# Patient Record
Sex: FEMALE | Race: WHITE | Employment: FULL TIME | URBAN - METROPOLITAN AREA
[De-identification: names, ages, dates, MRNs, and addresses within clinical notes are randomized per-mention and may not be internally consistent; named-entity substitution may affect disease eponyms.]

---

## 2018-01-17 PROBLEM — K56.609 BOWEL OBSTRUCTION (HCC): Status: ACTIVE | Noted: 2018-01-17

## 2018-01-18 PROBLEM — I87.2 VENOUS INSUFFICIENCY: Chronic | Status: ACTIVE | Noted: 2018-01-18

## 2018-01-18 PROBLEM — K56.609 SBO (SMALL BOWEL OBSTRUCTION) (HCC): Status: ACTIVE | Noted: 2018-01-18

## 2018-01-18 PROBLEM — E87.6 HYPOKALEMIA: Status: ACTIVE | Noted: 2018-01-18

## 2018-01-18 PROBLEM — E66.01 MORBID OBESITY (HCC): Chronic | Status: ACTIVE | Noted: 2018-01-18

## 2018-01-19 PROBLEM — K56.609 SMALL BOWEL OBSTRUCTION (HCC): Status: RESOLVED | Noted: 2018-01-17 | Resolved: 2018-01-19

## 2019-07-15 ENCOUNTER — OFFICE VISIT (OUTPATIENT)
Dept: ORTHOPEDIC SURGERY | Age: 49
End: 2019-07-15
Payer: COMMERCIAL

## 2019-07-15 VITALS — HEIGHT: 63 IN | TEMPERATURE: 98 F | BODY MASS INDEX: 25.69 KG/M2 | WEIGHT: 145 LBS

## 2019-07-15 DIAGNOSIS — M76.829 PTTD (POSTERIOR TIBIAL TENDON DYSFUNCTION): Primary | ICD-10-CM

## 2019-07-15 PROCEDURE — 99203 OFFICE O/P NEW LOW 30 MIN: CPT | Performed by: ORTHOPAEDIC SURGERY

## 2019-07-15 RX ORDER — OMEPRAZOLE 40 MG/1
CAPSULE, DELAYED RELEASE ORAL
Refills: 5 | COMMUNITY
Start: 2019-07-02 | End: 2021-02-17 | Stop reason: ALTCHOICE

## 2019-07-15 RX ORDER — ARIPIPRAZOLE 10 MG/1
TABLET ORAL
Refills: 0 | COMMUNITY
Start: 2019-06-14 | End: 2021-02-17 | Stop reason: ALTCHOICE

## 2019-07-15 RX ORDER — CYCLOBENZAPRINE HCL 10 MG
TABLET ORAL
Refills: 0 | COMMUNITY
Start: 2019-07-02 | End: 2021-02-17 | Stop reason: ALTCHOICE

## 2019-07-15 RX ORDER — BUMETANIDE 1 MG/1
TABLET ORAL
Refills: 5 | COMMUNITY
Start: 2019-07-02

## 2019-07-15 RX ORDER — HYDROXYZINE PAMOATE 25 MG/1
CAPSULE ORAL
Refills: 0 | COMMUNITY
Start: 2019-07-05 | End: 2021-02-24 | Stop reason: DRUGHIGH

## 2019-07-15 NOTE — PROGRESS NOTES
Chief Complaint   Patient presents with    Ankle Pain     Left Ankle, x 1 year       Marcelo Rushing is a 50 y.o.  @female@ who presents today with a bilateral ankle injury. The injury occurred 1 years. The history of injury was from no. The patient complains of pain over ptt. The intensity is 8/10. The pain is described as: sharp. Previous treatment includes: nothing. Past Medical History:   Diagnosis Date    Bursitis     hips    Diverticulitis     GERD (gastroesophageal reflux disease)      Past Surgical History:   Procedure Laterality Date    CHOLECYSTECTOMY      COLECTOMY      HERNIA REPAIR      HERNIA REPAIR  08/25/2015    incarcerated hernia repair    HYSTERECTOMY      NERVE BLOCK      sacroiliac bilateral 12-    PELVIC FRACTURE SURGERY         Current Outpatient Medications:     hydrOXYzine (VISTARIL) 25 MG capsule, TAKE 1 CAPSULE BY ORAL ROUTE 3 TIMES EVERY DAY AS NEEDED, Disp: , Rfl: 0    omeprazole (PRILOSEC) 40 MG delayed release capsule, TAKE ONE CAPSULE EVERY DAY, Disp: , Rfl: 5    cyclobenzaprine (FLEXERIL) 10 MG tablet, TAKE ONE TABLET 2 TIMES A DAY IF NEEDED CAUSES DROWSINESS,NO ALCOHOL, Disp: , Rfl: 0    ARIPiprazole (ABILIFY) 10 MG tablet, TAKE 1/2 TAB FOR 7 DAYS AND THEN 1 TABLET BY ORAL ROUTE EVERY DAY WITH 350 CALORIES OF FOOD, Disp: , Rfl: 0    bumetanide (BUMEX) 1 MG tablet, TAKE ONE TABLET 2 TIMES A DAY, Disp: , Rfl: 5    pantoprazole (PROTONIX) 40 MG tablet, Take 40 mg by mouth daily.   , Disp: , Rfl:   Allergies   Allergen Reactions    Ibuprofen Hives    Morphine Hives and Anxiety     Social History     Socioeconomic History    Marital status:      Spouse name: Not on file    Number of children: Not on file    Years of education: Not on file    Highest education level: Not on file   Occupational History    Not on file   Social Needs    Financial resource strain: Not on file    Food insecurity:     Worry: Not on file     Inability: Not on file    Transportation needs:     Medical: Not on file     Non-medical: Not on file   Tobacco Use    Smoking status: Current Every Day Smoker     Packs/day: 0.50     Types: Cigarettes    Smokeless tobacco: Never Used   Substance and Sexual Activity    Alcohol use: No    Drug use: No    Sexual activity: Not on file   Lifestyle    Physical activity:     Days per week: Not on file     Minutes per session: Not on file    Stress: Not on file   Relationships    Social connections:     Talks on phone: Not on file     Gets together: Not on file     Attends [employer] service: Not on file     Active member of club or organization: Not on file     Attends meetings of clubs or organizations: Not on file     Relationship status: Not on file    Intimate partner violence:     Fear of current or ex partner: Not on file     Emotionally abused: Not on file     Physically abused: Not on file     Forced sexual activity: Not on file   Other Topics Concern    Not on file   Social History Narrative    Not on file     Family History   Problem Relation Age of Onset    Osteoarthritis Other     Diabetes Other        REVIEW OF SYSTEMS:     General/Constitution:  (-)weight loss, (-)fever, (-)chills, (-)weakness. Skin: (-) rash,(-) psoriasis,(-) eczema, (-)skin cancer. Musculoskeletal: (-) fractures,  (-) dislocations,(-) collagen vascular disease, (-) fibromyalgia, (-) multiple sclerosis, (-) muscular dystrophy, (-) RSD,(-) joint pain (-)swelling, (-) joint pain,swelling. Neurologic: (-) epilepsy, (-)seizures,(-) brain tumor,(-) TIA, (-)stroke, (-)headaches, (-)Parkinson disease,(-) memory loss, (-) LOC. Cardiovascular: (-) Chest pain, (-) swelling in legs/feet, (-) SOB, (-) cramping in legs/feet with walking. Respiratory: (-) SOB, (-) Coughing, (-) night sweats. GI: (-) nausea, (-) vomiting, (-) diarrhea, (-) blood in stool, (-) gastric ulcer.   Psychiatric: (-) Depression, (-) Anxiety, (-) bipolar disease, (-) ligamentous instability, there is not  deformity noted. Knee exam: the injured knee reveals normal exam, no swelling, tenderness, instability; ligaments intact, FROM. Knee exam: neither positive for moderate crepitations, some mild tenderness laxity is not noted with  stress. Ankle Exam:    Upon inspection and palpation of the Bilateral ankle,  there is not deformity noted,  no swelling, no ecchymosis, does not have pain on palpation of posterior tibial tendon. ROM R/L : DF 10/10; PF 30/30; INV 15/15, SANTIAGO 15/15. This exam was compared bilaterally. Right Ankle:   (-) Anterior Drawer ,  (-) Posterior Drawer ,  (-) Squeeze test,  (-) External Rotation, (-) Eversion test , (-) Zabala Test     Left ankle:   (-) Anterior Drawer ,(-)  Posterior Drawer ,(-) Squeeze test,(-) External Rotation (-) Eversion test, (-) Zabala Test.      Foot exam- visual inspection reveals warm, good capillary refill, there is  pain to palpation over the posterior tibial tendon. ROM inversion/eversion diminished range of motion, abduction/adduction diminished range of motion, ROM in MTP/PIP/DIP diminished range of motion. Xrays:n/a  Radiographic findings reviewed with patient      Impression:  Encounter Diagnosis   Name Primary?  PTTD (posterior tibial tendon dysfunction) Yes         Plan:Natural history and expected course discussed. Questions answered. Rest, ice, compression, elevation (RICE) therapy.    refer to [employer]

## 2021-02-17 ENCOUNTER — HOSPITAL ENCOUNTER (EMERGENCY)
Age: 51
Discharge: HOME | End: 2021-02-17
Payer: MEDICARE

## 2021-02-17 VITALS
BODY MASS INDEX: 35.44 KG/M2 | WEIGHT: 200 LBS | HEIGHT: 63 IN | SYSTOLIC BLOOD PRESSURE: 118 MMHG | DIASTOLIC BLOOD PRESSURE: 80 MMHG | TEMPERATURE: 98.7 F | HEART RATE: 86 BPM | RESPIRATION RATE: 20 BRPM | OXYGEN SATURATION: 94 %

## 2021-02-17 DIAGNOSIS — R10.9 ABDOMINAL PAIN, UNSPECIFIED ABDOMINAL LOCATION: Primary | ICD-10-CM

## 2021-02-17 DIAGNOSIS — R53.83 FATIGUE, UNSPECIFIED TYPE: ICD-10-CM

## 2021-02-17 PROCEDURE — 99211 OFF/OP EST MAY X REQ PHY/QHP: CPT

## 2021-02-17 RX ORDER — PAROXETINE 10 MG/1
10 TABLET, FILM COATED ORAL EVERY MORNING
COMMUNITY

## 2021-02-17 RX ORDER — PREDNISONE 1 MG/1
1 TABLET ORAL DAILY
COMMUNITY
Start: 2021-02-15 | End: 2021-02-19

## 2021-02-17 ASSESSMENT — PAIN DESCRIPTION - PROGRESSION
CLINICAL_PROGRESSION: NOT CHANGED
CLINICAL_PROGRESSION: GRADUALLY WORSENING

## 2021-02-17 ASSESSMENT — PAIN DESCRIPTION - FREQUENCY: FREQUENCY: CONTINUOUS

## 2021-02-17 ASSESSMENT — PAIN DESCRIPTION - LOCATION
LOCATION: ABDOMEN
LOCATION: ABDOMEN

## 2021-02-17 ASSESSMENT — PAIN DESCRIPTION - PAIN TYPE: TYPE: ACUTE PAIN

## 2021-02-17 ASSESSMENT — PAIN - FUNCTIONAL ASSESSMENT
PAIN_FUNCTIONAL_ASSESSMENT: ACTIVITIES ARE NOT PREVENTED
PAIN_FUNCTIONAL_ASSESSMENT: ACTIVITIES ARE NOT PREVENTED

## 2021-02-17 ASSESSMENT — PAIN SCALES - GENERAL: PAINLEVEL_OUTOF10: 10

## 2021-02-17 ASSESSMENT — PAIN DESCRIPTION - ONSET: ONSET: ON-GOING

## 2021-02-17 NOTE — ED PROVIDER NOTES
Department of Emergency [address] [facility]  Provider Note  Admit Date/Time: 2021  3:02 PM  Room:   NAME: Darrold Bernheim  : 1970  MRN: [id number]     Chief Complaint:  Shortness of Breath, Fatigue, and Abdominal Pain    History of Present Illness        Darrold Bernheim is a 48 y.o. female who has a past medical history of:   Past Medical History:   Diagnosis Date    Bursitis     hips    Diverticulitis     GERD (gastroesophageal reflux disease)     presents to the urgent care center by private car for complaints of extreme fatigue and abdominal pain. She says that she cannot stay awake. She says that she cannot eat or drink. This has been going on for 2 days. Her last BM was 2 days ago. She says that she has a history of hernias and bowel obstruction. Pain is in the upper abdomen. She believes that she is dehydrated. ROS    Pertinent positives and negatives are stated within HPI, all other systems reviewed and are negative. Past Surgical History:   Procedure Laterality Date    CHOLECYSTECTOMY      COLECTOMY      HERNIA REPAIR      HERNIA REPAIR  2015    incarcerated hernia repair    HYSTERECTOMY      NERVE BLOCK      sacroiliac bilateral 2012    PELVIC FRACTURE SURGERY     Social History:  reports that she has been smoking cigarettes. She has been smoking about 0.50 packs per day. She has never used smokeless tobacco. She reports that she does not drink alcohol or use drugs. Family History: family history includes Diabetes in an other family member; Osteoarthritis in an other family member.   Allergies: Ibuprofen and Morphine    Physical Exam   Oxygen Saturation Interpretation: Normal.   ED Triage Vitals [21 1502]   BP Temp Temp Source Pulse Resp SpO2 Height Weight   118/80 98.7 °F (37.1 °C) Temporal 86 20 94 % 5' 3\" (1.6 m) 200 lb (90.7 kg)       Physical Exam  · Constitutional/General: She was sleeping on the exam table when I entered the room she aroused easily. States she is just very fatigued and cannot stay awake. · HEENT:  NC/NT, clear conjunctiva Airway patent. · Neck: Supple, full ROM,  · Respiratory: Lungs clear to auscultation bilaterally, no wheezes, rales, or rhonchi. Not in respiratory distress  · CV:  Regular rate. Regular rhythm. No murmurs, gallops, or rubs. 2+ distal pulses    · GI:  Abdomen Soft, tender throughout the upper abdomen, multiple abdominal scars  · Musculoskeletal: Moves all extremities x 4. Warm and well perfused, no clubbing, cyanosis, or edema. Capillary refill <3 seconds  · Integument: skin warm and dry. No rashes. · Lymphatic: no lymphadenopathy noted  · Neurologic: GCS 15, no focal deficits,     Lab / Imaging Results   (All laboratory and radiology results have been personally reviewed by myself)  Labs:  No results found for this visit on 02/17/21. Imaging: All Radiology results interpreted by Radiologist unless otherwise noted. No orders to display       ED Course / Medical Decision Making   Medications - No data to display       MDM:   With extreme pain in the upper abdomen. She said she has had this before and it was a bowel obstruction and they had to admit her an  treat her with an NG tube and rest.  She said she has not been able to eat or drink for 2 days she is very fatigued , her last bowel movement was 2 days ago. Says she has also has a history of multiple hernia repairs and surgeries. She denies any chest pain or fever. I advised the patient that she will need to go to the Emergency Department for evaluation of her symptoms. Her friend drove her. She does not want ambulance transfer. Assessment      1. Abdominal pain, unspecified abdominal location    2.  Fatigue, unspecified type      Plan   Advised to go to the Emergency Department for evaluation  New Medications     New Prescriptions    No medications on file     Electronically signed by BOB Odell - POLO   DD:

## 2021-02-20 ENCOUNTER — APPOINTMENT (OUTPATIENT)
Dept: ULTRASOUND IMAGING | Age: 51
DRG: 254 | End: 2021-02-20
Payer: MEDICARE

## 2021-02-20 ENCOUNTER — APPOINTMENT (OUTPATIENT)
Dept: CT IMAGING | Age: 51
DRG: 254 | End: 2021-02-20
Payer: MEDICARE

## 2021-02-20 ENCOUNTER — HOSPITAL ENCOUNTER (INPATIENT)
Age: 51
LOS: 2 days | Discharge: HOME | DRG: 254 | End: 2021-02-22
Attending: EMERGENCY MEDICINE | Admitting: SURGERY
Payer: MEDICARE

## 2021-02-20 DIAGNOSIS — K56.609 SMALL BOWEL OBSTRUCTION (HCC): ICD-10-CM

## 2021-02-20 DIAGNOSIS — K43.6 INCARCERATED VENTRAL HERNIA: Primary | ICD-10-CM

## 2021-02-20 DIAGNOSIS — J02.0 STREPTOCOCCAL SORE THROAT: ICD-10-CM

## 2021-02-20 PROBLEM — K46.0 INCARCERATED HERNIA: Status: ACTIVE | Noted: 2021-02-20

## 2021-02-20 LAB
ALBUMIN SERPL-MCNC: 4.1 G/DL (ref 3.5–5.2)
ALP BLD-CCNC: 71 U/L (ref 35–104)
ALT SERPL-CCNC: 105 U/L (ref 0–32)
ANION GAP SERPL CALCULATED.3IONS-SCNC: 15 MMOL/L (ref 7–16)
AST SERPL-CCNC: 85 U/L (ref 0–31)
BACTERIA: ABNORMAL /HPF
BASOPHILS ABSOLUTE: 0.04 E9/L (ref 0–0.2)
BASOPHILS RELATIVE PERCENT: 0.6 % (ref 0–2)
BILIRUB SERPL-MCNC: 0.3 MG/DL (ref 0–1.2)
BILIRUBIN URINE: NEGATIVE
BLOOD, URINE: ABNORMAL
BUN BLDV-MCNC: 9 MG/DL (ref 6–20)
CALCIUM SERPL-MCNC: 8.2 MG/DL (ref 8.6–10.2)
CHLORIDE BLD-SCNC: 100 MMOL/L (ref 98–107)
CLARITY: ABNORMAL
CO2: 23 MMOL/L (ref 22–29)
COLOR: YELLOW
CREAT SERPL-MCNC: 0.7 MG/DL (ref 0.5–1)
EOSINOPHILS ABSOLUTE: 0.15 E9/L (ref 0.05–0.5)
EOSINOPHILS RELATIVE PERCENT: 2.2 % (ref 0–6)
EPITHELIAL CELLS, UA: ABNORMAL /HPF
GFR AFRICAN AMERICAN: >60
GFR NON-AFRICAN AMERICAN: >60 ML/MIN/1.73
GLUCOSE BLD-MCNC: 86 MG/DL (ref 74–99)
GLUCOSE URINE: NEGATIVE MG/DL
HCT VFR BLD CALC: 47.6 % (ref 34–48)
HEMOGLOBIN: 16.1 G/DL (ref 11.5–15.5)
IMMATURE GRANULOCYTES #: 0.06 E9/L
IMMATURE GRANULOCYTES %: 0.9 % (ref 0–5)
KETONES, URINE: NEGATIVE MG/DL
LACTIC ACID: 2.7 MMOL/L (ref 0.5–2.2)
LEUKOCYTE ESTERASE, URINE: ABNORMAL
LIPASE: 82 U/L (ref 13–60)
LYMPHOCYTES ABSOLUTE: 1.53 E9/L (ref 1.5–4)
LYMPHOCYTES RELATIVE PERCENT: 22.4 % (ref 20–42)
MCH RBC QN AUTO: 32.7 PG (ref 26–35)
MCHC RBC AUTO-ENTMCNC: 33.8 % (ref 32–34.5)
MCV RBC AUTO: 96.6 FL (ref 80–99.9)
MONOCYTES ABSOLUTE: 0.48 E9/L (ref 0.1–0.95)
MONOCYTES RELATIVE PERCENT: 7 % (ref 2–12)
NEUTROPHILS ABSOLUTE: 4.56 E9/L (ref 1.8–7.3)
NEUTROPHILS RELATIVE PERCENT: 66.9 % (ref 43–80)
NITRITE, URINE: NEGATIVE
PDW BLD-RTO: 16.1 FL (ref 11.5–15)
PH UA: 5.5 (ref 5–9)
PLATELET # BLD: 235 E9/L (ref 130–450)
PMV BLD AUTO: 9.4 FL (ref 7–12)
POTASSIUM REFLEX MAGNESIUM: 4.4 MMOL/L (ref 3.5–5)
PROTEIN UA: NEGATIVE MG/DL
RBC # BLD: 4.93 E12/L (ref 3.5–5.5)
RBC UA: ABNORMAL /HPF (ref 0–2)
SARS-COV-2, NAAT: NOT DETECTED
SODIUM BLD-SCNC: 138 MMOL/L (ref 132–146)
SPECIFIC GRAVITY UA: 1.02 (ref 1–1.03)
STREP GRP A PCR: POSITIVE
TOTAL PROTEIN: 6.6 G/DL (ref 6.4–8.3)
TROPONIN: <0.01 NG/ML (ref 0–0.03)
UROBILINOGEN, URINE: 0.2 E.U./DL
WBC # BLD: 6.8 E9/L (ref 4.5–11.5)
WBC UA: ABNORMAL /HPF (ref 0–5)

## 2021-02-20 PROCEDURE — 96374 THER/PROPH/DIAG INJ IV PUSH: CPT

## 2021-02-20 PROCEDURE — 96361 HYDRATE IV INFUSION ADD-ON: CPT

## 2021-02-20 PROCEDURE — C9113 INJ PANTOPRAZOLE SODIUM, VIA: HCPCS | Performed by: STUDENT IN AN ORGANIZED HEALTH CARE EDUCATION/TRAINING PROGRAM

## 2021-02-20 PROCEDURE — 1200000000 HC SEMI PRIVATE

## 2021-02-20 PROCEDURE — 2580000003 HC RX 258: Performed by: EMERGENCY MEDICINE

## 2021-02-20 PROCEDURE — 6360000002 HC RX W HCPCS: Performed by: SURGERY

## 2021-02-20 PROCEDURE — 93005 ELECTROCARDIOGRAM TRACING: CPT | Performed by: STUDENT IN AN ORGANIZED HEALTH CARE EDUCATION/TRAINING PROGRAM

## 2021-02-20 PROCEDURE — 6360000004 HC RX CONTRAST MEDICATION: Performed by: RADIOLOGY

## 2021-02-20 PROCEDURE — 96375 TX/PRO/DX INJ NEW DRUG ADDON: CPT

## 2021-02-20 PROCEDURE — 80053 COMPREHEN METABOLIC PANEL: CPT

## 2021-02-20 PROCEDURE — 83690 ASSAY OF LIPASE: CPT

## 2021-02-20 PROCEDURE — 71275 CT ANGIOGRAPHY CHEST: CPT

## 2021-02-20 PROCEDURE — 83605 ASSAY OF LACTIC ACID: CPT

## 2021-02-20 PROCEDURE — 74177 CT ABD & PELVIS W/CONTRAST: CPT

## 2021-02-20 PROCEDURE — 85025 COMPLETE CBC W/AUTO DIFF WBC: CPT

## 2021-02-20 PROCEDURE — 6360000002 HC RX W HCPCS: Performed by: STUDENT IN AN ORGANIZED HEALTH CARE EDUCATION/TRAINING PROGRAM

## 2021-02-20 PROCEDURE — 81001 URINALYSIS AUTO W/SCOPE: CPT

## 2021-02-20 PROCEDURE — 99285 EMERGENCY DEPT VISIT HI MDM: CPT

## 2021-02-20 PROCEDURE — 84484 ASSAY OF TROPONIN QUANT: CPT

## 2021-02-20 PROCEDURE — 2580000003 HC RX 258: Performed by: SURGERY

## 2021-02-20 PROCEDURE — 87635 SARS-COV-2 COVID-19 AMP PRB: CPT

## 2021-02-20 PROCEDURE — 2500000003 HC RX 250 WO HCPCS: Performed by: STUDENT IN AN ORGANIZED HEALTH CARE EDUCATION/TRAINING PROGRAM

## 2021-02-20 PROCEDURE — 2580000003 HC RX 258: Performed by: STUDENT IN AN ORGANIZED HEALTH CARE EDUCATION/TRAINING PROGRAM

## 2021-02-20 PROCEDURE — 87880 STREP A ASSAY W/OPTIC: CPT

## 2021-02-20 PROCEDURE — 93971 EXTREMITY STUDY: CPT

## 2021-02-20 RX ORDER — ONDANSETRON 4 MG/1
4 TABLET, ORALLY DISINTEGRATING ORAL EVERY 8 HOURS PRN
Status: DISCONTINUED | OUTPATIENT
Start: 2021-02-20 | End: 2021-02-22 | Stop reason: HOSPADM

## 2021-02-20 RX ORDER — MEPERIDINE HYDROCHLORIDE 25 MG/ML
25 INJECTION INTRAMUSCULAR; INTRAVENOUS; SUBCUTANEOUS EVERY 4 HOURS PRN
Status: DISCONTINUED | OUTPATIENT
Start: 2021-02-20 | End: 2021-02-22 | Stop reason: HOSPADM

## 2021-02-20 RX ORDER — SODIUM CHLORIDE 0.9 % (FLUSH) 0.9 %
10 SYRINGE (ML) INJECTION PRN
Status: DISCONTINUED | OUTPATIENT
Start: 2021-02-20 | End: 2021-02-22 | Stop reason: HOSPADM

## 2021-02-20 RX ORDER — SODIUM CHLORIDE 0.9 % (FLUSH) 0.9 %
10 SYRINGE (ML) INJECTION EVERY 12 HOURS SCHEDULED
Status: DISCONTINUED | OUTPATIENT
Start: 2021-02-20 | End: 2021-02-20 | Stop reason: SDUPTHER

## 2021-02-20 RX ORDER — ONDANSETRON 2 MG/ML
4 INJECTION INTRAMUSCULAR; INTRAVENOUS EVERY 6 HOURS PRN
Status: DISCONTINUED | OUTPATIENT
Start: 2021-02-20 | End: 2021-02-22 | Stop reason: HOSPADM

## 2021-02-20 RX ORDER — MORPHINE SULFATE 2 MG/ML
2 INJECTION, SOLUTION INTRAMUSCULAR; INTRAVENOUS
Status: DISCONTINUED | OUTPATIENT
Start: 2021-02-20 | End: 2021-02-20 | Stop reason: ALTCHOICE

## 2021-02-20 RX ORDER — SODIUM CHLORIDE, SODIUM LACTATE, POTASSIUM CHLORIDE, CALCIUM CHLORIDE 600; 310; 30; 20 MG/100ML; MG/100ML; MG/100ML; MG/100ML
INJECTION, SOLUTION INTRAVENOUS CONTINUOUS
Status: DISCONTINUED | OUTPATIENT
Start: 2021-02-20 | End: 2021-02-22

## 2021-02-20 RX ORDER — 0.9 % SODIUM CHLORIDE 0.9 %
1000 INTRAVENOUS SOLUTION INTRAVENOUS ONCE
Status: COMPLETED | OUTPATIENT
Start: 2021-02-20 | End: 2021-02-20

## 2021-02-20 RX ORDER — ONDANSETRON 2 MG/ML
4 INJECTION INTRAMUSCULAR; INTRAVENOUS ONCE
Status: COMPLETED | OUTPATIENT
Start: 2021-02-20 | End: 2021-02-20

## 2021-02-20 RX ORDER — PANTOPRAZOLE SODIUM 40 MG/10ML
40 INJECTION, POWDER, LYOPHILIZED, FOR SOLUTION INTRAVENOUS ONCE
Status: COMPLETED | OUTPATIENT
Start: 2021-02-20 | End: 2021-02-20

## 2021-02-20 RX ORDER — FENTANYL CITRATE 50 UG/ML
50 INJECTION, SOLUTION INTRAMUSCULAR; INTRAVENOUS ONCE
Status: COMPLETED | OUTPATIENT
Start: 2021-02-20 | End: 2021-02-20

## 2021-02-20 RX ORDER — ACETAMINOPHEN 325 MG/1
650 TABLET ORAL EVERY 4 HOURS PRN
Status: DISCONTINUED | OUTPATIENT
Start: 2021-02-20 | End: 2021-02-22 | Stop reason: HOSPADM

## 2021-02-20 RX ORDER — SODIUM CHLORIDE 0.9 % (FLUSH) 0.9 %
10 SYRINGE (ML) INJECTION EVERY 12 HOURS SCHEDULED
Status: DISCONTINUED | OUTPATIENT
Start: 2021-02-20 | End: 2021-02-22 | Stop reason: HOSPADM

## 2021-02-20 RX ORDER — MEPERIDINE HYDROCHLORIDE 50 MG/ML
50 INJECTION INTRAMUSCULAR; INTRAVENOUS; SUBCUTANEOUS EVERY 4 HOURS PRN
Status: DISCONTINUED | OUTPATIENT
Start: 2021-02-20 | End: 2021-02-22 | Stop reason: HOSPADM

## 2021-02-20 RX ORDER — SODIUM CHLORIDE 0.9 % (FLUSH) 0.9 %
10 SYRINGE (ML) INJECTION PRN
Status: DISCONTINUED | OUTPATIENT
Start: 2021-02-20 | End: 2021-02-20 | Stop reason: SDUPTHER

## 2021-02-20 RX ORDER — MORPHINE SULFATE 4 MG/ML
4 INJECTION, SOLUTION INTRAMUSCULAR; INTRAVENOUS
Status: DISCONTINUED | OUTPATIENT
Start: 2021-02-20 | End: 2021-02-20 | Stop reason: ALTCHOICE

## 2021-02-20 RX ADMIN — FENTANYL CITRATE 50 MCG: 50 INJECTION, SOLUTION INTRAMUSCULAR; INTRAVENOUS at 19:29

## 2021-02-20 RX ADMIN — MEPERIDINE HYDROCHLORIDE 50 MG: 50 INJECTION, SOLUTION INTRAMUSCULAR; INTRAVENOUS; SUBCUTANEOUS at 21:30

## 2021-02-20 RX ADMIN — METRONIDAZOLE 500 MG: 500 INJECTION, SOLUTION INTRAVENOUS at 19:22

## 2021-02-20 RX ADMIN — IOPAMIDOL 75 ML: 755 INJECTION, SOLUTION INTRAVENOUS at 17:50

## 2021-02-20 RX ADMIN — PANTOPRAZOLE SODIUM 40 MG: 40 INJECTION, POWDER, FOR SOLUTION INTRAVENOUS at 16:53

## 2021-02-20 RX ADMIN — Medication 10 ML: at 23:05

## 2021-02-20 RX ADMIN — SODIUM CHLORIDE 1000 ML: 9 INJECTION, SOLUTION INTRAVENOUS at 18:00

## 2021-02-20 RX ADMIN — IOHEXOL 50 ML: 240 INJECTION, SOLUTION INTRATHECAL; INTRAVASCULAR; INTRAVENOUS; ORAL at 17:50

## 2021-02-20 RX ADMIN — ONDANSETRON 4 MG: 2 INJECTION INTRAMUSCULAR; INTRAVENOUS at 20:30

## 2021-02-20 RX ADMIN — ONDANSETRON 4 MG: 2 INJECTION INTRAMUSCULAR; INTRAVENOUS at 16:51

## 2021-02-20 RX ADMIN — WATER 1000 MG: 1 INJECTION INTRAMUSCULAR; INTRAVENOUS; SUBCUTANEOUS at 19:24

## 2021-02-20 RX ADMIN — ENOXAPARIN SODIUM 40 MG: 40 INJECTION SUBCUTANEOUS at 19:27

## 2021-02-20 RX ADMIN — SODIUM CHLORIDE 1000 ML: 9 INJECTION, SOLUTION INTRAVENOUS at 16:50

## 2021-02-20 RX ADMIN — SODIUM CHLORIDE, POTASSIUM CHLORIDE, SODIUM LACTATE AND CALCIUM CHLORIDE: 600; 310; 30; 20 INJECTION, SOLUTION INTRAVENOUS at 23:04

## 2021-02-20 ASSESSMENT — ENCOUNTER SYMPTOMS
NAUSEA: 1
DIARRHEA: 1
ABDOMINAL PAIN: 1
EYES NEGATIVE: 1
SHORTNESS OF BREATH: 0
SORE THROAT: 1
VOMITING: 1
COUGH: 0

## 2021-02-20 ASSESSMENT — PAIN SCALES - GENERAL: PAINLEVEL_OUTOF10: 10

## 2021-02-20 ASSESSMENT — PAIN DESCRIPTION - PROGRESSION: CLINICAL_PROGRESSION: NOT CHANGED

## 2021-02-20 ASSESSMENT — PAIN - FUNCTIONAL ASSESSMENT: PAIN_FUNCTIONAL_ASSESSMENT: ACTIVITIES ARE NOT PREVENTED

## 2021-02-20 ASSESSMENT — PAIN DESCRIPTION - FREQUENCY: FREQUENCY: CONTINUOUS

## 2021-02-20 ASSESSMENT — PAIN DESCRIPTION - DESCRIPTORS: DESCRIPTORS: SHARP;BURNING

## 2021-02-20 ASSESSMENT — PAIN DESCRIPTION - ORIENTATION: ORIENTATION: MID;LOWER

## 2021-02-20 ASSESSMENT — PAIN DESCRIPTION - ONSET: ONSET: ON-GOING

## 2021-02-20 NOTE — ED NOTES
Bed: 14  Expected date:   Expected time:   Means of arrival:   Comments:  Rosales Klein RN  02/20/21 2650

## 2021-02-20 NOTE — ED PROVIDER NOTES
43-year-old female with a history of multiple abdominal hernias presenting for evaluation of left-sided abdominal pain. She states for the past week she has felt a burning in the left side of her abdomen and has had nausea, emesis, and diarrhea. She is also had a sore throat and a cough productive of green sputum. She states she has not been able to keep anything down for several days, and complains of generalized weakness. States she has been in bed for the past 4 days. She also complains of swelling in her left leg that she says is new. She endorses shortness of breath and dizziness and states she had blood-tinged emesis 2 days ago. She was seen at [facility] recently and told she had bowel loops stuck in her hernia. She denies fever, chest pain, dysuria, hematemesis, hematochezia, and melena. Review of Systems   Constitutional: Positive for fatigue. Negative for chills and fever. HENT: Positive for congestion and sore throat. Eyes: Negative. Respiratory: Negative for cough and shortness of breath. Cardiovascular: Positive for leg swelling. Negative for chest pain. Gastrointestinal: Positive for abdominal pain, diarrhea, nausea and vomiting. Endocrine: Negative. Genitourinary: Negative for dysuria and frequency. Musculoskeletal: Negative for neck pain and neck stiffness. Skin: Negative for rash and wound. Neurological: Positive for light-headedness and headaches. Negative for dizziness. Physical Exam  Constitutional:       General: She is not in acute distress. Appearance: She is obese. She is not toxic-appearing. Comments: Appears tired   HENT:      Head: Normocephalic. Mouth/Throat:      Comments: 3+ tonsillar swelling bilaterally with erythema  Postnasal drainage in posterior pharynx  Dry oral mucous membranes  Eyes:      General: No scleral icterus. Extraocular Movements: Extraocular movements intact.    Cardiovascular:      Rate and Rhythm: Normal rate and regular rhythm. Pulmonary:      Effort: Pulmonary effort is normal. No respiratory distress. Breath sounds: No stridor. Wheezing present. No rhonchi or rales. Comments: Mild wheezes bilaterally  Abdominal:      General: There is no distension. Palpations: Abdomen is soft. Tenderness: There is abdominal tenderness in the left upper quadrant and left lower quadrant. Hernia: No hernia is present. Skin:     General: Skin is warm and dry. Neurological:      General: No focal deficit present. Mental Status: She is alert. Procedures     MDM  Number of Diagnoses or Management Options  Incarcerated ventral hernia  Small bowel obstruction ([address]  Streptococcal sore throat  Diagnosis management comments: 27-year-old female with a history of multiple abdominal hernias presenting for evaluation of left-sided abdominal pain with nausea, emesis, and diarrhea. Also complaining of sore throat, productive cough, shortness of breath, and left leg swelling. No acute ischemic changes on EKG. Troponin negative. CBC unremarkable, CMP. Lactate 2.7, lipase 82. Trace leukocyte esterase and many bacteria on UA. Strep screen positive. CT abdomen/pelvis showed incarcerated ventral hernia and SBO. US showed no DVT in L leg. Due to patient's history of pulmonary nodules and recent immobilization, as well as complaints of shortness of breath, CTA pulmonary was performed. No PE detected, but did show some groundglass opacities in bases. Rapid COVID negative. Dr. Chet Harding was consulted and agreed to admit patient. She was given rocephin and flagyl in the ED, as well as fluids, zofran, and protonix. NG tube was placed. She remained hemodynamically stable in the ED and was admitted to [facility].        ED Course as of Feb 21 1458   Sat Feb 20, 2021   1543 ATTENDING PROVIDER ATTESTATION:     I have personally performed and/or participated in the history, exam, medical decision making, and procedures and agree with all pertinent clinical information unless otherwise noted. I have also reviewed and agree with the past medical, family and social history unless otherwise noted. I have discussed this patient in detail with the resident, and provided the instruction and education regarding patient here with multiple complaints, she is complaining of abdominal pain with nausea and vomiting and diarrhea as well as generalized fatigue and weakness and body swelling and coughing short of breath. She basically has a positive review of all systems. symptoms have all been present for about 4 -5 days. No actual fevers. States she had a negative Covid test and was recently seen at a local hospital and worked up and discharged. She states nothing really makes her better or worse. .  My findings/plan: Patient is laying in the bed in no distress, heart rate regular, lungs with mild scattered wheezing with no respiratory distress. She has no jaundice or icterus. Abdomen soft and while she is talking there is no tenderness and then as she focuses on the exam she complains of pain at various areas of palpation which seems to migrate during the exam.  No pulsatile masses. No focal or consistent area of tenderness in the abdomen. No calf pain bilaterally. She is neurovascular intact all extremities. Very dramatic in presentation. [NC]   4636 Pt lying in bed. Complaining of pain. Just got back from [address] and is just now getting medications. Will continue to monitor.    [AP]   5655 Notified by nurse that while patient was sleeping, she desatted to 86% on room air. He woke her up and applied 3 L NC O2 and sats improved to 94%. Patient in CT at this time.    [AP]   [address] to Dr. Rambo Horan, discussed case. He is agreeable to admission.     [AP]      ED Course User Index  [AP] Jon Antonio MD  [NC] Chanel Nieto, DO          ED Course as of Feb 21 1508   Sat Feb 20, 2021   7497 ATTENDING PROVIDER ATTESTATION: I have personally performed and/or participated in the history, exam, medical decision making, and procedures and agree with all pertinent clinical information unless otherwise noted. I have also reviewed and agree with the past medical, family and social history unless otherwise noted. I have discussed this patient in detail with the resident, and provided the instruction and education regarding patient here with multiple complaints, she is complaining of abdominal pain with nausea and vomiting and diarrhea as well as generalized fatigue and weakness and body swelling and coughing short of breath. She basically has a positive review of all systems. symptoms have all been present for about 4 -5 days. No actual fevers. States she had a negative Covid test and was recently seen at a local hospital and worked up and discharged. She states nothing really makes her better or worse. .  My findings/plan: Patient is laying in the bed in no distress, heart rate regular, lungs with mild scattered wheezing with no respiratory distress. She has no jaundice or icterus. Abdomen soft and while she is talking there is no tenderness and then as she focuses on the exam she complains of pain at various areas of palpation which seems to migrate during the exam.  No pulsatile masses. No focal or consistent area of tenderness in the abdomen. No calf pain bilaterally. She is neurovascular intact all extremities. Very dramatic in presentation. [NC]   1974 Pt lying in bed. Complaining of pain. Just got back from [address] and is just now getting medications. Will continue to monitor.    [AP]   9635 Notified by nurse that while patient was sleeping, she desatted to 86% on room air. He woke her up and applied 3 L NC O2 and sats improved to 94%. Patient in CT at this time.    [AP]   [address] to Dr. Carolynn Khalil, discussed case. He is agreeable to admission.     [AP]      ED Course User Index  [AP] Poppy Edwards MD  [NC] Mile Carreon , DO       --------------------------------------------- PAST HISTORY ---------------------------------------------  Past Medical History:  has a past medical history of Bursitis, Class 3 severe obesity due to excess calories with body mass index (BMI) of 40.0 to 44.9 in adult [facility]), Diverticulitis, GERD (gastroesophageal reflux disease), Incisional hernia with obstruction but no gangrene, and Strep throat. Past Surgical History:  has a past surgical history that includes Hysterectomy; Cholecystectomy; Pelvic fracture surgery; colectomy (2016); Nerve Block; hernia repair; hernia repair (2015); ventral hernia repair (2015); and  section. Social History:  reports that she has been smoking cigarettes. She has a 30.00 pack-year smoking history. She has never used smokeless tobacco. She reports that she does not drink alcohol or use drugs. Family History: family history includes Diabetes in an other family member; Osteoarthritis in an other family member; Other in her father; Stroke in her brother. The patients home medications have been reviewed.     Allergies: Ibuprofen, Nsaids, and Morphine    -------------------------------------------------- RESULTS -------------------------------------------------    LABS:  Results for orders placed or performed during the hospital encounter of 21   Strep screen group a throat    Specimen: Throat   Result Value Ref Range    Strep Grp A PCR POSITIVE Negative   COVID-19, Rapid   Result Value Ref Range    SARS-CoV-2, NAAT Not Detected Not Detected   CBC Auto Differential   Result Value Ref Range    WBC 6.8 4.5 - 11.5 E9/L    RBC 4.93 3.50 - 5.50 E12/L    Hemoglobin 16.1 (H) 11.5 - 15.5 g/dL    Hematocrit 47.6 34.0 - 48.0 %    MCV 96.6 80.0 - 99.9 fL    MCH 32.7 26.0 - 35.0 pg    MCHC 33.8 32.0 - 34.5 %    RDW 16.1 (H) 11.5 - 15.0 fL    Platelets 298 524 - 394 E9/L    MPV 9.4 7.0 - 12.0 fL    Neutrophils % 66.9 43.0 - 80.0 % Bacteria, UA MANY (A) None Seen /HPF   CBC   Result Value Ref Range    WBC 8.2 4.5 - 11.5 E9/L    RBC 4.49 3.50 - 5.50 E12/L    Hemoglobin 14.5 11.5 - 15.5 g/dL    Hematocrit 44.3 34.0 - 48.0 %    MCV 98.7 80.0 - 99.9 fL    MCH 32.3 26.0 - 35.0 pg    MCHC 32.7 32.0 - 34.5 %    RDW 16.0 (H) 11.5 - 15.0 fL    Platelets 969 348 - 568 E9/L    MPV 9.9 7.0 - 12.0 fL   Comprehensive Metabolic Panel w/ Reflex to MG   Result Value Ref Range    Sodium 137 132 - 146 mmol/L    Potassium reflex Magnesium 4.4 3.5 - 5.0 mmol/L    Chloride 101 98 - 107 mmol/L    CO2 22 22 - 29 mmol/L    Anion Gap 14 7 - 16 mmol/L    Glucose 77 74 - 99 mg/dL    BUN 9 6 - 20 mg/dL    CREATININE 0.7 0.5 - 1.0 mg/dL    GFR Non-African American >60 >=60 mL/min/1.73    GFR African American >60     Calcium 8.0 (L) 8.6 - 10.2 mg/dL    Total Protein 6.5 6.4 - 8.3 g/dL    Albumin 3.9 3.5 - 5.2 g/dL    Total Bilirubin 0.4 0.0 - 1.2 mg/dL    Alkaline Phosphatase 66 35 - 104 U/L     (H) 0 - 32 U/L    AST 79 (H) 0 - 31 U/L   EKG 12 Lead   Result Value Ref Range    Ventricular Rate 85 BPM    Atrial Rate 85 BPM    P-R Interval 132 ms    QRS Duration 90 ms    Q-T Interval 386 ms    QTc Calculation (Bazett) 459 ms    P Axis 58 degrees    R Axis 100 degrees    T Axis 51 degrees       RADIOLOGY:  CTA PULMONARY W CONTRAST   Final Result   No central pulmonary embolism or aortic dissection. COPD with minimal atelectasis and ground-glass densities in the lung bases. Clinical assessment is recommended to evaluate for infectious/inflammatory   process. 9 mm subpleural nodule in the left base. Consider surveillance with repeat   CT scan in 8-10 weeks. CT ABDOMEN AND PELVIS. Comparison 01/18/2018   Findings. There is hepatomegaly with liver measuring 19 cm which is diffusely fatty. Gallbladder is absent.   Spleen, pancreas, the adrenals and the kidneys are   normal.  There is moderately dilated fluid-filled proximal small bowel loops   measuring up to 3. 5 cm. A ventral hernia is identified with herniation of   small bowel loops which are incarcerated resulting and bowel obstruction. Degenerative changes are identified in the lumbar spine with a grade 1   spondylolisthesis at L4-L5. Pelvis. The bladder is distended. Colon is collapsed. The appendix is   normal.   Impression   Moderate  small-bowel obstruction with dilated proximal small bowel loops due   to incarcerated ventral hernia with herniated and incarcerated small bowel   loops. CT ABDOMEN PELVIS W IV CONTRAST Additional Contrast? Oral   Final Result   No central pulmonary embolism or aortic dissection. COPD with minimal atelectasis and ground-glass densities in the lung bases. Clinical assessment is recommended to evaluate for infectious/inflammatory   process. 9 mm subpleural nodule in the left base. Consider surveillance with repeat   CT scan in 8-10 weeks. CT ABDOMEN AND PELVIS. Comparison 01/18/2018   Findings. There is hepatomegaly with liver measuring 19 cm which is diffusely fatty. Gallbladder is absent. Spleen, pancreas, the adrenals and the kidneys are   normal.  There is moderately dilated fluid-filled proximal small bowel loops   measuring up to 3.5 cm. A ventral hernia is identified with herniation of   small bowel loops which are incarcerated resulting and bowel obstruction. Degenerative changes are identified in the lumbar spine with a grade 1   spondylolisthesis at L4-L5. Pelvis. The bladder is distended. Colon is collapsed. The appendix is   normal.   Impression   Moderate  small-bowel obstruction with dilated proximal small bowel loops due   to incarcerated ventral hernia with herniated and incarcerated small bowel   loops. US DUP LOWER EXTREMITY LEFT CALEB   Final Result   No evidence of DVT in the left lower extremity. EKG: This EKG is signed and interpreted by me.     Rate: 85   Rhythm: Sinus  Interpretation: non-specific EKG  Comparison: no previous EKG available      ------------------------- NURSING NOTES AND VITALS REVIEWED ---------------------------  Date / Time Roomed:  2/20/2021  3:15 PM  ED Bed Assignment:  0331/0331-01    The nursing notes within the ED encounter and vital signs as below have been reviewed. Patient Vitals for the past 24 hrs:   BP Temp Temp src Pulse Resp SpO2 Height Weight   02/21/21 0759 (!) 154/87 98.4 °F (36.9 °C) Oral 74 16 97 % -- --   02/21/21 0715 -- -- -- -- -- 96 % -- --   02/20/21 2041 113/69 97 °F (36.1 °C) Oral 76 16 97 % 5' 2\" (1.575 m) --   02/20/21 1941 116/63 98 °F (36.7 °C) -- 83 12 97 % -- --   02/20/21 1821 (!) 100/53 97.2 °F (36.2 °C) -- 81 27 98 % -- --   02/20/21 1725 121/77 -- -- -- -- -- -- --   02/20/21 1709 -- 97.2 °F (36.2 °C) -- 77 12 94 % 5' 3\" (1.6 m) 240 lb (108.9 kg)   02/20/21 1520 133/83 97.2 °F (36.2 °C) Oral 78 20 93 % -- --       Oxygen Saturation Interpretation: Normal    ------------------------------------------ PROGRESS NOTES ------------------------------------------  Re-evaluation(s):  See ED course above. Counseling:  I have spoken with the patient and discussed todays results, in addition to providing specific details for the plan of care and counseling regarding the diagnosis and prognosis. Their questions are answered at this time and they are agreeable with the plan of admission.    --------------------------------- ADDITIONAL PROVIDER NOTES ---------------------------------  Consultations:  See ED course above. This patient's ED course included: a personal history and physicial examination, re-evaluation prior to disposition, multiple bedside re-evaluations, IV medications, cardiac monitoring and continuous pulse oximetry    This patient has remained hemodynamically stable during their ED course. Diagnosis:  1. Incarcerated ventral hernia    2.  Small bowel obstruction (HCC)    3. Streptococcal sore throat        Disposition:  Patient's disposition: Admit to med/surg floor  Patient's condition is stable.          Rhiannon Cast MD  Resident  02/21/21 8734

## 2021-02-20 NOTE — ED NOTES
Pt placed on 3l NC for a desat to 81 % while sleeping Doctor  notified     Zaida Keita RN  02/20/21 6038

## 2021-02-21 LAB
ALBUMIN SERPL-MCNC: 3.9 G/DL (ref 3.5–5.2)
ALP BLD-CCNC: 66 U/L (ref 35–104)
ALT SERPL-CCNC: 102 U/L (ref 0–32)
ANION GAP SERPL CALCULATED.3IONS-SCNC: 14 MMOL/L (ref 7–16)
AST SERPL-CCNC: 79 U/L (ref 0–31)
BILIRUB SERPL-MCNC: 0.4 MG/DL (ref 0–1.2)
BUN BLDV-MCNC: 9 MG/DL (ref 6–20)
CALCIUM SERPL-MCNC: 8 MG/DL (ref 8.6–10.2)
CHLORIDE BLD-SCNC: 101 MMOL/L (ref 98–107)
CO2: 22 MMOL/L (ref 22–29)
CREAT SERPL-MCNC: 0.7 MG/DL (ref 0.5–1)
EKG ATRIAL RATE: 85 BPM
EKG P AXIS: 58 DEGREES
EKG P-R INTERVAL: 132 MS
EKG Q-T INTERVAL: 386 MS
EKG QRS DURATION: 90 MS
EKG QTC CALCULATION (BAZETT): 459 MS
EKG R AXIS: 100 DEGREES
EKG T AXIS: 51 DEGREES
EKG VENTRICULAR RATE: 85 BPM
GFR AFRICAN AMERICAN: >60
GFR NON-AFRICAN AMERICAN: >60 ML/MIN/1.73
GLUCOSE BLD-MCNC: 77 MG/DL (ref 74–99)
HCT VFR BLD CALC: 44.3 % (ref 34–48)
HEMOGLOBIN: 14.5 G/DL (ref 11.5–15.5)
MCH RBC QN AUTO: 32.3 PG (ref 26–35)
MCHC RBC AUTO-ENTMCNC: 32.7 % (ref 32–34.5)
MCV RBC AUTO: 98.7 FL (ref 80–99.9)
PDW BLD-RTO: 16 FL (ref 11.5–15)
PLATELET # BLD: 210 E9/L (ref 130–450)
PMV BLD AUTO: 9.9 FL (ref 7–12)
POTASSIUM REFLEX MAGNESIUM: 4.4 MMOL/L (ref 3.5–5)
RBC # BLD: 4.49 E12/L (ref 3.5–5.5)
SODIUM BLD-SCNC: 137 MMOL/L (ref 132–146)
TOTAL PROTEIN: 6.5 G/DL (ref 6.4–8.3)
WBC # BLD: 8.2 E9/L (ref 4.5–11.5)

## 2021-02-21 PROCEDURE — 6360000002 HC RX W HCPCS: Performed by: SURGERY

## 2021-02-21 PROCEDURE — 6370000000 HC RX 637 (ALT 250 FOR IP): Performed by: INTERNAL MEDICINE

## 2021-02-21 PROCEDURE — 2580000003 HC RX 258: Performed by: SURGERY

## 2021-02-21 PROCEDURE — 85027 COMPLETE CBC AUTOMATED: CPT

## 2021-02-21 PROCEDURE — 80053 COMPREHEN METABOLIC PANEL: CPT

## 2021-02-21 PROCEDURE — 99223 1ST HOSP IP/OBS HIGH 75: CPT | Performed by: SURGERY

## 2021-02-21 PROCEDURE — 1200000000 HC SEMI PRIVATE

## 2021-02-21 PROCEDURE — 36415 COLL VENOUS BLD VENIPUNCTURE: CPT

## 2021-02-21 PROCEDURE — 6360000002 HC RX W HCPCS: Performed by: INTERNAL MEDICINE

## 2021-02-21 PROCEDURE — 2580000003 HC RX 258: Performed by: INTERNAL MEDICINE

## 2021-02-21 RX ORDER — ZOLPIDEM TARTRATE 5 MG/1
5 TABLET ORAL NIGHTLY
Status: DISCONTINUED | OUTPATIENT
Start: 2021-02-21 | End: 2021-02-21

## 2021-02-21 RX ORDER — PAROXETINE HYDROCHLORIDE 20 MG/1
10 TABLET, FILM COATED ORAL EVERY MORNING
Status: DISCONTINUED | OUTPATIENT
Start: 2021-02-22 | End: 2021-02-22 | Stop reason: HOSPADM

## 2021-02-21 RX ORDER — BUMETANIDE 1 MG/1
1 TABLET ORAL 2 TIMES DAILY
Status: DISCONTINUED | OUTPATIENT
Start: 2021-02-21 | End: 2021-02-22 | Stop reason: HOSPADM

## 2021-02-21 RX ORDER — CEFTRIAXONE 1 G/50ML
1000 INJECTION, SOLUTION INTRAVENOUS EVERY 24 HOURS
Status: DISCONTINUED | OUTPATIENT
Start: 2021-02-21 | End: 2021-02-21 | Stop reason: CLARIF

## 2021-02-21 RX ORDER — HYDROXYZINE PAMOATE 25 MG/1
25 CAPSULE ORAL EVERY 6 HOURS PRN
Status: DISCONTINUED | OUTPATIENT
Start: 2021-02-21 | End: 2021-02-22 | Stop reason: HOSPADM

## 2021-02-21 RX ORDER — ZOLPIDEM TARTRATE 5 MG/1
10 TABLET ORAL NIGHTLY
Status: DISCONTINUED | OUTPATIENT
Start: 2021-02-21 | End: 2021-02-22 | Stop reason: HOSPADM

## 2021-02-21 RX ADMIN — MEPERIDINE HYDROCHLORIDE 50 MG: 50 INJECTION, SOLUTION INTRAMUSCULAR; INTRAVENOUS; SUBCUTANEOUS at 20:53

## 2021-02-21 RX ADMIN — MEPERIDINE HYDROCHLORIDE 50 MG: 50 INJECTION, SOLUTION INTRAMUSCULAR; INTRAVENOUS; SUBCUTANEOUS at 08:00

## 2021-02-21 RX ADMIN — BUMETANIDE 1 MG: 1 TABLET ORAL at 17:03

## 2021-02-21 RX ADMIN — MEPERIDINE HYDROCHLORIDE 50 MG: 50 INJECTION, SOLUTION INTRAMUSCULAR; INTRAVENOUS; SUBCUTANEOUS at 16:53

## 2021-02-21 RX ADMIN — SODIUM CHLORIDE, PRESERVATIVE FREE 10 ML: 5 INJECTION INTRAVENOUS at 16:50

## 2021-02-21 RX ADMIN — SODIUM CHLORIDE, POTASSIUM CHLORIDE, SODIUM LACTATE AND CALCIUM CHLORIDE: 600; 310; 30; 20 INJECTION, SOLUTION INTRAVENOUS at 16:44

## 2021-02-21 RX ADMIN — CEFTRIAXONE SODIUM 1000 MG: 1 INJECTION, POWDER, FOR SOLUTION INTRAMUSCULAR; INTRAVENOUS at 18:43

## 2021-02-21 RX ADMIN — ONDANSETRON 4 MG: 2 INJECTION INTRAMUSCULAR; INTRAVENOUS at 01:51

## 2021-02-21 RX ADMIN — MEPERIDINE HYDROCHLORIDE 50 MG: 50 INJECTION, SOLUTION INTRAMUSCULAR; INTRAVENOUS; SUBCUTANEOUS at 01:51

## 2021-02-21 RX ADMIN — SODIUM CHLORIDE, POTASSIUM CHLORIDE, SODIUM LACTATE AND CALCIUM CHLORIDE: 600; 310; 30; 20 INJECTION, SOLUTION INTRAVENOUS at 07:25

## 2021-02-21 RX ADMIN — HYDROXYZINE PAMOATE 25 MG: 25 CAPSULE ORAL at 18:53

## 2021-02-21 RX ADMIN — ONDANSETRON 4 MG: 2 INJECTION INTRAMUSCULAR; INTRAVENOUS at 08:07

## 2021-02-21 RX ADMIN — ONDANSETRON 4 MG: 2 INJECTION INTRAMUSCULAR; INTRAVENOUS at 16:50

## 2021-02-21 RX ADMIN — ZOLPIDEM TARTRATE 10 MG: 5 TABLET ORAL at 20:46

## 2021-02-21 RX ADMIN — MEPERIDINE HYDROCHLORIDE 50 MG: 50 INJECTION, SOLUTION INTRAMUSCULAR; INTRAVENOUS; SUBCUTANEOUS at 12:20

## 2021-02-21 RX ADMIN — ENOXAPARIN SODIUM 40 MG: 40 INJECTION SUBCUTANEOUS at 12:24

## 2021-02-21 SDOH — ECONOMIC STABILITY: FOOD INSECURITY: WITHIN THE PAST 12 MONTHS, YOU WORRIED THAT YOUR FOOD WOULD RUN OUT BEFORE YOU GOT MONEY TO BUY MORE.: NOT ASKED

## 2021-02-21 SDOH — ECONOMIC STABILITY: TRANSPORTATION INSECURITY
IN THE PAST 12 MONTHS, HAS THE LACK OF TRANSPORTATION KEPT YOU FROM MEDICAL APPOINTMENTS OR FROM GETTING MEDICATIONS?: NOT ASKED

## 2021-02-21 SDOH — ECONOMIC STABILITY: INCOME INSECURITY: HOW HARD IS IT FOR YOU TO PAY FOR THE VERY BASICS LIKE FOOD, HOUSING, MEDICAL CARE, AND HEATING?: NOT ASKED

## 2021-02-21 SDOH — ECONOMIC STABILITY: FOOD INSECURITY: WITHIN THE PAST 12 MONTHS, THE FOOD YOU BOUGHT JUST DIDN'T LAST AND YOU DIDN'T HAVE MONEY TO GET MORE.: NOT ASKED

## 2021-02-21 SDOH — ECONOMIC STABILITY: TRANSPORTATION INSECURITY
IN THE PAST 12 MONTHS, HAS LACK OF TRANSPORTATION KEPT YOU FROM MEETINGS, WORK, OR FROM GETTING THINGS NEEDED FOR DAILY LIVING?: NOT ASKED

## 2021-02-21 ASSESSMENT — PAIN - FUNCTIONAL ASSESSMENT: PAIN_FUNCTIONAL_ASSESSMENT: PREVENTS OR INTERFERES SOME ACTIVE ACTIVITIES AND ADLS

## 2021-02-21 ASSESSMENT — PAIN SCALES - GENERAL
PAINLEVEL_OUTOF10: 7
PAINLEVEL_OUTOF10: 10
PAINLEVEL_OUTOF10: 8
PAINLEVEL_OUTOF10: 9

## 2021-02-21 ASSESSMENT — PAIN DESCRIPTION - PROGRESSION
CLINICAL_PROGRESSION: NOT CHANGED
CLINICAL_PROGRESSION: NOT CHANGED

## 2021-02-21 ASSESSMENT — PAIN DESCRIPTION - ONSET: ONSET: ON-GOING

## 2021-02-21 ASSESSMENT — PAIN DESCRIPTION - LOCATION
LOCATION: ABDOMEN
LOCATION: ABDOMEN

## 2021-02-21 ASSESSMENT — PAIN DESCRIPTION - FREQUENCY: FREQUENCY: CONTINUOUS

## 2021-02-21 NOTE — H&P
General Surgery History and Physical    Patient's Name/Date of Birth: Delilah Mays / 1970    Date: 2021     Surgeon: Corinne Cifuentes M.D.    PCP: Nelly Soto DO     Chief Complaint: abdominal pain    HPI:   Delilah Mays is a 48 y.o. female who presents for evaluation of abdominal pain and came to ED where CT was concerning for bowel obstruction in a large incarcerated ventral hernia . Timing of pain is improving but intermittent, radiation to entire abdomen, alleviated by nothing and started 3-4 days ago and severity is 3-8/10.  Did have bm this am, no emesis, mild nausea today      Past Medical History:   Diagnosis Date    Bursitis     hips    Class 3 severe obesity due to excess calories with body mass index (BMI) of 40.0 to 44.9 in adult [facility])     Diverticulitis     GERD (gastroesophageal reflux disease)     Incisional hernia with obstruction but no gangrene     Strep throat        Past Surgical History:   Procedure Laterality Date     SECTION      CHOLECYSTECTOMY      COLECTOMY  2016   [address]  2015    incarcerated hernia repair    HYSTERECTOMY      NERVE BLOCK      sacroiliac bilateral 2012    PELVIC FRACTURE SURGERY      VENTRAL HERNIA REPAIR  2015       Current Facility-Administered Medications   Medication Dose Route Frequency Provider Last Rate Last Admin    benzocaine-menthol (CEPACOL SORE THROAT) lozenge 1 lozenge  1 lozenge Oral PRN Gary Lopez DO        zolpidem (AMBIEN) tablet 5 mg  5 mg Oral Nightly Gary Lopez DO        cefTRIAXone (ROCEPHIN) 1,000 mg in sterile water 10 mL IV syringe  1,000 mg Intravenous Q24H Gary Lopez DO        acetaminophen (TYLENOL) tablet 650 mg  650 mg Oral Q4H PRN Paco Crawford MD        sodium chloride flush 0.9 % injection 10 mL  10 mL Intravenous 2 times per day Paco Crawford MD   10 mL at 21 0371    sodium chloride flush 0.9 % injection 10 mL  10 mL Intravenous PRN Penny Quinteros MD        ondansetron (ZOFRAN-ODT) disintegrating tablet 4 mg  4 mg Oral Q8H PRN Penny Quinteros MD        Or    ondansetron [facility]) injection 4 mg  4 mg Intravenous Q6H PRN Penny Quinteros MD   4 mg at 02/21/21 8039    enoxaparin (LOVENOX) injection 40 mg  40 mg Subcutaneous Daily Penny Quinteros MD        meperidine (DEMEROL) injection 25 mg  25 mg Intravenous Q4H PRN Penny Quinteros MD        Or    meperidine (DEMEROL) injection 50 mg  50 mg Intravenous Q4H PRN Penny Quinteros MD   50 mg at 02/21/21 0800    lactated ringers infusion   Intravenous Continuous Penny Quinteros  mL/hr at 02/21/21 0726 Rate Verify at 02/21/21 8759       Allergies   Allergen Reactions    Ibuprofen Hives    Nsaids Hives    Morphine Hives and Anxiety       The patient has a family history that is negative for severe cardiovascular or respiratory issues, negative for reaction to anesthesia.     Social History     Socioeconomic History    Marital status:      Spouse name: Not on file    Number of children: Not on file    Years of education: Not on file    Highest education level: Not on file   Occupational History     Employer: [employer]   Social Needs    Financial resource strain: Not on file    Food insecurity     Worry: Not on file     Inability: Not on file   [employer] needs     Medical: Not on file     Non-medical: Not on file   Tobacco Use    Smoking status: Current Every Day Smoker     Packs/day: 1.00     Years: 30.00     Pack years: 30.00     Types: Cigarettes    Smokeless tobacco: Never Used   Substance and Sexual Activity    Alcohol use: No    Drug use: No    Sexual activity: Yes     Partners: Male   Lifestyle    Physical activity     Days per week: Not on file     Minutes per session: Not on file    Stress: Not on file   Relationships    Social connections     Talks on phone: Not on file     Gets together: Not on file     Attends [employer] service: Not on file     Active member of club or organization: Not on file     Attends meetings of clubs or organizations: Not on file     Relationship status: Not on file    Intimate partner violence     Fear of current or ex partner: Not on file     Emotionally abused: Not on file     Physically abused: Not on file     Forced sexual activity: Not on file   Other Topics Concern    Not on file   Social History Narrative     twice.  Recently  December 2020           Review of Systems  Review of Systems -  General ROS: negative for - chills, fatigue or malaise  ENT ROS: negative for - hearing change, nasal congestion or nasal discharge  Allergy and Immunology ROS: negative for - hives, itchy/watery eyes or nasal congestion  Hematological and Lymphatic ROS: negative for - blood clots, blood transfusions, bruising or fatigue  Endocrine ROS: negative for - malaise/lethargy, mood swings, palpitations or polydipsia/polyuria  Respiratory ROS: negative for - sputum changes, stridor, tachypnea or wheezing  Cardiovascular ROS: negative for - irregular heartbeat, loss of consciousness, murmur or orthopnea  Gastrointestinal ROS: negative for - constipation, diarrhea, gas/bloating, heartburn or hematemesis  Genito-Urinary ROS: negative for -  genital discharge, genital ulcers or hematuria  Musculoskeletal ROS: negative for - gait disturbance, muscle pain or muscular weakness    Physical exam:   BP (!) 154/87   Pulse 74   Temp 98.4 °F (36.9 °C) (Oral)   Resp 16   Ht 5' 2\" (1.575 m)   Wt 240 lb (108.9 kg)   SpO2 97%   BMI 43.90 kg/m²   General appearance:  NAD  Head: NCAT, PERRLA, EOMI, red conjunctiva  Neck: supple, no masses  Lungs: CTAB, equal chest rise bilateral  Heart: Reg rate  Abdomen: soft, minimally tender, nondistended  Skin; no lesions  Gu: no cva tenderness  Extremities: extremities normal, atraumatic, no cyanosis or edema      Radiology:  CT abdomen/pelvis:   No central pulmonary embolism or

## 2021-02-22 VITALS
OXYGEN SATURATION: 96 % | BODY MASS INDEX: 44.16 KG/M2 | RESPIRATION RATE: 18 BRPM | HEIGHT: 62 IN | SYSTOLIC BLOOD PRESSURE: 136 MMHG | TEMPERATURE: 98 F | HEART RATE: 78 BPM | WEIGHT: 240 LBS | DIASTOLIC BLOOD PRESSURE: 84 MMHG

## 2021-02-22 LAB
ALBUMIN SERPL-MCNC: 3.8 G/DL (ref 3.5–5.2)
ALP BLD-CCNC: 68 U/L (ref 35–104)
ALT SERPL-CCNC: 109 U/L (ref 0–32)
ANION GAP SERPL CALCULATED.3IONS-SCNC: 10 MMOL/L (ref 7–16)
AST SERPL-CCNC: 110 U/L (ref 0–31)
BASOPHILS ABSOLUTE: 0.02 E9/L (ref 0–0.2)
BASOPHILS RELATIVE PERCENT: 0.4 % (ref 0–2)
BILIRUB SERPL-MCNC: 0.4 MG/DL (ref 0–1.2)
BUN BLDV-MCNC: 6 MG/DL (ref 6–20)
CALCIUM SERPL-MCNC: 8.6 MG/DL (ref 8.6–10.2)
CHLORIDE BLD-SCNC: 96 MMOL/L (ref 98–107)
CO2: 31 MMOL/L (ref 22–29)
CREAT SERPL-MCNC: 0.8 MG/DL (ref 0.5–1)
EOSINOPHILS ABSOLUTE: 0.07 E9/L (ref 0.05–0.5)
EOSINOPHILS RELATIVE PERCENT: 1.3 % (ref 0–6)
GFR AFRICAN AMERICAN: >60
GFR NON-AFRICAN AMERICAN: >60 ML/MIN/1.73
GLUCOSE BLD-MCNC: 111 MG/DL (ref 74–99)
HCT VFR BLD CALC: 43.3 % (ref 34–48)
HEMOGLOBIN: 14.1 G/DL (ref 11.5–15.5)
IMMATURE GRANULOCYTES #: 0.02 E9/L
IMMATURE GRANULOCYTES %: 0.4 % (ref 0–5)
LYMPHOCYTES ABSOLUTE: 0.92 E9/L (ref 1.5–4)
LYMPHOCYTES RELATIVE PERCENT: 17.4 % (ref 20–42)
MCH RBC QN AUTO: 32.4 PG (ref 26–35)
MCHC RBC AUTO-ENTMCNC: 32.6 % (ref 32–34.5)
MCV RBC AUTO: 99.5 FL (ref 80–99.9)
MONOCYTES ABSOLUTE: 0.35 E9/L (ref 0.1–0.95)
MONOCYTES RELATIVE PERCENT: 6.6 % (ref 2–12)
NEUTROPHILS ABSOLUTE: 3.9 E9/L (ref 1.8–7.3)
NEUTROPHILS RELATIVE PERCENT: 73.9 % (ref 43–80)
PDW BLD-RTO: 15.9 FL (ref 11.5–15)
PLATELET # BLD: 166 E9/L (ref 130–450)
PMV BLD AUTO: 9.9 FL (ref 7–12)
POTASSIUM SERPL-SCNC: 4.2 MMOL/L (ref 3.5–5)
RBC # BLD: 4.35 E12/L (ref 3.5–5.5)
SODIUM BLD-SCNC: 137 MMOL/L (ref 132–146)
TOTAL PROTEIN: 6.3 G/DL (ref 6.4–8.3)
WBC # BLD: 5.3 E9/L (ref 4.5–11.5)

## 2021-02-22 PROCEDURE — 80053 COMPREHEN METABOLIC PANEL: CPT

## 2021-02-22 PROCEDURE — 6360000002 HC RX W HCPCS: Performed by: SURGERY

## 2021-02-22 PROCEDURE — 6370000000 HC RX 637 (ALT 250 FOR IP): Performed by: NURSE PRACTITIONER

## 2021-02-22 PROCEDURE — 36415 COLL VENOUS BLD VENIPUNCTURE: CPT

## 2021-02-22 PROCEDURE — 99232 SBSQ HOSP IP/OBS MODERATE 35: CPT | Performed by: SURGERY

## 2021-02-22 PROCEDURE — 2580000003 HC RX 258: Performed by: SURGERY

## 2021-02-22 PROCEDURE — 6370000000 HC RX 637 (ALT 250 FOR IP): Performed by: INTERNAL MEDICINE

## 2021-02-22 PROCEDURE — 85025 COMPLETE CBC W/AUTO DIFF WBC: CPT

## 2021-02-22 RX ORDER — AMOXICILLIN 500 MG/1
500 CAPSULE ORAL EVERY 12 HOURS SCHEDULED
Status: DISCONTINUED | OUTPATIENT
Start: 2021-02-22 | End: 2021-02-22 | Stop reason: HOSPADM

## 2021-02-22 RX ORDER — AMOXICILLIN 500 MG/1
500 CAPSULE ORAL EVERY 12 HOURS SCHEDULED
Qty: 20 CAPSULE | Refills: 0 | Status: SHIPPED | OUTPATIENT
Start: 2021-02-22 | End: 2021-03-04

## 2021-02-22 RX ADMIN — MEPERIDINE HYDROCHLORIDE 50 MG: 50 INJECTION, SOLUTION INTRAMUSCULAR; INTRAVENOUS; SUBCUTANEOUS at 02:39

## 2021-02-22 RX ADMIN — BUMETANIDE 1 MG: 1 TABLET ORAL at 08:59

## 2021-02-22 RX ADMIN — ENOXAPARIN SODIUM 40 MG: 40 INJECTION SUBCUTANEOUS at 08:59

## 2021-02-22 RX ADMIN — AMOXICILLIN 500 MG: 500 CAPSULE ORAL at 08:59

## 2021-02-22 RX ADMIN — PAROXETINE HYDROCHLORIDE 10 MG: 20 TABLET, FILM COATED ORAL at 09:00

## 2021-02-22 RX ADMIN — MEPERIDINE HYDROCHLORIDE 50 MG: 50 INJECTION, SOLUTION INTRAMUSCULAR; INTRAVENOUS; SUBCUTANEOUS at 10:49

## 2021-02-22 RX ADMIN — SODIUM CHLORIDE, POTASSIUM CHLORIDE, SODIUM LACTATE AND CALCIUM CHLORIDE: 600; 310; 30; 20 INJECTION, SOLUTION INTRAVENOUS at 02:40

## 2021-02-22 RX ADMIN — MEPERIDINE HYDROCHLORIDE 50 MG: 50 INJECTION, SOLUTION INTRAMUSCULAR; INTRAVENOUS; SUBCUTANEOUS at 06:46

## 2021-02-22 RX ADMIN — ONDANSETRON 4 MG: 2 INJECTION INTRAMUSCULAR; INTRAVENOUS at 09:43

## 2021-02-22 ASSESSMENT — PAIN DESCRIPTION - DESCRIPTORS: DESCRIPTORS: CRAMPING;ACHING;DISCOMFORT

## 2021-02-22 ASSESSMENT — PAIN SCALES - GENERAL
PAINLEVEL_OUTOF10: 6
PAINLEVEL_OUTOF10: 8

## 2021-02-22 ASSESSMENT — PAIN DESCRIPTION - LOCATION: LOCATION: ABDOMEN

## 2021-02-22 NOTE — CONSULTS
[address]                                  CONSULTATION    PATIENT NAME: Clay Fuller                        :        1970  MED REC NO:   [id number]                            ROOM:       8411  ACCOUNT NO:   [de-identified]                           ADMIT DATE: 2021  PROVIDER:     Bindu Mirza DO    DATE OF CONSULTATION:  2021    CHIEF COMPLAINT/HISTORY OF CHIEF COMPLAINT:  This is a pleasant  22-year-old white female, who was admitted to [address] [place] [address] The patient presented to the hospital through the emergency room on  2021. The patient at this time presents to the hospital here with  chief complaint of lower quadrant abdominal pain. This got  progressively worse. The patient was seen and evaluated. It was noted  at this time, the patient did have evidence of incomplete small bowel  obstruction. The patient was admitted to the hospital at this time  through the general surgical floor. Consultation was obtained with Dr. Bhupendra Weinberg and Dr. Loco Kang. The patient states that the pain has been  intermittent for the past several days. This got progressively worse  over the past 12 hours. The patient states that she has been adequate  bowel movement. There has been no prior change in her bowel habits. The patient did have previous surgical intervention for incisional  hernia. She was admitted to the hospital through the general medical  floor. From a cardiac standpoint view, she currently denies any chest  pain. She denies any resting or exertional dyspnea, orthopnea,  paroxysmal nocturnal dyspnea, intermittent claudication, or pretibial  edema. It was noted that she did complain of a sore throat and she did  have a positive strep culture. Cardiac wise, there are no complaints of  chest pain. She denies any chest pain.   She states she is under a lot of stress lately, but did not have any cardiac symptomatology,  unfortunately she still smokes. Gastrointestinal wise, she has not had  any recent change to bowel habits. Her review of systems was described  above. Genitourinary wise, she denies any dysuria, hematuria, or  pyuria. Neurologically, she has been under a lot of stress lately due  to some marital discord. She underwent recent dissolution. She also  has been having hot flashes, which could be gynecological related. Currently, she appeared to be stable and improved at the present time. ALLERGIES:  She is allergic to MOTRIN, NONSTEROIDAL ANTI-INFLAMMATORY  AGENT, and MORPHINE. CURRENT MEDICATIONS PRIOR TO ADMISSION:  Include Rexulti 0.5 mg daily,  Paxil 10 mg daily, Vistaril 25 mg daily, and Bumex 1 mg nightly. PAST MEDICAL HISTORY:  Positive for usual childhood diseases, history of  obesity, diverticulitis, gastroesophageal reflux disease, incisional  hernia without obstruction, and recent strep throat. PAST SURGICAL HISTORY:  Includes  section, cholecystectomy,  colectomy in 2016, previous scope by Dr. Winsome Griffith, previous surgery by Dr. Yulia Colon, hernia repair in , hysterectomy with oophorectomy, nerve  block, pelvic fracture, and ventral hernia. FAMILY HISTORY:  Parents are alive. SOCIAL HISTORY:  The patient smokes a pack of cigarettes per day for 30  years. Denies any use of alcohol. Currently went through a divorce. She is a mother of two children. REVIEW OF THE CHART:  EKG showed normal sinus rhythm, nonspecific ST-T  wave abnormality. CMP was normal.  CBC was satisfactory. PHYSICAL EXAMINATION:  VITAL SIGNS:  Show height to be 5 feet 2 inches. Weight is 240 pounds. BMI of 43.91. Blood pressure 154/87, pulse 80, and respirations 18. GENERAL APPEARANCE:  At this time revealed a pleasant 77-year-old white  female, who is alert and responsive, oriented to person, place, and  time. HEENT:  Normal grossly to inspection. Posterior pharyngeal wall  appeared to be mildly injected. NECK: short stature. LUNGS:  Clear. HEART:  Fairly regular. No S3. No S4.  ABDOMEN:  With previous surgical intervention. Bowel sound was somewhat  hypodynamic. Slight tenderness to midepigastric area. It was obese. EXTREMITIES:  Currently without any cyanosis, clubbing, or edema. BREASTS:  Not performed. RECTAL:  Not performed. GENITAL:  Not performed. NEUROLOGIC:  Grossly intact. IMPRESSION:  At this time include,  1. Abdominal pain, possibly secondary to incomplete small bowel  obstruction from previous surgery, rule out adhesions. 2.  History of depression. 3.  Obesity with elevated BMI of 43.9.  4.  History of nicotine use. 5.  History of multiple stressors in life secondary to recent marital  discord and divorce. 6.  Perimenopausal syndrome, status post hysterectomy. 7.  Elevated liver enzymes. AST and ALT, rule out fatty infiltrates in  the liver. 8.  Strep pharyngitis. PLAN AND RECOMMENDATIONS:  At this time, currently, the patient does  appear to be stable. She has been admitted to the hospital through the  general surgical floor. The patient has been seen by Dr. Cookie Gilbert at this  time. The diet will be modified. Hopefully, this will be incomplete  small bowel obstruction, which has resolved. The patient will be placed  on antibiotics for strep pharyngitis. We will also review her other  medications and make further recommendations as clinically indicated. The patient will be resumed on her antidepressant therapy at this time. I have counseled her about smoking, but due to multiple stresses in her  life at this time this will be delayed. At the present time, we will  evaluate the liver enzymes accordingly.         Chinmay Montano DO    D: 02/21/2021 14:50:41       T: 02/21/2021 15:28:52     [email or web address]  Job#: [id number]     Doc#: [id number]    CC:

## 2021-02-22 NOTE — CARE COORDINATION
2-22-Cm note: ( Covid neg 2-20) I met with pt for transition of care needs, pt is independent, she denies any dc needs , pt's family friend will provide transport home .  Electronically signed by Ahmet Lewis RN on 2/22/2021 at 2:14 PM

## 2021-02-22 NOTE — PROGRESS NOTES
for symptomatic management. Home medications have been resumed. Chronic morbidities, labs and vital signs are being monitored address accordingly. Continue current therapy. See orders for further plan of care. She is considered clear for discharge from internal medicine standpoint once cleared by surgery. More than 50% of my time was spent at the bedside counseling/coordinating care with the patient and/or family with face to face contact. This time was spent reviewing notes and laboratory data as well as instructing and counseling the patient. Time I spent with the family or surrogate(s) is included only if the patient was incapable of providing the necessary information or participating in medical decisions. I also discussed the differential diagnosis and all of the proposed management plans with the patient and individuals accompanying the patient. I am readily available for any further decision-making and intervention.        BOB Vasquez - CNP  2/22/2021  7:20 AM

## 2021-02-22 NOTE — PROGRESS NOTES
GENERAL SURGERY  DAILY PROGRESS NOTE  2/22/2021    Subjective:  Doing well this AM, multiple BMs, some nausea but no emesis, feels much better    Objective:  /84   Pulse 65   Temp 98 °F (36.7 °C) (Oral)   Resp 18   Ht 5' 2\" (1.575 m)   Wt 240 lb (108.9 kg)   SpO2 98%   BMI 43.90 kg/m²     GENERAL:  Laying in bed, awake, alert, cooperative, no apparent distress  HEAD: Normocephalic, atraumatic  EYES: No sclera icterus, pupils equal  LUNGS:  No increased work of breathing  CARDIOVASCULAR:  RR  ABDOMEN:  Soft, non-tender, mild distention   EXTREMITIES: edema  SKIN: Warm and dry    Assessment/Plan:  48 y.o. female w/ SBO due to ventral hernia     Advance diet  Ambulate  Possible DC in PM pending progression     Electronically signed by Brad Deluca DO on 2/22/2021 at 6:44 AM    Surgery Progress Note            Chief complaint:   Patient Active Problem List   Diagnosis    GERD (gastroesophageal reflux disease)    Osteoarthritis cervical spine    Osteoarthritis of lumbar spine    Morbid obesity ([address]    Hypokalemia    Venous insufficiency    SBO (small bowel obstruction) ([address]    Incarcerated hernia       S: doing well, tolerating diet and  Moving bowels    O:   Vitals:    02/22/21 0515   BP: 136/84   Pulse: 65   Resp: 18   Temp: 98 °F (36.7 °C)   SpO2: 98%       Intake/Output Summary (Last 24 hours) at 2/22/2021 0802  Last data filed at 2/22/2021 0511  Gross per 24 hour   Intake 2790.42 ml   Output 3350 ml   Net -559.58 ml           Labs:  Recent Labs     02/20/21  1608 02/21/21  0501 02/22/21  0534   WBC 6.8 8.2 5.3   HGB 16.1* 14.5 14.1   HCT 47.6 44.3 43.3     Lab Results   Component Value Date    CREATININE 0.8 02/22/2021    BUN 6 02/22/2021     02/22/2021    K 4.2 02/22/2021    CL 96 (L) 02/22/2021    CO2 31 (H) 02/22/2021     Recent Labs     02/20/21  1608   LIPASE 82*       Physical exam:   /84   Pulse 65   Temp 98 °F (36.7 °C) (Oral)   Resp 18   Ht 5' 2\" (1.575 m)   Wt 240 lb (108.9 kg)   SpO2 98%   BMI 43.90 kg/m²   General appearance: NAD  Head: NCAT  Neck: supple, no masses  Lungs: equal chest rise bilateral  Heart: S1S2 present  Abdomen: soft, nontender, nondistended,  Large hernia  Skin; no lesions  Gu: no cva tenderness  Extremities: extremities normal, atraumatic, no cyanosis or edema    A:  psbo resolving and ventral hernia    P: will advance diet and if tolerates will d/c     Don Baugh MD  2/22/2021

## 2021-02-24 ENCOUNTER — TELEPHONE (OUTPATIENT)
Dept: SURGERY | Age: 51
End: 2021-02-24

## 2021-02-24 ENCOUNTER — OFFICE VISIT (OUTPATIENT)
Dept: SURGERY | Age: 51
End: 2021-02-24
Payer: MEDICARE

## 2021-02-24 ENCOUNTER — HOSPITAL ENCOUNTER (OUTPATIENT)
Age: 51
Setting detail: SURGERY ADMIT
DRG: 227 | End: 2021-02-24
Attending: SURGERY | Admitting: SURGERY
Payer: MEDICARE

## 2021-02-24 VITALS
SYSTOLIC BLOOD PRESSURE: 150 MMHG | BODY MASS INDEX: 36.8 KG/M2 | DIASTOLIC BLOOD PRESSURE: 80 MMHG | HEART RATE: 85 BPM | TEMPERATURE: 97.6 F | HEIGHT: 62 IN | WEIGHT: 200 LBS

## 2021-02-24 DIAGNOSIS — Z01.818 PREOP TESTING: Primary | ICD-10-CM

## 2021-02-24 DIAGNOSIS — K56.609 SBO (SMALL BOWEL OBSTRUCTION) (HCC): ICD-10-CM

## 2021-02-24 DIAGNOSIS — K43.0 INCISIONAL HERNIA WITH OBSTRUCTION BUT NO GANGRENE: Primary | ICD-10-CM

## 2021-02-24 DIAGNOSIS — R11.2 NON-INTRACTABLE VOMITING WITH NAUSEA, UNSPECIFIED VOMITING TYPE: ICD-10-CM

## 2021-02-24 DIAGNOSIS — R10.9 ABDOMINAL PAIN, UNSPECIFIED ABDOMINAL LOCATION: ICD-10-CM

## 2021-02-24 PROCEDURE — 3017F COLORECTAL CA SCREEN DOC REV: CPT | Performed by: SURGERY

## 2021-02-24 PROCEDURE — 4004F PT TOBACCO SCREEN RCVD TLK: CPT | Performed by: SURGERY

## 2021-02-24 PROCEDURE — 99214 OFFICE O/P EST MOD 30 MIN: CPT | Performed by: SURGERY

## 2021-02-24 PROCEDURE — G8417 CALC BMI ABV UP PARAM F/U: HCPCS | Performed by: SURGERY

## 2021-02-24 PROCEDURE — 1111F DSCHRG MED/CURRENT MED MERGE: CPT | Performed by: SURGERY

## 2021-02-24 PROCEDURE — G8427 DOCREV CUR MEDS BY ELIG CLIN: HCPCS | Performed by: SURGERY

## 2021-02-24 PROCEDURE — G8484 FLU IMMUNIZE NO ADMIN: HCPCS | Performed by: SURGERY

## 2021-02-24 RX ORDER — HYDROXYZINE 50 MG/1
50 TABLET, FILM COATED ORAL NIGHTLY
COMMUNITY
Start: 2021-01-27

## 2021-02-24 RX ORDER — TRAZODONE HYDROCHLORIDE 100 MG/1
TABLET ORAL
Status: ON HOLD | COMMUNITY
Start: 2021-01-27 | End: 2021-03-29 | Stop reason: HOSPADM

## 2021-02-24 RX ORDER — TRAZODONE HYDROCHLORIDE 50 MG/1
TABLET ORAL
Status: ON HOLD | COMMUNITY
Start: 2020-12-17 | End: 2021-08-02 | Stop reason: HOSPADM

## 2021-02-24 RX ORDER — ROPINIROLE 0.25 MG/1
TABLET, FILM COATED ORAL
Status: ON HOLD | COMMUNITY
Start: 2021-01-27 | End: 2021-08-02 | Stop reason: HOSPADM

## 2021-02-24 RX ORDER — PROMETHAZINE HYDROCHLORIDE 12.5 MG/1
12.5 TABLET ORAL 4 TIMES DAILY PRN
Qty: 20 TABLET | Refills: 1 | Status: SHIPPED | OUTPATIENT
Start: 2021-02-24 | End: 2021-03-03

## 2021-02-24 NOTE — TELEPHONE ENCOUNTER
Per Dr. Rafael Peters, patient is scheduled for Laparoscopic robotic incisional hernia repair with mesh, possible component separation at [address] [place] [address] on 3/30/21. Surgery scheduling form faxed with confirmation, surgeon's calendar updated. Dr. Rafael Peters to enter orders. Follow up appointment scheduled. Medical clearance requested from Dr. Rosa Pritchard. Electronically signed by Gris Reyes RN on 2/24/2021 at 12:44 PM    Medical clearance received.   Electronically signed by Gris Reyes RN on 3/10/2021 at 2:17 PM

## 2021-02-24 NOTE — PROGRESS NOTES
General Surgery History and Physical  [facility]    Patient's Name/Date of Birth: Bailey Proctor / 1970    Date: 2021     Surgeon: Lupis Bowser M.D.    PCP: Palmira Chapman DO     Chief Complaint: Abdominal hernia    HPI:   Bailey Proctor is a 48 y.o. female who presents for evaluation of incisional hernia. Timing is constant, radiation to midline, alleviated by none and started several months ago and severity is 7/10. she has history of multiple abdominal surgeries. she has had history of previous repairs of the hernia. States last hernia was repair was done with Dr. Dinah Sanchez in 2018 for which he \"repaired 10 hernias at once\". She admits that she had mesh placed and she states \"at least a foot of mesh\". Denies nausea, vomiting, fever, chills, SOB, chest pain, diarrheal constipation. He was recently admitted to the hospital for small bowel obstruction. She has had a few bowel movements since her discharge but continues to have pain and nausea no history of abnormal colonoscopy.  They are a smoker, 1 30pack per day for 30 years    Patient Active Problem List   Diagnosis    GERD (gastroesophageal reflux disease)    Osteoarthritis cervical spine    Osteoarthritis of lumbar spine    Morbid obesity ([address]    Hypokalemia    Venous insufficiency    SBO (small bowel obstruction) ([address]    Incarcerated hernia       Past Medical History:   Diagnosis Date    Bursitis     hips    Class 3 severe obesity due to excess calories with body mass index (BMI) of 40.0 to 44.9 in adult ([address]     Diverticulitis     GERD (gastroesophageal reflux disease)     Incisional hernia with obstruction but no gangrene     Strep throat        Past Surgical History:   Procedure Laterality Date     SECTION      CHOLECYSTECTOMY      COLECTOMY  2016    HERNIA REPAIR      HERNIA REPAIR  2015    incarcerated hernia repair    HYSTERECTOMY      NERVE BLOCK      sacroiliac bilateral 12-    PELVIC FRACTURE SURGERY      VENTRAL HERNIA REPAIR  08/25/2015       Allergies   Allergen Reactions    Ibuprofen Hives    Nsaids Hives    Morphine Hives and Anxiety       The patient has a family history that is negative for severe cardiovascular or respiratory issues, negative for reaction to anesthesia. Time spent reviewing past medical, surgical, social and family history, vitals, nursing assessment and images. No changes from above documented history. Social History     Socioeconomic History    Marital status:      Spouse name: Not on file    Number of children: Not on file    Years of education: Not on file    Highest education level: Not on file   Occupational History     Employer: [employer]   Social Needs    Financial resource strain: Not on file    Food insecurity     Worry: Not on file     Inability: Not on file    Transportation needs     Medical: Not on file     Non-medical: Not on file   Tobacco Use    Smoking status: Current Every Day Smoker     Packs/day: 1.00     Years: 30.00     Pack years: 30.00     Types: Cigarettes    Smokeless tobacco: Never Used   Substance and Sexual Activity    Alcohol use: No    Drug use: No    Sexual activity: Yes     Partners: Male   Lifestyle    Physical activity     Days per week: Not on file     Minutes per session: Not on file    Stress: Not on file   Relationships    Social connections     Talks on phone: Not on file     Gets together: Not on file     Attends Restorationist service: Not on file     Active member of club or organization: Not on file     Attends meetings of clubs or organizations: Not on file     Relationship status: Not on file    Intimate partner violence     Fear of current or ex partner: Not on file     Emotionally abused: Not on file     Physically abused: Not on file     Forced sexual activity: Not on file   Other Topics Concern    Not on file   Social History Narrative     twice.  Recently

## 2021-03-03 ENCOUNTER — APPOINTMENT (OUTPATIENT)
Dept: GENERAL RADIOLOGY | Age: 51
End: 2021-03-03
Payer: MEDICARE

## 2021-03-03 ENCOUNTER — APPOINTMENT (OUTPATIENT)
Dept: CT IMAGING | Age: 51
End: 2021-03-03
Payer: MEDICARE

## 2021-03-03 ENCOUNTER — HOSPITAL ENCOUNTER (EMERGENCY)
Age: 51
Discharge: HOME | End: 2021-03-03
Attending: EMERGENCY MEDICINE
Payer: MEDICARE

## 2021-03-03 VITALS
OXYGEN SATURATION: 95 % | DIASTOLIC BLOOD PRESSURE: 78 MMHG | WEIGHT: 240 LBS | BODY MASS INDEX: 42.52 KG/M2 | SYSTOLIC BLOOD PRESSURE: 137 MMHG | RESPIRATION RATE: 19 BRPM | HEART RATE: 80 BPM | TEMPERATURE: 96.4 F | HEIGHT: 63 IN

## 2021-03-03 DIAGNOSIS — K45.8 RECURRENT ABDOMINAL HERNIA WITHOUT OBSTRUCTION OR GANGRENE, UNSPECIFIED HERNIA TYPE: ICD-10-CM

## 2021-03-03 DIAGNOSIS — R60.1 GENERALIZED EDEMA: Primary | ICD-10-CM

## 2021-03-03 LAB
ALBUMIN SERPL-MCNC: 4.3 G/DL (ref 3.5–5.2)
ALP BLD-CCNC: 113 U/L (ref 35–104)
ALT SERPL-CCNC: 158 U/L (ref 0–32)
ANION GAP SERPL CALCULATED.3IONS-SCNC: 11 MMOL/L (ref 7–16)
AST SERPL-CCNC: 92 U/L (ref 0–31)
BACTERIA: NORMAL /HPF
BASOPHILS ABSOLUTE: 0.02 E9/L (ref 0–0.2)
BASOPHILS RELATIVE PERCENT: 0.3 % (ref 0–2)
BILIRUB SERPL-MCNC: 0.7 MG/DL (ref 0–1.2)
BILIRUBIN URINE: NEGATIVE
BLOOD, URINE: ABNORMAL
BUN BLDV-MCNC: 8 MG/DL (ref 6–20)
CALCIUM SERPL-MCNC: 9.2 MG/DL (ref 8.6–10.2)
CHLORIDE BLD-SCNC: 90 MMOL/L (ref 98–107)
CLARITY: CLEAR
CO2: 33 MMOL/L (ref 22–29)
COLOR: YELLOW
CREAT SERPL-MCNC: 0.8 MG/DL (ref 0.5–1)
D DIMER: 279 NG/ML DDU
EOSINOPHILS ABSOLUTE: 0.05 E9/L (ref 0.05–0.5)
EOSINOPHILS RELATIVE PERCENT: 0.7 % (ref 0–6)
EPITHELIAL CELLS, UA: NORMAL /HPF
GFR AFRICAN AMERICAN: >60
GFR NON-AFRICAN AMERICAN: >60 ML/MIN/1.73
GLUCOSE BLD-MCNC: 108 MG/DL (ref 74–99)
GLUCOSE URINE: NEGATIVE MG/DL
HCT VFR BLD CALC: 44.8 % (ref 34–48)
HEMOGLOBIN: 14.9 G/DL (ref 11.5–15.5)
IMMATURE GRANULOCYTES #: 0.02 E9/L
IMMATURE GRANULOCYTES %: 0.3 % (ref 0–5)
KETONES, URINE: NEGATIVE MG/DL
LACTIC ACID, SEPSIS: 1.7 MMOL/L (ref 0.5–1.9)
LEUKOCYTE ESTERASE, URINE: NEGATIVE
LIPASE: 13 U/L (ref 13–60)
LYMPHOCYTES ABSOLUTE: 0.87 E9/L (ref 1.5–4)
LYMPHOCYTES RELATIVE PERCENT: 12.6 % (ref 20–42)
MCH RBC QN AUTO: 33 PG (ref 26–35)
MCHC RBC AUTO-ENTMCNC: 33.3 % (ref 32–34.5)
MCV RBC AUTO: 99.1 FL (ref 80–99.9)
MONOCYTES ABSOLUTE: 0.51 E9/L (ref 0.1–0.95)
MONOCYTES RELATIVE PERCENT: 7.4 % (ref 2–12)
NEUTROPHILS ABSOLUTE: 5.42 E9/L (ref 1.8–7.3)
NEUTROPHILS RELATIVE PERCENT: 78.7 % (ref 43–80)
NITRITE, URINE: NEGATIVE
PDW BLD-RTO: 16.7 FL (ref 11.5–15)
PH UA: 7 (ref 5–9)
PLATELET # BLD: 225 E9/L (ref 130–450)
PMV BLD AUTO: 9.7 FL (ref 7–12)
POTASSIUM SERPL-SCNC: 3.4 MMOL/L (ref 3.5–5)
PRO-BNP: 410 PG/ML (ref 0–125)
PROTEIN UA: NEGATIVE MG/DL
RBC # BLD: 4.52 E12/L (ref 3.5–5.5)
RBC UA: NORMAL /HPF (ref 0–2)
SODIUM BLD-SCNC: 134 MMOL/L (ref 132–146)
SPECIFIC GRAVITY UA: <=1.005 (ref 1–1.03)
STREP GRP A PCR: NEGATIVE
T4 FREE: 1.41 NG/DL (ref 0.93–1.7)
TOTAL PROTEIN: 7.3 G/DL (ref 6.4–8.3)
TROPONIN: <0.01 NG/ML (ref 0–0.03)
TSH SERPL DL<=0.05 MIU/L-ACNC: 5.12 UIU/ML (ref 0.27–4.2)
UROBILINOGEN, URINE: 0.2 E.U./DL
WBC # BLD: 6.9 E9/L (ref 4.5–11.5)
WBC UA: NORMAL /HPF (ref 0–5)

## 2021-03-03 PROCEDURE — 6360000004 HC RX CONTRAST MEDICATION: Performed by: RADIOLOGY

## 2021-03-03 PROCEDURE — 87088 URINE BACTERIA CULTURE: CPT

## 2021-03-03 PROCEDURE — 85025 COMPLETE CBC W/AUTO DIFF WBC: CPT

## 2021-03-03 PROCEDURE — 85378 FIBRIN DEGRADE SEMIQUANT: CPT

## 2021-03-03 PROCEDURE — 83690 ASSAY OF LIPASE: CPT

## 2021-03-03 PROCEDURE — 87040 BLOOD CULTURE FOR BACTERIA: CPT

## 2021-03-03 PROCEDURE — 84439 ASSAY OF FREE THYROXINE: CPT

## 2021-03-03 PROCEDURE — 87880 STREP A ASSAY W/OPTIC: CPT

## 2021-03-03 PROCEDURE — 74177 CT ABD & PELVIS W/CONTRAST: CPT

## 2021-03-03 PROCEDURE — 83880 ASSAY OF NATRIURETIC PEPTIDE: CPT

## 2021-03-03 PROCEDURE — 81001 URINALYSIS AUTO W/SCOPE: CPT

## 2021-03-03 PROCEDURE — 6360000002 HC RX W HCPCS: Performed by: EMERGENCY MEDICINE

## 2021-03-03 PROCEDURE — 71045 X-RAY EXAM CHEST 1 VIEW: CPT

## 2021-03-03 PROCEDURE — 83605 ASSAY OF LACTIC ACID: CPT

## 2021-03-03 PROCEDURE — 84484 ASSAY OF TROPONIN QUANT: CPT

## 2021-03-03 PROCEDURE — 71275 CT ANGIOGRAPHY CHEST: CPT

## 2021-03-03 PROCEDURE — 80053 COMPREHEN METABOLIC PANEL: CPT

## 2021-03-03 PROCEDURE — 84443 ASSAY THYROID STIM HORMONE: CPT

## 2021-03-03 PROCEDURE — 99284 EMERGENCY DEPT VISIT MOD MDM: CPT

## 2021-03-03 PROCEDURE — 96374 THER/PROPH/DIAG INJ IV PUSH: CPT

## 2021-03-03 RX ORDER — FENTANYL CITRATE 50 UG/ML
100 INJECTION, SOLUTION INTRAMUSCULAR; INTRAVENOUS ONCE
Status: COMPLETED | OUTPATIENT
Start: 2021-03-03 | End: 2021-03-03

## 2021-03-03 RX ADMIN — FENTANYL CITRATE 100 MCG: 50 INJECTION INTRAMUSCULAR; INTRAVENOUS at 19:07

## 2021-03-03 RX ADMIN — IOPAMIDOL 75 ML: 755 INJECTION, SOLUTION INTRAVENOUS at 19:41

## 2021-03-03 ASSESSMENT — ENCOUNTER SYMPTOMS
ABDOMINAL PAIN: 1
CHEST TIGHTNESS: 1
SHORTNESS OF BREATH: 1

## 2021-03-03 ASSESSMENT — PAIN DESCRIPTION - PAIN TYPE: TYPE: ACUTE PAIN

## 2021-03-03 ASSESSMENT — PAIN SCALES - GENERAL: PAINLEVEL_OUTOF10: 7

## 2021-03-03 NOTE — ED PROVIDER NOTES
47 yo female with generalized edema that is recurrent in the last year. She also has some chronic abd pain with a pending hernia surgery at the end of the month. She has has some sob with cp as well. She is somewhat diaphoretic now. Not tachycardic, no hypoxia or respiratory distress. She is otherwise awake and alert and oriented, hypertensive upon arrival.  She is currently being treated with amoxicillin for strep pharyngitis that happened in the last 10 days or so. She has a few days left of the 2 weeks of antibiotics. She was started on Bumex a couple weeks ago as well. She denies any history of liver or kidney problems. However, she started drinking again a few weeks ago, is been drinking excessively and then stopped about 1 week ago. Last alcohol was 6 days ago. This is a recurrent problem, chronic, persistent, moderate to severe, 3 weeks of duration of this episode associated with her alcohol consumption and venous stasis. Family History   Problem Relation Age of Onset    Osteoarthritis Other     Diabetes Other     Other Father         pancreatitis    Stroke Brother      Past Surgical History:   Procedure Laterality Date     SECTION      CHOLECYSTECTOMY      COLECTOMY  2016    HERNIA REPAIR      HERNIA REPAIR  2015    incarcerated hernia repair    HYSTERECTOMY      NERVE BLOCK      sacroiliac bilateral 2012    PELVIC FRACTURE SURGERY      VENTRAL HERNIA REPAIR  2015       Review of Systems   Constitutional: Positive for diaphoresis. Negative for chills and fever. Respiratory: Positive for chest tightness and shortness of breath. Cardiovascular: Positive for leg swelling. Negative for chest pain. Gastrointestinal: Positive for abdominal pain. All other systems reviewed and are negative. Physical Exam  Constitutional:       General: She is not in acute distress. Appearance: She is well-developed. She is obese. She is diaphoretic. HENT:      Head: Normocephalic and atraumatic. Eyes:      Conjunctiva/sclera: Conjunctivae normal.      Pupils: Pupils are equal, round, and reactive to light. Neck:      Musculoskeletal: Normal range of motion. Thyroid: No thyromegaly. Cardiovascular:      Rate and Rhythm: Normal rate and regular rhythm. Pulmonary:      Effort: Pulmonary effort is normal. No respiratory distress. Breath sounds: Normal breath sounds. Abdominal:      General: There is no distension. Palpations: Abdomen is soft. Tenderness: There is no abdominal tenderness. There is no guarding or rebound. Musculoskeletal: Normal range of motion. General: No tenderness. Right lower leg: Edema present. Left lower leg: Edema present. Skin:     General: Skin is warm. Findings: No erythema. Neurological:      Mental Status: She is alert and oriented to person, place, and time. Cranial Nerves: No cranial nerve deficit. Coordination: Coordination normal.          Procedures     [facility]     ED Course as of Mar 05 2200   Wed Mar 03, 2021   6479 Patient says she is feeling better at this time. The belly pain she did have is gone. She does have a known hernia, concern for obstruction based on imaging. However, when I talk with her she is moving her bowels, she tolerating oral intake, there is no clinical evidence of an obstruction at this point. We talked about the generalized edema that she has, that she does keep take the Bumex but also she needs to help discover reason for this. Whether her family doctor is able to do the evaluation or send her to an endocrinologist there are additional testing that need to be done to figure this out. In the meantime she will return to the ED for any problems or any worsening, she knows to follow-up with her family doctor, she does have surgery scheduled for the end of the month with her general surgeon for the hernia.     [SO]      ED Course User Index [SO] Darlyn Rea DO          ED Course as of Mar 05 2201   Wed Mar 03, 2021   2159 Patient says she is feeling better at this time. The belly pain she did have is gone. She does have a known hernia, concern for obstruction based on imaging. However, when I talk with her she is moving her bowels, she tolerating oral intake, there is no clinical evidence of an obstruction at this point. We talked about the generalized edema that she has, that she does keep take the Bumex but also she needs to help discover reason for this. Whether her family doctor is able to do the evaluation or send her to an endocrinologist there are additional testing that need to be done to figure this out. In the meantime she will return to the ED for any problems or any worsening, she knows to follow-up with her family doctor, she does have surgery scheduled for the end of the month with her general surgeon for the hernia. [SO]      ED Course User Index  [SO] Darlyn Rea DO       --------------------------------------------- PAST HISTORY ---------------------------------------------  Past Medical History:  has a past medical history of Bursitis, Class 3 severe obesity due to excess calories with body mass index (BMI) of 40.0 to 44.9 in [facility]), Diverticulitis, GERD (gastroesophageal reflux disease), Incisional hernia with obstruction but no gangrene, and Strep throat. Past Surgical History:  has a past surgical history that includes Hysterectomy; Cholecystectomy; Pelvic fracture surgery; colectomy (2016); Nerve Block; hernia repair; hernia repair (2015); ventral hernia repair (2015); and  section. Social History:  reports that she has been smoking cigarettes. She has a 30.00 pack-year smoking history. She has never used smokeless tobacco. She reports that she does not drink alcohol or use drugs.     Family History: family history includes Diabetes in an other family member; Osteoarthritis in an other family member; Other in her father; Stroke in her brother. The patients home medications have been reviewed.     Allergies: Ibuprofen, Nsaids, and Morphine    -------------------------------------------------- RESULTS -------------------------------------------------  Labs:  Results for orders placed or performed during the hospital encounter of 03/03/21   Culture, Blood 1    Specimen: Blood   Result Value Ref Range    Blood Culture, Routine 24 Hours no growth    Culture, Blood 2    Specimen: Blood   Result Value Ref Range    Culture, Blood 2 24 Hours no growth    Culture, Urine    Specimen: Urine, clean catch   Result Value Ref Range    Urine Culture, Routine <10,000 CFU/mL  Mixed gram positive organisms      Strep Screen Group A Throat    Specimen: Throat   Result Value Ref Range    Strep Grp A PCR Negative Negative   CBC auto differential   Result Value Ref Range    WBC 6.9 4.5 - 11.5 E9/L    RBC 4.52 3.50 - 5.50 E12/L    Hemoglobin 14.9 11.5 - 15.5 g/dL    Hematocrit 44.8 34.0 - 48.0 %    MCV 99.1 80.0 - 99.9 fL    MCH 33.0 26.0 - 35.0 pg    MCHC 33.3 32.0 - 34.5 %    RDW 16.7 (H) 11.5 - 15.0 fL    Platelets 130 009 - 449 E9/L    MPV 9.7 7.0 - 12.0 fL    Neutrophils % 78.7 43.0 - 80.0 %    Immature Granulocytes % 0.3 0.0 - 5.0 %    Lymphocytes % 12.6 (L) 20.0 - 42.0 %    Monocytes % 7.4 2.0 - 12.0 %    Eosinophils % 0.7 0.0 - 6.0 %    Basophils % 0.3 0.0 - 2.0 %    Neutrophils Absolute 5.42 1.80 - 7.30 E9/L    Immature Granulocytes # 0.02 E9/L    Lymphocytes Absolute 0.87 (L) 1.50 - 4.00 E9/L    Monocytes Absolute 0.51 0.10 - 0.95 E9/L    Eosinophils Absolute 0.05 0.05 - 0.50 E9/L    Basophils Absolute 0.02 0.00 - 0.20 E9/L   Comprehensive metabolic panel   Result Value Ref Range    Sodium 134 132 - 146 mmol/L    Potassium 3.4 (L) 3.5 - 5.0 mmol/L    Chloride 90 (L) 98 - 107 mmol/L    CO2 33 (H) 22 - 29 mmol/L    Anion Gap 11 7 - 16 mmol/L    Glucose 108 (H) 74 - 99 mg/dL    BUN 8 6 - 20 mg/dL    CREATININE 0.8 0.5 - 1.0 mg/dL    GFR Non-African American >60 >=60 mL/min/1.73    GFR African American >60     Calcium 9.2 8.6 - 10.2 mg/dL    Total Protein 7.3 6.4 - 8.3 g/dL    Albumin 4.3 3.5 - 5.2 g/dL    Total Bilirubin 0.7 0.0 - 1.2 mg/dL    Alkaline Phosphatase 113 (H) 35 - 104 U/L     (H) 0 - 32 U/L    AST 92 (H) 0 - 31 U/L   Troponin   Result Value Ref Range    Troponin <0.01 0.00 - 0.03 ng/mL   Brain Natriuretic Peptide   Result Value Ref Range    Pro- (H) 0 - 125 pg/mL   D-Dimer, Quantitative   Result Value Ref Range    D-Dimer, Quant 279 ng/mL DDU   Lactate, Sepsis   Result Value Ref Range    Lactic Acid, Sepsis 1.7 0.5 - 1.9 mmol/L   Urinalysis   Result Value Ref Range    Color, UA Yellow Straw/Yellow    Clarity, UA Clear Clear    Glucose, Ur Negative Negative mg/dL    Bilirubin Urine Negative Negative    Ketones, Urine Negative Negative mg/dL    Specific Gravity, UA <=1.005 1.005 - 1.030    Blood, Urine SMALL (A) Negative    pH, UA 7.0 5.0 - 9.0    Protein, UA Negative Negative mg/dL    Urobilinogen, Urine 0.2 <2.0 E.U./dL    Nitrite, Urine Negative Negative    Leukocyte Esterase, Urine Negative Negative   Lipase   Result Value Ref Range    Lipase 13 13 - 60 U/L   TSH without Reflex   Result Value Ref Range    TSH 5.120 (H) 0.270 - 4.200 uIU/mL   T4, Free   Result Value Ref Range    T4 Free 1.41 0.93 - 1.70 ng/dL   Microscopic Urinalysis   Result Value Ref Range    WBC, UA NONE 0 - 5 /HPF    RBC, UA 1-3 0 - 2 /HPF    Epithelial Cells, UA NONE SEEN /HPF    Bacteria, UA NONE SEEN None Seen /HPF       Radiology:  CTA CHEST W CONTRAST   Final Result   1. No evidence of pulmonary embolism   2. Subpleural airspace opacities in the basilar left lower lobe may represent   atelectasis or pneumonia. There is new atelectasis in the right middle lobe   3.  Persistent ventral hernia containing a few loops of small bowel, with   persistent mild dilatation of small bowel proximal to the hernia suggesting   an element of low-grade obstruction, possibly chronic   4. Hepatic steatosis         CT ABDOMEN PELVIS W IV CONTRAST Additional Contrast? None   Final Result   1. No evidence of pulmonary embolism   2. Subpleural airspace opacities in the basilar left lower lobe may represent   atelectasis or pneumonia. There is new atelectasis in the right middle lobe   3. Persistent ventral hernia containing a few loops of small bowel, with   persistent mild dilatation of small bowel proximal to the hernia suggesting   an element of low-grade obstruction, possibly chronic   4. Hepatic steatosis         XR CHEST PORTABLE   Final Result   No pneumonia or pleural effusion.             ------------------------- NURSING NOTES AND VITALS REVIEWED ---------------------------  Date / Time Roomed:  3/3/2021  5:02 PM  ED Bed Assignment:  14/14    The nursing notes within the ED encounter and vital signs as below have been reviewed. /78   Pulse 80   Temp 96.4 °F (35.8 °C) (Temporal)   Resp 19   Ht 5' 3\" (1.6 m)   Wt 240 lb (108.9 kg)   SpO2 95%   BMI 42.51 kg/m²   Oxygen Saturation Interpretation: Normal      ------------------------------------------ PROGRESS NOTES ------------------------------------------  I have spoken with the patient and discussed todays results, in addition to providing specific details for the plan of care and counseling regarding the diagnosis and prognosis. Their questions are answered at this time and they are agreeable with the plan. I discussed at length with them reasons for immediate return here for re evaluation. They will followup with primary care by calling their office tomorrow. --------------------------------- ADDITIONAL PROVIDER NOTES ---------------------------------  At this time the patient is without objective evidence of an acute process requiring hospitalization or inpatient management.   They have remained hemodynamically stable throughout their entire ED visit and are stable for discharge with outpatient follow-up. The plan has been discussed in detail and they are aware of the specific conditions for emergent return, as well as the importance of follow-up. Discharge Medication List as of 3/3/2021 10:02 PM          Diagnosis:  1. Generalized edema    2. Recurrent abdominal hernia without obstruction or gangrene, unspecified hernia type        Disposition:  Patient's disposition: Discharge to home  Patient's condition is stable.          Darlyn Rea,   03/05/21 [id number]

## 2021-03-06 LAB — URINE CULTURE, ROUTINE: NORMAL

## 2021-03-08 LAB
BLOOD CULTURE, ROUTINE: NORMAL
CULTURE, BLOOD 2: NORMAL

## 2021-03-09 ENCOUNTER — APPOINTMENT (OUTPATIENT)
Dept: GENERAL RADIOLOGY | Age: 51
DRG: 254 | End: 2021-03-09
Payer: MEDICARE

## 2021-03-09 ENCOUNTER — APPOINTMENT (OUTPATIENT)
Dept: CT IMAGING | Age: 51
DRG: 254 | End: 2021-03-09
Payer: MEDICARE

## 2021-03-09 ENCOUNTER — HOSPITAL ENCOUNTER (INPATIENT)
Age: 51
LOS: 2 days | Discharge: HOME | DRG: 254 | End: 2021-03-11
Attending: EMERGENCY MEDICINE | Admitting: SURGERY
Payer: MEDICARE

## 2021-03-09 DIAGNOSIS — R10.9 INTRACTABLE ABDOMINAL PAIN: Primary | ICD-10-CM

## 2021-03-09 DIAGNOSIS — R11.0 NAUSEA: ICD-10-CM

## 2021-03-09 DIAGNOSIS — R10.33 PERIUMBILICAL ABDOMINAL PAIN: ICD-10-CM

## 2021-03-09 DIAGNOSIS — K46.0 INCARCERATED HERNIA: ICD-10-CM

## 2021-03-09 LAB
ALBUMIN SERPL-MCNC: 3.8 G/DL (ref 3.5–5.2)
ALP BLD-CCNC: 86 U/L (ref 35–104)
ALT SERPL-CCNC: 78 U/L (ref 0–32)
ANION GAP SERPL CALCULATED.3IONS-SCNC: 15 MMOL/L (ref 7–16)
AST SERPL-CCNC: 95 U/L (ref 0–31)
BACTERIA: ABNORMAL /HPF
BASOPHILS ABSOLUTE: 0.03 E9/L (ref 0–0.2)
BASOPHILS RELATIVE PERCENT: 0.7 % (ref 0–2)
BILIRUB SERPL-MCNC: 0.4 MG/DL (ref 0–1.2)
BILIRUBIN URINE: NEGATIVE
BLOOD, URINE: ABNORMAL
BUN BLDV-MCNC: 11 MG/DL (ref 6–20)
CALCIUM SERPL-MCNC: 8.6 MG/DL (ref 8.6–10.2)
CHLORIDE BLD-SCNC: 98 MMOL/L (ref 98–107)
CLARITY: CLEAR
CO2: 27 MMOL/L (ref 22–29)
COLOR: YELLOW
CREAT SERPL-MCNC: 0.8 MG/DL (ref 0.5–1)
EOSINOPHILS ABSOLUTE: 0.14 E9/L (ref 0.05–0.5)
EOSINOPHILS RELATIVE PERCENT: 3.2 % (ref 0–6)
EPITHELIAL CELLS, UA: ABNORMAL /HPF
GFR AFRICAN AMERICAN: >60
GFR NON-AFRICAN AMERICAN: >60 ML/MIN/1.73
GLUCOSE BLD-MCNC: 91 MG/DL (ref 74–99)
GLUCOSE URINE: NEGATIVE MG/DL
HCT VFR BLD CALC: 44.2 % (ref 34–48)
HEMOGLOBIN: 14.8 G/DL (ref 11.5–15.5)
IMMATURE GRANULOCYTES #: 0.03 E9/L
IMMATURE GRANULOCYTES %: 0.7 % (ref 0–5)
KETONES, URINE: NEGATIVE MG/DL
LACTIC ACID, SEPSIS: 2.1 MMOL/L (ref 0.5–1.9)
LACTIC ACID, SEPSIS: 2.8 MMOL/L (ref 0.5–1.9)
LEUKOCYTE ESTERASE, URINE: NEGATIVE
LIPASE: 48 U/L (ref 13–60)
LYMPHOCYTES ABSOLUTE: 1.15 E9/L (ref 1.5–4)
LYMPHOCYTES RELATIVE PERCENT: 26.5 % (ref 20–42)
MAGNESIUM: 2.2 MG/DL (ref 1.6–2.6)
MCH RBC QN AUTO: 32.6 PG (ref 26–35)
MCHC RBC AUTO-ENTMCNC: 33.5 % (ref 32–34.5)
MCV RBC AUTO: 97.4 FL (ref 80–99.9)
MONOCYTES ABSOLUTE: 0.39 E9/L (ref 0.1–0.95)
MONOCYTES RELATIVE PERCENT: 9 % (ref 2–12)
NEUTROPHILS ABSOLUTE: 2.6 E9/L (ref 1.8–7.3)
NEUTROPHILS RELATIVE PERCENT: 59.9 % (ref 43–80)
NITRITE, URINE: NEGATIVE
PDW BLD-RTO: 17.1 FL (ref 11.5–15)
PH UA: 6.5 (ref 5–9)
PLATELET # BLD: 215 E9/L (ref 130–450)
PMV BLD AUTO: 9.2 FL (ref 7–12)
POTASSIUM REFLEX MAGNESIUM: 3.4 MMOL/L (ref 3.5–5)
PROTEIN UA: NEGATIVE MG/DL
RBC # BLD: 4.54 E12/L (ref 3.5–5.5)
RBC UA: ABNORMAL /HPF (ref 0–2)
SODIUM BLD-SCNC: 140 MMOL/L (ref 132–146)
SPECIFIC GRAVITY UA: 1.01 (ref 1–1.03)
TOTAL PROTEIN: 6.5 G/DL (ref 6.4–8.3)
UROBILINOGEN, URINE: 1 E.U./DL
WBC # BLD: 4.3 E9/L (ref 4.5–11.5)
WBC UA: ABNORMAL /HPF (ref 0–5)

## 2021-03-09 PROCEDURE — 80053 COMPREHEN METABOLIC PANEL: CPT

## 2021-03-09 PROCEDURE — 83605 ASSAY OF LACTIC ACID: CPT

## 2021-03-09 PROCEDURE — 83735 ASSAY OF MAGNESIUM: CPT

## 2021-03-09 PROCEDURE — 96375 TX/PRO/DX INJ NEW DRUG ADDON: CPT

## 2021-03-09 PROCEDURE — 6370000000 HC RX 637 (ALT 250 FOR IP): Performed by: SURGERY

## 2021-03-09 PROCEDURE — 74177 CT ABD & PELVIS W/CONTRAST: CPT

## 2021-03-09 PROCEDURE — 99283 EMERGENCY DEPT VISIT LOW MDM: CPT

## 2021-03-09 PROCEDURE — 96365 THER/PROPH/DIAG IV INF INIT: CPT

## 2021-03-09 PROCEDURE — 1200000000 HC SEMI PRIVATE

## 2021-03-09 PROCEDURE — 83690 ASSAY OF LIPASE: CPT

## 2021-03-09 PROCEDURE — 71045 X-RAY EXAM CHEST 1 VIEW: CPT

## 2021-03-09 PROCEDURE — 81001 URINALYSIS AUTO W/SCOPE: CPT

## 2021-03-09 PROCEDURE — 6360000004 HC RX CONTRAST MEDICATION: Performed by: RADIOLOGY

## 2021-03-09 PROCEDURE — 2580000003 HC RX 258: Performed by: SURGERY

## 2021-03-09 PROCEDURE — 6360000002 HC RX W HCPCS: Performed by: EMERGENCY MEDICINE

## 2021-03-09 PROCEDURE — 93005 ELECTROCARDIOGRAM TRACING: CPT | Performed by: STUDENT IN AN ORGANIZED HEALTH CARE EDUCATION/TRAINING PROGRAM

## 2021-03-09 PROCEDURE — 36415 COLL VENOUS BLD VENIPUNCTURE: CPT

## 2021-03-09 PROCEDURE — 2580000003 HC RX 258: Performed by: STUDENT IN AN ORGANIZED HEALTH CARE EDUCATION/TRAINING PROGRAM

## 2021-03-09 PROCEDURE — 87088 URINE BACTERIA CULTURE: CPT

## 2021-03-09 PROCEDURE — 85025 COMPLETE CBC W/AUTO DIFF WBC: CPT

## 2021-03-09 PROCEDURE — 6360000002 HC RX W HCPCS: Performed by: SURGERY

## 2021-03-09 PROCEDURE — 6360000002 HC RX W HCPCS: Performed by: STUDENT IN AN ORGANIZED HEALTH CARE EDUCATION/TRAINING PROGRAM

## 2021-03-09 RX ORDER — SODIUM CHLORIDE 9 MG/ML
10 INJECTION INTRAVENOUS DAILY
Status: DISCONTINUED | OUTPATIENT
Start: 2021-03-10 | End: 2021-03-11 | Stop reason: HOSPADM

## 2021-03-09 RX ORDER — SODIUM CHLORIDE 0.9 % (FLUSH) 0.9 %
10 SYRINGE (ML) INJECTION EVERY 12 HOURS SCHEDULED
Status: DISCONTINUED | OUTPATIENT
Start: 2021-03-09 | End: 2021-03-11 | Stop reason: HOSPADM

## 2021-03-09 RX ORDER — HYDROMORPHONE HYDROCHLORIDE 1 MG/ML
1 INJECTION, SOLUTION INTRAMUSCULAR; INTRAVENOUS; SUBCUTANEOUS ONCE
Status: COMPLETED | OUTPATIENT
Start: 2021-03-09 | End: 2021-03-09

## 2021-03-09 RX ORDER — ACETAMINOPHEN 325 MG/1
650 TABLET ORAL EVERY 4 HOURS PRN
Status: DISCONTINUED | OUTPATIENT
Start: 2021-03-09 | End: 2021-03-11 | Stop reason: HOSPADM

## 2021-03-09 RX ORDER — SODIUM CHLORIDE, SODIUM LACTATE, POTASSIUM CHLORIDE, CALCIUM CHLORIDE 600; 310; 30; 20 MG/100ML; MG/100ML; MG/100ML; MG/100ML
INJECTION, SOLUTION INTRAVENOUS CONTINUOUS
Status: DISCONTINUED | OUTPATIENT
Start: 2021-03-09 | End: 2021-03-11 | Stop reason: HOSPADM

## 2021-03-09 RX ORDER — PANTOPRAZOLE SODIUM 40 MG/10ML
40 INJECTION, POWDER, LYOPHILIZED, FOR SOLUTION INTRAVENOUS DAILY
Status: DISCONTINUED | OUTPATIENT
Start: 2021-03-10 | End: 2021-03-11 | Stop reason: HOSPADM

## 2021-03-09 RX ORDER — POTASSIUM CHLORIDE 7.45 MG/ML
10 INJECTION INTRAVENOUS ONCE
Status: COMPLETED | OUTPATIENT
Start: 2021-03-09 | End: 2021-03-09

## 2021-03-09 RX ORDER — ONDANSETRON 2 MG/ML
4 INJECTION INTRAMUSCULAR; INTRAVENOUS ONCE
Status: COMPLETED | OUTPATIENT
Start: 2021-03-09 | End: 2021-03-09

## 2021-03-09 RX ORDER — SODIUM CHLORIDE 0.9 % (FLUSH) 0.9 %
10 SYRINGE (ML) INJECTION PRN
Status: DISCONTINUED | OUTPATIENT
Start: 2021-03-09 | End: 2021-03-11 | Stop reason: HOSPADM

## 2021-03-09 RX ORDER — ONDANSETRON 2 MG/ML
4 INJECTION INTRAMUSCULAR; INTRAVENOUS EVERY 6 HOURS PRN
Status: DISCONTINUED | OUTPATIENT
Start: 2021-03-09 | End: 2021-03-11 | Stop reason: HOSPADM

## 2021-03-09 RX ORDER — ONDANSETRON 4 MG/1
4 TABLET, ORALLY DISINTEGRATING ORAL EVERY 8 HOURS PRN
Status: DISCONTINUED | OUTPATIENT
Start: 2021-03-09 | End: 2021-03-11 | Stop reason: HOSPADM

## 2021-03-09 RX ORDER — OXYCODONE HYDROCHLORIDE 5 MG/1
5 TABLET ORAL EVERY 4 HOURS PRN
Status: DISCONTINUED | OUTPATIENT
Start: 2021-03-09 | End: 2021-03-11 | Stop reason: HOSPADM

## 2021-03-09 RX ORDER — PROCHLORPERAZINE EDISYLATE 5 MG/ML
10 INJECTION INTRAMUSCULAR; INTRAVENOUS ONCE
Status: COMPLETED | OUTPATIENT
Start: 2021-03-09 | End: 2021-03-09

## 2021-03-09 RX ORDER — OXYCODONE HYDROCHLORIDE 5 MG/1
10 TABLET ORAL EVERY 4 HOURS PRN
Status: DISCONTINUED | OUTPATIENT
Start: 2021-03-09 | End: 2021-03-11 | Stop reason: HOSPADM

## 2021-03-09 RX ORDER — FENTANYL CITRATE 50 UG/ML
100 INJECTION, SOLUTION INTRAMUSCULAR; INTRAVENOUS ONCE
Status: COMPLETED | OUTPATIENT
Start: 2021-03-09 | End: 2021-03-09

## 2021-03-09 RX ORDER — 0.9 % SODIUM CHLORIDE 0.9 %
1000 INTRAVENOUS SOLUTION INTRAVENOUS ONCE
Status: COMPLETED | OUTPATIENT
Start: 2021-03-09 | End: 2021-03-09

## 2021-03-09 RX ORDER — HYDROMORPHONE HYDROCHLORIDE 1 MG/ML
0.5 INJECTION, SOLUTION INTRAMUSCULAR; INTRAVENOUS; SUBCUTANEOUS
Status: DISCONTINUED | OUTPATIENT
Start: 2021-03-09 | End: 2021-03-11 | Stop reason: HOSPADM

## 2021-03-09 RX ADMIN — SODIUM CHLORIDE, PRESERVATIVE FREE 10 ML: 5 INJECTION INTRAVENOUS at 21:56

## 2021-03-09 RX ADMIN — IOPAMIDOL 75 ML: 755 INJECTION, SOLUTION INTRAVENOUS at 19:23

## 2021-03-09 RX ADMIN — OXYCODONE 10 MG: 5 TABLET ORAL at 22:39

## 2021-03-09 RX ADMIN — SODIUM CHLORIDE 1000 ML: 9 INJECTION, SOLUTION INTRAVENOUS at 20:19

## 2021-03-09 RX ADMIN — SODIUM CHLORIDE, POTASSIUM CHLORIDE, SODIUM LACTATE AND CALCIUM CHLORIDE: 600; 310; 30; 20 INJECTION, SOLUTION INTRAVENOUS at 21:56

## 2021-03-09 RX ADMIN — FENTANYL CITRATE 100 MCG: 50 INJECTION INTRAMUSCULAR; INTRAVENOUS at 18:21

## 2021-03-09 RX ADMIN — POTASSIUM CHLORIDE 10 MEQ: 7.46 INJECTION, SOLUTION INTRAVENOUS at 20:18

## 2021-03-09 RX ADMIN — ONDANSETRON 4 MG: 2 INJECTION INTRAMUSCULAR; INTRAVENOUS at 21:56

## 2021-03-09 RX ADMIN — HYDROMORPHONE HYDROCHLORIDE 1 MG: 1 INJECTION, SOLUTION INTRAMUSCULAR; INTRAVENOUS; SUBCUTANEOUS at 20:35

## 2021-03-09 RX ADMIN — PROCHLORPERAZINE EDISYLATE 10 MG: 5 INJECTION INTRAMUSCULAR; INTRAVENOUS at 20:44

## 2021-03-09 RX ADMIN — ONDANSETRON 4 MG: 2 INJECTION INTRAMUSCULAR; INTRAVENOUS at 18:21

## 2021-03-09 ASSESSMENT — ENCOUNTER SYMPTOMS
COUGH: 1
VOMITING: 0
SHORTNESS OF BREATH: 0
SORE THROAT: 0
DIARRHEA: 0
NAUSEA: 1
CONSTIPATION: 1
RHINORRHEA: 0
WHEEZING: 0
ABDOMINAL DISTENTION: 1
BLOOD IN STOOL: 0
ABDOMINAL PAIN: 1
BACK PAIN: 1

## 2021-03-09 ASSESSMENT — PAIN SCALES - GENERAL
PAINLEVEL_OUTOF10: 10
PAINLEVEL_OUTOF10: 8

## 2021-03-09 ASSESSMENT — PAIN DESCRIPTION - PAIN TYPE: TYPE: ACUTE PAIN

## 2021-03-09 NOTE — ED PROVIDER NOTES
[facility]  Department of Emergency Medicine     Written by: Iliana Baugh DO  Patient Name: Pb Whaley  Attending Provider: Ramesh Garcia MD  Admit Date: 3/9/2021  5:35 PM  MRN: [id number]                   : 1970        Chief Complaint   Patient presents with    Abdominal Pain     X 4 days. Sent here to be evaluated per her PCP.    - Chief complaint    The history is provided by the patient and medical records. Patient is a 66-year-old female with past medical history of GERD, SBO, partial colectomy in 2016 due to diverticulitis, and incarcerated hernia in the past who presents the ED due to chief complaint of abdominal pain. Complaint is severe in severity, associated with dry heaving, made worse with palpation, not made better by anything in particular, and began gradually worsening over the past week. States she has not passed a bowel movement in 4 days but has been passing gas. She was seen here on 3/3 for abdominal pain, shortness of breath, and chest pain; CT abdomen pelvis at that time showed a ventral wall hernia with loops of small bowel and mild dilatation proximally suggesting possible mild obstruction which was interpreted as likely chronic; work-up was otherwise generally unremarkable, she was not admitted. She is scheduled for hernia repair surgery with Dr. Maurisio Oswald on 3/30/2021. Denies any fevers, cough or sore throat, neck pain or stiffness, chest pain or palpitations, shortness of breath, diarrhea, black or bloody stools, or urinary symptoms. Review of Systems   Constitutional: Positive for fatigue. Negative for chills and fever. HENT: Negative for rhinorrhea and sore throat. Eyes: Negative for visual disturbance. Respiratory: Positive for cough. Negative for shortness of breath and wheezing. Cardiovascular: Negative for chest pain and palpitations.    Gastrointestinal: Positive for abdominal distention, abdominal pain, constipation and nausea. Negative for blood in stool, diarrhea and vomiting. Genitourinary: Positive for decreased urine volume. Negative for dysuria and frequency. Musculoskeletal: Positive for back pain. Negative for myalgias. Skin: Negative for rash and wound. Neurological: Negative for weakness and headaches. Psychiatric/Behavioral: Negative for confusion. Physical Exam  Vitals signs and nursing note reviewed. Constitutional:       Comments: AAOx4, obese, answers questions and response to examination appropriately, tearful and very uncomfortable due to pain   HENT:      Head: Normocephalic and atraumatic. Right Ear: External ear normal.      Left Ear: External ear normal.      Nose: Nose normal. No rhinorrhea. Mouth/Throat:      Mouth: Mucous membranes are moist.      Pharynx: Oropharynx is clear. Eyes:      Extraocular Movements: Extraocular movements intact. Conjunctiva/sclera: Conjunctivae normal.      Pupils: Pupils are equal, round, and reactive to light. Neck:      Musculoskeletal: Normal range of motion and neck supple. Cardiovascular:      Rate and Rhythm: Normal rate and regular rhythm. Pulses: Normal pulses. Heart sounds: Normal heart sounds. Pulmonary:      Effort: Pulmonary effort is normal. No respiratory distress. Breath sounds: Normal breath sounds. No wheezing or rales. Abdominal:      General: A surgical scar is present. Bowel sounds are normal.      Palpations: Abdomen is soft. Tenderness: There is abdominal tenderness in the epigastric area and periumbilical area. There is rebound. There is no right CVA tenderness, left CVA tenderness or guarding. Hernia: A hernia is present. Hernia is present in the ventral area. Musculoskeletal: Normal range of motion. Right lower leg: No edema. Left lower leg: No edema. Skin:     General: Skin is warm and dry. Capillary Refill: Capillary refill takes less than 2 seconds.    Neurological: General: No focal deficit present. Mental Status: She is alert and oriented to person, place, and time. Sensory: No sensory deficit. Psychiatric:         Mood and Affect: Mood normal.         Behavior: Behavior normal.          Procedures       MDM     63-year-old female with history of GERD, SBO, and incarcerated hernia presents to the ED due to chief complaint of abdominal pain. On arrival blood pressure is 148/89, vitals otherwise stable; she is afebrile and nontachycardic. Exam remarkable for marked TTP of periumbilical region; there is ventral wall hernia; there are multiple abdominal surgery scars; abdomen is non acute, there is voluntary guarding; no rebound or rigidity. Lactic acid is 2.8; patient given IV NS bolus. Patient was dry heaving throughout this encounter which was ultimately relieved after Zofran and compazine. Her abdominal pain was somewhat improved after fentanyl and dilaudid but was not resolved and repeat exams are improved but remained painful and patient continued to be very uncomfortable. CT abd/pelv with contrast shows no evidence of acute intra-abdominal or pelvic inflammatory process; there is a ventral hernia containing loops of small bowel with no evidence of obstruction; there is fatty liver. CXR shows no acute process. Labs show potassium 3.4 which was repleted; lipase 48, no UTI, CBC unremarkable, AST/ALT 95/78 which is at/below patient's baseline per chart review. Given these findings persistence and the persistence of patient's symptoms, decision made to admit this patient due to intractable abdominal pain in context of ventral wall hernia which is to be repaired at the end of this month. Spoke with patient's general surgeon Dr. Milady Reddy, discussed case; he accepts this patient for admission. Discussed results and plan with the patient, she is amenable and her questions are answered at this time.       I have discussed this patient with my attending, who has seen the patient and agrees with this disposition. Patient was seen and evaluated by myself and my attending Lupis Bowser MD. Assessment and Plan discussed with attending provider, please see attestation for final plan of care. ED Course as of Mar 10 0101   Tue Mar 09, 2021   2033 Patient experiencing increasing abdominal pain additional pain meds ordered and will reassess. Discussed results of labs thus far. [MS]    Patient is dry heaving significantly; Compazine ordered. [VG]    Decision to admit this patient due to intractable abdominal pain. [VG]    Spoke with Dr. Janie Aguayo from his general surgery), discussed case; he accepts this patient for admission.    [VG]      ED Course User Index  [MS] Stephy Mueller DO  [VG] Buster Otto,        --------------------------------------------- PAST HISTORY ---------------------------------------------  Past Medical History:  has a past medical history of Bursitis, Class 3 severe obesity due to excess calories with body mass index (BMI) of 40.0 to 44.9 in [facility]), Diverticulitis, GERD (gastroesophageal reflux disease), Incisional hernia with obstruction but no gangrene, and Strep throat. Past Surgical History:  has a past surgical history that includes Hysterectomy; Cholecystectomy; Pelvic fracture surgery; colectomy (2016); Nerve Block; hernia repair; hernia repair (2015); ventral hernia repair (2015); and  section. Social History:  reports that she has been smoking cigarettes. She has a 30.00 pack-year smoking history. She has never used smokeless tobacco. She reports that she does not drink alcohol or use drugs. Family History: family history includes Diabetes in an other family member; Osteoarthritis in an other family member; Other in her father; Stroke in her brother. The patients home medications have been reviewed.     Allergies: Ibuprofen, Nsaids, and Negative Negative mg/dL    Specific Gravity, UA 1.010 1.005 - 1.030    Blood, Urine MODERATE (A) Negative    pH, UA 6.5 5.0 - 9.0    Protein, UA Negative Negative mg/dL    Urobilinogen, Urine 1.0 <2.0 E.U./dL    Nitrite, Urine Negative Negative    Leukocyte Esterase, Urine Negative Negative   Lactate, Sepsis   Result Value Ref Range    Lactic Acid, Sepsis 2.8 (H) 0.5 - 1.9 mmol/L   Lactate, Sepsis   Result Value Ref Range    Lactic Acid, Sepsis 2.1 (H) 0.5 - 1.9 mmol/L   Magnesium   Result Value Ref Range    Magnesium 2.2 1.6 - 2.6 mg/dL   Microscopic Urinalysis   Result Value Ref Range    WBC, UA NONE 0 - 5 /HPF    RBC, UA 0-1 0 - 2 /HPF    Epithelial Cells, UA RARE /HPF    Bacteria, UA RARE (A) None Seen /HPF   EKG 12 Lead   Result Value Ref Range    Ventricular Rate 76 BPM    Atrial Rate 76 BPM    P-R Interval 142 ms    QRS Duration 92 ms    Q-T Interval 410 ms    QTc Calculation (Bazett) 461 ms    P Axis 58 degrees    R Axis 101 degrees    T Axis 50 degrees       RADIOLOGY:  CT ABDOMEN PELVIS W IV CONTRAST Additional Contrast? None   Final Result   No CT evidence of acute intra-abdominal or pelvic inflammatory process. Ventral hernia containing loops of small bowel, with no evidence of   obstruction. Fatty liver. XR CHEST PORTABLE   Final Result   No acute process. EKG:  This EKG is signed and interpreted by the emergency department physician. Rate: 76  Rhythm: Sinus  Interpretation: NSR, RAD, normal ME interval, normal QRS duration, QTc 461, no ST elevations  Comparison: No significant change when compared to prior      ------------------------- NURSING NOTES AND VITALS REVIEWED ---------------------------  Date / Time Roomed:  3/9/2021  5:35 PM  ED Bed Assignment:  [id number]/[id number]    The nursing notes within the ED encounter and vital signs as below have been reviewed.      Patient Vitals for the past 24 hrs:   BP Temp Temp src Pulse Resp SpO2 Height Weight   03/09/21 2145 -- -- -- -- -- 93 % -- --   03/09/21 2143 -- -- -- -- -- (!) 89 % -- --   03/09/21 2132 (!) 108/38 98.1 °F (36.7 °C) Oral 79 20 (!) 77 % -- --   03/09/21 2038 137/89 98.7 °F (37.1 °C) Oral 78 20 90 % -- --   03/09/21 1735 (!) 148/89 98.2 °F (36.8 °C) Oral 81 22 94 % 5' 3\" (1.6 m) 240 lb (108.9 kg)   03/09/21 1728 -- 96.6 °F (35.9 °C) Temporal -- -- -- -- --   03/09/21 1725 -- -- -- 87 22 93 % -- --       Oxygen Saturation Interpretation: Normal    ------------------------------------------ PROGRESS NOTES ------------------------------------------  Re-evaluation(s):  Please see ED course    Counseling:  I have spoken with the patient and discussed todays results, in addition to providing specific details for the plan of care and counseling regarding the diagnosis and prognosis. Their questions are answered at this time and they are agreeable with the plan of admission.    --------------------------------- ADDITIONAL PROVIDER NOTES ---------------------------------  Consultations:  Please see ED course    This patient's ED course included: a personal history and physicial examination, re-evaluation prior to disposition, multiple bedside re-evaluations, IV medications, cardiac monitoring, continuous pulse oximetry and complex medical decision making and emergency management    This patient has remained hemodynamically stable during their ED course. Diagnosis:  1. Intractable abdominal pain    2. Periumbilical abdominal pain    3. Nausea        Disposition:  Patient's disposition: Admit to med/surg floor  Patient's condition is stable.        Lon Hicks DO  Resident  03/10/21 0567

## 2021-03-10 ENCOUNTER — APPOINTMENT (OUTPATIENT)
Dept: GENERAL RADIOLOGY | Age: 51
DRG: 254 | End: 2021-03-10
Payer: MEDICARE

## 2021-03-10 LAB
ADENOVIRUS BY PCR: NOT DETECTED
ANION GAP SERPL CALCULATED.3IONS-SCNC: 14 MMOL/L (ref 7–16)
BASOPHILS ABSOLUTE: 0.02 E9/L (ref 0–0.2)
BASOPHILS RELATIVE PERCENT: 0.4 % (ref 0–2)
BORDETELLA PARAPERTUSSIS BY PCR: NOT DETECTED
BORDETELLA PERTUSSIS BY PCR: NOT DETECTED
BUN BLDV-MCNC: 9 MG/DL (ref 6–20)
CALCIUM SERPL-MCNC: 7.9 MG/DL (ref 8.6–10.2)
CHLAMYDOPHILIA PNEUMONIAE BY PCR: NOT DETECTED
CHLORIDE BLD-SCNC: 99 MMOL/L (ref 98–107)
CO2: 24 MMOL/L (ref 22–29)
CORONAVIRUS 229E BY PCR: NOT DETECTED
CORONAVIRUS HKU1 BY PCR: NOT DETECTED
CORONAVIRUS NL63 BY PCR: NOT DETECTED
CORONAVIRUS OC43 BY PCR: NOT DETECTED
CREAT SERPL-MCNC: 0.8 MG/DL (ref 0.5–1)
EKG ATRIAL RATE: 76 BPM
EKG P AXIS: 58 DEGREES
EKG P-R INTERVAL: 142 MS
EKG Q-T INTERVAL: 410 MS
EKG QRS DURATION: 92 MS
EKG QTC CALCULATION (BAZETT): 461 MS
EKG R AXIS: 101 DEGREES
EKG T AXIS: 50 DEGREES
EKG VENTRICULAR RATE: 76 BPM
EOSINOPHILS ABSOLUTE: 0.12 E9/L (ref 0.05–0.5)
EOSINOPHILS RELATIVE PERCENT: 2.2 % (ref 0–6)
GFR AFRICAN AMERICAN: >60
GFR NON-AFRICAN AMERICAN: >60 ML/MIN/1.73
GLUCOSE BLD-MCNC: 75 MG/DL (ref 74–99)
HCT VFR BLD CALC: 43.9 % (ref 34–48)
HEMOGLOBIN: 14.2 G/DL (ref 11.5–15.5)
HUMAN METAPNEUMOVIRUS BY PCR: NOT DETECTED
HUMAN RHINOVIRUS/ENTEROVIRUS BY PCR: NOT DETECTED
IMMATURE GRANULOCYTES #: 0.02 E9/L
IMMATURE GRANULOCYTES %: 0.4 % (ref 0–5)
INFLUENZA A BY PCR: NOT DETECTED
INFLUENZA B BY PCR: NOT DETECTED
LV EF: 67 %
LVEF MODALITY: NORMAL
LYMPHOCYTES ABSOLUTE: 1.05 E9/L (ref 1.5–4)
LYMPHOCYTES RELATIVE PERCENT: 19.2 % (ref 20–42)
MCH RBC QN AUTO: 32.6 PG (ref 26–35)
MCHC RBC AUTO-ENTMCNC: 32.3 % (ref 32–34.5)
MCV RBC AUTO: 100.7 FL (ref 80–99.9)
MONOCYTES ABSOLUTE: 0.5 E9/L (ref 0.1–0.95)
MONOCYTES RELATIVE PERCENT: 9.1 % (ref 2–12)
MYCOPLASMA PNEUMONIAE BY PCR: NOT DETECTED
NEUTROPHILS ABSOLUTE: 3.76 E9/L (ref 1.8–7.3)
NEUTROPHILS RELATIVE PERCENT: 68.7 % (ref 43–80)
PARAINFLUENZA VIRUS 1 BY PCR: NOT DETECTED
PARAINFLUENZA VIRUS 2 BY PCR: NOT DETECTED
PARAINFLUENZA VIRUS 3 BY PCR: NOT DETECTED
PARAINFLUENZA VIRUS 4 BY PCR: NOT DETECTED
PDW BLD-RTO: 17.1 FL (ref 11.5–15)
PLATELET # BLD: 203 E9/L (ref 130–450)
PMV BLD AUTO: 9.5 FL (ref 7–12)
POTASSIUM REFLEX MAGNESIUM: 4.2 MMOL/L (ref 3.5–5)
PRO-BNP: 189 PG/ML (ref 0–125)
PROCALCITONIN: 0.11 NG/ML (ref 0–0.08)
RBC # BLD: 4.36 E12/L (ref 3.5–5.5)
RESPIRATORY SYNCYTIAL VIRUS BY PCR: NOT DETECTED
SARS-COV-2, PCR: NOT DETECTED
SODIUM BLD-SCNC: 137 MMOL/L (ref 132–146)
TROPONIN: <0.01 NG/ML (ref 0–0.03)
WBC # BLD: 5.5 E9/L (ref 4.5–11.5)

## 2021-03-10 PROCEDURE — 6370000000 HC RX 637 (ALT 250 FOR IP): Performed by: SURGERY

## 2021-03-10 PROCEDURE — 74250 X-RAY XM SM INT 1CNTRST STD: CPT

## 2021-03-10 PROCEDURE — 6360000004 HC RX CONTRAST MEDICATION: Performed by: SURGERY

## 2021-03-10 PROCEDURE — 2580000003 HC RX 258: Performed by: SURGERY

## 2021-03-10 PROCEDURE — 83880 ASSAY OF NATRIURETIC PEPTIDE: CPT

## 2021-03-10 PROCEDURE — 6360000002 HC RX W HCPCS: Performed by: SURGERY

## 2021-03-10 PROCEDURE — 84145 PROCALCITONIN (PCT): CPT

## 2021-03-10 PROCEDURE — 2580000003 HC RX 258: Performed by: NURSE PRACTITIONER

## 2021-03-10 PROCEDURE — 6370000000 HC RX 637 (ALT 250 FOR IP): Performed by: NURSE PRACTITIONER

## 2021-03-10 PROCEDURE — 85025 COMPLETE CBC W/AUTO DIFF WBC: CPT

## 2021-03-10 PROCEDURE — 93010 ELECTROCARDIOGRAM REPORT: CPT | Performed by: INTERNAL MEDICINE

## 2021-03-10 PROCEDURE — 99222 1ST HOSP IP/OBS MODERATE 55: CPT | Performed by: SURGERY

## 2021-03-10 PROCEDURE — 36415 COLL VENOUS BLD VENIPUNCTURE: CPT

## 2021-03-10 PROCEDURE — 1200000000 HC SEMI PRIVATE

## 2021-03-10 PROCEDURE — 80048 BASIC METABOLIC PNL TOTAL CA: CPT

## 2021-03-10 PROCEDURE — 6370000000 HC RX 637 (ALT 250 FOR IP): Performed by: STUDENT IN AN ORGANIZED HEALTH CARE EDUCATION/TRAINING PROGRAM

## 2021-03-10 PROCEDURE — 0202U NFCT DS 22 TRGT SARS-COV-2: CPT

## 2021-03-10 PROCEDURE — 6360000002 HC RX W HCPCS: Performed by: NURSE PRACTITIONER

## 2021-03-10 PROCEDURE — 84484 ASSAY OF TROPONIN QUANT: CPT

## 2021-03-10 PROCEDURE — 93306 TTE W/DOPPLER COMPLETE: CPT

## 2021-03-10 PROCEDURE — C9113 INJ PANTOPRAZOLE SODIUM, VIA: HCPCS | Performed by: SURGERY

## 2021-03-10 PROCEDURE — 71046 X-RAY EXAM CHEST 2 VIEWS: CPT

## 2021-03-10 PROCEDURE — 2700000000 HC OXYGEN THERAPY PER DAY

## 2021-03-10 RX ORDER — DOCUSATE SODIUM 100 MG/1
100 CAPSULE, LIQUID FILLED ORAL 2 TIMES DAILY
Status: DISCONTINUED | OUTPATIENT
Start: 2021-03-10 | End: 2021-03-11 | Stop reason: HOSPADM

## 2021-03-10 RX ORDER — ALBUTEROL SULFATE 2.5 MG/3ML
2.5 SOLUTION RESPIRATORY (INHALATION) EVERY 4 HOURS PRN
Status: DISCONTINUED | OUTPATIENT
Start: 2021-03-10 | End: 2021-03-11 | Stop reason: HOSPADM

## 2021-03-10 RX ORDER — DOXYCYCLINE HYCLATE 100 MG/1
100 CAPSULE ORAL EVERY 12 HOURS SCHEDULED
Status: DISCONTINUED | OUTPATIENT
Start: 2021-03-10 | End: 2021-03-11 | Stop reason: HOSPADM

## 2021-03-10 RX ORDER — POLYETHYLENE GLYCOL 3350 17 G/17G
17 POWDER, FOR SOLUTION ORAL DAILY
Status: DISCONTINUED | OUTPATIENT
Start: 2021-03-10 | End: 2021-03-11 | Stop reason: HOSPADM

## 2021-03-10 RX ADMIN — HYDROMORPHONE HYDROCHLORIDE 0.5 MG: 1 INJECTION, SOLUTION INTRAMUSCULAR; INTRAVENOUS; SUBCUTANEOUS at 09:28

## 2021-03-10 RX ADMIN — HYDROMORPHONE HYDROCHLORIDE 0.5 MG: 1 INJECTION, SOLUTION INTRAMUSCULAR; INTRAVENOUS; SUBCUTANEOUS at 05:16

## 2021-03-10 RX ADMIN — HYDROMORPHONE HYDROCHLORIDE 0.5 MG: 1 INJECTION, SOLUTION INTRAMUSCULAR; INTRAVENOUS; SUBCUTANEOUS at 21:21

## 2021-03-10 RX ADMIN — DOCUSATE SODIUM 100 MG: 100 CAPSULE, LIQUID FILLED ORAL at 20:27

## 2021-03-10 RX ADMIN — OXYCODONE 10 MG: 5 TABLET ORAL at 07:02

## 2021-03-10 RX ADMIN — HYDROMORPHONE HYDROCHLORIDE 0.5 MG: 1 INJECTION, SOLUTION INTRAMUSCULAR; INTRAVENOUS; SUBCUTANEOUS at 13:57

## 2021-03-10 RX ADMIN — ENOXAPARIN SODIUM 40 MG: 40 INJECTION SUBCUTANEOUS at 16:20

## 2021-03-10 RX ADMIN — CEFTRIAXONE SODIUM 1000 MG: 1 INJECTION, POWDER, FOR SOLUTION INTRAMUSCULAR; INTRAVENOUS at 09:27

## 2021-03-10 RX ADMIN — OXYCODONE 10 MG: 5 TABLET ORAL at 20:27

## 2021-03-10 RX ADMIN — SODIUM CHLORIDE, PRESERVATIVE FREE 10 ML: 5 INJECTION INTRAVENOUS at 09:29

## 2021-03-10 RX ADMIN — OXYCODONE 10 MG: 5 TABLET ORAL at 16:20

## 2021-03-10 RX ADMIN — HYDROMORPHONE HYDROCHLORIDE 0.5 MG: 1 INJECTION, SOLUTION INTRAMUSCULAR; INTRAVENOUS; SUBCUTANEOUS at 01:39

## 2021-03-10 RX ADMIN — SODIUM CHLORIDE, PRESERVATIVE FREE 10 ML: 5 INJECTION INTRAVENOUS at 09:28

## 2021-03-10 RX ADMIN — SODIUM CHLORIDE, POTASSIUM CHLORIDE, SODIUM LACTATE AND CALCIUM CHLORIDE: 600; 310; 30; 20 INJECTION, SOLUTION INTRAVENOUS at 05:16

## 2021-03-10 RX ADMIN — HYDROMORPHONE HYDROCHLORIDE 0.5 MG: 1 INJECTION, SOLUTION INTRAMUSCULAR; INTRAVENOUS; SUBCUTANEOUS at 17:45

## 2021-03-10 RX ADMIN — OXYCODONE 10 MG: 5 TABLET ORAL at 03:03

## 2021-03-10 RX ADMIN — PANTOPRAZOLE SODIUM 40 MG: 40 INJECTION, POWDER, FOR SOLUTION INTRAVENOUS at 09:28

## 2021-03-10 RX ADMIN — OXYCODONE 10 MG: 5 TABLET ORAL at 12:16

## 2021-03-10 RX ADMIN — ONDANSETRON 4 MG: 2 INJECTION INTRAMUSCULAR; INTRAVENOUS at 20:29

## 2021-03-10 RX ADMIN — DOXYCYCLINE HYCLATE 100 MG: 100 CAPSULE ORAL at 20:26

## 2021-03-10 RX ADMIN — DIATRIZOATE MEGLUMINE AND DIATRIZOATE SODIUM 360 ML: 660; 100 LIQUID ORAL; RECTAL at 09:06

## 2021-03-10 ASSESSMENT — PAIN SCALES - GENERAL
PAINLEVEL_OUTOF10: 8
PAINLEVEL_OUTOF10: 7
PAINLEVEL_OUTOF10: 7
PAINLEVEL_OUTOF10: 3
PAINLEVEL_OUTOF10: 8
PAINLEVEL_OUTOF10: 7
PAINLEVEL_OUTOF10: 7

## 2021-03-10 ASSESSMENT — PAIN DESCRIPTION - DESCRIPTORS: DESCRIPTORS: SHOOTING;BURNING

## 2021-03-10 ASSESSMENT — PAIN DESCRIPTION - LOCATION: LOCATION: ABDOMEN

## 2021-03-10 NOTE — H&P
GENERAL SURGERY  HISTORY AND PHYSICAL  3/10/2021    Chief Complaint   Patient presents with    Abdominal Pain     X 4 days. Sent here to be evaluated per her PCP. MELISA Abdul is a 48 y.o. female w/ PMH obesity, multiple previous abdominal surgeries and a ventral hernia who presents for evaluation of constipation and pain. She was seen in the surgery clinic at the end of February with some issues and she is scheduled for repair at the end of March. She states the past 4 days she has had issues with having a BM and tolerating PO. She states her hernia sometimes \" pops out\" but it does reduce without issues. She went to her PCP who sent her to the ER. She had another CT scan which showed some small bowel loops in the hernia but no proximal dilation. Past Medical History:   Diagnosis Date    Bursitis     hips    Class 3 severe obesity due to excess calories with body mass index (BMI) of 40.0 to 44.9 in adult [facility])     Diverticulitis     GERD (gastroesophageal reflux disease)     Incisional hernia with obstruction but no gangrene     Strep throat        Past Surgical History:   Procedure Laterality Date     SECTION      CHOLECYSTECTOMY      COLECTOMY  2016    HERNIA REPAIR      HERNIA REPAIR  2015    incarcerated hernia repair    HYSTERECTOMY      NERVE BLOCK      sacroiliac bilateral 2012    PELVIC FRACTURE SURGERY      VENTRAL HERNIA REPAIR  2015       Prior to Admission medications    Medication Sig Start Date End Date Taking?  Authorizing Provider   hydrOXYzine (ATARAX) 50 MG tablet Take 50 mg by mouth nightly  21  Yes Historical Provider, MD   rOPINIRole (REQUIP) 0.25 MG tablet  21  Yes Historical Provider, MD   brexpiprazole (REXULTI) 0.5 MG TABS tablet Take 0.5 mg by mouth daily   Yes Historical Provider, MD   PARoxetine (PAXIL) 10 MG tablet Take 10 mg by mouth every morning   Yes Historical Provider, MD   bumetanide (BUMEX) 1 MG tablet TAKE comes back out, mild distention  Skin; warm and dry, no cyanosis  Gu: no cva tenderness  Extremities: edema  Psych: No tremor, visual hallucinations    LABS:  CBC  Recent Labs     03/10/21  0522   WBC 5.5   HGB 14.2   HCT 43.9        BMP  Recent Labs     03/10/21  0522      K 4.2   CL 99   CO2 24   BUN 9   CREATININE 0.8   CALCIUM 7.9*     Liver Function  Recent Labs     03/09/21  1825   LIPASE 48   BILITOT 0.4   AST 95*   ALT 78*   ALKPHOS 86   PROT 6.5   LABALBU 3.8     No results for input(s): LACTATE in the last 72 hours. No results for input(s): INR, PTT in the last 72 hours. Invalid input(s): PT    RADIOLOGY    Ct Abdomen Pelvis W Iv Contrast Additional Contrast? None    Result Date: 3/9/2021  EXAMINATION: CT OF THE ABDOMEN AND PELVIS WITH CONTRAST 3/9/2021 7:23 pm TECHNIQUE: CT of the abdomen and pelvis was performed with the administration of intravenous contrast. Multiplanar reformatted images are provided for review. Dose modulation, iterative reconstruction, and/or weight based adjustment of the mA/kV was utilized to reduce the radiation dose to as low as reasonably achievable. COMPARISON: March 3, 2021 HISTORY: ORDERING SYSTEM PROVIDED HISTORY: abdominal pain; r/o obstruction, free air TECHNOLOGIST PROVIDED HISTORY: Additional Contrast?->None Reason for exam:->abdominal pain; r/o obstruction, free air Decision Support Exception->Emergency Medical Condition (MA) FINDINGS: Lower Chest: There are mild ground-glass peripheral infiltrates at the visualized lung bases. No pleural effusion. Organs: Fatty liver with no focal lesions. Gallbladder is surgically absent. Spleen is not enlarged. Pancreas and adrenal glands appear unremarkable. Symmetric enhancement of the kidneys with no hydronephrosis. GI/Bowel: No evidence of obstruction. Appendix is not visualized. Pelvis: Bladder is suboptimally distended. There is no significant free fluid.  Peritoneum/Retroperitoneum: No free air or significant adenopathy. Bones/Soft Tissues: Ventral hernia containing loops of bowel. Degenerative changes with anterolisthesis of L4 on L5. No CT evidence of acute intra-abdominal or pelvic inflammatory process. Ventral hernia containing loops of small bowel, with no evidence of obstruction. Fatty liver. Xr Chest Portable    Result Date: 3/9/2021  EXAMINATION: ONE XRAY VIEW OF THE CHEST 3/9/2021 6:27 pm COMPARISON: None. HISTORY: ORDERING SYSTEM PROVIDED HISTORY: abdominal pain TECHNOLOGIST PROVIDED HISTORY: Reason for exam:->abdominal pain FINDINGS: The lungs are without acute focal process. There is no effusion or pneumothorax. The cardiomediastinal silhouette is without acute process. The osseous structures are without acute process. No acute process. ASSESSMENT:  48 y.o. female with intermittent SBO 2/2 ventral hernia    PLAN:  Hernia is not incarcerated or strangulated  NPO- possible CLD today  Monitor for bowel function  Bowel regimen  She is currently scheduled for March 30th for ventral hernia repair  Will discuss further with Dr. Yury Boyd    Electronically signed by Roosevelt Lepe DO on 3/10/21 at 7:08 AM EST      General Surgery Consult Note  Alla Mcneal MD, MS    Patient's Name/Date of Birth: Darrold Bernheim / 1970    Date: March 10, 2021     Surgeon: Yury Boyd MD    Requesting Physician:     Chief Complaint: abd pain    Patient Active Problem List   Diagnosis    GERD (gastroesophageal reflux disease)    Osteoarthritis cervical spine    Osteoarthritis of lumbar spine    Morbid obesity ([address]    Hypokalemia    Venous insufficiency    SBO (small bowel obstruction) ([address]    Incarcerated hernia    Intractable abdominal pain       Subjective: As above. I saw and examined the patient and discussed the above HPI with the resident and agree with documented positive and negative findings.      Objective:  BP (!) 157/89   Pulse 70   Temp 97.9 °F (36.6 °C) (Oral)   Resp 20   Ht 5' 3\" (1.6 m)   Wt 240 lb (108.9 kg)   SpO2 95%   BMI 42.51 kg/m²   Labs:  Reviewed by me and relevant abnormalities noted       A complete 10 system review was performed and are otherwise negative unless mentioned in the above HPI. Specific negatives are listed below but may not include all those reviewed. General ROS: negative obtundation, AMS  ENT ROS: negative rhinorrhea, epistaxis  Allergy and Immunology ROS: negative itchy/watery eyes or nasal congestion  Hematological and Lymphatic ROS: negative spontaneous bleeding or bruising  Endocrine ROS: negative  lethargy, mood swings, palpitations or polydipsia/polyuria  Respiratory ROS: negative sputum changes, stridor, tachypnea or wheezing  Cardiovascular ROS: negative for - loss of consciousness, murmur or orthopnea  Gastrointestinal ROS: negative for - hematochezia or hematemesis  Genito-Urinary ROS: negative for -  genital discharge or hematuria  Musculoskeletal ROS: negative for - focal weakness, gangrene  Psych/Neuro ROS: negative for - visual or auditory hallucinations, suicidal ideation    Physical exam:   BP (!) 157/89   Pulse 70   Temp 97.9 °F (36.6 °C) (Oral)   Resp 20   Ht 5' 3\" (1.6 m)   Wt 240 lb (108.9 kg)   SpO2 95%   BMI 42.51 kg/m²   General appearance:  NAD, appears stated age  Head: NCAT, PERRLA, EOMI, red conjunctiva  Neck: supple, no masses, trachea midline  Lungs: Equal chest rise bilateral, no retractions, no wheezing  Heart: Reg rate  Abdomen: soft, minimally tender, distended, partially reducible hernia  Skin; warm and dry, no cyanosis  Gu: no cva tenderness  Extremities: atraumatic, no focal motor deficits, no open wounds  Psych: No tremor, visual hallucinations        Radiology: I reviewed relevant abdominal imaging from this admission and that available in the EMR including CT abd/pel from admission.  My assessment is PSBO    Assessment/Plan:  Pb Whaley is a 48 y.o. female with incarcerated incisional hernia without evidence of bowel obstruction on CT, intractable abdominal pain  Patient Active Problem List   Diagnosis    GERD (gastroesophageal reflux disease)    Osteoarthritis cervical spine    Osteoarthritis of lumbar spine    Morbid obesity ([address]    Hypokalemia    Venous insufficiency    SBO (small bowel obstruction) ([address]    Incarcerated hernia    Intractable abdominal pain       Admit  IV fluids  IV pain control  Small bowel follow-through to rule out obstruction  If no evidence of obstruction will advance diet and transition to p.o. pain control for plans for outpatient repair of her incisional hernia as previously planned  I have personally participated in a face-to-face history and physical exam on the date of service. Reviewed chart, vitals, labs and radiologic studies. I also participated in medical decision making with the resident/NP on the date of service and I agree with all of the pertinent clinical information, assessment and treatment plan. I have reviewed and edited the note above based on my findings during my history, exam, and decision making.        Physician Signature: Electronically signed by Dr. Jonathan Farrell  [phone] (p)  3/10/2021  1:58 PM

## 2021-03-10 NOTE — CONSULTS
Department of Internal Medicine  Internal Medicine Consultation Note    Primary Care Physician: Leslie Tate DO   Admitting Physician:  Thai Aggarwal MD  Admission date and time: 3/9/2021  5:35 PM    Room:  [address] [place] [address]  Admitting diagnosis: Intractable abdominal pain [R10.9]    Patient Name: Ariel Sherwood  MRN: [id number]    Date of Service: 3/10/2021     Reason for consultation:  Abdominal pain, hypoxia    HISTORY OF PRESENT ILLNESS:    Jesica Oliva is a 59-year-old  female patient recently seen by our service during last hospitalization. At that point in time she had been treated for a partial small bowel obstruction that resolved with conservative medical treatment. She tolerated dietary advancement. She was also found to have acute streptococcal pharyngitis and was started on IV antibiotics but transition to oral amoxicillin at discharge. She states since discharge she has had intermittent abdominal pain which she locates in the periumbilical region. She admits to a hernia in that area. She states that she has not had a bowel movement for the last 5 days. She admits to nausea but does not presently disclose vomiting. She denies any fevers or chills associated. She admits to sensation of mild malaise. She denies any chest pain or resting shortness of breath but is presently requiring 4 L of oxygen. She is not coughing, not producing sputum. She denies hemoptysis. Abdominal discomfort in the periumbilical region as discussed. She denies acute genitourinary complaints. Jesica Oliva has had 2 recent ER evaluations. On March 3 she was evaluated for the same issues and was noted to be mildly hypoxic. D-dimer was assessed at that time and was mildly elevated. CTA of the chest was performed that did show some atelectasis in the left lung base as well as in the right perihilar region. A hernia with small bowel contained was noted without obstructive findings. She was subsequently discharged home.   She Marital status:      Spouse name: Not on file    Number of children: Not on file    Years of education: Not on file    Highest education level: Not on file   Occupational History     Employer: [employer]   Social Needs    Financial resource strain: Not on file    Food insecurity     Worry: Not on file     Inability: Not on file    Transportation needs     Medical: Not on file     Non-medical: Not on file   Tobacco Use    Smoking status: Current Every Day Smoker     Packs/day: 1.00     Years: 30.00     Pack years: 30.00     Types: Cigarettes    Smokeless tobacco: Never Used   Substance and Sexual Activity    Alcohol use: No    Drug use: No    Sexual activity: Yes     Partners: Male   Lifestyle    Physical activity     Days per week: Not on file     Minutes per session: Not on file    Stress: Not on file   Relationships    Social connections     Talks on phone: Not on file     Gets together: Not on file     Attends [employer] service: Not on file     Active member of club or organization: Not on file     Attends meetings of clubs or organizations: Not on file     Relationship status: Not on file    Intimate partner violence     Fear of current or ex partner: Not on file     Emotionally abused: Not on file     Physically abused: Not on file     Forced sexual activity: Not on file   Other Topics Concern    Not on file   Social History Narrative     twice. Recently  December 2020       ROS: 12 point review of symptoms was conducted, pertinent positives and negative were reviewed, aside from that all 12 systems were reviewed and negative.        PHYSICAL EXAM:  VITALS:  Vitals:    03/10/21 0600   BP: (!) 157/89   Pulse: 70   Resp: 20   Temp: 97.9 °F (36.6 °C)   SpO2: 95%         CONSTITUTIONAL:    Awake, alert, cooperative, uncomfortable appearing but without distress    EYES:    PERRL, EOMI, sclera clear without icterus, conjunctiva normal    ENT:    Normocephalic, atraumatic, Component Value Date     03/10/2021    K 4.2 03/10/2021    CL 99 03/10/2021    CO2 24 03/10/2021    BUN 9 03/10/2021    CREATININE 0.8 03/10/2021    GFRAA >60 03/10/2021    LABGLOM >60 03/10/2021    GLUCOSE 75 03/10/2021    GLUCOSE 89 09/14/2011    PROT 6.5 03/09/2021    LABALBU 3.8 03/09/2021    CALCIUM 7.9 03/10/2021    BILITOT 0.4 03/09/2021    ALKPHOS 86 03/09/2021    AST 95 03/09/2021    ALT 78 03/09/2021     Last 3 Troponin:    Lab Results   Component Value Date    TROPONINI <0.01 03/03/2021    TROPONINI <0.01 02/20/2021       ASSESSMENT:  · Intractable abdominal pain due to ventral hernia with small bowel contained without strangulation or obstruction   · Acute respiratory failure with hypoxia of unknown etiology  · Generalized edema without known cardiomyopathy  · Elevated blood pressure without known diagnosis of essential hypertension   · Morbid obesity, BMI 43.90 kg/m2    PLAN:  Buffy Hodges was admitted by the surgery team due to intractable abdominal pain with known ventral hernia. Imaging shows no evidence of obstruction and clinically this is soft and reducible. Symptomatic and supportive care, dietary advancement as per the surgery team.  Fluid rate will be de-escalating due to edema. Hypoxic evaluation will be undertaken. Given recent CTA reads of infiltrate versus atelectasis repeat chest x-ray be obtained. Empiric antibiotics for pneumonia will be employed and infectious work-up be undertaken. Echocardiogram will be assessed. We will monitor blood pressure control once pain is more well controlled and, if needed, institute antihypertensive therapy. EKG from emergency department has been reviewed as no acute findings. Troponin and BNP will be assessed. Underlying co-morbidites will be addressed during hospitalization as well. Labs and vital signs will be monitored closely and addressed accordingly. See additional orders for details.      Merna Contreras, APRN-CNP  7:55 AM  3/10/2021    Electronically signed by BOB Bosch CNP on 3/10/21 at 7:55 AM EST

## 2021-03-11 VITALS
SYSTOLIC BLOOD PRESSURE: 140 MMHG | OXYGEN SATURATION: 95 % | WEIGHT: 240 LBS | RESPIRATION RATE: 20 BRPM | TEMPERATURE: 97.7 F | HEART RATE: 66 BPM | BODY MASS INDEX: 42.52 KG/M2 | DIASTOLIC BLOOD PRESSURE: 84 MMHG | HEIGHT: 63 IN

## 2021-03-11 LAB
ANION GAP SERPL CALCULATED.3IONS-SCNC: 10 MMOL/L (ref 7–16)
BASOPHILS ABSOLUTE: 0.03 E9/L (ref 0–0.2)
BASOPHILS RELATIVE PERCENT: 0.6 % (ref 0–2)
BUN BLDV-MCNC: 7 MG/DL (ref 6–20)
CALCIUM SERPL-MCNC: 8.9 MG/DL (ref 8.6–10.2)
CHLORIDE BLD-SCNC: 98 MMOL/L (ref 98–107)
CO2: 28 MMOL/L (ref 22–29)
CREAT SERPL-MCNC: 0.8 MG/DL (ref 0.5–1)
EOSINOPHILS ABSOLUTE: 0.1 E9/L (ref 0.05–0.5)
EOSINOPHILS RELATIVE PERCENT: 2.1 % (ref 0–6)
GFR AFRICAN AMERICAN: >60
GFR NON-AFRICAN AMERICAN: >60 ML/MIN/1.73
GLUCOSE BLD-MCNC: 100 MG/DL (ref 74–99)
HCT VFR BLD CALC: 44.4 % (ref 34–48)
HEMOGLOBIN: 13.9 G/DL (ref 11.5–15.5)
IMMATURE GRANULOCYTES #: 0.01 E9/L
IMMATURE GRANULOCYTES %: 0.2 % (ref 0–5)
LYMPHOCYTES ABSOLUTE: 1.08 E9/L (ref 1.5–4)
LYMPHOCYTES RELATIVE PERCENT: 22.6 % (ref 20–42)
MCH RBC QN AUTO: 32.6 PG (ref 26–35)
MCHC RBC AUTO-ENTMCNC: 31.3 % (ref 32–34.5)
MCV RBC AUTO: 104.2 FL (ref 80–99.9)
MONOCYTES ABSOLUTE: 0.42 E9/L (ref 0.1–0.95)
MONOCYTES RELATIVE PERCENT: 8.8 % (ref 2–12)
NEUTROPHILS ABSOLUTE: 3.13 E9/L (ref 1.8–7.3)
NEUTROPHILS RELATIVE PERCENT: 65.7 % (ref 43–80)
PDW BLD-RTO: 16.7 FL (ref 11.5–15)
PLATELET # BLD: 149 E9/L (ref 130–450)
PMV BLD AUTO: 10.4 FL (ref 7–12)
POTASSIUM REFLEX MAGNESIUM: 3.8 MMOL/L (ref 3.5–5)
RBC # BLD: 4.26 E12/L (ref 3.5–5.5)
SODIUM BLD-SCNC: 136 MMOL/L (ref 132–146)
WBC # BLD: 4.8 E9/L (ref 4.5–11.5)

## 2021-03-11 PROCEDURE — 36415 COLL VENOUS BLD VENIPUNCTURE: CPT

## 2021-03-11 PROCEDURE — 2580000003 HC RX 258: Performed by: SURGERY

## 2021-03-11 PROCEDURE — 6370000000 HC RX 637 (ALT 250 FOR IP): Performed by: NURSE PRACTITIONER

## 2021-03-11 PROCEDURE — 6370000000 HC RX 637 (ALT 250 FOR IP): Performed by: STUDENT IN AN ORGANIZED HEALTH CARE EDUCATION/TRAINING PROGRAM

## 2021-03-11 PROCEDURE — 2700000000 HC OXYGEN THERAPY PER DAY

## 2021-03-11 PROCEDURE — 80048 BASIC METABOLIC PNL TOTAL CA: CPT

## 2021-03-11 PROCEDURE — 6360000002 HC RX W HCPCS: Performed by: SURGERY

## 2021-03-11 PROCEDURE — 85025 COMPLETE CBC W/AUTO DIFF WBC: CPT

## 2021-03-11 PROCEDURE — C9113 INJ PANTOPRAZOLE SODIUM, VIA: HCPCS | Performed by: SURGERY

## 2021-03-11 PROCEDURE — 2580000003 HC RX 258: Performed by: NURSE PRACTITIONER

## 2021-03-11 PROCEDURE — 6370000000 HC RX 637 (ALT 250 FOR IP): Performed by: SURGERY

## 2021-03-11 PROCEDURE — 6370000000 HC RX 637 (ALT 250 FOR IP): Performed by: INTERNAL MEDICINE

## 2021-03-11 PROCEDURE — 6360000002 HC RX W HCPCS: Performed by: NURSE PRACTITIONER

## 2021-03-11 RX ORDER — OXYCODONE HYDROCHLORIDE AND ACETAMINOPHEN 5; 325 MG/1; MG/1
1 TABLET ORAL EVERY 6 HOURS PRN
Qty: 28 TABLET | Refills: 0 | Status: ON HOLD
Start: 2021-03-11 | End: 2021-03-29 | Stop reason: HOSPADM

## 2021-03-11 RX ORDER — PETROLATUM 42 G/100G
OINTMENT TOPICAL 2 TIMES DAILY PRN
Status: DISCONTINUED | OUTPATIENT
Start: 2021-03-11 | End: 2021-03-11 | Stop reason: CLARIF

## 2021-03-11 RX ORDER — PETROLATUM 430 MG/G
OINTMENT TOPICAL 2 TIMES DAILY PRN
Status: DISCONTINUED | OUTPATIENT
Start: 2021-03-11 | End: 2021-03-11 | Stop reason: HOSPADM

## 2021-03-11 RX ADMIN — PETROLATUM: 430 OINTMENT TOPICAL at 08:46

## 2021-03-11 RX ADMIN — HYDROMORPHONE HYDROCHLORIDE 0.5 MG: 1 INJECTION, SOLUTION INTRAMUSCULAR; INTRAVENOUS; SUBCUTANEOUS at 07:35

## 2021-03-11 RX ADMIN — OXYCODONE 10 MG: 5 TABLET ORAL at 04:39

## 2021-03-11 RX ADMIN — SODIUM CHLORIDE, PRESERVATIVE FREE 10 ML: 5 INJECTION INTRAVENOUS at 08:47

## 2021-03-11 RX ADMIN — DOXYCYCLINE HYCLATE 100 MG: 100 CAPSULE ORAL at 08:47

## 2021-03-11 RX ADMIN — POLYETHYLENE GLYCOL 3350 17 G: 17 POWDER, FOR SOLUTION ORAL at 08:46

## 2021-03-11 RX ADMIN — PANTOPRAZOLE SODIUM 40 MG: 40 INJECTION, POWDER, FOR SOLUTION INTRAVENOUS at 08:46

## 2021-03-11 RX ADMIN — SODIUM CHLORIDE, POTASSIUM CHLORIDE, SODIUM LACTATE AND CALCIUM CHLORIDE: 600; 310; 30; 20 INJECTION, SOLUTION INTRAVENOUS at 04:39

## 2021-03-11 RX ADMIN — ENOXAPARIN SODIUM 40 MG: 40 INJECTION SUBCUTANEOUS at 08:46

## 2021-03-11 RX ADMIN — CEFTRIAXONE SODIUM 1000 MG: 1 INJECTION, POWDER, FOR SOLUTION INTRAMUSCULAR; INTRAVENOUS at 08:46

## 2021-03-11 RX ADMIN — HYDROMORPHONE HYDROCHLORIDE 0.5 MG: 1 INJECTION, SOLUTION INTRAMUSCULAR; INTRAVENOUS; SUBCUTANEOUS at 00:41

## 2021-03-11 RX ADMIN — OXYCODONE 10 MG: 5 TABLET ORAL at 08:52

## 2021-03-11 RX ADMIN — DOCUSATE SODIUM 100 MG: 100 CAPSULE, LIQUID FILLED ORAL at 08:47

## 2021-03-11 ASSESSMENT — PAIN DESCRIPTION - PROGRESSION: CLINICAL_PROGRESSION: NOT CHANGED

## 2021-03-11 ASSESSMENT — PAIN SCALES - GENERAL
PAINLEVEL_OUTOF10: 5
PAINLEVEL_OUTOF10: 2
PAINLEVEL_OUTOF10: 4
PAINLEVEL_OUTOF10: 8
PAINLEVEL_OUTOF10: 6

## 2021-03-11 ASSESSMENT — PAIN DESCRIPTION - FREQUENCY: FREQUENCY: CONTINUOUS

## 2021-03-11 ASSESSMENT — PAIN - FUNCTIONAL ASSESSMENT: PAIN_FUNCTIONAL_ASSESSMENT: ACTIVITIES ARE NOT PREVENTED

## 2021-03-11 ASSESSMENT — PAIN DESCRIPTION - DESCRIPTORS: DESCRIPTORS: ACHING;DISCOMFORT

## 2021-03-11 ASSESSMENT — PAIN DESCRIPTION - ONSET: ONSET: GRADUAL

## 2021-03-11 NOTE — PROGRESS NOTES
Internal Medicine Progress Note    [facility]    Aura Chopra. Emy Staff., F.A.LILLIEOManoloI. Cosette Goldberg, D.O., SHILPI Romano D.O. Fernando Marinelli, MSN, APRN, NP-C  Angel Gaminglonnie. Stanley Smith, MSN, APRN-CNP     Primary Care Physician: Dominique Alejandre DO   Admitting Physician:  Melissa Cruz MD  Admission date and time: 3/9/2021  5:35 PM    Room:  [address] [place] [address]  Admitting diagnosis: Intractable abdominal pain [R10.9]    Patient Name: Omer Piedra  MRN: [id number]    Date of Service: 3/11/2021     Subjective:  Cory Lea is a 48 y.o. female who was seen and examined today,3/11/2021, at the bedside. Patient is having some loose stools but this is probably secondary to resolution of her acute bowel problems. Pending Dr. Milady Reddy evaluation, possibly discharge today. Patient is tolerating diet satisfactory  Review of System:   Constitutional:   Denies fever or chills, weight loss or gain, fatigue or malaise. HEENT:   Denies ear pain, improved sore throat. Denies sinus issues. Cardiovascular:   Denies any chest pain, irregular heartbeats, or palpitations. Respiratory:   Denies shortness of breath, coughing, sputum production, hemoptysis, or wheezing. Gastrointestinal:   Mild abdominal cramping without pain. No nausea, no vomiting. Positive for the passage of flatus and BM. Genitourinary:    Denies any urgency, frequency, hematuria. Voiding  without difficulty. Extremities:   Denies lower extremity swelling, edema or cyanosis. Neurology:    Denies any headache or focal neurological deficits, Denies generalized weakness or memory difficulty. Psch:   Denies being anxious or depressed. Musculoskeletal:    Denies  myalgias, joint complaints or back pain. Integumentary:   Denies any rashes, ulcers, or excoriations. Denies bruising. Hematologic/Lymphatic:  Denies bruising or bleeding.     Physical Exam:  I/O this shift:  In: 360 [P.O.:360]  Out: -     Intake/Output Summary (Last 24 hours) at 3/11/2021 1402  Last data filed at 3/11/2021 0916  Gross per 24 hour   Intake 2983 ml   Output 250 ml   Net 2733 ml   I/O last 3 completed shifts: In: 2623 [P.O.:780; I.V.:1843]  Out: 250 [Urine:250]  Patient Vitals for the past 96 hrs (Last 3 readings):   Weight   03/09/21 1735 240 lb (108.9 kg)     Vital Signs:   Blood pressure (!) 140/84, pulse 66, temperature 97.7 °F (36.5 °C), temperature source Oral, resp. rate 20, height 5' 3\" (1.6 m), weight 240 lb (108.9 kg), SpO2 95 %. General appearance:  Alert, responsive, oriented to person, place, and time. Comfortable, no distress. Head:  Normocephalic. No masses, lesions or tenderness. Eyes:  PERRLA. EOMI. Sclera clear. Buccal mucosa moist.  ENT:  Ears normal. Mucosa normal.  Neck:    Supple. Trachea midline. No thyromegaly. No JVD. No bruits. Heart:    Rhythm regular. Rate controlled. S1 and S2, systolic murmur. Lungs:    Symmetrical. Clear to auscultation bilaterally. No wheezes. No rhonchi. No rales. Abdomen:   Soft. Non-tender. Non-distended. Bowel sounds positive. No organomegaly or masses. No pain on palpation. Extremities:    Peripheral pulses present. No peripheral edema. No ulcers. No cyanosis. No clubbing. Neurologic:    Alert x 3. No focal deficit. Cranial nerves grossly intact. No focal weakness. Psych:   Behavior is normal. Mood appears normal. Speech is not rapid and/or pressured. Musculoskeletal:   Spine ROM normal. Muscular strength intact. Gait not assessed. Integumentary:  No rashes  Skin normal color and texture.   Genitalia/Breast:  Deferred    Medication:  Scheduled Meds:   docusate sodium  100 mg Oral BID    polyethylene glycol  17 g Oral Daily    cefTRIAXone (ROCEPHIN) IV  1,000 mg Intravenous Q24H    doxycycline hyclate  100 mg Oral 2 times per day    sodium chloride flush  10 mL Intravenous 2 times per day    pantoprazole  40 mg Intravenous Daily    And    sodium chloride (PF)  10 mL Intravenous Daily    enoxaparin  40 mg Subcutaneous Daily     Continuous Infusions:   lactated ringers 75 mL/hr at 03/11/21 0439       Objective Data:  CBC with Differential:    Lab Results   Component Value Date    WBC 4.8 03/11/2021    RBC 4.26 03/11/2021    HGB 13.9 03/11/2021    HCT 44.4 03/11/2021     03/11/2021    .2 03/11/2021    MCH 32.6 03/11/2021    MCHC 31.3 03/11/2021    RDW 16.7 03/11/2021    SEGSPCT 82 09/14/2011    LYMPHOPCT 22.6 03/11/2021    MONOPCT 8.8 03/11/2021    BASOPCT 0.6 03/11/2021    MONOSABS 0.42 03/11/2021    LYMPHSABS 1.08 03/11/2021    EOSABS 0.10 03/11/2021    BASOSABS 0.03 03/11/2021     CMP:    Lab Results   Component Value Date     03/11/2021    K 3.8 03/11/2021    CL 98 03/11/2021    CO2 28 03/11/2021    BUN 7 03/11/2021    CREATININE 0.8 03/11/2021    GFRAA >60 03/11/2021    LABGLOM >60 03/11/2021    GLUCOSE 100 03/11/2021    GLUCOSE 89 09/14/2011    PROT 6.5 03/09/2021    LABALBU 3.8 03/09/2021    CALCIUM 8.9 03/11/2021    BILITOT 0.4 03/09/2021    ALKPHOS 86 03/09/2021    AST 95 03/09/2021    ALT 78 03/09/2021       Wound Documentation:   Incision 08/25/15 Abdomen Mid (Active)   Number of days: 2007     Assessment:    · Partial small bowel obstruction with resolution  · Mild transaminitis  · Acute streptococcal pharyngitis  · Emotional stress superimposed on depression  · Morbid obesity, BMI 43.90 kg/m2    Plan:     · Patient bowel function seem to be satisfactory at the present time. · Surgery has evaluated the patient with probable discharge today  · Plan for surgical revision in the next several weeks  · Encourage ambulation and adequate bowel prophylaxis      More than 50% of my time was spent at the bedside counseling/coordinating care with the patient and/or family with face to face contact. This time was spent reviewing notes and laboratory data as well as instructing and counseling the patient.  Time I spent with the family or surrogate(s) is included only if the patient was incapable of providing the necessary information or participating in medical decisions. I also discussed the differential diagnosis and all of the proposed management plans with the patient and individuals accompanying the patient. I am readily available for any further decision-making and intervention.        DO PENNY Ramírez  3/11/2021  2:02 PM

## 2021-03-11 NOTE — PLAN OF CARE
Problem: Pain:  Goal: Pain level will decrease  Description: Pain level will decrease  3/10/2021 2249 by Cornelio Moore RN  Outcome: Met This Shift  3/10/2021 1632 by Mabeline Bamberger, RN  Outcome: Met This Shift  Goal: Control of acute pain  Description: Control of acute pain  3/10/2021 1632 by Mabeline Bamberger, RN  Outcome: Met This Shift     Problem: Falls - Risk of:  Goal: Will remain free from falls  Description: Will remain free from falls  3/10/2021 2249 by Cornelio Moore RN  Outcome: Met This Shift  3/10/2021 1632 by Mabeline Bamberger, RN  Outcome: Met This Shift  Goal: Absence of physical injury  Description: Absence of physical injury  3/10/2021 2249 by Cornelio Moore RN  Outcome: Met This Shift  3/10/2021 1632 by Mabeline Bamberger, RN  Outcome: Met This Shift

## 2021-03-11 NOTE — PLAN OF CARE
Problem: Pain:  Goal: Pain level will decrease  Description: Pain level will decrease  3/11/2021 1019 by Amauri Aldridge RN  Outcome: Met This Shift  3/10/2021 2249 by Mayito Wheeler RN  Outcome: Met This Shift  Goal: Control of acute pain  Description: Control of acute pain  Outcome: Met This Shift     Problem: Falls - Risk of:  Goal: Will remain free from falls  Description: Will remain free from falls  3/11/2021 1019 by Amauri Aldirdge RN  Outcome: Met This Shift  3/10/2021 2249 by Mayito Wheeler RN  Outcome: Met This Shift  Goal: Absence of physical injury  Description: Absence of physical injury  3/11/2021 1019 by Amauri Aldridge RN  Outcome: Met This Shift  3/10/2021 2249 by Mayito Wheeler RN  Outcome: Met This Shift

## 2021-03-12 LAB — URINE CULTURE, ROUTINE: NORMAL

## 2021-03-16 DIAGNOSIS — G56.02 CARPAL TUNNEL SYNDROME OF LEFT WRIST: Primary | ICD-10-CM

## 2021-03-16 DIAGNOSIS — G56.12 LEFT MEDIAN NERVE NEUROPATHY: ICD-10-CM

## 2021-03-17 ENCOUNTER — HOSPITAL ENCOUNTER (EMERGENCY)
Age: 51
Discharge: LEFT BEFORE DISCHARGE | End: 2021-03-17
Payer: MEDICARE

## 2021-03-17 ENCOUNTER — OFFICE VISIT (OUTPATIENT)
Dept: PHYSICAL MEDICINE AND REHAB | Age: 51
End: 2021-03-17
Payer: MEDICARE

## 2021-03-17 VITALS — WEIGHT: 235 LBS | HEIGHT: 62 IN | BODY MASS INDEX: 43.24 KG/M2

## 2021-03-17 VITALS
BODY MASS INDEX: 44.16 KG/M2 | WEIGHT: 240 LBS | HEIGHT: 62 IN | SYSTOLIC BLOOD PRESSURE: 124 MMHG | RESPIRATION RATE: 18 BRPM | TEMPERATURE: 98.7 F | HEART RATE: 89 BPM | DIASTOLIC BLOOD PRESSURE: 64 MMHG | OXYGEN SATURATION: 99 %

## 2021-03-17 DIAGNOSIS — G56.22 CUBITAL TUNNEL SYNDROME ON LEFT: ICD-10-CM

## 2021-03-17 DIAGNOSIS — G56.02 CARPAL TUNNEL SYNDROME OF LEFT WRIST: Primary | ICD-10-CM

## 2021-03-17 PROCEDURE — G8428 CUR MEDS NOT DOCUMENT: HCPCS | Performed by: PHYSICAL MEDICINE & REHABILITATION

## 2021-03-17 PROCEDURE — 99242 OFF/OP CONSLTJ NEW/EST SF 20: CPT | Performed by: PHYSICAL MEDICINE & REHABILITATION

## 2021-03-17 PROCEDURE — G8417 CALC BMI ABV UP PARAM F/U: HCPCS | Performed by: PHYSICAL MEDICINE & REHABILITATION

## 2021-03-17 PROCEDURE — 95909 NRV CNDJ TST 5-6 STUDIES: CPT | Performed by: PHYSICAL MEDICINE & REHABILITATION

## 2021-03-17 PROCEDURE — G8484 FLU IMMUNIZE NO ADMIN: HCPCS | Performed by: PHYSICAL MEDICINE & REHABILITATION

## 2021-03-17 PROCEDURE — 95886 MUSC TEST DONE W/N TEST COMP: CPT | Performed by: PHYSICAL MEDICINE & REHABILITATION

## 2021-03-17 RX ORDER — ONDANSETRON 2 MG/ML
4 INJECTION INTRAMUSCULAR; INTRAVENOUS ONCE
Status: DISCONTINUED | OUTPATIENT
Start: 2021-03-17 | End: 2021-03-17

## 2021-03-17 RX ORDER — 0.9 % SODIUM CHLORIDE 0.9 %
1000 INTRAVENOUS SOLUTION INTRAVENOUS ONCE
Status: DISCONTINUED | OUTPATIENT
Start: 2021-03-17 | End: 2021-03-18 | Stop reason: HOSPADM

## 2021-03-17 RX ORDER — 0.9 % SODIUM CHLORIDE 0.9 %
1000 INTRAVENOUS SOLUTION INTRAVENOUS ONCE
Status: DISCONTINUED | OUTPATIENT
Start: 2021-03-17 | End: 2021-03-17

## 2021-03-17 RX ORDER — ONDANSETRON 2 MG/ML
4 INJECTION INTRAMUSCULAR; INTRAVENOUS ONCE
Status: DISCONTINUED | OUTPATIENT
Start: 2021-03-17 | End: 2021-03-18 | Stop reason: HOSPADM

## 2021-03-17 ASSESSMENT — PAIN SCALES - GENERAL: PAINLEVEL_OUTOF10: 8

## 2021-03-17 ASSESSMENT — PAIN DESCRIPTION - PAIN TYPE: TYPE: CHRONIC PAIN

## 2021-03-17 ASSESSMENT — PAIN DESCRIPTION - LOCATION: LOCATION: ABDOMEN

## 2021-03-17 NOTE — PATIENT INSTRUCTIONS
Electrodiagnotic Laboratory  Accredited by the [facility] with Exemplary status  ANA CRISTINA Rapp D.O. Formerly [facility]  [address]  Phone: [phone]  Fax: [phone]        Today you had an electrodiagnostic exam which included nerve conduction studies (NCS) and electromyography (EMG). This test evaluated the electrical activity of your nerves and muscles to help determine if you have a nerve or muscle disease. This test can help determine the location and type of a nerve or muscle problem. This will help your referring doctor diagnose your condition and determine the appropriate next step in your treatment plan. After your test:    1. There are no long lasting side effects of the test.     2. You may resume your normal activities without restrictions. 3.  Resume any medications that were stopped for the test.     4  If you have sore areas or bruising in your muscles where the needle was placed, apply a cold pack to the sore area for 15-20 minutes three to four times a day as needed for pain. The soreness should go away in about 1-2 days. 5. Your results were provided  Briefly at the end of your test and the final detailed report will be provided to your referring physician, and/or primary care physician and any other parties you requested within 1-2 days of the examination. You may wish to contact your referring provider after a few days to determine what they would like you to do next. 6.  Please call [phone] with any questions or concerns and if you develop increased body temperature/fever, swelling, tenderness, increased pain and/or drainage from the sites where the needle was placed. Thank you for choosing us for your health care needs.

## 2021-03-17 NOTE — PROGRESS NOTES
[address] [facility]  Electrodiagnostic Laboratory  *Accredited by the [address] [place] [address] with exemplary status  [address] [facility] [address]  Phone: [phone]  Fax: [phone]    Referring Provider: Momo Patterson CNP  Primary Care Physician: Daryl Arevalo DO  Patient Name: Pb Whaley  Patient YOB: 1970  Gender: female  BMI: Body mass index is 42.98 kg/m². Height 5' 2\" (1.575 m), weight 235 lb (106.6 kg). 3/17/2021    Description of clinical problem:   Chief Complaint   Patient presents with    Extremity Pain     pain in elbow to hand. 8/10 pain. constant pain. for the last 6 weeks. no acute injury    Numbness     L ring and pinky finger have been numb for the last 2 weeks.  Extremity Weakness      strength decreased. thumb to middle finger do not want to work at times. Sensory NCS      Nerve / Sites Rec. Site Peak Lat PP Amp Segments Distance Velocity Temp. ms µV  cm m/s °C   L Median - Digit II (Antidromic)      Palm Dig II 1.88 26.7 Palm - Dig II 7 56 32.2      Wrist Dig II 4.64* 9.9* Wrist - Dig II 14 37 32.2   L Ulnar - Digit V (Antidromic)      Wrist Dig V 3.23 7.5 Wrist - Dig V 14 50 32.2   L Radial - Anatomical snuff box (Forearm)      Forearm Wrist 2.24 17.7 Forearm - Wrist 10 55 32.1   L Dorsal ulnar cutaneous - Hand dorsum (Forearm)      Forearm Hand dorsum 1.88 7.8* Forearm - Hand dorsum 8 53 32.1       Motor NCS      Nerve / Sites Muscle Onset Amplitude Segments Distance Velocity Temp.     ms mV  cm m/s °C   L Median - APB      Palm APB 1.77 9.2 Palm - APB   32.2      Wrist APB 4.95* 9.7 Wrist - Palm 8 25* 32.1      Elbow APB 8.18 7.8 Elbow - Wrist 19 59 32.2   L Ulnar - ADM      Wrist ADM 2.97 1.9* Wrist - ADM 8  32.1      B. Elbow ADM 6.46 1.8* B. Elbow - Wrist 16 46 32.1      A. Elbow ADM 10.21 1.0* A. Elbow - B. Elbow 10 27* 32.2       F Wave      Nerve Fmin % F    ms %   L Median - APB 27.92 100   L Ulnar - ADM 33.23* 50       EMG      EMG Summary Table     Spontaneous MUAP Recruitment   Muscle Nerve Roots IA Fib PSW Fasc Amp Dur. PPP Pattern   L. Biceps brachii Musculocutaneous C5-C6 N None None None N N N N   L. Infraspinatus Suprascapular C5-C6 N None None None N N N N   L. Triceps brachii Radial C6-C8 N None None None N N N N   L. Pronator teres Median C6-C7 N None None None N N N N   L. Extensor indicis proprius Radial C7-C8 N None None None N N N N   L. First dorsal interosseous Ulnar C8-T1 N 3+ 3+ None N N N Discrete   L. Abductor pollicis brevis Median F0-N1 N None None None N N N N   L. Flexor Digitorum Profundus Dig IV-V Ulnar C8-T1 N None None None N N N N       Study Limitations:  none    Summary of Findings:   Nerve conduction studies:   · The following nerve conduction studies were abnormal:   · The left median sensory latency at the wrist is prolonged and there is incomplete conduction block across the left wrist.    · The left dorsal ulnar cutaneous sensory response was decreased in amplitude. · The left median motor latency at the wrist is prolonged and there is focal slowing of conduction velocity across the wrist.   · The left ulnar motor amplitude was decreased and there was focal slowing of conduction velocity across the elbow,   · All other nerve conduction studies listed in the table above were normal in latency, amplitude and conduction velocity. Needle EMG:   · Needle EMG was performed using a concentric needle. The following abnormalities were seen on needle EMG: positive sharp waves, fibrillation potentials and discrete motor unit recruitment was present in the left first dorsal interossei. All other muscles tested, as listed in the table above demonstrated normal amplitude, duration, phases and recruitment and no active denervation signs were seen. Diagnostic Interpretation: This study was abnormal.     Electrodiagnosis: There is electrodiagnostic evidence of a median mononeuropathy.    · Location: left at the wrist    · Garlon Payal: [  ] Axonal   Cis.Boast  ] Demyelinating  [  ] Mixed axonal and demyelinating     [  ] Sensory [  ] Motor               [ X ] Mixed sensorimotor     [  ] with active denervation       [ X ] without active denervation  · Duration: Acute  · Severity: moderate  · Prognosis: Good    Electrodiagnosis: There is electrodiagnostic evidence of a ulnar neuropathy. · Location: left at the elbow. · Nature: [  ] Axonal   [  ] Demyelinating  [ X ] Mixed axonal and demyelinating     [  ] Sensory [  ] Motor               [ X ] Mixed sensorimotor     [ X ] with active denervation       [  ] without active denervation  · Duration: Subacute  · Severity: severe  · Prognosis: Fair. The prognosis for recovery of axonal lesions is poor and dependant on collateral sprouting and reinnervation. The prognosis for recovery of demyelinating lesions is good if the cause is alleviated. Previous Study: none for comparison      Follow up EMG is recommended if no surgical intervention and symptoms persist in 3 months. Technologist: Meredith Dillon  Physician:    Kristyn Ledesma D.O., P.T. Board Certified Physical Medicine and Rehabilitation  Board Certified Electrodiagnostic Medicine      Nerve conduction studies and electromyography were performed according to our laboratory policies and procedures which can be provided upon request. All abnormal values are identified in the table.  Laboratory normal values can also be provided upon request.       Cc: POLO Navarro DO

## 2021-03-17 NOTE — PROGRESS NOTES
[address] [facility]  Electrodiagnostic Laboratory  *Accredited by the [address] [place] [address] with exemplary status  [address] [facility] [address]  Phone: [phone]  Fax: [phone]      Date of Examination: 03/17/21  Patient Name: [place]  is a 48y.o. year old female who was seen due to complaints of   Chief Complaint   Patient presents with    Extremity Pain     pain in elbow to hand. 8/10 pain. constant pain. for the last 6 weeks. no acute injury    Numbness     L ring and pinky finger have been numb for the last 2 weeks.  Extremity Weakness      strength decreased. thumb to middle finger do not want to work at times. Physical Exam: MSK: There is no joint effusion, deformity, instability, swelling, erythema or warmth. AROM is full in the spine and extremities. +Tinel left wrist and elbow. Neurologic:  Left  is weak, diminished sensation to light touch left hypothenar, otherwise, no focal sensorimotor deficit. Reflexes 2+ and symmetric. Gait is normal.    Impression:   1. Carpal tunnel syndrome of left wrist    2. Cubital tunnel syndrome on left        Plan:   · EMG is indicated to evaluate the above diagnosis. Orders Placed This Encounter   Procedures    HI NEEDLE EMG EA EXTREMTY W/PARASPINL AREA COMPLETE    HI MOTOR &/SENS 5-6 NRV CNDJ PRECONF ELTRODE LIMB     · EMG was done today and showed left cubital and left carpal tunnel syndrome. The patient was educated about the diagnosis and the prognosis. · Recommend surgical consult regarding cubital tunnel. · Regarding CTS, recommend neutral wrist splints at h.s., OT and/or carpal tunnel injection and if no improvement after 4-6 weeks of conservative treatments consider orthopedic surgery evaluation. · Recommend repeating the EMG in 3 months if symptoms persist.  Advised patient to follow up with referring provider. Thank you for allowing me to participate in the care of your patient. Sincerely,     Heena Cisneros

## 2021-03-18 ENCOUNTER — APPOINTMENT (OUTPATIENT)
Dept: GENERAL RADIOLOGY | Age: 51
DRG: 227 | End: 2021-03-18
Payer: MEDICARE

## 2021-03-18 ENCOUNTER — APPOINTMENT (OUTPATIENT)
Dept: ULTRASOUND IMAGING | Age: 51
DRG: 227 | End: 2021-03-18
Payer: MEDICARE

## 2021-03-18 ENCOUNTER — HOSPITAL ENCOUNTER (INPATIENT)
Age: 51
LOS: 11 days | Discharge: HOME | DRG: 227 | End: 2021-03-29
Attending: EMERGENCY MEDICINE | Admitting: SURGERY
Payer: MEDICARE

## 2021-03-18 ENCOUNTER — APPOINTMENT (OUTPATIENT)
Dept: CT IMAGING | Age: 51
DRG: 227 | End: 2021-03-18
Payer: MEDICARE

## 2021-03-18 DIAGNOSIS — K43.6 VENTRAL HERNIA WITH OBSTRUCTION AND WITHOUT GANGRENE: ICD-10-CM

## 2021-03-18 DIAGNOSIS — K56.609 SMALL BOWEL OBSTRUCTION (HCC): Primary | ICD-10-CM

## 2021-03-18 DIAGNOSIS — J44.1 CHRONIC OBSTRUCTIVE PULMONARY DISEASE WITH ACUTE EXACERBATION (HCC): ICD-10-CM

## 2021-03-18 DIAGNOSIS — G56.02 CARPAL TUNNEL SYNDROME OF LEFT WRIST: ICD-10-CM

## 2021-03-18 DIAGNOSIS — G56.12 LEFT MEDIAN NERVE NEUROPATHY: ICD-10-CM

## 2021-03-18 LAB
ALBUMIN SERPL-MCNC: 4.2 G/DL (ref 3.5–5.2)
ALP BLD-CCNC: 85 U/L (ref 35–104)
ALT SERPL-CCNC: 133 U/L (ref 0–32)
ANION GAP SERPL CALCULATED.3IONS-SCNC: 17 MMOL/L (ref 7–16)
AST SERPL-CCNC: 178 U/L (ref 0–31)
BACTERIA: NORMAL /HPF
BASOPHILS ABSOLUTE: 0.02 E9/L (ref 0–0.2)
BASOPHILS RELATIVE PERCENT: 0.4 % (ref 0–2)
BILIRUB SERPL-MCNC: 0.5 MG/DL (ref 0–1.2)
BILIRUBIN URINE: NEGATIVE
BLOOD, URINE: ABNORMAL
BUN BLDV-MCNC: 10 MG/DL (ref 6–20)
CALCIUM SERPL-MCNC: 9 MG/DL (ref 8.6–10.2)
CHLORIDE BLD-SCNC: 100 MMOL/L (ref 98–107)
CLARITY: CLEAR
CO2: 23 MMOL/L (ref 22–29)
COLOR: YELLOW
CREAT SERPL-MCNC: 0.8 MG/DL (ref 0.5–1)
EKG ATRIAL RATE: 150 BPM
EKG P AXIS: 48 DEGREES
EKG Q-T INTERVAL: 210 MS
EKG QRS DURATION: 82 MS
EKG QTC CALCULATION (BAZETT): 331 MS
EKG R AXIS: 106 DEGREES
EKG T AXIS: 43 DEGREES
EKG VENTRICULAR RATE: 150 BPM
EOSINOPHILS ABSOLUTE: 0.04 E9/L (ref 0.05–0.5)
EOSINOPHILS RELATIVE PERCENT: 0.7 % (ref 0–6)
EPITHELIAL CELLS, UA: NORMAL /HPF
GFR AFRICAN AMERICAN: >60
GFR NON-AFRICAN AMERICAN: >60 ML/MIN/1.73
GLUCOSE BLD-MCNC: 89 MG/DL (ref 74–99)
GLUCOSE URINE: NEGATIVE MG/DL
HCT VFR BLD CALC: 45.4 % (ref 34–48)
HEMOGLOBIN: 15.3 G/DL (ref 11.5–15.5)
IMMATURE GRANULOCYTES #: 0.03 E9/L
IMMATURE GRANULOCYTES %: 0.5 % (ref 0–5)
KETONES, URINE: NEGATIVE MG/DL
LACTIC ACID: 2.8 MMOL/L (ref 0.5–2.2)
LEUKOCYTE ESTERASE, URINE: NEGATIVE
LIPASE: 23 U/L (ref 13–60)
LYMPHOCYTES ABSOLUTE: 1.02 E9/L (ref 1.5–4)
LYMPHOCYTES RELATIVE PERCENT: 17.9 % (ref 20–42)
MCH RBC QN AUTO: 33.2 PG (ref 26–35)
MCHC RBC AUTO-ENTMCNC: 33.7 % (ref 32–34.5)
MCV RBC AUTO: 98.5 FL (ref 80–99.9)
MONOCYTES ABSOLUTE: 0.55 E9/L (ref 0.1–0.95)
MONOCYTES RELATIVE PERCENT: 9.7 % (ref 2–12)
NEUTROPHILS ABSOLUTE: 4.03 E9/L (ref 1.8–7.3)
NEUTROPHILS RELATIVE PERCENT: 70.8 % (ref 43–80)
NITRITE, URINE: NEGATIVE
PDW BLD-RTO: 16.6 FL (ref 11.5–15)
PH UA: 8 (ref 5–9)
PLATELET # BLD: 204 E9/L (ref 130–450)
PMV BLD AUTO: 9.3 FL (ref 7–12)
POTASSIUM REFLEX MAGNESIUM: 4.2 MMOL/L (ref 3.5–5)
PRO-BNP: 186 PG/ML (ref 0–125)
PROTEIN UA: NEGATIVE MG/DL
RBC # BLD: 4.61 E12/L (ref 3.5–5.5)
RBC UA: NORMAL /HPF (ref 0–2)
SODIUM BLD-SCNC: 140 MMOL/L (ref 132–146)
SPECIFIC GRAVITY UA: 1.01 (ref 1–1.03)
TOTAL PROTEIN: 6.9 G/DL (ref 6.4–8.3)
TROPONIN: <0.01 NG/ML (ref 0–0.03)
UROBILINOGEN, URINE: 0.2 E.U./DL
WBC # BLD: 5.7 E9/L (ref 4.5–11.5)
WBC UA: NORMAL /HPF (ref 0–5)

## 2021-03-18 PROCEDURE — 2580000003 HC RX 258: Performed by: INTERNAL MEDICINE

## 2021-03-18 PROCEDURE — 6360000002 HC RX W HCPCS: Performed by: EMERGENCY MEDICINE

## 2021-03-18 PROCEDURE — C9113 INJ PANTOPRAZOLE SODIUM, VIA: HCPCS | Performed by: INTERNAL MEDICINE

## 2021-03-18 PROCEDURE — 83880 ASSAY OF NATRIURETIC PEPTIDE: CPT

## 2021-03-18 PROCEDURE — 84484 ASSAY OF TROPONIN QUANT: CPT

## 2021-03-18 PROCEDURE — 81001 URINALYSIS AUTO W/SCOPE: CPT

## 2021-03-18 PROCEDURE — 96372 THER/PROPH/DIAG INJ SC/IM: CPT

## 2021-03-18 PROCEDURE — 74177 CT ABD & PELVIS W/CONTRAST: CPT

## 2021-03-18 PROCEDURE — 96376 TX/PRO/DX INJ SAME DRUG ADON: CPT

## 2021-03-18 PROCEDURE — 80053 COMPREHEN METABOLIC PANEL: CPT

## 2021-03-18 PROCEDURE — 2580000003 HC RX 258: Performed by: SURGERY

## 2021-03-18 PROCEDURE — 96374 THER/PROPH/DIAG INJ IV PUSH: CPT

## 2021-03-18 PROCEDURE — 96375 TX/PRO/DX INJ NEW DRUG ADDON: CPT

## 2021-03-18 PROCEDURE — 6360000002 HC RX W HCPCS: Performed by: INTERNAL MEDICINE

## 2021-03-18 PROCEDURE — 93971 EXTREMITY STUDY: CPT

## 2021-03-18 PROCEDURE — 6370000000 HC RX 637 (ALT 250 FOR IP): Performed by: SURGERY

## 2021-03-18 PROCEDURE — 99284 EMERGENCY DEPT VISIT MOD MDM: CPT

## 2021-03-18 PROCEDURE — 6360000004 HC RX CONTRAST MEDICATION: Performed by: RADIOLOGY

## 2021-03-18 PROCEDURE — 1200000000 HC SEMI PRIVATE

## 2021-03-18 PROCEDURE — 93005 ELECTROCARDIOGRAM TRACING: CPT | Performed by: EMERGENCY MEDICINE

## 2021-03-18 PROCEDURE — 83605 ASSAY OF LACTIC ACID: CPT

## 2021-03-18 PROCEDURE — 71045 X-RAY EXAM CHEST 1 VIEW: CPT

## 2021-03-18 PROCEDURE — 74018 RADEX ABDOMEN 1 VIEW: CPT

## 2021-03-18 PROCEDURE — 85025 COMPLETE CBC W/AUTO DIFF WBC: CPT

## 2021-03-18 PROCEDURE — 83690 ASSAY OF LIPASE: CPT

## 2021-03-18 RX ORDER — HYDROXYZINE HYDROCHLORIDE 25 MG/1
50 TABLET, FILM COATED ORAL NIGHTLY
Status: DISCONTINUED | OUTPATIENT
Start: 2021-03-18 | End: 2021-03-29 | Stop reason: HOSPADM

## 2021-03-18 RX ORDER — PANTOPRAZOLE SODIUM 40 MG/10ML
40 INJECTION, POWDER, LYOPHILIZED, FOR SOLUTION INTRAVENOUS DAILY
Status: DISCONTINUED | OUTPATIENT
Start: 2021-03-19 | End: 2021-03-18

## 2021-03-18 RX ORDER — TRAZODONE HYDROCHLORIDE 50 MG/1
50 TABLET ORAL NIGHTLY PRN
Status: DISCONTINUED | OUTPATIENT
Start: 2021-03-18 | End: 2021-03-29 | Stop reason: HOSPADM

## 2021-03-18 RX ORDER — HYDROMORPHONE HYDROCHLORIDE 1 MG/ML
0.25 INJECTION, SOLUTION INTRAMUSCULAR; INTRAVENOUS; SUBCUTANEOUS
Status: DISCONTINUED | OUTPATIENT
Start: 2021-03-18 | End: 2021-03-29

## 2021-03-18 RX ORDER — ONDANSETRON 4 MG/1
4 TABLET, ORALLY DISINTEGRATING ORAL EVERY 8 HOURS PRN
Status: DISCONTINUED | OUTPATIENT
Start: 2021-03-18 | End: 2021-03-29 | Stop reason: HOSPADM

## 2021-03-18 RX ORDER — SODIUM CHLORIDE 9 MG/ML
10 INJECTION INTRAVENOUS DAILY
Status: DISCONTINUED | OUTPATIENT
Start: 2021-03-19 | End: 2021-03-18

## 2021-03-18 RX ORDER — NICOTINE 21 MG/24HR
1 PATCH, TRANSDERMAL 24 HOURS TRANSDERMAL DAILY
Status: DISCONTINUED | OUTPATIENT
Start: 2021-03-18 | End: 2021-03-19

## 2021-03-18 RX ORDER — SODIUM CHLORIDE 0.9 % (FLUSH) 0.9 %
10 SYRINGE (ML) INJECTION EVERY 12 HOURS SCHEDULED
Status: DISCONTINUED | OUTPATIENT
Start: 2021-03-18 | End: 2021-03-29 | Stop reason: HOSPADM

## 2021-03-18 RX ORDER — PAROXETINE HYDROCHLORIDE 20 MG/1
10 TABLET, FILM COATED ORAL EVERY MORNING
Status: DISCONTINUED | OUTPATIENT
Start: 2021-03-19 | End: 2021-03-29 | Stop reason: HOSPADM

## 2021-03-18 RX ORDER — ONDANSETRON 2 MG/ML
4 INJECTION INTRAMUSCULAR; INTRAVENOUS ONCE
Status: COMPLETED | OUTPATIENT
Start: 2021-03-18 | End: 2021-03-18

## 2021-03-18 RX ORDER — FENTANYL CITRATE 50 UG/ML
50 INJECTION, SOLUTION INTRAMUSCULAR; INTRAVENOUS ONCE
Status: COMPLETED | OUTPATIENT
Start: 2021-03-18 | End: 2021-03-18

## 2021-03-18 RX ORDER — 0.9 % SODIUM CHLORIDE 0.9 %
1000 INTRAVENOUS SOLUTION INTRAVENOUS ONCE
Status: COMPLETED | OUTPATIENT
Start: 2021-03-18 | End: 2021-03-19

## 2021-03-18 RX ORDER — SODIUM CHLORIDE 9 MG/ML
INJECTION, SOLUTION INTRAVENOUS CONTINUOUS
Status: DISCONTINUED | OUTPATIENT
Start: 2021-03-18 | End: 2021-03-23

## 2021-03-18 RX ORDER — HYDROMORPHONE HYDROCHLORIDE 1 MG/ML
0.5 INJECTION, SOLUTION INTRAMUSCULAR; INTRAVENOUS; SUBCUTANEOUS
Status: DISCONTINUED | OUTPATIENT
Start: 2021-03-18 | End: 2021-03-29

## 2021-03-18 RX ORDER — DICYCLOMINE HYDROCHLORIDE 10 MG/ML
20 INJECTION INTRAMUSCULAR ONCE
Status: COMPLETED | OUTPATIENT
Start: 2021-03-18 | End: 2021-03-18

## 2021-03-18 RX ORDER — SODIUM CHLORIDE 0.9 % (FLUSH) 0.9 %
10 SYRINGE (ML) INJECTION PRN
Status: DISCONTINUED | OUTPATIENT
Start: 2021-03-18 | End: 2021-03-29 | Stop reason: HOSPADM

## 2021-03-18 RX ORDER — HYDROMORPHONE HYDROCHLORIDE 1 MG/ML
1 INJECTION, SOLUTION INTRAMUSCULAR; INTRAVENOUS; SUBCUTANEOUS ONCE
Status: COMPLETED | OUTPATIENT
Start: 2021-03-18 | End: 2021-03-18

## 2021-03-18 RX ORDER — ONDANSETRON 2 MG/ML
4 INJECTION INTRAMUSCULAR; INTRAVENOUS EVERY 6 HOURS PRN
Status: DISCONTINUED | OUTPATIENT
Start: 2021-03-18 | End: 2021-03-29 | Stop reason: HOSPADM

## 2021-03-18 RX ORDER — BUMETANIDE 1 MG/1
1 TABLET ORAL 2 TIMES DAILY
Status: DISCONTINUED | OUTPATIENT
Start: 2021-03-18 | End: 2021-03-18

## 2021-03-18 RX ORDER — PROMETHAZINE HYDROCHLORIDE 25 MG/ML
25 INJECTION, SOLUTION INTRAMUSCULAR; INTRAVENOUS EVERY 6 HOURS PRN
Status: DISCONTINUED | OUTPATIENT
Start: 2021-03-18 | End: 2021-03-29 | Stop reason: HOSPADM

## 2021-03-18 RX ORDER — PANTOPRAZOLE SODIUM 40 MG/10ML
40 INJECTION, POWDER, LYOPHILIZED, FOR SOLUTION INTRAVENOUS 2 TIMES DAILY
Status: DISCONTINUED | OUTPATIENT
Start: 2021-03-18 | End: 2021-03-29

## 2021-03-18 RX ORDER — BUMETANIDE 1 MG/1
1 TABLET ORAL 2 TIMES DAILY
Status: DISCONTINUED | OUTPATIENT
Start: 2021-03-19 | End: 2021-03-21

## 2021-03-18 RX ADMIN — IOPAMIDOL 75 ML: 755 INJECTION, SOLUTION INTRAVENOUS at 19:29

## 2021-03-18 RX ADMIN — HYDROMORPHONE HYDROCHLORIDE 1 MG: 1 INJECTION, SOLUTION INTRAMUSCULAR; INTRAVENOUS; SUBCUTANEOUS at 21:20

## 2021-03-18 RX ADMIN — ONDANSETRON 4 MG: 2 INJECTION INTRAMUSCULAR; INTRAVENOUS at 17:23

## 2021-03-18 RX ADMIN — FENTANYL CITRATE 50 MCG: 50 INJECTION INTRAMUSCULAR; INTRAVENOUS at 17:25

## 2021-03-18 RX ADMIN — SODIUM CHLORIDE, PRESERVATIVE FREE 10 ML: 5 INJECTION INTRAVENOUS at 23:17

## 2021-03-18 RX ADMIN — DICYCLOMINE HYDROCHLORIDE 20 MG: 10 INJECTION INTRAMUSCULAR at 20:13

## 2021-03-18 RX ADMIN — PANTOPRAZOLE SODIUM 40 MG: 40 INJECTION, POWDER, FOR SOLUTION INTRAVENOUS at 23:16

## 2021-03-18 RX ADMIN — FENTANYL CITRATE 50 MCG: 50 INJECTION INTRAMUSCULAR; INTRAVENOUS at 20:16

## 2021-03-18 RX ADMIN — IOHEXOL 50 ML: 240 INJECTION, SOLUTION INTRATHECAL; INTRAVASCULAR; INTRAVENOUS; ORAL at 19:30

## 2021-03-18 RX ADMIN — SODIUM CHLORIDE 1000 ML: 9 INJECTION, SOLUTION INTRAVENOUS at 23:17

## 2021-03-18 RX ADMIN — PROMETHAZINE HYDROCHLORIDE 25 MG: 25 INJECTION INTRAMUSCULAR; INTRAVENOUS at 21:23

## 2021-03-18 ASSESSMENT — PAIN SCALES - GENERAL
PAINLEVEL_OUTOF10: 9
PAINLEVEL_OUTOF10: 8
PAINLEVEL_OUTOF10: 6

## 2021-03-18 ASSESSMENT — PAIN DESCRIPTION - FREQUENCY: FREQUENCY: INTERMITTENT

## 2021-03-18 ASSESSMENT — PAIN DESCRIPTION - ONSET: ONSET: ON-GOING

## 2021-03-18 ASSESSMENT — PAIN DESCRIPTION - PAIN TYPE: TYPE: ACUTE PAIN

## 2021-03-18 ASSESSMENT — PAIN DESCRIPTION - PROGRESSION: CLINICAL_PROGRESSION: GRADUALLY WORSENING

## 2021-03-18 ASSESSMENT — ENCOUNTER SYMPTOMS
EYE REDNESS: 0
VOMITING: 0
SHORTNESS OF BREATH: 0
ABDOMINAL PAIN: 1

## 2021-03-18 ASSESSMENT — PAIN DESCRIPTION - ORIENTATION: ORIENTATION: MID

## 2021-03-18 ASSESSMENT — PAIN - FUNCTIONAL ASSESSMENT: PAIN_FUNCTIONAL_ASSESSMENT: PREVENTS OR INTERFERES SOME ACTIVE ACTIVITIES AND ADLS

## 2021-03-18 ASSESSMENT — PAIN DESCRIPTION - LOCATION: LOCATION: ABDOMEN

## 2021-03-18 NOTE — ED PROVIDER NOTES
Neurological: Negative for light-headedness. Psychiatric/Behavioral: Negative for confusion. All other systems reviewed and are negative.      --------------------------------------------- PAST HISTORY ---------------------------------------------  Past Medical History:  has a past medical history of Arthritis, Bursitis, Class 3 severe obesity due to excess calories with body mass index (BMI) of 40.0 to 44.9 in adult [facility]), COPD (chronic obstructive pulmonary disease) ([facility] [address] Diverticulitis, GERD (gastroesophageal reflux disease), Incisional hernia with obstruction but no gangrene, and Strep throat. Past Surgical History:  has a past surgical history that includes Hysterectomy; Cholecystectomy; Pelvic fracture surgery; colectomy (2016); Nerve Block; hernia repair; hernia repair (2015); ventral hernia repair (2015);  section; and Forearm fracture surgery (Left). Social History:  reports that she quit smoking 13 days ago. Her smoking use included cigarettes. She has a 30.00 pack-year smoking history. She has never used smokeless tobacco. She reports current alcohol use. She reports previous drug use. Drug: Marijuana. Family History: family history includes Cancer in her paternal grandfather; Diabetes in an other family member; Osteoarthritis in an other family member; Other in her father; Prostate Cancer in her father; Stroke in her brother. The patients home medications have been reviewed. Allergies: Ibuprofen, Nsaids, and Morphine    ---------------------------------------------------PHYSICAL EXAM--------------------------------------  Constitutional/General: Alert and oriented x3, well appearing, non toxic in NAD  Head: Normocephalic and atraumatic  Mouth: Oropharynx clear, handling secretions, no trismus  Neck: Supple, full ROM,  Pulmonary: Lungs clear to auscultation bilaterally, no wheezes, rales, or rhonchi. Not in respiratory distress  Cardiovascular:  Regular rate. CREATININE 0.8 0.5 - 1.0 mg/dL    GFR Non-African American >60 >=60 mL/min/1.73    GFR African American >60     Calcium 9.0 8.6 - 10.2 mg/dL    Total Protein 6.9 6.4 - 8.3 g/dL    Albumin 4.2 3.5 - 5.2 g/dL    Total Bilirubin 0.5 0.0 - 1.2 mg/dL    Alkaline Phosphatase 85 35 - 104 U/L     (H) 0 - 32 U/L     (H) 0 - 31 U/L   Lipase   Result Value Ref Range    Lipase 23 13 - 60 U/L   Urinalysis   Result Value Ref Range    Color, UA Yellow Straw/Yellow    Clarity, UA Clear Clear    Glucose, Ur Negative Negative mg/dL    Bilirubin Urine Negative Negative    Ketones, Urine Negative Negative mg/dL    Specific Gravity, UA 1.015 1.005 - 1.030    Blood, Urine SMALL (A) Negative    pH, UA 8.0 5.0 - 9.0    Protein, UA Negative Negative mg/dL    Urobilinogen, Urine 0.2 <2.0 E.U./dL    Nitrite, Urine Negative Negative    Leukocyte Esterase, Urine Negative Negative   Lactic acid, plasma   Result Value Ref Range    Lactic Acid 2.8 (H) 0.5 - 2.2 mmol/L   Troponin   Result Value Ref Range    Troponin <0.01 0.00 - 0.03 ng/mL   Brain Natriuretic Peptide   Result Value Ref Range    Pro- (H) 0 - 125 pg/mL   Microscopic Urinalysis   Result Value Ref Range    WBC, UA NONE 0 - 5 /HPF    RBC, UA 1-3 0 - 2 /HPF    Epithelial Cells, UA RARE /HPF    Bacteria, UA NONE SEEN None Seen /HPF   EKG 12 lead   Result Value Ref Range    Ventricular Rate 150 BPM    Atrial Rate 150 BPM    QRS Duration 82 ms    Q-T Interval 210 ms    QTc Calculation (Bazett) 331 ms    P Axis 48 degrees    R Axis 106 degrees    T Axis 43 degrees       RADIOLOGY:  Interpreted by Radiologist.  US DUP LOWER EXTREMITY RIGHT CALEB   Final Result   No evidence of DVT in the right lower extremity. XR CHEST 1 VIEW   Final Result   No acute process. Specifically, no free air below the right hemidiaphragm.          CT ABDOMEN PELVIS W IV CONTRAST Additional Contrast? Oral   Final Result   Small ventral hernia containing a loop of small bowel, resulting in early or   partial small bowel obstruction. Fatty liver. No evidence of acute appendicitis or obstructive uropathy. XR ABDOMEN FOR NG/OG/NE TUBE PLACEMENT    (Results Pending)         EKG: This EKG is signed and interpreted by me. Normal sinus rhythm, rate of 75, mild nonspecific ST segment elevations in the inferior leads which were present on prior EKG, no reciprocal depressions, QRS duration 82 MS,  MS, stable compared to patient's prior EKG  Interpreted by me      ------------------------- NURSING NOTES AND VITALS REVIEWED ---------------------------   The nursing notes within the ED encounter and vital signs as below have been reviewed by myself. BP (!) 147/89   Pulse 70   Temp 98.8 °F (37.1 °C) (Oral)   Resp 20   Ht 5' 2\" (1.575 m)   Wt 220 lb (99.8 kg)   SpO2 92%   BMI 40.24 kg/m²   Oxygen Saturation Interpretation: Normal    The patients available past medical records and past encounters were reviewed.         ------------------------------ ED COURSE/MEDICAL DECISION MAKING----------------------  Medications   brexpiprazole (REXULTI) tablet 0.5 mg (has no administration in time range)   hydrOXYzine (ATARAX) tablet 50 mg (50 mg Oral Not Given 3/18/21 2307)   PARoxetine (PAXIL) tablet 10 mg (has no administration in time range)   traZODone (DESYREL) tablet 50 mg (has no administration in time range)   sodium chloride flush 0.9 % injection 10 mL (10 mLs Intravenous Given 3/18/21 2317)   sodium chloride flush 0.9 % injection 10 mL (has no administration in time range)   ondansetron (ZOFRAN-ODT) disintegrating tablet 4 mg (has no administration in time range)     Or   ondansetron (ZOFRAN) injection 4 mg (has no administration in time range)   enoxaparin (LOVENOX) injection 40 mg (has no administration in time range)   0.9 % sodium chloride infusion (has no administration in time range)   HYDROmorphone HCl PF (DILAUDID) injection 0.25 mg (has no administration in time range) Or   HYDROmorphone HCl PF (DILAUDID) injection 0.5 mg (has no administration in time range)   promethazine (PHENERGAN) injection 25 mg (25 mg Intramuscular Given 3/18/21 2123)   0.9 % sodium chloride bolus (1,000 mLs Intravenous New Bag 3/18/21 2317)   pantoprazole (PROTONIX) injection 40 mg (40 mg Intravenous Given 3/18/21 2316)   bumetanide (BUMEX) tablet 1 mg (has no administration in time range)   nicotine (NICODERM CQ) 21 MG/24HR 1 patch (1 patch Transdermal Patch Applied 3/18/21 2317)   ondansetron (ZOFRAN) injection 4 mg (4 mg Intravenous Given 3/18/21 1723)   fentaNYL (SUBLIMAZE) injection 50 mcg (50 mcg Intravenous Given 3/18/21 1725)   dicyclomine (BENTYL) injection 20 mg (20 mg Intramuscular Given 3/18/21 2013)   iopamidol (ISOVUE-370) 76 % injection 75 mL (75 mLs Intravenous Given 3/18/21 1929)   iohexol (OMNIPAQUE 240) injection 50 mL (50 mLs Oral Given 3/18/21 1930)   fentaNYL (SUBLIMAZE) injection 50 mcg (50 mcg Intravenous Given 3/18/21 2016)   HYDROmorphone HCl PF (DILAUDID) injection 1 mg (1 mg Intravenous Given 3/18/21 2120)             Medical Decision Making:   IDr. Dinesh am the primary physician of record. Krystal Kilgore is a 48 y.o. female who presents to the ED for abdominal pain differential diagnosis includes but is not limited to bowel obstruction, colitis, diverticulitis the patient does have a past medical history of   has a past medical history of Arthritis, Bursitis, Class 3 severe obesity due to excess calories with body mass index (BMI) of 40.0 to 44.9 in [facility]), COPD (chronic obstructive pulmonary disease) ([facility] [address] Diverticulitis, GERD (gastroesophageal reflux disease), Incisional hernia with obstruction but no gangrene, and Strep throat. . The patient was given fentanyl, Zofran, Bentyl. Labs and imaging obtained, reviewed. Patient treated symptomatically.    The patient had a CBC which was fairly unremarkable, CMP with mild transaminitis, lactic acid 2.8, patient troponin negative, no CV changes on EKG, ultrasound of the right lower extremity negative for any DVT. CT abdomen pelvis did reveal concern for potential incarcerated ventral abdominal hernia as well as bowel obstruction. General surgery consultation. NG tube order was placed patient adamantly refused NG tube. Patient will be admitted for further treatment and evaluation. Re-Evaluations/Consultations:             ED Course as of Mar 18 2337   Thu Mar 18, 2021   2004 Is in the bed in no acute distress but still complaining of pain. She was educated on the results of today. Patient will be remedicated. [MT]   2020 Spoke with Dr. Clarke Armas he will accept the patient for admission    [MT]      ED Course User Index  [MT] Martha Patterson DO               This patient's ED course included: History, physical examination, reevaluation prior to disposition, labs, imaging, telemetry monitoring, EKG, IV medications, general surgery consultation      This patient has remained hemodynamically stable during their ED course. Counseling: The emergency provider has spoken with the patient and discussed todays results, in addition to providing specific details for the plan of care and counseling regarding the diagnosis and prognosis. Questions are answered at this time and they are agreeable with the plan.       --------------------------------- IMPRESSION AND DISPOSITION ---------------------------------    IMPRESSION  1. Small bowel obstruction ([address]    2. Ventral hernia with obstruction and without gangrene        DISPOSITION  Disposition: Admit to med/surg floor  Patient condition is stable        NOTE: This report was transcribed using voice recognition software.  Every effort was made to ensure accuracy; however, inadvertent computerized transcription errors may be present         Martha Patterson DO  03/18/21 [id number]

## 2021-03-19 ENCOUNTER — APPOINTMENT (OUTPATIENT)
Dept: GENERAL RADIOLOGY | Age: 51
DRG: 227 | End: 2021-03-19
Payer: MEDICARE

## 2021-03-19 LAB
ACETAMINOPHEN LEVEL: <5 MCG/ML (ref 10–30)
ALBUMIN SERPL-MCNC: 4.1 G/DL (ref 3.5–5.2)
ALP BLD-CCNC: 82 U/L (ref 35–104)
ALT SERPL-CCNC: 123 U/L (ref 0–32)
ANION GAP SERPL CALCULATED.3IONS-SCNC: 9 MMOL/L (ref 7–16)
AST SERPL-CCNC: 181 U/L (ref 0–31)
BASOPHILS ABSOLUTE: 0.04 E9/L (ref 0–0.2)
BASOPHILS RELATIVE PERCENT: 0.8 % (ref 0–2)
BILIRUB SERPL-MCNC: 0.7 MG/DL (ref 0–1.2)
BILIRUBIN DIRECT: 0.2 MG/DL (ref 0–0.3)
BILIRUBIN, INDIRECT: 0.5 MG/DL (ref 0–1)
BUN BLDV-MCNC: 8 MG/DL (ref 6–20)
CALCIUM SERPL-MCNC: 8.6 MG/DL (ref 8.6–10.2)
CHLORIDE BLD-SCNC: 100 MMOL/L (ref 98–107)
CO2: 27 MMOL/L (ref 22–29)
CREAT SERPL-MCNC: 0.8 MG/DL (ref 0.5–1)
EOSINOPHILS ABSOLUTE: 0.1 E9/L (ref 0.05–0.5)
EOSINOPHILS RELATIVE PERCENT: 1.9 % (ref 0–6)
GFR AFRICAN AMERICAN: >60
GFR NON-AFRICAN AMERICAN: >60 ML/MIN/1.73
GLUCOSE BLD-MCNC: 87 MG/DL (ref 74–99)
HCT VFR BLD CALC: 43.9 % (ref 34–48)
HCT VFR BLD CALC: 44.1 % (ref 34–48)
HCT VFR BLD CALC: 44.9 % (ref 34–48)
HEMOGLOBIN: 13.9 G/DL (ref 11.5–15.5)
HEMOGLOBIN: 14.2 G/DL (ref 11.5–15.5)
HEMOGLOBIN: 14.5 G/DL (ref 11.5–15.5)
IMMATURE GRANULOCYTES #: 0.03 E9/L
IMMATURE GRANULOCYTES %: 0.6 % (ref 0–5)
INR BLD: 1
LACTIC ACID: 0.7 MMOL/L (ref 0.5–2.2)
LACTIC ACID: 0.7 MMOL/L (ref 0.5–2.2)
LACTIC ACID: 0.9 MMOL/L (ref 0.5–2.2)
LACTIC ACID: 0.9 MMOL/L (ref 0.5–2.2)
LACTIC ACID: 1 MMOL/L (ref 0.5–2.2)
LIPASE: 23 U/L (ref 13–60)
LYMPHOCYTES ABSOLUTE: 1.24 E9/L (ref 1.5–4)
LYMPHOCYTES RELATIVE PERCENT: 23.8 % (ref 20–42)
MAGNESIUM: 2.2 MG/DL (ref 1.6–2.6)
MCH RBC QN AUTO: 32.6 PG (ref 26–35)
MCHC RBC AUTO-ENTMCNC: 31.6 % (ref 32–34.5)
MCV RBC AUTO: 103 FL (ref 80–99.9)
MONOCYTES ABSOLUTE: 0.4 E9/L (ref 0.1–0.95)
MONOCYTES RELATIVE PERCENT: 7.7 % (ref 2–12)
NEUTROPHILS ABSOLUTE: 3.4 E9/L (ref 1.8–7.3)
NEUTROPHILS RELATIVE PERCENT: 65.2 % (ref 43–80)
PDW BLD-RTO: 16.6 FL (ref 11.5–15)
PHOSPHORUS: 3.3 MG/DL (ref 2.5–4.5)
PLATELET # BLD: 179 E9/L (ref 130–450)
PMV BLD AUTO: 9.5 FL (ref 7–12)
POTASSIUM REFLEX MAGNESIUM: 4.7 MMOL/L (ref 3.5–5)
PROTHROMBIN TIME: 11.2 SEC (ref 9.3–12.4)
RBC # BLD: 4.36 E12/L (ref 3.5–5.5)
SODIUM BLD-SCNC: 136 MMOL/L (ref 132–146)
TOTAL PROTEIN: 6.7 G/DL (ref 6.4–8.3)
TSH SERPL DL<=0.05 MIU/L-ACNC: 8.04 UIU/ML (ref 0.27–4.2)
WBC # BLD: 5.2 E9/L (ref 4.5–11.5)

## 2021-03-19 PROCEDURE — C9113 INJ PANTOPRAZOLE SODIUM, VIA: HCPCS | Performed by: INTERNAL MEDICINE

## 2021-03-19 PROCEDURE — 85025 COMPLETE CBC W/AUTO DIFF WBC: CPT

## 2021-03-19 PROCEDURE — 6360000002 HC RX W HCPCS: Performed by: SURGERY

## 2021-03-19 PROCEDURE — 6370000000 HC RX 637 (ALT 250 FOR IP): Performed by: SURGERY

## 2021-03-19 PROCEDURE — 83605 ASSAY OF LACTIC ACID: CPT

## 2021-03-19 PROCEDURE — 6370000000 HC RX 637 (ALT 250 FOR IP): Performed by: INTERNAL MEDICINE

## 2021-03-19 PROCEDURE — 85610 PROTHROMBIN TIME: CPT

## 2021-03-19 PROCEDURE — 80076 HEPATIC FUNCTION PANEL: CPT

## 2021-03-19 PROCEDURE — 80074 ACUTE HEPATITIS PANEL: CPT

## 2021-03-19 PROCEDURE — 84100 ASSAY OF PHOSPHORUS: CPT

## 2021-03-19 PROCEDURE — 85018 HEMOGLOBIN: CPT

## 2021-03-19 PROCEDURE — 99223 1ST HOSP IP/OBS HIGH 75: CPT | Performed by: SURGERY

## 2021-03-19 PROCEDURE — 83735 ASSAY OF MAGNESIUM: CPT

## 2021-03-19 PROCEDURE — 6360000002 HC RX W HCPCS: Performed by: INTERNAL MEDICINE

## 2021-03-19 PROCEDURE — 80143 DRUG ASSAY ACETAMINOPHEN: CPT

## 2021-03-19 PROCEDURE — 85014 HEMATOCRIT: CPT

## 2021-03-19 PROCEDURE — 84443 ASSAY THYROID STIM HORMONE: CPT

## 2021-03-19 PROCEDURE — 1200000000 HC SEMI PRIVATE

## 2021-03-19 PROCEDURE — 2580000003 HC RX 258: Performed by: SURGERY

## 2021-03-19 PROCEDURE — 83690 ASSAY OF LIPASE: CPT

## 2021-03-19 PROCEDURE — 36415 COLL VENOUS BLD VENIPUNCTURE: CPT

## 2021-03-19 PROCEDURE — 80048 BASIC METABOLIC PNL TOTAL CA: CPT

## 2021-03-19 PROCEDURE — 74018 RADEX ABDOMEN 1 VIEW: CPT

## 2021-03-19 RX ORDER — LEVOTHYROXINE SODIUM 0.05 MG/1
50 TABLET ORAL DAILY
Status: DISCONTINUED | OUTPATIENT
Start: 2021-03-19 | End: 2021-03-29 | Stop reason: HOSPADM

## 2021-03-19 RX ADMIN — PANTOPRAZOLE SODIUM 40 MG: 40 INJECTION, POWDER, FOR SOLUTION INTRAVENOUS at 21:22

## 2021-03-19 RX ADMIN — SODIUM CHLORIDE: 9 INJECTION, SOLUTION INTRAVENOUS at 02:45

## 2021-03-19 RX ADMIN — HYDROMORPHONE HYDROCHLORIDE 0.5 MG: 1 INJECTION, SOLUTION INTRAMUSCULAR; INTRAVENOUS; SUBCUTANEOUS at 14:39

## 2021-03-19 RX ADMIN — HYDROMORPHONE HYDROCHLORIDE 0.5 MG: 1 INJECTION, SOLUTION INTRAMUSCULAR; INTRAVENOUS; SUBCUTANEOUS at 00:42

## 2021-03-19 RX ADMIN — LEVOTHYROXINE SODIUM 50 MCG: 50 TABLET ORAL at 16:55

## 2021-03-19 RX ADMIN — ENOXAPARIN SODIUM 40 MG: 40 INJECTION SUBCUTANEOUS at 08:09

## 2021-03-19 RX ADMIN — HYDROXYZINE HYDROCHLORIDE 50 MG: 25 TABLET, FILM COATED ORAL at 21:22

## 2021-03-19 RX ADMIN — HYDROMORPHONE HYDROCHLORIDE 0.5 MG: 1 INJECTION, SOLUTION INTRAMUSCULAR; INTRAVENOUS; SUBCUTANEOUS at 18:03

## 2021-03-19 RX ADMIN — HYDROMORPHONE HYDROCHLORIDE 0.5 MG: 1 INJECTION, SOLUTION INTRAMUSCULAR; INTRAVENOUS; SUBCUTANEOUS at 08:09

## 2021-03-19 RX ADMIN — SODIUM CHLORIDE, PRESERVATIVE FREE 10 ML: 5 INJECTION INTRAVENOUS at 21:27

## 2021-03-19 RX ADMIN — HYDROMORPHONE HYDROCHLORIDE 0.5 MG: 1 INJECTION, SOLUTION INTRAMUSCULAR; INTRAVENOUS; SUBCUTANEOUS at 11:39

## 2021-03-19 RX ADMIN — HYDROMORPHONE HYDROCHLORIDE 0.5 MG: 1 INJECTION, SOLUTION INTRAMUSCULAR; INTRAVENOUS; SUBCUTANEOUS at 03:48

## 2021-03-19 RX ADMIN — HYDROMORPHONE HYDROCHLORIDE 0.5 MG: 1 INJECTION, SOLUTION INTRAMUSCULAR; INTRAVENOUS; SUBCUTANEOUS at 21:22

## 2021-03-19 RX ADMIN — BUMETANIDE 1 MG: 1 TABLET ORAL at 21:22

## 2021-03-19 RX ADMIN — PANTOPRAZOLE SODIUM 40 MG: 40 INJECTION, POWDER, FOR SOLUTION INTRAVENOUS at 08:09

## 2021-03-19 ASSESSMENT — PAIN SCALES - GENERAL
PAINLEVEL_OUTOF10: 7
PAINLEVEL_OUTOF10: 3
PAINLEVEL_OUTOF10: 7
PAINLEVEL_OUTOF10: 8
PAINLEVEL_OUTOF10: 7
PAINLEVEL_OUTOF10: 5

## 2021-03-19 ASSESSMENT — PAIN DESCRIPTION - PROGRESSION: CLINICAL_PROGRESSION: NOT CHANGED

## 2021-03-19 ASSESSMENT — PAIN DESCRIPTION - ONSET
ONSET: GRADUAL
ONSET: ON-GOING

## 2021-03-19 ASSESSMENT — PAIN - FUNCTIONAL ASSESSMENT: PAIN_FUNCTIONAL_ASSESSMENT: ACTIVITIES ARE NOT PREVENTED

## 2021-03-19 ASSESSMENT — PAIN DESCRIPTION - FREQUENCY: FREQUENCY: CONTINUOUS

## 2021-03-19 ASSESSMENT — PAIN DESCRIPTION - PAIN TYPE
TYPE: ACUTE PAIN
TYPE: ACUTE PAIN

## 2021-03-19 ASSESSMENT — PAIN DESCRIPTION - LOCATION: LOCATION: ABDOMEN

## 2021-03-19 NOTE — CONSULTS
Department of Internal Medicine  Internal Medicine Consultation Note    Primary Care Physician: Willis Mares DO   Admitting Physician:  Asher Bruno MD  Admission date and time: 3/18/2021  4:41 PM    Room:  [address] [place] [address]  Admitting diagnosis: SBO (small bowel obstruction) [facility]) [K56.609]    Patient Name: Nolan Joya  MRN: [id number]    Date of Service: 3/19/2021     Reason for consultation: Medical management    History of present illness:    Patient is a 59-year-old female who presented to the ED due to abdominal pain, dry heaving and decreased oral intake. Patient states that dry heaving began a few days ago. It continued to progress and her oral intake diminished as well. Initially she was able to tolerate solids however over the last 1 to 2 days she has only able to tolerate liquids with great difficulty. Describes abdominal pain as sharp with associated cramping. Pain is mainly epigastric in location with some left-sided upper quadrant pain as well. Patient did admit to likely bleeding from hemorrhoids about a week ago. However today she noted a bowel movement with black tar-like stool. She denies any chest pain. Denies shortness of breath. She denies any fever or chills.     PAST MEDICAL Hx:  Past Medical History:   Diagnosis Date    Arthritis     Bursitis     hips    Class 3 severe obesity due to excess calories with body mass index (BMI) of 40.0 to 44.9 in adult [facility])     COPD (chronic obstructive pulmonary disease) ([address]     Diverticulitis     GERD (gastroesophageal reflux disease)     Incisional hernia with obstruction but no gangrene     Strep throat        PAST SURGICAL Hx:   Past Surgical History:   Procedure Laterality Date     SECTION      CHOLECYSTECTOMY      COLECTOMY  2016    FOREARM FRACTURE SURGERY Left     fracture of ulna s/p repair    HERNIA REPAIR      HERNIA REPAIR  2015    incarcerated hernia repair    HYSTERECTOMY      NERVE BLOCK sacroiliac bilateral 2012    PELVIC FRACTURE SURGERY      VENTRAL HERNIA REPAIR  2015       FAMILY Hx:  Family History   Problem Relation Age of Onset    Osteoarthritis Other     Diabetes Other     Other Father         pancreatitis    Prostate Cancer Father     Stroke Brother         half brother    Cancer Paternal Grandfather        HOME MEDICATIONS:  Prior to Admission medications    Medication Sig Start Date End Date Taking?  Authorizing Provider   traZODone (DESYREL) 100 MG tablet  21  Yes Historical Provider, MD   traZODone (DESYREL) 50 MG tablet  20  Yes Historical Provider, MD   rOPINIRole (REQUIP) 0.25 MG tablet  21  Yes Historical Provider, MD   bumetanide (BUMEX) 1 MG tablet TAKE ONE TABLET 2 TIMES A DAY 19  Yes Historical Provider, MD   hydrOXYzine (ATARAX) 50 MG tablet Take 50 mg by mouth nightly  21   Historical Provider, MD   brexpiprazole (REXULTI) 0.5 MG TABS tablet Take 0.5 mg by mouth daily    Historical Provider, MD   PARoxetine (PAXIL) 10 MG tablet Take 10 mg by mouth every morning    Historical Provider, MD       ALLERGIES:  Ibuprofen, Nsaids, and Morphine    SOCIAL Hx:  Social History     Socioeconomic History    Marital status:      Spouse name: Not on file    Number of children: Not on file    Years of education: Not on file    Highest education level: Not on file   Occupational History     Employer: [employer]   Social Needs    Financial resource strain: Not on file    Food insecurity     Worry: Not on file     Inability: Not on file    Transportation needs     Medical: Not on file     Non-medical: Not on file   Tobacco Use    Smoking status: Former Smoker     Packs/day: 1.00     Years: 30.00     Pack years: 30.00     Types: Cigarettes     Quit date: 3/5/2021     Years since quittin.0    Smokeless tobacco: Never Used   Substance and Sexual Activity    Alcohol use: Yes     Comment: daily vodka drinker    Drug use: Not Currently     Types: Marijuana     Comment: medical marijuana.  Sexual activity: Yes     Partners: Male   Lifestyle    Physical activity     Days per week: Not on file     Minutes per session: Not on file    Stress: Not on file   Relationships    Social connections     Talks on phone: Not on file     Gets together: Not on file     Attends Faith service: Not on file     Active member of club or organization: Not on file     Attends meetings of clubs or organizations: Not on file     Relationship status: Not on file    Intimate partner violence     Fear of current or ex partner: Not on file     Emotionally abused: Not on file     Physically abused: Not on file     Forced sexual activity: Not on file   Other Topics Concern    Not on file   Social History Narrative     twice. Recently  December 2020       ROS: Positive in bold  General:   Denies chills, fatigue, fever, malaise, night sweats or weight loss    Psychological:   Denies anxiety, disorientation or hallucinations    ENT:    Denies epistaxis, headaches, vertigo or visual changes    Cardiovascular:   Denies any chest pain, irregular heartbeats, or palpitations. No paroxysmal nocturnal dyspnea. Respiratory:   Denies shortness of breath, coughing, sputum production, hemoptysis, or wheezing. No orthopnea. Gastrointestinal:   Denies nausea, vomiting, diarrhea, or constipation. Denies any abdominal pain. Denies change in bowel habits or stools. Genito-Urinary:    Denies any urgency, frequency, hematuria. Voiding without difficulty. Musculoskeletal:   Denies joint pain, joint stiffness, joint swelling or muscle pain    Neurology:    Denies any headache or focal neurological deficits. No weakness or paresthesia. Derm:    Denies any rashes, ulcers, or excoriations. Denies bruising. Extremities:   Denies any lower extremity swelling or edema.       PHYSICAL EXAM:  VITALS:  Vitals:    03/18/21 2215   BP: (!) 147/89 Pulse: 70   Resp: 20   Temp: 98.8 °F (37.1 °C)   SpO2: 92%         CONSTITUTIONAL:    Awake, alert, cooperative, no apparent distress, and appears stated age    EYES:     EOMI, sclera clear without icterus, conjunctiva normal    ENT:    Normocephalic, atraumatic, sinuses nontender on palpation. External ears without lesions. NECK:    Supple, symmetrical, trachea midline, no adenopathy, thyroid symmetric, not enlarged and no tenderness, skin normal, no bruits, no JVD    HEMATOLOGIC/LYMPHATICS:    No cervical lymphadenopathy and no supraclavicular lymphadenopathy    LUNGS:    Symmetric. No increased work of breathing, good air exchange, clear to auscultation bilaterally, no wheezes, rhonchi, or rales,     CARDIOVASCULAR:    Normal apical impulse, regular rate and rhythm, normal S1 and S2, no S3 or S4, and no murmur noted    ABDOMEN:    Normal contour, normal bowel sounds, soft, non-distended, non-tender, no masses palpated, no hepatosplenomegaly, no rebound or guarding elicited on palpation   Patient has epigastric and left upper quadrant abdominal tenderness  Patient has nonreducible ventral abdominal hernia    MUSCULOSKELETAL:    There is no redness, warmth, or swelling of the joints. Full range of motion noted. Tone is normal.    NEUROLOGIC:    Awake, alert, oriented to name, place and time. Cranial nerves II-XII are grossly intact. Motor is 5 out of 5 bilaterally. SKIN:    No bruising or bleeding. No redness, warmth, or swelling    EXTREMITIES:    Peripheral pulses present. No edema, cyanosis, or swelling.     LABORATORY DATA:  CBC with Differential:    Lab Results   Component Value Date    WBC 5.7 03/18/2021    RBC 4.61 03/18/2021    HGB 15.3 03/18/2021    HCT 45.4 03/18/2021     03/18/2021    MCV 98.5 03/18/2021    MCH 33.2 03/18/2021    MCHC 33.7 03/18/2021    RDW 16.6 03/18/2021    SEGSPCT 82 09/14/2011    LYMPHOPCT 17.9 03/18/2021    MONOPCT 9.7 03/18/2021    BASOPCT 0.4 03/18/2021 MONOSABS 0.55 03/18/2021    LYMPHSABS 1.02 03/18/2021    EOSABS 0.04 03/18/2021    BASOSABS 0.02 03/18/2021     CMP:    Lab Results   Component Value Date     03/18/2021    K 4.2 03/18/2021     03/18/2021    CO2 23 03/18/2021    BUN 10 03/18/2021    CREATININE 0.8 03/18/2021    GFRAA >60 03/18/2021    LABGLOM >60 03/18/2021    GLUCOSE 89 03/18/2021    GLUCOSE 89 09/14/2011    PROT 6.9 03/18/2021    LABALBU 4.2 03/18/2021    CALCIUM 9.0 03/18/2021    BILITOT 0.5 03/18/2021    ALKPHOS 85 03/18/2021     03/18/2021     03/18/2021       ASSESSMENT/PLAN:  1. Small bowel obstruction  2. Nonreducible ventral abdominal hernia  3. Transaminitis  4. Possible GI bleed   5. Left cubital and left carpal tunnel syndrome  6. COPD  7. Mild pulmonary hypertension  8. GERD  9. Obesity  10. History of tobacco abuse; recently quit  11. Daily alcohol consumption      Patient admitted by general surgery. Patient placed on IV fluids. NG placement ordered. Placed on IV Dilaudid for pain control. Patient did admit to melena. Protonix IV twice daily ordered. H&H every 8 hours. Internal medicine consulted for medical management.     Santana Ricketts [place]  12:42 AM  3/19/2021    Electronically signed by Santana Ricketts DO on 3/18/21 at 8:48 PM EDT

## 2021-03-19 NOTE — PROGRESS NOTES
HCT 44.9 03/19/2021     03/19/2021    .0 03/19/2021    MCH 32.6 03/19/2021    MCHC 31.6 03/19/2021    RDW 16.6 03/19/2021    SEGSPCT 82 09/14/2011    LYMPHOPCT 23.8 03/19/2021    MONOPCT 7.7 03/19/2021    BASOPCT 0.8 03/19/2021    MONOSABS 0.40 03/19/2021    LYMPHSABS 1.24 03/19/2021    EOSABS 0.10 03/19/2021    BASOSABS 0.04 03/19/2021     CMP:    Lab Results   Component Value Date     03/19/2021    K 4.7 03/19/2021     03/19/2021    CO2 27 03/19/2021    BUN 8 03/19/2021    CREATININE 0.8 03/19/2021    GFRAA >60 03/19/2021    LABGLOM >60 03/19/2021    GLUCOSE 87 03/19/2021    GLUCOSE 89 09/14/2011    PROT 6.7 03/19/2021    LABALBU 4.1 03/19/2021    CALCIUM 8.6 03/19/2021    BILITOT 0.7 03/19/2021    ALKPHOS 82 03/19/2021     03/19/2021     03/19/2021       Wound Documentation:   Incision 08/25/15 Abdomen Mid (Active)   Number of days: 2032       Assessment:    · Small bowel obstruction with nonreducible ventral abdominal hernia  · Transaminitis  · Possible GI bleed   · Left cubital and left carpal tunnel syndrome  · COPD  · Mild pulmonary hypertension  · GERD  · Obesity  · History of tobacco abuse; recently quit  · Daily alcohol consumption  · Elevated TSH      Plan:     · Continue NG tube decompression--follow surgical recommendation  · Adequate pain control  · DVT prophylaxis  · Monitor hemoglobin hematocrit continue PPIs  · Thyroid supplementation      More than 50% of my time was spent at the bedside counseling/coordinating care with the patient and/or family with face to face contact. This time was spent reviewing notes and laboratory data as well as instructing and counseling the patient. Time I spent with the family or surrogate(s) is included only if the patient was incapable of providing the necessary information or participating in medical decisions.  I also discussed the differential diagnosis and all of the proposed management plans with the patient and individuals accompanying the patient. I am readily available for any further decision-making and intervention.        Coleman Lopez DO, [email or web address].  3/19/2021  1:13 PM

## 2021-03-19 NOTE — ED NOTES
Pt refused NG placement do to previous complication. Doctor notified.      Vlad Greer, RN  03/18/21 2021

## 2021-03-19 NOTE — PLAN OF CARE
Problem: Pain:  Goal: Pain level will decrease  Description: Pain level will decrease  3/19/2021 1030 by Moises Bates RN  Outcome: Met This Shift  3/19/2021 0213 by Eve Collins RN  Outcome: Met This Shift  Goal: Control of acute pain  Description: Control of acute pain  3/19/2021 1030 by Moises Bates RN  Outcome: Met This Shift  3/19/2021 0213 by Eve Collins RN  Outcome: Met This Shift

## 2021-03-19 NOTE — CARE COORDINATION
SS Note/Discharge plan:  Met with pt for transition of care planning, pt currently a&o and pleasant, pt plans to return home and will have help and transportation home from her daughter or son-in-law, pt functions independently, no anticipated needs for discharge.  Electronically signed by Jennett Ormond, LSW on 3/19/2021 at 11:52 AM      Readmission Assessment  Number of Days since last admission?: 1-7 days  Previous Disposition: Home with Family  Who is being Interviewed: Patient  What was the patient's/caregiver's perception as to why they think they needed to return back to the hospital?: Other (Comment)(pt having increased abdominal pain and decreased appetite)  Did you visit your Primary Care Physician after you left the hospital, before you returned this time?: No  Why weren't you able to visit your PCP?: Other (Comment)(rehospitalized)  Did you see a specialist, such as Cardiac, Pulmonary, Orthopedic Physician, etc. after you left the hospital?: No  Who advised the patient to return to the hospital?: Physician  Does the patient report anything that got in the way of taking their medications?: No  In our efforts to provide the best possible care to you and others like you, can you think of anything that we could have done to help you after you left the hospital the first time, so that you might not have needed to return so soon?: Other (Comment)(No)

## 2021-03-19 NOTE — H&P
bumetanide (BUMEX) 1 MG tablet TAKE ONE TABLET 2 TIMES A DAY 19  Yes Historical Provider, MD   hydrOXYzine (ATARAX) 50 MG tablet Take 50 mg by mouth nightly  21   Historical Provider, MD   brexpiprazole (REXULTI) 0.5 MG TABS tablet Take 0.5 mg by mouth daily    Historical Provider, MD   PARoxetine (PAXIL) 10 MG tablet Take 10 mg by mouth every morning    Historical Provider, MD       Allergies   Allergen Reactions    Ibuprofen Hives    Nsaids Hives    Morphine Hives and Anxiety       Family History   Problem Relation Age of Onset    Osteoarthritis Other     Diabetes Other     Other Father         pancreatitis    Prostate Cancer Father     Stroke Brother         half brother    Cancer Paternal Grandfather        Social History     Tobacco Use    Smoking status: Former Smoker     Packs/day: 1.00     Years: 30.00     Pack years: 30.00     Types: Cigarettes     Quit date: 3/5/2021     Years since quittin.0    Smokeless tobacco: Never Used   Substance Use Topics    Alcohol use: Yes     Comment: daily vodka drinker    Drug use: Not Currently     Types: Marijuana     Comment: medical marijuana.          Review of Systems - History obtained from the patient  General ROS: negative obtundation, AMS  ENT ROS: negative rhinorrhea, epistaxis  Allergy and Immunology ROS: negative itchy/watery eyes or nasal congestion  Hematological and Lymphatic ROS: negative spontaneous bleeding or bruising  Endocrine ROS: negative  lethargy, mood swings, palpitations or polydipsia/polyuria  Respiratory ROS: negative sputum changes, stridor, tachypnea or wheezing  Cardiovascular ROS: negative for - loss of consciousness, murmur or orthopnea  Gastrointestinal ROS: abdominal pain, nausea, vomiting  Genito-Urinary ROS: negative for -  genital discharge or hematuria  Musculoskeletal ROS: negative for - focal weakness, gangrene  Psych/Neuro ROS: negative for - visual or auditory hallucinations, suicidal enlarged. Organs: The liver is fatty with no focal lesions or biliary ductal dilatation. Gallbladder is surgically absent. Spleen is not enlarged. The pancreas demonstrates normal contour. Adrenal glands appear unremarkable. There is symmetric enhancement of the kidneys with no hydronephrosis. GI/Bowel: There are no obstructing or constricting mass lesions. The appendix is normal.  The proximal small bowel is mildly distended. Pelvis: Bladder is suboptimally distended with no stones. There is no significant free fluid. The uterus is absent. Peritoneum/Retroperitoneum: No free air or significant adenopathy. Bones/Soft Tissues: Postsurgical scar in the anterior abdominal wall. Small ventral hernia containing a loop of small bowel, no change. Small ventral hernia containing a loop of small bowel, resulting in early or partial small bowel obstruction. Fatty liver. No evidence of acute appendicitis or obstructive uropathy. Xr Chest 1 View    Result Date: 3/18/2021  EXAMINATION: ONE XRAY VIEW OF THE CHEST 3/18/2021 7:02 pm COMPARISON: March 10, 2021 HISTORY: ORDERING SYSTEM PROVIDED HISTORY: eval free air TECHNOLOGIST PROVIDED HISTORY: Reason for exam:->eval free air FINDINGS: The lungs are without acute focal process. There is no effusion or pneumothorax. The cardiomediastinal silhouette is without acute process. The osseous structures are without acute process. Stable old left rib fractures noted. No acute process. Specifically, no free air below the right hemidiaphragm. Us Dup Lower Extremity Right Zachery    Result Date: 3/18/2021  EXAMINATION: DUPLEX VENOUS ULTRASOUND OF THE RIGHT LOWER EXTREMITY, 3/18/2021 7:50 pm TECHNIQUE: Duplex ultrasound using B-mode/gray scaled imaging and Doppler spectral analysis and color flow was obtained of the right lower extremity. COMPARISON: None.  HISTORY: ORDERING SYSTEM PROVIDED HISTORY: Discomfort TECHNOLOGIST PROVIDED HISTORY: Reason for exam:->Discomfort What reading provider will be dictating this exam?->CRC FINDINGS: The visualized veins of the right lower extremity are patent and free of echogenic thrombus. The veins demonstrate good compressibility with normal color flow study and spectral analysis. Soft tissue swelling at level of right calf. No evidence of DVT in the right lower extremity. Xr Abdomen For Ng/og/ne Tube Placement    Result Date: 3/19/2021  EXAMINATION: ONE SUPINE XRAY VIEW(S) OF THE ABDOMEN 3/18/2021 11:19 pm COMPARISON: None. HISTORY: ORDERING SYSTEM PROVIDED HISTORY: Confirmation of course of NG/OG/NE tube and location of tip of tube TECHNOLOGIST PROVIDED HISTORY: Reason for exam:->Confirmation of course of NG/OG/NE tube and location of tip of tube Portable? ->Yes FINDINGS: Nonspecific bowel gas pattern without evidence of obstruction. No abnormal calcifications. No acute osseous abnormality. Enteric tube tip is in the body of the stomach. Normal heart size, lungs and pleural spaces. Enteric tube tip in the body of the stomach. ASSESSMENT:  48 y.o. female with chronic PSBO 2/2 ventral hernia    PLAN:  Continue NGT  NPO  Pain control  Will discuss with Dr. Mehdi Meredith and Dr. Nilda Callahan- She is scheduled for operative repair at the end of the month    Electronically signed by Hansel Jewell DO on 3/19/21 at 7:12 AM EDT      General Surgery Progress Note  [facility]    Patient's Name/Date of Birth: Sean Chaidez / 1970    Date: March 19, 2021     Surgeon: Mehdi Meredith MD    Chief Complaint: SBO    Patient Active Problem List   Diagnosis    GERD (gastroesophageal reflux disease)    Osteoarthritis cervical spine    Osteoarthritis of lumbar spine    Morbid obesity ([address]    Hypokalemia    Venous insufficiency    SBO (small bowel obstruction) ([address]    Incarcerated hernia    Intractable abdominal pain       Subjective: HPI as above. Now passing some flatus. No bm.  No nausea    Objective:  BP (!) 146/77   Pulse 73   Temp 98.7 °F (37.1 °C) Comment (Src): oral  Resp 20   Ht 5' 2\" (1.575 m)   Wt 220 lb (99.8 kg)   SpO2 99%   BMI 40.24 kg/m²   Labs:  Recent Labs     03/18/21  1708 03/19/21  0100 03/19/21  0400 03/19/21  1459   WBC 5.7  --  5.2  --    HGB 15.3 14.5 14.2 13.9   HCT 45.4 44.1 44.9 43.9     Lab Results   Component Value Date    CREATININE 0.8 03/19/2021    BUN 8 03/19/2021     03/19/2021    K 4.7 03/19/2021     03/19/2021    CO2 27 03/19/2021     Recent Labs     03/18/21  1708 03/19/21  0400   LIPASE 23 23     CBC with Differential:    Lab Results   Component Value Date    WBC 5.2 03/19/2021    RBC 4.36 03/19/2021    HGB 13.9 03/19/2021    HCT 43.9 03/19/2021     03/19/2021    .0 03/19/2021    MCH 32.6 03/19/2021    MCHC 31.6 03/19/2021    RDW 16.6 03/19/2021    SEGSPCT 82 09/14/2011    LYMPHOPCT 23.8 03/19/2021    MONOPCT 7.7 03/19/2021    BASOPCT 0.8 03/19/2021    MONOSABS 0.40 03/19/2021    LYMPHSABS 1.24 03/19/2021    EOSABS 0.10 03/19/2021    BASOSABS 0.04 03/19/2021     CMP:    Lab Results   Component Value Date     03/19/2021    K 4.7 03/19/2021     03/19/2021    CO2 27 03/19/2021    BUN 8 03/19/2021    CREATININE 0.8 03/19/2021    GFRAA >60 03/19/2021    LABGLOM >60 03/19/2021    GLUCOSE 87 03/19/2021    GLUCOSE 89 09/14/2011    PROT 6.7 03/19/2021    LABALBU 4.1 03/19/2021    CALCIUM 8.6 03/19/2021    BILITOT 0.7 03/19/2021    ALKPHOS 82 03/19/2021     03/19/2021     03/19/2021       General appearance:  NAD  Head: NCAT, PERRLA, EOMI, red conjunctiva  Neck: supple, no masses  Lungs: CTAB, equal chest rise bilateral  Heart: Reg rate  Abdomen: soft, nondistended, mild tender midline with partially reducible ventral hernia  Skin; no lesions  Gu: no cva tenderness  Extremities: extremities normal, atraumatic, no cyanosis or edema      Assessment/Plan:  Sharon Anita is a 48 y.o. female with SBO, incarcerated incisional hernia    Clamp NG  Clears   Abd

## 2021-03-20 LAB
ALBUMIN SERPL-MCNC: 4 G/DL (ref 3.5–5.2)
ALP BLD-CCNC: 85 U/L (ref 35–104)
ALT SERPL-CCNC: 102 U/L (ref 0–32)
ANION GAP SERPL CALCULATED.3IONS-SCNC: 10 MMOL/L (ref 7–16)
AST SERPL-CCNC: 96 U/L (ref 0–31)
BASOPHILS ABSOLUTE: 0.02 E9/L (ref 0–0.2)
BASOPHILS RELATIVE PERCENT: 0.4 % (ref 0–2)
BILIRUB SERPL-MCNC: 0.7 MG/DL (ref 0–1.2)
BILIRUBIN DIRECT: 0.3 MG/DL (ref 0–0.3)
BILIRUBIN, INDIRECT: 0.4 MG/DL (ref 0–1)
BUN BLDV-MCNC: 7 MG/DL (ref 6–20)
CALCIUM SERPL-MCNC: 8.4 MG/DL (ref 8.6–10.2)
CHLORIDE BLD-SCNC: 101 MMOL/L (ref 98–107)
CO2: 26 MMOL/L (ref 22–29)
CREAT SERPL-MCNC: 0.8 MG/DL (ref 0.5–1)
EOSINOPHILS ABSOLUTE: 0.13 E9/L (ref 0.05–0.5)
EOSINOPHILS RELATIVE PERCENT: 2.8 % (ref 0–6)
GFR AFRICAN AMERICAN: >60
GFR NON-AFRICAN AMERICAN: >60 ML/MIN/1.73
GLUCOSE BLD-MCNC: 99 MG/DL (ref 74–99)
HCT VFR BLD CALC: 43.3 % (ref 34–48)
HCT VFR BLD CALC: 43.6 % (ref 34–48)
HCT VFR BLD CALC: 44.5 % (ref 34–48)
HCT VFR BLD CALC: 45.3 % (ref 34–48)
HEMOGLOBIN: 14 G/DL (ref 11.5–15.5)
HEMOGLOBIN: 14.4 G/DL (ref 11.5–15.5)
HEMOGLOBIN: 14.5 G/DL (ref 11.5–15.5)
HEMOGLOBIN: 14.6 G/DL (ref 11.5–15.5)
IMMATURE GRANULOCYTES #: 0.02 E9/L
IMMATURE GRANULOCYTES %: 0.4 % (ref 0–5)
LACTIC ACID: 0.7 MMOL/L (ref 0.5–2.2)
LACTIC ACID: 1.4 MMOL/L (ref 0.5–2.2)
LACTIC ACID: 1.4 MMOL/L (ref 0.5–2.2)
LYMPHOCYTES ABSOLUTE: 1.03 E9/L (ref 1.5–4)
LYMPHOCYTES RELATIVE PERCENT: 22.3 % (ref 20–42)
MCH RBC QN AUTO: 33 PG (ref 26–35)
MCHC RBC AUTO-ENTMCNC: 32.3 % (ref 32–34.5)
MCV RBC AUTO: 102.1 FL (ref 80–99.9)
MONOCYTES ABSOLUTE: 0.39 E9/L (ref 0.1–0.95)
MONOCYTES RELATIVE PERCENT: 8.5 % (ref 2–12)
NEUTROPHILS ABSOLUTE: 3.02 E9/L (ref 1.8–7.3)
NEUTROPHILS RELATIVE PERCENT: 65.6 % (ref 43–80)
PDW BLD-RTO: 15.9 FL (ref 11.5–15)
PLATELET # BLD: 137 E9/L (ref 130–450)
PMV BLD AUTO: 9.7 FL (ref 7–12)
POTASSIUM SERPL-SCNC: 4.2 MMOL/L (ref 3.5–5)
RBC # BLD: 4.24 E12/L (ref 3.5–5.5)
SODIUM BLD-SCNC: 137 MMOL/L (ref 132–146)
TOTAL PROTEIN: 6.7 G/DL (ref 6.4–8.3)
WBC # BLD: 4.6 E9/L (ref 4.5–11.5)

## 2021-03-20 PROCEDURE — 80053 COMPREHEN METABOLIC PANEL: CPT

## 2021-03-20 PROCEDURE — 82248 BILIRUBIN DIRECT: CPT

## 2021-03-20 PROCEDURE — 6360000002 HC RX W HCPCS: Performed by: INTERNAL MEDICINE

## 2021-03-20 PROCEDURE — 6370000000 HC RX 637 (ALT 250 FOR IP): Performed by: INTERNAL MEDICINE

## 2021-03-20 PROCEDURE — 85018 HEMOGLOBIN: CPT

## 2021-03-20 PROCEDURE — 83605 ASSAY OF LACTIC ACID: CPT

## 2021-03-20 PROCEDURE — 85025 COMPLETE CBC W/AUTO DIFF WBC: CPT

## 2021-03-20 PROCEDURE — 6370000000 HC RX 637 (ALT 250 FOR IP): Performed by: SURGERY

## 2021-03-20 PROCEDURE — 36415 COLL VENOUS BLD VENIPUNCTURE: CPT

## 2021-03-20 PROCEDURE — 2580000003 HC RX 258: Performed by: SURGERY

## 2021-03-20 PROCEDURE — 6360000002 HC RX W HCPCS: Performed by: SURGERY

## 2021-03-20 PROCEDURE — 99232 SBSQ HOSP IP/OBS MODERATE 35: CPT | Performed by: SURGERY

## 2021-03-20 PROCEDURE — 1200000000 HC SEMI PRIVATE

## 2021-03-20 PROCEDURE — 85014 HEMATOCRIT: CPT

## 2021-03-20 PROCEDURE — C9113 INJ PANTOPRAZOLE SODIUM, VIA: HCPCS | Performed by: INTERNAL MEDICINE

## 2021-03-20 RX ORDER — METOCLOPRAMIDE 5 MG/1
5 TABLET ORAL
Status: DISCONTINUED | OUTPATIENT
Start: 2021-03-20 | End: 2021-03-29 | Stop reason: HOSPADM

## 2021-03-20 RX ADMIN — ONDANSETRON 4 MG: 2 INJECTION INTRAMUSCULAR; INTRAVENOUS at 04:06

## 2021-03-20 RX ADMIN — SODIUM CHLORIDE: 9 INJECTION, SOLUTION INTRAVENOUS at 19:39

## 2021-03-20 RX ADMIN — BENZOCAINE 6 MG-MENTHOL 10 MG LOZENGES 1 LOZENGE: at 00:57

## 2021-03-20 RX ADMIN — BUMETANIDE 1 MG: 1 TABLET ORAL at 08:33

## 2021-03-20 RX ADMIN — BREXPIPRAZOLE 0.5 MG: 2 TABLET ORAL at 10:00

## 2021-03-20 RX ADMIN — LEVOTHYROXINE SODIUM 50 MCG: 50 TABLET ORAL at 05:23

## 2021-03-20 RX ADMIN — HYDROMORPHONE HYDROCHLORIDE 0.5 MG: 1 INJECTION, SOLUTION INTRAMUSCULAR; INTRAVENOUS; SUBCUTANEOUS at 07:37

## 2021-03-20 RX ADMIN — HYDROMORPHONE HYDROCHLORIDE 0.5 MG: 1 INJECTION, SOLUTION INTRAMUSCULAR; INTRAVENOUS; SUBCUTANEOUS at 04:08

## 2021-03-20 RX ADMIN — SODIUM CHLORIDE, PRESERVATIVE FREE 10 ML: 5 INJECTION INTRAVENOUS at 20:46

## 2021-03-20 RX ADMIN — SODIUM CHLORIDE, PRESERVATIVE FREE 10 ML: 5 INJECTION INTRAVENOUS at 08:33

## 2021-03-20 RX ADMIN — HYDROMORPHONE HYDROCHLORIDE 0.5 MG: 1 INJECTION, SOLUTION INTRAMUSCULAR; INTRAVENOUS; SUBCUTANEOUS at 00:35

## 2021-03-20 RX ADMIN — HYDROMORPHONE HYDROCHLORIDE 0.5 MG: 1 INJECTION, SOLUTION INTRAMUSCULAR; INTRAVENOUS; SUBCUTANEOUS at 18:30

## 2021-03-20 RX ADMIN — SODIUM CHLORIDE: 9 INJECTION, SOLUTION INTRAVENOUS at 00:34

## 2021-03-20 RX ADMIN — PANTOPRAZOLE SODIUM 40 MG: 40 INJECTION, POWDER, FOR SOLUTION INTRAVENOUS at 08:33

## 2021-03-20 RX ADMIN — BUMETANIDE 1 MG: 1 TABLET ORAL at 19:39

## 2021-03-20 RX ADMIN — ENOXAPARIN SODIUM 40 MG: 40 INJECTION SUBCUTANEOUS at 08:34

## 2021-03-20 RX ADMIN — ONDANSETRON 4 MG: 4 TABLET, ORALLY DISINTEGRATING ORAL at 16:18

## 2021-03-20 RX ADMIN — PANTOPRAZOLE SODIUM 40 MG: 40 INJECTION, POWDER, FOR SOLUTION INTRAVENOUS at 20:46

## 2021-03-20 RX ADMIN — HYDROXYZINE HYDROCHLORIDE 50 MG: 25 TABLET, FILM COATED ORAL at 20:46

## 2021-03-20 RX ADMIN — METOCLOPRAMIDE 5 MG: 5 TABLET ORAL at 20:46

## 2021-03-20 RX ADMIN — HYDROMORPHONE HYDROCHLORIDE 0.5 MG: 1 INJECTION, SOLUTION INTRAMUSCULAR; INTRAVENOUS; SUBCUTANEOUS at 14:29

## 2021-03-20 RX ADMIN — BENZOCAINE 6 MG-MENTHOL 10 MG LOZENGES 1 LOZENGE: at 04:09

## 2021-03-20 RX ADMIN — HYDROMORPHONE HYDROCHLORIDE 0.5 MG: 1 INJECTION, SOLUTION INTRAMUSCULAR; INTRAVENOUS; SUBCUTANEOUS at 21:30

## 2021-03-20 RX ADMIN — PAROXETINE HYDROCHLORIDE 10 MG: 20 TABLET, FILM COATED ORAL at 08:33

## 2021-03-20 RX ADMIN — PROMETHAZINE HYDROCHLORIDE 25 MG: 25 INJECTION INTRAMUSCULAR; INTRAVENOUS at 19:51

## 2021-03-20 RX ADMIN — METOCLOPRAMIDE 5 MG: 5 TABLET ORAL at 16:17

## 2021-03-20 RX ADMIN — HYDROMORPHONE HYDROCHLORIDE 0.5 MG: 1 INJECTION, SOLUTION INTRAMUSCULAR; INTRAVENOUS; SUBCUTANEOUS at 11:07

## 2021-03-20 ASSESSMENT — PAIN DESCRIPTION - ONSET: ONSET: ON-GOING

## 2021-03-20 ASSESSMENT — PAIN DESCRIPTION - LOCATION
LOCATION: ABDOMEN
LOCATION: ABDOMEN

## 2021-03-20 ASSESSMENT — PAIN SCALES - GENERAL
PAINLEVEL_OUTOF10: 6
PAINLEVEL_OUTOF10: 5
PAINLEVEL_OUTOF10: 9
PAINLEVEL_OUTOF10: 5
PAINLEVEL_OUTOF10: 10
PAINLEVEL_OUTOF10: 7

## 2021-03-20 ASSESSMENT — PAIN DESCRIPTION - FREQUENCY: FREQUENCY: CONTINUOUS

## 2021-03-20 ASSESSMENT — PAIN DESCRIPTION - ORIENTATION
ORIENTATION: MID;LOWER
ORIENTATION: INNER

## 2021-03-20 ASSESSMENT — PAIN DESCRIPTION - PROGRESSION: CLINICAL_PROGRESSION: RAPIDLY WORSENING

## 2021-03-20 ASSESSMENT — PAIN DESCRIPTION - DESCRIPTORS: DESCRIPTORS: ACHING;DISCOMFORT;PENETRATING

## 2021-03-20 NOTE — PROGRESS NOTES
General Surgery Progress Note  Jany Mitchell MD, MS    Patient's Name/Date of Birth: Deondre Flores / 1970    Date: March 20, 2021     Surgeon: Nneka Steward MD    Chief Complaint: sbo    Patient Active Problem List   Diagnosis    GERD (gastroesophageal reflux disease)    Osteoarthritis cervical spine    Osteoarthritis of lumbar spine    [address]bid [address] ([facility]    Hypokalemia    Venous insufficiency    SBO (small bowel obstruction) ([facility]    Incarcerated hernia    Intractable abdominal pain       Subjective: Seems improved ambulating NG clamped tolerating clears no bowel movement    Objective:  BP (!) 143/90   Pulse 76   Temp 98.9 °F (37.2 °C) (Oral)   Resp 20   Ht 5' 2\" (1.575 m)   Wt 220 lb (99.8 kg)   SpO2 96%   BMI 40.24 kg/m²   Labs:  Recent Labs     03/18/21  1708 03/18/21  1708 03/19/21  0400 03/19/21  0400 03/20/21  0041 03/20/21  0420 03/20/21  1053   WBC 5.7  --  5.2  --   --  4.6  --    HGB 15.3   < > 14.2   < > 14.6 14.0 14.5   HCT 45.4   < > 44.9   < > 45.3 43.3 43.6    < > = values in this interval not displayed. Lab Results   Component Value Date    CREATININE 0.8 03/20/2021    BUN 7 03/20/2021     03/20/2021    K 4.2 03/20/2021     03/20/2021    CO2 26 03/20/2021     Recent Labs     03/18/21  1708 03/19/21  0400   LIPASE 23 23         General appearance:  NAD  Head: NCAT, PERRLA, EOMI, red conjunctiva  Neck: supple, no masses  Lungs: CTAB, equal chest rise bilateral  Heart: Reg rate  Abdomen: soft, nondistended, tender mild midline  Skin; no lesions  Gu: no cva tenderness  Extremities: extremities normal, atraumatic, no cyanosis or edema      Assessment/Plan:  Deondre Flores is a 48 y.o. female current small bowel obstruction secondary to incarcerated large incisional hernia    Due to recurrent hospitalizations for small bowel obstruction she will need surgical intervention.   Plan surgery first part of the week pending availability in OR time as will require significant 3 to 4 hours to complete component separation for hernia repair  Continue clear liquid diet remove NG tube  Continue ambulation monitor pain  Bowel regimen    Physician Signature: Electronically signed by Dr. Marika Rees  [phone] (p)  3/20/2021  1:27 PM

## 2021-03-20 NOTE — PROGRESS NOTES
Internal Medicine Progress Note    [facility]    Lance Betancourt. Mamie Mcarthur., F.A.LILLIEO.I. Jackalyn Runner, D.O., SHILPI Francisco D.O. Mariana Saini, MSN, APRN, NP-C  Nay Juarez. Shayne Toledo, MSN, APRN-CNP     Primary Care Physician: Zoltan Narayanan DO   Admitting Physician:  Anette Sharif MD  Admission date and time: 3/18/2021  4:41 PM    Room:  [address] [place] [address]  Admitting diagnosis: SBO (small bowel obstruction) [facility]) [K56.609]    Patient Name: Kathryn Blake  MRN: [id number]    Date of Service: 3/20/2021     Subjective:  Adelina Trinidad is a 48 y.o. female who was seen and examined today,3/20/2021, at the bedside. Patient still had NG tube in. Repeat radiographic finding does show progression of contrast into the colon. Still complaining of difficulty taking full amount in. No family present during my examination. Review of System:   Constitutional:   Denies fever or chills, weight loss or gain, fatigue or malaise. HEENT:   Denies ear pain, sore throat, sinus or eye problems. Slight irritation from NG tube  Cardiovascular:   Denies any chest pain, irregular heartbeats, or palpitations. Respiratory:   Denies shortness of breath, coughing, sputum production, hemoptysis, or wheezing. Gastrointestinal:   Improved nausea. Less distention  Genitourinary:    Denies any urgency, frequency, hematuria. Voiding  without difficulty. Extremities:   Denies lower extremity swelling, edema or cyanosis. Neurology:    Denies any headache or focal neurological deficits, Denies generalized weakness or memory difficulty. Psch:   Denies being anxious or depressed. Musculoskeletal:    Denies  myalgias, joint complaints or back pain. Integumentary:   Denies any rashes, ulcers, or excoriations. Denies bruising. Hematologic/Lymphatic:  Denies bruising or bleeding.     Physical Exam:  I/O this shift:  In: 290 [P.O.:280; I.V.:10]  Out: -     Intake/Output Summary (Last 24 hours) at 3/20/2021 1253  Last data filed at 3/20/2021 2307  Gross per 24 hour   Intake 1110 ml   Output 1950 ml   Net -840 ml   I/O last 3 completed shifts: In: 36 [P.O.:820]  Out: 1950 [Urine:1550; Emesis/NG output:400]  Patient Vitals for the past 96 hrs (Last 3 readings):   Weight   03/18/21 2215 220 lb (99.8 kg)   03/18/21 1636 220 lb (99.8 kg)     Vital Signs:   Blood pressure (!) 143/90, pulse 76, temperature 98.9 °F (37.2 °C), temperature source Oral, resp. rate 20, height 5' 2\" (1.575 m), weight 220 lb (99.8 kg), SpO2 96 %. General appearance:  Alert, responsive, oriented to person, place, and time. Well preserved, alert, no distress. Head:  Normocephalic. No masses, lesions or tenderness. Eyes:  PERRLA. EOMI. Sclera clear. Buccal mucosa moist.  ENT:  Ears normal. Mucosa normal.  NG tube in place  Neck:    Supple. Trachea midline. No thyromegaly. No JVD. No bruits. Heart:    Rhythm regular. Rate controlled. No murmurs. Lungs:    Symmetrical. Clear to auscultation bilaterally. No wheezes. No rhonchi. No rales. Abdomen:   Diminished abdominal bowel sounds  Extremities:    Peripheral pulses present. No peripheral edema. No ulcers. No cyanosis. No clubbing. Neurologic:    Alert x 3. No focal deficit. Cranial nerves grossly intact. No focal weakness. Psych:   Behavior is normal. Mood appears normal. Speech is not rapid and/or pressured. Musculoskeletal:   Spine ROM normal. Muscular strength intact. Gait not assessed. Integumentary:  No rashes  Skin normal color and texture.   Genitalia/Breast:  Deferred    Medication:  Scheduled Meds:   brexpiprazole  0.5 mg Oral Daily    nicotine  1 patch Transdermal Daily    levothyroxine  50 mcg Oral Daily    hydrOXYzine  50 mg Oral Nightly    PARoxetine  10 mg Oral QAM    sodium chloride flush  10 mL Intravenous 2 times per day    enoxaparin  40 mg Subcutaneous Daily    pantoprazole  40 mg Intravenous BID    bumetanide  1 mg Oral BID     Continuous Infusions:   sodium chloride 100 mL/hr at 03/20/21 0034       Objective Data:  CBC with Differential:    Lab Results   Component Value Date    WBC 4.6 03/20/2021    RBC 4.24 03/20/2021    HGB 14.5 03/20/2021    HCT 43.6 03/20/2021     03/20/2021    .1 03/20/2021    MCH 33.0 03/20/2021    MCHC 32.3 03/20/2021    RDW 15.9 03/20/2021    SEGSPCT 82 09/14/2011    LYMPHOPCT 22.3 03/20/2021    MONOPCT 8.5 03/20/2021    BASOPCT 0.4 03/20/2021    MONOSABS 0.39 03/20/2021    LYMPHSABS 1.03 03/20/2021    EOSABS 0.13 03/20/2021    BASOSABS 0.02 03/20/2021     CMP:    Lab Results   Component Value Date     03/20/2021    K 4.2 03/20/2021    K 4.7 03/19/2021     03/20/2021    CO2 26 03/20/2021    BUN 7 03/20/2021    CREATININE 0.8 03/20/2021    GFRAA >60 03/20/2021    LABGLOM >60 03/20/2021    GLUCOSE 99 03/20/2021    GLUCOSE 89 09/14/2011    PROT 6.7 03/20/2021    LABALBU 4.0 03/20/2021    CALCIUM 8.4 03/20/2021    BILITOT 0.7 03/20/2021    ALKPHOS 85 03/20/2021    AST 96 03/20/2021     03/20/2021       Wound Documentation:   Incision 08/25/15 Abdomen Mid (Active)   Number of days: 2032       Assessment:    · Small bowel obstruction with nonreducible ventral abdominal hernia  · Transaminitis  · Possible GI bleed   · Left cubital and left carpal tunnel syndrome  · COPD  · Mild pulmonary hypertension  · GERD  · Obesity  · History of tobacco abuse; recently quit  · Daily alcohol consumption  · Elevated TSH      Plan:     · Patient started on Synthroid for elevated TSH  · NG tube  as tolerated with diet being monitor  · Increase activity  · Consider trial of Reglan  · DVT prophylaxis      More than 50% of my time was spent at the bedside counseling/coordinating care with the patient and/or family with face to face contact. This time was spent reviewing notes and laboratory data as well as instructing and counseling the patient.  Time I spent with the family or surrogate(s) is included only if the

## 2021-03-21 LAB
ALBUMIN SERPL-MCNC: 3.9 G/DL (ref 3.5–5.2)
ALP BLD-CCNC: 80 U/L (ref 35–104)
ALT SERPL-CCNC: 84 U/L (ref 0–32)
ANION GAP SERPL CALCULATED.3IONS-SCNC: 9 MMOL/L (ref 7–16)
ANISOCYTOSIS: ABNORMAL
AST SERPL-CCNC: 70 U/L (ref 0–31)
BASOPHILS ABSOLUTE: 0 E9/L (ref 0–0.2)
BASOPHILS RELATIVE PERCENT: 0.2 % (ref 0–2)
BILIRUB SERPL-MCNC: 0.4 MG/DL (ref 0–1.2)
BILIRUBIN DIRECT: 0.2 MG/DL (ref 0–0.3)
BILIRUBIN, INDIRECT: 0.2 MG/DL (ref 0–1)
BUN BLDV-MCNC: 8 MG/DL (ref 6–20)
CALCIUM SERPL-MCNC: 8.2 MG/DL (ref 8.6–10.2)
CHLORIDE BLD-SCNC: 104 MMOL/L (ref 98–107)
CO2: 29 MMOL/L (ref 22–29)
CREAT SERPL-MCNC: 0.9 MG/DL (ref 0.5–1)
EOSINOPHILS ABSOLUTE: 0.04 E9/L (ref 0.05–0.5)
EOSINOPHILS RELATIVE PERCENT: 0.9 % (ref 0–6)
GFR AFRICAN AMERICAN: >60
GFR NON-AFRICAN AMERICAN: >60 ML/MIN/1.73
GLUCOSE BLD-MCNC: 99 MG/DL (ref 74–99)
HCT VFR BLD CALC: 44 % (ref 34–48)
HCT VFR BLD CALC: 45.5 % (ref 34–48)
HCT VFR BLD CALC: 46.5 % (ref 34–48)
HCT VFR BLD CALC: 46.7 % (ref 34–48)
HEMOGLOBIN: 14 G/DL (ref 11.5–15.5)
HEMOGLOBIN: 14.1 G/DL (ref 11.5–15.5)
HEMOGLOBIN: 14.4 G/DL (ref 11.5–15.5)
HEMOGLOBIN: 15 G/DL (ref 11.5–15.5)
LYMPHOCYTES ABSOLUTE: 1.18 E9/L (ref 1.5–4)
LYMPHOCYTES RELATIVE PERCENT: 24.6 % (ref 20–42)
MCH RBC QN AUTO: 32.7 PG (ref 26–35)
MCHC RBC AUTO-ENTMCNC: 31 % (ref 32–34.5)
MCV RBC AUTO: 105.4 FL (ref 80–99.9)
MONOCYTES ABSOLUTE: 0.28 E9/L (ref 0.1–0.95)
MONOCYTES RELATIVE PERCENT: 6.1 % (ref 2–12)
NEUTROPHILS ABSOLUTE: 3.2 E9/L (ref 1.8–7.3)
NEUTROPHILS RELATIVE PERCENT: 68.4 % (ref 43–80)
OVALOCYTES: ABNORMAL
PDW BLD-RTO: 15.8 FL (ref 11.5–15)
PLATELET # BLD: 144 E9/L (ref 130–450)
PMV BLD AUTO: 9.8 FL (ref 7–12)
POIKILOCYTES: ABNORMAL
POTASSIUM SERPL-SCNC: 4.6 MMOL/L (ref 3.5–5)
RBC # BLD: 4.41 E12/L (ref 3.5–5.5)
SODIUM BLD-SCNC: 142 MMOL/L (ref 132–146)
TEAR DROP CELLS: ABNORMAL
TOTAL PROTEIN: 6.4 G/DL (ref 6.4–8.3)
WBC # BLD: 4.7 E9/L (ref 4.5–11.5)

## 2021-03-21 PROCEDURE — 80053 COMPREHEN METABOLIC PANEL: CPT

## 2021-03-21 PROCEDURE — 6360000002 HC RX W HCPCS: Performed by: INTERNAL MEDICINE

## 2021-03-21 PROCEDURE — 85014 HEMATOCRIT: CPT

## 2021-03-21 PROCEDURE — 36415 COLL VENOUS BLD VENIPUNCTURE: CPT

## 2021-03-21 PROCEDURE — 85018 HEMOGLOBIN: CPT

## 2021-03-21 PROCEDURE — 1200000000 HC SEMI PRIVATE

## 2021-03-21 PROCEDURE — 99232 SBSQ HOSP IP/OBS MODERATE 35: CPT | Performed by: SURGERY

## 2021-03-21 PROCEDURE — C9113 INJ PANTOPRAZOLE SODIUM, VIA: HCPCS | Performed by: INTERNAL MEDICINE

## 2021-03-21 PROCEDURE — 82248 BILIRUBIN DIRECT: CPT

## 2021-03-21 PROCEDURE — 6370000000 HC RX 637 (ALT 250 FOR IP): Performed by: SURGERY

## 2021-03-21 PROCEDURE — 2580000003 HC RX 258: Performed by: SURGERY

## 2021-03-21 PROCEDURE — 6360000002 HC RX W HCPCS: Performed by: SURGERY

## 2021-03-21 PROCEDURE — 6370000000 HC RX 637 (ALT 250 FOR IP): Performed by: INTERNAL MEDICINE

## 2021-03-21 PROCEDURE — 2580000003 HC RX 258: Performed by: INTERNAL MEDICINE

## 2021-03-21 PROCEDURE — 85025 COMPLETE CBC W/AUTO DIFF WBC: CPT

## 2021-03-21 RX ORDER — CEFAZOLIN SODIUM 2 G/50ML
2000 SOLUTION INTRAVENOUS
Status: CANCELLED | OUTPATIENT
Start: 2021-03-21 | End: 2021-03-21

## 2021-03-21 RX ORDER — SODIUM CHLORIDE 0.9 % (FLUSH) 0.9 %
10 SYRINGE (ML) INJECTION PRN
Status: CANCELLED | OUTPATIENT
Start: 2021-03-21

## 2021-03-21 RX ORDER — BUMETANIDE 1 MG/1
1 TABLET ORAL DAILY
Status: DISCONTINUED | OUTPATIENT
Start: 2021-03-21 | End: 2021-03-29 | Stop reason: HOSPADM

## 2021-03-21 RX ORDER — SODIUM CHLORIDE 9 MG/ML
INJECTION, SOLUTION INTRAVENOUS CONTINUOUS
Status: CANCELLED | OUTPATIENT
Start: 2021-03-21

## 2021-03-21 RX ORDER — SODIUM CHLORIDE 0.9 % (FLUSH) 0.9 %
10 SYRINGE (ML) INJECTION EVERY 12 HOURS SCHEDULED
Status: CANCELLED | OUTPATIENT
Start: 2021-03-21

## 2021-03-21 RX ADMIN — HYDROMORPHONE HYDROCHLORIDE 0.5 MG: 1 INJECTION, SOLUTION INTRAMUSCULAR; INTRAVENOUS; SUBCUTANEOUS at 15:27

## 2021-03-21 RX ADMIN — HYDROMORPHONE HYDROCHLORIDE 0.5 MG: 1 INJECTION, SOLUTION INTRAMUSCULAR; INTRAVENOUS; SUBCUTANEOUS at 08:16

## 2021-03-21 RX ADMIN — HYDROXYZINE HYDROCHLORIDE 50 MG: 25 TABLET, FILM COATED ORAL at 20:36

## 2021-03-21 RX ADMIN — PROMETHAZINE HYDROCHLORIDE 25 MG: 25 INJECTION INTRAMUSCULAR; INTRAVENOUS at 04:44

## 2021-03-21 RX ADMIN — SODIUM CHLORIDE, PRESERVATIVE FREE 10 ML: 5 INJECTION INTRAVENOUS at 11:40

## 2021-03-21 RX ADMIN — SODIUM CHLORIDE, PRESERVATIVE FREE 10 ML: 5 INJECTION INTRAVENOUS at 08:16

## 2021-03-21 RX ADMIN — SODIUM CHLORIDE: 9 INJECTION, SOLUTION INTRAVENOUS at 19:32

## 2021-03-21 RX ADMIN — METOCLOPRAMIDE 5 MG: 5 TABLET ORAL at 16:44

## 2021-03-21 RX ADMIN — PANTOPRAZOLE SODIUM 40 MG: 40 INJECTION, POWDER, FOR SOLUTION INTRAVENOUS at 20:36

## 2021-03-21 RX ADMIN — ONDANSETRON 4 MG: 2 INJECTION INTRAMUSCULAR; INTRAVENOUS at 11:40

## 2021-03-21 RX ADMIN — ENOXAPARIN SODIUM 40 MG: 40 INJECTION SUBCUTANEOUS at 08:23

## 2021-03-21 RX ADMIN — METOCLOPRAMIDE 5 MG: 5 TABLET ORAL at 11:39

## 2021-03-21 RX ADMIN — BREXPIPRAZOLE 0.5 MG: 2 TABLET ORAL at 08:27

## 2021-03-21 RX ADMIN — SODIUM CHLORIDE, PRESERVATIVE FREE 10 ML: 5 INJECTION INTRAVENOUS at 20:36

## 2021-03-21 RX ADMIN — HYDROMORPHONE HYDROCHLORIDE 0.5 MG: 1 INJECTION, SOLUTION INTRAMUSCULAR; INTRAVENOUS; SUBCUTANEOUS at 22:27

## 2021-03-21 RX ADMIN — PANTOPRAZOLE SODIUM 40 MG: 40 INJECTION, POWDER, FOR SOLUTION INTRAVENOUS at 08:23

## 2021-03-21 RX ADMIN — HYDROMORPHONE HYDROCHLORIDE 0.5 MG: 1 INJECTION, SOLUTION INTRAMUSCULAR; INTRAVENOUS; SUBCUTANEOUS at 11:39

## 2021-03-21 RX ADMIN — METOCLOPRAMIDE 5 MG: 5 TABLET ORAL at 20:36

## 2021-03-21 RX ADMIN — BUMETANIDE 1 MG: 1 TABLET ORAL at 08:26

## 2021-03-21 RX ADMIN — SODIUM CHLORIDE, PRESERVATIVE FREE 10 ML: 5 INJECTION INTRAVENOUS at 15:27

## 2021-03-21 RX ADMIN — HYDROMORPHONE HYDROCHLORIDE 0.5 MG: 1 INJECTION, SOLUTION INTRAMUSCULAR; INTRAVENOUS; SUBCUTANEOUS at 01:07

## 2021-03-21 RX ADMIN — PAROXETINE HYDROCHLORIDE 10 MG: 20 TABLET, FILM COATED ORAL at 08:26

## 2021-03-21 RX ADMIN — LEVOTHYROXINE SODIUM 50 MCG: 50 TABLET ORAL at 06:13

## 2021-03-21 RX ADMIN — SODIUM CHLORIDE: 9 INJECTION, SOLUTION INTRAVENOUS at 04:43

## 2021-03-21 RX ADMIN — HYDROMORPHONE HYDROCHLORIDE 0.5 MG: 1 INJECTION, SOLUTION INTRAMUSCULAR; INTRAVENOUS; SUBCUTANEOUS at 04:44

## 2021-03-21 RX ADMIN — HYDROMORPHONE HYDROCHLORIDE 0.5 MG: 1 INJECTION, SOLUTION INTRAMUSCULAR; INTRAVENOUS; SUBCUTANEOUS at 19:32

## 2021-03-21 RX ADMIN — METOCLOPRAMIDE 5 MG: 5 TABLET ORAL at 06:13

## 2021-03-21 ASSESSMENT — PAIN DESCRIPTION - LOCATION
LOCATION: ABDOMEN

## 2021-03-21 ASSESSMENT — PAIN DESCRIPTION - FREQUENCY
FREQUENCY: CONTINUOUS

## 2021-03-21 ASSESSMENT — PAIN SCALES - GENERAL
PAINLEVEL_OUTOF10: 9
PAINLEVEL_OUTOF10: 7
PAINLEVEL_OUTOF10: 4
PAINLEVEL_OUTOF10: 8
PAINLEVEL_OUTOF10: 4
PAINLEVEL_OUTOF10: 8
PAINLEVEL_OUTOF10: 6
PAINLEVEL_OUTOF10: 5
PAINLEVEL_OUTOF10: 8
PAINLEVEL_OUTOF10: 6
PAINLEVEL_OUTOF10: 9

## 2021-03-21 ASSESSMENT — PAIN DESCRIPTION - PROGRESSION: CLINICAL_PROGRESSION: NOT CHANGED

## 2021-03-21 ASSESSMENT — PAIN DESCRIPTION - ONSET
ONSET: ON-GOING
ONSET: ON-GOING

## 2021-03-21 ASSESSMENT — PAIN DESCRIPTION - ORIENTATION
ORIENTATION: MID;LOWER
ORIENTATION: MID;LOWER

## 2021-03-21 ASSESSMENT — PAIN DESCRIPTION - PAIN TYPE
TYPE: ACUTE PAIN
TYPE: ACUTE PAIN

## 2021-03-21 ASSESSMENT — PAIN DESCRIPTION - DESCRIPTORS
DESCRIPTORS: ACHING;DISCOMFORT;SORE
DESCRIPTORS: ACHING;CONSTANT;DISCOMFORT

## 2021-03-21 ASSESSMENT — PAIN - FUNCTIONAL ASSESSMENT: PAIN_FUNCTIONAL_ASSESSMENT: ACTIVITIES ARE NOT PREVENTED

## 2021-03-21 NOTE — PLAN OF CARE
Problem: Pain:  Goal: Pain level will decrease  Outcome: Met This Shift     Problem: Pain:  Goal: Control of acute pain  3/21/2021 0942 by Serjio Grigsby RN  Outcome: Met This Shift     Problem: Pain:  Goal: Control of chronic pain  3/21/2021 0942 by Serjio Grigsby RN  Outcome: Met This Shift

## 2021-03-21 NOTE — PLAN OF CARE
Problem: Pain:  Goal: Pain level will decrease  Description: Pain level will decrease  3/20/2021 1227 by Serjio Grigsby RN  Outcome: Met This Shift  Goal: Control of acute pain  Description: Control of acute pain  3/20/2021 2127 by Carmencita Saenz RN  Outcome: Met This Shift  3/20/2021 1227 by Serjio Grigsby RN  Outcome: Met This Shift  Goal: Control of chronic pain  Description: Control of chronic pain  3/20/2021 2127 by Carmencita Saenz RN  Outcome: Met This Shift  3/20/2021 1227 by Serjio Grigsby RN  Outcome: Met This Shift

## 2021-03-21 NOTE — PROGRESS NOTES
40 mg Intravenous BID    bumetanide  1 mg Oral BID     Continuous Infusions:   sodium chloride 100 mL/hr at 03/21/21 0443       Objective Data:  CBC with Differential:    Lab Results   Component Value Date    WBC 4.7 03/21/2021    RBC 4.41 03/21/2021    HGB 14.4 03/21/2021    HCT 46.5 03/21/2021     03/21/2021    .4 03/21/2021    MCH 32.7 03/21/2021    MCHC 31.0 03/21/2021    RDW 15.8 03/21/2021    SEGSPCT 82 09/14/2011    LYMPHOPCT 24.6 03/21/2021    MONOPCT 6.1 03/21/2021    BASOPCT 0.2 03/21/2021    MONOSABS 0.28 03/21/2021    LYMPHSABS 1.18 03/21/2021    EOSABS 0.04 03/21/2021    BASOSABS 0.00 03/21/2021     CMP:    Lab Results   Component Value Date     03/21/2021    K 4.6 03/21/2021    K 4.7 03/19/2021     03/21/2021    CO2 29 03/21/2021    BUN 8 03/21/2021    CREATININE 0.9 03/21/2021    GFRAA >60 03/21/2021    LABGLOM >60 03/21/2021    GLUCOSE 99 03/21/2021    GLUCOSE 89 09/14/2011    PROT 6.4 03/21/2021    LABALBU 3.9 03/21/2021    CALCIUM 8.2 03/21/2021    BILITOT 0.4 03/21/2021    ALKPHOS 80 03/21/2021    AST 70 03/21/2021    ALT 84 03/21/2021       Wound Documentation:   Incision 08/25/15 Abdomen Mid (Active)   Number of days: 2032       Assessment:    · Small bowel obstruction with nonreducible ventral abdominal hernia  · Transaminitis  · Possible GI bleed   · Left cubital and left carpal tunnel syndrome  · COPD  · Mild pulmonary hypertension  · GERD  · Obesity  · History of tobacco abuse; recently quit  · Daily alcohol consumption  · Elevated TSH      Plan:     · NG tube has removed  · Patient tolerating clear liquid diet  · IV Reglan being prescribed  · Resume on home medication  · Surgery is considering earlier surgical intervention  · We will modify IVs and diuretics      More than 50% of my time was spent at the bedside counseling/coordinating care with the patient and/or family with face to face contact.   This time was spent reviewing notes and laboratory data as well as instructing and counseling the patient. Time I spent with the family or surrogate(s) is included only if the patient was incapable of providing the necessary information or participating in medical decisions. I also discussed the differential diagnosis and all of the proposed management plans with the patient and individuals accompanying the patient. I am readily available for any further decision-making and intervention.        Alyssa Lopez DO, F.A.C.O.I.  3/21/2021  6:48 AM

## 2021-03-21 NOTE — PROGRESS NOTES
General Surgery Progress Note  Ayaz Villa MD, MS    Patient's Name/Date of Birth: Marina Álvarez / 1970    Date: March 21, 2021     Surgeon: Navneet Gilbert MD    Chief Complaint: sbo    Patient Active Problem List   Diagnosis    GERD (gastroesophageal reflux disease)    Osteoarthritis cervical spine    Osteoarthritis of lumbar spine    [facility] [address]    Hypokalemia    Venous insufficiency    SBO (small bowel obstruction) ([facility] [address]    Incarcerated hernia    Intractable abdominal pain       Subjective: Seems improved ambulating, tolerating clears still with pain    Objective:  /85   Pulse 78   Temp 98 °F (36.7 °C) (Oral)   Resp 18   Ht 5' 2\" (1.575 m)   Wt 220 lb (99.8 kg)   SpO2 96%   BMI 40.24 kg/m²   Labs:  Recent Labs     03/19/21  0400 03/19/21  0400 03/20/21  0420 03/20/21  0420 03/21/21  0059 03/21/21  0459 03/21/21  0808   WBC 5.2  --  4.6  --   --  4.7  --    HGB 14.2   < > 14.0   < > 14.0 14.4 14.1   HCT 44.9   < > 43.3   < > 44.0 46.5 45.5    < > = values in this interval not displayed. Lab Results   Component Value Date    CREATININE 0.9 03/21/2021    BUN 8 03/21/2021     03/21/2021    K 4.6 03/21/2021     03/21/2021    CO2 29 03/21/2021     Recent Labs     03/18/21  1708 03/19/21  0400   LIPASE 23 23         General appearance:  NAD  Head: NCAT, PERRLA, EOMI, red conjunctiva  Neck: supple, no masses  Lungs: CTAB, equal chest rise bilateral  Heart: Reg rate  Abdomen: soft, nondistended, tender mild midline  Skin; no lesions  Gu: no cva tenderness  Extremities: extremities normal, atraumatic, no cyanosis or edema      Assessment/Plan:  Marina Álvarez is a 48 y.o. female current small bowel obstruction secondary to incarcerated large incisional hernia    Due to recurrent hospitalizations for small bowel obstruction she will need surgical intervention.   Plan surgery first part of the week pending availability in OR time as will require

## 2021-03-22 LAB
ALBUMIN SERPL-MCNC: 3.7 G/DL (ref 3.5–5.2)
ALP BLD-CCNC: 78 U/L (ref 35–104)
ALT SERPL-CCNC: 75 U/L (ref 0–32)
ANION GAP SERPL CALCULATED.3IONS-SCNC: 11 MMOL/L (ref 7–16)
AST SERPL-CCNC: 64 U/L (ref 0–31)
BASOPHILS ABSOLUTE: 0.03 E9/L (ref 0–0.2)
BASOPHILS RELATIVE PERCENT: 0.7 % (ref 0–2)
BILIRUB SERPL-MCNC: 0.4 MG/DL (ref 0–1.2)
BILIRUBIN DIRECT: <0.2 MG/DL (ref 0–0.3)
BILIRUBIN, INDIRECT: ABNORMAL MG/DL (ref 0–1)
BUN BLDV-MCNC: 9 MG/DL (ref 6–20)
CALCIUM SERPL-MCNC: 8.6 MG/DL (ref 8.6–10.2)
CHLORIDE BLD-SCNC: 102 MMOL/L (ref 98–107)
CO2: 27 MMOL/L (ref 22–29)
CREAT SERPL-MCNC: 0.9 MG/DL (ref 0.5–1)
EOSINOPHILS ABSOLUTE: 0.08 E9/L (ref 0.05–0.5)
EOSINOPHILS RELATIVE PERCENT: 1.7 % (ref 0–6)
GFR AFRICAN AMERICAN: >60
GFR NON-AFRICAN AMERICAN: >60 ML/MIN/1.73
GLUCOSE BLD-MCNC: 112 MG/DL (ref 74–99)
HCT VFR BLD CALC: 43.9 % (ref 34–48)
HCT VFR BLD CALC: 44.1 % (ref 34–48)
HCT VFR BLD CALC: 46.5 % (ref 34–48)
HEMOGLOBIN: 14 G/DL (ref 11.5–15.5)
HEMOGLOBIN: 14.1 G/DL (ref 11.5–15.5)
HEMOGLOBIN: 14.7 G/DL (ref 11.5–15.5)
IMMATURE GRANULOCYTES #: 0.02 E9/L
IMMATURE GRANULOCYTES %: 0.4 % (ref 0–5)
LYMPHOCYTES ABSOLUTE: 1.07 E9/L (ref 1.5–4)
LYMPHOCYTES RELATIVE PERCENT: 23.3 % (ref 20–42)
MCH RBC QN AUTO: 33 PG (ref 26–35)
MCHC RBC AUTO-ENTMCNC: 31.7 % (ref 32–34.5)
MCV RBC AUTO: 104 FL (ref 80–99.9)
MONOCYTES ABSOLUTE: 0.31 E9/L (ref 0.1–0.95)
MONOCYTES RELATIVE PERCENT: 6.7 % (ref 2–12)
NEUTROPHILS ABSOLUTE: 3.09 E9/L (ref 1.8–7.3)
NEUTROPHILS RELATIVE PERCENT: 67.2 % (ref 43–80)
PDW BLD-RTO: 15.5 FL (ref 11.5–15)
PLATELET # BLD: 145 E9/L (ref 130–450)
PMV BLD AUTO: 10 FL (ref 7–12)
POTASSIUM SERPL-SCNC: 4.2 MMOL/L (ref 3.5–5)
RBC # BLD: 4.24 E12/L (ref 3.5–5.5)
SODIUM BLD-SCNC: 140 MMOL/L (ref 132–146)
TOTAL PROTEIN: 6.5 G/DL (ref 6.4–8.3)
WBC # BLD: 4.6 E9/L (ref 4.5–11.5)

## 2021-03-22 PROCEDURE — 2580000003 HC RX 258: Performed by: SURGERY

## 2021-03-22 PROCEDURE — 85018 HEMOGLOBIN: CPT

## 2021-03-22 PROCEDURE — 6360000002 HC RX W HCPCS: Performed by: INTERNAL MEDICINE

## 2021-03-22 PROCEDURE — 80053 COMPREHEN METABOLIC PANEL: CPT

## 2021-03-22 PROCEDURE — 6370000000 HC RX 637 (ALT 250 FOR IP): Performed by: INTERNAL MEDICINE

## 2021-03-22 PROCEDURE — 6370000000 HC RX 637 (ALT 250 FOR IP): Performed by: STUDENT IN AN ORGANIZED HEALTH CARE EDUCATION/TRAINING PROGRAM

## 2021-03-22 PROCEDURE — C9113 INJ PANTOPRAZOLE SODIUM, VIA: HCPCS | Performed by: INTERNAL MEDICINE

## 2021-03-22 PROCEDURE — 2580000003 HC RX 258: Performed by: INTERNAL MEDICINE

## 2021-03-22 PROCEDURE — 6360000002 HC RX W HCPCS: Performed by: SURGERY

## 2021-03-22 PROCEDURE — 82248 BILIRUBIN DIRECT: CPT

## 2021-03-22 PROCEDURE — 85014 HEMATOCRIT: CPT

## 2021-03-22 PROCEDURE — 6370000000 HC RX 637 (ALT 250 FOR IP): Performed by: SURGERY

## 2021-03-22 PROCEDURE — 36415 COLL VENOUS BLD VENIPUNCTURE: CPT

## 2021-03-22 PROCEDURE — 1200000000 HC SEMI PRIVATE

## 2021-03-22 PROCEDURE — 85025 COMPLETE CBC W/AUTO DIFF WBC: CPT

## 2021-03-22 RX ORDER — OXYCODONE HYDROCHLORIDE 5 MG/1
5 TABLET ORAL EVERY 4 HOURS PRN
Status: DISCONTINUED | OUTPATIENT
Start: 2021-03-22 | End: 2021-03-26

## 2021-03-22 RX ADMIN — HYDROMORPHONE HYDROCHLORIDE 0.5 MG: 1 INJECTION, SOLUTION INTRAMUSCULAR; INTRAVENOUS; SUBCUTANEOUS at 01:39

## 2021-03-22 RX ADMIN — HYDROMORPHONE HYDROCHLORIDE 0.5 MG: 1 INJECTION, SOLUTION INTRAMUSCULAR; INTRAVENOUS; SUBCUTANEOUS at 16:06

## 2021-03-22 RX ADMIN — OXYCODONE 5 MG: 5 TABLET ORAL at 17:45

## 2021-03-22 RX ADMIN — HYDROMORPHONE HYDROCHLORIDE 0.5 MG: 1 INJECTION, SOLUTION INTRAMUSCULAR; INTRAVENOUS; SUBCUTANEOUS at 19:31

## 2021-03-22 RX ADMIN — HYDROMORPHONE HYDROCHLORIDE 0.5 MG: 1 INJECTION, SOLUTION INTRAMUSCULAR; INTRAVENOUS; SUBCUTANEOUS at 11:44

## 2021-03-22 RX ADMIN — METOCLOPRAMIDE 5 MG: 5 TABLET ORAL at 05:56

## 2021-03-22 RX ADMIN — BREXPIPRAZOLE 0.5 MG: 2 TABLET ORAL at 12:45

## 2021-03-22 RX ADMIN — PAROXETINE HYDROCHLORIDE 10 MG: 20 TABLET, FILM COATED ORAL at 08:00

## 2021-03-22 RX ADMIN — METOCLOPRAMIDE 5 MG: 5 TABLET ORAL at 16:06

## 2021-03-22 RX ADMIN — METOCLOPRAMIDE 5 MG: 5 TABLET ORAL at 20:28

## 2021-03-22 RX ADMIN — ONDANSETRON 4 MG: 4 TABLET, ORALLY DISINTEGRATING ORAL at 11:44

## 2021-03-22 RX ADMIN — PANTOPRAZOLE SODIUM 40 MG: 40 INJECTION, POWDER, FOR SOLUTION INTRAVENOUS at 20:28

## 2021-03-22 RX ADMIN — SODIUM CHLORIDE, PRESERVATIVE FREE 10 ML: 5 INJECTION INTRAVENOUS at 20:29

## 2021-03-22 RX ADMIN — HYDROMORPHONE HYDROCHLORIDE 0.5 MG: 1 INJECTION, SOLUTION INTRAMUSCULAR; INTRAVENOUS; SUBCUTANEOUS at 22:55

## 2021-03-22 RX ADMIN — PANTOPRAZOLE SODIUM 40 MG: 40 INJECTION, POWDER, FOR SOLUTION INTRAVENOUS at 08:00

## 2021-03-22 RX ADMIN — PROMETHAZINE HYDROCHLORIDE 25 MG: 25 INJECTION INTRAMUSCULAR; INTRAVENOUS at 19:31

## 2021-03-22 RX ADMIN — ENOXAPARIN SODIUM 40 MG: 40 INJECTION SUBCUTANEOUS at 07:56

## 2021-03-22 RX ADMIN — OXYCODONE 5 MG: 5 TABLET ORAL at 10:11

## 2021-03-22 RX ADMIN — SODIUM CHLORIDE, PRESERVATIVE FREE 10 ML: 5 INJECTION INTRAVENOUS at 08:01

## 2021-03-22 RX ADMIN — METOCLOPRAMIDE 5 MG: 5 TABLET ORAL at 11:44

## 2021-03-22 RX ADMIN — HYDROXYZINE HYDROCHLORIDE 50 MG: 25 TABLET, FILM COATED ORAL at 20:28

## 2021-03-22 RX ADMIN — BUMETANIDE 1 MG: 1 TABLET ORAL at 07:57

## 2021-03-22 RX ADMIN — ONDANSETRON 4 MG: 2 INJECTION INTRAMUSCULAR; INTRAVENOUS at 01:43

## 2021-03-22 RX ADMIN — HYDROMORPHONE HYDROCHLORIDE 0.5 MG: 1 INJECTION, SOLUTION INTRAMUSCULAR; INTRAVENOUS; SUBCUTANEOUS at 04:46

## 2021-03-22 RX ADMIN — LEVOTHYROXINE SODIUM 50 MCG: 50 TABLET ORAL at 05:56

## 2021-03-22 RX ADMIN — SODIUM CHLORIDE: 9 INJECTION, SOLUTION INTRAVENOUS at 16:46

## 2021-03-22 ASSESSMENT — PAIN SCALES - GENERAL
PAINLEVEL_OUTOF10: 7
PAINLEVEL_OUTOF10: 8
PAINLEVEL_OUTOF10: 7
PAINLEVEL_OUTOF10: 8
PAINLEVEL_OUTOF10: 6
PAINLEVEL_OUTOF10: 7
PAINLEVEL_OUTOF10: 9
PAINLEVEL_OUTOF10: 7

## 2021-03-22 ASSESSMENT — PAIN DESCRIPTION - DESCRIPTORS: DESCRIPTORS: ACHING;CONSTANT;DISCOMFORT

## 2021-03-22 ASSESSMENT — PAIN DESCRIPTION - ORIENTATION: ORIENTATION: MID;LOWER

## 2021-03-22 ASSESSMENT — PAIN DESCRIPTION - FREQUENCY: FREQUENCY: CONTINUOUS

## 2021-03-22 ASSESSMENT — PAIN DESCRIPTION - LOCATION: LOCATION: ABDOMEN

## 2021-03-22 ASSESSMENT — PAIN DESCRIPTION - ONSET: ONSET: ON-GOING

## 2021-03-22 NOTE — PLAN OF CARE
Problem: Pain:  Goal: Pain level will decrease  3/22/2021 1048 by Mac Swain RN  Outcome: Met This Shift     Problem: Pain:  Goal: Control of acute pain  3/22/2021 1048 by Mac Swain RN  Outcome: Met This Shift     Problem: Pain:  Goal: Control of chronic pain  Outcome: Met This Shift     Problem: Bowel/Gastric:  Goal: Control of bowel function will improve  Outcome: Not Met This Shift     Problem:  Bowel/Gastric:  Goal: Ability to achieve a regular elimination pattern will improve  Outcome: Not Met This Shift     Problem: Nutritional:  Goal: Ability to follow a diet with enough fiber (20 to 30 grams) for normal bowel function will improve  Outcome: Not Met This Shift

## 2021-03-22 NOTE — CARE COORDINATION
CM note: patient will be having hernia repair surgery as she has experienced several admission for same complaints. CM will follow but no discharge needs have been identified.

## 2021-03-22 NOTE — PROGRESS NOTES
GENERAL SURGERY  DAILY PROGRESS NOTE  3/22/2021    Subjective:  Passing some flatus, pain still present, tolerating full liquids    Objective:  /70   Pulse 70   Temp 98.3 °F (36.8 °C) (Oral)   Resp 16   Ht 5' 2\" (1.575 m)   Wt 220 lb (99.8 kg)   SpO2 96%   BMI 40.24 kg/m²     GENERAL:  Laying in bed, awake, alert, cooperative, no apparent distress  HEAD: Normocephalic, atraumatic  EYES: No sclera icterus, pupils equal  LUNGS:  No increased work of breathing  CARDIOVASCULAR:  RR  ABDOMEN:  Soft, tender at ventral hernia- reducible, obese  EXTREMITIES: edema  SKIN: Warm and dry    Assessment/Plan:  48 y.o. female w/ chronic partial SBO 2/2 ventral hernia    Continue FLD  Plan for operative repair thi admission due to multiple previous admissions  Add caterina for pain control    Electronically signed by Raphael Stoner DO on 3/22/2021 at 6:33 AM      As above  No acute change  Surgery Wed due to scheduling for complex abdominal wall reconstruction    Medina Gonzales MD, MS  Minimally Invasive and Bariatric Surgery  [phone] (p)  3/22/2021  5:23 PM

## 2021-03-22 NOTE — PROGRESS NOTES
shift: In: 925 [P.O.:480; I.V.:445]  Out: 400 [Urine:400]    Intake/Output Summary (Last 24 hours) at 3/22/2021 0647  Last data filed at 3/22/2021 0557  Gross per 24 hour   Intake 3252 ml   Output 400 ml   Net 2852 ml   I/O last 3 completed shifts: In: 3429 [P.O.:1800; I.V.:1787]  Out: 600 [Urine:600]  Patient Vitals for the past 96 hrs (Last 3 readings):   Weight   03/18/21 2215 220 lb (99.8 kg)   03/18/21 1636 220 lb (99.8 kg)     Vital Signs:   Blood pressure 126/70, pulse 70, temperature 98.3 °F (36.8 °C), temperature source Oral, resp. rate 16, height 5' 2\" (1.575 m), weight 220 lb (99.8 kg), SpO2 96 %. General appearance:  Alert, responsive, oriented to person, place, and time. Well preserved, alert, no distress. Head:  Normocephalic. No masses, lesions or tenderness. Eyes:  PERRLA. EOMI. Sclera clear. Buccal mucosa moist.  ENT:  Ears normal. Mucosa normal.   Neck:    Supple. Trachea midline. No thyromegaly. No JVD. No bruits. Heart:    Rhythm regular. Rate controlled. No murmurs. Lungs:    Symmetrical. Clear to auscultation bilaterally. No wheezes. No rhonchi. No rales. Abdomen:   Soft. Mildly tender to palpation diffusely with voluntary guarding elicited. Hypoactive bowel sounds. Extremities:    Peripheral pulses present. No peripheral edema. No ulcers. No cyanosis. No clubbing. Neurologic:    Alert x 3. No focal deficit. Cranial nerves grossly intact. No focal weakness. Psych:   Behavior is normal. Mood appears normal. Speech is not rapid and/or pressured. Musculoskeletal:   Spine ROM normal. Muscular strength intact. Gait not assessed. Integumentary:  No rashes  Skin normal color and texture.   Genitalia/Breast:  Deferred    Medication:  Scheduled Meds:   bumetanide  1 mg Oral Daily    brexpiprazole  0.5 mg Oral Daily    metoclopramide  5 mg Oral 4x Daily AC & HS    nicotine  1 patch Transdermal Daily    levothyroxine  50 mcg Oral Daily    hydrOXYzine  50 mg Oral Nightly    family with face to face contact. This time was spent reviewing notes and laboratory data as well as instructing and counseling the patient. Time I spent with the family or surrogate(s) is included only if the patient was incapable of providing the necessary information or participating in medical decisions. I also discussed the differential diagnosis and all of the proposed management plans with the patient and individuals accompanying the patient. I am readily available for any further decision-making and intervention.        Kaylynn Campoverde DO, F.A.C.O.I.  3/22/2021  6:47 AM

## 2021-03-23 LAB
ALBUMIN SERPL-MCNC: 3.8 G/DL (ref 3.5–5.2)
ALP BLD-CCNC: 76 U/L (ref 35–104)
ALT SERPL-CCNC: 83 U/L (ref 0–32)
ANION GAP SERPL CALCULATED.3IONS-SCNC: 8 MMOL/L (ref 7–16)
AST SERPL-CCNC: 73 U/L (ref 0–31)
BASOPHILS ABSOLUTE: 0.02 E9/L (ref 0–0.2)
BASOPHILS RELATIVE PERCENT: 0.4 % (ref 0–2)
BILIRUB SERPL-MCNC: 0.4 MG/DL (ref 0–1.2)
BILIRUBIN DIRECT: <0.2 MG/DL (ref 0–0.3)
BILIRUBIN, INDIRECT: ABNORMAL MG/DL (ref 0–1)
BUN BLDV-MCNC: 10 MG/DL (ref 6–20)
CALCIUM SERPL-MCNC: 9.3 MG/DL (ref 8.6–10.2)
CHLORIDE BLD-SCNC: 99 MMOL/L (ref 98–107)
CO2: 31 MMOL/L (ref 22–29)
CREAT SERPL-MCNC: 1 MG/DL (ref 0.5–1)
EOSINOPHILS ABSOLUTE: 0.09 E9/L (ref 0.05–0.5)
EOSINOPHILS RELATIVE PERCENT: 1.9 % (ref 0–6)
GFR AFRICAN AMERICAN: >60
GFR NON-AFRICAN AMERICAN: 59 ML/MIN/1.73
GLUCOSE BLD-MCNC: 114 MG/DL (ref 74–99)
HAV IGM SER IA-ACNC: NORMAL
HCT VFR BLD CALC: 44.4 % (ref 34–48)
HCT VFR BLD CALC: 44.5 % (ref 34–48)
HCT VFR BLD CALC: 47.4 % (ref 34–48)
HCT VFR BLD CALC: 48.4 % (ref 34–48)
HEMOGLOBIN: 14.1 G/DL (ref 11.5–15.5)
HEMOGLOBIN: 14.4 G/DL (ref 11.5–15.5)
HEMOGLOBIN: 15.2 G/DL (ref 11.5–15.5)
HEMOGLOBIN: 15.8 G/DL (ref 11.5–15.5)
HEPATITIS B CORE IGM ANTIBODY: NORMAL
HEPATITIS B SURFACE ANTIGEN INTERPRETATION: NORMAL
HEPATITIS C ANTIBODY INTERPRETATION: NORMAL
IMMATURE GRANULOCYTES #: 0.03 E9/L
IMMATURE GRANULOCYTES %: 0.6 % (ref 0–5)
LYMPHOCYTES ABSOLUTE: 1.08 E9/L (ref 1.5–4)
LYMPHOCYTES RELATIVE PERCENT: 22.5 % (ref 20–42)
MCH RBC QN AUTO: 32.9 PG (ref 26–35)
MCHC RBC AUTO-ENTMCNC: 31.7 % (ref 32–34.5)
MCV RBC AUTO: 103.7 FL (ref 80–99.9)
MONOCYTES ABSOLUTE: 0.42 E9/L (ref 0.1–0.95)
MONOCYTES RELATIVE PERCENT: 8.7 % (ref 2–12)
NEUTROPHILS ABSOLUTE: 3.17 E9/L (ref 1.8–7.3)
NEUTROPHILS RELATIVE PERCENT: 65.9 % (ref 43–80)
PDW BLD-RTO: 15.4 FL (ref 11.5–15)
PLATELET # BLD: 142 E9/L (ref 130–450)
PMV BLD AUTO: 9.7 FL (ref 7–12)
POTASSIUM SERPL-SCNC: 4.6 MMOL/L (ref 3.5–5)
RBC # BLD: 4.29 E12/L (ref 3.5–5.5)
SODIUM BLD-SCNC: 138 MMOL/L (ref 132–146)
TOTAL PROTEIN: 6.3 G/DL (ref 6.4–8.3)
WBC # BLD: 4.8 E9/L (ref 4.5–11.5)

## 2021-03-23 PROCEDURE — 99231 SBSQ HOSP IP/OBS SF/LOW 25: CPT | Performed by: SURGERY

## 2021-03-23 PROCEDURE — 85025 COMPLETE CBC W/AUTO DIFF WBC: CPT

## 2021-03-23 PROCEDURE — 82248 BILIRUBIN DIRECT: CPT

## 2021-03-23 PROCEDURE — 85014 HEMATOCRIT: CPT

## 2021-03-23 PROCEDURE — 6360000002 HC RX W HCPCS: Performed by: SURGERY

## 2021-03-23 PROCEDURE — 1200000000 HC SEMI PRIVATE

## 2021-03-23 PROCEDURE — 6370000000 HC RX 637 (ALT 250 FOR IP): Performed by: STUDENT IN AN ORGANIZED HEALTH CARE EDUCATION/TRAINING PROGRAM

## 2021-03-23 PROCEDURE — 2580000003 HC RX 258: Performed by: INTERNAL MEDICINE

## 2021-03-23 PROCEDURE — 85018 HEMOGLOBIN: CPT

## 2021-03-23 PROCEDURE — 6360000002 HC RX W HCPCS: Performed by: INTERNAL MEDICINE

## 2021-03-23 PROCEDURE — 6370000000 HC RX 637 (ALT 250 FOR IP): Performed by: SURGERY

## 2021-03-23 PROCEDURE — 2580000003 HC RX 258: Performed by: SURGERY

## 2021-03-23 PROCEDURE — 80053 COMPREHEN METABOLIC PANEL: CPT

## 2021-03-23 PROCEDURE — 6370000000 HC RX 637 (ALT 250 FOR IP): Performed by: INTERNAL MEDICINE

## 2021-03-23 PROCEDURE — C9113 INJ PANTOPRAZOLE SODIUM, VIA: HCPCS | Performed by: INTERNAL MEDICINE

## 2021-03-23 PROCEDURE — 36415 COLL VENOUS BLD VENIPUNCTURE: CPT

## 2021-03-23 RX ADMIN — PANTOPRAZOLE SODIUM 40 MG: 40 INJECTION, POWDER, FOR SOLUTION INTRAVENOUS at 08:15

## 2021-03-23 RX ADMIN — ENOXAPARIN SODIUM 40 MG: 40 INJECTION SUBCUTANEOUS at 08:30

## 2021-03-23 RX ADMIN — HYDROMORPHONE HYDROCHLORIDE 0.5 MG: 1 INJECTION, SOLUTION INTRAMUSCULAR; INTRAVENOUS; SUBCUTANEOUS at 05:11

## 2021-03-23 RX ADMIN — OXYCODONE 5 MG: 5 TABLET ORAL at 15:38

## 2021-03-23 RX ADMIN — HYDROMORPHONE HYDROCHLORIDE 0.5 MG: 1 INJECTION, SOLUTION INTRAMUSCULAR; INTRAVENOUS; SUBCUTANEOUS at 11:11

## 2021-03-23 RX ADMIN — OXYCODONE 5 MG: 5 TABLET ORAL at 09:46

## 2021-03-23 RX ADMIN — HYDROXYZINE HYDROCHLORIDE 50 MG: 25 TABLET, FILM COATED ORAL at 20:56

## 2021-03-23 RX ADMIN — HYDROMORPHONE HYDROCHLORIDE 0.5 MG: 1 INJECTION, SOLUTION INTRAMUSCULAR; INTRAVENOUS; SUBCUTANEOUS at 17:41

## 2021-03-23 RX ADMIN — HYDROMORPHONE HYDROCHLORIDE 0.5 MG: 1 INJECTION, SOLUTION INTRAMUSCULAR; INTRAVENOUS; SUBCUTANEOUS at 23:58

## 2021-03-23 RX ADMIN — BREXPIPRAZOLE 0.5 MG: 2 TABLET ORAL at 08:16

## 2021-03-23 RX ADMIN — BUMETANIDE 1 MG: 1 TABLET ORAL at 08:15

## 2021-03-23 RX ADMIN — HYDROMORPHONE HYDROCHLORIDE 0.5 MG: 1 INJECTION, SOLUTION INTRAMUSCULAR; INTRAVENOUS; SUBCUTANEOUS at 08:15

## 2021-03-23 RX ADMIN — SODIUM CHLORIDE, PRESERVATIVE FREE 10 ML: 5 INJECTION INTRAVENOUS at 20:57

## 2021-03-23 RX ADMIN — PROMETHAZINE HYDROCHLORIDE 25 MG: 25 INJECTION INTRAMUSCULAR; INTRAVENOUS at 05:55

## 2021-03-23 RX ADMIN — PAROXETINE HYDROCHLORIDE 10 MG: 20 TABLET, FILM COATED ORAL at 08:15

## 2021-03-23 RX ADMIN — HYDROMORPHONE HYDROCHLORIDE 0.5 MG: 1 INJECTION, SOLUTION INTRAMUSCULAR; INTRAVENOUS; SUBCUTANEOUS at 02:06

## 2021-03-23 RX ADMIN — METOCLOPRAMIDE 5 MG: 5 TABLET ORAL at 20:56

## 2021-03-23 RX ADMIN — OXYCODONE 5 MG: 5 TABLET ORAL at 00:10

## 2021-03-23 RX ADMIN — METOCLOPRAMIDE 5 MG: 5 TABLET ORAL at 11:53

## 2021-03-23 RX ADMIN — PANTOPRAZOLE SODIUM 40 MG: 40 INJECTION, POWDER, FOR SOLUTION INTRAVENOUS at 20:56

## 2021-03-23 RX ADMIN — METOCLOPRAMIDE 5 MG: 5 TABLET ORAL at 05:18

## 2021-03-23 RX ADMIN — PROMETHAZINE HYDROCHLORIDE 25 MG: 25 INJECTION INTRAMUSCULAR; INTRAVENOUS at 22:37

## 2021-03-23 RX ADMIN — HYDROMORPHONE HYDROCHLORIDE 0.5 MG: 1 INJECTION, SOLUTION INTRAMUSCULAR; INTRAVENOUS; SUBCUTANEOUS at 14:24

## 2021-03-23 RX ADMIN — METOCLOPRAMIDE 5 MG: 5 TABLET ORAL at 16:55

## 2021-03-23 RX ADMIN — ONDANSETRON 4 MG: 2 INJECTION INTRAMUSCULAR; INTRAVENOUS at 08:15

## 2021-03-23 RX ADMIN — LEVOTHYROXINE SODIUM 50 MCG: 50 TABLET ORAL at 05:18

## 2021-03-23 RX ADMIN — SODIUM CHLORIDE: 9 INJECTION, SOLUTION INTRAVENOUS at 11:49

## 2021-03-23 RX ADMIN — HYDROMORPHONE HYDROCHLORIDE 0.5 MG: 1 INJECTION, SOLUTION INTRAMUSCULAR; INTRAVENOUS; SUBCUTANEOUS at 20:56

## 2021-03-23 RX ADMIN — OXYCODONE 5 MG: 5 TABLET ORAL at 22:37

## 2021-03-23 ASSESSMENT — PAIN SCALES - GENERAL
PAINLEVEL_OUTOF10: 8
PAINLEVEL_OUTOF10: 8
PAINLEVEL_OUTOF10: 7
PAINLEVEL_OUTOF10: 9

## 2021-03-23 NOTE — PROGRESS NOTES
General Surgery Progress Note  Edis Contreras MD, MS    Patient's Name/Date of Birth: Emery Belter / 1970    Date: March 23, 2021     Surgeon: Elodia Hodgkins, MD    Chief Complaint: sbo    Patient Active Problem List   Diagnosis    GERD (gastroesophageal reflux disease)    Osteoarthritis cervical spine    Osteoarthritis of lumbar spine    Morbid obesity ([facility] [address]    Hypokalemia    Venous insufficiency    SBO (small bowel obstruction) ([facility] [address]    Incarcerated hernia    Intractable abdominal pain       Subjective: improved, still with pain, had BM    Objective:  BP (!) 155/87   Pulse 69   Temp 98.3 °F (36.8 °C) (Oral)   Resp 16   Ht 5' 2\" (1.575 m)   Wt 220 lb (99.8 kg)   SpO2 95%   BMI 40.24 kg/m²   Labs:  Recent Labs     03/21/21  0459 03/21/21  0459 03/22/21  0432 03/22/21  0432 03/23/21  0106 03/23/21  0454 03/23/21  1027   WBC 4.7  --  4.6  --   --  4.8  --    HGB 14.4   < > 14.0   < > 14.4 14.1 15.8*   HCT 46.5   < > 44.1   < > 44.4 44.5 48.4*    < > = values in this interval not displayed. Lab Results   Component Value Date    CREATININE 1.0 03/23/2021    BUN 10 03/23/2021     03/23/2021    K 4.6 03/23/2021    CL 99 03/23/2021    CO2 31 (H) 03/23/2021     No results for input(s): LIPASE, AMYLASE in the last 72 hours.       General appearance:  NAD  Head: NCAT, PERRLA, EOMI, red conjunctiva  Neck: supple, no masses  Lungs: CTAB, equal chest rise bilateral  Heart: Reg rate  Abdomen: soft, nondistended, tender epigastric  Skin; no lesions  Gu: no cva tenderness  Extremities: extremities normal, atraumatic, no cyanosis or edema      Assessment/Plan:  Emery Beltre is a 48 y.o. female with recurrent SBO, now PSBO, ongoign pain due to incarcerated hernai    For Thursday morning for laparoscopic robotic assisted of incarcerated incisional hernia repair possible component separation  Given the timeline needed for the surgical procedure we have to delay until we have operative ability in OR time  We will advance her diet I discussed with her protein supplements and limited diet we will stop her IV fluids for now.     Physician Signature: Electronically signed by Dr. Dave Batres  [phone] (p)  3/23/2021  4:06 PM

## 2021-03-23 NOTE — PROGRESS NOTES
Internal Medicine Progress Note    [facility]    Ofe Found. Barbie Donaldson., MANAOManoloI. Patel Sainz D.O., MANAOLEANNA Kinney D.O. Reji Borges, MSN, APRN, NP-C  Angella Gauthier. Yahaira Thomson, MSN, APRN-CNP     Primary Care Physician: Aleja Lezama DO   Admitting Physician:  Heena Abraham MD  Admission date and time: 3/18/2021  4:41 PM    Room:  [address] [place] [address]  Admitting diagnosis: SBO (small bowel obstruction) [facility]) [K56.609]    Patient Name: Tammy aPtten  MRN: [id number]    Date of Service: 3/23/2021     Subjective:  Diandra Álvarez is a 48 y.o. female who was seen and examined today,3/23/2021, at the bedside. Diandra Álvarez continues to tolerate a full liquid diet with plans for surgical intervention tomorrow. She continues to complain of abdominal pain and nausea. Review of System:   Constitutional:   Denies fever or chills, weight loss or gain, fatigue or malaise. HEENT:   Denies ear pain, sore throat, sinus or eye problems. Cardiovascular:   Denies any chest pain, irregular heartbeats, or palpitations. Respiratory:   Denies shortness of breath, coughing, sputum production, hemoptysis, or wheezing. Gastrointestinal:   Ongoing abdominal discomfort. Ongoing nausea with the ingestion of clear liquids. Genitourinary:    Denies any urgency, frequency, hematuria. Voiding  without difficulty. Extremities:   Denies lower extremity swelling, edema or cyanosis. Neurology:    Denies any headache or focal neurological deficits, Denies generalized weakness or memory difficulty. Psch:   Denies being anxious or depressed. Musculoskeletal:    Denies  myalgias, joint complaints or back pain. Integumentary:   Denies any rashes, ulcers, or excoriations. Denies bruising. Hematologic/Lymphatic:  Denies bruising or bleeding. Physical Exam:  No intake/output data recorded.     Intake/Output Summary (Last 24 hours) at 3/23/2021 0640  Last data filed at 3/22/2021 2257  Gross per 24 with Differential:    Lab Results   Component Value Date    WBC 4.8 03/23/2021    RBC 4.29 03/23/2021    HGB 14.1 03/23/2021    HCT 44.5 03/23/2021     03/23/2021    .7 03/23/2021    MCH 32.9 03/23/2021    MCHC 31.7 03/23/2021    RDW 15.4 03/23/2021    SEGSPCT 82 09/14/2011    LYMPHOPCT 22.5 03/23/2021    MONOPCT 8.7 03/23/2021    BASOPCT 0.4 03/23/2021    MONOSABS 0.42 03/23/2021    LYMPHSABS 1.08 03/23/2021    EOSABS 0.09 03/23/2021    BASOSABS 0.02 03/23/2021     CMP:    Lab Results   Component Value Date     03/23/2021    K 4.6 03/23/2021    K 4.7 03/19/2021    CL 99 03/23/2021    CO2 31 03/23/2021    BUN 10 03/23/2021    CREATININE 1.0 03/23/2021    GFRAA >60 03/23/2021    LABGLOM 59 03/23/2021    GLUCOSE 114 03/23/2021    GLUCOSE 89 09/14/2011    PROT 6.3 03/23/2021    LABALBU 3.8 03/23/2021    CALCIUM 9.3 03/23/2021    BILITOT 0.4 03/23/2021    ALKPHOS 76 03/23/2021    AST 73 03/23/2021    ALT 83 03/23/2021       Assessment:  1. Small bowel obstruction with nonreducible ventral abdominal hernia  2. Left cubital and left carpal tunnel syndrome  3. Oxygen dependent COPD with chronic respiratory failure  4. Mild pulmonary hypertension  5. Gastroesophageal reflux disease  6. Obesity  7. History of tobacco abuse; recently quit  8. Daily alcohol consumption    Plan:   Plans are for surgical intervention tomorrow. She is cleared for surgery. Chronic comorbidities are otherwise well controlled. We will continue with pain control, IV fluid resuscitation, and antiemetic medications. More than 50% of my time was spent at the bedside counseling/coordinating care with the patient and/or family with face to face contact. This time was spent reviewing notes and laboratory data as well as instructing and counseling the patient. Time I spent with the family or surrogate(s) is included only if the patient was incapable of providing the necessary information or participating in medical decisions.  I also discussed the differential diagnosis and all of the proposed management plans with the patient and individuals accompanying the patient. I am readily available for any further decision-making and intervention.        Keven Rodriguez DO, F.A.C.O.I.  3/23/2021  6:40 AM

## 2021-03-23 NOTE — PLAN OF CARE
Problem: Pain:  Goal: Pain level will decrease  Description: Pain level will decrease  3/23/2021 0924 by Daxa Quesada RN  Outcome: Met This Shift     Problem: Pain:  Goal: Control of acute pain  Description: Control of acute pain  3/23/2021 0924 by Daxa Quesada RN  Outcome: Met This Shift     Problem: Pain:  Goal: Control of chronic pain  Description: Control of chronic pain  3/23/2021 0924 by Daxa Quesada RN  Outcome: Met This Shift     Problem: Nutritional:  Goal: Ability to follow a diet with enough fiber (20 to 30 grams) for normal bowel function will improve  Description: Ability to follow a diet with enough fiber (20 to 30 grams) for normal bowel function will improve  3/23/2021 0924 by Daxa Quesada RN  Outcome: Met This Shift     Problem:  Bowel/Gastric:  Goal: Control of bowel function will improve  Description: Control of bowel function will improve  3/23/2021 0924 by Daxa Quesada RN  Outcome: Not Met This Shift

## 2021-03-24 LAB
ALBUMIN SERPL-MCNC: 4 G/DL (ref 3.5–5.2)
ALP BLD-CCNC: 79 U/L (ref 35–104)
ALT SERPL-CCNC: 95 U/L (ref 0–32)
ANION GAP SERPL CALCULATED.3IONS-SCNC: 10 MMOL/L (ref 7–16)
AST SERPL-CCNC: 85 U/L (ref 0–31)
BASOPHILS ABSOLUTE: 0.04 E9/L (ref 0–0.2)
BASOPHILS RELATIVE PERCENT: 0.8 % (ref 0–2)
BILIRUB SERPL-MCNC: 0.5 MG/DL (ref 0–1.2)
BILIRUBIN DIRECT: <0.2 MG/DL (ref 0–0.3)
BILIRUBIN, INDIRECT: NORMAL MG/DL (ref 0–1)
BUN BLDV-MCNC: 14 MG/DL (ref 6–20)
CALCIUM SERPL-MCNC: 9.5 MG/DL (ref 8.6–10.2)
CHLORIDE BLD-SCNC: 99 MMOL/L (ref 98–107)
CO2: 33 MMOL/L (ref 22–29)
CREAT SERPL-MCNC: 1.1 MG/DL (ref 0.5–1)
EOSINOPHILS ABSOLUTE: 0.11 E9/L (ref 0.05–0.5)
EOSINOPHILS RELATIVE PERCENT: 2.3 % (ref 0–6)
GFR AFRICAN AMERICAN: >60
GFR NON-AFRICAN AMERICAN: 52 ML/MIN/1.73
GLUCOSE BLD-MCNC: 103 MG/DL (ref 74–99)
HCT VFR BLD CALC: 46.4 % (ref 34–48)
HCT VFR BLD CALC: 46.9 % (ref 34–48)
HEMOGLOBIN: 14.9 G/DL (ref 11.5–15.5)
HEMOGLOBIN: 15.2 G/DL (ref 11.5–15.5)
IMMATURE GRANULOCYTES #: 0.02 E9/L
IMMATURE GRANULOCYTES %: 0.4 % (ref 0–5)
LYMPHOCYTES ABSOLUTE: 1.11 E9/L (ref 1.5–4)
LYMPHOCYTES RELATIVE PERCENT: 22.8 % (ref 20–42)
MCH RBC QN AUTO: 33.1 PG (ref 26–35)
MCHC RBC AUTO-ENTMCNC: 32.1 % (ref 32–34.5)
MCV RBC AUTO: 103.1 FL (ref 80–99.9)
MONOCYTES ABSOLUTE: 0.42 E9/L (ref 0.1–0.95)
MONOCYTES RELATIVE PERCENT: 8.6 % (ref 2–12)
NEUTROPHILS ABSOLUTE: 3.16 E9/L (ref 1.8–7.3)
NEUTROPHILS RELATIVE PERCENT: 65.1 % (ref 43–80)
PDW BLD-RTO: 15.2 FL (ref 11.5–15)
PLATELET # BLD: 147 E9/L (ref 130–450)
PMV BLD AUTO: 9.8 FL (ref 7–12)
POTASSIUM SERPL-SCNC: 4.5 MMOL/L (ref 3.5–5)
RBC # BLD: 4.5 E12/L (ref 3.5–5.5)
SODIUM BLD-SCNC: 142 MMOL/L (ref 132–146)
TOTAL PROTEIN: 6.8 G/DL (ref 6.4–8.3)
WBC # BLD: 4.9 E9/L (ref 4.5–11.5)

## 2021-03-24 PROCEDURE — 6370000000 HC RX 637 (ALT 250 FOR IP): Performed by: INTERNAL MEDICINE

## 2021-03-24 PROCEDURE — 85014 HEMATOCRIT: CPT

## 2021-03-24 PROCEDURE — 85018 HEMOGLOBIN: CPT

## 2021-03-24 PROCEDURE — 6370000000 HC RX 637 (ALT 250 FOR IP): Performed by: SURGERY

## 2021-03-24 PROCEDURE — 80053 COMPREHEN METABOLIC PANEL: CPT

## 2021-03-24 PROCEDURE — 36415 COLL VENOUS BLD VENIPUNCTURE: CPT

## 2021-03-24 PROCEDURE — 82248 BILIRUBIN DIRECT: CPT

## 2021-03-24 PROCEDURE — 6360000002 HC RX W HCPCS: Performed by: INTERNAL MEDICINE

## 2021-03-24 PROCEDURE — 1200000000 HC SEMI PRIVATE

## 2021-03-24 PROCEDURE — 85025 COMPLETE CBC W/AUTO DIFF WBC: CPT

## 2021-03-24 PROCEDURE — 2580000003 HC RX 258: Performed by: SURGERY

## 2021-03-24 PROCEDURE — C9113 INJ PANTOPRAZOLE SODIUM, VIA: HCPCS | Performed by: INTERNAL MEDICINE

## 2021-03-24 PROCEDURE — 6360000002 HC RX W HCPCS: Performed by: SURGERY

## 2021-03-24 PROCEDURE — 6370000000 HC RX 637 (ALT 250 FOR IP): Performed by: STUDENT IN AN ORGANIZED HEALTH CARE EDUCATION/TRAINING PROGRAM

## 2021-03-24 RX ADMIN — PROMETHAZINE HYDROCHLORIDE 25 MG: 25 INJECTION INTRAMUSCULAR; INTRAVENOUS at 21:57

## 2021-03-24 RX ADMIN — HYDROMORPHONE HYDROCHLORIDE 0.5 MG: 1 INJECTION, SOLUTION INTRAMUSCULAR; INTRAVENOUS; SUBCUTANEOUS at 06:45

## 2021-03-24 RX ADMIN — LEVOTHYROXINE SODIUM 50 MCG: 50 TABLET ORAL at 06:35

## 2021-03-24 RX ADMIN — HYDROMORPHONE HYDROCHLORIDE 0.5 MG: 1 INJECTION, SOLUTION INTRAMUSCULAR; INTRAVENOUS; SUBCUTANEOUS at 10:47

## 2021-03-24 RX ADMIN — PANTOPRAZOLE SODIUM 40 MG: 40 INJECTION, POWDER, FOR SOLUTION INTRAVENOUS at 20:58

## 2021-03-24 RX ADMIN — SODIUM CHLORIDE, PRESERVATIVE FREE 10 ML: 5 INJECTION INTRAVENOUS at 09:36

## 2021-03-24 RX ADMIN — METOCLOPRAMIDE 5 MG: 5 TABLET ORAL at 16:53

## 2021-03-24 RX ADMIN — PAROXETINE HYDROCHLORIDE 10 MG: 20 TABLET, FILM COATED ORAL at 09:37

## 2021-03-24 RX ADMIN — HYDROMORPHONE HYDROCHLORIDE 0.5 MG: 1 INJECTION, SOLUTION INTRAMUSCULAR; INTRAVENOUS; SUBCUTANEOUS at 18:04

## 2021-03-24 RX ADMIN — OXYCODONE 5 MG: 5 TABLET ORAL at 16:53

## 2021-03-24 RX ADMIN — ONDANSETRON 4 MG: 2 INJECTION INTRAMUSCULAR; INTRAVENOUS at 16:53

## 2021-03-24 RX ADMIN — OXYCODONE 5 MG: 5 TABLET ORAL at 04:56

## 2021-03-24 RX ADMIN — PANTOPRAZOLE SODIUM 40 MG: 40 INJECTION, POWDER, FOR SOLUTION INTRAVENOUS at 09:37

## 2021-03-24 RX ADMIN — HYDROXYZINE HYDROCHLORIDE 50 MG: 25 TABLET, FILM COATED ORAL at 20:58

## 2021-03-24 RX ADMIN — HYDROMORPHONE HYDROCHLORIDE 0.5 MG: 1 INJECTION, SOLUTION INTRAMUSCULAR; INTRAVENOUS; SUBCUTANEOUS at 13:52

## 2021-03-24 RX ADMIN — ONDANSETRON 4 MG: 2 INJECTION INTRAMUSCULAR; INTRAVENOUS at 09:37

## 2021-03-24 RX ADMIN — ENOXAPARIN SODIUM 40 MG: 40 INJECTION SUBCUTANEOUS at 09:37

## 2021-03-24 RX ADMIN — SODIUM CHLORIDE, PRESERVATIVE FREE 10 ML: 5 INJECTION INTRAVENOUS at 20:59

## 2021-03-24 RX ADMIN — HYDROMORPHONE HYDROCHLORIDE 0.5 MG: 1 INJECTION, SOLUTION INTRAMUSCULAR; INTRAVENOUS; SUBCUTANEOUS at 23:59

## 2021-03-24 RX ADMIN — HYDROMORPHONE HYDROCHLORIDE 0.5 MG: 1 INJECTION, SOLUTION INTRAMUSCULAR; INTRAVENOUS; SUBCUTANEOUS at 03:17

## 2021-03-24 RX ADMIN — METOCLOPRAMIDE 5 MG: 5 TABLET ORAL at 06:35

## 2021-03-24 RX ADMIN — OXYCODONE 5 MG: 5 TABLET ORAL at 09:37

## 2021-03-24 RX ADMIN — HYDROMORPHONE HYDROCHLORIDE 0.5 MG: 1 INJECTION, SOLUTION INTRAMUSCULAR; INTRAVENOUS; SUBCUTANEOUS at 20:57

## 2021-03-24 RX ADMIN — METOCLOPRAMIDE 5 MG: 5 TABLET ORAL at 10:47

## 2021-03-24 RX ADMIN — BREXPIPRAZOLE 0.5 MG: 2 TABLET ORAL at 09:38

## 2021-03-24 RX ADMIN — METOCLOPRAMIDE 5 MG: 5 TABLET ORAL at 20:58

## 2021-03-24 ASSESSMENT — PAIN SCALES - GENERAL
PAINLEVEL_OUTOF10: 8
PAINLEVEL_OUTOF10: 9
PAINLEVEL_OUTOF10: 9
PAINLEVEL_OUTOF10: 8
PAINLEVEL_OUTOF10: 9
PAINLEVEL_OUTOF10: 8
PAINLEVEL_OUTOF10: 6
PAINLEVEL_OUTOF10: 7

## 2021-03-24 ASSESSMENT — PAIN DESCRIPTION - PAIN TYPE: TYPE: ACUTE PAIN

## 2021-03-24 NOTE — PROGRESS NOTES
Internal Medicine Progress Note    [facility]    Ofe Found. Barbie Donaldson., RACHEL.BRIAN.LILLIEOManoloI. Patel Sainz D.O., MANAOLEANNA Kinney D.O. Reji Borges, MSN, APRN, NP-C  Angella Gauthier. Yahaira Thomson, MSN, APRN-CNP     Primary Care Physician: Aleja Lezama DO   Admitting Physician:  Heena Abraham MD  Admission date and time: 3/18/2021  4:41 PM    Room:  [address] [place] [address]  Admitting diagnosis: SBO (small bowel obstruction) [facility]) [K56.609]    Patient Name: Tammy Patten  MRN: [id number]    Date of Service: 3/24/2021     Subjective:  Channing is a 48 y.o. female who was seen and examined today,3/24/2021, at the bedside. Channing is scheduled to undergo surgical intervention tomorrow morning. She continues to have intermittent abdominal discomfort. Review of System:   Constitutional:   Denies fever or chills, weight loss or gain, fatigue or malaise. HEENT:   Denies ear pain, sore throat, sinus or eye problems. Cardiovascular:   Denies any chest pain, irregular heartbeats, or palpitations. Respiratory:   Denies shortness of breath, coughing, sputum production, hemoptysis, or wheezing. Gastrointestinal:   Ongoing abdominal discomfort. Ongoing nausea with the ingestion of clear liquids. Genitourinary:    Denies any urgency, frequency, hematuria. Voiding  without difficulty. Extremities:   Denies lower extremity swelling, edema or cyanosis. Neurology:    Denies any headache or focal neurological deficits, Denies generalized weakness or memory difficulty. Psch:   Denies being anxious or depressed. Musculoskeletal:    Denies  myalgias, joint complaints or back pain. Integumentary:   Denies any rashes, ulcers, or excoriations. Denies bruising. Hematologic/Lymphatic:  Denies bruising or bleeding. Physical Exam:  No intake/output data recorded.     Intake/Output Summary (Last 24 hours) at 3/24/2021 0651  Last data filed at 3/23/2021 1750  Gross per 24 hour   Intake 1540 ml Output --   Net 1540 ml   I/O last 3 completed shifts: In: 1540 [P.O.:1540]  Out: -   No data found. Vital Signs:   Blood pressure 127/87, pulse 66, temperature 97.8 °F (36.6 °C), temperature source Oral, resp. rate 16, height 5' 2\" (1.575 m), weight 220 lb (99.8 kg), SpO2 96 %. General appearance:  Alert, responsive, oriented to person, place, and time. Well preserved, alert, no distress. Head:  Normocephalic. No masses, lesions or tenderness. Eyes:  PERRLA. EOMI. Sclera clear. Buccal mucosa moist.  ENT:  Ears normal. Mucosa normal.   Neck:    Supple. Trachea midline. No thyromegaly. No JVD. No bruits. Heart:    Rhythm regular. Rate controlled. No murmurs. Lungs:    Symmetrical. Clear to auscultation bilaterally. No wheezes. No rhonchi. No rales. Abdomen:   Soft. Mildly tender to palpation diffusely with voluntary guarding elicited. Hypoactive bowel sounds. Extremities:    Peripheral pulses present. No peripheral edema. No ulcers. No cyanosis. No clubbing. Neurologic:    Alert x 3. No focal deficit. Cranial nerves grossly intact. No focal weakness. Psych:   Behavior is normal. Mood appears normal. Speech is not rapid and/or pressured. Musculoskeletal:   Spine ROM normal. Muscular strength intact. Gait not assessed. Integumentary:  No rashes  Skin normal color and texture.   Genitalia/Breast:  Deferred    Medication:  Scheduled Meds:   bumetanide  1 mg Oral Daily    brexpiprazole  0.5 mg Oral Daily    metoclopramide  5 mg Oral 4x Daily AC & HS    nicotine  1 patch Transdermal Daily    levothyroxine  50 mcg Oral Daily    hydrOXYzine  50 mg Oral Nightly    PARoxetine  10 mg Oral QAM    sodium chloride flush  10 mL Intravenous 2 times per day    enoxaparin  40 mg Subcutaneous Daily    pantoprazole  40 mg Intravenous BID     Continuous Infusions:      Objective Data:  CBC with Differential:    Lab Results   Component Value Date    WBC 4.9 03/24/2021    RBC 4.50 03/24/2021    HGB 14.9 03/24/2021    HCT 46.4 03/24/2021     03/24/2021    .1 03/24/2021    MCH 33.1 03/24/2021    MCHC 32.1 03/24/2021    RDW 15.2 03/24/2021    SEGSPCT 82 09/14/2011    LYMPHOPCT 22.8 03/24/2021    MONOPCT 8.6 03/24/2021    BASOPCT 0.8 03/24/2021    MONOSABS 0.42 03/24/2021    LYMPHSABS 1.11 03/24/2021    EOSABS 0.11 03/24/2021    BASOSABS 0.04 03/24/2021     CMP:    Lab Results   Component Value Date     03/24/2021    K 4.5 03/24/2021    K 4.7 03/19/2021    CL 99 03/24/2021    CO2 33 03/24/2021    BUN 14 03/24/2021    CREATININE 1.1 03/24/2021    GFRAA >60 03/24/2021    LABGLOM 52 03/24/2021    GLUCOSE 103 03/24/2021    GLUCOSE 89 09/14/2011    PROT 6.8 03/24/2021    LABALBU 4.0 03/24/2021    CALCIUM 9.5 03/24/2021    BILITOT 0.5 03/24/2021    ALKPHOS 79 03/24/2021    AST 85 03/24/2021    ALT 95 03/24/2021       Assessment:  1. Small bowel obstruction with nonreducible ventral abdominal hernia  2. Left cubital and left carpal tunnel syndrome  3. Oxygen dependent COPD with chronic respiratory failure  4. Mild pulmonary hypertension  5. Gastroesophageal reflux disease  6. Obesity  7. History of tobacco abuse; recently quit  8. Daily alcohol consumption    Plan:   Plans are for surgical intervention tomorrow. She is cleared for surgery. In the setting of mild upward trending creatinine, Bumex will be held temporarily. Appropriate antiemetic medications are being administered. Chronic comorbidities are being monitored. More than 50% of my time was spent at the bedside counseling/coordinating care with the patient and/or family with face to face contact. This time was spent reviewing notes and laboratory data as well as instructing and counseling the patient. Time I spent with the family or surrogate(s) is included only if the patient was incapable of providing the necessary information or participating in medical decisions.  I also discussed the differential diagnosis and all of the proposed management plans with the patient and individuals accompanying the patient. I am readily available for any further decision-making and intervention.        Mackenzie Gutierrez DO, RACHEL.A.C.O.I.  3/24/2021  6:51 AM

## 2021-03-24 NOTE — PLAN OF CARE
Problem: Pain:  Goal: Pain level will decrease  Description: Pain level will decrease  Outcome: Met This Shift     Problem: Pain:  Goal: Control of acute pain  Description: Control of acute pain  Outcome: Met This Shift     Problem:  Bowel/Gastric:  Goal: Control of bowel function will improve  Description: Control of bowel function will improve  Outcome: Met This Shift

## 2021-03-25 ENCOUNTER — ANESTHESIA EVENT (OUTPATIENT)
Dept: OPERATING ROOM | Age: 51
DRG: 227 | End: 2021-03-25
Payer: MEDICARE

## 2021-03-25 ENCOUNTER — ANESTHESIA (OUTPATIENT)
Dept: OPERATING ROOM | Age: 51
DRG: 227 | End: 2021-03-25
Payer: MEDICARE

## 2021-03-25 VITALS
DIASTOLIC BLOOD PRESSURE: 82 MMHG | OXYGEN SATURATION: 100 % | RESPIRATION RATE: 5 BRPM | TEMPERATURE: 97.9 F | SYSTOLIC BLOOD PRESSURE: 121 MMHG

## 2021-03-25 LAB
ALBUMIN SERPL-MCNC: 4 G/DL (ref 3.5–5.2)
ALP BLD-CCNC: 73 U/L (ref 35–104)
ALT SERPL-CCNC: 93 U/L (ref 0–32)
ANION GAP SERPL CALCULATED.3IONS-SCNC: 10 MMOL/L (ref 7–16)
AST SERPL-CCNC: 81 U/L (ref 0–31)
BASOPHILS ABSOLUTE: 0.04 E9/L (ref 0–0.2)
BASOPHILS RELATIVE PERCENT: 1 % (ref 0–2)
BILIRUB SERPL-MCNC: 0.4 MG/DL (ref 0–1.2)
BILIRUBIN DIRECT: <0.2 MG/DL (ref 0–0.3)
BILIRUBIN, INDIRECT: ABNORMAL MG/DL (ref 0–1)
BUN BLDV-MCNC: 18 MG/DL (ref 6–20)
CALCIUM SERPL-MCNC: 9.5 MG/DL (ref 8.6–10.2)
CHLORIDE BLD-SCNC: 98 MMOL/L (ref 98–107)
CO2: 30 MMOL/L (ref 22–29)
CREAT SERPL-MCNC: 1.1 MG/DL (ref 0.5–1)
EOSINOPHILS ABSOLUTE: 0.09 E9/L (ref 0.05–0.5)
EOSINOPHILS RELATIVE PERCENT: 2.2 % (ref 0–6)
GFR AFRICAN AMERICAN: >60
GFR NON-AFRICAN AMERICAN: 52 ML/MIN/1.73
GLUCOSE BLD-MCNC: 93 MG/DL (ref 74–99)
HCT VFR BLD CALC: 46.5 % (ref 34–48)
HEMOGLOBIN: 14.9 G/DL (ref 11.5–15.5)
IMMATURE GRANULOCYTES #: 0.02 E9/L
IMMATURE GRANULOCYTES %: 0.5 % (ref 0–5)
LYMPHOCYTES ABSOLUTE: 1.06 E9/L (ref 1.5–4)
LYMPHOCYTES RELATIVE PERCENT: 25.6 % (ref 20–42)
MCH RBC QN AUTO: 33 PG (ref 26–35)
MCHC RBC AUTO-ENTMCNC: 32 % (ref 32–34.5)
MCV RBC AUTO: 103.1 FL (ref 80–99.9)
MONOCYTES ABSOLUTE: 0.47 E9/L (ref 0.1–0.95)
MONOCYTES RELATIVE PERCENT: 11.4 % (ref 2–12)
NEUTROPHILS ABSOLUTE: 2.46 E9/L (ref 1.8–7.3)
NEUTROPHILS RELATIVE PERCENT: 59.3 % (ref 43–80)
PDW BLD-RTO: 14.9 FL (ref 11.5–15)
PLATELET # BLD: 148 E9/L (ref 130–450)
PMV BLD AUTO: 10.2 FL (ref 7–12)
POTASSIUM SERPL-SCNC: 4.8 MMOL/L (ref 3.5–5)
RBC # BLD: 4.51 E12/L (ref 3.5–5.5)
SODIUM BLD-SCNC: 138 MMOL/L (ref 132–146)
TOTAL PROTEIN: 6.7 G/DL (ref 6.4–8.3)
WBC # BLD: 4.1 E9/L (ref 4.5–11.5)

## 2021-03-25 PROCEDURE — 6360000002 HC RX W HCPCS

## 2021-03-25 PROCEDURE — 85025 COMPLETE CBC W/AUTO DIFF WBC: CPT

## 2021-03-25 PROCEDURE — 7100000001 HC PACU RECOVERY - ADDTL 15 MIN: Performed by: SURGERY

## 2021-03-25 PROCEDURE — 6370000000 HC RX 637 (ALT 250 FOR IP): Performed by: SURGERY

## 2021-03-25 PROCEDURE — C1781 MESH (IMPLANTABLE): HCPCS | Performed by: SURGERY

## 2021-03-25 PROCEDURE — S2900 ROBOTIC SURGICAL SYSTEM: HCPCS | Performed by: SURGERY

## 2021-03-25 PROCEDURE — 3600000019 HC SURGERY ROBOT ADDTL 15MIN: Performed by: SURGERY

## 2021-03-25 PROCEDURE — 6360000002 HC RX W HCPCS: Performed by: SURGERY

## 2021-03-25 PROCEDURE — 2500000003 HC RX 250 WO HCPCS

## 2021-03-25 PROCEDURE — 36415 COLL VENOUS BLD VENIPUNCTURE: CPT

## 2021-03-25 PROCEDURE — 0DN84ZZ RELEASE SMALL INTESTINE, PERCUTANEOUS ENDOSCOPIC APPROACH: ICD-10-PCS | Performed by: SURGERY

## 2021-03-25 PROCEDURE — 15734 MUSCLE-SKIN GRAFT TRUNK: CPT | Performed by: SURGERY

## 2021-03-25 PROCEDURE — 49657 PR LAP, RECURRENT INCISIONAL HERNIA REPAIR,INCARCERATED: CPT | Performed by: SURGERY

## 2021-03-25 PROCEDURE — 2500000003 HC RX 250 WO HCPCS: Performed by: SURGERY

## 2021-03-25 PROCEDURE — 3600000009 HC SURGERY ROBOT BASE: Performed by: SURGERY

## 2021-03-25 PROCEDURE — 6370000000 HC RX 637 (ALT 250 FOR IP)

## 2021-03-25 PROCEDURE — 6370000000 HC RX 637 (ALT 250 FOR IP): Performed by: STUDENT IN AN ORGANIZED HEALTH CARE EDUCATION/TRAINING PROGRAM

## 2021-03-25 PROCEDURE — 0WUF4JZ SUPPLEMENT ABDOMINAL WALL WITH SYNTHETIC SUBSTITUTE, PERCUTANEOUS ENDOSCOPIC APPROACH: ICD-10-PCS | Performed by: SURGERY

## 2021-03-25 PROCEDURE — 1200000000 HC SEMI PRIVATE

## 2021-03-25 PROCEDURE — 8E0W4CZ ROBOTIC ASSISTED PROCEDURE OF TRUNK REGION, PERCUTANEOUS ENDOSCOPIC APPROACH: ICD-10-PCS | Performed by: SURGERY

## 2021-03-25 PROCEDURE — 2709999900 HC NON-CHARGEABLE SUPPLY: Performed by: SURGERY

## 2021-03-25 PROCEDURE — 82248 BILIRUBIN DIRECT: CPT

## 2021-03-25 PROCEDURE — 2580000003 HC RX 258

## 2021-03-25 PROCEDURE — 6360000002 HC RX W HCPCS: Performed by: ANESTHESIOLOGY

## 2021-03-25 PROCEDURE — 7100000000 HC PACU RECOVERY - FIRST 15 MIN: Performed by: SURGERY

## 2021-03-25 PROCEDURE — 80053 COMPREHEN METABOLIC PANEL: CPT

## 2021-03-25 PROCEDURE — 3700000001 HC ADD 15 MINUTES (ANESTHESIA): Performed by: SURGERY

## 2021-03-25 PROCEDURE — 2720000010 HC SURG SUPPLY STERILE: Performed by: SURGERY

## 2021-03-25 PROCEDURE — C9113 INJ PANTOPRAZOLE SODIUM, VIA: HCPCS | Performed by: SURGERY

## 2021-03-25 PROCEDURE — 3700000000 HC ANESTHESIA ATTENDED CARE: Performed by: SURGERY

## 2021-03-25 DEVICE — MESH HERN W12XL12IN INGUINAL POLYPR SQ L PORE MFIL SF: Type: IMPLANTABLE DEVICE | Status: FUNCTIONAL

## 2021-03-25 RX ORDER — SODIUM CHLORIDE, SODIUM LACTATE, POTASSIUM CHLORIDE, CALCIUM CHLORIDE 600; 310; 30; 20 MG/100ML; MG/100ML; MG/100ML; MG/100ML
INJECTION, SOLUTION INTRAVENOUS CONTINUOUS PRN
Status: DISCONTINUED | OUTPATIENT
Start: 2021-03-25 | End: 2021-03-25 | Stop reason: SDUPTHER

## 2021-03-25 RX ORDER — BUPIVACAINE HYDROCHLORIDE AND EPINEPHRINE 2.5; 5 MG/ML; UG/ML
INJECTION, SOLUTION EPIDURAL; INFILTRATION; INTRACAUDAL; PERINEURAL PRN
Status: DISCONTINUED | OUTPATIENT
Start: 2021-03-25 | End: 2021-03-25 | Stop reason: ALTCHOICE

## 2021-03-25 RX ORDER — PROPOFOL 10 MG/ML
INJECTION, EMULSION INTRAVENOUS PRN
Status: DISCONTINUED | OUTPATIENT
Start: 2021-03-25 | End: 2021-03-25 | Stop reason: SDUPTHER

## 2021-03-25 RX ORDER — SUCCINYLCHOLINE/SOD CL,ISO/PF 200MG/10ML
SYRINGE (ML) INTRAVENOUS PRN
Status: DISCONTINUED | OUTPATIENT
Start: 2021-03-25 | End: 2021-03-25 | Stop reason: SDUPTHER

## 2021-03-25 RX ORDER — MEPERIDINE HYDROCHLORIDE 25 MG/ML
INJECTION INTRAMUSCULAR; INTRAVENOUS; SUBCUTANEOUS
Status: COMPLETED
Start: 2021-03-25 | End: 2021-03-25

## 2021-03-25 RX ORDER — MIDAZOLAM HYDROCHLORIDE 1 MG/ML
INJECTION INTRAMUSCULAR; INTRAVENOUS PRN
Status: DISCONTINUED | OUTPATIENT
Start: 2021-03-25 | End: 2021-03-25 | Stop reason: SDUPTHER

## 2021-03-25 RX ORDER — DEXAMETHASONE SODIUM PHOSPHATE 4 MG/ML
INJECTION, SOLUTION INTRA-ARTICULAR; INTRALESIONAL; INTRAMUSCULAR; INTRAVENOUS; SOFT TISSUE
Status: COMPLETED
Start: 2021-03-25 | End: 2021-03-25

## 2021-03-25 RX ORDER — MEPERIDINE HYDROCHLORIDE 25 MG/ML
12.5 INJECTION INTRAMUSCULAR; INTRAVENOUS; SUBCUTANEOUS EVERY 5 MIN PRN
Status: DISCONTINUED | OUTPATIENT
Start: 2021-03-25 | End: 2021-03-25 | Stop reason: HOSPADM

## 2021-03-25 RX ORDER — DEXAMETHASONE SODIUM PHOSPHATE 10 MG/ML
4 INJECTION INTRAMUSCULAR; INTRAVENOUS ONCE
Status: DISCONTINUED | OUTPATIENT
Start: 2021-03-25 | End: 2021-03-25 | Stop reason: HOSPADM

## 2021-03-25 RX ORDER — GLYCOPYRROLATE 1 MG/5 ML
SYRINGE (ML) INTRAVENOUS PRN
Status: DISCONTINUED | OUTPATIENT
Start: 2021-03-25 | End: 2021-03-25 | Stop reason: SDUPTHER

## 2021-03-25 RX ORDER — ALBUTEROL SULFATE 90 UG/1
AEROSOL, METERED RESPIRATORY (INHALATION) PRN
Status: DISCONTINUED | OUTPATIENT
Start: 2021-03-25 | End: 2021-03-25 | Stop reason: SDUPTHER

## 2021-03-25 RX ORDER — PROMETHAZINE HYDROCHLORIDE 25 MG/ML
INJECTION, SOLUTION INTRAMUSCULAR; INTRAVENOUS
Status: COMPLETED
Start: 2021-03-25 | End: 2021-03-25

## 2021-03-25 RX ORDER — FENTANYL CITRATE 50 UG/ML
INJECTION, SOLUTION INTRAMUSCULAR; INTRAVENOUS PRN
Status: DISCONTINUED | OUTPATIENT
Start: 2021-03-25 | End: 2021-03-25 | Stop reason: SDUPTHER

## 2021-03-25 RX ORDER — PROMETHAZINE HYDROCHLORIDE 25 MG/ML
25 INJECTION, SOLUTION INTRAMUSCULAR; INTRAVENOUS ONCE
Status: COMPLETED | OUTPATIENT
Start: 2021-03-25 | End: 2021-03-25

## 2021-03-25 RX ORDER — NEOSTIGMINE METHYLSULFATE 1 MG/ML
INJECTION, SOLUTION INTRAVENOUS PRN
Status: DISCONTINUED | OUTPATIENT
Start: 2021-03-25 | End: 2021-03-25 | Stop reason: SDUPTHER

## 2021-03-25 RX ORDER — ONDANSETRON 2 MG/ML
INJECTION INTRAMUSCULAR; INTRAVENOUS PRN
Status: DISCONTINUED | OUTPATIENT
Start: 2021-03-25 | End: 2021-03-25 | Stop reason: SDUPTHER

## 2021-03-25 RX ORDER — LIDOCAINE HYDROCHLORIDE 20 MG/ML
INJECTION, SOLUTION INTRAVENOUS PRN
Status: DISCONTINUED | OUTPATIENT
Start: 2021-03-25 | End: 2021-03-25 | Stop reason: SDUPTHER

## 2021-03-25 RX ORDER — CEFAZOLIN SODIUM 1 G/3ML
INJECTION, POWDER, FOR SOLUTION INTRAMUSCULAR; INTRAVENOUS PRN
Status: DISCONTINUED | OUTPATIENT
Start: 2021-03-25 | End: 2021-03-25 | Stop reason: SDUPTHER

## 2021-03-25 RX ORDER — DEXAMETHASONE SODIUM PHOSPHATE 10 MG/ML
INJECTION, SOLUTION INTRAMUSCULAR; INTRAVENOUS PRN
Status: DISCONTINUED | OUTPATIENT
Start: 2021-03-25 | End: 2021-03-25 | Stop reason: SDUPTHER

## 2021-03-25 RX ORDER — MEPERIDINE HYDROCHLORIDE 50 MG/ML
INJECTION INTRAMUSCULAR; INTRAVENOUS; SUBCUTANEOUS
Status: COMPLETED
Start: 2021-03-25 | End: 2021-03-25

## 2021-03-25 RX ORDER — METHOCARBAMOL 500 MG/1
1000 TABLET, FILM COATED ORAL EVERY 6 HOURS
Status: DISCONTINUED | OUTPATIENT
Start: 2021-03-25 | End: 2021-03-29 | Stop reason: HOSPADM

## 2021-03-25 RX ORDER — MEPERIDINE HYDROCHLORIDE 50 MG/ML
50 INJECTION INTRAMUSCULAR; INTRAVENOUS; SUBCUTANEOUS ONCE
Status: COMPLETED | OUTPATIENT
Start: 2021-03-25 | End: 2021-03-25

## 2021-03-25 RX ORDER — ROCURONIUM BROMIDE 10 MG/ML
INJECTION, SOLUTION INTRAVENOUS PRN
Status: DISCONTINUED | OUTPATIENT
Start: 2021-03-25 | End: 2021-03-25 | Stop reason: SDUPTHER

## 2021-03-25 RX ADMIN — FENTANYL CITRATE 100 MCG: 50 INJECTION, SOLUTION INTRAMUSCULAR; INTRAVENOUS at 09:33

## 2021-03-25 RX ADMIN — HYDROMORPHONE HYDROCHLORIDE 0.5 MG: 1 INJECTION, SOLUTION INTRAMUSCULAR; INTRAVENOUS; SUBCUTANEOUS at 20:22

## 2021-03-25 RX ADMIN — DEXAMETHASONE SODIUM PHOSPHATE 10 MG: 10 INJECTION, SOLUTION INTRAMUSCULAR; INTRAVENOUS at 09:58

## 2021-03-25 RX ADMIN — ONDANSETRON 4 MG: 2 INJECTION INTRAMUSCULAR; INTRAVENOUS at 12:17

## 2021-03-25 RX ADMIN — HYDROMORPHONE HYDROCHLORIDE 0.5 MG: 1 INJECTION, SOLUTION INTRAMUSCULAR; INTRAVENOUS; SUBCUTANEOUS at 14:33

## 2021-03-25 RX ADMIN — OXYCODONE 5 MG: 5 TABLET ORAL at 22:51

## 2021-03-25 RX ADMIN — HYDROMORPHONE HYDROCHLORIDE 0.5 MG: 1 INJECTION, SOLUTION INTRAMUSCULAR; INTRAVENOUS; SUBCUTANEOUS at 14:04

## 2021-03-25 RX ADMIN — OXYCODONE 5 MG: 5 TABLET ORAL at 16:31

## 2021-03-25 RX ADMIN — HYDROMORPHONE HYDROCHLORIDE 0.5 MG: 1 INJECTION, SOLUTION INTRAMUSCULAR; INTRAVENOUS; SUBCUTANEOUS at 14:44

## 2021-03-25 RX ADMIN — PROPOFOL 200 MG: 10 INJECTION, EMULSION INTRAVENOUS at 09:33

## 2021-03-25 RX ADMIN — MEPERIDINE HYDROCHLORIDE 50 MG: 50 INJECTION, SOLUTION INTRAMUSCULAR; INTRAVENOUS; SUBCUTANEOUS at 15:27

## 2021-03-25 RX ADMIN — ROCURONIUM BROMIDE 20 MG: 10 SOLUTION INTRAVENOUS at 11:02

## 2021-03-25 RX ADMIN — FENTANYL CITRATE 50 MCG: 50 INJECTION, SOLUTION INTRAMUSCULAR; INTRAVENOUS at 11:15

## 2021-03-25 RX ADMIN — MEPERIDINE HYDROCHLORIDE 12.5 MG: 25 INJECTION INTRAMUSCULAR; INTRAVENOUS; SUBCUTANEOUS at 15:00

## 2021-03-25 RX ADMIN — PROMETHAZINE HYDROCHLORIDE 25 MG: 25 INJECTION INTRAMUSCULAR; INTRAVENOUS at 15:27

## 2021-03-25 RX ADMIN — HYDROMORPHONE HYDROCHLORIDE 0.5 MG: 1 INJECTION, SOLUTION INTRAMUSCULAR; INTRAVENOUS; SUBCUTANEOUS at 14:18

## 2021-03-25 RX ADMIN — MEPERIDINE HYDROCHLORIDE 50 MG: 50 INJECTION INTRAMUSCULAR; INTRAVENOUS; SUBCUTANEOUS at 15:27

## 2021-03-25 RX ADMIN — ALBUTEROL SULFATE 2 PUFF: 90 AEROSOL, METERED RESPIRATORY (INHALATION) at 11:19

## 2021-03-25 RX ADMIN — PANTOPRAZOLE SODIUM 40 MG: 40 INJECTION, POWDER, FOR SOLUTION INTRAVENOUS at 20:22

## 2021-03-25 RX ADMIN — FENTANYL CITRATE 50 MCG: 50 INJECTION, SOLUTION INTRAMUSCULAR; INTRAVENOUS at 10:06

## 2021-03-25 RX ADMIN — SODIUM CHLORIDE, POTASSIUM CHLORIDE, SODIUM LACTATE AND CALCIUM CHLORIDE: 600; 310; 30; 20 INJECTION, SOLUTION INTRAVENOUS at 09:26

## 2021-03-25 RX ADMIN — ROCURONIUM BROMIDE 30 MG: 10 SOLUTION INTRAVENOUS at 09:44

## 2021-03-25 RX ADMIN — MEPERIDINE HYDROCHLORIDE 12.5 MG: 25 INJECTION INTRAMUSCULAR; INTRAVENOUS; SUBCUTANEOUS at 15:09

## 2021-03-25 RX ADMIN — HYDROMORPHONE HYDROCHLORIDE 0.5 MG: 1 INJECTION, SOLUTION INTRAMUSCULAR; INTRAVENOUS; SUBCUTANEOUS at 06:11

## 2021-03-25 RX ADMIN — LIDOCAINE HYDROCHLORIDE 100 MG: 20 INJECTION, SOLUTION INTRAVENOUS at 09:33

## 2021-03-25 RX ADMIN — PHENYLEPHRINE HYDROCHLORIDE 100 MCG: 10 INJECTION INTRAVENOUS at 12:24

## 2021-03-25 RX ADMIN — METOCLOPRAMIDE 5 MG: 5 TABLET ORAL at 18:32

## 2021-03-25 RX ADMIN — Medication 140 MG: at 09:37

## 2021-03-25 RX ADMIN — Medication 0.6 MG: at 13:06

## 2021-03-25 RX ADMIN — DEXAMETHASONE SODIUM PHOSPHATE 4 MG: 4 INJECTION, SOLUTION INTRAMUSCULAR; INTRAVENOUS at 13:53

## 2021-03-25 RX ADMIN — ROCURONIUM BROMIDE 20 MG: 10 SOLUTION INTRAVENOUS at 11:50

## 2021-03-25 RX ADMIN — CEFAZOLIN 3000 MG: 1 INJECTION, POWDER, FOR SOLUTION INTRAMUSCULAR; INTRAVENOUS at 09:33

## 2021-03-25 RX ADMIN — OXYCODONE 5 MG: 5 TABLET ORAL at 01:33

## 2021-03-25 RX ADMIN — Medication 3 MG: at 13:06

## 2021-03-25 RX ADMIN — PROMETHAZINE HYDROCHLORIDE 25 MG: 25 INJECTION, SOLUTION INTRAMUSCULAR; INTRAVENOUS at 15:27

## 2021-03-25 RX ADMIN — MIDAZOLAM 2 MG: 1 INJECTION INTRAMUSCULAR; INTRAVENOUS at 09:20

## 2021-03-25 RX ADMIN — FENTANYL CITRATE 50 MCG: 50 INJECTION, SOLUTION INTRAMUSCULAR; INTRAVENOUS at 09:54

## 2021-03-25 RX ADMIN — METHOCARBAMOL 1000 MG: 500 TABLET, FILM COATED ORAL at 18:29

## 2021-03-25 ASSESSMENT — PULMONARY FUNCTION TESTS
PIF_VALUE: 37
PIF_VALUE: 24
PIF_VALUE: 35
PIF_VALUE: 32
PIF_VALUE: 37
PIF_VALUE: 35
PIF_VALUE: 31
PIF_VALUE: 0
PIF_VALUE: 37
PIF_VALUE: 30
PIF_VALUE: 34
PIF_VALUE: 32
PIF_VALUE: 32
PIF_VALUE: 37
PIF_VALUE: 29
PIF_VALUE: 34
PIF_VALUE: 37
PIF_VALUE: 35
PIF_VALUE: 33
PIF_VALUE: 35
PIF_VALUE: 34
PIF_VALUE: 37
PIF_VALUE: 35
PIF_VALUE: 37
PIF_VALUE: 34
PIF_VALUE: 17
PIF_VALUE: 30
PIF_VALUE: 34
PIF_VALUE: 33
PIF_VALUE: 37
PIF_VALUE: 35
PIF_VALUE: 33
PIF_VALUE: 33
PIF_VALUE: 37
PIF_VALUE: 37
PIF_VALUE: 34
PIF_VALUE: 37
PIF_VALUE: 25
PIF_VALUE: 34
PIF_VALUE: 35
PIF_VALUE: 24
PIF_VALUE: 37
PIF_VALUE: 37
PIF_VALUE: 35
PIF_VALUE: 3
PIF_VALUE: 33
PIF_VALUE: 34
PIF_VALUE: 37
PIF_VALUE: 34
PIF_VALUE: 30
PIF_VALUE: 35
PIF_VALUE: 0
PIF_VALUE: 32
PIF_VALUE: 35
PIF_VALUE: 35
PIF_VALUE: 37
PIF_VALUE: 34
PIF_VALUE: 37
PIF_VALUE: 35
PIF_VALUE: 29
PIF_VALUE: 35
PIF_VALUE: 37
PIF_VALUE: 30
PIF_VALUE: 32
PIF_VALUE: 35
PIF_VALUE: 0
PIF_VALUE: 37
PIF_VALUE: 37
PIF_VALUE: 13
PIF_VALUE: 37
PIF_VALUE: 35
PIF_VALUE: 37
PIF_VALUE: 34
PIF_VALUE: 37
PIF_VALUE: 16
PIF_VALUE: 34
PIF_VALUE: 34
PIF_VALUE: 32
PIF_VALUE: 37
PIF_VALUE: 35
PIF_VALUE: 35
PIF_VALUE: 34
PIF_VALUE: 37
PIF_VALUE: 35
PIF_VALUE: 32
PIF_VALUE: 37
PIF_VALUE: 37
PIF_VALUE: 34
PIF_VALUE: 33
PIF_VALUE: 37
PIF_VALUE: 12
PIF_VALUE: 37
PIF_VALUE: 34
PIF_VALUE: 33
PIF_VALUE: 37
PIF_VALUE: 42
PIF_VALUE: 37
PIF_VALUE: 33
PIF_VALUE: 37
PIF_VALUE: 33
PIF_VALUE: 47
PIF_VALUE: 37
PIF_VALUE: 35
PIF_VALUE: 35
PIF_VALUE: 34
PIF_VALUE: 32
PIF_VALUE: 35
PIF_VALUE: 37
PIF_VALUE: 32
PIF_VALUE: 33
PIF_VALUE: 37
PIF_VALUE: 37
PIF_VALUE: 35
PIF_VALUE: 33
PIF_VALUE: 33
PIF_VALUE: 37
PIF_VALUE: 33
PIF_VALUE: 25
PIF_VALUE: 29
PIF_VALUE: 37
PIF_VALUE: 37
PIF_VALUE: 25
PIF_VALUE: 30
PIF_VALUE: 40

## 2021-03-25 ASSESSMENT — PAIN DESCRIPTION - PAIN TYPE
TYPE: SURGICAL PAIN
TYPE: SURGICAL PAIN

## 2021-03-25 ASSESSMENT — PAIN SCALES - GENERAL
PAINLEVEL_OUTOF10: 10
PAINLEVEL_OUTOF10: 8
PAINLEVEL_OUTOF10: 0
PAINLEVEL_OUTOF10: 9
PAINLEVEL_OUTOF10: 6
PAINLEVEL_OUTOF10: 8
PAINLEVEL_OUTOF10: 10

## 2021-03-25 ASSESSMENT — PAIN DESCRIPTION - LOCATION
LOCATION: ABDOMEN

## 2021-03-25 ASSESSMENT — PAIN DESCRIPTION - DESCRIPTORS
DESCRIPTORS: ACHING;SHARP;SORE
DESCRIPTORS: DISCOMFORT;SHARP;SORE
DESCRIPTORS: ACHING;SHARP;SORE
DESCRIPTORS: DISCOMFORT;SHARP;SORE

## 2021-03-25 ASSESSMENT — PAIN DESCRIPTION - FREQUENCY: FREQUENCY: CONTINUOUS

## 2021-03-25 NOTE — PROGRESS NOTES
Internal Medicine Progress Note    [facility]    Ирина Tsai. Sierra Damico., RACHEL.A.CManoloOManoloI. Maddison Booker D.O., F.A.C.O.I. Saintclair Popper, D.O. Garth Lacks, MSN, APRN, NP-C  Pradip Myrick. Chicho Marin, MSN, APRN-CNP     Primary Care Physician: Candy Hernadez DO   Admitting Physician:  Anh Read MD  Admission date and time: 3/18/2021  4:41 PM    Room:  OR POOL/NONE  Admitting diagnosis: SBO (small bowel obstruction) [facility]) [K56.609]    Patient Name: Obed Oconnor  MRN: [id number]    Date of Service: 3/25/2021     Subjective:  Carlos Boxer is a 48 y.o. female who was seen and examined today,3/25/2021, at the bedside. Patient seems somewhat apprehensive today about surgery. Anxious to have this done but is concern of complication. She has no further nausea or vomiting at the present time. History of first that she might require a colostomy    No family members are present      Review of System:   Constitutional:   Denies fever or chills, weight loss or gain, fatigue or malaise. HEENT:   Denies ear pain, sore throat, sinus or eye problems. Cardiovascular:   Denies any chest pain, irregular heartbeats, or palpitations. Respiratory:   Denies shortness of breath, coughing, sputum production, hemoptysis, or wheezing. Gastrointestinal:   Ongoing abdominal discomfort. Resolved nausea   Genitourinary:    Denies any urgency, frequency, hematuria. Voiding  without difficulty. Extremities:   Denies lower extremity swelling, edema or cyanosis. Neurology:    Denies any headache or focal neurological deficits, Denies generalized weakness or memory difficulty. Psch:   Denies being anxious or depressed. Musculoskeletal:    Denies  myalgias, joint complaints or back pain. Integumentary:   Denies any rashes, ulcers, or excoriations. Denies bruising. Hematologic/Lymphatic:  Denies bruising or bleeding. Physical Exam:  I/O this shift:   In: 900 [I.V.:900]  Out: 100 [Urine:100]    Intake/Output Summary (Last 24 hours) at 3/25/2021 1351  Last data filed at 3/25/2021 1334  Gross per 24 hour   Intake 900 ml   Output 100 ml   Net 800 ml   I/O last 3 completed shifts: In: 80 [P.O.:780]  Out: 0   No data found. Vital Signs:   Blood pressure (!) 153/111, pulse 88, temperature 98.6 °F (37 °C), temperature source Temporal, resp. rate 12, height 5' 2\" (1.575 m), weight 220 lb (99.8 kg), SpO2 92 %. General appearance:  Alert, responsive, oriented to person, place, and time. Well preserved, alert, no distress. Head:  Normocephalic. No masses, lesions or tenderness. Eyes:  PERRLA. EOMI. Sclera clear. Buccal mucosa moist.  ENT:  Ears normal. Mucosa normal.   Neck:    Supple. Trachea midline. No thyromegaly. No JVD. No bruits. Heart:    Rhythm regular. Rate controlled. No murmurs. Lungs:    Symmetrical. Clear to auscultation bilaterally. No wheezes. No rhonchi. No rales. Abdomen:   Soft. Mildly tender to palpation diffusely with voluntary guarding elicited. Hypoactive bowel sounds. Extremities:    Peripheral pulses present. No peripheral edema. No ulcers. No cyanosis. No clubbing. Neurologic:    Alert x 3. No focal deficit. Cranial nerves grossly intact. No focal weakness. Psych:   Behavior is normal. Mood appears normal. Speech is not rapid and/or pressured. Musculoskeletal:   Spine ROM normal. Muscular strength intact. Gait not assessed. Integumentary:  No rashes  Skin normal color and texture.   Genitalia/Breast:  Deferred    Medication:  Scheduled Meds:   dexamethasone        [Held by provider] bumetanide  1 mg Oral Daily    brexpiprazole  0.5 mg Oral Daily    metoclopramide  5 mg Oral 4x Daily AC & HS    nicotine  1 patch Transdermal Daily    levothyroxine  50 mcg Oral Daily    hydrOXYzine  50 mg Oral Nightly    PARoxetine  10 mg Oral QAM    sodium chloride flush  10 mL Intravenous 2 times per day    enoxaparin  40 mg Subcutaneous Daily    pantoprazole  40 mg Intravenous BID     Continuous Infusions:      Objective Data:  CBC with Differential:    Lab Results   Component Value Date    WBC 4.1 03/25/2021    RBC 4.51 03/25/2021    HGB 14.9 03/25/2021    HCT 46.5 03/25/2021     03/25/2021    .1 03/25/2021    MCH 33.0 03/25/2021    MCHC 32.0 03/25/2021    RDW 14.9 03/25/2021    SEGSPCT 82 09/14/2011    LYMPHOPCT 25.6 03/25/2021    MONOPCT 11.4 03/25/2021    BASOPCT 1.0 03/25/2021    MONOSABS 0.47 03/25/2021    LYMPHSABS 1.06 03/25/2021    EOSABS 0.09 03/25/2021    BASOSABS 0.04 03/25/2021     CMP:    Lab Results   Component Value Date     03/25/2021    K 4.8 03/25/2021    K 4.7 03/19/2021    CL 98 03/25/2021    CO2 30 03/25/2021    BUN 18 03/25/2021    CREATININE 1.1 03/25/2021    GFRAA >60 03/25/2021    LABGLOM 52 03/25/2021    GLUCOSE 93 03/25/2021    GLUCOSE 89 09/14/2011    PROT 6.7 03/25/2021    LABALBU 4.0 03/25/2021    CALCIUM 9.5 03/25/2021    BILITOT 0.4 03/25/2021    ALKPHOS 73 03/25/2021    AST 81 03/25/2021    ALT 93 03/25/2021       Assessment:      · Small bowel obstruction with nonreducible ventral abdominal hernia. For surgery today by Dr. Dave Batres  · Left cubital and left carpal tunnel syndrome  · Oxygen dependent COPD with chronic respiratory failure  · Mild pulmonary hypertension  · Gastroesophageal reflux disease  · Obesity  · History of tobacco abuse; recently quit  · Daily alcohol consumption    Plan:     · Patient is scheduled for surgery today  · Reassurance offered and given to the patient  · We will follow postoperatively  · Obtain postop lab work in the a.m. including EKG and cardiac enzymes  · Continue DVT prophylaxis  · Encourage the use of incentive spirometry        More than 50% of my time was spent at the bedside counseling/coordinating care with the patient and/or family with face to face contact.   This time was spent reviewing notes and laboratory data as well as instructing and counseling the

## 2021-03-25 NOTE — OP NOTE
Miriam Cool MD, MS [facility]  [id number]    DATE OF PROCEDURE:  3/25/2021    PREOPERATIVE DIAGNOSES:  1. Recurrent Incarcerated incisional hernia. 2.  Disruption of muscle closure. 3.  Intraabdominal adhesions. 4.  SBO    POSTOPERATIVE DIAGNOSES:  1. Recurrent Incarcerated incisional hernia. 2.  Disruption of muscle closure. 3.  Intraabdominal adhesions. 4.  SBO  5. Hepatomegaly    PROCEDURE PERFORMED:    1. Laparoscopic robotic-assisted transabdominal retrorectus release with repair of incisional hernia with mesh  2. Extensive lysis of adhesions  3. Left sided abdominal wall posterior component separation with myofascial flap mobilization and placement  4. Right sided abdominal wall posterior component separation with myofascial flap mobilization and placement    ANESTHESIA:  General endotracheal.    SURGEON:  Kalpesh Chester MD    ASSISTANT: Dr Glenna Green MD (No qualified surgical resident or surgical assistant was available to assist this case); Dr Loi Ramsay DO    COMPLICATIONS:  None. ESTIMATED BLOOD LOSS:  50 mL. FLUIDS:  Crystalloid. SPECIMEN:  None. MESH USED:  Bard soft mesh 12 inch x 12 inch. INDICATIONS:  This is a 48 y.o. patient with symptomatic recurrent incarcerated incisional hernia. A CT scan showed a complete midline disruption with greater than 9 cm diastasis of the fascial edges and multiple areas of incarcerated bowel. She had multiple areas on CT scan with incarcerated small bowel she was admitted for NG decompression secondary to her small bowel obstruction. She had some improvement. Patient had resolution with bowel function however continued to have ongoing pain. She had been admitted 3 times in 3 months for the same and therefore we elected to discuss with her surgical repairing component release for closure.   After being explained the risks, benefits, and alternatives of the procedure, the patient agreed to proceed. OPERATIVE PROCEDURE:  The patient was taken to the operating room, placed supine, administered general anesthesia and intubated. Once the airway was secured and they were adequately sedated, they was prepped and draped in normal sterile fashion. A time-out was performed, confirming surgical site and the patient's name. The patient received preoperative antibiotics with in 30min prior to incision. We initially placed them supine, fractured the bed in order to separate the anterior superior iliac spine in the costal margin. We started on the right side for dissection and made an 8-mm incision just in the left upper quadrant at Julian's point. Inserted a Veress needle, confirmed needle placement, insufflated to 15 mmHg. We removed the Veress needle and inserted an 8-mm robotic trocar. Upon entrance we could immediately appreciate extensive adhesive disease on the left side of the abdomen. Therefore we elected to enter onto the right side we used the Optiview trocar and a 5 mm camera in order to enter into the right upper quadrant. Upon entrance in the abdomen on this side we could appreciate severe hepatomegaly. There is much fewer and adhesions on the right side of the abdomen we were able to gain access. Inserted the camera to follow and inspected the abdomen. There was extensive adhesions in the midline to multiple hernia defects from the subxiphoid area all the way down to the suprapubic. We then inserted 2 more 8-mm trocars, one in the right lateral abdomen and one in the right lower quadrant. The liver was very large and extended almost all the way down the left gutter to the ASIS. it was fatty and consistent with hepatic steatosis. Under direct visualization, we docked the robot over the left side.   We initially started our dissection, lysed adhesions for greater than an hour in order to expose hernia defect at the midline, it was extensive and there were multiple adhesions including bowel that was incarcerated in the hernia defects. Sharp dissection with scissors and minimal cautery was used to free the bowel. We were able to reduce them and without any injury to bowel. Meticulous dissection was necessary to gently reduce the large hernia defect at times even entering into the hernia with the camera to dissect bowel from the hernia sac. We then identified the midline defect, which extended approximately 22 cm in length and was almost 9 cm in width at its widest portion. The mesh could be appreciated from her previous repairs that was now incarcerated up within the hernia defect. We also identified the area where we inserted the left upper quadrant trocar which had not injured any underlying structures. We were also able to gain access with 3 additional trochars. There was obvious lack in the ability to close the midline without component muscle release. We initially started on the left lateral side, identifying the medial border at the right rectus muscle. We could immediately appreciate the midline defect was not amenable to repair with primary closure. Created a fascial defect and entered into the retromuscular plane behind the rectus muscle. We then dissected laterally, until we were to the lateral edge of the rectus. We then identified the perforating vessels to the rectus and preserved the branches of the epigastrics and perforating vessels. We moved 1cm medial to the linea semilunaris and incised the posterior sheath. We identified the transversalis fascia and released the posterior lamella of the internal oblique and entered into the preperitonal space releasing the transversalis muscle from the fascial edges. We used electrocautery in order to dissect in between the anterior and posterior rectus aponeurosis planes and separate the transversalis from the posterior peritoneal wall.   We then entered in the preperitoneal space and dissected all the way lateral, making sure that we were initially at our start of our dissection, just medial to the semilunaris line. We did this in order to create right sided myofascial flaps in order to gain enough laxity to close the midline defect anteriorly and approximate the posterior sheath excluding the abdominal organs from our field. We dissected all the way up to the costal margin, all the way nearly to the diaphragm and then laterally all the way up to the sulcus to the white line of Toldt. We dissected all the way down in the inguinal canal, crossed the arcuate ligament and then into the pre-fascial plane in the right groin. Dissected all the way to the midline and we completely exposed just posterior to the bladder. At this point, we were satisfied that we got adequate amount of release to extend the myofascial flap past the midline. We then under direct visualization, inserted 3 more 8-mm trocars in the left side of the abdomen in a similar fashion and then docked the robot over the left side again to work from left to right. We started our dissection again working from medial to lateral, identifying the medial border at the left rectus muscle and then dissecting laterally in the retrorectus plane, until we identified the edge of the fascial line of the semilunaris fascial confluence and then we entered into the preperitoneal space, releasing the posterior lamella of the internal oblique and transversalis muscle and dissecting both cephalad and caudad. We were able to connect our left sided dissection at the xyphoid and the posterior bladder sulcus. We again preserved the perforating rectus blood supply and branches of the epigastric vessels. We were able to identify both inferior epigastric vessels and preserve them on the left and the right. This completed the release of bilateral myofascial flaps in order to gain midline closure. Suprapubic dissection was met and we extended into the left groin.   We identified the arcuate line, traversed it, and then dissected all the way to the white line of Toldt laterally. We dissected up to the costal margin, past it and then even up to the diaphragm. The falciform ligament was taken down partially until we completely connected our 2 fascial edges. At this point, we satisfied with our dissection. Our fascial edges would meet in the middle with some laxity and then we closed the peritoneal space using a running Stratafix suture, running from cephalad to caudad, until we completely excluded the intraabdominal contents. At this point, we were in the retrorectus plane and we closed the midline defect using a running Stratafix PDS 2-0 barbed suture, running from caudad to cephalad, until we completely reapproximated the fascial edges and the rectus was again touching in the middle. We then used 3-0 vicryl suture to close any peritoneal tears that were made during dissection. There were several separate areas that were closed. This occluded the abdominal organs and the intraperitoneal space from our field and we were now in the rectrorectus space with all of our trocars. We dropped the insufflation pressure to 8 mmHg and at the beginning of the closure of the anterior sheath, the peak airway pressures were 40 and at the completion of the closure that was 40. We used two separate 2-0 PDS barbed sutures to achieve closure. At this point, we had reapproximated the fascial edges. Our defect had been primarily closed and we were excluded from the peritoneal cavity. At this point, we used a piece of 12 inch x 12 inch Bard soft mesh, measured our cavity that we had created, was 30 cm from lateral to medial at the most superior aspect by the costal edges and down into the groin, it was tapered to approximately 20 cm in width. We cut our mesh that was 30 x 30 cm in order to accommodate this tapering. The cephalad to caudad distance was approximately 30 cm.   We then rolled up our mesh, placed it through a 12-mm left upper quadrant trocar. Under direct visualization, we unrolled it, until completely covered and reached both lateral edges of our dissection and all the way down to the prevesicular space as well as up to the subxiphoid area. Mesh laid flat without any kinks, rolls, or bunching. We used 6 g of Selvin over the mesh in attempts to minimize seroma formation. At this point, we decompressed the abdomen under direct visualization, while the mesh remained in place and evacuated all the air from the pre-rectus space. At this point, we removed each one of our trocars, closed the skin incisions using 4-0 Monocryl suture and surgical glue. The patient was then awoken, extubated, and transferred to the postoperative care in stable condition. All instrument counts, lap counts, and needle counts were correct at the completion of the procedure. Dr Adriana Sandoval was essential in performing the case safely and assisted in muscle release, lysis of adhesions, dissection and mesh placement.       Arleen Scott MD, MS  Minimally Invasive and Bariatric Surgery  [phone] (p)  3/25/2021  1:03 PM

## 2021-03-25 NOTE — PROGRESS NOTES
Nutrition Assessment     Type and Reason for Visit: Initial, RD Nutrition Re-Screen/LOS    Nutrition Recommendations/Plan: Monitor nutrition progression     Nutrition Assessment:  Pt admits w/ Dx: SBO, w/PMH of Morbid Obesity and GERD. Pt is NPO and for OR today. Prior to surg. procedure pt p.o. intakes >50-75% most meals.  Will contiue to monitor and follow      Current Nutrition Therapies:    Diet NPO, After Midnight      Nutrition Diagnosis:   · Inadequate energy intake related to altered GI function as evidenced by NPO or clear liquid status due to medical condition, intake 51-75%      Nutrition Interventions:   Food and/or Nutrient Delivery:  Continue NPO(advance diet and add ONS when medically appropriate)  Nutrition Education/Counseling:  No recommendation at this time   Coordination of Nutrition Care:  Continue to monitor while inpatient    Goals:  Nutrition Progression       Nutrition Monitoring and Evaluation:   Behavioral-Environmental Outcomes:      Food/Nutrient Intake Outcomes:  Diet Advancement/Tolerance  Physical Signs/Symptoms Outcomes:  Biochemical Data, GI Status, Fluid Status or Edema, Nutrition Focused Physical Findings, Skin, Weight     Discharge Planning:    Continue current diet, Continue Oral Nutrition Supplement     Electronically signed by Ary Heller RD, LD on 3/25/21 at 10:56 AM EDT    Contact: [phone]

## 2021-03-25 NOTE — PLAN OF CARE
Problem: Pain:  Goal: Pain level will decrease  Description: Pain level will decrease  Outcome: Not Met This Shift  Goal: Control of acute pain  Description: Control of acute pain  Outcome: Not Met This Shift     Problem: Infection - Surgical Site:  Goal: Will show no infection signs and symptoms  Description: Will show no infection signs and symptoms  Outcome: Not Met This Shift

## 2021-03-25 NOTE — ANESTHESIA POSTPROCEDURE EVALUATION
Department of Anesthesiology  Postprocedure Note    Patient: Roxane Parmar  MRN: [id number]  YOB: 1970  Date of evaluation: 3/25/2021  Time:  3:23 PM     Procedure Summary     Date: 03/25/21 Room / Location: [facility] [address] / [address] [facility]    Anesthesia Start: 4139 Anesthesia Stop: 4328    Procedure: [address] [place], POSSIBLE COMPONENT SEPARATION  LAPAROSCOPIC ROBOTIC XI ASSISTED (CPT 86885) (N/A ) Diagnosis: (INCISIONAL HERNIA)    Surgeons: Zak Najera MD Responsible Provider: Rickey Tam MD    Anesthesia Type: general ASA Status: 3          Anesthesia Type: general    Russell Phase I: Russell Score: 8    Russell Phase II:      Last vitals: Reviewed and per EMR flowsheets. Anesthesia Post Evaluation    Patient location during evaluation: PACU  Patient participation: complete - patient participated  Level of consciousness: awake  Pain score: 0  Airway patency: patent  Nausea & Vomiting: no nausea  Complications: no  Cardiovascular status: blood pressure returned to baseline  Respiratory status: acceptable  Hydration status: euvolemic  Comments: Her Laryngeal aperture is very small and 1.5 inch deeper that the epiglottis, it allows a maximum ETT size of 7:00, preferably use ETT size 6.5.

## 2021-03-25 NOTE — PLAN OF CARE
Problem: Pain:  Goal: Pain level will decrease  Description: Pain level will decrease  3/25/2021 0257 by Todd Terrazas RN  Outcome: Met This Shift     Problem: Pain:  Goal: Control of acute pain  Description: Control of acute pain  3/25/2021 0257 by Todd Terrazas RN  Outcome: Met This Shift     Problem:  Bowel/Gastric:  Goal: Control of bowel function will improve  Description: Control of bowel function will improve  3/25/2021 0257 by Todd Terrazas RN  Outcome: Met This Shift

## 2021-03-25 NOTE — ANESTHESIA PRE PROCEDURE
Department of Anesthesiology  Preprocedure Note       Name:  Omer Piedra   Age:  48 y.o.  :  1970                                          MRN:  [id number]         Date:  3/25/2021      Surgeon: Esme Lawson):  Melissa Cruz MD    Procedure: Procedure(s):  INCISIONAL HERNIA REPAIR WITH MESH, POSSIBLE COMPONENT SEPARATION  LAPAROSCOPIC ROBOTIC XI ASSISTED (CPT 83594)    Medications prior to admission:   Prior to Admission medications    Medication Sig Start Date End Date Taking?  Authorizing Provider   traZODone (DESYREL) 100 MG tablet  21  Yes Historical Provider, MD   traZODone (DESYREL) 50 MG tablet  20  Yes Historical Provider, MD   rOPINIRole (REQUIP) 0.25 MG tablet  21  Yes Historical Provider, MD   bumetanide (BUMEX) 1 MG tablet TAKE ONE TABLET 2 TIMES A DAY 19  Yes Historical Provider, MD   hydrOXYzine (ATARAX) 50 MG tablet Take 50 mg by mouth nightly  21   Historical Provider, MD   brexpiprazole (REXULTI) 0.5 MG TABS tablet Take 0.5 mg by mouth daily    Historical Provider, MD   PARoxetine (PAXIL) 10 MG tablet Take 10 mg by mouth every morning    Historical Provider, MD       Current medications:    Current Facility-Administered Medications   Medication Dose Route Frequency Provider Last Rate Last Admin    oxyCODONE (ROXICODONE) immediate release tablet 5 mg  5 mg Oral Q4H PRN Hamzah Alexandra DO   5 mg at 21 0133    [Held by provider] bumetanide (BUMEX) tablet 1 mg  1 mg Oral Daily Gary Lopez DO   1 mg at 21 0815    Benzocaine-Menthol (CEPACOL) 1 lozenge  1 lozenge Oral Q2H PRN Venus Carranza MD   1 lozenge at 21 0409    brexpiprazole (REXULTI) tablet 0.5 mg  0.5 mg Oral Daily Venus Carranza MD   0.5 mg at 21 0938    metoclopramide (REGLAN) tablet 5 mg  5 mg Oral 4x Daily AC & HS Gary Lopez DO   5 mg at 21 205    nicotine (NICODERM CQ) 7 MG/24HR 1 patch  1 patch Transdermal Daily Gary Lopez DO   1 patch at 21 0563    levothyroxine (SYNTHROID) tablet 50 mcg  50 mcg Oral Daily Dayanara Lopez, DO   50 mcg at 03/24/21 4529    hydrOXYzine (ATARAX) tablet 50 mg  50 mg Oral Nightly Esthela Tran MD   50 mg at 03/24/21 2058    PARoxetine (PAXIL) tablet 10 mg  10 mg Oral QAM Esthela Tran MD   10 mg at 03/24/21 0937    traZODone (DESYREL) tablet 50 mg  50 mg Oral Nightly PRN Esthela Tran MD        sodium chloride flush 0.9 % injection 10 mL  10 mL Intravenous 2 times per day Esthela Tran MD   10 mL at 03/24/21 2059    sodium chloride flush 0.9 % injection 10 mL  10 mL Intravenous PRN Esthela Tran MD   10 mL at 03/22/21 2029    ondansetron (ZOFRAN-ODT) disintegrating tablet 4 mg  4 mg Oral Q8H PRN Esthela Tran MD   4 mg at 03/22/21 1144    Or    ondansetron (ZOFRAN) injection 4 mg  4 mg Intravenous Q6H PRN Esthela Tran MD   4 mg at 03/24/21 1653    enoxaparin (LOVENOX) injection 40 mg  40 mg Subcutaneous Daily Esthela Tran MD   40 mg at 03/24/21 0937    HYDROmorphone HCl PF (DILAUDID) injection 0.25 mg  0.25 mg Intravenous Q3H PRN Esthela Tran MD        Or    HYDROmorphone HCl PF (DILAUDID) injection 0.5 mg  0.5 mg Intravenous Q3H PRN Esthela Tran MD   0.5 mg at 03/25/21 0611    promethazine (PHENERGAN) injection 25 mg  25 mg Intramuscular Q6H PRN Isabelle Daub, DO   25 mg at 03/24/21 2157    pantoprazole (PROTONIX) injection 40 mg  40 mg Intravenous BID Isabelle Daub, DO   40 mg at 03/24/21 2058       Allergies:     Allergies   Allergen Reactions    Ibuprofen Hives    Nsaids Hives    Morphine Hives and Anxiety       Problem List:    Patient Active Problem List   Diagnosis Code    GERD (gastroesophageal reflux disease) K21.9    Osteoarthritis cervical spine M47.812    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    Morbid obesity ([address] E66.01    Hypokalemia E87.6    Venous insufficiency I87.2    SBO (small bowel obstruction) ([facility] [address] K56.609    Incarcerated hernia K46.0    Intractable abdominal pain R10.9       Past Medical History:        Diagnosis Date    Arthritis     Bursitis     hips    Class 3 severe obesity due to excess calories with body mass index (BMI) of 40.0 to 44.9 in adult [facility])     COPD (chronic obstructive pulmonary disease) (HCC)     Diverticulitis     GERD (gastroesophageal reflux disease)     Incisional hernia with obstruction but no gangrene     Strep throat        Past Surgical History:        Procedure Laterality Date     SECTION      CHOLECYSTECTOMY      COLECTOMY  2016    FOREARM FRACTURE SURGERY Left     fracture of ulna s/p repair    HERNIA REPAIR      HERNIA REPAIR  2015    incarcerated hernia repair    HYSTERECTOMY      NERVE BLOCK      sacroiliac bilateral 2012    PELVIC FRACTURE SURGERY      VENTRAL HERNIA REPAIR  2015       Social History:    Social History     Tobacco Use    Smoking status: Former Smoker     Packs/day: 1.00     Years: 30.00     Pack years: 30.00     Types: Cigarettes     Quit date: 3/5/2021     Years since quittin.0    Smokeless tobacco: Never Used   Substance Use Topics    Alcohol use: Yes     Comment: daily vodka drinker                                Counseling given: Not Answered      Vital Signs (Current):   Vitals:    21 0530 21 0800 21 1653 21 0530   BP: 127/87  135/77 114/72   Pulse: 66 69 70 65   Resp: 16  16 18   Temp: 97.8 °F (36.6 °C)  98.2 °F (36.8 °C) 98.3 °F (36.8 °C)   TempSrc: Oral  Oral Oral   SpO2: 96% 97% 96% 95%   Weight:       Height:                                                  BP Readings from Last 3 Encounters:   21 114/72   21 124/64   21 (!) 140/84       NPO Status:                                                                                 BMI:   Wt Readings from Last 3 Encounters:   21 220 lb (99.8 kg)   21 240 lb (108.9 kg)   21 235 lb (106.6 kg)     Body mass index is 40.24 kg/m².    CBC:   Lab Results   Component Value Date    WBC 4.1 03/25/2021    RBC 4.51 03/25/2021    HGB 14.9 03/25/2021    HCT 46.5 03/25/2021    .1 03/25/2021    RDW 14.9 03/25/2021     03/25/2021       CMP:   Lab Results   Component Value Date     03/25/2021    K 4.8 03/25/2021    K 4.7 03/19/2021    CL 98 03/25/2021    CO2 30 03/25/2021    BUN 18 03/25/2021    CREATININE 1.1 03/25/2021    GFRAA >60 03/25/2021    LABGLOM 52 03/25/2021    GLUCOSE 93 03/25/2021    GLUCOSE 89 09/14/2011    PROT 6.7 03/25/2021    CALCIUM 9.5 03/25/2021    BILITOT 0.4 03/25/2021    ALKPHOS 73 03/25/2021    AST 81 03/25/2021    ALT 93 03/25/2021       POC Tests: No results for input(s): POCGLU, POCNA, POCK, POCCL, POCBUN, POCHEMO, POCHCT in the last 72 hours. Coags:   Lab Results   Component Value Date    PROTIME 11.2 03/19/2021    INR 1.0 03/19/2021       HCG (If Applicable):   Lab Results   Component Value Date    PREGTESTUR NEGATIVE 01/17/2018        ABGs: No results found for: PHART, PO2ART, IWH1XEN, QJI6NXQ, BEART, X2IWTOUU     Type & Screen (If Applicable):  No results found for: LABABO, LABRH    Drug/Infectious Status (If Applicable):  No results found for: HIV, HEPCAB    COVID-19 Screening (If Applicable):   Lab Results   Component Value Date    COVID19 Not Detected 03/10/2021           Anesthesia Evaluation  Patient summary reviewed  Airway: Mallampati: I  TM distance: >3 FB   Neck ROM: full  Mouth opening: > = 3 FB Dental:          Pulmonary: breath sounds clear to auscultation  (+) COPD:                            ROS comment: Strep Throat. Former Smoker. Cardiovascular:            Rhythm: regular  Rate: normal                    Neuro/Psych:                ROS comment: Buirsitis. GI/Hepatic/Renal:   (+) GERD:,          ROS comment: Diverticulitis  . Endo/Other:    (+) : arthritis:., .                  ROS comment: Obesity BMI 44.5. Abdominal:   (+) obese,         Vascular: negative vascular ROS.

## 2021-03-26 LAB
ALBUMIN SERPL-MCNC: 3.7 G/DL (ref 3.5–5.2)
ALP BLD-CCNC: 62 U/L (ref 35–104)
ALT SERPL-CCNC: 95 U/L (ref 0–32)
ANION GAP SERPL CALCULATED.3IONS-SCNC: 13 MMOL/L (ref 7–16)
AST SERPL-CCNC: 93 U/L (ref 0–31)
BASOPHILS ABSOLUTE: 0.03 E9/L (ref 0–0.2)
BASOPHILS RELATIVE PERCENT: 0.6 % (ref 0–2)
BILIRUB SERPL-MCNC: 0.8 MG/DL (ref 0–1.2)
BILIRUBIN DIRECT: <0.2 MG/DL (ref 0–0.3)
BILIRUBIN, INDIRECT: ABNORMAL MG/DL (ref 0–1)
BUN BLDV-MCNC: 14 MG/DL (ref 6–20)
CALCIUM SERPL-MCNC: 8.7 MG/DL (ref 8.6–10.2)
CHLORIDE BLD-SCNC: 97 MMOL/L (ref 98–107)
CK MB: 1.5 NG/ML (ref 0–4.3)
CO2: 26 MMOL/L (ref 22–29)
CREAT SERPL-MCNC: 1 MG/DL (ref 0.5–1)
EOSINOPHILS ABSOLUTE: 0.02 E9/L (ref 0.05–0.5)
EOSINOPHILS RELATIVE PERCENT: 0.4 % (ref 0–6)
GFR AFRICAN AMERICAN: >60
GFR NON-AFRICAN AMERICAN: 59 ML/MIN/1.73
GLUCOSE BLD-MCNC: 138 MG/DL (ref 74–99)
HCT VFR BLD CALC: 43.1 % (ref 34–48)
HEMOGLOBIN: 14.2 G/DL (ref 11.5–15.5)
IMMATURE GRANULOCYTES #: 0.02 E9/L
IMMATURE GRANULOCYTES %: 0.4 % (ref 0–5)
LYMPHOCYTES ABSOLUTE: 0.6 E9/L (ref 1.5–4)
LYMPHOCYTES RELATIVE PERCENT: 11 % (ref 20–42)
MCH RBC QN AUTO: 33.7 PG (ref 26–35)
MCHC RBC AUTO-ENTMCNC: 32.9 % (ref 32–34.5)
MCV RBC AUTO: 102.4 FL (ref 80–99.9)
MONOCYTES ABSOLUTE: 1.03 E9/L (ref 0.1–0.95)
MONOCYTES RELATIVE PERCENT: 19 % (ref 2–12)
NEUTROPHILS ABSOLUTE: 3.73 E9/L (ref 1.8–7.3)
NEUTROPHILS RELATIVE PERCENT: 68.6 % (ref 43–80)
PDW BLD-RTO: 15.1 FL (ref 11.5–15)
PLATELET # BLD: 158 E9/L (ref 130–450)
PMV BLD AUTO: 10.2 FL (ref 7–12)
POTASSIUM SERPL-SCNC: 4.7 MMOL/L (ref 3.5–5)
RBC # BLD: 4.21 E12/L (ref 3.5–5.5)
SODIUM BLD-SCNC: 136 MMOL/L (ref 132–146)
TOTAL CK: 328 U/L (ref 20–180)
TOTAL PROTEIN: 6.4 G/DL (ref 6.4–8.3)
TROPONIN: <0.01 NG/ML (ref 0–0.03)
WBC # BLD: 5.4 E9/L (ref 4.5–11.5)

## 2021-03-26 PROCEDURE — 97165 OT EVAL LOW COMPLEX 30 MIN: CPT

## 2021-03-26 PROCEDURE — 97530 THERAPEUTIC ACTIVITIES: CPT

## 2021-03-26 PROCEDURE — C9113 INJ PANTOPRAZOLE SODIUM, VIA: HCPCS | Performed by: SURGERY

## 2021-03-26 PROCEDURE — 6360000002 HC RX W HCPCS: Performed by: SURGERY

## 2021-03-26 PROCEDURE — 6370000000 HC RX 637 (ALT 250 FOR IP): Performed by: STUDENT IN AN ORGANIZED HEALTH CARE EDUCATION/TRAINING PROGRAM

## 2021-03-26 PROCEDURE — 6370000000 HC RX 637 (ALT 250 FOR IP): Performed by: SURGERY

## 2021-03-26 PROCEDURE — 85025 COMPLETE CBC W/AUTO DIFF WBC: CPT

## 2021-03-26 PROCEDURE — 84484 ASSAY OF TROPONIN QUANT: CPT

## 2021-03-26 PROCEDURE — 82550 ASSAY OF CK (CPK): CPT

## 2021-03-26 PROCEDURE — 82553 CREATINE MB FRACTION: CPT

## 2021-03-26 PROCEDURE — 82248 BILIRUBIN DIRECT: CPT

## 2021-03-26 PROCEDURE — 97530 THERAPEUTIC ACTIVITIES: CPT | Performed by: PHYSICAL THERAPIST

## 2021-03-26 PROCEDURE — 2580000003 HC RX 258: Performed by: SURGERY

## 2021-03-26 PROCEDURE — 36415 COLL VENOUS BLD VENIPUNCTURE: CPT

## 2021-03-26 PROCEDURE — 1200000000 HC SEMI PRIVATE

## 2021-03-26 PROCEDURE — 80053 COMPREHEN METABOLIC PANEL: CPT

## 2021-03-26 PROCEDURE — 97161 PT EVAL LOW COMPLEX 20 MIN: CPT | Performed by: PHYSICAL THERAPIST

## 2021-03-26 RX ORDER — CYCLOBENZAPRINE HCL 10 MG
10 TABLET ORAL 3 TIMES DAILY PRN
Status: DISCONTINUED | OUTPATIENT
Start: 2021-03-26 | End: 2021-03-29 | Stop reason: HOSPADM

## 2021-03-26 RX ORDER — OXYCODONE HYDROCHLORIDE 5 MG/1
10 TABLET ORAL EVERY 4 HOURS PRN
Status: DISCONTINUED | OUTPATIENT
Start: 2021-03-26 | End: 2021-03-29 | Stop reason: HOSPADM

## 2021-03-26 RX ORDER — SODIUM CHLORIDE 9 MG/ML
INJECTION, SOLUTION INTRAVENOUS CONTINUOUS
Status: DISCONTINUED | OUTPATIENT
Start: 2021-03-26 | End: 2021-03-27

## 2021-03-26 RX ORDER — OXYCODONE HYDROCHLORIDE 5 MG/1
5 TABLET ORAL EVERY 4 HOURS PRN
Status: DISCONTINUED | OUTPATIENT
Start: 2021-03-26 | End: 2021-03-29 | Stop reason: HOSPADM

## 2021-03-26 RX ORDER — DOCUSATE SODIUM 100 MG/1
100 CAPSULE, LIQUID FILLED ORAL DAILY
Status: DISCONTINUED | OUTPATIENT
Start: 2021-03-26 | End: 2021-03-29 | Stop reason: HOSPADM

## 2021-03-26 RX ORDER — SENNA PLUS 8.6 MG/1
1 TABLET ORAL NIGHTLY
Status: DISCONTINUED | OUTPATIENT
Start: 2021-03-26 | End: 2021-03-29 | Stop reason: HOSPADM

## 2021-03-26 RX ADMIN — METHOCARBAMOL 1000 MG: 500 TABLET, FILM COATED ORAL at 18:46

## 2021-03-26 RX ADMIN — ONDANSETRON 4 MG: 2 INJECTION INTRAMUSCULAR; INTRAVENOUS at 03:23

## 2021-03-26 RX ADMIN — OXYCODONE 10 MG: 5 TABLET ORAL at 21:32

## 2021-03-26 RX ADMIN — HYDROMORPHONE HYDROCHLORIDE 0.5 MG: 1 INJECTION, SOLUTION INTRAMUSCULAR; INTRAVENOUS; SUBCUTANEOUS at 19:53

## 2021-03-26 RX ADMIN — SODIUM CHLORIDE, PRESERVATIVE FREE 10 ML: 5 INJECTION INTRAVENOUS at 13:59

## 2021-03-26 RX ADMIN — DOCUSATE SODIUM 100 MG: 100 CAPSULE, LIQUID FILLED ORAL at 08:41

## 2021-03-26 RX ADMIN — METHOCARBAMOL 1000 MG: 500 TABLET, FILM COATED ORAL at 01:29

## 2021-03-26 RX ADMIN — HYDROMORPHONE HYDROCHLORIDE 0.5 MG: 1 INJECTION, SOLUTION INTRAMUSCULAR; INTRAVENOUS; SUBCUTANEOUS at 00:23

## 2021-03-26 RX ADMIN — METHOCARBAMOL 1000 MG: 500 TABLET, FILM COATED ORAL at 23:43

## 2021-03-26 RX ADMIN — PAROXETINE HYDROCHLORIDE 10 MG: 20 TABLET, FILM COATED ORAL at 08:35

## 2021-03-26 RX ADMIN — HYDROXYZINE HYDROCHLORIDE 50 MG: 25 TABLET, FILM COATED ORAL at 21:32

## 2021-03-26 RX ADMIN — METOCLOPRAMIDE 5 MG: 5 TABLET ORAL at 12:58

## 2021-03-26 RX ADMIN — ENOXAPARIN SODIUM 40 MG: 40 INJECTION SUBCUTANEOUS at 08:34

## 2021-03-26 RX ADMIN — METOCLOPRAMIDE 5 MG: 5 TABLET ORAL at 17:19

## 2021-03-26 RX ADMIN — PANTOPRAZOLE SODIUM 40 MG: 40 INJECTION, POWDER, FOR SOLUTION INTRAVENOUS at 23:42

## 2021-03-26 RX ADMIN — HYDROMORPHONE HYDROCHLORIDE 0.5 MG: 1 INJECTION, SOLUTION INTRAMUSCULAR; INTRAVENOUS; SUBCUTANEOUS at 23:03

## 2021-03-26 RX ADMIN — SODIUM CHLORIDE: 9 INJECTION, SOLUTION INTRAVENOUS at 15:40

## 2021-03-26 RX ADMIN — METOCLOPRAMIDE 5 MG: 5 TABLET ORAL at 06:33

## 2021-03-26 RX ADMIN — OXYCODONE 10 MG: 5 TABLET ORAL at 08:35

## 2021-03-26 RX ADMIN — METOCLOPRAMIDE 5 MG: 5 TABLET ORAL at 21:32

## 2021-03-26 RX ADMIN — SENNOSIDES 8.6 MG: 8.6 TABLET, FILM COATED ORAL at 21:32

## 2021-03-26 RX ADMIN — OXYCODONE 10 MG: 5 TABLET ORAL at 12:59

## 2021-03-26 RX ADMIN — PANTOPRAZOLE SODIUM 40 MG: 40 INJECTION, POWDER, FOR SOLUTION INTRAVENOUS at 08:42

## 2021-03-26 RX ADMIN — OXYCODONE 10 MG: 5 TABLET ORAL at 17:18

## 2021-03-26 RX ADMIN — HYDROMORPHONE HYDROCHLORIDE 0.5 MG: 1 INJECTION, SOLUTION INTRAMUSCULAR; INTRAVENOUS; SUBCUTANEOUS at 06:34

## 2021-03-26 RX ADMIN — HYDROMORPHONE HYDROCHLORIDE 0.5 MG: 1 INJECTION, SOLUTION INTRAMUSCULAR; INTRAVENOUS; SUBCUTANEOUS at 03:28

## 2021-03-26 RX ADMIN — PROMETHAZINE HYDROCHLORIDE 25 MG: 25 INJECTION INTRAMUSCULAR; INTRAVENOUS at 00:27

## 2021-03-26 RX ADMIN — BREXPIPRAZOLE 0.5 MG: 2 TABLET ORAL at 08:32

## 2021-03-26 RX ADMIN — METHOCARBAMOL 1000 MG: 500 TABLET, FILM COATED ORAL at 12:58

## 2021-03-26 RX ADMIN — HYDROMORPHONE HYDROCHLORIDE 0.5 MG: 1 INJECTION, SOLUTION INTRAMUSCULAR; INTRAVENOUS; SUBCUTANEOUS at 13:57

## 2021-03-26 RX ADMIN — LEVOTHYROXINE SODIUM 50 MCG: 50 TABLET ORAL at 06:33

## 2021-03-26 RX ADMIN — HYDROMORPHONE HYDROCHLORIDE 0.5 MG: 1 INJECTION, SOLUTION INTRAMUSCULAR; INTRAVENOUS; SUBCUTANEOUS at 10:07

## 2021-03-26 ASSESSMENT — PAIN DESCRIPTION - ONSET
ONSET: ON-GOING
ONSET: ON-GOING

## 2021-03-26 ASSESSMENT — PAIN DESCRIPTION - LOCATION
LOCATION: ABDOMEN
LOCATION: ABDOMEN

## 2021-03-26 ASSESSMENT — PAIN SCALES - GENERAL
PAINLEVEL_OUTOF10: 0
PAINLEVEL_OUTOF10: 10
PAINLEVEL_OUTOF10: 0
PAINLEVEL_OUTOF10: 10

## 2021-03-26 ASSESSMENT — PAIN DESCRIPTION - FREQUENCY
FREQUENCY: CONTINUOUS

## 2021-03-26 ASSESSMENT — PAIN DESCRIPTION - PROGRESSION
CLINICAL_PROGRESSION: NOT CHANGED
CLINICAL_PROGRESSION: NOT CHANGED
CLINICAL_PROGRESSION: GRADUALLY WORSENING

## 2021-03-26 ASSESSMENT — PAIN - FUNCTIONAL ASSESSMENT
PAIN_FUNCTIONAL_ASSESSMENT: PREVENTS OR INTERFERES SOME ACTIVE ACTIVITIES AND ADLS
PAIN_FUNCTIONAL_ASSESSMENT: PREVENTS OR INTERFERES SOME ACTIVE ACTIVITIES AND ADLS

## 2021-03-26 ASSESSMENT — PAIN DESCRIPTION - PAIN TYPE
TYPE: SURGICAL PAIN
TYPE: SURGICAL PAIN;ACUTE PAIN
TYPE: ACUTE PAIN;SURGICAL PAIN

## 2021-03-26 ASSESSMENT — PAIN DESCRIPTION - DESCRIPTORS: DESCRIPTORS: CRAMPING;DISCOMFORT;DULL

## 2021-03-26 NOTE — PROGRESS NOTES
GENERAL SURGERY  DAILY PROGRESS NOTE  3/26/2021    Subjective:  Doing ok this AM, having significant pain, didn't sleep well, on 5L through facemask this morning    Objective:  /77   Pulse 99   Temp 98.8 °F (37.1 °C) (Oral)   Resp 24   Ht 5' 2\" (1.575 m)   Wt 220 lb (99.8 kg)   SpO2 90%   BMI 40.24 kg/m²     GENERAL:  Laying in bed, awake, alert, cooperative, no apparent distress  HEAD: Normocephalic, atraumatic  EYES: No sclera icterus, pupils equal  LUNGS:  5L through face mask  CARDIOVASCULAR:  RR  ABDOMEN:  Soft, tender at midline, incisions C/D/I, binder in place  EXTREMITIES: edema  SKIN: Warm and dry    Assessment/Plan:  48 y.o. female s/p ventral hernia repair, component separation     Add flexeril today  Keep sam until ambulating better  PT/OT  Increase caterina  Continue to wean O2    Electronically signed by Barber Cruz, DO on 3/26/2021 at 7:23 AM      As above  Nausea, postop ileus as expected  IVF till tolerating PO  Sam out  Out of bed  Wean 02 as tolerated  Binder while walking can have unstrapped while in bed to fascilitate [facility]  Await return of bowel function    Juana Reynolds MD, MS  Minimally Invasive and Bariatric Surgery  [phone] (p)  3/26/2021  2:29 PM

## 2021-03-26 NOTE — PROGRESS NOTES
Internal Medicine Progress Note    ANNALEE=[facility]    Lizbeth Garner. Aviva Wyatt, MANAOManoloIManolo Villela D.O., HILARIA Quarles, MSN, APRN, NP-C  Meseret Henson, MSN, APRN-CNP     Primary Care Physician: Rosa Levy DO   Admitting Physician:  Dino Cardona MD  Admission date and time: 3/18/2021  4:41 PM    Room:  [address] [place] [address]  Admitting diagnosis: SBO (small bowel obstruction) [facility]) [K56.609]    Patient Name: Sharon Howard  MRN: [id number]    Date of Service: 3/26/2021     Subjective:  Hu Dumont is a 48 y.o. female who was seen and examined today,3/26/2021, at the bedside. Patient seem to be doing well. She does have normal amount of postop pain and tolerating surgery satisfactory. Currently using the incentive spirometry. Seems in relatively good spirits      No family members are present      Review of System:   Constitutional:   Denies fever or chills, weight loss or gain, fatigue or malaise. HEENT:   Denies ear pain, sore throat, sinus or eye problems. Cardiovascular:   Denies any chest pain, irregular heartbeats, or palpitations. Respiratory:   Denies shortness of breath, coughing, sputum production, hemoptysis, or wheezing. Gastrointestinal:   Ongoing abdominal discomfort. Resolved nausea per surgical pain  Genitourinary:    Denies any urgency, frequency, hematuria. Voiding  without difficulty. Extremities:   Denies lower extremity swelling, edema or cyanosis. Neurology:    Denies any headache or focal neurological deficits, Denies generalized weakness or memory difficulty. Psch:   Denies being anxious or depressed. Musculoskeletal:    Denies  myalgias, joint complaints or back pain. Integumentary:   Denies any rashes, ulcers, or excoriations. Denies bruising. Hematologic/Lymphatic:  Denies bruising or bleeding. Physical Exam:  No intake/output data recorded.     Intake/Output Summary (Last 24 hours) at 3/26/2021 [address] [facility] filed at 3/26/2021 3262  Gross per 24 hour   Intake 1970 ml   Output 1290 ml   Net 680 ml   I/O last 3 completed shifts: In: 1970 [P.O.:570; I.V.:1400]  Out: 1290 [Urine:1290]  No data found. Vital Signs:   Blood pressure 119/71, pulse 101, temperature 99.2 °F (37.3 °C), temperature source Oral, resp. rate 20, height 5' 2\" (1.575 m), weight 220 lb (99.8 kg), SpO2 92 %. General appearance:  Alert, responsive, oriented to person, place, and time. Well preserved, alert, no distress. Head:  Normocephalic. No masses, lesions or tenderness. Eyes:  PERRLA. EOMI. Sclera clear. Buccal mucosa moist.  Wearing supplemental O2  ENT:  Ears normal. Mucosa normal.   Neck:    Supple. Trachea midline. No thyromegaly. No JVD. No bruits. Heart:    Rhythm regular. Rate controlled. No murmurs. Lungs:    Symmetrical. Clear to auscultation bilaterally. No wheezes. No rhonchi. No rales. Abdomen:   Soft. Mildly tender to palpation diffusely with voluntary guarding elicited. Hypoactive bowel sounds. Post surgical findings  Extremities:    Peripheral pulses present. No peripheral edema. No ulcers. No cyanosis. No clubbing. Neurologic:    Alert x 3. No focal deficit. Cranial nerves grossly intact. No focal weakness. Psych:   Behavior is normal. Mood appears normal. Speech is not rapid and/or pressured. Musculoskeletal:   Spine ROM normal. Muscular strength intact. Gait not assessed. Integumentary:  No rashes  Skin normal color and texture.   Genitalia/Breast:  Deferred    Medication:  Scheduled Meds:   docusate sodium  100 mg Oral Daily    senna  1 tablet Oral Nightly    enoxaparin  40 mg Subcutaneous Daily    methocarbamol  1,000 mg Oral Q6H    [Held by provider] bumetanide  1 mg Oral Daily    brexpiprazole  0.5 mg Oral Daily    metoclopramide  5 mg Oral 4x Daily AC & HS    nicotine  1 patch Transdermal Daily    levothyroxine  50 mcg Oral Daily    hydrOXYzine  50 mg Oral Nightly    PARoxetine 10 mg Oral QAM    sodium chloride flush  10 mL Intravenous 2 times per day    pantoprazole  40 mg Intravenous BID     Continuous Infusions:      Objective Data:  CBC with Differential:    Lab Results   Component Value Date    WBC 5.4 03/26/2021    RBC 4.21 03/26/2021    HGB 14.2 03/26/2021    HCT 43.1 03/26/2021     03/26/2021    .4 03/26/2021    MCH 33.7 03/26/2021    MCHC 32.9 03/26/2021    RDW 15.1 03/26/2021    SEGSPCT 82 09/14/2011    LYMPHOPCT 11.0 03/26/2021    MONOPCT 19.0 03/26/2021    BASOPCT 0.6 03/26/2021    MONOSABS 1.03 03/26/2021    LYMPHSABS 0.60 03/26/2021    EOSABS 0.02 03/26/2021    BASOSABS 0.03 03/26/2021     CMP:    Lab Results   Component Value Date     03/26/2021    K 4.7 03/26/2021    K 4.7 03/19/2021    CL 97 03/26/2021    CO2 26 03/26/2021    BUN 14 03/26/2021    CREATININE 1.0 03/26/2021    GFRAA >60 03/26/2021    LABGLOM 59 03/26/2021    GLUCOSE 138 03/26/2021    GLUCOSE 89 09/14/2011    PROT 6.4 03/26/2021    LABALBU 3.7 03/26/2021    CALCIUM 8.7 03/26/2021    BILITOT 0.8 03/26/2021    ALKPHOS 62 03/26/2021    AST 93 03/26/2021    ALT 95 03/26/2021       Assessment:      · Small bowel obstruction with nonreducible ventral abdominal hernia. Status post laparoscopic robotic assisted transabdominal retrorectus repair of incisional hernia with extensive lysis of adhesion by Dr. Saul Carcamo on March 25  ·  Left cubital and left carpal tunnel syndrome  · Oxygen dependent COPD with chronic respiratory failure  · Mild pulmonary hypertension  · Gastroesophageal reflux disease  · Obesity  · History of tobacco abuse; recently quit  · Daily alcohol consumption    Plan:     · Patient doing well with surgery  · Lab work appear to be satisfactory  · Encourage ambulation  · DVT prophylaxis  · Continue use incentive spirometry        More than 50% of my time was spent at the bedside counseling/coordinating care with the patient and/or family with face to face contact.   This time was spent reviewing notes and laboratory data as well as instructing and counseling the patient. Time I spent with the family or surrogate(s) is included only if the patient was incapable of providing the necessary information or participating in medical decisions. I also discussed the differential diagnosis and all of the proposed management plans with the patient and individuals accompanying the patient. I am readily available for any further decision-making and intervention.        Harlan Lopez DO, F.A.C.O.I.  3/26/2021  1:07 PM

## 2021-03-26 NOTE — PROGRESS NOTES
Physical Therapy Initial Evaluation    Room #:  0324/0324-01  Patient Name: Malick Bass  YOB: 1970  MRN: [id number]    Referring Provider:   Luis Carlos Huber DO     Date of Service: 3/26/2021    Evaluating Physical Therapist: Zakia Mason, PT #[id number]       Diagnosis:   SBO (small bowel obstruction) ([address] [A29.124]      1. Laparoscopic robotic-assisted transabdominal retrorectus release with repair of incisional hernia with mesh  2. Extensive lysis of adhesions  3.  Left sided abdominal wall posterior component separation with myofascial flap mobilization and placement  Right sided abdominal wall posterior component separation with myofascial flap mobilization and placement    Patient Active Problem List   Diagnosis    GERD (gastroesophageal reflux disease)    Osteoarthritis cervical spine    Osteoarthritis of lumbar spine    Small bowel obstruction ([address]    Morbid obesity ([address]    Hypokalemia    Venous insufficiency    SBO (small bowel obstruction) ([address]    Incarcerated hernia    Intractable abdominal pain    Incarcerated incisional hernia    Disruption or dehiscence of closure of fascia, superficial or muscular        Tentative placement recommendation: Home Health Physical Therapy     Equipment recommendation: Wheeled Walker and Tub transfer bench      Prior Level of Function: Patient ambulated independently    Rehab Potential: good  - for baseline    Past medical history:   Past Medical History:   Diagnosis Date    Arthritis     Bursitis     hips    Class 3 severe obesity due to excess calories with body mass index (BMI) of 40.0 to 44.9 in adult [facility])     COPD (chronic obstructive pulmonary disease) ([address]     Diverticulitis     GERD (gastroesophageal reflux disease)     Incisional hernia with obstruction but no gangrene     Strep throat      Past Surgical History:   Procedure Laterality Date     SECTION      CHOLECYSTECTOMY      COLECTOMY  2016    FOREARM FRACTURE SURGERY Left     fracture of ulna s/p repair    HERNIA REPAIR      HERNIA REPAIR  08/25/2015    incarcerated hernia repair    HERNIA REPAIR N/A 3/25/2021    INCISIONAL HERNIA REPAIR WITH MESH, POSSIBLE COMPONENT SEPARATION  LAPAROSCOPIC ROBOTIC XI ASSISTED (CPT 87331) [facility] [address] [facility]      sacroiliac bilateral 12-    PELVIC FRACTURE SURGERY      VENTRAL HERNIA REPAIR  08/25/2015       Precautions: Up as tolerated, falls ,     Laparoscopic robotic-assisted transabdominal retrorectus release with repair of incisional hernia with mesh,   Extensive lysis of adhesions  Left sided abdominal wall posterior component separation with myofascial flap mobilization and placement  Right sided abdominal wall posterior component separation with myofascial flap mobilization and placement    SUBJECTIVE:    Social history: Patient lives with son   in a two story home bedroom and bathroom 2nd floor full flight steps with rail  with 1 + 3 steps  to enter with Rail  Tub shower grab bars    Equipment owned: Gladis Andres,       [address]   How much difficulty turning over in bed?: A Little  How much difficulty sitting down on / standing up from a chair with arms?: A Little  How much difficulty moving from lying on back to sitting on side of bed?: A Little  How much help from another person moving to and from a bed to a chair?: A Little  How much help from another person needed to walk in hospital room?: A Little  How much help from another person for climbing 3-5 steps with a railing?: A Little  AM-PAC Inpatient Mobility Raw Score : 18  AM-PAC Inpatient T-Scale Score : 43.63  Mobility Inpatient CMS 0-100% Score: 46.58  Mobility Inpatient CMS G-Code Modifier : CK    Nursing cleared patient for PT evaluation.  The admitting diagnosis and active problem list as listed above have been reviewed prior to the initiation of this instructed/facilitated in the following treatment: Patient stood at bedside x 3 minutes supervision for balance, nursing present moving chair/recliner. Ambulated in hallway, returned to room, assisted up to recliner. performed exercises. lower extremities elevated with chair       Therapeutic Exercises:  ankle pumps, quad sets, glut sets and long arc quad  x 15 reps. At end of session, patient in chair with   call light and phone within reach,   all lines and tubes intact, nursing notified. Patient would benefit from continued skilled Physical Therapy to improve functional independence and quality of life. Patient's/ family goals   home        ASSESSMENT: Patient exhibits decreased strength, balance, endurance and pain abdomen impairing functional mobility, transfers, gait , gait distance and tolerance to activity       Plan of Care:     -Bed Mobility: Lower extremity exercises   -Sitting Balance: Incorporate reaching activities to activate trunk muscles   -Standing Balance: Perform strengthening exercises in standing to promote motor control with or without upper extremity support   -Transfers: Provide instruction on proper hand and foot position for adequate transfer of weight onto lower extremities and use of gait device  -Gait: Gait training and Standing activities to improve: base of support, weight shift, weight bearing    -Endurance: Utilize Supervised activities to increase level of endurance to allow for safe functional mobility including transfers and gait   -Stairs: Stair training with instruction on proper technique and hand placement on rail    Patient and or family understand(s) diagnosis, prognosis, and plan of care. Frequency of treatments: Patient will be seen  daily.        Time in  1006  Time out  1028    Total Treatment Time   8 minutes    Evaluation time includes thorough review of current medical information, gathering information on past medical history/social history and prior level of function, completion of standardized testing/informal observation of tasks, assessment of data, and development of Plan of care and goals.      CPT codes:  Low Complexity PT evaluation (55894)  Gait Training (36516) 8 minutes 1 unit(s)    Lux Padilla, PT

## 2021-03-26 NOTE — PROGRESS NOTES
OCCUPATIONAL THERAPY  Initial Evaluation  Date:3/26/2021  Patient Name: Miya Gonsalves  MRN: [id number]  : 1970  ROOM #: 2074/7768-65     Referring Provider: Nemesio Arthur DO  Evaluating OT: Vimal Holland OTR/L [id number]    Placement Recommendation: HHOT   Recommended Adaptive Equipment: wheeled walker, tub transfer bench    AMPAC   AM-PAC Daily Activity Inpatient   How much help for putting on and taking off regular lower body clothing?: A Little  How much help for Bathing?: A Little  How much help for Toileting?: A Little  How much help for putting on and taking off regular upper body clothing?: A Little  How much help for taking care of personal grooming?: A Little  How much help for eating meals?: None  AM-PAC Inpatient Daily Activity Raw Score: 19  AM-PAC Inpatient ADL T-Scale Score : 40.22  ADL Inpatient CMS 0-100% Score: 42.8  ADL Inpatient CMS G-Code Modifier : CK    Based on patient's functional performance as stated below and their level of assistance needed prior to admission, this therapist believes that the patient would benefit from skilled Occupational Therapy following their hospital stay in an effort to increase safety and independence with completion of ADL/IADL tasks for functional independence and quality of life. Diagnosis:   1. Small bowel obstruction ([address]    2. Ventral hernia with obstruction and without gangrene      Pertinent Medical History:   Past Medical History:   Diagnosis Date    Arthritis     Bursitis     hips    Class 3 severe obesity due to excess calories with body mass index (BMI) of 40.0 to 44.9 in adult [facility])     COPD (chronic obstructive pulmonary disease) [facility]     Diverticulitis     GERD (gastroesophageal reflux disease)     Incisional hernia with obstruction but no gangrene     Strep throat       Precautions:  falls, s/p ventral hernia repair, component separation, 4L O2 via NC   Pain Scale: Numeric Rate: 10/10 pain in abdomin; Nursing notified. bedside chair and commode to increase dynamic sitting balance and activities tolerance with Supervision     Standing fair with wheeled walker to improve balance   Endurance: fair     Comments: Upon arrival to the room the patient was seated in bedside chair. At end of the session, the patient was returned to bedside chair. Call light and phone within reach. Pt required verbal cues and instruction as noted above for safe facilitation and completion of tasks. Therapist provided skilled monitoring of the patient's response during treatment session. Prior to and at the end of session, environmental modifications/line management completed for patients safety and efficiency of treatment session. OT services provided to include instruction/training on safety and adapted techniques for completion of transfers and ADL's. Overall, the patient demonstrates difficulties with completion of BADL's and IADL's. Factors contributing to these difficulties includes decreased endurance and generalized weakness. Pt would benefit from continued skilled OT to increase independence with BADL's and IADL's. Treatment:    Bed mobility: Facilitated bed mobility with cues for proper body mechanics and sequencing to prepare for ADL completion. Functional transfers: Facilitated transfers from various surfaces with cues for body alignment, safety and hand placement. ADL completion: Self-care retraining for the above-mentioned ADLs; training on proper hand placement, safety technique, sequencing, and energy conservation techniques. Postural Balance: Sitting and standing balance retraining to improve righting reactions with postural changes during ADLs. Skilled positioning: Proper positioning to improve interaction with environment, overall functioning and decrease/prevent edema and contractures.     Evaluation Complexity:   · Low Complexity  · History: Brief history including review of medical records relating to the problem  · Exam: 1-3 performance Deficits  · Assistance/Modification: No assistance or modifications required to perform tasks. No comorbities affecting occupational performance. Assessment of current deficits:   Functional mobility [x]        ADLs [x]        Strength [x]      Cognition []  Functional transfers  [x]       IADLs [x]        Safety Awareness []      Endurance [x]  Fine Motor Coordination []       Balance [x]       Vision/perception []     Sensation []   Gross Motor Coordination []       ROM []         Delirium []                          Motor Control []    Plan of Care: OT 1-3x/week for 5-7 days PRN   Instruction/training on adapted ADL techniques and AE recommendations to increased functional independence with precautions   Functional transfer/mobility training/DME recommendations for increased independence, safety, and fall prevention    Therapeutic activity to facilitate/challenge dynamic balance, standing tolerance, fine motor dexterity/in-hand manipulation for increased independence with ALD's    Functional Mobility Training   Training on energy conservation techniques/strategies to improve independence/tolerance for self care routine   Positioning to Improve Functional [place], Safety, and Skin Integrity   Patient/family education to increase follow through with safety techniques and functional independence   Therapeutic exercise to improve motor endurance, ROM, and functional strength for ALD's and functional transfers   Splinting/positioning for increased function, prevention of contractures, and improved skin integrity   Recommendation of environmental modifications for increased safety with functional transfers/mobility and ADL's          Rehab Potential: Good for established goals developed with patient and family. Patient / Family Goal: return home      Patient and/or family were instructed on functional diagnosis, prognosis/goals and OT plan of care.  Demonstrated fair understanding. Evaluation time includes thorough review of current medical information, gathering information on past medical history/social history and prior level of function, completion of standardized testing/informal observation of tasks, assessment of data, and development of POC/Goals.     Time In and Out: 8:40 - 8:45am and 9:06- 9:21am                Min Units   OT Eval Low 34486 X     OT Eval Medium 85279     OT Eval High 44971     OT Re-Eval [id number]          ADL/Self Care 10172     Therapeutic Activities 54638 13    Therapeutic Ex 24404     Orthotic Management 19419     Neuro Re-Ed 96106     Non-Billable Time     TOTAL TIMED TREATMENT 13 [facility] OTR/L [id number]

## 2021-03-26 NOTE — PLAN OF CARE
Problem: Pain:  Goal: Pain level will decrease  Description: Pain level will decrease  3/26/2021 0818 by Keely Price RN  Outcome: Met This Shift  3/25/2021 1841 by Daja Hurst RN  Outcome: Not Met This Shift  Goal: Control of acute pain  Description: Control of acute pain  3/26/2021 0818 by Keely Price RN  Outcome: Met This Shift  3/25/2021 1841 by Daja Hurst RN  Outcome: Not Met This Shift  Goal: Control of chronic pain  Description: Control of chronic pain  Outcome: Met This Shift     Problem:  Bowel/Gastric:  Goal: Control of bowel function will improve  Description: Control of bowel function will improve  Outcome: Met This Shift  Goal: Ability to achieve a regular elimination pattern will improve  Description: Ability to achieve a regular elimination pattern will improve  Outcome: Met This Shift     Problem: Infection - Surgical Site:  Goal: Will show no infection signs and symptoms  Description: Will show no infection signs and symptoms  3/26/2021 0818 by Keely Price RN  Outcome: Met This Shift  3/25/2021 1841 by Daja Hurst RN  Outcome: Not Met This Shift

## 2021-03-27 ENCOUNTER — APPOINTMENT (OUTPATIENT)
Dept: GENERAL RADIOLOGY | Age: 51
DRG: 227 | End: 2021-03-27
Payer: MEDICARE

## 2021-03-27 LAB
ALBUMIN SERPL-MCNC: 3.5 G/DL (ref 3.5–5.2)
ALP BLD-CCNC: 59 U/L (ref 35–104)
ALT SERPL-CCNC: 62 U/L (ref 0–32)
ANION GAP SERPL CALCULATED.3IONS-SCNC: 8 MMOL/L (ref 7–16)
AST SERPL-CCNC: 38 U/L (ref 0–31)
BASOPHILS ABSOLUTE: 0.02 E9/L (ref 0–0.2)
BASOPHILS RELATIVE PERCENT: 0.3 % (ref 0–2)
BILIRUB SERPL-MCNC: 0.9 MG/DL (ref 0–1.2)
BILIRUBIN DIRECT: 0.4 MG/DL (ref 0–0.3)
BILIRUBIN, INDIRECT: 0.5 MG/DL (ref 0–1)
BUN BLDV-MCNC: 12 MG/DL (ref 6–20)
CALCIUM SERPL-MCNC: 8.6 MG/DL (ref 8.6–10.2)
CHLORIDE BLD-SCNC: 98 MMOL/L (ref 98–107)
CO2: 27 MMOL/L (ref 22–29)
CREAT SERPL-MCNC: 1 MG/DL (ref 0.5–1)
EOSINOPHILS ABSOLUTE: 0.12 E9/L (ref 0.05–0.5)
EOSINOPHILS RELATIVE PERCENT: 1.9 % (ref 0–6)
GFR AFRICAN AMERICAN: >60
GFR NON-AFRICAN AMERICAN: 59 ML/MIN/1.73
GLUCOSE BLD-MCNC: 135 MG/DL (ref 74–99)
HCT VFR BLD CALC: 38.4 % (ref 34–48)
HEMOGLOBIN: 12.2 G/DL (ref 11.5–15.5)
IMMATURE GRANULOCYTES #: 0.02 E9/L
IMMATURE GRANULOCYTES %: 0.3 % (ref 0–5)
LYMPHOCYTES ABSOLUTE: 0.62 E9/L (ref 1.5–4)
LYMPHOCYTES RELATIVE PERCENT: 9.6 % (ref 20–42)
MCH RBC QN AUTO: 32.8 PG (ref 26–35)
MCHC RBC AUTO-ENTMCNC: 31.8 % (ref 32–34.5)
MCV RBC AUTO: 103.2 FL (ref 80–99.9)
MONOCYTES ABSOLUTE: 1.13 E9/L (ref 0.1–0.95)
MONOCYTES RELATIVE PERCENT: 17.5 % (ref 2–12)
NEUTROPHILS ABSOLUTE: 4.55 E9/L (ref 1.8–7.3)
NEUTROPHILS RELATIVE PERCENT: 70.4 % (ref 43–80)
PDW BLD-RTO: 14.9 FL (ref 11.5–15)
PLATELET # BLD: 144 E9/L (ref 130–450)
PMV BLD AUTO: 10.4 FL (ref 7–12)
POTASSIUM SERPL-SCNC: 4.2 MMOL/L (ref 3.5–5)
RBC # BLD: 3.72 E12/L (ref 3.5–5.5)
SODIUM BLD-SCNC: 133 MMOL/L (ref 132–146)
TOTAL PROTEIN: 6.2 G/DL (ref 6.4–8.3)
WBC # BLD: 6.5 E9/L (ref 4.5–11.5)

## 2021-03-27 PROCEDURE — 94640 AIRWAY INHALATION TREATMENT: CPT

## 2021-03-27 PROCEDURE — 6370000000 HC RX 637 (ALT 250 FOR IP): Performed by: SURGERY

## 2021-03-27 PROCEDURE — 1200000000 HC SEMI PRIVATE

## 2021-03-27 PROCEDURE — 99024 POSTOP FOLLOW-UP VISIT: CPT | Performed by: SURGERY

## 2021-03-27 PROCEDURE — 80053 COMPREHEN METABOLIC PANEL: CPT

## 2021-03-27 PROCEDURE — 6360000002 HC RX W HCPCS: Performed by: SURGERY

## 2021-03-27 PROCEDURE — 97110 THERAPEUTIC EXERCISES: CPT

## 2021-03-27 PROCEDURE — C9113 INJ PANTOPRAZOLE SODIUM, VIA: HCPCS | Performed by: SURGERY

## 2021-03-27 PROCEDURE — 6370000000 HC RX 637 (ALT 250 FOR IP): Performed by: STUDENT IN AN ORGANIZED HEALTH CARE EDUCATION/TRAINING PROGRAM

## 2021-03-27 PROCEDURE — 2580000003 HC RX 258: Performed by: SURGERY

## 2021-03-27 PROCEDURE — 85025 COMPLETE CBC W/AUTO DIFF WBC: CPT

## 2021-03-27 PROCEDURE — 6360000002 HC RX W HCPCS: Performed by: INTERNAL MEDICINE

## 2021-03-27 PROCEDURE — 71045 X-RAY EXAM CHEST 1 VIEW: CPT

## 2021-03-27 PROCEDURE — 82248 BILIRUBIN DIRECT: CPT

## 2021-03-27 PROCEDURE — 36415 COLL VENOUS BLD VENIPUNCTURE: CPT

## 2021-03-27 PROCEDURE — 97530 THERAPEUTIC ACTIVITIES: CPT

## 2021-03-27 RX ORDER — ALBUTEROL SULFATE 2.5 MG/3ML
2.5 SOLUTION RESPIRATORY (INHALATION) 4 TIMES DAILY
Status: DISCONTINUED | OUTPATIENT
Start: 2021-03-27 | End: 2021-03-29 | Stop reason: HOSPADM

## 2021-03-27 RX ORDER — BUDESONIDE 0.5 MG/2ML
500 INHALANT ORAL 2 TIMES DAILY
Status: DISCONTINUED | OUTPATIENT
Start: 2021-03-27 | End: 2021-03-29 | Stop reason: HOSPADM

## 2021-03-27 RX ADMIN — HYDROMORPHONE HYDROCHLORIDE 0.5 MG: 1 INJECTION, SOLUTION INTRAMUSCULAR; INTRAVENOUS; SUBCUTANEOUS at 15:43

## 2021-03-27 RX ADMIN — SENNOSIDES 8.6 MG: 8.6 TABLET, FILM COATED ORAL at 20:50

## 2021-03-27 RX ADMIN — HYDROMORPHONE HYDROCHLORIDE 0.5 MG: 1 INJECTION, SOLUTION INTRAMUSCULAR; INTRAVENOUS; SUBCUTANEOUS at 21:58

## 2021-03-27 RX ADMIN — HYDROMORPHONE HYDROCHLORIDE 0.5 MG: 1 INJECTION, SOLUTION INTRAMUSCULAR; INTRAVENOUS; SUBCUTANEOUS at 18:46

## 2021-03-27 RX ADMIN — HYDROXYZINE HYDROCHLORIDE 50 MG: 25 TABLET, FILM COATED ORAL at 20:50

## 2021-03-27 RX ADMIN — SODIUM CHLORIDE, PRESERVATIVE FREE 10 ML: 5 INJECTION INTRAVENOUS at 20:51

## 2021-03-27 RX ADMIN — DOCUSATE SODIUM 100 MG: 100 CAPSULE, LIQUID FILLED ORAL at 08:03

## 2021-03-27 RX ADMIN — METHOCARBAMOL 1000 MG: 500 TABLET, FILM COATED ORAL at 16:43

## 2021-03-27 RX ADMIN — OXYCODONE 10 MG: 5 TABLET ORAL at 16:43

## 2021-03-27 RX ADMIN — METOCLOPRAMIDE 5 MG: 5 TABLET ORAL at 12:02

## 2021-03-27 RX ADMIN — METOCLOPRAMIDE 5 MG: 5 TABLET ORAL at 06:41

## 2021-03-27 RX ADMIN — HYDROMORPHONE HYDROCHLORIDE 0.5 MG: 1 INJECTION, SOLUTION INTRAMUSCULAR; INTRAVENOUS; SUBCUTANEOUS at 08:59

## 2021-03-27 RX ADMIN — PROMETHAZINE HYDROCHLORIDE 25 MG: 25 INJECTION INTRAMUSCULAR; INTRAVENOUS at 06:55

## 2021-03-27 RX ADMIN — HYDROMORPHONE HYDROCHLORIDE 0.5 MG: 1 INJECTION, SOLUTION INTRAMUSCULAR; INTRAVENOUS; SUBCUTANEOUS at 02:22

## 2021-03-27 RX ADMIN — METOCLOPRAMIDE 5 MG: 5 TABLET ORAL at 16:43

## 2021-03-27 RX ADMIN — BUDESONIDE 500 MCG: 0.5 INHALANT RESPIRATORY (INHALATION) at 19:54

## 2021-03-27 RX ADMIN — ENOXAPARIN SODIUM 40 MG: 40 INJECTION SUBCUTANEOUS at 08:03

## 2021-03-27 RX ADMIN — HYDROMORPHONE HYDROCHLORIDE 0.5 MG: 1 INJECTION, SOLUTION INTRAMUSCULAR; INTRAVENOUS; SUBCUTANEOUS at 12:02

## 2021-03-27 RX ADMIN — PANTOPRAZOLE SODIUM 40 MG: 40 INJECTION, POWDER, FOR SOLUTION INTRAVENOUS at 20:51

## 2021-03-27 RX ADMIN — SODIUM CHLORIDE: 9 INJECTION, SOLUTION INTRAVENOUS at 02:16

## 2021-03-27 RX ADMIN — OXYCODONE 10 MG: 5 TABLET ORAL at 08:04

## 2021-03-27 RX ADMIN — PROMETHAZINE HYDROCHLORIDE 25 MG: 25 INJECTION INTRAMUSCULAR; INTRAVENOUS at 20:58

## 2021-03-27 RX ADMIN — HYDROMORPHONE HYDROCHLORIDE 0.5 MG: 1 INJECTION, SOLUTION INTRAMUSCULAR; INTRAVENOUS; SUBCUTANEOUS at 05:46

## 2021-03-27 RX ADMIN — TRAZODONE HYDROCHLORIDE 50 MG: 50 TABLET ORAL at 20:50

## 2021-03-27 RX ADMIN — METHOCARBAMOL 1000 MG: 500 TABLET, FILM COATED ORAL at 12:02

## 2021-03-27 RX ADMIN — LEVOTHYROXINE SODIUM 50 MCG: 50 TABLET ORAL at 06:42

## 2021-03-27 RX ADMIN — PANTOPRAZOLE SODIUM 40 MG: 40 INJECTION, POWDER, FOR SOLUTION INTRAVENOUS at 08:04

## 2021-03-27 RX ADMIN — OXYCODONE 10 MG: 5 TABLET ORAL at 20:50

## 2021-03-27 RX ADMIN — METHOCARBAMOL 1000 MG: 500 TABLET, FILM COATED ORAL at 06:41

## 2021-03-27 RX ADMIN — BREXPIPRAZOLE 0.5 MG: 2 TABLET ORAL at 08:03

## 2021-03-27 RX ADMIN — SODIUM CHLORIDE, PRESERVATIVE FREE 10 ML: 5 INJECTION INTRAVENOUS at 08:06

## 2021-03-27 RX ADMIN — PAROXETINE HYDROCHLORIDE 10 MG: 20 TABLET, FILM COATED ORAL at 08:03

## 2021-03-27 RX ADMIN — ALBUTEROL SULFATE 2.5 MG: 2.5 SOLUTION RESPIRATORY (INHALATION) at 19:54

## 2021-03-27 RX ADMIN — METOCLOPRAMIDE 5 MG: 5 TABLET ORAL at 20:58

## 2021-03-27 ASSESSMENT — PAIN DESCRIPTION - FREQUENCY: FREQUENCY: CONTINUOUS

## 2021-03-27 ASSESSMENT — PAIN SCALES - GENERAL
PAINLEVEL_OUTOF10: 9
PAINLEVEL_OUTOF10: 5
PAINLEVEL_OUTOF10: 2
PAINLEVEL_OUTOF10: 9
PAINLEVEL_OUTOF10: 9
PAINLEVEL_OUTOF10: 2
PAINLEVEL_OUTOF10: 3
PAINLEVEL_OUTOF10: 5
PAINLEVEL_OUTOF10: 10
PAINLEVEL_OUTOF10: 6
PAINLEVEL_OUTOF10: 9

## 2021-03-27 ASSESSMENT — PAIN DESCRIPTION - ORIENTATION
ORIENTATION: RIGHT;LEFT

## 2021-03-27 ASSESSMENT — PAIN DESCRIPTION - ONSET: ONSET: ON-GOING

## 2021-03-27 ASSESSMENT — PAIN DESCRIPTION - PAIN TYPE: TYPE: SURGICAL PAIN

## 2021-03-27 ASSESSMENT — PAIN DESCRIPTION - PROGRESSION: CLINICAL_PROGRESSION: GRADUALLY WORSENING

## 2021-03-27 ASSESSMENT — PAIN - FUNCTIONAL ASSESSMENT: PAIN_FUNCTIONAL_ASSESSMENT: PREVENTS OR INTERFERES SOME ACTIVE ACTIVITIES AND ADLS

## 2021-03-27 ASSESSMENT — PAIN DESCRIPTION - DESCRIPTORS: DESCRIPTORS: CRAMPING;DISCOMFORT

## 2021-03-27 NOTE — PROGRESS NOTES
General Surgery Progress Note  [facility]    Patient's Name/Date of Birth: Marina Álvarez / 1970    Date: March 27, 2021     Surgeon: Carli Barger MD    Chief Complaint: s/p hernia repair    Patient Active Problem List   Diagnosis    GERD (gastroesophageal reflux disease)    Osteoarthritis cervical spine    Osteoarthritis of lumbar spine    Small bowel obstruction ([address]    Morbid obesity ([address]    Hypokalemia    Venous insufficiency    SBO (small bowel obstruction) ([address]    Incarcerated hernia    Intractable abdominal pain    Incarcerated incisional hernia    Disruption or dehiscence of closure of fascia, superficial or muscular       Subjective: c/o mild nausea. No flatus or BM. Objective:  /66   Pulse 80   Temp 98.3 °F (36.8 °C) (Oral)   Resp 16   Ht 5' 2\" (1.575 m)   Wt 220 lb (99.8 kg)   SpO2 91%   BMI 40.24 kg/m²   Labs:  Recent Labs     03/25/21  0548 03/26/21  0817 03/27/21  0452   WBC 4.1* 5.4 6.5   HGB 14.9 14.2 12.2   HCT 46.5 43.1 38.4     Lab Results   Component Value Date    CREATININE 1.0 03/27/2021    BUN 12 03/27/2021     03/27/2021    K 4.2 03/27/2021    CL 98 03/27/2021    CO2 27 03/27/2021     No results for input(s): LIPASE, AMYLASE in the last 72 hours.   CBC with Differential:    Lab Results   Component Value Date    WBC 6.5 03/27/2021    RBC 3.72 03/27/2021    HGB 12.2 03/27/2021    HCT 38.4 03/27/2021     03/27/2021    .2 03/27/2021    MCH 32.8 03/27/2021    MCHC 31.8 03/27/2021    RDW 14.9 03/27/2021    SEGSPCT 82 09/14/2011    LYMPHOPCT 9.6 03/27/2021    MONOPCT 17.5 03/27/2021    BASOPCT 0.3 03/27/2021    MONOSABS 1.13 03/27/2021    LYMPHSABS 0.62 03/27/2021    EOSABS 0.12 03/27/2021    BASOSABS 0.02 03/27/2021     BMP:    Lab Results   Component Value Date     03/27/2021    K 4.2 03/27/2021    K 4.7 03/19/2021    CL 98 03/27/2021    CO2 27 03/27/2021    BUN 12 03/27/2021    LABALBU 3.5 03/27/2021    CREATININE 1.0 03/27/2021    CALCIUM 8.6 03/27/2021    GFRAA >60 03/27/2021    LABGLOM 59 03/27/2021    GLUCOSE 135 03/27/2021    GLUCOSE 89 09/14/2011       General appearance:  NAD  Head: NCAT, PERRLA, EOMI, red conjunctiva  Neck: supple, no masses  Lungs: CTAB, equal chest rise bilateral  Heart: Reg rate  Abdomen: soft, Mildly distended, tender appropriately, incision C/D/I,   Skin; no lesions  Gu: no cva tenderness  Extremities: extremities normal, atraumatic, no cyanosis or edema      Assessment/Plan:  Sharon Howard is a 48 y.o. female POD 2 lap robotic assisted repair of incarcerated incisional hernia with bilateral component separation, expected postop ileus due     Wean O2  Await bowel function  Continue gen diet  OOB, increase activity  Pain control    Dino Cardona MD  3/27/2021  1:28 PM

## 2021-03-27 NOTE — PLAN OF CARE
Problem: Pain:  Goal: Pain level will decrease  Description: Pain level will decrease  Outcome: Met This Shift  Goal: Control of acute pain  Description: Control of acute pain  Outcome: Met This Shift  Goal: Control of chronic pain  Description: Control of chronic pain  Outcome: Met This Shift     Problem:  Bowel/Gastric:  Goal: Control of bowel function will improve  Description: Control of bowel function will improve  Outcome: Met This Shift  Goal: Ability to achieve a regular elimination pattern will improve  Description: Ability to achieve a regular elimination pattern will improve  Outcome: Met This Shift     Problem: Nutritional:  Goal: Ability to follow a diet with enough fiber (20 to 30 grams) for normal bowel function will improve  Description: Ability to follow a diet with enough fiber (20 to 30 grams) for normal bowel function will improve  Outcome: Met This Shift     Problem: Infection - Surgical Site:  Goal: Will show no infection signs and symptoms  Description: Will show no infection signs and symptoms  Outcome: Met This Shift

## 2021-03-27 NOTE — PROGRESS NOTES
Internal Medicine Progress Note    [facility]    Lauren Le. oMisés Cruz., VANDANAC.OManoloI. Bonnie Em D.O., ARACELI.LILLIEOHILARIA Begum, MSN, APRN, NP-C  Flaac Tao. Arben Vargas, MSN, APRN-CNP     Primary Care Physician: Kendy Mayo DO   Admitting Physician:  Sterling Madrigal MD  Admission date and time: 3/18/2021  4:41 PM    Room:  [address] [place] [address]  Admitting diagnosis: SBO (small bowel obstruction) [facility]) [K56.609]    Patient Name: Luisa Andrade  MRN: [id number]    Date of Service: 3/27/2021     Subjective:  Hal Velázquez is a 48 y.o. female who was seen and examined today,3/27/2021, at the bedside. Hal Velázquez seems to be resting fairly comfortably. She has been weaned to 2 L of nasal cannula oxygen. She has not yet passed flatus nor is she had bowel movements. She is tolerating some degree of oral ingestion. We discussed the need for increased ambulation and increased utilization of the incentive spirometer. Review of System:   Constitutional:   Admits to generalized malaise and fatigue. HEENT:   Denies ear pain, sore throat, sinus or eye problems. Admits to irritation associated with the nasal cannula oxygen. Cardiovascular:   Denies any chest pain, irregular heartbeats, or palpitations. Respiratory:   Admits to shortness of breath with exertion. No significant coughing or sputum production. Gastrointestinal:   Persistent postoperative abdominal pain as to be expected. Less nausea with better tolerance to oral ingestion. Genitourinary:    Denies any urgency, frequency, hematuria. Voiding without difficulty. Extremities:   Denies lower extremity swelling, edema or cyanosis. Neurology:    Denies any headache or focal neurological deficits, Denies generalized weakness or memory difficulty. Psch:   Denies being anxious or depressed. Musculoskeletal:    Denies  myalgias, joint complaints or back pain.    Integumentary:   Denies any rashes, ulcers, or excoriations. Denies bruising. Hematologic/Lymphatic:  Denies bruising or bleeding. Physical Exam:  No intake/output data recorded. Intake/Output Summary (Last 24 hours) at 3/27/2021 0714  Last data filed at 3/27/2021 0602  Gross per 24 hour   Intake 645 ml   Output 1700 ml   Net -1055 ml   I/O last 3 completed shifts: In: 034 [P.O.:480; I.V.:165]  Out: 1700 [Urine:1700]  No data found. Vital Signs:   Blood pressure 111/66, pulse 80, temperature 98.3 °F (36.8 °C), temperature source Oral, resp. rate 16, height 5' 2\" (1.575 m), weight 220 lb (99.8 kg), SpO2 91 %. General appearance:  Alert, responsive, oriented to person, place, and time. Well preserved, alert, no distress. Head:  Normocephalic. No masses, lesions or tenderness. Eyes:  PERRLA. EOMI. Sclera clear. Buccal mucosa moist.    ENT:  Ears normal. Mucosa normal.  Nasal cannula oxygen is in place. Neck:    Supple. Trachea midline. No thyromegaly. No JVD. No bruits. Heart:    Rhythm regular. Rate controlled. No murmurs. Lungs:    Diminished bilaterally with decreased breath sounds at the bases. .  Abdomen:   Soft. Mildly tender to palpation diffusely with voluntary guarding elicited. Hypoactive bowel sounds. Post surgical findings  Extremities:    Peripheral pulses present. No peripheral edema. No ulcers. No cyanosis. No clubbing. Neurologic:    Alert x 3. No focal deficit. Cranial nerves grossly intact. No focal weakness. Psych:   Behavior is normal. Mood appears normal. Speech is not rapid and/or pressured. Musculoskeletal:   Spine ROM normal. Muscular strength intact. Gait not assessed. Integumentary:  No rashes  Skin normal color and texture.   Genitalia/Breast:  Deferred    Medication:  Scheduled Meds:   albuterol  2.5 mg Nebulization 4x daily    docusate sodium  100 mg Oral Daily    senna  1 tablet Oral Nightly    enoxaparin  40 mg Subcutaneous Daily    methocarbamol  1,000 mg Oral Q6H    [Held by provider] bumetanide 1 mg Oral Daily    brexpiprazole  0.5 mg Oral Daily    metoclopramide  5 mg Oral 4x Daily AC & HS    nicotine  1 patch Transdermal Daily    levothyroxine  50 mcg Oral Daily    hydrOXYzine  50 mg Oral Nightly    PARoxetine  10 mg Oral QAM    sodium chloride flush  10 mL Intravenous 2 times per day    pantoprazole  40 mg Intravenous BID     Continuous Infusions:      Objective Data:  CBC with Differential:    Lab Results   Component Value Date    WBC 6.5 03/27/2021    RBC 3.72 03/27/2021    HGB 12.2 03/27/2021    HCT 38.4 03/27/2021     03/27/2021    .2 03/27/2021    MCH 32.8 03/27/2021    MCHC 31.8 03/27/2021    RDW 14.9 03/27/2021    SEGSPCT 82 09/14/2011    LYMPHOPCT 9.6 03/27/2021    MONOPCT 17.5 03/27/2021    BASOPCT 0.3 03/27/2021    MONOSABS 1.13 03/27/2021    LYMPHSABS 0.62 03/27/2021    EOSABS 0.12 03/27/2021    BASOSABS 0.02 03/27/2021     CMP:    Lab Results   Component Value Date     03/27/2021    K 4.2 03/27/2021    K 4.7 03/19/2021    CL 98 03/27/2021    CO2 27 03/27/2021    BUN 12 03/27/2021    CREATININE 1.0 03/27/2021    GFRAA >60 03/27/2021    LABGLOM 59 03/27/2021    GLUCOSE 135 03/27/2021    GLUCOSE 89 09/14/2011    PROT 6.2 03/27/2021    LABALBU 3.5 03/27/2021    CALCIUM 8.6 03/27/2021    BILITOT 0.9 03/27/2021    ALKPHOS 59 03/27/2021    AST 38 03/27/2021    ALT 62 03/27/2021       Assessment:  1. Small bowel obstruction with nonreducible ventral abdominal hernia status post laparoscopic robotic assisted transabdominal retrorectus repair of incisional hernia with extensive lysis of adhesion by Dr. Rey Carter on March 25  2. Postoperative respiratory failure with hypoxia in the setting of COPD  3. Mild pulmonary hypertension  4. Gastroesophageal reflux disease  5. Obesity  6. History of tobacco abuse; recently quit  7. Daily alcohol consumption    Plan: We will attempt to wean off the nasal cannula oxygen today and have encouraged ongoing use of the incentive spirometer. Nebulized respiratory medications will be added routinely and we will check a portable chest x-ray. She is doing well postoperatively as we continue to await return of bowel function. Diet is being advanced. IV fluids will be discontinued. We discussed the need to become more ambulatory and she voiced understanding and agreement. More than 50% of my time was spent at the bedside counseling/coordinating care with the patient and/or family with face to face contact. This time was spent reviewing notes and laboratory data as well as instructing and counseling the patient. Time I spent with the family or surrogate(s) is included only if the patient was incapable of providing the necessary information or participating in medical decisions. I also discussed the differential diagnosis and all of the proposed management plans with the patient and individuals accompanying the patient. I am readily available for any further decision-making and intervention.        Lisa Hart DO, F.A.C.O.I.  3/27/2021  7:14 AM

## 2021-03-27 NOTE — PLAN OF CARE
Problem: Pain:  Goal: Pain level will decrease  Outcome: Met This Shift  Note: Pain is less than 3 after being medicated     Problem: Pain:  Goal: Control of acute pain  Outcome: Met This Shift     Problem: Pain:  Goal: Control of chronic pain  Outcome: Met This Shift     Problem: Bowel/Gastric:  Goal: Control of bowel function will improve  Outcome: Met This Shift     Problem:  Bowel/Gastric:  Goal: Ability to achieve a regular elimination pattern will improve  Outcome: Met This Shift     Problem: Nutritional:  Goal: Ability to follow a diet with enough fiber (20 to 30 grams) for normal bowel function will improve  Outcome: Met This Shift     Problem: Infection - Surgical Site:  Goal: Will show no infection signs and symptoms  Outcome: Met This Shift  Note: No s/s of any infection

## 2021-03-27 NOTE — PROGRESS NOTES
Physical Therapy Treatment Note    Room #:  0324/0324-01  Patient Name: Nidhi Burroughs  YOB: 1970  MRN: [id number]    Referring Provider:   Saul Campbell DO     Date of Service: 3/27/2021    Evaluating Physical Therapist: Ashlee Toney, PT #[id number]       Diagnosis:   SBO (small bowel obstruction) ([address] [W64.011]      1. Laparoscopic robotic-assisted transabdominal retrorectus release with repair of incisional hernia with mesh  2. Extensive lysis of adhesions  3.  Left sided abdominal wall posterior component separation with myofascial flap mobilization and placement  Right sided abdominal wall posterior component separation with myofascial flap mobilization and placement    Patient Active Problem List   Diagnosis    GERD (gastroesophageal reflux disease)    Osteoarthritis cervical spine    Osteoarthritis of lumbar spine    Small bowel obstruction ([address]    Morbid obesity ([address]    Hypokalemia    Venous insufficiency    SBO (small bowel obstruction) ([address]    Incarcerated hernia    Intractable abdominal pain    Incarcerated incisional hernia    Disruption or dehiscence of closure of fascia, superficial or muscular        Tentative placement recommendation: Home Health Physical Therapy     Equipment recommendation: Wheeled Walker and Tub transfer bench      Prior Level of Function: Patient ambulated independently    Rehab Potential: good  - for baseline    Past medical history:   Past Medical History:   Diagnosis Date    Arthritis     Bursitis     hips    Class 3 severe obesity due to excess calories with body mass index (BMI) of 40.0 to 44.9 in adult [facility])     COPD (chronic obstructive pulmonary disease) ([address]     Diverticulitis     GERD (gastroesophageal reflux disease)     Incisional hernia with obstruction but no gangrene     Strep throat      Past Surgical History:   Procedure Laterality Date     SECTION      CHOLECYSTECTOMY      COLECTOMY  2016    FOREARM FRACTURE SURGERY Left     fracture of ulna s/p repair    HERNIA REPAIR      HERNIA REPAIR  08/25/2015    incarcerated hernia repair    HERNIA REPAIR N/A 3/25/2021    INCISIONAL HERNIA REPAIR WITH MESH, POSSIBLE COMPONENT SEPARATION  LAPAROSCOPIC ROBOTIC XI ASSISTED (CPT 22021) performed by Melissa Chavez MD at [id number] "[employer]      sacroiliac bilateral 12-    PELVIC FRACTURE SURGERY      VENTRAL HERNIA REPAIR  08/25/2015       Precautions: Up as tolerated, falls ,     Laparoscopic robotic-assisted transabdominal retrorectus release with repair of incisional hernia with mesh,   Extensive lysis of adhesions  Left sided abdominal wall posterior component separation with myofascial flap mobilization and placement  Right sided abdominal wall posterior component separation with myofascial flap mobilization and placement    SUBJECTIVE:    Social history: Patient lives with son   in a two story home bedroom and bathroom 2nd floor full flight steps with rail  with 1 + 3 steps  to enter with Rail  Tub shower grab bars    Equipment owned: [address]   How much difficulty turning over in bed?: A Little  How much difficulty sitting down on / standing up from a chair with arms?: A Little  How much difficulty moving from lying on back to sitting on side of bed?: A Little  How much help from another person moving to and from a bed to a chair?: A Little  How much help from another person needed to walk in hospital room?: A Little  How much help from another person for climbing 3-5 steps with a railing?: A Little  AM-PAC Inpatient Mobility Raw Score : 18  AM-PAC Inpatient T-Scale Score : 43.63  Mobility Inpatient CMS 0-100% Score: 46.58  Mobility Inpatient CMS G-Code Modifier : CK    Nursing cleared patient for PT treatment. Patient states once she goes up 1 flight of stairs into upstairs apartment everything on 1 floor.   She has a good support system. OBJECTIVE;   Initial Evaluation  Date: 3/26/2021 Treatment Date:  3/27/2021     Short Term/ Long Term   Goals   Was pt agreeable to Eval/treatment? Yes   yes To be met in 2 days   Pain level   10/10  abdomen 10/10 abdomen, had been medicaded    Bed Mobility    Rolling: Not assessed  patient in chair   Rolling: Not assessed    Supine to sit: Not assessed    Sit to supine: Supervision    Scooting: Supervision     Rolling: Independent    Supine to sit:  Independent    Sit to supine: Independent    Scooting: Independent     Transfers Sit to stand: Minimal assist of 1 Cues for hand placement and safety  Sit to stand: Supervision     Sit to stand: Independent     Ambulation    150 feet using  wheeled walker with Minimal assist of 1   progressing to supervision   90 x 2 feet using  wheeled walker with Supervision     150 feet using  wheeled walker with Modified Independent    Stair negotiation: ascended and descended   Not assessed    10 stairs with 1 rail supervision   10 steps 1 rail with supervision    ROM Within functional limits          Strength BUE:  refer to OT eval  RLE:  4-/5  LLE:  4-/5   Increase strength in affected mm groups by 1/3 grade   Balance Sitting EOB:  not assessed    Dynamic Standing:  fair + [id number]   Sitting EOB:  good    Dynamic Standing: good       Patient is Alert & Oriented x person, place, time and situation and follows directions    Sensation:  Patient  reports numbness and tingling Left upper extremitiy digits 4+5    Edema:  yes bilateral lower extremities    Endurance: fair       Vitals:   3 liters nasal cannula  Blood Pressure at rest   Blood Pressure during session     Heart Rate at rest   Heart Rate during session    SPO2 at rest  SPO2 during session      Patient education  Patient educated on role of Physical Therapy, risks of immobility, safety and plan of care,  importance of mobility while in hospital , ankle pumps, quad set and glut set for edema control, blood clot prevention and positioning for skin integrity and comfort      Patient response to education:   Pt verbalized understanding Pt demonstrated skill Pt requires further education in this area   Yes Partial Yes      Treatment:  Patient practiced and was instructed/facilitated in the following treatment: Patient stood at bedside with supervision for balance. Ambulated in hallway, rested briefly, performed stairs, returned to room, used bathroom -performing transfers independently, was able to stand at sink with good balance while washing hands. RTB with supervision. Therapeutic Exercises:  not performed  Due to pain, patient wanted to RTB and rest.  She was fatigued from increased activity. At end of session, patient in bed with   call light and phone within reach,   all lines and tubes intact, nursing notified. Patient would benefit from continued skilled Physical Therapy to improve functional independence and quality of life.        Patient's/ family goals   home        ASSESSMENT: Patient exhibits decreased strength, balance, endurance and pain abdomen impairing functional mobility, transfers, gait , gait distance and tolerance to activity       Plan of Care:     -Bed Mobility: Lower extremity exercises   -Sitting Balance: Incorporate reaching activities to activate trunk muscles   -Standing Balance: Perform strengthening exercises in standing to promote motor control with or without upper extremity support   -Transfers: Provide instruction on proper hand and foot position for adequate transfer of weight onto lower extremities and use of gait device  -Gait: Gait training and Standing activities to improve: base of support, weight shift, weight bearing    -Endurance: Utilize Supervised activities to increase level of endurance to allow for safe functional mobility including transfers and gait   -Stairs: Stair training with instruction on proper technique and hand placement on rail    Patient and or family

## 2021-03-28 LAB
ALBUMIN SERPL-MCNC: 3.1 G/DL (ref 3.5–5.2)
ALP BLD-CCNC: 63 U/L (ref 35–104)
ALT SERPL-CCNC: 41 U/L (ref 0–32)
ANION GAP SERPL CALCULATED.3IONS-SCNC: 6 MMOL/L (ref 7–16)
ANISOCYTOSIS: ABNORMAL
AST SERPL-CCNC: 25 U/L (ref 0–31)
BASOPHILS ABSOLUTE: 0 E9/L (ref 0–0.2)
BASOPHILS RELATIVE PERCENT: 0.3 % (ref 0–2)
BILIRUB SERPL-MCNC: 0.7 MG/DL (ref 0–1.2)
BILIRUBIN DIRECT: 0.3 MG/DL (ref 0–0.3)
BILIRUBIN, INDIRECT: 0.4 MG/DL (ref 0–1)
BUN BLDV-MCNC: 10 MG/DL (ref 6–20)
CALCIUM SERPL-MCNC: 8.7 MG/DL (ref 8.6–10.2)
CHLORIDE BLD-SCNC: 98 MMOL/L (ref 98–107)
CO2: 31 MMOL/L (ref 22–29)
CREAT SERPL-MCNC: 1 MG/DL (ref 0.5–1)
EOSINOPHILS ABSOLUTE: 0.11 E9/L (ref 0.05–0.5)
EOSINOPHILS RELATIVE PERCENT: 1.7 % (ref 0–6)
GFR AFRICAN AMERICAN: >60
GFR NON-AFRICAN AMERICAN: 59 ML/MIN/1.73
GLUCOSE BLD-MCNC: 109 MG/DL (ref 74–99)
HCT VFR BLD CALC: 35.1 % (ref 34–48)
HEMOGLOBIN: 10.9 G/DL (ref 11.5–15.5)
LYMPHOCYTES ABSOLUTE: 1.14 E9/L (ref 1.5–4)
LYMPHOCYTES RELATIVE PERCENT: 17.4 % (ref 20–42)
MAGNESIUM: 2 MG/DL (ref 1.6–2.6)
MCH RBC QN AUTO: 33 PG (ref 26–35)
MCHC RBC AUTO-ENTMCNC: 31.1 % (ref 32–34.5)
MCV RBC AUTO: 106.4 FL (ref 80–99.9)
MONOCYTES ABSOLUTE: 0.4 E9/L (ref 0.1–0.95)
MONOCYTES RELATIVE PERCENT: 6.1 % (ref 2–12)
NEUTROPHILS ABSOLUTE: 5.03 E9/L (ref 1.8–7.3)
NEUTROPHILS RELATIVE PERCENT: 74.8 % (ref 43–80)
PDW BLD-RTO: 14.7 FL (ref 11.5–15)
PHOSPHORUS: 2.9 MG/DL (ref 2.5–4.5)
PLATELET # BLD: 141 E9/L (ref 130–450)
PMV BLD AUTO: 10.1 FL (ref 7–12)
POIKILOCYTES: ABNORMAL
POLYCHROMASIA: ABNORMAL
POTASSIUM SERPL-SCNC: 4.3 MMOL/L (ref 3.5–5)
RBC # BLD: 3.3 E12/L (ref 3.5–5.5)
SODIUM BLD-SCNC: 135 MMOL/L (ref 132–146)
TEAR DROP CELLS: ABNORMAL
TOTAL PROTEIN: 6.1 G/DL (ref 6.4–8.3)
WBC # BLD: 6.7 E9/L (ref 4.5–11.5)

## 2021-03-28 PROCEDURE — 6370000000 HC RX 637 (ALT 250 FOR IP): Performed by: SURGERY

## 2021-03-28 PROCEDURE — 84100 ASSAY OF PHOSPHORUS: CPT

## 2021-03-28 PROCEDURE — 1200000000 HC SEMI PRIVATE

## 2021-03-28 PROCEDURE — 80053 COMPREHEN METABOLIC PANEL: CPT

## 2021-03-28 PROCEDURE — 85025 COMPLETE CBC W/AUTO DIFF WBC: CPT

## 2021-03-28 PROCEDURE — 82248 BILIRUBIN DIRECT: CPT

## 2021-03-28 PROCEDURE — 2580000003 HC RX 258: Performed by: SURGERY

## 2021-03-28 PROCEDURE — 6360000002 HC RX W HCPCS: Performed by: INTERNAL MEDICINE

## 2021-03-28 PROCEDURE — C9113 INJ PANTOPRAZOLE SODIUM, VIA: HCPCS | Performed by: SURGERY

## 2021-03-28 PROCEDURE — 6370000000 HC RX 637 (ALT 250 FOR IP): Performed by: INTERNAL MEDICINE

## 2021-03-28 PROCEDURE — 94640 AIRWAY INHALATION TREATMENT: CPT

## 2021-03-28 PROCEDURE — 2700000000 HC OXYGEN THERAPY PER DAY

## 2021-03-28 PROCEDURE — 6370000000 HC RX 637 (ALT 250 FOR IP): Performed by: STUDENT IN AN ORGANIZED HEALTH CARE EDUCATION/TRAINING PROGRAM

## 2021-03-28 PROCEDURE — 83735 ASSAY OF MAGNESIUM: CPT

## 2021-03-28 PROCEDURE — 6360000002 HC RX W HCPCS: Performed by: SURGERY

## 2021-03-28 PROCEDURE — 99024 POSTOP FOLLOW-UP VISIT: CPT | Performed by: SURGERY

## 2021-03-28 PROCEDURE — 36415 COLL VENOUS BLD VENIPUNCTURE: CPT

## 2021-03-28 RX ADMIN — METHOCARBAMOL 1000 MG: 500 TABLET, FILM COATED ORAL at 00:33

## 2021-03-28 RX ADMIN — OXYCODONE 10 MG: 5 TABLET ORAL at 13:43

## 2021-03-28 RX ADMIN — DOCUSATE SODIUM 100 MG: 100 CAPSULE, LIQUID FILLED ORAL at 08:44

## 2021-03-28 RX ADMIN — ALBUTEROL SULFATE 2.5 MG: 2.5 SOLUTION RESPIRATORY (INHALATION) at 06:28

## 2021-03-28 RX ADMIN — BUMETANIDE 1 MG: 1 TABLET ORAL at 08:43

## 2021-03-28 RX ADMIN — HYDROMORPHONE HYDROCHLORIDE 0.5 MG: 1 INJECTION, SOLUTION INTRAMUSCULAR; INTRAVENOUS; SUBCUTANEOUS at 05:28

## 2021-03-28 RX ADMIN — METHOCARBAMOL 1000 MG: 500 TABLET, FILM COATED ORAL at 11:51

## 2021-03-28 RX ADMIN — OXYCODONE 10 MG: 5 TABLET ORAL at 19:15

## 2021-03-28 RX ADMIN — METOCLOPRAMIDE 5 MG: 5 TABLET ORAL at 06:27

## 2021-03-28 RX ADMIN — HYDROMORPHONE HYDROCHLORIDE 0.5 MG: 1 INJECTION, SOLUTION INTRAMUSCULAR; INTRAVENOUS; SUBCUTANEOUS at 01:20

## 2021-03-28 RX ADMIN — HYDROMORPHONE HYDROCHLORIDE 0.5 MG: 1 INJECTION, SOLUTION INTRAMUSCULAR; INTRAVENOUS; SUBCUTANEOUS at 18:03

## 2021-03-28 RX ADMIN — PANTOPRAZOLE SODIUM 40 MG: 40 INJECTION, POWDER, FOR SOLUTION INTRAVENOUS at 08:43

## 2021-03-28 RX ADMIN — METOCLOPRAMIDE 5 MG: 5 TABLET ORAL at 11:51

## 2021-03-28 RX ADMIN — OXYCODONE 10 MG: 5 TABLET ORAL at 06:30

## 2021-03-28 RX ADMIN — METOCLOPRAMIDE 5 MG: 5 TABLET ORAL at 21:27

## 2021-03-28 RX ADMIN — SODIUM CHLORIDE, PRESERVATIVE FREE 10 ML: 5 INJECTION INTRAVENOUS at 08:43

## 2021-03-28 RX ADMIN — METHOCARBAMOL 1000 MG: 500 TABLET, FILM COATED ORAL at 06:28

## 2021-03-28 RX ADMIN — HYDROMORPHONE HYDROCHLORIDE 0.5 MG: 1 INJECTION, SOLUTION INTRAMUSCULAR; INTRAVENOUS; SUBCUTANEOUS at 11:51

## 2021-03-28 RX ADMIN — PROMETHAZINE HYDROCHLORIDE 25 MG: 25 INJECTION INTRAMUSCULAR; INTRAVENOUS at 19:19

## 2021-03-28 RX ADMIN — HYDROMORPHONE HYDROCHLORIDE 0.5 MG: 1 INJECTION, SOLUTION INTRAMUSCULAR; INTRAVENOUS; SUBCUTANEOUS at 08:43

## 2021-03-28 RX ADMIN — BUDESONIDE 500 MCG: 0.5 INHALANT RESPIRATORY (INHALATION) at 18:26

## 2021-03-28 RX ADMIN — HYDROMORPHONE HYDROCHLORIDE 0.5 MG: 1 INJECTION, SOLUTION INTRAMUSCULAR; INTRAVENOUS; SUBCUTANEOUS at 21:19

## 2021-03-28 RX ADMIN — HYDROXYZINE HYDROCHLORIDE 50 MG: 25 TABLET, FILM COATED ORAL at 21:22

## 2021-03-28 RX ADMIN — PANTOPRAZOLE SODIUM 40 MG: 40 INJECTION, POWDER, FOR SOLUTION INTRAVENOUS at 21:21

## 2021-03-28 RX ADMIN — PAROXETINE HYDROCHLORIDE 10 MG: 20 TABLET, FILM COATED ORAL at 08:44

## 2021-03-28 RX ADMIN — METHOCARBAMOL 1000 MG: 500 TABLET, FILM COATED ORAL at 17:24

## 2021-03-28 RX ADMIN — ALBUTEROL SULFATE 2.5 MG: 2.5 SOLUTION RESPIRATORY (INHALATION) at 18:26

## 2021-03-28 RX ADMIN — ALBUTEROL SULFATE 2.5 MG: 2.5 SOLUTION RESPIRATORY (INHALATION) at 09:28

## 2021-03-28 RX ADMIN — METOCLOPRAMIDE 5 MG: 5 TABLET ORAL at 17:25

## 2021-03-28 RX ADMIN — BUDESONIDE 500 MCG: 0.5 INHALANT RESPIRATORY (INHALATION) at 06:28

## 2021-03-28 RX ADMIN — TRAZODONE HYDROCHLORIDE 50 MG: 50 TABLET ORAL at 21:22

## 2021-03-28 RX ADMIN — ENOXAPARIN SODIUM 40 MG: 40 INJECTION SUBCUTANEOUS at 08:43

## 2021-03-28 RX ADMIN — LEVOTHYROXINE SODIUM 50 MCG: 50 TABLET ORAL at 06:27

## 2021-03-28 RX ADMIN — BREXPIPRAZOLE 0.5 MG: 2 TABLET ORAL at 09:47

## 2021-03-28 RX ADMIN — SENNOSIDES 8.6 MG: 8.6 TABLET, FILM COATED ORAL at 21:22

## 2021-03-28 RX ADMIN — HYDROMORPHONE HYDROCHLORIDE 0.5 MG: 1 INJECTION, SOLUTION INTRAMUSCULAR; INTRAVENOUS; SUBCUTANEOUS at 14:49

## 2021-03-28 RX ADMIN — OXYCODONE 10 MG: 5 TABLET ORAL at 02:56

## 2021-03-28 ASSESSMENT — PAIN SCALES - GENERAL
PAINLEVEL_OUTOF10: 2
PAINLEVEL_OUTOF10: 5
PAINLEVEL_OUTOF10: 1
PAINLEVEL_OUTOF10: 9
PAINLEVEL_OUTOF10: 8
PAINLEVEL_OUTOF10: 2

## 2021-03-28 ASSESSMENT — PAIN DESCRIPTION - LOCATION: LOCATION: ABDOMEN

## 2021-03-28 ASSESSMENT — PAIN DESCRIPTION - PROGRESSION
CLINICAL_PROGRESSION: GRADUALLY WORSENING
CLINICAL_PROGRESSION: GRADUALLY WORSENING

## 2021-03-28 ASSESSMENT — PAIN DESCRIPTION - ONSET: ONSET: ON-GOING

## 2021-03-28 ASSESSMENT — PAIN DESCRIPTION - ORIENTATION
ORIENTATION: RIGHT;LEFT
ORIENTATION: RIGHT;LEFT

## 2021-03-28 ASSESSMENT — PAIN DESCRIPTION - PAIN TYPE: TYPE: SURGICAL PAIN

## 2021-03-28 ASSESSMENT — PAIN DESCRIPTION - FREQUENCY: FREQUENCY: CONTINUOUS

## 2021-03-28 ASSESSMENT — PAIN - FUNCTIONAL ASSESSMENT: PAIN_FUNCTIONAL_ASSESSMENT: PREVENTS OR INTERFERES SOME ACTIVE ACTIVITIES AND ADLS

## 2021-03-28 NOTE — PROGRESS NOTES
General Surgery Progress Note  [facility]    Patient's Name/Date of Birth: Urmila Hawkins / 1970    Date: March 28, 2021     Surgeon: Tay Black MD    Chief Complaint: s/p hernia repair    Patient Active Problem List   Diagnosis    GERD (gastroesophageal reflux disease)    Osteoarthritis cervical spine    Osteoarthritis of lumbar spine    Small bowel obstruction ([address]    Morbid obesity ([address]    Hypokalemia    Venous insufficiency    SBO (small bowel obstruction) ([address]    Incarcerated hernia    Intractable abdominal pain    Incarcerated incisional hernia    Disruption or dehiscence of closure of fascia, superficial or muscular       Subjective: No significant nausea. Tolerating liquids. Denies any shortness of breath. Did get out of bed and walk around yesterday    Objective:  /82   Pulse 76   Temp 98.6 °F (37 °C) (Oral)   Resp 15   Ht 5' 2\" (1.575 m)   Wt 220 lb (99.8 kg)   SpO2 95%   BMI 40.24 kg/m²   Labs:  Recent Labs     03/26/21  0817 03/27/21  0452 03/28/21  0501   WBC 5.4 6.5 6.7   HGB 14.2 12.2 10.9*   HCT 43.1 38.4 35.1     Lab Results   Component Value Date    CREATININE 1.0 03/28/2021    BUN 10 03/28/2021     03/28/2021    K 4.3 03/28/2021    CL 98 03/28/2021    CO2 31 (H) 03/28/2021     No results for input(s): LIPASE, AMYLASE in the last 72 hours.   CBC with Differential:    Lab Results   Component Value Date    WBC 6.7 03/28/2021    RBC 3.30 03/28/2021    HGB 10.9 03/28/2021    HCT 35.1 03/28/2021     03/28/2021    .4 03/28/2021    MCH 33.0 03/28/2021    MCHC 31.1 03/28/2021    RDW 14.7 03/28/2021    SEGSPCT 82 09/14/2011    LYMPHOPCT 17.4 03/28/2021    MONOPCT 6.1 03/28/2021    BASOPCT 0.3 03/28/2021    MONOSABS 0.40 03/28/2021    LYMPHSABS 1.14 03/28/2021    EOSABS 0.11 03/28/2021    BASOSABS 0.00 03/28/2021     BMP:    Lab Results   Component Value Date     03/28/2021    K 4.3 03/28/2021    K 4.7 03/19/2021    CL 98 03/28/2021 CO2 31 03/28/2021    BUN 10 03/28/2021    LABALBU 3.1 03/28/2021    CREATININE 1.0 03/28/2021    CALCIUM 8.7 03/28/2021    GFRAA >60 03/28/2021    LABGLOM 59 03/28/2021    GLUCOSE 109 03/28/2021    GLUCOSE 89 09/14/2011       General appearance:  NAD  Head: NCAT, PERRLA, EOMI, red conjunctiva  Neck: supple, no masses  Lungs: CTAB, equal chest rise bilateral  Heart: Reg rate  Abdomen: soft, Mildly distended, tender appropriately, incision C/D/I,   Skin; no lesions  Gu: no cva tenderness  Extremities: extremities normal, atraumatic, no cyanosis or edema      Assessment/Plan:  Pepito Otoole is a 48 y.o. female POD 3 lap robotic assisted repair of incarcerated incisional hernia with bilateral component separation, expected postop ileus     O2 Sat dropped to 70s when off oxygen again today, continue supplemental O2 and deep breathing exercises encouraged  Gen diet  OOB, increase activity  Pain control  Home when oxygen can be weaned off    Margarett Cheadle, MD  3/28/2021  1:29 PM

## 2021-03-28 NOTE — PROGRESS NOTES
At rest on 2L NC, patient's oxygen saturation 93%.    At rest on RA, patient's oxygen 84%  Patient attempted to ambulate in ryan on RA and O2 sat dropped to 74% on RA   Patient placed back on 2L NC and recovered to 92%

## 2021-03-28 NOTE — PLAN OF CARE
Problem: Pain:  Goal: Pain level will decrease  3/28/2021 0032 by Jaylen Neumann RN  Outcome: Met This Shift     Problem: Pain:  Goal: Control of acute pain  3/28/2021 0032 by Jaylen Neumann RN  Outcome: Met This Shift     Problem: Pain:  Goal: Control of chronic pain  3/28/2021 0032 by Jaylen Neumann RN  Outcome: Met This Shift     Problem: Bowel/Gastric:  Goal: Control of bowel function will improve  3/28/2021 0032 by Jaylen Neumann RN  Outcome: Met This Shift     Problem:  Bowel/Gastric:  Goal: Ability to achieve a regular elimination pattern will improve  3/28/2021 0032 by Jaylen Neumann RN  Outcome: Met This Shift     Problem: Nutritional:  Goal: Ability to follow a diet with enough fiber (20 to 30 grams) for normal bowel function will improve  3/28/2021 0032 by Jaylen Neumann RN  Outcome: Met This Shift     Problem: Infection - Surgical Site:  Goal: Will show no infection signs and symptoms  3/28/2021 0032 by Jaylen Neumann RN  Outcome: Met This Shift

## 2021-03-28 NOTE — PROGRESS NOTES
Integumentary:   Denies any rashes, ulcers, or excoriations. Denies bruising. Hematologic/Lymphatic:  Denies bruising or bleeding. Physical Exam:  No intake/output data recorded. Intake/Output Summary (Last 24 hours) at 3/28/2021 0731  Last data filed at 3/28/2021 0549  Gross per 24 hour   Intake 610 ml   Output 0 ml   Net 610 ml   I/O last 3 completed shifts: In: 56 [P.O.:610]  Out: 0   No data found. Vital Signs:   Blood pressure 136/82, pulse 76, temperature 98.6 °F (37 °C), temperature source Oral, resp. rate 15, height 5' 2\" (1.575 m), weight 220 lb (99.8 kg), SpO2 95 %. General appearance:  Alert, responsive, oriented to person, place, and time. Well preserved, alert, no distress. Head:  Normocephalic. No masses, lesions or tenderness. Eyes:  PERRLA. EOMI. Sclera clear. Buccal mucosa moist.    ENT:  Ears normal. Mucosa normal.  Nasal cannula oxygen is in place. Neck:    Supple. Trachea midline. No thyromegaly. No JVD. No bruits. Heart:    Rhythm regular. Rate controlled. No murmurs. Lungs:    Diminished bilaterally with decreased breath sounds at the bases. .  Abdomen:   Soft. Mildly tender to palpation diffusely with voluntary guarding elicited. Hypoactive bowel sounds. Post surgical findings  Extremities:    Peripheral pulses present. No peripheral edema. No ulcers. No cyanosis. No clubbing. Neurologic:    Alert x 3. No focal deficit. Cranial nerves grossly intact. No focal weakness. Psych:   Behavior is normal. Mood appears normal. Speech is not rapid and/or pressured. Musculoskeletal:   Spine ROM normal. Muscular strength intact. Gait not assessed. Integumentary:  No rashes  Skin normal color and texture.   Genitalia/Breast:  Deferred    Medication:  Scheduled Meds:   albuterol  2.5 mg Nebulization 4x daily    budesonide  500 mcg Nebulization BID    docusate sodium  100 mg Oral Daily    senna  1 tablet Oral Nightly    enoxaparin  40 mg Subcutaneous Daily    methocarbamol  1,000 mg Oral Q6H    bumetanide  1 mg Oral Daily    brexpiprazole  0.5 mg Oral Daily    metoclopramide  5 mg Oral 4x Daily AC & HS    nicotine  1 patch Transdermal Daily    levothyroxine  50 mcg Oral Daily    hydrOXYzine  50 mg Oral Nightly    PARoxetine  10 mg Oral QAM    sodium chloride flush  10 mL Intravenous 2 times per day    pantoprazole  40 mg Intravenous BID     Continuous Infusions:      Objective Data:  CBC with Differential:    Lab Results   Component Value Date    WBC 6.7 03/28/2021    RBC 3.30 03/28/2021    HGB 10.9 03/28/2021    HCT 35.1 03/28/2021     03/28/2021    .4 03/28/2021    MCH 33.0 03/28/2021    MCHC 31.1 03/28/2021    RDW 14.7 03/28/2021    SEGSPCT 82 09/14/2011    LYMPHOPCT 17.4 03/28/2021    MONOPCT 6.1 03/28/2021    BASOPCT 0.3 03/28/2021    MONOSABS 0.40 03/28/2021    LYMPHSABS 1.14 03/28/2021    EOSABS 0.11 03/28/2021    BASOSABS 0.00 03/28/2021     CMP:    Lab Results   Component Value Date     03/28/2021    K 4.3 03/28/2021    K 4.7 03/19/2021    CL 98 03/28/2021    CO2 31 03/28/2021    BUN 10 03/28/2021    CREATININE 1.0 03/28/2021    GFRAA >60 03/28/2021    LABGLOM 59 03/28/2021    GLUCOSE 109 03/28/2021    GLUCOSE 89 09/14/2011    PROT 6.1 03/28/2021    LABALBU 3.1 03/28/2021    CALCIUM 8.7 03/28/2021    BILITOT 0.7 03/28/2021    ALKPHOS 63 03/28/2021    AST 25 03/28/2021    ALT 41 03/28/2021       Assessment:  1. Small bowel obstruction with nonreducible ventral abdominal hernia status post laparoscopic robotic assisted transabdominal retrorectus repair of incisional hernia with extensive lysis of adhesion by Dr. Dave Batres on March 25  2. Postoperative respiratory failure with hypoxia in the setting of COPD  3. Mild pulmonary hypertension  4. Gastroesophageal reflux disease  5. Obesity  6. History of tobacco abuse; recently quit  7. Daily alcohol consumption    Plan:   The patient is doing dramatically better today.   We will make further attempts at nasal cannula oxygen weaning and have witnessed her utilizing the incentive spirometer. We encouraged frequent ambulation. Otherwise, she is now passing flatus and has been able to tolerate increased oral ingestion. If she continues to improve throughout the day, she may be acceptable for discharge home later. More than 50% of my time was spent at the bedside counseling/coordinating care with the patient and/or family with face to face contact. This time was spent reviewing notes and laboratory data as well as instructing and counseling the patient. Time I spent with the family or surrogate(s) is included only if the patient was incapable of providing the necessary information or participating in medical decisions. I also discussed the differential diagnosis and all of the proposed management plans with the patient and individuals accompanying the patient. I am readily available for any further decision-making and intervention.        Janiya Nguyễn DO, F.A.C.O.I.  3/28/2021  7:31 AM

## 2021-03-29 ENCOUNTER — APPOINTMENT (OUTPATIENT)
Dept: CT IMAGING | Age: 51
DRG: 227 | End: 2021-03-29
Payer: MEDICARE

## 2021-03-29 VITALS
RESPIRATION RATE: 20 BRPM | DIASTOLIC BLOOD PRESSURE: 63 MMHG | WEIGHT: 220 LBS | SYSTOLIC BLOOD PRESSURE: 104 MMHG | TEMPERATURE: 98.4 F | HEART RATE: 70 BPM | HEIGHT: 62 IN | BODY MASS INDEX: 40.48 KG/M2 | OXYGEN SATURATION: 91 %

## 2021-03-29 LAB
ALBUMIN SERPL-MCNC: 3.1 G/DL (ref 3.5–5.2)
ALP BLD-CCNC: 67 U/L (ref 35–104)
ALT SERPL-CCNC: 36 U/L (ref 0–32)
ANION GAP SERPL CALCULATED.3IONS-SCNC: 12 MMOL/L (ref 7–16)
AST SERPL-CCNC: 26 U/L (ref 0–31)
BASOPHILS ABSOLUTE: 0.03 E9/L (ref 0–0.2)
BASOPHILS RELATIVE PERCENT: 0.5 % (ref 0–2)
BILIRUB SERPL-MCNC: 0.5 MG/DL (ref 0–1.2)
BUN BLDV-MCNC: 8 MG/DL (ref 6–20)
CALCIUM SERPL-MCNC: 8.4 MG/DL (ref 8.6–10.2)
CHLORIDE BLD-SCNC: 97 MMOL/L (ref 98–107)
CO2: 26 MMOL/L (ref 22–29)
CREAT SERPL-MCNC: 0.8 MG/DL (ref 0.5–1)
EOSINOPHILS ABSOLUTE: 0.12 E9/L (ref 0.05–0.5)
EOSINOPHILS RELATIVE PERCENT: 2.1 % (ref 0–6)
GFR AFRICAN AMERICAN: >60
GFR NON-AFRICAN AMERICAN: >60 ML/MIN/1.73
GLUCOSE BLD-MCNC: 150 MG/DL (ref 74–99)
HCT VFR BLD CALC: 34.3 % (ref 34–48)
HEMOGLOBIN: 10.8 G/DL (ref 11.5–15.5)
IMMATURE GRANULOCYTES #: 0.05 E9/L
IMMATURE GRANULOCYTES %: 0.9 % (ref 0–5)
LYMPHOCYTES ABSOLUTE: 0.96 E9/L (ref 1.5–4)
LYMPHOCYTES RELATIVE PERCENT: 17 % (ref 20–42)
MAGNESIUM: 1.8 MG/DL (ref 1.6–2.6)
MCH RBC QN AUTO: 32.8 PG (ref 26–35)
MCHC RBC AUTO-ENTMCNC: 31.5 % (ref 32–34.5)
MCV RBC AUTO: 104.3 FL (ref 80–99.9)
MONOCYTES ABSOLUTE: 0.59 E9/L (ref 0.1–0.95)
MONOCYTES RELATIVE PERCENT: 10.4 % (ref 2–12)
NEUTROPHILS ABSOLUTE: 3.91 E9/L (ref 1.8–7.3)
NEUTROPHILS RELATIVE PERCENT: 69.1 % (ref 43–80)
PDW BLD-RTO: 14.6 FL (ref 11.5–15)
PHOSPHORUS: 3.2 MG/DL (ref 2.5–4.5)
PLATELET # BLD: 182 E9/L (ref 130–450)
PMV BLD AUTO: 11.1 FL (ref 7–12)
POTASSIUM SERPL-SCNC: 3.4 MMOL/L (ref 3.5–5)
RBC # BLD: 3.29 E12/L (ref 3.5–5.5)
SODIUM BLD-SCNC: 135 MMOL/L (ref 132–146)
TOTAL PROTEIN: 6.3 G/DL (ref 6.4–8.3)
WBC # BLD: 5.7 E9/L (ref 4.5–11.5)

## 2021-03-29 PROCEDURE — 6370000000 HC RX 637 (ALT 250 FOR IP): Performed by: SURGERY

## 2021-03-29 PROCEDURE — 2580000003 HC RX 258: Performed by: SURGERY

## 2021-03-29 PROCEDURE — 6370000000 HC RX 637 (ALT 250 FOR IP): Performed by: INTERNAL MEDICINE

## 2021-03-29 PROCEDURE — 71275 CT ANGIOGRAPHY CHEST: CPT

## 2021-03-29 PROCEDURE — 2700000000 HC OXYGEN THERAPY PER DAY

## 2021-03-29 PROCEDURE — 6370000000 HC RX 637 (ALT 250 FOR IP): Performed by: STUDENT IN AN ORGANIZED HEALTH CARE EDUCATION/TRAINING PROGRAM

## 2021-03-29 PROCEDURE — 85025 COMPLETE CBC W/AUTO DIFF WBC: CPT

## 2021-03-29 PROCEDURE — 6360000002 HC RX W HCPCS: Performed by: INTERNAL MEDICINE

## 2021-03-29 PROCEDURE — 6360000002 HC RX W HCPCS: Performed by: SURGERY

## 2021-03-29 PROCEDURE — 84100 ASSAY OF PHOSPHORUS: CPT

## 2021-03-29 PROCEDURE — 80053 COMPREHEN METABOLIC PANEL: CPT

## 2021-03-29 PROCEDURE — 6360000004 HC RX CONTRAST MEDICATION: Performed by: RADIOLOGY

## 2021-03-29 PROCEDURE — 94640 AIRWAY INHALATION TREATMENT: CPT

## 2021-03-29 PROCEDURE — 36415 COLL VENOUS BLD VENIPUNCTURE: CPT

## 2021-03-29 PROCEDURE — 83735 ASSAY OF MAGNESIUM: CPT

## 2021-03-29 RX ORDER — OXYCODONE HYDROCHLORIDE 5 MG/1
5 TABLET ORAL EVERY 4 HOURS PRN
Qty: 30 TABLET | Refills: 0 | Status: SHIPPED | OUTPATIENT
Start: 2021-03-29 | End: 2021-04-02 | Stop reason: SDUPTHER

## 2021-03-29 RX ORDER — CYCLOBENZAPRINE HCL 10 MG
10 TABLET ORAL 3 TIMES DAILY PRN
Qty: 30 TABLET | Refills: 0 | Status: SHIPPED | OUTPATIENT
Start: 2021-03-29 | End: 2021-04-08

## 2021-03-29 RX ORDER — PANTOPRAZOLE SODIUM 40 MG/1
40 TABLET, DELAYED RELEASE ORAL
Qty: 30 TABLET | Refills: 3 | Status: SHIPPED | OUTPATIENT
Start: 2021-03-30

## 2021-03-29 RX ORDER — PANTOPRAZOLE SODIUM 40 MG/1
40 TABLET, DELAYED RELEASE ORAL
Status: DISCONTINUED | OUTPATIENT
Start: 2021-03-29 | End: 2021-03-29 | Stop reason: HOSPADM

## 2021-03-29 RX ORDER — LEVOTHYROXINE SODIUM 0.05 MG/1
50 TABLET ORAL DAILY
Qty: 30 TABLET | Refills: 3 | Status: SHIPPED | OUTPATIENT
Start: 2021-03-30

## 2021-03-29 RX ORDER — ALBUTEROL SULFATE 90 UG/1
2 AEROSOL, METERED RESPIRATORY (INHALATION) EVERY 6 HOURS PRN
Qty: 1 INHALER | Refills: 3 | Status: SHIPPED | OUTPATIENT
Start: 2021-03-29 | End: 2021-12-12 | Stop reason: ALTCHOICE

## 2021-03-29 RX ORDER — PSEUDOEPHEDRINE HCL 30 MG
100 TABLET ORAL DAILY
Qty: 30 CAPSULE | Refills: 0 | Status: SHIPPED | OUTPATIENT
Start: 2021-03-29 | End: 2021-04-28

## 2021-03-29 RX ADMIN — LEVOTHYROXINE SODIUM 50 MCG: 50 TABLET ORAL at 06:17

## 2021-03-29 RX ADMIN — METOCLOPRAMIDE 5 MG: 5 TABLET ORAL at 12:11

## 2021-03-29 RX ADMIN — HYDROMORPHONE HYDROCHLORIDE 0.5 MG: 1 INJECTION, SOLUTION INTRAMUSCULAR; INTRAVENOUS; SUBCUTANEOUS at 04:22

## 2021-03-29 RX ADMIN — BREXPIPRAZOLE 0.5 MG: 2 TABLET ORAL at 12:10

## 2021-03-29 RX ADMIN — IOPAMIDOL 80 ML: 755 INJECTION, SOLUTION INTRAVENOUS at 07:42

## 2021-03-29 RX ADMIN — OXYCODONE 10 MG: 5 TABLET ORAL at 01:47

## 2021-03-29 RX ADMIN — SODIUM CHLORIDE, PRESERVATIVE FREE 10 ML: 5 INJECTION INTRAVENOUS at 00:42

## 2021-03-29 RX ADMIN — HYDROMORPHONE HYDROCHLORIDE 0.5 MG: 1 INJECTION, SOLUTION INTRAMUSCULAR; INTRAVENOUS; SUBCUTANEOUS at 00:43

## 2021-03-29 RX ADMIN — ALBUTEROL SULFATE 2.5 MG: 2.5 SOLUTION RESPIRATORY (INHALATION) at 09:53

## 2021-03-29 RX ADMIN — METHOCARBAMOL 1000 MG: 500 TABLET, FILM COATED ORAL at 00:51

## 2021-03-29 RX ADMIN — OXYCODONE 10 MG: 5 TABLET ORAL at 13:25

## 2021-03-29 RX ADMIN — PANTOPRAZOLE SODIUM 40 MG: 40 TABLET, DELAYED RELEASE ORAL at 09:31

## 2021-03-29 RX ADMIN — PAROXETINE HYDROCHLORIDE 10 MG: 20 TABLET, FILM COATED ORAL at 09:28

## 2021-03-29 RX ADMIN — METHOCARBAMOL 1000 MG: 500 TABLET, FILM COATED ORAL at 06:18

## 2021-03-29 RX ADMIN — OXYCODONE 10 MG: 5 TABLET ORAL at 06:23

## 2021-03-29 RX ADMIN — ALBUTEROL SULFATE 2.5 MG: 2.5 SOLUTION RESPIRATORY (INHALATION) at 06:14

## 2021-03-29 RX ADMIN — BUMETANIDE 1 MG: 1 TABLET ORAL at 09:27

## 2021-03-29 RX ADMIN — BUDESONIDE 500 MCG: 0.5 INHALANT RESPIRATORY (INHALATION) at 06:14

## 2021-03-29 RX ADMIN — DOCUSATE SODIUM 100 MG: 100 CAPSULE, LIQUID FILLED ORAL at 09:28

## 2021-03-29 RX ADMIN — METOCLOPRAMIDE 5 MG: 5 TABLET ORAL at 06:17

## 2021-03-29 RX ADMIN — ENOXAPARIN SODIUM 40 MG: 40 INJECTION SUBCUTANEOUS at 09:29

## 2021-03-29 RX ADMIN — METHOCARBAMOL 1000 MG: 500 TABLET, FILM COATED ORAL at 12:11

## 2021-03-29 ASSESSMENT — PAIN SCALES - GENERAL
PAINLEVEL_OUTOF10: 8
PAINLEVEL_OUTOF10: 5
PAINLEVEL_OUTOF10: 6
PAINLEVEL_OUTOF10: 2

## 2021-03-29 NOTE — PLAN OF CARE
Problem: Pain:  Goal: Pain level will decrease  3/28/2021 2348 by Chichi Wilson RN  Outcome: Met This Shift  Note: Pain is less than 3 after being medicated     Problem: Pain:  Goal: Control of acute pain  3/28/2021 2348 by Chichi Wilson RN  Outcome: Met This Shift     Problem: Pain:  Goal: Control of chronic pain  3/28/2021 2348 by Chichi Wilson RN  Outcome: Met This Shift     Problem: Bowel/Gastric:  Goal: Control of bowel function will improve  3/28/2021 2348 by Chichi Wilson RN  Outcome: Met This Shift  Note: Moving bowels     Problem: Bowel/Gastric:  Goal: Control of bowel function will improve  3/28/2021 2348 by Chichi Wilson RN  Outcome: Met This Shift  Note: Moving bowels     Problem: Bowel/Gastric:  Goal: Ability to achieve a regular elimination pattern will improve  3/28/2021 2348 by Chichi Wilson RN  Outcome: Met This Shift     Problem:  Bowel/Gastric:  Goal: Ability to achieve a regular elimination pattern will improve  3/28/2021 2348 by Chichi Wilson RN  Outcome: Met This Shift     Problem: Nutritional:  Goal: Ability to follow a diet with enough fiber (20 to 30 grams) for normal bowel function will improve  3/28/2021 2348 by Chichi Wilson RN  Outcome: Met This Shift     Problem: Infection - Surgical Site:  Goal: Will show no infection signs and symptoms  3/28/2021 2348 by Cihchi Wilson RN  Outcome: Met This Shift

## 2021-03-29 NOTE — CARE COORDINATION
SS Note/Discharge plan:  SS Consult noted for home 02 and qualifying testing and orders in Epic for d/c home today 3/29, met with pt to discuss referral and dme providers offered, pt receptive to [id number], referral made to [employer] rep and request 02 portable be delivered to pt's hospital room this afternoon and to arrange home delivery with pt, nursing informed. Electronically signed by MOON Porter on 3/29/2021 at 9:45 AM

## 2021-03-29 NOTE — DISCHARGE SUMMARY
Internal Medicine Progress Note     [facility]NALEE=[facility]     Ofe Found. Barbie Donaldson., F.A.C.O.I. Patel Sainz D.O., RACHEL.BRIAN.CManoloOManoloIManolo Kinney D.O. Reji Borges, MSN, APRN, NP-C  Angella Gauthier. Yahaira Thomson, MSN, [id number] [facility]       Internal Medicine  Discharge Summary    NAME: Tammy Patten  :  1970  MRN:  [id number]  PCP:Pancho Goncalves DO  ADMITTED: 3/18/2021      DISCHARGED: 3/29/21    ADMITTING PHYSICIAN: Heena Abraham MD    CONSULTANT(S):   IP CONSULT TO GENERAL SURGERY  IP CONSULT TO HOSPITALIST     ADMITTING DIAGNOSIS:   S[facility]) ([facility] [K56.609]     DISCHARGE DIAGNOSES:   1. Small bowel obstruction with nonreducible ventral abdominal hernia status post laparoscopic robotic assisted transabdominal retrorectus repair of incisional hernia with extensive lysis of adhesion by Dr. Evi Aldana on   2. Postoperative respiratory failure with hypoxia in the setting of COPD  3. Mild pulmonary hypertension  4. Gastroesophageal reflux disease  5. Obesity  6. History of tobacco abuse; recently quit  7. Daily alcohol consumption    BRIEF HISTORY OF PRESENT ILLNESS:   Patient is a 51-year-old female who presented to the ED due to abdominal pain, dry heaving and decreased oral intake. Patient states that dry heaving began a few days ago. It continued to progress and her oral intake diminished as well. Initially she was able to tolerate solids however over the last 1 to 2 days she has only able to tolerate liquids with great difficulty. Describes abdominal pain as sharp with associated cramping. Pain is mainly epigastric in location with some left-sided upper quadrant pain as well. Patient did admit to likely bleeding from hemorrhoids about a week ago. However today she noted a bowel movement with black tar-like stool. She denies any chest pain. Denies shortness of breath. She denies any fever or chills.     LABS[de-identified]  Lab Results   Component Value Date    WBC 5.7 03/29/2021    HGB 10.8 (L) 03/29/2021    HCT 34.3 03/29/2021     03/29/2021     03/29/2021    K 3.4 (L) 03/29/2021    CL 97 (L) 03/29/2021    CREATININE 0.8 03/29/2021    BUN 8 03/29/2021    CO2 26 03/29/2021    GLUCOSE 150 (H) 03/29/2021    ALT 36 (H) 03/29/2021    AST 26 03/29/2021    INR 1.0 03/19/2021     Lab Results   Component Value Date    INR 1.0 03/19/2021    PROTIME 11.2 03/19/2021      Lab Results   Component Value Date    TSH 8.040 (H) 03/19/2021     No results found for: TRIG  No results found for: HDL  No results found for: LDLCALC  No results found for: LABA1C    IMAGING:  Echo Complete    Result Date: 3/10/2021  Transthoracic Echocardiography Report (TTE)  Demographics   Patient Name        [address] [place] [address] A Gender                Female   Medical Record      [id number]    Room Number           8402  Number   Account #           [de-identified]   Procedure Date        03/10/2021   Corporate ID                    Ordering Physician    Suni Street   Accession Number    [id number]  Referring Physician   Date of Birth       1970  Sonographer           Murtaza Franco ELVIRA   Age                 48 year(s)  Interpreting          Laura Coulter DO                                  Physician                                   Any Other  Procedure Type of Study   TTE procedure:Echo Complete W/Doppler & Color Flow. Procedure Date Date: 03/10/2021 Start: 10:37 AM Study Location: Echo Lab Technical Quality: Poor visualization due to body habitus. Indications:Cardiomyopathy and Hypoxemia. Patient Status: Routine Height: 63 inches Weight: 250 pounds BSA: 2.13 m^2 BMI: 44.29 kg/m^2 BP: 157/89 mmHg  Findings   Left Ventricle  Left ventricular size is grossly normal.  Borderline concentric left ventricular hypertrophy. Ejection fraction is measured at 67%. No evidence of left ventricular mass or thrombus noted. No regional wall motion abnormalities seen.    Right Ventricle  Mildly dilated right Dimension: 5.3  cm                           Dimension: 4.1 cmAO Root  cm              LV Volume Diastolic: 906.3   Dimension: 3.1 cm  LV FS:37.7 %    ml  LV PW           LV Volume Systolic: 62.2 ml  Diastolic: 1.2  LV EDV/LV EDV Index: 134.9  cm              ml/63 ml/m^2LV ESV/LV ESV    RV Diastolic Dimension: 3.2  LV PW Systolic: Index: 56.3 CE/82NR/ m^2     cm  1.3 cm          EF Calculated: 66.8 %        RV Systolic Dimension: 2.6 cm  Septum          LV Mass Index: 120 l/min*m^2 LA/Aorta: 3.27  Diastolic: 1.2  cm                                           LA volume/Index: 68.3 ml  Septum          LVOT: 2 cm  Systolic: 1.7  cm   LV Mass: 256.57  g  Doppler Measurements & Calculations   MV Peak E-Wave:   AV Peak Velocity: 1.6 m/s      LVOT Peak Velocity: 1.58  1.28 m/s          AV Peak Gradient: 10.28 mmHg   m/s  MV Peak A-Wave:   AV Mean Velocity: 1.03 m/s     LVOT Mean Velocity: 0.97  1.04 m/s          AV Mean Gradient: 5 mmHg       m/s  MV E/A Ratio:     AV VTI: 34.8 cm                LVOT Peak Gradient: 10  1.23              AV Area (Continuity):3.09 cm^2 mmHgLVOT Mean Gradient:  MV Peak Gradient:                                4.5 mmHg  6 mmHg            LVOT VTI: 34.2 cm              Estimated RVSP: 41.8 mmHg  MV Mean Gradient:                                Estimated RAP:10 mmHg  3.3 mmHg          Estimated PASP: 41.81 mmHg  MV Mean Velocity: Pulm. Vein A Reversal  0.86 m/s          Duration:152.2 msec            TR Velocity:2.82 m/s  MV Deceleration   Pulm. Vein D Velocity:0.63     TR Gradient:31.81 mmHg  Time: 228.9 msec  m/sPulm. Vein A Reversal       PV Peak Velocity: 1.44  MV P1/2t: 98.2    Velocity:0.34 m/s              m/s  msec              Pulm.  Vein S Velocity: 0.77    PV Peak Gradient: 8.28  MVA by PHT:2.24   m/s                            mmHg  cm^2                                             PV Mean Velocity: 0.89  MV Area                                          m/s  (continuity): 2.5 PV Mean Gradient: 3.7  cm^2                                             mmHg  [email or web address] [id number]    Xr Chest (2 Vw)    Result Date: 3/10/2021  EXAMINATION: TWO XRAY VIEWS OF THE CHEST 3/10/2021 8:55 am COMPARISON: 03/09/2021 HISTORY: ORDERING SYSTEM PROVIDED HISTORY: Hypoxia, further evaluation of underlying pneumonia TECHNOLOGIST PROVIDED HISTORY: Reason for exam:->Hypoxia, further evaluation of underlying pneumonia FINDINGS: There is worsening interstitial process in the lungs. There is a question early alveolar infiltrate in the right lower lobe. There are is old healed left-sided rib fractures. There are no effusions. Heart size is normal.     Progressive interstitial process in the lungs. This could be on a viral etiology. Clinical correlation is recommended     Xr Abdomen (kub) (single Ap View)    Result Date: 3/19/2021  EXAMINATION: ONE SUPINE XRAY VIEW(S) OF THE ABDOMEN 3/19/2021 5:39 pm COMPARISON: Abdominal radiograph from earlier the same day. HISTORY: ORDERING SYSTEM PROVIDED HISTORY: progression of po contrast TECHNOLOGIST PROVIDED HISTORY: Reason for exam:->progression of po contrast FINDINGS: A supine frontal view abdominal radiograph was obtained. The abdominal bowel gas pattern is nonspecific and unremarkable with no dilated loops or air-fluid levels. There is oral contrast material throughout the large bowel. No pathologic soft tissue calcifications. Metallic surgical clips are present within the right upper quadrant region. The visualized lung bases are clear. No acute osseous abnormality. Nonspecific, nonobstructive bowel gas pattern. The ingested contrast material has progressed, now present throughout the colon. The distal end of the nasogastric tube is coiled within the descending duodenum.      Ct Abdomen Pelvis W Iv Contrast Additional Contrast? Oral    Result Date: 3/18/2021  EXAMINATION: CT OF THE ABDOMEN AND PELVIS WITH CONTRAST 3/18/2021 7:05 pm TECHNIQUE: CT of the abdomen and pelvis was performed with the administration of intravenous contrast. Multiplanar reformatted images are provided for review. Dose modulation, iterative reconstruction, and/or weight based adjustment of the mA/kV was utilized to reduce the radiation dose to as low as reasonably achievable. COMPARISON: March 9, 2021 HISTORY: ORDERING SYSTEM PROVIDED HISTORY: abdominal pain hernia TECHNOLOGIST PROVIDED HISTORY: Reason for exam:->abdominal pain hernia Additional Contrast?->Oral Decision Support Exception->Emergency Medical Condition (MA) FINDINGS: Lower Chest: Mild chronic parenchymal changes. No active infiltrates or pleural effusion. The heart is mildly enlarged. Organs: The liver is fatty with no focal lesions or biliary ductal dilatation. Gallbladder is surgically absent. Spleen is not enlarged. The pancreas demonstrates normal contour. Adrenal glands appear unremarkable. There is symmetric enhancement of the kidneys with no hydronephrosis. GI/Bowel: There are no obstructing or constricting mass lesions. The appendix is normal.  The proximal small bowel is mildly distended. Pelvis: Bladder is suboptimally distended with no stones. There is no significant free fluid. The uterus is absent. Peritoneum/Retroperitoneum: No free air or significant adenopathy. Bones/Soft Tissues: Postsurgical scar in the anterior abdominal wall. Small ventral hernia containing a loop of small bowel, no change. Small ventral hernia containing a loop of small bowel, resulting in early or partial small bowel obstruction. Fatty liver. No evidence of acute appendicitis or obstructive uropathy.      Ct Abdomen Pelvis W Iv Contrast Additional Contrast? None    Result Date: 3/9/2021  EXAMINATION: CT OF THE ABDOMEN AND PELVIS WITH CONTRAST 3/9/2021 7:23 pm TECHNIQUE: CT of the abdomen and pelvis was performed with the administration of intravenous contrast. Multiplanar reformatted images are provided for review. Dose modulation, iterative reconstruction, and/or weight based adjustment of the mA/kV was utilized to reduce the radiation dose to as low as reasonably achievable. COMPARISON: March 3, 2021 HISTORY: ORDERING SYSTEM PROVIDED HISTORY: abdominal pain; r/o obstruction, free air TECHNOLOGIST PROVIDED HISTORY: Additional Contrast?->None Reason for exam:->abdominal pain; r/o obstruction, free air Decision Support Exception->Emergency Medical Condition (MA) FINDINGS: Lower Chest: There are mild ground-glass peripheral infiltrates at the visualized lung bases. No pleural effusion. Organs: Fatty liver with no focal lesions. Gallbladder is surgically absent. Spleen is not enlarged. Pancreas and adrenal glands appear unremarkable. Symmetric enhancement of the kidneys with no hydronephrosis. GI/Bowel: No evidence of obstruction. Appendix is not visualized. Pelvis: Bladder is suboptimally distended. There is no significant free fluid. Peritoneum/Retroperitoneum: No free air or significant adenopathy. Bones/Soft Tissues: Ventral hernia containing loops of bowel. Degenerative changes with anterolisthesis of L4 on L5. No CT evidence of acute intra-abdominal or pelvic inflammatory process. Ventral hernia containing loops of small bowel, with no evidence of obstruction. Fatty liver. Ct Abdomen Pelvis W Iv Contrast Additional Contrast? None    Result Date: 3/3/2021  EXAMINATION: CTA OF THE CHEST; CT OF THE ABDOMEN AND PELVIS WITH CONTRAST 3/3/2021 7:38 pm TECHNIQUE: CTA of the chest was performed after the administration of intravenous contrast.  Multiplanar reformatted images are provided for review. MIP images are provided for review.  Dose modulation, iterative reconstruction, and/or weight based adjustment of the mA/kV was utilized to reduce the radiation dose to as low as reasonably achievable.; CT of the abdomen and pelvis was performed with the administration of intravenous contrast. Multiplanar reformatted images are provided for review. Dose modulation, iterative reconstruction, and/or weight based adjustment of the mA/kV was utilized to reduce the radiation dose to as low as reasonably achievable. COMPARISON: 02/20/2021 HISTORY: ORDERING SYSTEM PROVIDED HISTORY: ro PE TECHNOLOGIST PROVIDED HISTORY: Reason for exam:->ro PE Decision Support Exception->Emergency Medical Condition (MA); ORDERING SYSTEM PROVIDED HISTORY: abd pain, hx hernia TECHNOLOGIST PROVIDED HISTORY: Reason for exam:->abd pain, hx hernia Additional Contrast?->None Decision Support Exception->Emergency Medical Condition (MA) FINDINGS: Chest: Pulmonary arteries: Pulmonary arteries are adequately opacified. No emboli are demonstrated Mediastinum: Thoracic aorta normal in caliber. No mediastinal adenopathy. No pericardial effusion Lungs/pleura: Atelectasis in the lower lobes. Stable 9 mm opacity in the lateral left base. Several similar subpleural airspace opacities in the posterior left base are present. No pleural effusion atelectasis in the right middle lobe. Soft Tissues/Bones: No acute bony abnormality Abdomen/Pelvis: Organs: Low-attenuation of the liver. Remaining solid organs unremarkable. Gallbladder surgically absent. GI/Bowel: Herniation of several loops of small bowel through a supraumbilical midline abdominal wall defect. Mild distention of a few small bowel loops proximal to the hernia. Small bowel is distal to the hernia are normal in caliber and not decompressed. Colon normal in caliber Pelvis: Uterus surgically absent. Urinary bladder unremarkable. No pelvic fluid Peritoneum/Retroperitoneum: No ascites or pneumoperitoneum. Aorta unremarkable Bones/Soft Tissues: No acute bony abnormality. Diffuse lumbar degenerative disease with a few mm L4 anterolisthesis due to facet arthropathy     1.  No evidence of pulmonary embolism 2. Subpleural airspace opacities in the basilar left lower lobe may represent atelectasis or pneumonia. There is new atelectasis in the right middle lobe 3. Persistent ventral hernia containing a few loops of small bowel, with persistent mild dilatation of small bowel proximal to the hernia suggesting an element of low-grade obstruction, possibly chronic 4. Hepatic steatosis     Xr Chest Portable    Result Date: 3/27/2021  EXAMINATION: ONE XRAY VIEW OF THE CHEST 3/27/2021 8:43 am COMPARISON: 03/18/2021 HISTORY: ORDERING SYSTEM PROVIDED HISTORY: SOB TECHNOLOGIST PROVIDED HISTORY: Reason for exam:->SOB FINDINGS: The heart and mediastinum are normal for AP technique. There is a moderate sliding-type hiatal hernia. Lung volumes are decreased. Mild bibasilar consolidation, most likely reflects subsegmental atelectasis. Decreased lung volumes with increased bibasilar atelectasis. Xr Chest Portable    Result Date: 3/9/2021  EXAMINATION: ONE XRAY VIEW OF THE CHEST 3/9/2021 6:27 pm COMPARISON: None. HISTORY: ORDERING SYSTEM PROVIDED HISTORY: abdominal pain TECHNOLOGIST PROVIDED HISTORY: Reason for exam:->abdominal pain FINDINGS: The lungs are without acute focal process. There is no effusion or pneumothorax. The cardiomediastinal silhouette is without acute process. The osseous structures are without acute process. No acute process. Xr Chest Portable    Result Date: 3/3/2021  EXAMINATION: ONE XRAY VIEW OF THE CHEST 3/3/2021 5:42 pm COMPARISON: No prior HISTORY: ORDERING SYSTEM PROVIDED HISTORY: Jeremy Manners TECHNOLOGIST PROVIDED HISTORY: Reason for exam:->edemea, ro chf FINDINGS: No airspace opacity or pleural effusion. The heart is normal size. No pneumothorax. No free air beneath the hemidiaphragms. No pneumonia or pleural effusion.      Xr Chest 1 View    Result Date: 3/18/2021  EXAMINATION: ONE XRAY VIEW OF THE CHEST 3/18/2021 7:02 pm COMPARISON: March 10, 2021 HISTORY: ORDERING SYSTEM PROVIDED HISTORY: eval free air TECHNOLOGIST PROVIDED HISTORY: Reason for exam:->eval free air FINDINGS: The lungs are without acute focal process. There is no effusion or pneumothorax. The cardiomediastinal silhouette is without acute process. The osseous structures are without acute process. Stable old left rib fractures noted. No acute process. Specifically, no free air below the right hemidiaphragm. Cta Chest W Contrast    Result Date: 3/29/2021  EXAMINATION: CTA OF THE CHEST 3/29/2021 7:33 am TECHNIQUE: CTA of the chest was performed after the administration of intravenous contrast.  Multiplanar reformatted images are provided for review. MIP images are provided for review. Dose modulation, iterative reconstruction, and/or weight based adjustment of the mA/kV was utilized to reduce the radiation dose to as low as reasonably achievable. COMPARISON: 03/18/2021 HISTORY: ORDERING SYSTEM PROVIDED HISTORY: Assess for PE TECHNOLOGIST PROVIDED HISTORY: Reason for exam:->Assess for PE FINDINGS: Pulmonary Arteries: Pulmonary arteries are adequately opacified for evaluation. No evidence of intraluminal filling defect to suggest pulmonary embolism. Main pulmonary artery is normal in caliber. Mediastinum: No evidence of mediastinal lymphadenopathy. The heart and pericardium demonstrate no acute abnormality. There is no acute abnormality of the thoracic aorta. Lungs/pleura: Vague ground-glass infiltration is seen within the right middle lobe and within the anterior aspect of the left lung and lingula. Areas of dense consolidation are seen within the right and left lung bases more prominent within the right lung base. The dense consolidation is favored to represent atelectasis. Pneumonia is less likely. Mild emphysematous changes are noted. There is no right or left pleural effusion. Upper Abdomen: There is fatty infiltration of the liver.   Subcutaneous emphysema seen along the right lateral Multiplanar reformatted images are provided for review. Dose modulation, iterative reconstruction, and/or weight based adjustment of the mA/kV was utilized to reduce the radiation dose to as low as reasonably achievable. COMPARISON: 02/20/2021 HISTORY: ORDERING SYSTEM PROVIDED HISTORY: ro PE TECHNOLOGIST PROVIDED HISTORY: Reason for exam:->ro PE Decision Support Exception->Emergency Medical Condition (MA); ORDERING SYSTEM PROVIDED HISTORY: abd pain, hx hernia TECHNOLOGIST PROVIDED HISTORY: Reason for exam:->abd pain, hx hernia Additional Contrast?->None Decision Support Exception->Emergency Medical Condition (MA) FINDINGS: Chest: Pulmonary arteries: Pulmonary arteries are adequately opacified. No emboli are demonstrated Mediastinum: Thoracic aorta normal in caliber. No mediastinal adenopathy. No pericardial effusion Lungs/pleura: Atelectasis in the lower lobes. Stable 9 mm opacity in the lateral left base. Several similar subpleural airspace opacities in the posterior left base are present. No pleural effusion atelectasis in the right middle lobe. Soft Tissues/Bones: No acute bony abnormality Abdomen/Pelvis: Organs: Low-attenuation of the liver. Remaining solid organs unremarkable. Gallbladder surgically absent. GI/Bowel: Herniation of several loops of small bowel through a supraumbilical midline abdominal wall defect. Mild distention of a few small bowel loops proximal to the hernia. Small bowel is distal to the hernia are normal in caliber and not decompressed. Colon normal in caliber Pelvis: Uterus surgically absent. Urinary bladder unremarkable. No pelvic fluid Peritoneum/Retroperitoneum: No ascites or pneumoperitoneum. Aorta unremarkable Bones/Soft Tissues: No acute bony abnormality. Diffuse lumbar degenerative disease with a few mm L4 anterolisthesis due to facet arthropathy     1. No evidence of pulmonary embolism 2.  Subpleural airspace opacities in the basilar left lower lobe may represent atelectasis or pneumonia. There is new atelectasis in the right middle lobe 3. Persistent ventral hernia containing a few loops of small bowel, with persistent mild dilatation of small bowel proximal to the hernia suggesting an element of low-grade obstruction, possibly chronic 4. Hepatic steatosis     Us Dup Lower Extremity Right Zachery    Result Date: 3/18/2021  EXAMINATION: DUPLEX VENOUS ULTRASOUND OF THE RIGHT LOWER EXTREMITY, 3/18/2021 7:50 pm TECHNIQUE: Duplex ultrasound using B-mode/gray scaled imaging and Doppler spectral analysis and color flow was obtained of the right lower extremity. COMPARISON: None. HISTORY: ORDERING SYSTEM PROVIDED HISTORY: Discomfort TECHNOLOGIST PROVIDED HISTORY: Reason for exam:->Discomfort What reading provider will be dictating this exam?->CRC FINDINGS: The visualized veins of the right lower extremity are patent and free of echogenic thrombus. The veins demonstrate good compressibility with normal color flow study and spectral analysis. Soft tissue swelling at level of right calf. No evidence of DVT in the right lower extremity. Xr Abdomen For Ng/og/ne Tube Placement    Result Date: 3/19/2021  EXAMINATION: ONE SUPINE XRAY VIEW(S) OF THE ABDOMEN 3/18/2021 11:19 pm COMPARISON: None. HISTORY: ORDERING SYSTEM PROVIDED HISTORY: Confirmation of course of NG/OG/NE tube and location of tip of tube TECHNOLOGIST PROVIDED HISTORY: Reason for exam:->Confirmation of course of NG/OG/NE tube and location of tip of tube Portable? ->Yes FINDINGS: Nonspecific bowel gas pattern without evidence of obstruction. No abnormal calcifications. No acute osseous abnormality. Enteric tube tip is in the body of the stomach. Normal heart size, lungs and pleural spaces. Enteric tube tip in the body of the stomach.      Fl Small Bowel Follow Through Only    Result Date: 3/10/2021  EXAMINATION: SMALL BOWEL FOLLOW THROUGH SERIES 3/10/2021 TECHNIQUE: Small bowel follow through series was performed with overhead images and spot images. FLUOROSCOPY DOSE AND TYPE OR TIME AND EXPOSURES: Fluoroscopy time equals 0.1 minutes. Total dose equals 127 mGy COMPARISON: Correlation is made with the patient's previous CT scan of 1 day earlier. HISTORY: ORDERING SYSTEM PROVIDED HISTORY: eval bowel obstruction, Gastrografin TECHNOLOGIST PROVIDED HISTORY: Reason for exam:->eval bowel obstruction, Gastrografin FINDINGS:  image of the abdomen demonstrates a nonspecific, nonobstructed bowel gas pattern. There is normal transit time to the terminal ileum. No focal abnormalities, strictures or obstructions are seen of the small bowel. Spot images of the terminal ileum are unremarkable. 1. There is no evidence of small-bowel obstruction. Contrast extends into the colon in a normal transit time. 2. Ventral hernia which is better appreciated on the CT scan of 1 day earlier. There is no obstruction of the herniated loops of small bowel. HOSPITAL COURSE:   [place] spent an extended period of time hospitalized and this will be a brief synopsis of the events that occurred during the hospitalization. She ultimately underwent laparoscopic robotic assisted transabdominal retrorectus repair of incisional hernia with extensive lysis of adhesions by Dr. Joy Ruth on March 25. She did well postoperatively and diet was advanced accordingly. She dealt with intermittent periods of hypoxia and will require nasal cannula oxygen upon discharge in the setting of atelectasis complicated by longstanding COPD. Inhaler therapy has been provided and we have stressed the absolute importance of utilizing the incentive spirometer. Chronic comorbidities have been monitored. We discussed alcohol and tobacco cessation. She became ambulatory and was ultimately deemed acceptable for discharge home with close outpatient follow-up.      BRIEF PHYSICAL EXAMINATION AND LABORATORIES ON DAY OF DISCHARGE:  VITALS:  /63   Pulse 70   Temp 98.4 °F (36.9 °C) (Oral)   Resp 20   Ht 5' 2\" (1.575 m)   Wt 220 lb (99.8 kg)   SpO2 91%   BMI 40.24 kg/m²     HEENT:  PERRLA. EOMI. Sclera clear. Buccal mucosa moist.  Nasal cannula oxygen was in place. Neck:  Supple. Trachea midline. No thyromegaly. No JVD. No bruits. Heart:  Rhythm regular, rate controlled. No murmurs. Lungs:  Symmetrical. Clear to auscultation bilaterally. No wheezes. No rhonchi. No rales. Abdomen: Soft. Mildly tender to palpation diffusely. Bowel sounds are active. Incision sites are clean, dry, and intact. Extremities:  Peripheral pulses present. No peripheral edema. No ulcers. Neurologic:  Alert x 3. No focal deficit. Cranial nerves grossly intact. Skin:  No petechia. No hemorrhage. No wounds. DISPOSITION:  The patient's condition is good. At this time the patient is without objective evidence of an acute process requiring continuing hospitalization or inpatient management. They are stable for discharge with outpatient follow-up. I have spoken with the patient and discussed the results of the current hospitalization, in addition to providing specific details for the plan of care and counseling regarding the diagnosis and prognosis. The plan has been discussed in detail and they are aware of the specific conditions for emergent return, as well as the importance of follow-up.   Their questions are answered at this time and they are agreeable with the plan for discharge to home    [facility] MEDICATIONS:    [facility] [address] Medication Instructions IMP:[id number]    Printed on:03/29/21 1426   Medication Information                      albuterol sulfate HFA (PROVENTIL HFA) 108 (90 Base) MCG/ACT inhaler  Inhale 2 puffs into the lungs every 6 hours as needed for Wheezing             brexpiprazole (REXULTI) 0.5 MG TABS tablet  Take 0.5 mg by mouth daily             bumetanide (BUMEX) 1 MG tablet  TAKE ONE TABLET 2 TIMES A DAY             cyclobenzaprine (FLEXERIL) 10 MG tablet  Take 1 tablet by mouth 3 times daily as needed for Muscle spasms             docusate sodium (COLACE, DULCOLAX) 100 MG CAPS  Take 100 mg by mouth daily             hydrOXYzine (ATARAX) 50 MG tablet  Take 50 mg by mouth nightly              levothyroxine (SYNTHROID) 50 MCG tablet  Take 1 tablet by mouth Daily             oxyCODONE (ROXICODONE) 5 MG immediate release tablet  Take 1 tablet by mouth every 4 hours as needed for Pain for up to 5 days. pantoprazole (PROTONIX) 40 MG tablet  Take 1 tablet by mouth every morning (before breakfast)             PARoxetine (PAXIL) 10 MG tablet  Take 10 mg by mouth every morning             rOPINIRole (REQUIP) 0.25 MG tablet               traZODone (DESYREL) 50 MG tablet                   FOLLOW UP/INSTRUCTIONS:  · This patient is instructed to follow-up with her primary care physician. · Patient is instructed to follow-up with the consults listed above as directed by them. · she is instructed to resume home medications and take new medications as indicated in the list above. · If the patient has a recurrence of symptoms, she is instructed to go to the ED. Preparing for this patient's discharge, including paperwork, orders, instructions, and meeting with patient did require > 40 minutes.     Bianca Patel,      3/29/2021  9:06 AM

## 2021-03-29 NOTE — PROGRESS NOTES
CLINICAL PHARMACY NOTE: MEDS TO [address] Select Patient?: No  Total # of Prescriptions Filled: 6   The following medications were delivered to the patient:  · Docusate sodium 100 mg  · Levothyroxine 50 mcg  · cyclobenzaprine 10 mg  · Pantoprazole 40 mg  · Albuterol inhaler  · Oxycodone 5 mg  Total # of Interventions Completed: 2  Time Spent (min): 30    Additional Documentation:

## 2021-03-29 NOTE — PROGRESS NOTES
GENERAL SURGERY  DAILY PROGRESS NOTE  3/29/2021    Chief Complaint   Patient presents with    Abdominal Pain     hernia patient, surgery scheduled for the 30th of this month. dry heaving. fifth visit this month for same issue.  Leg Swelling     edema in the lower extremities. Subjective:  Feels well. Tolerating diet.  Still on O2 this am. Had a BM yesterday    Objective:  /63   Pulse 70   Temp 98.4 °F (36.9 °C) (Oral)   Resp 20   Ht 5' 2\" (1.575 m)   Wt 220 lb (99.8 kg)   SpO2 91%   BMI 40.24 kg/m²     GENERAL:  Laying in bed, awake, alert, cooperative, no apparent distress  LUNGS:  No increased work of breathing with talking, on 2L NC  CARDIOVASCULAR:  RR  ABDOMEN:  Soft, non-tender, non-distended, incisions CDI  EXTREMITIES: No edema or swelling  SKIN: Warm and dry    Assessment/Plan:  48 y.o. female sp 3/25 Laparoscopic robotic-assisted transabdominal retrorectus release with repair of incisional hernia with mesh    Diet as tolerated  PRN pain medications  Pulmonary toilet  CTA without PE  Ok for dc from [address] [place] [address] perspective when ok with others     Electronically signed by Dedra Bustillo DO on 3/29/2021 at 9:21 AM      As above  Discharge when okay with others  Follow-up in 2 weeks

## 2021-03-29 NOTE — PROGRESS NOTES
Internal Medicine Progress Note    [facility]    Hilda Guzmán. Nuvia Diop., MANAOManoloI. Kathya Ledesma D.O., SHILPI Greenfield D.O. Bartolo Carrillo, MSN, APRN, NP-C  Sandi Dunn. Yamileth Dillard, MSN, APRN-CNP     Primary Care Physician: Delphine De Leon DO   Admitting Physician:  Sharon Hampton MD  Admission date and time: 3/18/2021  4:41 PM    Room:  [address] [place] [address]  Admitting diagnosis: SBO (small bowel obstruction) [facility]) [K56.609]    Patient Name: Dorothy Charles  MRN: [id number]    Date of Service: 3/29/2021     Subjective:  Fidel Rubin is a 48 y.o. female who was seen and examined today,3/29/2021, at the bedside. Fidel Rubin is feeling well from a surgical standpoint but does continue to deal with hypoxia. I personally walked her in the hallway today and visualize desaturation to 79%. Is important to note that she does exhibit shallow breathing as she describes pain with deep inspiration. She has been pulling good volumes with the incentive spirometer. We discussed moving forward with CTA. Review of System:   Constitutional:   Ongoing improvement in her generalized malaise and fatigue. HEENT:   Denies ear pain, sore throat, sinus or eye problems. Admits to irritation associated with the nasal cannula oxygen. Cardiovascular:   Denies any chest pain, irregular heartbeats, or palpitations. Respiratory:   Shortness of breath that is most noticeable with exertion. Gastrointestinal:   She is tolerating oral ingestion. She has minimal abdominal pain at this point with no further nausea. Genitourinary:    Denies any urgency, frequency, hematuria. Voiding without difficulty. Extremities:   Denies lower extremity swelling, edema or cyanosis. Neurology:    Denies any headache or focal neurological deficits, Denies generalized weakness or memory difficulty. Psch:   Denies being anxious or depressed. Musculoskeletal:    Denies  myalgias, joint complaints or back pain. Integumentary:   Denies any rashes, ulcers, or excoriations. Denies bruising. Hematologic/Lymphatic:  Denies bruising or bleeding. Physical Exam:  I/O this shift:  In: 120 [P.O.:120]  Out: 0     Intake/Output Summary (Last 24 hours) at 3/29/2021 0650  Last data filed at 3/29/2021 0502  Gross per 24 hour   Intake 1500 ml   Output 1150 ml   Net 350 ml   I/O last 3 completed shifts: In: 1500 [P.O.:1500]  Out: 1150 [Urine:1150]  No data found. Vital Signs:   Blood pressure 104/63, pulse 70, temperature 98.4 °F (36.9 °C), temperature source Oral, resp. rate 20, height 5' 2\" (1.575 m), weight 220 lb (99.8 kg), SpO2 91 %. General appearance:  Alert, responsive, oriented to person, place, and time. Well preserved, alert, no distress. Head:  Normocephalic. No masses, lesions or tenderness. Eyes:  PERRLA. EOMI. Sclera clear. Buccal mucosa moist.    ENT:  Ears normal. Mucosa normal.  Nasal cannula oxygen is in place. Neck:    Supple. Trachea midline. No thyromegaly. No JVD. No bruits. Heart:    Rhythm regular. Rate controlled. No murmurs. Lungs:    Diminished bilaterally with decreased breath sounds at the bases. .  Abdomen:   Soft. Mildly tender to palpation diffusely with voluntary guarding elicited. Hypoactive bowel sounds. Post surgical findings  Extremities:    Peripheral pulses present. No peripheral edema. No ulcers. No cyanosis. No clubbing. Neurologic:    Alert x 3. No focal deficit. Cranial nerves grossly intact. No focal weakness. Psych:   Behavior is normal. Mood appears normal. Speech is not rapid and/or pressured. Musculoskeletal:   Spine ROM normal. Muscular strength intact. Gait not assessed. Integumentary:  No rashes  Skin normal color and texture.   Genitalia/Breast:  Deferred    Medication:  Scheduled Meds:   pantoprazole  40 mg Oral QAM AC    albuterol  2.5 mg Nebulization 4x daily    budesonide  500 mcg Nebulization BID    docusate sodium  100 mg Oral Daily    senna  1 tablet Oral Nightly    enoxaparin  40 mg Subcutaneous Daily    methocarbamol  1,000 mg Oral Q6H    bumetanide  1 mg Oral Daily    brexpiprazole  0.5 mg Oral Daily    metoclopramide  5 mg Oral 4x Daily AC & HS    nicotine  1 patch Transdermal Daily    levothyroxine  50 mcg Oral Daily    hydrOXYzine  50 mg Oral Nightly    PARoxetine  10 mg Oral QAM    sodium chloride flush  10 mL Intravenous 2 times per day     Continuous Infusions:      Objective Data:  CBC with Differential:    Lab Results   Component Value Date    WBC 5.7 03/29/2021    RBC 3.29 03/29/2021    HGB 10.8 03/29/2021    HCT 34.3 03/29/2021     03/29/2021    .3 03/29/2021    MCH 32.8 03/29/2021    MCHC 31.5 03/29/2021    RDW 14.6 03/29/2021    SEGSPCT 82 09/14/2011    LYMPHOPCT 17.0 03/29/2021    MONOPCT 10.4 03/29/2021    BASOPCT 0.5 03/29/2021    MONOSABS 0.59 03/29/2021    LYMPHSABS 0.96 03/29/2021    EOSABS 0.12 03/29/2021    BASOSABS 0.03 03/29/2021     CMP:    Lab Results   Component Value Date     03/29/2021    K 3.4 03/29/2021    K 4.7 03/19/2021    CL 97 03/29/2021    CO2 26 03/29/2021    BUN 8 03/29/2021    CREATININE 0.8 03/29/2021    GFRAA >60 03/29/2021    LABGLOM >60 03/29/2021    GLUCOSE 150 03/29/2021    GLUCOSE 89 09/14/2011    PROT 6.3 03/29/2021    LABALBU 3.1 03/29/2021    CALCIUM 8.4 03/29/2021    BILITOT 0.5 03/29/2021    ALKPHOS 67 03/29/2021    AST 26 03/29/2021    ALT 36 03/29/2021       Assessment:  1. Small bowel obstruction with nonreducible ventral abdominal hernia status post laparoscopic robotic assisted transabdominal retrorectus repair of incisional hernia with extensive lysis of adhesion by Dr. Jose Benavides on March 25  2. Postoperative respiratory failure with hypoxia in the setting of COPD  3. Mild pulmonary hypertension  4. Gastroesophageal reflux disease  5. Obesity  6. History of tobacco abuse; recently quit  7.  Daily alcohol consumption    Plan:   As stated previously, she continues to have significant desaturation with activity. We will move forward with CTA to rule out underlying pulmonary embolism. We discussed the absolute need for deep breaths and use of the incentive spirometer. There is certainly some degree of atelectasis. If CTA is within normal limits, the patient is agreeable to nasal cannula oxygen at home. We will continue our current treatment strategy as it stands. We will reevaluate post CTA to determine the possibility of discharge home. More than 50% of my time was spent at the bedside counseling/coordinating care with the patient and/or family with face to face contact. This time was spent reviewing notes and laboratory data as well as instructing and counseling the patient. Time I spent with the family or surrogate(s) is included only if the patient was incapable of providing the necessary information or participating in medical decisions. I also discussed the differential diagnosis and all of the proposed management plans with the patient and individuals accompanying the patient. I am readily available for any further decision-making and intervention.        Za Morrell DO, FManoloA.C.O.I.  3/29/2021  6:50 AM

## 2021-03-30 LAB
EKG ATRIAL RATE: 94 BPM
EKG P AXIS: 33 DEGREES
EKG P-R INTERVAL: 124 MS
EKG Q-T INTERVAL: 362 MS
EKG QRS DURATION: 82 MS
EKG QTC CALCULATION (BAZETT): 452 MS
EKG R AXIS: 97 DEGREES
EKG T AXIS: 15 DEGREES
EKG VENTRICULAR RATE: 94 BPM

## 2021-04-02 DIAGNOSIS — K56.609 SMALL BOWEL OBSTRUCTION (HCC): ICD-10-CM

## 2021-04-02 RX ORDER — OXYCODONE HYDROCHLORIDE 5 MG/1
5 TABLET ORAL EVERY 4 HOURS PRN
Qty: 30 TABLET | Refills: 0 | Status: SHIPPED | OUTPATIENT
Start: 2021-04-02 | End: 2021-04-07

## 2021-04-14 ENCOUNTER — OFFICE VISIT (OUTPATIENT)
Dept: SURGERY | Age: 51
End: 2021-04-14

## 2021-04-14 VITALS
DIASTOLIC BLOOD PRESSURE: 59 MMHG | TEMPERATURE: 98.1 F | HEART RATE: 92 BPM | HEIGHT: 62 IN | SYSTOLIC BLOOD PRESSURE: 108 MMHG | WEIGHT: 220 LBS | BODY MASS INDEX: 40.48 KG/M2

## 2021-04-14 DIAGNOSIS — K43.0 INCISIONAL HERNIA WITH OBSTRUCTION BUT NO GANGRENE: Primary | ICD-10-CM

## 2021-04-14 PROCEDURE — 99024 POSTOP FOLLOW-UP VISIT: CPT | Performed by: SURGERY

## 2021-04-14 NOTE — PROGRESS NOTES
General Surgery Office Note  Marvin Teran MD, MS    Patient's Name/Date of Birth: Nidhi Burroughs / 1970    Date: April 14, 2021     Chief compaint: Postop visit from laparoscopic robot assisted recurrent incisional hernia repair with mesh, with component separation    Surgeon: Frank Espinosa MD    Patient Active Problem List   Diagnosis    GERD (gastroesophageal reflux disease)    Osteoarthritis cervical spine    Osteoarthritis of lumbar spine    Small bowel obstruction ([address]    Morbid obesity ([address]    Hypokalemia    Venous insufficiency    SBO (small bowel obstruction) ([address]    Incarcerated hernia    Intractable abdominal pain    Incarcerated incisional hernia    Disruption or dehiscence of closure of fascia, superficial or muscular       Subjective: Complains of ongoing problems with constipation. She is only taking stool softeners and she is smoking frequently. She states that she has had some intolerance to food but she is not vomiting she just has no appetite. She has not been taking any laxatives. She is continued to ask for recurrent refills on her narcotic pain medication and has not done anything to increase her bowel activity. She still has some intermittent pains but she states that these are mildly controlled now further out from surgery    Objective:  BP (!) 108/59   Pulse 92   Temp 98.1 °F (36.7 °C) (Temporal)   Ht 5' 2\" (1.575 m)   Wt 220 lb (99.8 kg)   BMI 40.24 kg/m²   Labs:  No results for input(s): WBC, HGB, HCT in the last 72 hours. Invalid input(s): PLR  Lab Results   Component Value Date    CREATININE 0.8 03/29/2021    BUN 8 03/29/2021     03/29/2021    K 3.4 (L) 03/29/2021    CL 97 (L) 03/29/2021    CO2 26 03/29/2021     No results for input(s): LIPASE, AMYLASE in the last 72 hours.       Review of Systems -  General ROS: negative for - chills, fatigue or malaise  ENT ROS: negative for - hearing change, nasal congestion or nasal discharge  Allergy and Immunology ROS: negative for - hives, itchy/watery eyes or nasal congestion  Hematological and Lymphatic ROS: negative for - blood clots, blood transfusions, bruising or fatigue  Endocrine ROS: negative for - malaise/lethargy, mood swings, palpitations or polydipsia/polyuria  Respiratory ROS: negative for - sputum changes, stridor, tachypnea or wheezing  Cardiovascular ROS: negative for - irregular heartbeat, loss of consciousness, murmur or orthopnea  Gastrointestinal ROS: negative for - diarrhea, or hematemesis  Genito-Urinary ROS: negative for -  genital discharge, genital ulcers or hematuria  Musculoskeletal ROS: negative for - gait disturbance, muscle pain or muscular weakness  Psych/Neuro ROS: negative for - visual or auditory hallucinations, suicidal ideation    General appearance: AA, NAD  HEENT: NCAT, PERRLA, EOMI  Lungs: Clear, equal rise bilateral  Heart: Reg  Abdomen: soft, nondistended, nontender, incisions well healed, no signs of infection, no cellulitis, no hematoma  Ext: Atraumatic, no swelling  : No hernia         Assessment/Plan:  Janny Elizabeth is a 48 y.o. female 2 weeks s/p laparoscopic robot assisted recurrent incisional hernia repair with mesh, component separation. Significant postop constipation. She continues to smoke    Resume activity gradually   No heavy lifting more than 20lbs for 4 weeks total  Pathology reviewed and copy provided  Follow up as needed  Aggressive bowel regiment advised mag citrate today tomorrow 3 times a day MiraLAX, day 3 would be another bottle of magnesium citrate.   I then advise daily MiraLAX 3 times until her bowels are moving regularly        Physician Signature: Electronically signed by Dr. Rey Carter  [phone] (p)  4/14/2021  10:47 AM

## 2021-04-23 ENCOUNTER — HOSPITAL ENCOUNTER (INPATIENT)
Age: 51
LOS: 4 days | Discharge: HOME | DRG: 249 | End: 2021-04-27
Attending: EMERGENCY MEDICINE | Admitting: SURGERY
Payer: MEDICARE

## 2021-04-23 ENCOUNTER — APPOINTMENT (OUTPATIENT)
Dept: CT IMAGING | Age: 51
DRG: 249 | End: 2021-04-23
Payer: MEDICARE

## 2021-04-23 DIAGNOSIS — R11.2 INTRACTABLE VOMITING WITH NAUSEA, UNSPECIFIED VOMITING TYPE: Primary | ICD-10-CM

## 2021-04-23 DIAGNOSIS — R10.13 ABDOMINAL PAIN, EPIGASTRIC: ICD-10-CM

## 2021-04-23 LAB
ALBUMIN SERPL-MCNC: 4.3 G/DL (ref 3.5–5.2)
ALP BLD-CCNC: 111 U/L (ref 35–104)
ALT SERPL-CCNC: 102 U/L (ref 0–32)
ANION GAP SERPL CALCULATED.3IONS-SCNC: 23 MMOL/L (ref 7–16)
AST SERPL-CCNC: 159 U/L (ref 0–31)
BASOPHILS ABSOLUTE: 0.04 E9/L (ref 0–0.2)
BASOPHILS RELATIVE PERCENT: 0.5 % (ref 0–2)
BILIRUB SERPL-MCNC: 0.9 MG/DL (ref 0–1.2)
BUN BLDV-MCNC: 9 MG/DL (ref 6–20)
CALCIUM SERPL-MCNC: 9.5 MG/DL (ref 8.6–10.2)
CHLORIDE BLD-SCNC: 89 MMOL/L (ref 98–107)
CO2: 24 MMOL/L (ref 22–29)
CREAT SERPL-MCNC: 0.8 MG/DL (ref 0.5–1)
EKG ATRIAL RATE: 104 BPM
EKG P AXIS: 53 DEGREES
EKG P-R INTERVAL: 124 MS
EKG Q-T INTERVAL: 342 MS
EKG QRS DURATION: 76 MS
EKG QTC CALCULATION (BAZETT): 449 MS
EKG R AXIS: 94 DEGREES
EKG T AXIS: 57 DEGREES
EKG VENTRICULAR RATE: 104 BPM
EOSINOPHILS ABSOLUTE: 0.18 E9/L (ref 0.05–0.5)
EOSINOPHILS RELATIVE PERCENT: 2.4 % (ref 0–6)
GFR AFRICAN AMERICAN: >60
GFR NON-AFRICAN AMERICAN: >60 ML/MIN/1.73
GLUCOSE BLD-MCNC: 85 MG/DL (ref 74–99)
HCT VFR BLD CALC: 48.2 % (ref 34–48)
HEMOGLOBIN: 16.1 G/DL (ref 11.5–15.5)
IMMATURE GRANULOCYTES #: 0.02 E9/L
IMMATURE GRANULOCYTES %: 0.3 % (ref 0–5)
LACTIC ACID: 2.6 MMOL/L (ref 0.5–2.2)
LIPASE: 31 U/L (ref 13–60)
LYMPHOCYTES ABSOLUTE: 1.32 E9/L (ref 1.5–4)
LYMPHOCYTES RELATIVE PERCENT: 17.5 % (ref 20–42)
MAGNESIUM: 2 MG/DL (ref 1.6–2.6)
MCH RBC QN AUTO: 32.8 PG (ref 26–35)
MCHC RBC AUTO-ENTMCNC: 33.4 % (ref 32–34.5)
MCV RBC AUTO: 98.2 FL (ref 80–99.9)
MONOCYTES ABSOLUTE: 0.53 E9/L (ref 0.1–0.95)
MONOCYTES RELATIVE PERCENT: 7 % (ref 2–12)
NEUTROPHILS ABSOLUTE: 5.47 E9/L (ref 1.8–7.3)
NEUTROPHILS RELATIVE PERCENT: 72.3 % (ref 43–80)
PDW BLD-RTO: 14.7 FL (ref 11.5–15)
PLATELET # BLD: 280 E9/L (ref 130–450)
PMV BLD AUTO: 10.1 FL (ref 7–12)
POTASSIUM REFLEX MAGNESIUM: 4.1 MMOL/L (ref 3.5–5)
RBC # BLD: 4.91 E12/L (ref 3.5–5.5)
SODIUM BLD-SCNC: 136 MMOL/L (ref 132–146)
TOTAL PROTEIN: 7.7 G/DL (ref 6.4–8.3)
TROPONIN: <0.01 NG/ML (ref 0–0.03)
WBC # BLD: 7.6 E9/L (ref 4.5–11.5)

## 2021-04-23 PROCEDURE — 83605 ASSAY OF LACTIC ACID: CPT

## 2021-04-23 PROCEDURE — 6360000002 HC RX W HCPCS: Performed by: SURGERY

## 2021-04-23 PROCEDURE — 6360000002 HC RX W HCPCS: Performed by: EMERGENCY MEDICINE

## 2021-04-23 PROCEDURE — 99284 EMERGENCY DEPT VISIT MOD MDM: CPT

## 2021-04-23 PROCEDURE — 83735 ASSAY OF MAGNESIUM: CPT

## 2021-04-23 PROCEDURE — 6370000000 HC RX 637 (ALT 250 FOR IP): Performed by: SURGERY

## 2021-04-23 PROCEDURE — 85025 COMPLETE CBC W/AUTO DIFF WBC: CPT

## 2021-04-23 PROCEDURE — 96374 THER/PROPH/DIAG INJ IV PUSH: CPT

## 2021-04-23 PROCEDURE — 96375 TX/PRO/DX INJ NEW DRUG ADDON: CPT

## 2021-04-23 PROCEDURE — 2580000003 HC RX 258: Performed by: STUDENT IN AN ORGANIZED HEALTH CARE EDUCATION/TRAINING PROGRAM

## 2021-04-23 PROCEDURE — 74177 CT ABD & PELVIS W/CONTRAST: CPT

## 2021-04-23 PROCEDURE — 84484 ASSAY OF TROPONIN QUANT: CPT

## 2021-04-23 PROCEDURE — 6360000002 HC RX W HCPCS: Performed by: STUDENT IN AN ORGANIZED HEALTH CARE EDUCATION/TRAINING PROGRAM

## 2021-04-23 PROCEDURE — 80053 COMPREHEN METABOLIC PANEL: CPT

## 2021-04-23 PROCEDURE — 2580000003 HC RX 258

## 2021-04-23 PROCEDURE — 6360000004 HC RX CONTRAST MEDICATION: Performed by: RADIOLOGY

## 2021-04-23 PROCEDURE — 96361 HYDRATE IV INFUSION ADD-ON: CPT

## 2021-04-23 PROCEDURE — 93005 ELECTROCARDIOGRAM TRACING: CPT | Performed by: PHYSICIAN ASSISTANT

## 2021-04-23 PROCEDURE — 83690 ASSAY OF LIPASE: CPT

## 2021-04-23 PROCEDURE — 2580000003 HC RX 258: Performed by: EMERGENCY MEDICINE

## 2021-04-23 PROCEDURE — 1200000000 HC SEMI PRIVATE

## 2021-04-23 RX ORDER — FENTANYL CITRATE 50 UG/ML
75 INJECTION, SOLUTION INTRAMUSCULAR; INTRAVENOUS ONCE
Status: COMPLETED | OUTPATIENT
Start: 2021-04-23 | End: 2021-04-23

## 2021-04-23 RX ORDER — SODIUM CHLORIDE 0.9 % (FLUSH) 0.9 %
5-40 SYRINGE (ML) INJECTION EVERY 12 HOURS SCHEDULED
Status: DISCONTINUED | OUTPATIENT
Start: 2021-04-23 | End: 2021-04-27 | Stop reason: HOSPADM

## 2021-04-23 RX ORDER — ONDANSETRON 4 MG/1
4 TABLET, ORALLY DISINTEGRATING ORAL ONCE
Status: DISCONTINUED | OUTPATIENT
Start: 2021-04-23 | End: 2021-04-23

## 2021-04-23 RX ORDER — SODIUM CHLORIDE, SODIUM LACTATE, POTASSIUM CHLORIDE, CALCIUM CHLORIDE 600; 310; 30; 20 MG/100ML; MG/100ML; MG/100ML; MG/100ML
INJECTION, SOLUTION INTRAVENOUS
Status: COMPLETED
Start: 2021-04-23 | End: 2021-04-23

## 2021-04-23 RX ORDER — HYDROMORPHONE HYDROCHLORIDE 1 MG/ML
0.5 INJECTION, SOLUTION INTRAMUSCULAR; INTRAVENOUS; SUBCUTANEOUS
Status: DISCONTINUED | OUTPATIENT
Start: 2021-04-23 | End: 2021-04-26

## 2021-04-23 RX ORDER — 0.9 % SODIUM CHLORIDE 0.9 %
1000 INTRAVENOUS SOLUTION INTRAVENOUS ONCE
Status: COMPLETED | OUTPATIENT
Start: 2021-04-23 | End: 2021-04-23

## 2021-04-23 RX ORDER — HYDROCODONE BITARTRATE AND ACETAMINOPHEN 5; 325 MG/1; MG/1
1 TABLET ORAL ONCE
Status: DISCONTINUED | OUTPATIENT
Start: 2021-04-23 | End: 2021-04-23

## 2021-04-23 RX ORDER — METOCLOPRAMIDE HYDROCHLORIDE 5 MG/ML
10 INJECTION INTRAMUSCULAR; INTRAVENOUS ONCE
Status: COMPLETED | OUTPATIENT
Start: 2021-04-23 | End: 2021-04-23

## 2021-04-23 RX ORDER — ONDANSETRON 4 MG/1
4 TABLET, ORALLY DISINTEGRATING ORAL EVERY 8 HOURS PRN
Status: DISCONTINUED | OUTPATIENT
Start: 2021-04-23 | End: 2021-04-27 | Stop reason: HOSPADM

## 2021-04-23 RX ORDER — DIPHENHYDRAMINE HYDROCHLORIDE 50 MG/ML
25 INJECTION INTRAMUSCULAR; INTRAVENOUS ONCE
Status: COMPLETED | OUTPATIENT
Start: 2021-04-23 | End: 2021-04-23

## 2021-04-23 RX ORDER — PROMETHAZINE HYDROCHLORIDE 25 MG/ML
12.5 INJECTION, SOLUTION INTRAMUSCULAR; INTRAVENOUS ONCE
Status: DISCONTINUED | OUTPATIENT
Start: 2021-04-23 | End: 2021-04-23

## 2021-04-23 RX ORDER — ONDANSETRON 2 MG/ML
4 INJECTION INTRAMUSCULAR; INTRAVENOUS ONCE
Status: COMPLETED | OUTPATIENT
Start: 2021-04-23 | End: 2021-04-23

## 2021-04-23 RX ORDER — ONDANSETRON 2 MG/ML
4 INJECTION INTRAMUSCULAR; INTRAVENOUS EVERY 6 HOURS PRN
Status: DISCONTINUED | OUTPATIENT
Start: 2021-04-23 | End: 2021-04-27 | Stop reason: HOSPADM

## 2021-04-23 RX ORDER — SODIUM CHLORIDE, SODIUM LACTATE, POTASSIUM CHLORIDE, CALCIUM CHLORIDE 600; 310; 30; 20 MG/100ML; MG/100ML; MG/100ML; MG/100ML
INJECTION, SOLUTION INTRAVENOUS CONTINUOUS
Status: DISCONTINUED | OUTPATIENT
Start: 2021-04-23 | End: 2021-04-27 | Stop reason: HOSPADM

## 2021-04-23 RX ORDER — SODIUM CHLORIDE 9 MG/ML
25 INJECTION, SOLUTION INTRAVENOUS PRN
Status: DISCONTINUED | OUTPATIENT
Start: 2021-04-23 | End: 2021-04-27 | Stop reason: HOSPADM

## 2021-04-23 RX ORDER — HYDROMORPHONE HYDROCHLORIDE 1 MG/ML
0.5 INJECTION, SOLUTION INTRAMUSCULAR; INTRAVENOUS; SUBCUTANEOUS ONCE
Status: COMPLETED | OUTPATIENT
Start: 2021-04-23 | End: 2021-04-23

## 2021-04-23 RX ORDER — HYDROMORPHONE HYDROCHLORIDE 1 MG/ML
0.25 INJECTION, SOLUTION INTRAMUSCULAR; INTRAVENOUS; SUBCUTANEOUS
Status: DISCONTINUED | OUTPATIENT
Start: 2021-04-23 | End: 2021-04-26

## 2021-04-23 RX ORDER — SODIUM CHLORIDE 0.9 % (FLUSH) 0.9 %
5-40 SYRINGE (ML) INJECTION PRN
Status: DISCONTINUED | OUTPATIENT
Start: 2021-04-23 | End: 2021-04-27 | Stop reason: HOSPADM

## 2021-04-23 RX ADMIN — ONDANSETRON 4 MG: 2 INJECTION INTRAMUSCULAR; INTRAVENOUS at 17:05

## 2021-04-23 RX ADMIN — METOCLOPRAMIDE HYDROCHLORIDE 10 MG: 5 INJECTION INTRAMUSCULAR; INTRAVENOUS at 19:31

## 2021-04-23 RX ADMIN — FENTANYL CITRATE 75 MCG: 50 INJECTION, SOLUTION INTRAMUSCULAR; INTRAVENOUS at 17:09

## 2021-04-23 RX ADMIN — ONDANSETRON 4 MG: 2 INJECTION INTRAMUSCULAR; INTRAVENOUS at 22:10

## 2021-04-23 RX ADMIN — SODIUM CHLORIDE, POTASSIUM CHLORIDE, SODIUM LACTATE AND CALCIUM CHLORIDE: 600; 310; 30; 20 INJECTION, SOLUTION INTRAVENOUS at 22:11

## 2021-04-23 RX ADMIN — SODIUM CHLORIDE 1000 ML: 9 INJECTION, SOLUTION INTRAVENOUS at 19:29

## 2021-04-23 RX ADMIN — HYDROMORPHONE HYDROCHLORIDE 0.5 MG: 1 INJECTION, SOLUTION INTRAMUSCULAR; INTRAVENOUS; SUBCUTANEOUS at 19:32

## 2021-04-23 RX ADMIN — SODIUM CHLORIDE 1000 ML: 9 INJECTION, SOLUTION INTRAVENOUS at 17:06

## 2021-04-23 RX ADMIN — HYDROCODONE BITARTRATE AND ACETAMINOPHEN 1 TABLET: 5; 325 TABLET ORAL at 16:29

## 2021-04-23 RX ADMIN — DIPHENHYDRAMINE HYDROCHLORIDE 25 MG: 50 INJECTION, SOLUTION INTRAMUSCULAR; INTRAVENOUS at 19:32

## 2021-04-23 RX ADMIN — HYDROMORPHONE HYDROCHLORIDE 0.5 MG: 1 INJECTION, SOLUTION INTRAMUSCULAR; INTRAVENOUS; SUBCUTANEOUS at 22:39

## 2021-04-23 RX ADMIN — IOPAMIDOL 75 ML: 755 INJECTION, SOLUTION INTRAVENOUS at 18:52

## 2021-04-23 ASSESSMENT — PAIN SCALES - GENERAL
PAINLEVEL_OUTOF10: 9
PAINLEVEL_OUTOF10: 10
PAINLEVEL_OUTOF10: 8

## 2021-04-23 ASSESSMENT — PAIN DESCRIPTION - DESCRIPTORS: DESCRIPTORS: SHARP;TENDER

## 2021-04-23 ASSESSMENT — PAIN DESCRIPTION - LOCATION: LOCATION: ABDOMEN

## 2021-04-23 ASSESSMENT — PAIN DESCRIPTION - PAIN TYPE: TYPE: ACUTE PAIN;SURGICAL PAIN

## 2021-04-23 NOTE — Clinical Note
Patient Class: Inpatient [101]   REQUIRED: Diagnosis: Intractable nausea and vomiting [863368]   Estimated Length of Stay: Estimated stay of more than 2 midnights   Admitting Provider: Waldemar Álvarez [[id number]]   Telemetry Bed Required?: No

## 2021-04-23 NOTE — ED NOTES
FIRST PROVIDER CONTACT ASSESSMENT NOTE      Department of Emergency Medicine   Admit Date: No admission date for patient encounter. Chief Complaint: Emesis (for last week) and Abdominal Pain (hernia repair 2 weeks ago with Amira Vides)      History of Present Illness:   Evangelista Gonzales is a 48 y.o. female who presents to the ED for lower abdominal pain and vomiting x 1 week. Hx hernia repair with Dr. Amira Vides 2 weeks ago. Hx diverticulitis-this feels similar. No pain meds or nausea medications at home.  Afebrile but does have chills with vomiting.         -----------------END OF FIRST PROVIDER CONTACT ASSESSMENT NOTE--------------  Electronically signed by TIM Forte   DD: 4/23/21               Silva Forte  04/23/21 1627

## 2021-04-24 ENCOUNTER — APPOINTMENT (OUTPATIENT)
Dept: CT IMAGING | Age: 51
DRG: 249 | End: 2021-04-24
Payer: MEDICARE

## 2021-04-24 LAB
ALBUMIN SERPL-MCNC: 3.7 G/DL (ref 3.5–5.2)
ALP BLD-CCNC: 90 U/L (ref 35–104)
ALT SERPL-CCNC: 79 U/L (ref 0–32)
ANION GAP SERPL CALCULATED.3IONS-SCNC: 13 MMOL/L (ref 7–16)
AST SERPL-CCNC: 118 U/L (ref 0–31)
BACTERIA: ABNORMAL /HPF
BILIRUB SERPL-MCNC: 0.6 MG/DL (ref 0–1.2)
BILIRUBIN URINE: ABNORMAL
BLOOD, URINE: ABNORMAL
BUN BLDV-MCNC: 9 MG/DL (ref 6–20)
CALCIUM SERPL-MCNC: 8.5 MG/DL (ref 8.6–10.2)
CHLORIDE BLD-SCNC: 94 MMOL/L (ref 98–107)
CLARITY: ABNORMAL
CO2: 29 MMOL/L (ref 22–29)
COLOR: YELLOW
CREAT SERPL-MCNC: 0.8 MG/DL (ref 0.5–1)
EPITHELIAL CELLS, UA: ABNORMAL /HPF
GFR AFRICAN AMERICAN: >60
GFR NON-AFRICAN AMERICAN: >60 ML/MIN/1.73
GLUCOSE BLD-MCNC: 89 MG/DL (ref 74–99)
GLUCOSE URINE: NEGATIVE MG/DL
HCT VFR BLD CALC: 43.1 % (ref 34–48)
HEMOGLOBIN: 13.6 G/DL (ref 11.5–15.5)
KETONES, URINE: 40 MG/DL
LEUKOCYTE ESTERASE, URINE: NEGATIVE
MCH RBC QN AUTO: 32.5 PG (ref 26–35)
MCHC RBC AUTO-ENTMCNC: 31.6 % (ref 32–34.5)
MCV RBC AUTO: 103.1 FL (ref 80–99.9)
NITRITE, URINE: NEGATIVE
PDW BLD-RTO: 15 FL (ref 11.5–15)
PH UA: 6 (ref 5–9)
PLATELET # BLD: 186 E9/L (ref 130–450)
PMV BLD AUTO: 9.8 FL (ref 7–12)
POTASSIUM REFLEX MAGNESIUM: 4.5 MMOL/L (ref 3.5–5)
PROTEIN UA: 30 MG/DL
RBC # BLD: 4.18 E12/L (ref 3.5–5.5)
RBC UA: ABNORMAL /HPF (ref 0–2)
SODIUM BLD-SCNC: 136 MMOL/L (ref 132–146)
SPECIFIC GRAVITY UA: 1.01 (ref 1–1.03)
TOTAL PROTEIN: 6.4 G/DL (ref 6.4–8.3)
UROBILINOGEN, URINE: 1 E.U./DL
WBC # BLD: 7 E9/L (ref 4.5–11.5)
WBC UA: ABNORMAL /HPF (ref 0–5)

## 2021-04-24 PROCEDURE — 85027 COMPLETE CBC AUTOMATED: CPT

## 2021-04-24 PROCEDURE — 6360000004 HC RX CONTRAST MEDICATION: Performed by: RADIOLOGY

## 2021-04-24 PROCEDURE — 74176 CT ABD & PELVIS W/O CONTRAST: CPT

## 2021-04-24 PROCEDURE — 6360000002 HC RX W HCPCS: Performed by: INTERNAL MEDICINE

## 2021-04-24 PROCEDURE — 1200000000 HC SEMI PRIVATE

## 2021-04-24 PROCEDURE — 6370000000 HC RX 637 (ALT 250 FOR IP): Performed by: INTERNAL MEDICINE

## 2021-04-24 PROCEDURE — 36415 COLL VENOUS BLD VENIPUNCTURE: CPT

## 2021-04-24 PROCEDURE — 99223 1ST HOSP IP/OBS HIGH 75: CPT | Performed by: SURGERY

## 2021-04-24 PROCEDURE — 99253 IP/OBS CNSLTJ NEW/EST LOW 45: CPT | Performed by: INTERNAL MEDICINE

## 2021-04-24 PROCEDURE — 80053 COMPREHEN METABOLIC PANEL: CPT

## 2021-04-24 PROCEDURE — 6360000002 HC RX W HCPCS: Performed by: SURGERY

## 2021-04-24 PROCEDURE — 2580000003 HC RX 258: Performed by: SURGERY

## 2021-04-24 PROCEDURE — 81001 URINALYSIS AUTO W/SCOPE: CPT

## 2021-04-24 RX ORDER — SENNA AND DOCUSATE SODIUM 50; 8.6 MG/1; MG/1
2 TABLET, FILM COATED ORAL DAILY PRN
Status: DISCONTINUED | OUTPATIENT
Start: 2021-04-24 | End: 2021-04-27 | Stop reason: HOSPADM

## 2021-04-24 RX ORDER — HYDROXYZINE HYDROCHLORIDE 25 MG/1
50 TABLET, FILM COATED ORAL NIGHTLY
Status: DISCONTINUED | OUTPATIENT
Start: 2021-04-24 | End: 2021-04-27 | Stop reason: HOSPADM

## 2021-04-24 RX ORDER — PAROXETINE HYDROCHLORIDE 20 MG/1
10 TABLET, FILM COATED ORAL EVERY MORNING
Status: DISCONTINUED | OUTPATIENT
Start: 2021-04-24 | End: 2021-04-27 | Stop reason: HOSPADM

## 2021-04-24 RX ORDER — LEVOTHYROXINE SODIUM 0.05 MG/1
50 TABLET ORAL DAILY
Status: DISCONTINUED | OUTPATIENT
Start: 2021-04-24 | End: 2021-04-27 | Stop reason: HOSPADM

## 2021-04-24 RX ORDER — PROCHLORPERAZINE EDISYLATE 5 MG/ML
5 INJECTION INTRAMUSCULAR; INTRAVENOUS EVERY 6 HOURS PRN
Status: DISCONTINUED | OUTPATIENT
Start: 2021-04-24 | End: 2021-04-27 | Stop reason: HOSPADM

## 2021-04-24 RX ORDER — PANTOPRAZOLE SODIUM 40 MG/1
40 TABLET, DELAYED RELEASE ORAL
Status: DISCONTINUED | OUTPATIENT
Start: 2021-04-24 | End: 2021-04-27 | Stop reason: HOSPADM

## 2021-04-24 RX ADMIN — HYDROMORPHONE HYDROCHLORIDE 0.5 MG: 1 INJECTION, SOLUTION INTRAMUSCULAR; INTRAVENOUS; SUBCUTANEOUS at 02:13

## 2021-04-24 RX ADMIN — HYDROMORPHONE HYDROCHLORIDE 0.5 MG: 1 INJECTION, SOLUTION INTRAMUSCULAR; INTRAVENOUS; SUBCUTANEOUS at 06:13

## 2021-04-24 RX ADMIN — HYDROMORPHONE HYDROCHLORIDE 0.5 MG: 1 INJECTION, SOLUTION INTRAMUSCULAR; INTRAVENOUS; SUBCUTANEOUS at 23:31

## 2021-04-24 RX ADMIN — ONDANSETRON 4 MG: 2 INJECTION INTRAMUSCULAR; INTRAVENOUS at 08:01

## 2021-04-24 RX ADMIN — HYDROMORPHONE HYDROCHLORIDE 0.5 MG: 1 INJECTION, SOLUTION INTRAMUSCULAR; INTRAVENOUS; SUBCUTANEOUS at 13:11

## 2021-04-24 RX ADMIN — HYDROXYZINE HYDROCHLORIDE 50 MG: 25 TABLET, FILM COATED ORAL at 20:29

## 2021-04-24 RX ADMIN — SODIUM CHLORIDE, PRESERVATIVE FREE 10 ML: 5 INJECTION INTRAVENOUS at 06:13

## 2021-04-24 RX ADMIN — HYDROMORPHONE HYDROCHLORIDE 0.5 MG: 1 INJECTION, SOLUTION INTRAMUSCULAR; INTRAVENOUS; SUBCUTANEOUS at 17:23

## 2021-04-24 RX ADMIN — IOHEXOL 50 ML: 240 INJECTION, SOLUTION INTRATHECAL; INTRAVASCULAR; INTRAVENOUS; ORAL at 13:57

## 2021-04-24 RX ADMIN — SODIUM CHLORIDE, POTASSIUM CHLORIDE, SODIUM LACTATE AND CALCIUM CHLORIDE: 600; 310; 30; 20 INJECTION, SOLUTION INTRAVENOUS at 13:10

## 2021-04-24 RX ADMIN — SODIUM CHLORIDE, PRESERVATIVE FREE 10 ML: 5 INJECTION INTRAVENOUS at 02:13

## 2021-04-24 RX ADMIN — ONDANSETRON 4 MG: 2 INJECTION INTRAMUSCULAR; INTRAVENOUS at 23:31

## 2021-04-24 RX ADMIN — HYDROMORPHONE HYDROCHLORIDE 0.5 MG: 1 INJECTION, SOLUTION INTRAMUSCULAR; INTRAVENOUS; SUBCUTANEOUS at 09:33

## 2021-04-24 RX ADMIN — PROCHLORPERAZINE EDISYLATE 5 MG: 5 INJECTION INTRAMUSCULAR; INTRAVENOUS at 10:38

## 2021-04-24 RX ADMIN — HYDROMORPHONE HYDROCHLORIDE 0.5 MG: 1 INJECTION, SOLUTION INTRAMUSCULAR; INTRAVENOUS; SUBCUTANEOUS at 20:29

## 2021-04-24 ASSESSMENT — PAIN DESCRIPTION - PAIN TYPE
TYPE: ACUTE PAIN

## 2021-04-24 ASSESSMENT — PAIN SCALES - GENERAL
PAINLEVEL_OUTOF10: 8
PAINLEVEL_OUTOF10: 7
PAINLEVEL_OUTOF10: 10
PAINLEVEL_OUTOF10: 8
PAINLEVEL_OUTOF10: 8
PAINLEVEL_OUTOF10: 10
PAINLEVEL_OUTOF10: 6
PAINLEVEL_OUTOF10: 5

## 2021-04-24 ASSESSMENT — PAIN DESCRIPTION - FREQUENCY
FREQUENCY: CONTINUOUS

## 2021-04-24 ASSESSMENT — PAIN DESCRIPTION - DESCRIPTORS
DESCRIPTORS: SHARP;SHOOTING
DESCRIPTORS: ACHING;DISCOMFORT;CONSTANT

## 2021-04-24 ASSESSMENT — PAIN DESCRIPTION - PROGRESSION
CLINICAL_PROGRESSION: GRADUALLY WORSENING
CLINICAL_PROGRESSION: GRADUALLY WORSENING
CLINICAL_PROGRESSION: OTHER (COMMENT)

## 2021-04-24 ASSESSMENT — PAIN DESCRIPTION - LOCATION
LOCATION: ABDOMEN

## 2021-04-24 ASSESSMENT — PAIN DESCRIPTION - ORIENTATION
ORIENTATION: RIGHT;LEFT
ORIENTATION: MID;UPPER
ORIENTATION: MID;UPPER

## 2021-04-24 ASSESSMENT — PAIN DESCRIPTION - ONSET: ONSET: ON-GOING

## 2021-04-24 NOTE — ED PROVIDER NOTES
HPI:     Luisa Andrade is a 48 y.o. female presenting to the ED for nausea and vomiting, beginning 5 days ago. The complaint has been persistent, moderate in severity, and worsened by nothing. Patient states that she had a large hernia repair 3 weeks ago by Dr. Jelani Armstrong. States that postoperatively she had epigastric abdominal pain, nausea, vomiting which spontaneously resolved and she was doing well until 5 days ago when she began experiencing severe epigastric pain once again along with nausea and vomiting. States that her incision sites have healed well. Denies any other symptoms including headache, dizziness, fever, chest pain, cough, diarrhea, constipation, urinary symptoms. Review of Systems:   Please see HPI above. All bolded are positive. All un-bolded are negative.     Constitutional Symptoms: fever, chills, fatigue, generalized weakness, diaphoresis, increase in thirst, loss of appetite  Eyes: vision change   Ears, Nose, Mouth, Throat: hearing loss, nasal congestion, sores in the mouth  Cardiovascular: chest pain, chest heaviness, palpitations  Respiratory: shortness of breath, wheezing, coughing  Gastrointestinal: abdominal pain, nausea, vomiting, diarrhea, constipation, melena, hematochezia, hematemesis  Genitourinary: dysuria, hematuria, increased frequency  Musculoskeletal: lower extremity edema, myalgias, arthralgias, back pain  Integumentary: rashes, itching   Neurological: headache, lightheadedness, dizziness, confusion, syncope, numbness, tingling, focal weakness  Psychiatric: depression, suicidal ideation, anxiety  Endocrine: unintentional weight change  Hematologic/Lymphatic: lymphadenopathy, easy bruising, easy bleeding   Allergic/Immunologic: recurrent infections            --------------------------------------------- PAST HISTORY ---------------------------------------------  Past Medical History:  has a past medical history of Arthritis, Bursitis, Class 3 severe obesity due to excess calories with body mass index (BMI) of 40.0 to 44.9 in adult [facility]), COPD (chronic obstructive pulmonary disease) ([facility] [address] Diverticulitis, GERD (gastroesophageal reflux disease), Incisional hernia with obstruction but no gangrene, and Strep throat. Past Surgical History:  has a past surgical history that includes Hysterectomy; Cholecystectomy; Pelvic fracture surgery; colectomy (2016); Nerve Block; hernia repair; hernia repair (2015); ventral hernia repair (2015);  section; Forearm fracture surgery (Left); and hernia repair (N/A, 3/25/2021). Social History:  reports that she quit smoking about 7 weeks ago. Her smoking use included cigarettes. She has a 30.00 pack-year smoking history. She has never used smokeless tobacco. She reports current alcohol use. She reports previous drug use. Drug: Marijuana. Family History: family history includes Cancer in her paternal grandfather; Diabetes in an other family member; Osteoarthritis in an other family member; Other in her father; Prostate Cancer in her father; Stroke in her brother. The patients home medications have been reviewed.     Allergies: Ibuprofen, Nsaids, and Morphine    -------------------------------------------------- RESULTS -------------------------------------------------  All laboratory and radiology results have been personally reviewed by myself   LABS:  Results for orders placed or performed during the hospital encounter of 21   CBC Auto Differential   Result Value Ref Range    WBC 7.6 4.5 - 11.5 E9/L    RBC 4.91 3.50 - 5.50 E12/L    Hemoglobin 16.1 (H) 11.5 - 15.5 g/dL    Hematocrit 48.2 (H) 34.0 - 48.0 %    MCV 98.2 80.0 - 99.9 fL    MCH 32.8 26.0 - 35.0 pg    MCHC 33.4 32.0 - 34.5 %    RDW 14.7 11.5 - 15.0 fL    Platelets 874 695 - 836 E9/L    MPV 10.1 7.0 - 12.0 fL    Neutrophils % 72.3 43.0 - 80.0 %    Immature Granulocytes % 0.3 0.0 - 5.0 %    Lymphocytes % 17.5 (L) 20.0 - 42.0 %    Monocytes % 7.0 2.0 - 12.0 % Eosinophils % 2.4 0.0 - 6.0 %    Basophils % 0.5 0.0 - 2.0 %    Neutrophils Absolute 5.47 1.80 - 7.30 E9/L    Immature Granulocytes # 0.02 E9/L    Lymphocytes Absolute 1.32 (L) 1.50 - 4.00 E9/L    Monocytes Absolute 0.53 0.10 - 0.95 E9/L    Eosinophils Absolute 0.18 0.05 - 0.50 E9/L    Basophils Absolute 0.04 0.00 - 0.20 E9/L   Comprehensive Metabolic Panel w/ Reflex to MG   Result Value Ref Range    Sodium 136 132 - 146 mmol/L    Potassium reflex Magnesium 4.1 3.5 - 5.0 mmol/L    Chloride 89 (L) 98 - 107 mmol/L    CO2 24 22 - 29 mmol/L    Anion Gap 23 (H) 7 - 16 mmol/L    Glucose 85 74 - 99 mg/dL    BUN 9 6 - 20 mg/dL    CREATININE 0.8 0.5 - 1.0 mg/dL    GFR Non-African American >60 >=60 mL/min/1.73    GFR African American >60     Calcium 9.5 8.6 - 10.2 mg/dL    Total Protein 7.7 6.4 - 8.3 g/dL    Albumin 4.3 3.5 - 5.2 g/dL    Total Bilirubin 0.9 0.0 - 1.2 mg/dL    Alkaline Phosphatase 111 (H) 35 - 104 U/L     (H) 0 - 32 U/L     (H) 0 - 31 U/L   Lipase   Result Value Ref Range    Lipase 31 13 - 60 U/L   Troponin   Result Value Ref Range    Troponin <0.01 0.00 - 0.03 ng/mL   Lactic Acid, Plasma   Result Value Ref Range    Lactic Acid 2.6 (H) 0.5 - 2.2 mmol/L   Magnesium   Result Value Ref Range    Magnesium 2.0 1.6 - 2.6 mg/dL   EKG 12 Lead   Result Value Ref Range    Ventricular Rate 104 BPM    Atrial Rate 104 BPM    P-R Interval 124 ms    QRS Duration 76 ms    Q-T Interval 342 ms    QTc Calculation (Bazett) 449 ms    P Axis 53 degrees    R Axis 94 degrees    T Axis 57 degrees       RADIOLOGY:  Interpreted by Radiologist.  CT ABDOMEN PELVIS W IV CONTRAST Additional Contrast? None   Final Result   17.7 x 2.4 cm anterior abdominal wall fluid collection just deep to the   rectus abdominus, likely postsurgical seroma. Diffuse fatty liver, status post cholecystectomy. No evidence of acute intra-abdominal or pelvic inflammatory process or   obstructive uropathy. ------------------------- NURSING NOTES AND VITALS REVIEWED ---------------------------   The nursing notes within the ED encounter and vital signs as below have been reviewed. /70   Pulse 91   Temp 98.7 °F (37.1 °C)   Resp 18   Wt 220 lb (99.8 kg)   SpO2 90%   BMI 40.24 kg/m²   Oxygen Saturation Interpretation: Normal      ---------------------------------------------------PHYSICAL EXAM--------------------------------------      Constitutional/General: Alert and oriented x3, well appearing, non toxic in moderate distress, morbidly obese  Head: Normocephalic and atraumatic  Eyes: PERRL, EOMI  Mouth: Oropharynx clear, handling secretions, no trismus  Neck: Supple, full ROM, phonation normal, no meningismus  Pulmonary: Lungs clear to auscultation bilaterally, no wheezes, rales, or rhonchi. Not in respiratory distress  Cardiovascular:  Regular rate and rhythm, no murmurs, gallops, or rubs. 2+ distal pulses  Abdomen: Tenderness to palpation of epigastric region, soft, nondistended, no rigidity rebound, epigastric guarding noted  Extremities: Moves all extremities x 4.  Warm and well perfused  Skin: warm and dry without rash  Neurologic: GCS 15,  Psych: Normal Affect      ------------------------------ ED COURSE/MEDICAL DECISION MAKING----------------------  Medications   sodium chloride flush 0.9 % injection 5-40 mL ( Intravenous Canceled Entry 4/23/21 2201)   sodium chloride flush 0.9 % injection 5-40 mL (has no administration in time range)   0.9 % sodium chloride infusion (has no administration in time range)   ondansetron (ZOFRAN-ODT) disintegrating tablet 4 mg ( Oral See Alternative 4/23/21 2210)     Or   ondansetron (ZOFRAN) injection 4 mg (4 mg Intravenous Given 4/23/21 2210)   enoxaparin (LOVENOX) injection 40 mg (has no administration in time range)   lactated ringers infusion ( Intravenous New Bag 4/23/21 2211)   HYDROmorphone HCl PF (DILAUDID) injection 0.25 mg ( Intravenous See Alternative 4/23/21 2239)     Or   HYDROmorphone HCl PF (DILAUDID) injection 0.5 mg (0.5 mg Intravenous Given 4/23/21 2239)   0.9 % sodium chloride bolus (0 mLs Intravenous Stopped 4/23/21 1825)   fentaNYL (SUBLIMAZE) injection 75 mcg (75 mcg Intravenous Given 4/23/21 1709)   ondansetron (ZOFRAN) injection 4 mg (4 mg Intravenous Given 4/23/21 1705)   iopamidol (ISOVUE-370) 76 % injection 75 mL (75 mLs Intravenous Given 4/23/21 1852)   0.9 % sodium chloride bolus (0 mLs Intravenous Stopped 4/23/21 2200)   metoclopramide (REGLAN) injection 10 mg (10 mg Intravenous Given 4/23/21 1931)   diphenhydrAMINE (BENADRYL) injection 25 mg (25 mg Intravenous Given 4/23/21 1932)   HYDROmorphone HCl PF (DILAUDID) injection 0.5 mg (0.5 mg Intravenous Given 4/23/21 1932)         ED COURSE:  ED Course as of Apr 24 0013 Fri Apr 23, 2021   1659 ATTENDING PROVIDER ATTESTATION:     I have personally performed and/or participated in the history, exam, medical decision making, and procedures and agree with all pertinent clinical information unless otherwise noted. I have also reviewed and agree with the past medical, family and social history unless otherwise noted. I have discussed this patient in detail with the resident, and provided the instruction and education regarding patient here complaining of about 5 days of persistent nausea with dry heaving and vomiting. No diarrhea. No blood in the stool. Has a history of multiple abdominal surgeries, recently had a hernia surgery. Denies chest pain, palpitations or shortness of breath. No fevers. No flank pain or dysuria. .  My findings/plan: Patient appears somewhat uncomfortable, she is sitting in the bed in no acute distress however. Heart rate tachycardic. Lungs are clear and equal.  Dry lips and oral mucosa noted. Abdomen soft with moderate diffuse upper abdominal tenderness with guarding in the epigastric region. No jaundice or icterus. No CVA tenderness.           [NC]   2027 Patient sitting the bed resting comfortably no distress, not currently nauseated or vomiting. States she is feeling better after the second round of medication. She is updated on work-up results, CT results. [NC]   2125 Case discussed with Dr. Carolina Garcia, detailed overview given, he will admit the patient. [NC]      ED Course User Index  [NC] Magali Haley DO       Medical Decision Making:    Patient presented to the ER today with nausea, vomiting, epigastric pain status post hernia repair 3 weeks ago. Very difficult to control patient's symptoms in the emergency department requiring multiple doses of pain medication antiemetics. Did speak to Dr. Carolina Garcia who is agreeable to admission for intractable vomiting and further surgical evaluation. Patient is agreeable as well. All questions have been answered. Counseling: The emergency provider has spoken with the patient and discussed todays results, in addition to providing specific details for the plan of care and counseling regarding the diagnosis and prognosis. Questions are answered at this time and they are agreeable with the plan.      --------------------------------- IMPRESSION AND DISPOSITION ---------------------------------    IMPRESSION  1. Intractable vomiting with nausea, unspecified vomiting type    2. Abdominal pain, epigastric        New Prescriptions    No medications on file       DISPOSITION  Disposition: Admit to med/surg floor  Patient condition is stable      NOTE: This report was transcribed using voice recognition software.  Every effort was made to ensure accuracy; however, inadvertent computerized transcription errors may be present     Gosia Mcguire DO  Resident  04/24/21 2240

## 2021-04-24 NOTE — H&P
mg  40 mg Oral QAM AC Ramon Gustafson MD        PARoxetine (PAXIL) tablet 10 mg  10 mg Oral QAM Ramon Gustafson MD        sennosides-docusate sodium (SENOKOT-S) 8.6-50 MG tablet 2 tablet  2 tablet Oral Daily PRN Ramon Gustafson MD        sodium chloride flush 0.9 % injection 5-40 mL  5-40 mL Intravenous 2 times per day Kerry Moran MD        sodium chloride flush 0.9 % injection 5-40 mL  5-40 mL Intravenous PRN Kerry Moran MD   10 mL at 04/24/21 0613    0.9 % sodium chloride infusion  25 mL Intravenous PRN Kerry Moran MD        ondansetron (ZOFRAN-ODT) disintegrating tablet 4 mg  4 mg Oral Q8H PRN Kerry Moran MD        Or    [facility]ron [facility]) injection 4 mg  4 mg Intravenous Q6H PRN Kerry Moran MD   4 mg at 04/24/21 0801    enoxaparin (LOVENOX) injection 40 mg  40 mg Subcutaneous Daily Kerry Moran MD        lactated ringers infusion   Intravenous Continuous Kerry Moran MD 75 mL/hr at 04/23/21 2211 New Bag at 04/23/21 2211    HYDROmorphone HCl PF (DILAUDID) injection 0.25 mg  0.25 mg Intravenous Q3H PRN Kerry Moran MD        Or   [address] [place] [address] HYDROmorphone HCl PF (DILAUDID) injection 0.5 mg  0.5 mg Intravenous Q3H PRN Kerry Moran MD   0.5 mg at 04/24/21 0366       Allergies   Allergen Reactions    Ibuprofen Hives    Nsaids Hives    Morphine Hives and Anxiety       The patient has a family history that is negative for severe cardiovascular or respiratory issues, negative for reaction to anesthesia.     Social History     Socioeconomic History    Marital status:      Spouse name: Hasmukh Ramsay Number of children: 3    Years of education: 15    Highest education level: Not on file   Occupational History     Employer: [employer]   Social Needs    Financial resource strain: Not on file    Food insecurity     Worry: Not on file     Inability: Not on file   [employer] needs     Medical: Not on file     Non-medical: Not on file   Tobacco Use    Smoking status: Former Smoker Packs/day: 1.00     Years: 30.00     Pack years: 30.00     Types: Cigarettes     Quit date: 3/5/2021     Years since quittin.1    Smokeless tobacco: Never Used   Substance and Sexual Activity    Alcohol use: Yes     Comment: daily vodka drinker    Drug use: Not Currently     Types: Marijuana     Comment: medical marijuana.  Sexual activity: Yes     Partners: Male   Lifestyle    Physical activity     Days per week: Not on file     Minutes per session: Not on file    Stress: Not on file   Relationships    Social connections     Talks on phone: Not on file     Gets together: Not on file     Attends Jainism service: Not on file     Active member of club or organization: Not on file     Attends meetings of clubs or organizations: Not on file     Relationship status: Not on file    Intimate partner violence     Fear of current or ex partner: Not on file     Emotionally abused: Not on file     Physically abused: Not on file     Forced sexual activity: Not on file   Other Topics Concern    Not on file   Social History Narrative     twice.  Recently  2020           Review of Systems  Review of Systems -  General ROS: negative for - chills, fatigue or malaise  ENT ROS: negative for - hearing change, nasal congestion or nasal discharge  Allergy and Immunology ROS: negative for - hives, itchy/watery eyes or nasal congestion  Hematological and Lymphatic ROS: negative for - blood clots, blood transfusions, bruising or fatigue  Endocrine ROS: negative for - malaise/lethargy, mood swings, palpitations or polydipsia/polyuria  Respiratory ROS: negative for - sputum changes, stridor, tachypnea or wheezing  Cardiovascular ROS: negative for - irregular heartbeat, loss of consciousness, murmur or orthopnea  Gastrointestinal ROS: negative for - constipation, diarrhea, gas/bloating, heartburn or hematemesis  Genito-Urinary ROS: negative for -  genital discharge, genital ulcers or hematuria  Musculoskeletal ROS: negative for - gait disturbance, muscle pain or muscular weakness    Physical exam:   /71   Pulse 86   Temp 98.1 °F (36.7 °C) (Oral)   Resp 18   Ht 5' 2\" (1.575 m)   Wt 220 lb (99.8 kg)   SpO2 92%   BMI 40.24 kg/m²   General appearance:  NAD  Head: NCAT, PERRLA, EOMI, red conjunctiva  Neck: supple, no masses  Lungs: CTAB, equal chest rise bilateral  Heart: Reg rate  Abdomen: soft, mildly tender, nondistended  Skin; no lesions  Gu: no cva tenderness  Extremities: extremities normal, atraumatic, no cyanosis or edema      Radiology:  CT abdomen/pelvis:   17.7 x 2.4 cm anterior abdominal wall fluid collection just deep to the   rectus abdominus, likely postsurgical seroma.       Diffuse fatty liver, status post cholecystectomy.       No evidence of acute intra-abdominal or pelvic inflammatory process or   obstructive uropathy. Assessment:  48 y.o. female with n/v diffuse abdominal pain      Plan:   Will get contrast oral CT to evaluate, follow labs  Kizzy Shell MD  8:10 AM  4/24/2021

## 2021-04-24 NOTE — CONSULTS
[facility]      Reason for Consult:  Medical management    History of Present Illness:  48 y.o. female with a history of obesity, history of small bowel obstruction status post lap scope retrorectus repair of incisional hernia with extensive lysis of adhesion 3/25/2021, ? COPD, tobacco abuse, GERD presents with worsening nausea vomiting and abdominal pain. She had  lap scope retrorectus repair of incisional hernia with extensive lysis of adhesion surgery 4 weeks ago, a week ago started to have this checked bowel nausea vomiting and epigastric abdominal pain which progressively getting worse. Patient was admitted under general surgery service. He did have 1 bowel movement today which she said was tarry. Medicine was consulted for medical management    Informant(s) for H&P: Patient and chart review    REVIEW OF SYSTEMS:  Complete ROS performed with patient, pertinent positives and negatives are listed in the HPI.     PMH:  Past Medical History:   Diagnosis Date    Arthritis     Bursitis     hips    Class 3 severe obesity due to excess calories with body mass index (BMI) of 40.0 to 44.9 in adult [facility])     COPD (chronic obstructive pulmonary disease) (HCC)     Diverticulitis     GERD (gastroesophageal reflux disease)     Incisional hernia with obstruction but no gangrene     Strep throat        Surgical History:  Past Surgical History:   Procedure Laterality Date     SECTION      CHOLECYSTECTOMY      COLECTOMY  2016    FOREARM FRACTURE SURGERY Left     fracture of ulna s/p repair    HERNIA REPAIR      HERNIA REPAIR  2015    incarcerated hernia repair    HERNIA REPAIR N/A 3/25/2021    INCISIONAL HERNIA REPAIR WITH MESH, POSSIBLE COMPONENT SEPARATION  LAPAROSCOPIC ROBOTIC XI ASSISTED (CPT Q7720644) performed by Aliyah Mitchell MD at [address]      sacroiliac bilateral 12-    PELVIC FRACTURE SURGERY      VENTRAL HERNIA REPAIR  08/25/2015       Medications Prior to Admission:    Prior to Admission medications    Medication Sig Start Date End Date Taking? Authorizing Provider   levothyroxine (SYNTHROID) 50 MCG tablet Take 1 tablet by mouth Daily 3/30/21   Monse Harris, DO   pantoprazole (PROTONIX) 40 MG tablet Take 1 tablet by mouth every morning (before breakfast) 3/30/21   Monse Harris, DO   docusate sodium (COLACE, DULCOLAX) 100 MG CAPS Take 100 mg by mouth daily 3/29/21 4/28/21  Monse Harris,    albuterol sulfate HFA (PROVENTIL HFA) 108 (90 Base) MCG/ACT inhaler Inhale 2 puffs into the lungs every 6 hours as needed for Wheezing 3/29/21   Monse Harris, DO   traZODone (DESYREL) 50 MG tablet  12/17/20   Historical Provider, MD   hydrOXYzine (ATARAX) 50 MG tablet Take 50 mg by mouth nightly  1/27/21   Historical Provider, MD   rOPINIRole (REQUIP) 0.25 MG tablet  1/27/21   Historical Provider, MD   brexpiprazole (REXULTI) 0.5 MG TABS tablet Take 0.5 mg by mouth daily    Historical Provider, MD   PARoxetine (PAXIL) 10 MG tablet Take 10 mg by mouth every morning    Historical Provider, MD   bumetanide (BUMEX) 1 MG tablet TAKE ONE TABLET 2 TIMES A DAY 7/2/19   Historical Provider, MD       Allergies:    Ibuprofen, Nsaids, and Morphine    Social History:    reports that she quit smoking about 7 weeks ago. Her smoking use included cigarettes. She has a 30.00 pack-year smoking history. She has never used smokeless tobacco. She reports current alcohol use. She reports previous drug use. Drug: Marijuana. Family History:   family history includes Cancer in her paternal grandfather; Diabetes in an other family member; Osteoarthritis in an other family member; Other in her father; Prostate Cancer in her father; Stroke in her brother.     PHYSICAL EXAM:  Vitals:  /80   Pulse 85   Temp 98.1 °F (36.7 °C) (Oral)   Resp 18   Ht 5' 2\" (1.575 m)   Wt 220 lb (99.8 kg)   SpO2 96% BMI 40.24 kg/m²     General Appearance: alert and oriented to person, place and time   Skin: warm and dry  Head: normocephalic and atraumatic  Eyes: pupils equal, round, and reactive to light, extraocular eye movements intact, conjunctivae normal  Neck: neck supple and non tender without mass   Pulmonary/Chest: clear to auscultation bilaterally- no wheezes, rales or rhonchi, normal air movement, no respiratory distress  Cardiovascular: normal rate, normal S1 and S2 and no carotid bruits  Abdomen: soft, epigastric tenderness with a palpable small anterior wall lesion, vertical surgical scar noticed as well as lap scope sites  Extremities: no cyanosis, no clubbing and no edema  Neurologic: no cranial nerve deficit and speech normal        LABS:  Recent Labs     04/23/21  1633      K 4.1   CL 89*   CO2 24   BUN 9   CREATININE 0.8   GLUCOSE 85   CALCIUM 9.5       Recent Labs     04/23/21  1633   WBC 7.6   RBC 4.91   HGB 16.1*   HCT 48.2*   MCV 98.2   MCH 32.8   MCHC 33.4   RDW 14.7      MPV 10.1       No results for input(s): POCGLU in the last 72 hours.     CBC with Differential:    Lab Results   Component Value Date    WBC 7.0 04/24/2021    RBC 4.18 04/24/2021    HGB 13.6 04/24/2021    HCT 43.1 04/24/2021     04/24/2021    .1 04/24/2021    MCH 32.5 04/24/2021    MCHC 31.6 04/24/2021    RDW 15.0 04/24/2021    SEGSPCT 82 09/14/2011    LYMPHOPCT 17.5 04/23/2021    MONOPCT 7.0 04/23/2021    BASOPCT 0.5 04/23/2021    MONOSABS 0.53 04/23/2021    LYMPHSABS 1.32 04/23/2021    EOSABS 0.18 04/23/2021    BASOSABS 0.04 04/23/2021     CMP:    Lab Results   Component Value Date     04/23/2021    K 4.1 04/23/2021    CL 89 04/23/2021    CO2 24 04/23/2021    BUN 9 04/23/2021    CREATININE 0.8 04/23/2021    GFRAA >60 04/23/2021    LABGLOM >60 04/23/2021    GLUCOSE 85 04/23/2021    GLUCOSE 89 09/14/2011    PROT 7.7 04/23/2021    LABALBU 4.3 04/23/2021    CALCIUM 9.5 04/23/2021    BILITOT 0.9 04/23/2021 ALKPHOS 111 04/23/2021     04/23/2021     04/23/2021       Radiology:   CT ABDOMEN PELVIS W IV CONTRAST Additional Contrast? None   Final Result   17.7 x 2.4 cm anterior abdominal wall fluid collection just deep to the   rectus abdominus, likely postsurgical seroma. Diffuse fatty liver, status post cholecystectomy. No evidence of acute intra-abdominal or pelvic inflammatory process or   obstructive uropathy. EKG: Sinus tachycardia      ASSESSMENT:      Active Problems:    Intractable abdominal pain    Intractable nausea and vomiting  Resolved Problems:    * No resolved hospital problems. *      PLAN:    1. Worsening nausea vomiting, epigastric abdominal pain s/p lap scope retrorectus repair of incisional hernia with extensive lysis of adhesion 3/25/2021  · N.p.o. for now  · Monitor for possible surgical intervention per primary  · Zofran as needed    2. Epigastric abdominal pain 2/2 likely anterior abdominal wall post surgery seroma    3. High anion gap metabolic acidosis 2/2 lactic acidosis vs starvational ketoacidosis  · Continue IV fluid  · Repeat lactic acid    4. Transaminitis, history of [place]  · Continue monitor liver function test    5. Hx of small bowel obstruction status post lap scope retrorectus repair of incisional hernia with extensive lysis of adhesion  · Monitor electrolytes, keep K above 4 and mag above 2    6. Obesity     7. ?  COPD, stable    8. Tobacco abuse  ·  smoking cessation    9. GERD    10. Hypothyroidism on Synthroid    Thank you very much for this interesting consult and we will continue to follow along. Feel free to call with any questions at [phone]. Electronically signed by Darryl Encarnacion MD on 4/24/2021 at 2:50 AM    NOTE: This report was transcribed using voice recognition software.  Every effort was made to ensure accuracy; however, inadvertent computerized transcription errors may be present.

## 2021-04-25 PROCEDURE — 2580000003 HC RX 258: Performed by: SURGERY

## 2021-04-25 PROCEDURE — 99232 SBSQ HOSP IP/OBS MODERATE 35: CPT | Performed by: INTERNAL MEDICINE

## 2021-04-25 PROCEDURE — 6370000000 HC RX 637 (ALT 250 FOR IP): Performed by: INTERNAL MEDICINE

## 2021-04-25 PROCEDURE — 99232 SBSQ HOSP IP/OBS MODERATE 35: CPT | Performed by: SURGERY

## 2021-04-25 PROCEDURE — 6360000002 HC RX W HCPCS: Performed by: SURGERY

## 2021-04-25 PROCEDURE — 6360000002 HC RX W HCPCS: Performed by: INTERNAL MEDICINE

## 2021-04-25 PROCEDURE — 1200000000 HC SEMI PRIVATE

## 2021-04-25 RX ADMIN — PROCHLORPERAZINE EDISYLATE 5 MG: 5 INJECTION INTRAMUSCULAR; INTRAVENOUS at 05:59

## 2021-04-25 RX ADMIN — HYDROMORPHONE HYDROCHLORIDE 0.5 MG: 1 INJECTION, SOLUTION INTRAMUSCULAR; INTRAVENOUS; SUBCUTANEOUS at 02:40

## 2021-04-25 RX ADMIN — HYDROMORPHONE HYDROCHLORIDE 0.5 MG: 1 INJECTION, SOLUTION INTRAMUSCULAR; INTRAVENOUS; SUBCUTANEOUS at 09:18

## 2021-04-25 RX ADMIN — SODIUM CHLORIDE, POTASSIUM CHLORIDE, SODIUM LACTATE AND CALCIUM CHLORIDE: 600; 310; 30; 20 INJECTION, SOLUTION INTRAVENOUS at 12:54

## 2021-04-25 RX ADMIN — LEVOTHYROXINE SODIUM 50 MCG: 50 TABLET ORAL at 05:57

## 2021-04-25 RX ADMIN — ENOXAPARIN SODIUM 40 MG: 40 INJECTION SUBCUTANEOUS at 09:18

## 2021-04-25 RX ADMIN — HYDROMORPHONE HYDROCHLORIDE 0.5 MG: 1 INJECTION, SOLUTION INTRAMUSCULAR; INTRAVENOUS; SUBCUTANEOUS at 06:00

## 2021-04-25 RX ADMIN — PROCHLORPERAZINE EDISYLATE 5 MG: 5 INJECTION INTRAMUSCULAR; INTRAVENOUS at 12:19

## 2021-04-25 RX ADMIN — HYDROMORPHONE HYDROCHLORIDE 0.5 MG: 1 INJECTION, SOLUTION INTRAMUSCULAR; INTRAVENOUS; SUBCUTANEOUS at 12:20

## 2021-04-25 RX ADMIN — PAROXETINE HYDROCHLORIDE 10 MG: 20 TABLET, FILM COATED ORAL at 08:13

## 2021-04-25 RX ADMIN — PANTOPRAZOLE SODIUM 40 MG: 40 TABLET, DELAYED RELEASE ORAL at 05:57

## 2021-04-25 RX ADMIN — BREXPIPRAZOLE 0.5 MG: 2 TABLET ORAL at 08:14

## 2021-04-25 RX ADMIN — HYDROMORPHONE HYDROCHLORIDE 0.5 MG: 1 INJECTION, SOLUTION INTRAMUSCULAR; INTRAVENOUS; SUBCUTANEOUS at 16:19

## 2021-04-25 RX ADMIN — PROCHLORPERAZINE EDISYLATE 5 MG: 5 INJECTION INTRAMUSCULAR; INTRAVENOUS at 17:44

## 2021-04-25 RX ADMIN — HYDROXYZINE HYDROCHLORIDE 50 MG: 25 TABLET, FILM COATED ORAL at 21:19

## 2021-04-25 RX ADMIN — HYDROMORPHONE HYDROCHLORIDE 0.5 MG: 1 INJECTION, SOLUTION INTRAMUSCULAR; INTRAVENOUS; SUBCUTANEOUS at 23:02

## 2021-04-25 RX ADMIN — HYDROMORPHONE HYDROCHLORIDE 0.5 MG: 1 INJECTION, SOLUTION INTRAMUSCULAR; INTRAVENOUS; SUBCUTANEOUS at 19:39

## 2021-04-25 ASSESSMENT — PAIN DESCRIPTION - DESCRIPTORS
DESCRIPTORS: CRAMPING;DISCOMFORT;PRESSURE
DESCRIPTORS: ACHING;DISCOMFORT;DULL

## 2021-04-25 ASSESSMENT — PAIN SCALES - GENERAL
PAINLEVEL_OUTOF10: 4
PAINLEVEL_OUTOF10: 8
PAINLEVEL_OUTOF10: 9
PAINLEVEL_OUTOF10: 9
PAINLEVEL_OUTOF10: 8
PAINLEVEL_OUTOF10: 1
PAINLEVEL_OUTOF10: 8

## 2021-04-25 ASSESSMENT — PAIN DESCRIPTION - ORIENTATION: ORIENTATION: RIGHT;LEFT

## 2021-04-25 ASSESSMENT — PAIN DESCRIPTION - PROGRESSION
CLINICAL_PROGRESSION: GRADUALLY WORSENING
CLINICAL_PROGRESSION: GRADUALLY WORSENING

## 2021-04-25 ASSESSMENT — PAIN DESCRIPTION - FREQUENCY
FREQUENCY: INTERMITTENT
FREQUENCY: CONTINUOUS

## 2021-04-25 ASSESSMENT — PAIN DESCRIPTION - PAIN TYPE: TYPE: ACUTE PAIN

## 2021-04-25 ASSESSMENT — PAIN DESCRIPTION - LOCATION: LOCATION: ABDOMEN

## 2021-04-25 ASSESSMENT — PAIN - FUNCTIONAL ASSESSMENT: PAIN_FUNCTIONAL_ASSESSMENT: PREVENTS OR INTERFERES SOME ACTIVE ACTIVITIES AND ADLS

## 2021-04-25 NOTE — PROGRESS NOTES
tenderness  Extremities: extremities normal, atraumatic, no cyanosis or edema    A:  Ileus with gastroenteritis, resolving    P: general diet and monitor exam.    Jake Freed MD  4/25/2021

## 2021-04-25 NOTE — PROGRESS NOTES
Department of Internal Medicine  General Internal Medicine  Attending Progress Note  Chief Complaint   Patient presents with    Emesis     for last week    Abdominal Pain     hernia repair 2 weeks ago with Ronit     SUBJECTIVE:    Reports that she had diarrhea all night long. No fever or chills. Noted that her stomach is . No nausea or vomiting. She is aware of plans to check or test her stool for infectious process and treat if positive.      OBJECTIVE      Medications    Current Facility-Administered Medications: brexpiprazole (REXULTI) tablet 0.5 mg, 0.5 mg, Oral, Daily  hydrOXYzine (ATARAX) tablet 50 mg, 50 mg, Oral, Nightly  levothyroxine (SYNTHROID) tablet 50 mcg, 50 mcg, Oral, Daily  pantoprazole (PROTONIX) tablet 40 mg, 40 mg, Oral, QAM AC  PARoxetine (PAXIL) tablet 10 mg, 10 mg, Oral, QAM  sennosides-docusate sodium (SENOKOT-S) 8.6-50 MG tablet 2 tablet, 2 tablet, Oral, Daily PRN  prochlorperazine (COMPAZINE) injection 5 mg, 5 mg, Intravenous, Q6H PRN  sodium chloride flush 0.9 % injection 5-40 mL, 5-40 mL, Intravenous, 2 times per day  sodium chloride flush 0.9 % injection 5-40 mL, 5-40 mL, Intravenous, PRN  0.9 % sodium chloride infusion, 25 mL, Intravenous, PRN  ondansetron (ZOFRAN-ODT) disintegrating tablet 4 mg, 4 mg, Oral, Q8H PRN **OR** ondansetron (ZOFRAN) injection 4 mg, 4 mg, Intravenous, Q6H PRN  enoxaparin (LOVENOX) injection 40 mg, 40 mg, Subcutaneous, Daily  lactated ringers infusion, , Intravenous, Continuous  HYDROmorphone HCl PF (DILAUDID) injection 0.25 mg, 0.25 mg, Intravenous, Q3H PRN **OR** HYDROmorphone HCl PF (DILAUDID) injection 0.5 mg, 0.5 mg, Intravenous, Q3H PRN  Physical    VITALS:  /66   Pulse 85   Temp 98.7 °F (37.1 °C) (Oral)   Resp 20   Ht 5' 2\" (1.575 m)   Wt 220 lb (99.8 kg)   SpO2 92%   BMI 40.24 kg/m²   CONSTITUTIONAL:  awake, alert, cooperative, no apparent distress, and appears stated age  EYES:  Lids and lashes normal, pupils equal, round and reactive to light, extra ocular muscles intact, sclera clear, conjunctiva normal  ENT:  normocepalic, without obvious abnormality  NECK:  supple, symmetrical, trachea midline  HEMATOLOGIC/LYMPHATICS:  no cervical lymphadenopathy  BACK:  Symmetric, no curvature, spinous processes are non-tender on palpation, paraspinous muscles are non-tender on palpation, no costal vertebral tenderness  LUNGS:  No increased work of breathing, good air exchange, clear to auscultation bilaterally, no crackles or wheezing  CARDIOVASCULAR:  Normal apical impulse, regular rate and rhythm, normal S1 and S2, no S3 or S4, and no murmur noted  ABDOMEN:  No scars, normal bowel sounds, soft, non-distended, non-tender, no masses palpated, no hepatosplenomegally  MUSCULOSKELETAL:  there is no redness, warmth, or swelling of the joints  NEUROLOGIC:  No focal neuro deficit  SKIN:  no bruising or bleeding  Data    CBC:   Lab Results   Component Value Date    WBC 7.0 04/24/2021    RBC 4.18 04/24/2021    HGB 13.6 04/24/2021    HCT 43.1 04/24/2021    .1 04/24/2021    MCH 32.5 04/24/2021    MCHC 31.6 04/24/2021    RDW 15.0 04/24/2021     04/24/2021    MPV 9.8 04/24/2021     CMP:    Lab Results   Component Value Date     04/24/2021    K 4.5 04/24/2021    CL 94 04/24/2021    CO2 29 04/24/2021    BUN 9 04/24/2021    CREATININE 0.8 04/24/2021    GFRAA >60 04/24/2021    LABGLOM >60 04/24/2021    GLUCOSE 89 04/24/2021    GLUCOSE 89 09/14/2011    PROT 6.4 04/24/2021    LABALBU 3.7 04/24/2021    CALCIUM 8.5 04/24/2021    BILITOT 0.6 04/24/2021    ALKPHOS 90 04/24/2021     04/24/2021    ALT 79 04/24/2021       ASSESSMENT AND PLAN        1. Intractable abdominal pain  Etiology is unknown, but suspects may be related to long term hx of abdominal surgery complication. Continue to monitor  Treat symptomatically  CT of abdomen is essentially benign other than known fluid collection. Defer further management to Primary    2. Intractable nausea and vomiting  Symptom management    3. Hypothyroidism: On synthroid  Check TSH in am    4. GERD: on PPI    5.   Depression: continue Paxil

## 2021-04-25 NOTE — PLAN OF CARE
Problem: Pain:  Goal: Pain level will decrease  4/25/2021 1023 by Shira Boggs RN  Outcome: Met This Shift  Note: Pain is less than 3 after being medicated     Problem: Pain:  Goal: Control of acute pain  4/25/2021 1023 by Shira Boggs RN  Outcome: Met This Shift     Problem: Pain:  Goal: Control of chronic pain  4/25/2021 1023 by Shira Boggs RN  Outcome: Met This Shift     Problem: Nausea/Vomiting:  Goal: Absence of nausea/vomiting  4/25/2021 1023 by Shira Boggs RN  Outcome: Met This Shift  Note: No n/v     Problem: Nausea/Vomiting:  Goal: Absence of nausea/vomiting  4/25/2021 1023 by Shira Boggs RN  Outcome: Met This Shift  Note: No n/v     Problem: Nausea/Vomiting:  Goal: Able to drink  4/25/2021 1023 by Shira Boggs RN  Outcome: Met This Shift     Problem: Nausea/Vomiting:  Goal: Able to eat  4/25/2021 1023 by Shira Boggs RN  Outcome: Met This Shift     Problem: Nausea/Vomiting:  Goal: Ability to achieve adequate nutritional intake will improve  Outcome: Met This Shift     Problem: Fluid Volume:  Goal: Ability to achieve a balanced intake and output will improve  Outcome: Met This Shift     Problem: Fluid Volume:  Goal: Ability to achieve a balanced intake and output will improve  Outcome: Met This Shift     Problem: Fluid Volume:  Goal: Ability to achieve a balanced intake and output will improve  Outcome: Met This Shift     Problem: Physical Regulation:  Goal: Ability to maintain clinical measurements within normal limits will improve  Outcome: Met This Shift     Problem: Physical Regulation:  Goal: Ability to maintain clinical measurements within normal limits will improve  Outcome: Met This Shift     Problem: Physical Regulation:  Goal: Will show no signs and symptoms of electrolyte imbalance  Outcome: Met This Shift

## 2021-04-26 LAB
ALBUMIN SERPL-MCNC: 3.3 G/DL (ref 3.5–5.2)
ALP BLD-CCNC: 86 U/L (ref 35–104)
ALT SERPL-CCNC: 80 U/L (ref 0–32)
ANION GAP SERPL CALCULATED.3IONS-SCNC: 8 MMOL/L (ref 7–16)
AST SERPL-CCNC: 135 U/L (ref 0–31)
BASOPHILS ABSOLUTE: 0.02 E9/L (ref 0–0.2)
BASOPHILS RELATIVE PERCENT: 0.5 % (ref 0–2)
BILIRUB SERPL-MCNC: 0.3 MG/DL (ref 0–1.2)
BUN BLDV-MCNC: 4 MG/DL (ref 6–20)
CALCIUM SERPL-MCNC: 9.3 MG/DL (ref 8.6–10.2)
CHLORIDE BLD-SCNC: 99 MMOL/L (ref 98–107)
CO2: 30 MMOL/L (ref 22–29)
CREAT SERPL-MCNC: 0.8 MG/DL (ref 0.5–1)
EOSINOPHILS ABSOLUTE: 0.14 E9/L (ref 0.05–0.5)
EOSINOPHILS RELATIVE PERCENT: 3.2 % (ref 0–6)
FOLATE: 5.6 NG/ML (ref 4.8–24.2)
GFR AFRICAN AMERICAN: >60
GFR NON-AFRICAN AMERICAN: >60 ML/MIN/1.73
GLUCOSE BLD-MCNC: 114 MG/DL (ref 74–99)
HCT VFR BLD CALC: 41.4 % (ref 34–48)
HEMOGLOBIN: 12.6 G/DL (ref 11.5–15.5)
IMMATURE GRANULOCYTES #: 0.03 E9/L
IMMATURE GRANULOCYTES %: 0.7 % (ref 0–5)
LYMPHOCYTES ABSOLUTE: 0.98 E9/L (ref 1.5–4)
LYMPHOCYTES RELATIVE PERCENT: 22.4 % (ref 20–42)
MCH RBC QN AUTO: 31.7 PG (ref 26–35)
MCHC RBC AUTO-ENTMCNC: 30.4 % (ref 32–34.5)
MCV RBC AUTO: 104 FL (ref 80–99.9)
MONOCYTES ABSOLUTE: 0.18 E9/L (ref 0.1–0.95)
MONOCYTES RELATIVE PERCENT: 4.1 % (ref 2–12)
NEUTROPHILS ABSOLUTE: 3.03 E9/L (ref 1.8–7.3)
NEUTROPHILS RELATIVE PERCENT: 69.1 % (ref 43–80)
PDW BLD-RTO: 14.3 FL (ref 11.5–15)
PLATELET # BLD: 135 E9/L (ref 130–450)
PMV BLD AUTO: 10.6 FL (ref 7–12)
POTASSIUM SERPL-SCNC: 4.1 MMOL/L (ref 3.5–5)
RBC # BLD: 3.98 E12/L (ref 3.5–5.5)
SODIUM BLD-SCNC: 137 MMOL/L (ref 132–146)
TOTAL PROTEIN: 5.8 G/DL (ref 6.4–8.3)
TSH SERPL DL<=0.05 MIU/L-ACNC: 2.07 UIU/ML (ref 0.27–4.2)
VITAMIN B-12: 964 PG/ML (ref 211–946)
VITAMIN D 25-HYDROXY: 29 NG/ML (ref 30–100)
WBC # BLD: 4.4 E9/L (ref 4.5–11.5)

## 2021-04-26 PROCEDURE — 6370000000 HC RX 637 (ALT 250 FOR IP): Performed by: INTERNAL MEDICINE

## 2021-04-26 PROCEDURE — 82746 ASSAY OF FOLIC ACID SERUM: CPT

## 2021-04-26 PROCEDURE — 82607 VITAMIN B-12: CPT

## 2021-04-26 PROCEDURE — 6360000002 HC RX W HCPCS: Performed by: INTERNAL MEDICINE

## 2021-04-26 PROCEDURE — 1200000000 HC SEMI PRIVATE

## 2021-04-26 PROCEDURE — 82306 VITAMIN D 25 HYDROXY: CPT

## 2021-04-26 PROCEDURE — 6370000000 HC RX 637 (ALT 250 FOR IP): Performed by: SURGERY

## 2021-04-26 PROCEDURE — 84443 ASSAY THYROID STIM HORMONE: CPT

## 2021-04-26 PROCEDURE — 85025 COMPLETE CBC W/AUTO DIFF WBC: CPT

## 2021-04-26 PROCEDURE — 6360000002 HC RX W HCPCS: Performed by: SURGERY

## 2021-04-26 PROCEDURE — 99232 SBSQ HOSP IP/OBS MODERATE 35: CPT | Performed by: INTERNAL MEDICINE

## 2021-04-26 PROCEDURE — 36415 COLL VENOUS BLD VENIPUNCTURE: CPT

## 2021-04-26 PROCEDURE — 99024 POSTOP FOLLOW-UP VISIT: CPT | Performed by: SURGERY

## 2021-04-26 PROCEDURE — 2580000003 HC RX 258: Performed by: SURGERY

## 2021-04-26 PROCEDURE — 80053 COMPREHEN METABOLIC PANEL: CPT

## 2021-04-26 RX ORDER — OXYCODONE HYDROCHLORIDE 15 MG/1
15 TABLET ORAL EVERY 4 HOURS PRN
Status: DISCONTINUED | OUTPATIENT
Start: 2021-04-26 | End: 2021-04-27 | Stop reason: HOSPADM

## 2021-04-26 RX ORDER — ACETAMINOPHEN 500 MG
500 TABLET ORAL
Status: DISCONTINUED | OUTPATIENT
Start: 2021-04-26 | End: 2021-04-27 | Stop reason: HOSPADM

## 2021-04-26 RX ORDER — METHOCARBAMOL 500 MG/1
500 TABLET, FILM COATED ORAL 4 TIMES DAILY
Status: DISCONTINUED | OUTPATIENT
Start: 2021-04-26 | End: 2021-04-27 | Stop reason: HOSPADM

## 2021-04-26 RX ORDER — HYDROCODONE BITARTRATE AND ACETAMINOPHEN 5; 325 MG/1; MG/1
2 TABLET ORAL EVERY 4 HOURS PRN
Status: DISCONTINUED | OUTPATIENT
Start: 2021-04-26 | End: 2021-04-26

## 2021-04-26 RX ORDER — OXYCODONE HCL 10 MG/1
10 TABLET, FILM COATED, EXTENDED RELEASE ORAL EVERY 4 HOURS PRN
Status: DISCONTINUED | OUTPATIENT
Start: 2021-04-26 | End: 2021-04-26 | Stop reason: CLARIF

## 2021-04-26 RX ORDER — OXYCODONE HYDROCHLORIDE 5 MG/1
10 TABLET ORAL EVERY 4 HOURS PRN
Status: DISCONTINUED | OUTPATIENT
Start: 2021-04-26 | End: 2021-04-27 | Stop reason: HOSPADM

## 2021-04-26 RX ORDER — HYDROCODONE BITARTRATE AND ACETAMINOPHEN 5; 325 MG/1; MG/1
1 TABLET ORAL EVERY 4 HOURS PRN
Status: DISCONTINUED | OUTPATIENT
Start: 2021-04-26 | End: 2021-04-26

## 2021-04-26 RX ORDER — LIDOCAINE 50 MG/G
1 PATCH TOPICAL DAILY
Status: DISCONTINUED | OUTPATIENT
Start: 2021-04-26 | End: 2021-04-26 | Stop reason: CLARIF

## 2021-04-26 RX ORDER — LIDOCAINE 4 G/G
1 PATCH TOPICAL DAILY
Status: DISCONTINUED | OUTPATIENT
Start: 2021-04-26 | End: 2021-04-27 | Stop reason: HOSPADM

## 2021-04-26 RX ORDER — OXYCODONE HYDROCHLORIDE 15 MG/1
15 TABLET, FILM COATED, EXTENDED RELEASE ORAL EVERY 4 HOURS PRN
Status: DISCONTINUED | OUTPATIENT
Start: 2021-04-26 | End: 2021-04-26 | Stop reason: CLARIF

## 2021-04-26 RX ADMIN — PAROXETINE HYDROCHLORIDE 10 MG: 20 TABLET, FILM COATED ORAL at 08:14

## 2021-04-26 RX ADMIN — HYDROCODONE BITARTRATE AND ACETAMINOPHEN 2 TABLET: 5; 325 TABLET ORAL at 12:12

## 2021-04-26 RX ADMIN — OXYCODONE HYDROCHLORIDE 15 MG: 15 TABLET ORAL at 20:39

## 2021-04-26 RX ADMIN — SODIUM CHLORIDE, POTASSIUM CHLORIDE, SODIUM LACTATE AND CALCIUM CHLORIDE: 600; 310; 30; 20 INJECTION, SOLUTION INTRAVENOUS at 16:37

## 2021-04-26 RX ADMIN — LEVOTHYROXINE SODIUM 50 MCG: 50 TABLET ORAL at 05:11

## 2021-04-26 RX ADMIN — ACETAMINOPHEN 500 MG: 500 TABLET ORAL at 20:39

## 2021-04-26 RX ADMIN — PROCHLORPERAZINE EDISYLATE 5 MG: 5 INJECTION INTRAMUSCULAR; INTRAVENOUS at 12:11

## 2021-04-26 RX ADMIN — HYDROMORPHONE HYDROCHLORIDE 0.5 MG: 1 INJECTION, SOLUTION INTRAMUSCULAR; INTRAVENOUS; SUBCUTANEOUS at 02:15

## 2021-04-26 RX ADMIN — HYDROCODONE BITARTRATE AND ACETAMINOPHEN 2 TABLET: 5; 325 TABLET ORAL at 08:14

## 2021-04-26 RX ADMIN — METHOCARBAMOL 500 MG: 500 TABLET, FILM COATED ORAL at 20:15

## 2021-04-26 RX ADMIN — ONDANSETRON 4 MG: 2 INJECTION INTRAMUSCULAR; INTRAVENOUS at 16:41

## 2021-04-26 RX ADMIN — PANTOPRAZOLE SODIUM 40 MG: 40 TABLET, DELAYED RELEASE ORAL at 05:11

## 2021-04-26 RX ADMIN — HYDROMORPHONE HYDROCHLORIDE 0.25 MG: 1 INJECTION, SOLUTION INTRAMUSCULAR; INTRAVENOUS; SUBCUTANEOUS at 05:12

## 2021-04-26 RX ADMIN — ACETAMINOPHEN 500 MG: 500 TABLET ORAL at 16:33

## 2021-04-26 RX ADMIN — BREXPIPRAZOLE 0.5 MG: 2 TABLET ORAL at 08:15

## 2021-04-26 RX ADMIN — OXYCODONE 10 MG: 5 TABLET ORAL at 16:33

## 2021-04-26 RX ADMIN — PROCHLORPERAZINE EDISYLATE 5 MG: 5 INJECTION INTRAMUSCULAR; INTRAVENOUS at 20:15

## 2021-04-26 RX ADMIN — HYDROXYZINE HYDROCHLORIDE 50 MG: 25 TABLET, FILM COATED ORAL at 20:12

## 2021-04-26 ASSESSMENT — PAIN DESCRIPTION - DESCRIPTORS
DESCRIPTORS: BURNING;DISCOMFORT;SHARP
DESCRIPTORS: DISCOMFORT
DESCRIPTORS: TIGHTNESS;CRAMPING

## 2021-04-26 ASSESSMENT — PAIN SCALES - GENERAL
PAINLEVEL_OUTOF10: 10
PAINLEVEL_OUTOF10: 7
PAINLEVEL_OUTOF10: 9
PAINLEVEL_OUTOF10: 8
PAINLEVEL_OUTOF10: 5
PAINLEVEL_OUTOF10: 7
PAINLEVEL_OUTOF10: 8
PAINLEVEL_OUTOF10: 6

## 2021-04-26 ASSESSMENT — PAIN DESCRIPTION - ORIENTATION
ORIENTATION: RIGHT;MID
ORIENTATION: MID;LOWER;RIGHT;LEFT

## 2021-04-26 ASSESSMENT — PAIN DESCRIPTION - ONSET
ONSET: ON-GOING
ONSET: ON-GOING

## 2021-04-26 ASSESSMENT — PAIN DESCRIPTION - PROGRESSION
CLINICAL_PROGRESSION: GRADUALLY IMPROVING
CLINICAL_PROGRESSION: NOT CHANGED

## 2021-04-26 ASSESSMENT — PAIN DESCRIPTION - PAIN TYPE
TYPE: ACUTE PAIN
TYPE: ACUTE PAIN

## 2021-04-26 ASSESSMENT — PAIN DESCRIPTION - LOCATION
LOCATION: ABDOMEN
LOCATION: ABDOMEN

## 2021-04-26 ASSESSMENT — PAIN DESCRIPTION - FREQUENCY: FREQUENCY: CONTINUOUS

## 2021-04-26 NOTE — PLAN OF CARE
Problem: Pain:  Goal: Pain level will decrease  Description: Pain level will decrease  4/25/2021 2012 by Stevie Alvares RN  Outcome: Ongoing  4/25/2021 1023 by Zainab Ross RN  Outcome: Met This Shift  Note: Pain is less than 3 after being medicated  Goal: Control of acute pain  Description: Control of acute pain  4/25/2021 2012 by Stevie Alvares RN  Outcome: Ongoing  4/25/2021 1023 by Zainab Ross RN  Outcome: Met This Shift  Goal: Control of chronic pain  Description: Control of chronic pain  4/25/2021 2012 by Stevie Alvares RN  Outcome: Ongoing  4/25/2021 1023 by Zainab Ross RN  Outcome: Met This Shift     Problem: Nausea/Vomiting:  Goal: Absence of nausea/vomiting  Description: Absence of nausea/vomiting  4/25/2021 2012 by Stevie Alvares RN  Outcome: Ongoing  4/25/2021 1023 by Zainab Ross RN  Outcome: Met This Shift  Note: No n/v  Goal: Able to drink  Description: Able to drink  4/25/2021 2012 by Stevie Alvares RN  Outcome: Ongoing  4/25/2021 1023 by Zainab Ross RN  Outcome: Met This Shift  Goal: Able to eat  Description: Able to eat  4/25/2021 2012 by Stevie Alvares RN  Outcome: Ongoing  4/25/2021 1023 by Zainab Ross RN  Outcome: Met This Shift  Goal: Ability to achieve adequate nutritional intake will improve  Description: Ability to achieve adequate nutritional intake will improve  4/25/2021 2012 by Stevie Alvares RN  Outcome: Ongoing  4/25/2021 1023 by Zainab Ross RN  Outcome: Met This Shift     Problem: Fluid Volume:  Goal: Ability to achieve a balanced intake and output will improve  Description: Ability to achieve a balanced intake and output will improve  4/25/2021 2012 by Stevie Alvares RN  Outcome: Met This Shift  4/25/2021 1023 by Zainab Ross RN  Outcome: Met This Shift     Problem: Physical Regulation:  Goal: Ability to maintain clinical measurements within normal limits will improve  Description: Ability to maintain clinical measurements within normal limits will improve  4/25/2021 2012 by Demetrius Nguyen RN  Outcome: Met This Shift  4/25/2021 1023 by Tony Black RN  Outcome: Met This Shift  Goal: Will show no signs and symptoms of electrolyte imbalance  Description: Will show no signs and symptoms of electrolyte imbalance  4/25/2021 2012 by Demetrius Nguyen RN  Outcome: Met This Shift  4/25/2021 1023 by Tony Black RN  Outcome: Met This Shift

## 2021-04-26 NOTE — CARE COORDINATION
4-26- note: ( no covid testing) I met with pt for transition of care needs, pt is independent, from home, relays no dc needs , friend Jo Ann Forbes will provide transport home.  Electronically signed by Dex Torres RN on 4/26/2021 at 11:30 AM

## 2021-04-26 NOTE — PROGRESS NOTES
Patient feeling nauseated and having increased pain after lunch and is requesting to not be discharged. Dr Layla Mitchell notified and discharge order cancelled.

## 2021-04-26 NOTE — DISCHARGE SUMMARY
tenderness/mass/nodules  Lungs: clear to auscultation bilaterally  Heart: regular rate and rhythm, S1, S2 normal, no murmur, click, rub or gallop  Abdomen: soft, non-tender; bowel sounds normal; no masses,  no organomegaly and incisions clean and dry  Extremities: extremities normal, atraumatic, no cyanosis or edema      Disposition: home    Patient Instructions: Activity: activity as tolerated  Diet: regular diet  Wound Care: keep wound clean and dry    Follow-up with Dr. Naveen Mo in 2 weeks.     Signed:  Seth Mann  4/26/2021  7:33 AM

## 2021-04-26 NOTE — PROGRESS NOTES
Department of Internal Medicine  General Internal Medicine  Attending Progress Note  Chief Complaint   Patient presents with    Emesis     for last week    Abdominal Pain     hernia repair 2 weeks ago with Ronit     SUBJECTIVE:    Reports that she is feeling better. No fever or chills. No chest pain. Denied diarrhea for the past 24 hours.      OBJECTIVE      Medications    Current Facility-Administered Medications: HYDROcodone-acetaminophen (NORCO) 5-325 MG per tablet 1 tablet, 1 tablet, Oral, Q4H PRN **OR** HYDROcodone-acetaminophen (NORCO) 5-325 MG per tablet 2 tablet, 2 tablet, Oral, Q4H PRN  brexpiprazole (REXULTI) tablet 0.5 mg, 0.5 mg, Oral, Daily  hydrOXYzine (ATARAX) tablet 50 mg, 50 mg, Oral, Nightly  levothyroxine (SYNTHROID) tablet 50 mcg, 50 mcg, Oral, Daily  pantoprazole (PROTONIX) tablet 40 mg, 40 mg, Oral, QAM AC  PARoxetine (PAXIL) tablet 10 mg, 10 mg, Oral, QAM  sennosides-docusate sodium (SENOKOT-S) 8.6-50 MG tablet 2 tablet, 2 tablet, Oral, Daily PRN  prochlorperazine (COMPAZINE) injection 5 mg, 5 mg, Intravenous, Q6H PRN  sodium chloride flush 0.9 % injection 5-40 mL, 5-40 mL, Intravenous, 2 times per day  sodium chloride flush 0.9 % injection 5-40 mL, 5-40 mL, Intravenous, PRN  0.9 % sodium chloride infusion, 25 mL, Intravenous, PRN  ondansetron (ZOFRAN-ODT) disintegrating tablet 4 mg, 4 mg, Oral, Q8H PRN **OR** ondansetron (ZOFRAN) injection 4 mg, 4 mg, Intravenous, Q6H PRN  enoxaparin (LOVENOX) injection 40 mg, 40 mg, Subcutaneous, Daily  lactated ringers infusion, , Intravenous, Continuous  Physical    VITALS:  BP (!) 147/97   Pulse 75   Temp 98.3 °F (36.8 °C) (Oral)   Resp 18   Ht 5' 2\" (1.575 m)   Wt 220 lb (99.8 kg)   SpO2 95%   BMI 40.24 kg/m²   CONSTITUTIONAL:  awake, alert, cooperative, no apparent distress, and appears stated age  EYES:  Lids and lashes normal, pupils equal, round and reactive to light, extra ocular muscles intact, sclera clear, conjunctiva normal  ENT:

## 2021-04-27 VITALS
HEIGHT: 62 IN | RESPIRATION RATE: 16 BRPM | HEART RATE: 74 BPM | SYSTOLIC BLOOD PRESSURE: 144 MMHG | TEMPERATURE: 97.9 F | BODY MASS INDEX: 40.48 KG/M2 | WEIGHT: 220 LBS | DIASTOLIC BLOOD PRESSURE: 77 MMHG | OXYGEN SATURATION: 95 %

## 2021-04-27 PROCEDURE — 6360000002 HC RX W HCPCS: Performed by: INTERNAL MEDICINE

## 2021-04-27 PROCEDURE — 2580000003 HC RX 258: Performed by: SURGERY

## 2021-04-27 PROCEDURE — 6370000000 HC RX 637 (ALT 250 FOR IP): Performed by: SURGERY

## 2021-04-27 PROCEDURE — 6370000000 HC RX 637 (ALT 250 FOR IP): Performed by: INTERNAL MEDICINE

## 2021-04-27 PROCEDURE — 99239 HOSP IP/OBS DSCHRG MGMT >30: CPT | Performed by: SURGERY

## 2021-04-27 RX ORDER — OXYCODONE HYDROCHLORIDE 5 MG/1
5 TABLET ORAL EVERY 6 HOURS PRN
Qty: 20 TABLET | Refills: 0 | Status: SHIPPED | OUTPATIENT
Start: 2021-04-27 | End: 2021-05-02

## 2021-04-27 RX ADMIN — PANTOPRAZOLE SODIUM 40 MG: 40 TABLET, DELAYED RELEASE ORAL at 06:01

## 2021-04-27 RX ADMIN — METHOCARBAMOL 500 MG: 500 TABLET, FILM COATED ORAL at 08:44

## 2021-04-27 RX ADMIN — ACETAMINOPHEN 500 MG: 500 TABLET ORAL at 04:43

## 2021-04-27 RX ADMIN — PROCHLORPERAZINE EDISYLATE 5 MG: 5 INJECTION INTRAMUSCULAR; INTRAVENOUS at 08:59

## 2021-04-27 RX ADMIN — ACETAMINOPHEN 500 MG: 500 TABLET ORAL at 12:36

## 2021-04-27 RX ADMIN — OXYCODONE HYDROCHLORIDE 15 MG: 15 TABLET ORAL at 04:43

## 2021-04-27 RX ADMIN — OXYCODONE HYDROCHLORIDE 15 MG: 15 TABLET ORAL at 12:57

## 2021-04-27 RX ADMIN — ACETAMINOPHEN 500 MG: 500 TABLET ORAL at 08:30

## 2021-04-27 RX ADMIN — OXYCODONE HYDROCHLORIDE 15 MG: 15 TABLET ORAL at 08:42

## 2021-04-27 RX ADMIN — SODIUM CHLORIDE, POTASSIUM CHLORIDE, SODIUM LACTATE AND CALCIUM CHLORIDE: 600; 310; 30; 20 INJECTION, SOLUTION INTRAVENOUS at 04:45

## 2021-04-27 RX ADMIN — OXYCODONE 10 MG: 5 TABLET ORAL at 00:48

## 2021-04-27 RX ADMIN — BREXPIPRAZOLE 0.5 MG: 2 TABLET ORAL at 08:44

## 2021-04-27 RX ADMIN — METHOCARBAMOL 500 MG: 500 TABLET, FILM COATED ORAL at 12:56

## 2021-04-27 RX ADMIN — LEVOTHYROXINE SODIUM 50 MCG: 50 TABLET ORAL at 06:01

## 2021-04-27 RX ADMIN — PAROXETINE HYDROCHLORIDE 10 MG: 20 TABLET, FILM COATED ORAL at 08:43

## 2021-04-27 ASSESSMENT — PAIN DESCRIPTION - DESCRIPTORS
DESCRIPTORS: ACHING;DISCOMFORT;DULL
DESCRIPTORS: TIGHTNESS;CRAMPING

## 2021-04-27 ASSESSMENT — PAIN DESCRIPTION - LOCATION: LOCATION: ABDOMEN;BACK

## 2021-04-27 ASSESSMENT — PAIN SCALES - GENERAL
PAINLEVEL_OUTOF10: 1
PAINLEVEL_OUTOF10: 8
PAINLEVEL_OUTOF10: 9
PAINLEVEL_OUTOF10: 7
PAINLEVEL_OUTOF10: 8
PAINLEVEL_OUTOF10: 7

## 2021-04-27 ASSESSMENT — PAIN - FUNCTIONAL ASSESSMENT: PAIN_FUNCTIONAL_ASSESSMENT: PREVENTS OR INTERFERES SOME ACTIVE ACTIVITIES AND ADLS

## 2021-04-27 ASSESSMENT — PAIN DESCRIPTION - ONSET
ONSET: ON-GOING
ONSET: GRADUAL

## 2021-04-27 ASSESSMENT — PAIN DESCRIPTION - PAIN TYPE
TYPE: ACUTE PAIN
TYPE: ACUTE PAIN

## 2021-04-27 ASSESSMENT — PAIN DESCRIPTION - PROGRESSION: CLINICAL_PROGRESSION: GRADUALLY IMPROVING

## 2021-04-27 ASSESSMENT — PAIN DESCRIPTION - FREQUENCY: FREQUENCY: CONTINUOUS

## 2021-04-27 ASSESSMENT — PAIN DESCRIPTION - ORIENTATION
ORIENTATION: MID;LOWER
ORIENTATION: RIGHT;LEFT

## 2021-04-27 NOTE — PLAN OF CARE
Problem: Pain:  Goal: Pain level will decrease  Description: Pain level will decrease  4/26/2021 2140 by Gunnar Fink RN  Outcome: Ongoing  4/26/2021 1814 by Dante Zepeda RN  Outcome: Not Met This Shift  Goal: Control of acute pain  Description: Control of acute pain  4/26/2021 2140 by Gunnar Fink RN  Outcome: Ongoing  4/26/2021 1814 by Dante Zepeda RN  Outcome: Not Met This Shift  Goal: Control of chronic pain  Description: Control of chronic pain  4/26/2021 2140 by Gunnar Fink RN  Outcome: Ongoing  4/26/2021 1814 by Dante Zepeda RN  Outcome: Not Met This Shift     Problem: Nausea/Vomiting:  Goal: Absence of nausea/vomiting  Description: Absence of nausea/vomiting  4/26/2021 2140 by Gunnar Fink RN  Outcome: Met This Shift  4/26/2021 1814 by Dante Zepeda RN  Outcome: Not Met This Shift  Goal: Able to drink  Description: Able to drink  4/26/2021 2140 by Gunnar Fink RN  Outcome: Met This Shift  4/26/2021 1814 by Dante Zepeda RN  Outcome: Met This Shift  Goal: Able to eat  Description: Able to eat  4/26/2021 2140 by Gunnar Fink RN  Outcome: Met This Shift  4/26/2021 1814 by Dante Zepeda RN  Outcome: Not Met This Shift  Goal: Ability to achieve adequate nutritional intake will improve  Description: Ability to achieve adequate nutritional intake will improve  4/26/2021 2140 by Gunnar Fink RN  Outcome: Ongoing  4/26/2021 1814 by Dante Zepeda RN  Outcome: Not Met This Shift     Problem: Fluid Volume:  Goal: Ability to achieve a balanced intake and output will improve  Description: Ability to achieve a balanced intake and output will improve  4/26/2021 2140 by Gunnar Fink RN  Outcome: Ongoing  4/26/2021 1814 by Dante Zepeda RN  Outcome: Not Met This Shift     Problem: Physical Regulation:  Goal: Ability to maintain clinical measurements within normal limits will improve  Description: Ability to maintain clinical measurements within normal limits will improve  4/26/2021 2140 by Saundra Dumont Carolyne Taveras RN  Outcome: Met This Shift  4/26/2021 1814 by Eugene Schultz RN  Outcome: Not Met This Shift  Goal: Will show no signs and symptoms of electrolyte imbalance  Description: Will show no signs and symptoms of electrolyte imbalance  4/26/2021 2140 by Jayant Lynn RN  Outcome: Met This Shift  4/26/2021 1814 by Eugene Schultz RN  Outcome: Not Met This Shift

## 2021-04-27 NOTE — PLAN OF CARE
Problem: Pain:  Goal: Pain level will decrease  Outcome: Completed     Problem: Pain:  Goal: Control of acute pain  Outcome: Completed     Problem: Pain:  Goal: Control of chronic pain  Outcome: Completed     Problem: Nausea/Vomiting:  Goal: Absence of nausea/vomiting  Outcome: Completed     Problem: Nausea/Vomiting:  Goal: Able to drink  Outcome: Completed     Problem: Nausea/Vomiting:  Goal: Able to eat  Outcome: Completed     Problem: Nausea/Vomiting:  Goal: Ability to achieve adequate nutritional intake will improve  Outcome: Completed     Problem: Fluid Volume:  Goal: Ability to achieve a balanced intake and output will improve  Outcome: Completed     Problem: Physical Regulation:  Goal: Ability to maintain clinical measurements within normal limits will improve  Outcome: Completed     Problem: Physical Regulation:  Goal: Will show no signs and symptoms of electrolyte imbalance  Outcome: Completed     Problem: Physical Regulation:  Goal: Will show no signs and symptoms of electrolyte imbalance  Outcome: Completed

## 2021-04-27 NOTE — DISCHARGE SUMMARY
Physician Discharge Summary     Dayron Cedeño  [id number]    Admit date: 2021    Discharge date and time: No discharge date for patient encounter. Admitting Physician: Mitzy Hunter MD     Admission Diagnoses: Intractable nausea and vomiting [R11.2]  Intractable abdominal pain [R10.9]    Condition at discharge: stable   [place], [address] Medication Instructions ZY    Printed on:21 [id number]   Medication Information                      albuterol sulfate HFA (PROVENTIL HFA) 108 (90 Base) MCG/ACT inhaler  Inhale 2 puffs into the lungs every 6 hours as needed for Wheezing             brexpiprazole (REXULTI) 0.5 MG TABS tablet  Take 0.5 mg by mouth daily             bumetanide (BUMEX) 1 MG tablet  TAKE ONE TABLET 2 TIMES A DAY             docusate sodium (COLACE, DULCOLAX) 100 MG CAPS  Take 100 mg by mouth daily             hydrOXYzine (ATARAX) 50 MG tablet  Take 50 mg by mouth nightly              levothyroxine (SYNTHROID) 50 MCG tablet  Take 1 tablet by mouth Daily             oxyCODONE (ROXICODONE) 5 MG immediate release tablet  Take 1 tablet by mouth every 6 hours as needed for Pain for up to 5 days. Intended supply: 5 days. Take lowest dose possible to manage pain             pantoprazole (PROTONIX) 40 MG tablet  Take 1 tablet by mouth every morning (before breakfast)             PARoxetine (PAXIL) 10 MG tablet  Take 10 mg by mouth every morning             rOPINIRole (REQUIP) 0.25 MG tablet               traZODone (DESYREL) 50 MG tablet                     Hospital Course: Had uncomplicated gastroenteritis and uncomplicated hospital course and was discharged tolerating a general diet, having good bowel function, ambulating independently and with adequate analgesia.     Discharge Exam: BP (!) 144/77   Pulse 74   Temp 97.9 °F (36.6 °C) (Oral)   Resp 16   Ht 5' 2\" (1.575 m)   Wt 220 lb (99.8 kg)   SpO2 95%   BMI 40.24 kg/m²     General appearance: alert, appears stated age and cooperative  Head: Normocephalic, without obvious abnormality, atraumatic  Neck: no adenopathy, no carotid bruit, no JVD, supple, symmetrical, trachea midline and thyroid not enlarged, symmetric, no tenderness/mass/nodules  Lungs: clear to auscultation bilaterally  Heart: regular rate and rhythm, S1, S2 normal, no murmur, click, rub or gallop  Abdomen: soft, non-tender; bowel sounds normal; no masses,  no organomegaly and incisions clean and dry  Extremities: extremities normal, atraumatic, no cyanosis or edema      Disposition: home    Patient Instructions: Activity: activity as tolerated  Diet: regular diet  Wound Care: keep wound clean and dry    Follow-up with Dr. Winsome Soto in 2 weeks.     Signed:  Jagruti Richter  4/27/2021  7:54 AM

## 2021-04-27 NOTE — PROGRESS NOTES
CLINICAL PHARMACY NOTE: MEDS TO [address] [place] [address] Drive Select Patient?: No  Total # of Prescriptions Filled: 1   The following medications were delivered to the patient:  · Oxycodone 5 mg  Total # of Interventions Completed: 2  Time Spent (min): 30    Additional Documentation:

## 2021-05-07 ENCOUNTER — HOSPITAL ENCOUNTER (EMERGENCY)
Age: 51
Discharge: HOME | End: 2021-05-07
Attending: EMERGENCY MEDICINE
Payer: MEDICARE

## 2021-05-07 ENCOUNTER — APPOINTMENT (OUTPATIENT)
Dept: CT IMAGING | Age: 51
End: 2021-05-07
Payer: MEDICARE

## 2021-05-07 ENCOUNTER — TELEPHONE (OUTPATIENT)
Dept: SURGERY | Age: 51
End: 2021-05-07

## 2021-05-07 VITALS
HEART RATE: 112 BPM | OXYGEN SATURATION: 97 % | HEIGHT: 62 IN | RESPIRATION RATE: 22 BRPM | WEIGHT: 220 LBS | TEMPERATURE: 97.1 F | DIASTOLIC BLOOD PRESSURE: 89 MMHG | BODY MASS INDEX: 40.48 KG/M2 | SYSTOLIC BLOOD PRESSURE: 155 MMHG

## 2021-05-07 DIAGNOSIS — R79.89 ELEVATED LFTS: ICD-10-CM

## 2021-05-07 DIAGNOSIS — K92.2 UPPER GI BLEED: ICD-10-CM

## 2021-05-07 DIAGNOSIS — R19.7 DIARRHEA, UNSPECIFIED TYPE: ICD-10-CM

## 2021-05-07 DIAGNOSIS — R11.2 NON-INTRACTABLE VOMITING WITH NAUSEA, UNSPECIFIED VOMITING TYPE: Primary | ICD-10-CM

## 2021-05-07 DIAGNOSIS — R10.9 ABDOMINAL PAIN, UNSPECIFIED ABDOMINAL LOCATION: ICD-10-CM

## 2021-05-07 DIAGNOSIS — E86.0 DEHYDRATION: ICD-10-CM

## 2021-05-07 LAB
ALBUMIN SERPL-MCNC: 4.3 G/DL (ref 3.5–5.2)
ALP BLD-CCNC: 118 U/L (ref 35–104)
ALT SERPL-CCNC: 137 U/L (ref 0–32)
ANION GAP SERPL CALCULATED.3IONS-SCNC: 23 MMOL/L (ref 7–16)
AST SERPL-CCNC: 275 U/L (ref 0–31)
BASOPHILS ABSOLUTE: 0.07 E9/L (ref 0–0.2)
BASOPHILS RELATIVE PERCENT: 1 % (ref 0–2)
BILIRUB SERPL-MCNC: 0.8 MG/DL (ref 0–1.2)
BUN BLDV-MCNC: 8 MG/DL (ref 6–20)
C-REACTIVE PROTEIN: 0.4 MG/DL (ref 0–0.4)
CALCIUM SERPL-MCNC: 9.3 MG/DL (ref 8.6–10.2)
CHLORIDE BLD-SCNC: 91 MMOL/L (ref 98–107)
CO2: 19 MMOL/L (ref 22–29)
CREAT SERPL-MCNC: 0.8 MG/DL (ref 0.5–1)
EOSINOPHILS ABSOLUTE: 0.16 E9/L (ref 0.05–0.5)
EOSINOPHILS RELATIVE PERCENT: 2.2 % (ref 0–6)
GFR AFRICAN AMERICAN: >60
GFR NON-AFRICAN AMERICAN: >60 ML/MIN/1.73
GLUCOSE BLD-MCNC: 83 MG/DL (ref 74–99)
HCT VFR BLD CALC: 49.2 % (ref 34–48)
HEMOGLOBIN: 16.5 G/DL (ref 11.5–15.5)
IMMATURE GRANULOCYTES #: 0.03 E9/L
IMMATURE GRANULOCYTES %: 0.4 % (ref 0–5)
LACTIC ACID: 2.7 MMOL/L (ref 0.5–2.2)
LIPASE: 34 U/L (ref 13–60)
LYMPHOCYTES ABSOLUTE: 1.47 E9/L (ref 1.5–4)
LYMPHOCYTES RELATIVE PERCENT: 20.2 % (ref 20–42)
MCH RBC QN AUTO: 33 PG (ref 26–35)
MCHC RBC AUTO-ENTMCNC: 33.5 % (ref 32–34.5)
MCV RBC AUTO: 98.4 FL (ref 80–99.9)
MONOCYTES ABSOLUTE: 0.45 E9/L (ref 0.1–0.95)
MONOCYTES RELATIVE PERCENT: 6.2 % (ref 2–12)
NEUTROPHILS ABSOLUTE: 5.11 E9/L (ref 1.8–7.3)
NEUTROPHILS RELATIVE PERCENT: 70 % (ref 43–80)
PDW BLD-RTO: 15 FL (ref 11.5–15)
PLATELET # BLD: 412 E9/L (ref 130–450)
PMV BLD AUTO: 9.9 FL (ref 7–12)
POTASSIUM SERPL-SCNC: 3.8 MMOL/L (ref 3.5–5)
RBC # BLD: 5 E12/L (ref 3.5–5.5)
SODIUM BLD-SCNC: 133 MMOL/L (ref 132–146)
TOTAL PROTEIN: 7.9 G/DL (ref 6.4–8.3)
WBC # BLD: 7.3 E9/L (ref 4.5–11.5)

## 2021-05-07 PROCEDURE — 96374 THER/PROPH/DIAG INJ IV PUSH: CPT

## 2021-05-07 PROCEDURE — 96375 TX/PRO/DX INJ NEW DRUG ADDON: CPT

## 2021-05-07 PROCEDURE — 99283 EMERGENCY DEPT VISIT LOW MDM: CPT

## 2021-05-07 PROCEDURE — 86140 C-REACTIVE PROTEIN: CPT

## 2021-05-07 PROCEDURE — 96372 THER/PROPH/DIAG INJ SC/IM: CPT

## 2021-05-07 PROCEDURE — 74177 CT ABD & PELVIS W/CONTRAST: CPT

## 2021-05-07 PROCEDURE — 6360000002 HC RX W HCPCS: Performed by: EMERGENCY MEDICINE

## 2021-05-07 PROCEDURE — 2580000003 HC RX 258: Performed by: EMERGENCY MEDICINE

## 2021-05-07 PROCEDURE — 83690 ASSAY OF LIPASE: CPT

## 2021-05-07 PROCEDURE — 80053 COMPREHEN METABOLIC PANEL: CPT

## 2021-05-07 PROCEDURE — 96361 HYDRATE IV INFUSION ADD-ON: CPT

## 2021-05-07 PROCEDURE — 36415 COLL VENOUS BLD VENIPUNCTURE: CPT

## 2021-05-07 PROCEDURE — 83605 ASSAY OF LACTIC ACID: CPT

## 2021-05-07 PROCEDURE — 85025 COMPLETE CBC W/AUTO DIFF WBC: CPT

## 2021-05-07 PROCEDURE — 6360000004 HC RX CONTRAST MEDICATION: Performed by: RADIOLOGY

## 2021-05-07 RX ORDER — OXYCODONE HYDROCHLORIDE AND ACETAMINOPHEN 5; 325 MG/1; MG/1
1 TABLET ORAL EVERY 6 HOURS PRN
Qty: 12 TABLET | Refills: 0 | Status: SHIPPED | OUTPATIENT
Start: 2021-05-07 | End: 2021-05-10

## 2021-05-07 RX ORDER — HYDROMORPHONE HYDROCHLORIDE 1 MG/ML
1 INJECTION, SOLUTION INTRAMUSCULAR; INTRAVENOUS; SUBCUTANEOUS ONCE
Status: COMPLETED | OUTPATIENT
Start: 2021-05-07 | End: 2021-05-07

## 2021-05-07 RX ORDER — PROMETHAZINE HYDROCHLORIDE 25 MG/1
25 TABLET ORAL 4 TIMES DAILY PRN
Qty: 20 TABLET | Refills: 0 | Status: SHIPPED | OUTPATIENT
Start: 2021-05-07 | End: 2021-05-14

## 2021-05-07 RX ORDER — PROMETHAZINE HYDROCHLORIDE 25 MG/ML
12.5 INJECTION, SOLUTION INTRAMUSCULAR; INTRAVENOUS ONCE
Status: COMPLETED | OUTPATIENT
Start: 2021-05-07 | End: 2021-05-07

## 2021-05-07 RX ORDER — 0.9 % SODIUM CHLORIDE 0.9 %
1000 INTRAVENOUS SOLUTION INTRAVENOUS ONCE
Status: DISCONTINUED | OUTPATIENT
Start: 2021-05-07 | End: 2021-05-07 | Stop reason: HOSPADM

## 2021-05-07 RX ORDER — 0.9 % SODIUM CHLORIDE 0.9 %
1000 INTRAVENOUS SOLUTION INTRAVENOUS ONCE
Status: COMPLETED | OUTPATIENT
Start: 2021-05-07 | End: 2021-05-07

## 2021-05-07 RX ADMIN — IOPAMIDOL 75 ML: 755 INJECTION, SOLUTION INTRAVENOUS at 20:28

## 2021-05-07 RX ADMIN — SODIUM CHLORIDE 1000 ML: 9 INJECTION, SOLUTION INTRAVENOUS at 18:42

## 2021-05-07 RX ADMIN — PROMETHAZINE HYDROCHLORIDE 12.5 MG: 25 INJECTION INTRAMUSCULAR; INTRAVENOUS at 18:39

## 2021-05-07 RX ADMIN — HYDROMORPHONE HYDROCHLORIDE 1 MG: 1 INJECTION, SOLUTION INTRAMUSCULAR; INTRAVENOUS; SUBCUTANEOUS at 18:41

## 2021-05-07 ASSESSMENT — PAIN SCALES - GENERAL: PAINLEVEL_OUTOF10: 10

## 2021-05-07 ASSESSMENT — ENCOUNTER SYMPTOMS
BLOOD IN STOOL: 1
SHORTNESS OF BREATH: 0
NAUSEA: 1
VOMITING: 1
SORE THROAT: 0
DIARRHEA: 1
ABDOMINAL PAIN: 1

## 2021-05-07 NOTE — ED PROVIDER NOTES
Chief complaint abdominal pain nausea vomiting diarrhea  History of present illness this 80-year-old female presents for generalized abdominal pain, particularly in the upper quadrants. Associated with vomiting and diarrhea approximately four times a day. Describes vomitus is yellow. Describes diarrhea as varying in color but sometimes with blood symptoms black. She has had these symptoms off and on for about 6 weeks since she had a hiatal hernia repair. She has had multiple visits for these complaints. Has had to be admitted for intractable nausea and vomiting. She states with medications oftentimes it will get better temporarily but it keeps coming back. She has been able to eat and drink. Denies any fevers chills, denies any cough congestion or difficulty breathing, denies chest pain. She has no other acute complaints. Review of Systems   Constitutional: Negative for chills and fever. HENT: Negative for congestion and sore throat. Respiratory: Negative for shortness of breath. Cardiovascular: Negative for chest pain. Gastrointestinal: Positive for abdominal pain, blood in stool, diarrhea, nausea and vomiting. Genitourinary: Negative for dysuria. All other systems reviewed and are negative. Physical Exam    General: awake and alert, in moderate distress secondary to pain and nausea  Skin: warm, pink, and dry. Head: normocephalic and atraumatic  EENT: Pupils are equal, round, and reactive to light . Conjunctiva are clear. There is no epistaxis or rhinorrhea. Oral mucous membranes are moist, pharynx is clear with no erythema or exudates. Airway is intact with no stridor, no drooling, no difficulty controlling secretions. Phonation is normal.  Heart: regular rate and rhythm, no murmur. Lungs: clear to auscultation bilaterally, no wheezes or crackles. Breathing unlabored. Abdomen: Soft, nondistended. No rebound rigidity or guarding.   She is tender to palpation diffusely, particularly the right and left upper quadrants as well as the right lower quadrant. Extremities: no significant edema, no calf tenderness. Rectal: Normal central tone, external hemorrhoids noted. She has a small amount of melanotic stool in her rectal vault. It is Hemoccult positive. No bright red blood. ----------------------------------------------- PAST HISTORY --------------------------------------------  Past Medical History:  has a past medical history of Arthritis, Bursitis, Class 3 severe obesity due to excess calories with body mass index (BMI) of 40.0 to 44.9 in adult [facility]), COPD (chronic obstructive pulmonary disease) ([facility] [address] Diverticulitis, GERD (gastroesophageal reflux disease), Incisional hernia with obstruction but no gangrene, and Strep throat. Past Surgical History:  has a past surgical history that includes Hysterectomy; Cholecystectomy; Pelvic fracture surgery; colectomy (2016); Nerve Block; hernia repair; hernia repair (2015); ventral hernia repair (2015);  section; Forearm fracture surgery (Left); and hernia repair (N/A, 3/25/2021). Social History:  reports that she quit smoking about 2 months ago. Her smoking use included cigarettes. She has a 30.00 pack-year smoking history. She has never used smokeless tobacco. She reports current alcohol use. She reports previous drug use. Drug: Marijuana. Family History: family history includes Cancer in her paternal grandfather; Diabetes in an other family member; Osteoarthritis in an other family member; Other in her father; Prostate Cancer in her father; Stroke in her brother. The patients home medications have been reviewed.     Allergies: Ibuprofen, Nsaids, and Morphine    ------------------------------------------------ RESULTS ---------------------------------------------------    Labs:  Results for orders placed or performed during the hospital encounter of 21   CBC auto differential   Result Value Ref Range    WBC 7.3 4.5 - 11.5 E9/L    RBC 5.00 3.50 - 5.50 E12/L    Hemoglobin 16.5 (H) 11.5 - 15.5 g/dL    Hematocrit 49.2 (H) 34.0 - 48.0 %    MCV 98.4 80.0 - 99.9 fL    MCH 33.0 26.0 - 35.0 pg    MCHC 33.5 32.0 - 34.5 %    RDW 15.0 11.5 - 15.0 fL    Platelets 479 016 - 017 E9/L    MPV 9.9 7.0 - 12.0 fL    Neutrophils % 70.0 43.0 - 80.0 %    Immature Granulocytes % 0.4 0.0 - 5.0 %    Lymphocytes % 20.2 20.0 - 42.0 %    Monocytes % 6.2 2.0 - 12.0 %    Eosinophils % 2.2 0.0 - 6.0 %    Basophils % 1.0 0.0 - 2.0 %    Neutrophils Absolute 5.11 1.80 - 7.30 E9/L    Immature Granulocytes # 0.03 E9/L    Lymphocytes Absolute 1.47 (L) 1.50 - 4.00 E9/L    Monocytes Absolute 0.45 0.10 - 0.95 E9/L    Eosinophils Absolute 0.16 0.05 - 0.50 E9/L    Basophils Absolute 0.07 0.00 - 0.20 E9/L   Comprehensive metabolic panel   Result Value Ref Range    Sodium 133 132 - 146 mmol/L    Potassium 3.8 3.5 - 5.0 mmol/L    Chloride 91 (L) 98 - 107 mmol/L    CO2 19 (L) 22 - 29 mmol/L    Anion Gap 23 (H) 7 - 16 mmol/L    Glucose 83 74 - 99 mg/dL    BUN 8 6 - 20 mg/dL    CREATININE 0.8 0.5 - 1.0 mg/dL    GFR Non-African American >60 >=60 mL/min/1.73    GFR African American >60     Calcium 9.3 8.6 - 10.2 mg/dL    Total Protein 7.9 6.4 - 8.3 g/dL    Albumin 4.3 3.5 - 5.2 g/dL    Total Bilirubin 0.8 0.0 - 1.2 mg/dL    Alkaline Phosphatase 118 (H) 35 - 104 U/L     (H) 0 - 32 U/L     (H) 0 - 31 U/L   Lipase   Result Value Ref Range    Lipase 34 13 - 60 U/L   LACTIC ACID, PLASMA   Result Value Ref Range    Lactic Acid 2.7 (H) 0.5 - 2.2 mmol/L     Imaging: All Radiology results interpreted by Radiologist unless otherwise noted. CT ABDOMEN PELVIS W IV CONTRAST Additional Contrast? None   Final Result   1. Mild apparent thickening of the proximal colon may be an artifact of under   distension or signify colitis.    2. Minimal residual subcutaneous postoperative fluid collection at the site   the ventral hernia repair, decreased as of The etiology of her elevated LFTs is not clear but suspect related to her prolonged postoperative intestinal symptomatology. COUNSELING:   I have spoken with the patient and discussed todays results, in addition to providing specific details for the plan of care and counseling regarding the diagnosis and prognosis.     --------------------------------------- IMPRESSION & DISPOSITION --------------------------------     IMPRESSION(s):  1. Non-intractable vomiting with nausea, unspecified vomiting type    2. Diarrhea, unspecified type    3. Dehydration    4. Elevated LFTs    5. Upper GI bleed    6. Abdominal pain, unspecified abdominal location        This patient's ED course included: a personal history and physicial examination, re-evaluation prior to disposition, IV medications and cardiac monitoring    This patient has remained hemodynamically stable and improved during their ED course. DISPOSITION:  Disposition: Discharge to home. Patient condition is stable. END OF PROVIDER NOTE.        Moises Stern DO  05/07/21 6411

## 2021-05-10 ENCOUNTER — OFFICE VISIT (OUTPATIENT)
Dept: SURGERY | Age: 51
End: 2021-05-10
Payer: MEDICARE

## 2021-05-10 VITALS
BODY MASS INDEX: 40.48 KG/M2 | WEIGHT: 220 LBS | SYSTOLIC BLOOD PRESSURE: 149 MMHG | DIASTOLIC BLOOD PRESSURE: 100 MMHG | TEMPERATURE: 97.7 F | HEART RATE: 97 BPM | HEIGHT: 62 IN | OXYGEN SATURATION: 99 %

## 2021-05-10 DIAGNOSIS — R74.8 ELEVATED LIVER ENZYMES: ICD-10-CM

## 2021-05-10 DIAGNOSIS — K62.5 RECTAL BLEEDING: Primary | ICD-10-CM

## 2021-05-10 PROCEDURE — G8417 CALC BMI ABV UP PARAM F/U: HCPCS | Performed by: SURGERY

## 2021-05-10 PROCEDURE — 1111F DSCHRG MED/CURRENT MED MERGE: CPT | Performed by: SURGERY

## 2021-05-10 PROCEDURE — 3017F COLORECTAL CA SCREEN DOC REV: CPT | Performed by: SURGERY

## 2021-05-10 PROCEDURE — 99212 OFFICE O/P EST SF 10 MIN: CPT | Performed by: SURGERY

## 2021-05-10 PROCEDURE — 1036F TOBACCO NON-USER: CPT | Performed by: SURGERY

## 2021-05-10 PROCEDURE — G8427 DOCREV CUR MEDS BY ELIG CLIN: HCPCS | Performed by: SURGERY

## 2021-05-12 NOTE — PROGRESS NOTES
Intractable nausea and vomiting    Intractable vomiting       Past Medical History:   Diagnosis Date    Arthritis     Bursitis     hips    Class 3 severe obesity due to excess calories with body mass index (BMI) of 40.0 to 44.9 in adult [facility])     COPD (chronic obstructive pulmonary disease) ([address]     Diverticulitis     GERD (gastroesophageal reflux disease)     Incisional hernia with obstruction but no gangrene     Strep throat        Past Surgical History:   Procedure Laterality Date     SECTION      CHOLECYSTECTOMY      COLECTOMY  2016    FOREARM FRACTURE SURGERY Left     fracture of ulna s/p repair    HERNIA REPAIR      HERNIA REPAIR  2015    incarcerated hernia repair    HERNIA REPAIR N/A 3/25/2021    INCISIONAL HERNIA REPAIR WITH MESH, POSSIBLE COMPONENT SEPARATION  LAPAROSCOPIC ROBOTIC XI ASSISTED (CPT 60549) [facility] Paris Bo [facility] [address]      sacroiliac bilateral 2012    PELVIC FRACTURE SURGERY      VENTRAL HERNIA REPAIR  2015       Allergies   Allergen Reactions    Ibuprofen Hives    Nsaids Hives    Morphine Hives and Anxiety       The patient has a family history that is negative for severe cardiovascular or respiratory issues, negative for reaction to anesthesia. Time spent reviewing past medical, surgical, social and family history, vitals, nursing assessment and images. No changes from above documented history.     Social History     Socioeconomic History    Marital status:      Spouse name: Hasmukh Ramsay Number of children: 3    Years of education: 15    Highest education level: Not on file   Occupational History     Employer: [employer]   Social Needs    Financial resource strain: Not on file    Food insecurity     Worry: Not on file     Inability: Not on file   [employer] needs     Medical: Not on file     Non-medical: Not on file   Tobacco Use    Smoking status: Former Smoker Packs/day: 1.00     Years: 30.00     Pack years: 30.00     Types: Cigarettes     Quit date: 3/5/2021     Years since quittin.1    Smokeless tobacco: Never Used   Substance and Sexual Activity    Alcohol use: Yes     Comment: daily vodka drinker    Drug use: Not Currently     Types: Marijuana     Comment: medical marijuana.  Sexual activity: Yes     Partners: Male   Lifestyle    Physical activity     Days per week: Not on file     Minutes per session: Not on file    Stress: Not on file   Relationships    Social connections     Talks on phone: Not on file     Gets together: Not on file     Attends [employer] service: Not on file     Active member of club or organization: Not on file     Attends meetings of clubs or organizations: Not on file     Relationship status: Not on file    Intimate partner violence     Fear of current or ex partner: Not on file     Emotionally abused: Not on file     Physically abused: Not on file     Forced sexual activity: Not on file   Other Topics Concern    Not on file   Social History Narrative     twice. Recently  2020       I have reviewed relevant labs from this admission and interpretation is included in my assessment and plan    Review of Systems    A complete 10 system review was performed and are otherwise negative unless mentioned in the above HPI. Specific negatives are listed below but may not include all those reviewed.     General ROS: negative obtundation, AMS  ENT ROS: negative rhinorrhea, epistaxis  Allergy and Immunology ROS: negative itchy/watery eyes or nasal congestion  Hematological and Lymphatic ROS: negative spontaneous bleeding or bruising  Endocrine ROS: negative  lethargy, mood swings, palpitations or polydipsia/polyuria  Respiratory ROS: negative sputum changes, stridor, tachypnea or wheezing  Cardiovascular ROS: negative for - loss of consciousness, murmur or orthopnea  Gastrointestinal ROS: negative for - hematochezia or hematemesis  Genito-Urinary ROS: negative for -  genital discharge or hematuria  Musculoskeletal ROS: negative for - focal weakness, gangrene  Psych/Neuro ROS: negative for - visual or auditory hallucinations, suicidal ideation    Physical exam:   BP (!) 149/100   Pulse 97   Temp 97.7 °F (36.5 °C) (Temporal)   Ht 5' 2\" (1.575 m)   Wt 220 lb (99.8 kg)   SpO2 99%   BMI 40.24 kg/m²   General appearance:  NAD, appears stated age  Head: NCAT, PERRLA, EOMI, red conjunctiva  Neck: supple, no masses, trachea midline  Lungs: Equal chest rise bilateral, no retractions, no wheezing  Heart: Reg rate  Abdomen: soft, tender mild middle to lower abdomen, obese, incisions well-healed, no cellulitis, nondistended  Skin; warm and dry, no cyanosis  Gu: no cva tenderness  Extremities: atraumatic, no focal motor deficits, no open wounds  Psych: No tremor, visual hallucinations      Radiology: I reviewed relevant abdominal imaging from this admission and that available in the EMR including CT abd/pel from 4/23, 4.24, 5/7.  My assessment is well healing incisional hernia    Assessment:  John Altamirano is a 48 y.o. female with chronic abdominal pain, melena, diarrhea, history of incisional hernia due to small bowel obstruction recurrent  Patient Active Problem List   Diagnosis    GERD (gastroesophageal reflux disease)    Osteoarthritis cervical spine    Osteoarthritis of lumbar spine    Small bowel obstruction (HCC)    Morbid obesity ([address]    Hypokalemia    Venous insufficiency    SBO (small bowel obstruction) ([address]    Incarcerated hernia    Intractable abdominal pain    Incarcerated incisional hernia    Disruption or dehiscence of closure of fascia, superficial or muscular    Intractable nausea and vomiting    Intractable vomiting         Plan:  No acute surgical issue  Follow-up with me as needed  Follow-up with GI for upper and lower endoscopy  I reviewed her CT scans extensively showing well healing incisional hernia with no evidence of persistent seroma. Appears that on multiple CAT scan she did show a seroma now on her most recent one is now resolved. No signs of bowel obstruction there is no signs of twisting of bowel or ischemia there is no evidence of inflammatory changes.         Brandon Tsai MD  2:07 AM  5/10/2021

## 2021-07-01 ENCOUNTER — APPOINTMENT (OUTPATIENT)
Dept: GENERAL RADIOLOGY | Age: 51
End: 2021-07-01
Payer: MEDICARE

## 2021-07-01 ENCOUNTER — HOSPITAL ENCOUNTER (EMERGENCY)
Age: 51
Discharge: HOME | End: 2021-07-02
Attending: EMERGENCY MEDICINE
Payer: MEDICARE

## 2021-07-01 VITALS
TEMPERATURE: 100.7 F | OXYGEN SATURATION: 98 % | WEIGHT: 220 LBS | BODY MASS INDEX: 38.98 KG/M2 | RESPIRATION RATE: 18 BRPM | HEART RATE: 85 BPM | SYSTOLIC BLOOD PRESSURE: 138 MMHG | HEIGHT: 63 IN | DIASTOLIC BLOOD PRESSURE: 80 MMHG

## 2021-07-01 DIAGNOSIS — J40 BRONCHITIS: Primary | ICD-10-CM

## 2021-07-01 LAB
ALBUMIN SERPL-MCNC: 3.8 G/DL (ref 3.5–5.2)
ALP BLD-CCNC: 80 U/L (ref 35–104)
ALT SERPL-CCNC: 12 U/L (ref 0–32)
ANION GAP SERPL CALCULATED.3IONS-SCNC: 16 MMOL/L (ref 7–16)
AST SERPL-CCNC: 15 U/L (ref 0–31)
BASOPHILS ABSOLUTE: 0.03 E9/L (ref 0–0.2)
BASOPHILS RELATIVE PERCENT: 0.3 % (ref 0–2)
BILIRUB SERPL-MCNC: 0.5 MG/DL (ref 0–1.2)
BUN BLDV-MCNC: 9 MG/DL (ref 6–20)
CALCIUM SERPL-MCNC: 8.9 MG/DL (ref 8.6–10.2)
CHLORIDE BLD-SCNC: 93 MMOL/L (ref 98–107)
CO2: 21 MMOL/L (ref 22–29)
CREAT SERPL-MCNC: 1 MG/DL (ref 0.5–1)
EKG ATRIAL RATE: 94 BPM
EKG P AXIS: 57 DEGREES
EKG P-R INTERVAL: 118 MS
EKG Q-T INTERVAL: 440 MS
EKG QRS DURATION: 72 MS
EKG QTC CALCULATION (BAZETT): 550 MS
EKG R AXIS: 118 DEGREES
EKG T AXIS: 54 DEGREES
EKG VENTRICULAR RATE: 94 BPM
EOSINOPHILS ABSOLUTE: 0.01 E9/L (ref 0.05–0.5)
EOSINOPHILS RELATIVE PERCENT: 0.1 % (ref 0–6)
GFR AFRICAN AMERICAN: >60
GFR NON-AFRICAN AMERICAN: 58 ML/MIN/1.73
GLUCOSE BLD-MCNC: 106 MG/DL (ref 74–99)
HCT VFR BLD CALC: 43.8 % (ref 34–48)
HEMOGLOBIN: 15.3 G/DL (ref 11.5–15.5)
IMMATURE GRANULOCYTES #: 0.04 E9/L
IMMATURE GRANULOCYTES %: 0.4 % (ref 0–5)
INFLUENZA A BY PCR: NOT DETECTED
INFLUENZA B BY PCR: NOT DETECTED
LACTIC ACID, SEPSIS: 1.3 MMOL/L (ref 0.5–1.9)
LIPASE: 16 U/L (ref 13–60)
LYMPHOCYTES ABSOLUTE: 0.81 E9/L (ref 1.5–4)
LYMPHOCYTES RELATIVE PERCENT: 7.9 % (ref 20–42)
MCH RBC QN AUTO: 31.8 PG (ref 26–35)
MCHC RBC AUTO-ENTMCNC: 34.9 % (ref 32–34.5)
MCV RBC AUTO: 91.1 FL (ref 80–99.9)
MONOCYTES ABSOLUTE: 0.54 E9/L (ref 0.1–0.95)
MONOCYTES RELATIVE PERCENT: 5.3 % (ref 2–12)
NEUTROPHILS ABSOLUTE: 8.82 E9/L (ref 1.8–7.3)
NEUTROPHILS RELATIVE PERCENT: 86 % (ref 43–80)
PDW BLD-RTO: 13.9 FL (ref 11.5–15)
PLATELET # BLD: 197 E9/L (ref 130–450)
PMV BLD AUTO: 10.3 FL (ref 7–12)
POTASSIUM SERPL-SCNC: 3.3 MMOL/L (ref 3.5–5)
RBC # BLD: 4.81 E12/L (ref 3.5–5.5)
SARS-COV-2, NAAT: NOT DETECTED
SODIUM BLD-SCNC: 130 MMOL/L (ref 132–146)
TOTAL PROTEIN: 7.4 G/DL (ref 6.4–8.3)
TROPONIN, HIGH SENSITIVITY: 9 NG/L (ref 0–9)
WBC # BLD: 10.3 E9/L (ref 4.5–11.5)

## 2021-07-01 PROCEDURE — 93005 ELECTROCARDIOGRAM TRACING: CPT | Performed by: EMERGENCY MEDICINE

## 2021-07-01 PROCEDURE — 6360000002 HC RX W HCPCS: Performed by: EMERGENCY MEDICINE

## 2021-07-01 PROCEDURE — 94644 CONT INHLJ TX 1ST HOUR: CPT

## 2021-07-01 PROCEDURE — 84484 ASSAY OF TROPONIN QUANT: CPT

## 2021-07-01 PROCEDURE — 71045 X-RAY EXAM CHEST 1 VIEW: CPT

## 2021-07-01 PROCEDURE — 87502 INFLUENZA DNA AMP PROBE: CPT

## 2021-07-01 PROCEDURE — 87040 BLOOD CULTURE FOR BACTERIA: CPT

## 2021-07-01 PROCEDURE — 6370000000 HC RX 637 (ALT 250 FOR IP): Performed by: EMERGENCY MEDICINE

## 2021-07-01 PROCEDURE — 94640 AIRWAY INHALATION TREATMENT: CPT

## 2021-07-01 PROCEDURE — 83690 ASSAY OF LIPASE: CPT

## 2021-07-01 PROCEDURE — 2580000003 HC RX 258: Performed by: EMERGENCY MEDICINE

## 2021-07-01 PROCEDURE — 85025 COMPLETE CBC W/AUTO DIFF WBC: CPT

## 2021-07-01 PROCEDURE — 83605 ASSAY OF LACTIC ACID: CPT

## 2021-07-01 PROCEDURE — 87635 SARS-COV-2 COVID-19 AMP PRB: CPT

## 2021-07-01 PROCEDURE — 96374 THER/PROPH/DIAG INJ IV PUSH: CPT

## 2021-07-01 PROCEDURE — 99285 EMERGENCY DEPT VISIT HI MDM: CPT

## 2021-07-01 PROCEDURE — 80053 COMPREHEN METABOLIC PANEL: CPT

## 2021-07-01 PROCEDURE — 96375 TX/PRO/DX INJ NEW DRUG ADDON: CPT

## 2021-07-01 PROCEDURE — 2500000003 HC RX 250 WO HCPCS: Performed by: EMERGENCY MEDICINE

## 2021-07-01 RX ORDER — ONDANSETRON 2 MG/ML
4 INJECTION INTRAMUSCULAR; INTRAVENOUS ONCE
Status: COMPLETED | OUTPATIENT
Start: 2021-07-01 | End: 2021-07-01

## 2021-07-01 RX ORDER — 0.9 % SODIUM CHLORIDE 0.9 %
1000 INTRAVENOUS SOLUTION INTRAVENOUS ONCE
Status: COMPLETED | OUTPATIENT
Start: 2021-07-01 | End: 2021-07-01

## 2021-07-01 RX ORDER — IPRATROPIUM BROMIDE AND ALBUTEROL SULFATE 2.5; .5 MG/3ML; MG/3ML
3 SOLUTION RESPIRATORY (INHALATION) ONCE
Status: COMPLETED | OUTPATIENT
Start: 2021-07-01 | End: 2021-07-01

## 2021-07-01 RX ORDER — ACETAMINOPHEN 500 MG
1000 TABLET ORAL ONCE
Status: COMPLETED | OUTPATIENT
Start: 2021-07-01 | End: 2021-07-01

## 2021-07-01 RX ORDER — BENZONATATE 100 MG/1
100 CAPSULE ORAL 3 TIMES DAILY PRN
Qty: 21 CAPSULE | Refills: 0 | Status: SHIPPED | OUTPATIENT
Start: 2021-07-01 | End: 2021-07-08

## 2021-07-01 RX ORDER — AZITHROMYCIN 250 MG/1
250 TABLET, FILM COATED ORAL DAILY
Qty: 4 TABLET | Refills: 0 | Status: SHIPPED | OUTPATIENT
Start: 2021-07-01 | End: 2021-07-05

## 2021-07-01 RX ORDER — DEXAMETHASONE SODIUM PHOSPHATE 10 MG/ML
10 INJECTION, SOLUTION INTRAMUSCULAR; INTRAVENOUS ONCE
Status: COMPLETED | OUTPATIENT
Start: 2021-07-01 | End: 2021-07-01

## 2021-07-01 RX ORDER — AZITHROMYCIN 250 MG/1
500 TABLET, FILM COATED ORAL ONCE
Status: COMPLETED | OUTPATIENT
Start: 2021-07-01 | End: 2021-07-01

## 2021-07-01 RX ORDER — OXYCODONE HYDROCHLORIDE 5 MG/1
5 TABLET ORAL ONCE
Status: COMPLETED | OUTPATIENT
Start: 2021-07-01 | End: 2021-07-01

## 2021-07-01 RX ORDER — LIDOCAINE HYDROCHLORIDE 20 MG/ML
5 INJECTION, SOLUTION EPIDURAL; INFILTRATION; INTRACAUDAL; PERINEURAL ONCE
Status: COMPLETED | OUTPATIENT
Start: 2021-07-01 | End: 2021-07-01

## 2021-07-01 RX ADMIN — DEXAMETHASONE SODIUM PHOSPHATE 10 MG: 10 INJECTION, SOLUTION INTRAMUSCULAR; INTRAVENOUS at 23:26

## 2021-07-01 RX ADMIN — ONDANSETRON 4 MG: 2 INJECTION INTRAMUSCULAR; INTRAVENOUS at 18:41

## 2021-07-01 RX ADMIN — AZITHROMYCIN MONOHYDRATE 500 MG: 250 TABLET ORAL at 23:26

## 2021-07-01 RX ADMIN — ACETAMINOPHEN 1000 MG: 500 TABLET ORAL at 23:26

## 2021-07-01 RX ADMIN — LIDOCAINE HYDROCHLORIDE 5 ML: 20 INJECTION, SOLUTION EPIDURAL; INFILTRATION; INTRACAUDAL; PERINEURAL at 17:56

## 2021-07-01 RX ADMIN — SODIUM CHLORIDE 1000 ML: 9 INJECTION, SOLUTION INTRAVENOUS at 22:00

## 2021-07-01 RX ADMIN — OXYCODONE HYDROCHLORIDE 5 MG: 5 TABLET ORAL at 18:41

## 2021-07-01 RX ADMIN — SODIUM CHLORIDE 1000 ML: 9 INJECTION, SOLUTION INTRAVENOUS at 17:37

## 2021-07-01 RX ADMIN — IPRATROPIUM BROMIDE AND ALBUTEROL SULFATE 3 AMPULE: .5; 3 SOLUTION RESPIRATORY (INHALATION) at 17:51

## 2021-07-01 RX ADMIN — ACETAMINOPHEN 1000 MG: 500 TABLET ORAL at 17:31

## 2021-07-01 ASSESSMENT — ENCOUNTER SYMPTOMS
CHEST TIGHTNESS: 0
DIARRHEA: 1
ABDOMINAL PAIN: 0
SHORTNESS OF BREATH: 1
WHEEZING: 1
TACHYPNEA: 1

## 2021-07-01 ASSESSMENT — PAIN SCALES - GENERAL
PAINLEVEL_OUTOF10: 10
PAINLEVEL_OUTOF10: 10
PAINLEVEL_OUTOF10: 8

## 2021-07-01 ASSESSMENT — PAIN DESCRIPTION - DESCRIPTORS: DESCRIPTORS: ACHING

## 2021-07-01 NOTE — ED PROVIDER NOTES
26-year-old female presenting with 4 days of fever, coughing, nausea, also recently started having diarrhea. No abdominal pain, no chest pain. She describes a headache with her coughing as well. No nuchal rigidity, this is gradual onset, persistent, moderate severity, 4 days duration, associated with the diarrhea and the coughing. Family History   Problem Relation Age of Onset    Osteoarthritis Other     Diabetes Other     Other Father         pancreatitis    Prostate Cancer Father     Stroke Brother         half brother    Cancer Paternal Grandfather      Past Surgical History:   Procedure Laterality Date     SECTION      CHOLECYSTECTOMY      COLECTOMY  2016    FOREARM FRACTURE SURGERY Left     fracture of ulna s/p repair    HERNIA REPAIR      HERNIA REPAIR  2015    incarcerated hernia repair    HERNIA REPAIR N/A 3/25/2021    INCISIONAL HERNIA REPAIR WITH MESH, POSSIBLE COMPONENT SEPARATION  LAPAROSCOPIC ROBOTIC XI ASSISTED (CPT 35206) performed by Jennifer Dickson MD at [address] [facility]      sacroiliac bilateral 2012    PELVIC FRACTURE SURGERY      VENTRAL HERNIA REPAIR  2015       Review of Systems   Constitutional: Positive for fever. Respiratory: Positive for shortness of breath and wheezing. Negative for chest tightness. Cardiovascular: Negative for chest pain and palpitations. Gastrointestinal: Positive for diarrhea. Negative for abdominal pain. Neurological: Positive for headaches. All other systems reviewed and are negative. Physical Exam  Constitutional:       General: She is not in acute distress. Appearance: She is well-developed. HENT:      Head: Normocephalic and atraumatic. Eyes:      Conjunctiva/sclera: Conjunctivae normal.      Pupils: Pupils are equal, round, and reactive to light. Neck:      Thyroid: No thyromegaly. Cardiovascular:      Rate and Rhythm: Normal rate and regular rhythm. Pulmonary:      Effort: Pulmonary effort is normal. No respiratory distress. Breath sounds: Normal breath sounds. Abdominal:      General: There is no distension. Palpations: Abdomen is soft. Tenderness: There is no abdominal tenderness. There is no guarding or rebound. Musculoskeletal:         General: No tenderness. Normal range of motion. Cervical back: Normal range of motion. Skin:     General: Skin is warm and dry. Findings: No erythema. Neurological:      Mental Status: She is alert and oriented to person, place, and time. Cranial Nerves: No cranial nerve deficit. Coordination: Coordination normal.          Procedures     MDM     ED Course as of Jul 05 0839 Thu Jul 01, 2021 1810 EKG: Interpreted by me, Rate of 94, rightward axis, no ST elevation or depressions, no obvious T wave abnormalities. [SO]   2150 Pt wears oxygen at night at home.    [SO]   2154 Pt feeling better, will give additional fluids, pt has no nuchal rigidity and no neck pain. Discussed meningitis and LP but pt did not want to undergo this. She moves her head around without pain.    [SO]   2327 Patient having occasional coughing fits. She will be discharged home with medication for bronchitis, steroids for her lung problems as she is a chronic smoker. Also be given cough medicine. She understands return to the ED for any problems or any worsening. Covid is negative, flu is negative, no obvious pneumonia detected. With her smoking history, her oxygen usage, this is most likely bronchitis. She is asking to be discharged home. She was agreeable to stay for additional fluids and additional treatments but she does not want to stay any longer.     [SO]      ED Course User Index  [SO] Radha Galvan,       ED Course as of Jul 05 0839 Thu Jul 01, 2021 1810 EKG: Interpreted by me, Rate of 94, rightward axis, no ST elevation or depressions, no obvious T wave abnormalities.     [SO]   2150 Pt wears oxygen at night at home.    [SO]    Pt feeling better, will give additional fluids, pt has no nuchal rigidity and no neck pain. Discussed meningitis and LP but pt did not want to undergo this. She moves her head around without pain.    [SO]   232 Patient having occasional coughing fits. She will be discharged home with medication for bronchitis, steroids for her lung problems as she is a chronic smoker. Also be given cough medicine. She understands return to the ED for any problems or any worsening. Covid is negative, flu is negative, no obvious pneumonia detected. With her smoking history, her oxygen usage, this is most likely bronchitis. She is asking to be discharged home. She was agreeable to stay for additional fluids and additional treatments but she does not want to stay any longer.     [SO]      ED Course User Index  [SO] Mary Urbina, DO       --------------------------------------------- PAST HISTORY ---------------------------------------------  Past Medical History:  has a past medical history of Arthritis, Bursitis, Class 3 severe obesity due to excess calories with body mass index (BMI) of 40.0 to 44.9 in adult [facility]), COPD (chronic obstructive pulmonary disease) ([facility] [address] Diverticulitis, GERD (gastroesophageal reflux disease), Incisional hernia with obstruction but no gangrene, and Strep throat. Past Surgical History:  has a past surgical history that includes Hysterectomy; Cholecystectomy; Pelvic fracture surgery; colectomy (2016); Nerve Block; hernia repair; hernia repair (2015); ventral hernia repair (2015);  section; Forearm fracture surgery (Left); and hernia repair (N/A, 3/25/2021). Social History:  reports that she quit smoking about 4 months ago. Her smoking use included cigarettes. She has a 30.00 pack-year smoking history. She has never used smokeless tobacco. She reports current alcohol use. She reports previous drug use.  Drug: Marijuana. Family History: family history includes Cancer in her paternal grandfather; Diabetes in an other family member; Osteoarthritis in an other family member; Other in her father; Prostate Cancer in her father; Stroke in her brother. The patients home medications have been reviewed.     Allergies: Ibuprofen, Nsaids, and Morphine    -------------------------------------------------- RESULTS -------------------------------------------------  Labs:  Results for orders placed or performed during the hospital encounter of 07/01/21   Culture, Blood 1    Specimen: Blood   Result Value Ref Range    Blood Culture, Routine 24 Hours no growth    Culture, Blood 2    Specimen: Blood   Result Value Ref Range    Culture, Blood 2 24 Hours no growth    COVID-19, Rapid    Specimen: Nasopharyngeal Swab   Result Value Ref Range    SARS-CoV-2, NAAT Not Detected Not Detected   Rapid influenza A/B antigens    Specimen: Nares   Result Value Ref Range    Influenza A by PCR Not Detected Not Detected    Influenza B by PCR Not Detected Not Detected   CBC Auto Differential   Result Value Ref Range    WBC 10.3 4.5 - 11.5 E9/L    RBC 4.81 3.50 - 5.50 E12/L    Hemoglobin 15.3 11.5 - 15.5 g/dL    Hematocrit 43.8 34.0 - 48.0 %    MCV 91.1 80.0 - 99.9 fL    MCH 31.8 26.0 - 35.0 pg    MCHC 34.9 (H) 32.0 - 34.5 %    RDW 13.9 11.5 - 15.0 fL    Platelets 257 751 - 198 E9/L    MPV 10.3 7.0 - 12.0 fL    Neutrophils % 86.0 (H) 43.0 - 80.0 %    Immature Granulocytes % 0.4 0.0 - 5.0 %    Lymphocytes % 7.9 (L) 20.0 - 42.0 %    Monocytes % 5.3 2.0 - 12.0 %    Eosinophils % 0.1 0.0 - 6.0 %    Basophils % 0.3 0.0 - 2.0 %    Neutrophils Absolute 8.82 (H) 1.80 - 7.30 E9/L    Immature Granulocytes # 0.04 E9/L    Lymphocytes Absolute 0.81 (L) 1.50 - 4.00 E9/L    Monocytes Absolute 0.54 0.10 - 0.95 E9/L    Eosinophils Absolute 0.01 (L) 0.05 - 0.50 E9/L    Basophils Absolute 0.03 0.00 - 0.20 E9/L   Troponin   Result Value Ref Range    Troponin, High Sensitivity 9 0 - 9 ng/L   Lipase   Result Value Ref Range    Lipase 16 13 - 60 U/L   Lactate, Sepsis   Result Value Ref Range    Lactic Acid, Sepsis 1.3 0.5 - 1.9 mmol/L   Comprehensive metabolic panel   Result Value Ref Range    Sodium 130 (L) 132 - 146 mmol/L    Potassium 3.3 (L) 3.5 - 5.0 mmol/L    Chloride 93 (L) 98 - 107 mmol/L    CO2 21 (L) 22 - 29 mmol/L    Anion Gap 16 7 - 16 mmol/L    Glucose 106 (H) 74 - 99 mg/dL    BUN 9 6 - 20 mg/dL    CREATININE 1.0 0.5 - 1.0 mg/dL    GFR Non-African American 58 >=60 mL/min/1.73    GFR African American >60     Calcium 8.9 8.6 - 10.2 mg/dL    Total Protein 7.4 6.4 - 8.3 g/dL    Albumin 3.8 3.5 - 5.2 g/dL    Total Bilirubin 0.5 0.0 - 1.2 mg/dL    Alkaline Phosphatase 80 35 - 104 U/L    ALT 12 0 - 32 U/L    AST 15 0 - 31 U/L   EKG 12 Lead   Result Value Ref Range    Ventricular Rate 94 BPM    Atrial Rate 94 BPM    P-R Interval 118 ms    QRS Duration 72 ms    Q-T Interval 440 ms    QTc Calculation (Bazett) 550 ms    P Axis 57 degrees    R Axis 118 degrees    T Axis 54 degrees       Radiology:  XR CHEST PORTABLE   Final Result   No acute process. ------------------------- NURSING NOTES AND VITALS REVIEWED ---------------------------  Date / Time Roomed:  7/1/2021  4:52 PM  ED Bed Assignment:  LORA/LORA    The nursing notes within the ED encounter and vital signs as below have been reviewed. /80   Pulse 85   Temp 100.7 °F (38.2 °C) (Oral)   Resp 18   Ht 5' 3\" (1.6 m)   Wt 220 lb (99.8 kg)   SpO2 98%   BMI 38.97 kg/m²   Oxygen Saturation Interpretation: Normal      ------------------------------------------ PROGRESS NOTES ------------------------------------------  I have spoken with the patient and discussed todays results, in addition to providing specific details for the plan of care and counseling regarding the diagnosis and prognosis. Their questions are answered at this time and they are agreeable with the plan.  I discussed at length with them reasons for immediate return here for re evaluation. They will followup with primary care by calling their office tomorrow. --------------------------------- ADDITIONAL PROVIDER NOTES ---------------------------------  At this time the patient is without objective evidence of an acute process requiring hospitalization or inpatient management. They have remained hemodynamically stable throughout their entire ED visit and are stable for discharge with outpatient follow-up. The plan has been discussed in detail and they are aware of the specific conditions for emergent return, as well as the importance of follow-up. Discharge Medication List as of 7/1/2021 11:24 PM      START taking these medications    Details   azithromycin (ZITHROMAX Z-KEON) 250 MG tablet Take 1 tablet by mouth daily for 4 days, Disp-4 tablet, R-0Print             Diagnosis:  1. Bronchitis        Disposition:  Patient's disposition: Discharge to home  Patient's condition is stable.               Randall Blizzard, DO  07/05/21 0840

## 2021-07-02 NOTE — ED NOTES
Pt placed on 2L NC at this time. Pt SpO2 is 88% on RA. Per pt she wears 2L NC at home as needed at night. Dr. Lizezth Malik made aware.      Carole Basurto RN  07/01/21 9634

## 2021-07-06 LAB
BLOOD CULTURE, ROUTINE: NORMAL
CULTURE, BLOOD 2: NORMAL

## 2021-07-29 ENCOUNTER — APPOINTMENT (OUTPATIENT)
Dept: CT IMAGING | Age: 51
DRG: 720 | End: 2021-07-29
Payer: MEDICARE

## 2021-07-29 ENCOUNTER — HOSPITAL ENCOUNTER (INPATIENT)
Age: 51
LOS: 4 days | Discharge: HOME | DRG: 720 | End: 2021-08-02
Attending: EMERGENCY MEDICINE | Admitting: INTERNAL MEDICINE
Payer: MEDICARE

## 2021-07-29 DIAGNOSIS — J44.1 COPD EXACERBATION (HCC): ICD-10-CM

## 2021-07-29 DIAGNOSIS — G93.89 BRAIN MASS: Primary | ICD-10-CM

## 2021-07-29 DIAGNOSIS — R39.198 DIFFICULTY URINATING: ICD-10-CM

## 2021-07-29 LAB
ALBUMIN SERPL-MCNC: 3.6 G/DL (ref 3.5–5.2)
ALP BLD-CCNC: 82 U/L (ref 35–104)
ALT SERPL-CCNC: 12 U/L (ref 0–32)
ANION GAP SERPL CALCULATED.3IONS-SCNC: 14 MMOL/L (ref 7–16)
AST SERPL-CCNC: 15 U/L (ref 0–31)
BACTERIA: ABNORMAL /HPF
BASOPHILS ABSOLUTE: 0.04 E9/L (ref 0–0.2)
BASOPHILS RELATIVE PERCENT: 0.2 % (ref 0–2)
BILIRUB SERPL-MCNC: 0.7 MG/DL (ref 0–1.2)
BILIRUBIN URINE: ABNORMAL
BLOOD, URINE: ABNORMAL
BUN BLDV-MCNC: 12 MG/DL (ref 6–20)
CALCIUM SERPL-MCNC: 9.2 MG/DL (ref 8.6–10.2)
CHLORIDE BLD-SCNC: 101 MMOL/L (ref 98–107)
CLARITY: ABNORMAL
CO2: 20 MMOL/L (ref 22–29)
COLOR: ABNORMAL
CREAT SERPL-MCNC: 1.2 MG/DL (ref 0.5–1)
EOSINOPHILS ABSOLUTE: 0.05 E9/L (ref 0.05–0.5)
EOSINOPHILS RELATIVE PERCENT: 0.3 % (ref 0–6)
EPITHELIAL CELLS, UA: ABNORMAL /HPF
GFR AFRICAN AMERICAN: 57
GFR NON-AFRICAN AMERICAN: 47 ML/MIN/1.73
GLUCOSE BLD-MCNC: 116 MG/DL (ref 74–99)
GLUCOSE URINE: 100 MG/DL
HCT VFR BLD CALC: 42.7 % (ref 34–48)
HEMOGLOBIN: 14.3 G/DL (ref 11.5–15.5)
IMMATURE GRANULOCYTES #: 0.11 E9/L
IMMATURE GRANULOCYTES %: 0.6 % (ref 0–5)
KETONES, URINE: ABNORMAL MG/DL
LACTIC ACID: 1.4 MMOL/L (ref 0.5–2.2)
LEUKOCYTE ESTERASE, URINE: NEGATIVE
LIPASE: 17 U/L (ref 13–60)
LYMPHOCYTES ABSOLUTE: 0.41 E9/L (ref 1.5–4)
LYMPHOCYTES RELATIVE PERCENT: 2.4 % (ref 20–42)
MCH RBC QN AUTO: 30.4 PG (ref 26–35)
MCHC RBC AUTO-ENTMCNC: 33.5 % (ref 32–34.5)
MCV RBC AUTO: 90.9 FL (ref 80–99.9)
MONOCYTES ABSOLUTE: 0.45 E9/L (ref 0.1–0.95)
MONOCYTES RELATIVE PERCENT: 2.6 % (ref 2–12)
NEUTROPHILS ABSOLUTE: 16.36 E9/L (ref 1.8–7.3)
NEUTROPHILS RELATIVE PERCENT: 93.9 % (ref 43–80)
NITRITE, URINE: POSITIVE
PDW BLD-RTO: 14.5 FL (ref 11.5–15)
PH UA: 5 (ref 5–9)
PLATELET # BLD: 207 E9/L (ref 130–450)
PMV BLD AUTO: 9.3 FL (ref 7–12)
POLYCHROMASIA: ABNORMAL
POTASSIUM REFLEX MAGNESIUM: 3.9 MMOL/L (ref 3.5–5)
PROTEIN UA: 100 MG/DL
RBC # BLD: 4.7 E12/L (ref 3.5–5.5)
RBC UA: ABNORMAL /HPF (ref 0–2)
SODIUM BLD-SCNC: 135 MMOL/L (ref 132–146)
SPECIFIC GRAVITY UA: >=1.03 (ref 1–1.03)
TOTAL PROTEIN: 6.7 G/DL (ref 6.4–8.3)
UROBILINOGEN, URINE: 1 E.U./DL
WBC # BLD: 17.4 E9/L (ref 4.5–11.5)
WBC UA: ABNORMAL /HPF (ref 0–5)

## 2021-07-29 PROCEDURE — 70450 CT HEAD/BRAIN W/O DYE: CPT

## 2021-07-29 PROCEDURE — 85025 COMPLETE CBC W/AUTO DIFF WBC: CPT

## 2021-07-29 PROCEDURE — 96374 THER/PROPH/DIAG INJ IV PUSH: CPT

## 2021-07-29 PROCEDURE — 83690 ASSAY OF LIPASE: CPT

## 2021-07-29 PROCEDURE — 96375 TX/PRO/DX INJ NEW DRUG ADDON: CPT

## 2021-07-29 PROCEDURE — 99283 EMERGENCY DEPT VISIT LOW MDM: CPT

## 2021-07-29 PROCEDURE — 6360000002 HC RX W HCPCS: Performed by: STUDENT IN AN ORGANIZED HEALTH CARE EDUCATION/TRAINING PROGRAM

## 2021-07-29 PROCEDURE — 2580000003 HC RX 258: Performed by: STUDENT IN AN ORGANIZED HEALTH CARE EDUCATION/TRAINING PROGRAM

## 2021-07-29 PROCEDURE — 2060000000 HC ICU INTERMEDIATE R&B

## 2021-07-29 PROCEDURE — 83605 ASSAY OF LACTIC ACID: CPT

## 2021-07-29 PROCEDURE — 6360000004 HC RX CONTRAST MEDICATION: Performed by: RADIOLOGY

## 2021-07-29 PROCEDURE — 80307 DRUG TEST PRSMV CHEM ANLYZR: CPT

## 2021-07-29 PROCEDURE — 6360000002 HC RX W HCPCS: Performed by: EMERGENCY MEDICINE

## 2021-07-29 PROCEDURE — 74177 CT ABD & PELVIS W/CONTRAST: CPT

## 2021-07-29 PROCEDURE — 81001 URINALYSIS AUTO W/SCOPE: CPT

## 2021-07-29 PROCEDURE — 80053 COMPREHEN METABOLIC PANEL: CPT

## 2021-07-29 PROCEDURE — 87088 URINE BACTERIA CULTURE: CPT

## 2021-07-29 PROCEDURE — 96376 TX/PRO/DX INJ SAME DRUG ADON: CPT

## 2021-07-29 RX ORDER — FENTANYL CITRATE 50 UG/ML
50 INJECTION, SOLUTION INTRAMUSCULAR; INTRAVENOUS ONCE
Status: COMPLETED | OUTPATIENT
Start: 2021-07-29 | End: 2021-07-29

## 2021-07-29 RX ORDER — FENTANYL CITRATE 50 UG/ML
50 INJECTION, SOLUTION INTRAMUSCULAR; INTRAVENOUS
Status: DISCONTINUED | OUTPATIENT
Start: 2021-07-29 | End: 2021-07-29 | Stop reason: SDUPTHER

## 2021-07-29 RX ORDER — LORAZEPAM 2 MG/ML
0.5 INJECTION INTRAMUSCULAR ONCE
Status: COMPLETED | OUTPATIENT
Start: 2021-07-29 | End: 2021-07-29

## 2021-07-29 RX ORDER — 0.9 % SODIUM CHLORIDE 0.9 %
1000 INTRAVENOUS SOLUTION INTRAVENOUS ONCE
Status: COMPLETED | OUTPATIENT
Start: 2021-07-29 | End: 2021-07-29

## 2021-07-29 RX ORDER — FENTANYL CITRATE 50 UG/ML
25 INJECTION, SOLUTION INTRAMUSCULAR; INTRAVENOUS
Status: DISCONTINUED | OUTPATIENT
Start: 2021-07-29 | End: 2021-07-30

## 2021-07-29 RX ORDER — 0.9 % SODIUM CHLORIDE 0.9 %
500 INTRAVENOUS SOLUTION INTRAVENOUS ONCE
Status: DISCONTINUED | OUTPATIENT
Start: 2021-07-29 | End: 2021-07-29 | Stop reason: SDUPTHER

## 2021-07-29 RX ORDER — SODIUM CHLORIDE 9 MG/ML
INJECTION, SOLUTION INTRAVENOUS ONCE
Status: DISCONTINUED | OUTPATIENT
Start: 2021-07-29 | End: 2021-07-29

## 2021-07-29 RX ORDER — ONDANSETRON 2 MG/ML
4 INJECTION INTRAMUSCULAR; INTRAVENOUS ONCE
Status: COMPLETED | OUTPATIENT
Start: 2021-07-29 | End: 2021-07-29

## 2021-07-29 RX ORDER — ONDANSETRON 2 MG/ML
4 INJECTION INTRAMUSCULAR; INTRAVENOUS EVERY 6 HOURS PRN
Status: DISCONTINUED | OUTPATIENT
Start: 2021-07-29 | End: 2021-07-29 | Stop reason: SDUPTHER

## 2021-07-29 RX ADMIN — FENTANYL CITRATE 25 MCG: 50 INJECTION, SOLUTION INTRAMUSCULAR; INTRAVENOUS at 20:11

## 2021-07-29 RX ADMIN — FENTANYL CITRATE 25 MCG: 50 INJECTION, SOLUTION INTRAMUSCULAR; INTRAVENOUS at 23:18

## 2021-07-29 RX ADMIN — SODIUM CHLORIDE 1000 ML: 9 INJECTION, SOLUTION INTRAVENOUS at 19:05

## 2021-07-29 RX ADMIN — LORAZEPAM 0.5 MG: 2 INJECTION INTRAMUSCULAR; INTRAVENOUS at 20:10

## 2021-07-29 RX ADMIN — FENTANYL CITRATE 50 MCG: 50 INJECTION, SOLUTION INTRAMUSCULAR; INTRAVENOUS at 19:06

## 2021-07-29 RX ADMIN — ONDANSETRON 4 MG: 2 INJECTION INTRAMUSCULAR; INTRAVENOUS at 19:06

## 2021-07-29 RX ADMIN — IOPAMIDOL 75 ML: 755 INJECTION, SOLUTION INTRAVENOUS at 21:19

## 2021-07-29 ASSESSMENT — ENCOUNTER SYMPTOMS
ABDOMINAL PAIN: 1
VOMITING: 0
EYE DISCHARGE: 0
SORE THROAT: 0
ABDOMINAL DISTENTION: 0
EYE PAIN: 0
COUGH: 0
DIARRHEA: 0
NAUSEA: 1
WHEEZING: 0
BACK PAIN: 0
SHORTNESS OF BREATH: 0
EYE REDNESS: 0
SINUS PRESSURE: 0

## 2021-07-29 ASSESSMENT — PAIN DESCRIPTION - PROGRESSION: CLINICAL_PROGRESSION: GRADUALLY WORSENING

## 2021-07-29 ASSESSMENT — PAIN SCALES - GENERAL
PAINLEVEL_OUTOF10: 3
PAINLEVEL_OUTOF10: 10

## 2021-07-29 ASSESSMENT — PAIN DESCRIPTION - ORIENTATION: ORIENTATION: LOWER

## 2021-07-29 ASSESSMENT — PAIN DESCRIPTION - LOCATION: LOCATION: BACK;ABDOMEN

## 2021-07-29 NOTE — ED PROVIDER NOTES
Chief Complaint   Patient presents with    Urinary Retention     UNABLE TO VOID X 2 DAYS       Patient is a 59-year-old female presents today for difficulty urinating, abdominal pain and discomfort. Per family she has not been acting her normal self the past day. On arrival pain is not made better or worse by anything specific. She endorses generalized abdominal pain. She appears uncomfortable and is writhing around in bed. Has not tried any medication for the symptoms. Denies any fall, trauma or injury. No history of kidney stones or kidney infections. Per family she is also not acting right, however is alert and oriented x3, is able to answer questions appropriately. The history is provided by the patient and a parent. No  was used. Review of Systems   Constitutional: Negative for chills and fever. HENT: Negative for ear pain, sinus pressure and sore throat. Eyes: Negative for pain, discharge and redness. Respiratory: Negative for cough, shortness of breath and wheezing. Cardiovascular: Negative for chest pain. Gastrointestinal: Positive for abdominal pain and nausea. Negative for abdominal distention, diarrhea and vomiting. Genitourinary: Positive for difficulty urinating. Negative for dysuria and frequency. Musculoskeletal: Negative for arthralgias and back pain. Skin: Negative for rash and wound. Neurological: Negative for weakness and headaches. Hematological: Negative for adenopathy. Psychiatric/Behavioral: Positive for behavioral problems. All other systems reviewed and are negative. Physical Exam  Vitals and nursing note reviewed. Constitutional:       General: She is not in acute distress. Appearance: Normal appearance. She is well-developed. She is not ill-appearing. Comments: Uncomfortable appearing, writhing around in bed   HENT:      Head: Normocephalic and atraumatic.    Eyes:      Pupils: Pupils are equal, round, and reactive to light. Cardiovascular:      Rate and Rhythm: Normal rate and regular rhythm. Pulses: Normal pulses. Heart sounds: Normal heart sounds. Pulmonary:      Effort: Pulmonary effort is normal. No respiratory distress. Breath sounds: Normal breath sounds. No wheezing or rales. Abdominal:      General: Bowel sounds are normal.      Palpations: Abdomen is soft. Tenderness: There is no abdominal tenderness. There is no guarding or rebound. Musculoskeletal:      Cervical back: Normal range of motion and neck supple. Skin:     General: Skin is warm and dry. Capillary Refill: Capillary refill takes less than 2 seconds. Neurological:      General: No focal deficit present. Mental Status: She is alert and oriented to person, place, and time. Cranial Nerves: No cranial nerve deficit.       Coordination: Coordination normal.      Comments: Muscle strength 5/5 in upper and lower extremities bilaterally  Sensation intact to light touch in upper and lower extremities bilaterally  Alert and oriented x3            Procedures     Labs Reviewed   CBC WITH AUTO DIFFERENTIAL - Abnormal; Notable for the following components:       Result Value    WBC 17.4 (*)     Neutrophils % 93.9 (*)     Lymphocytes % 2.4 (*)     Neutrophils Absolute 16.36 (*)     Lymphocytes Absolute 0.41 (*)     All other components within normal limits   COMPREHENSIVE METABOLIC PANEL W/ REFLEX TO MG FOR LOW K - Abnormal; Notable for the following components:    CO2 20 (*)     Glucose 116 (*)     CREATININE 1.2 (*)     All other components within normal limits   URINALYSIS - Abnormal; Notable for the following components:    Color, UA ORANGE (*)     Clarity, UA CLOUDY (*)     Glucose, Ur 100 (*)     Bilirubin Urine MODERATE (*)     Ketones, Urine TRACE (*)     Blood, Urine LARGE (*)     Protein,  (*)     Nitrite, Urine POSITIVE (*)     All other components within normal limits   MICROSCOPIC URINALYSIS - Abnormal; Notable for the following components:    Bacteria, UA MANY (*)     All other components within normal limits   CULTURE, URINE   LACTIC ACID, PLASMA   LIPASE     CT ABDOMEN PELVIS W IV CONTRAST Additional Contrast? None   Final Result   1. No acute abnormality is seen in the abdomen or the pelvis. 2. Compared to 05/07/2021, the size of the liver has mildly decreased. The   extent of appendix steatosis as significantly decreased. 3. The urinary bladder is decompressed by a Keane catheter and is therefore   suboptimally visualized. It is otherwise grossly unremarkable. 4. 3 mm left lower lobe pulmonary nodule. If the patient is at a high risk   for malignancy, per the recommendations of the Fleischner society, follow-up   CT of the chest may be obtained in 12 months. Otherwise, no future follow-up   is needed. CT Head WO Contrast   Final Result   1. No acute intracranial abnormality. 2. 1.6 cm calcified meningioma along the high left frontal convexity with   mild mass effect on the underlying brain. No edema. CT CHEST W CONTRAST    (Results Pending)   MRI BRAIN W CONTRAST    (Results Pending)         MDM  Number of Diagnoses or Management Options  Diagnosis management comments: Patient is a 51-year-old female presents today for  abnormal behavior, abdominal pain, difficulty urinating. Physical exam shows abdomen is soft, no guarding rebound. Keane catheter was placed. She has no neurologic deficits on examination. As patient was having some behavior issues we did decide to CAT scan her head as well as CT of the abdomen to evaluate for possible kidney stone. CT abdomen shows no intra-abdominal processes related to her symptoms, they do no incidental lung nodules. CT head does show patient has a calcified intracranial mass. Neurosurgery was consulted, they do recommend MRI for further evaluation.   As patient has remained hemodynamically stable, and we have the capability of MRI they are comfortable with patient being admitted here for further work-up. Family was updated about this. She has remained hemodynamically stable. Did not require any acute intervention at this time. Amount and/or Complexity of Data Reviewed  Clinical lab tests: reviewed  Tests in the radiology section of CPT®: reviewed             ED Course as of 2309   Thu  Spoke with Dr. Shayy Carcamo, Neurosurgery who recommends CT of the chest as well as MRI of the brain    [JH]      ED Course User Index  [JH] Vidal Mcdonald, DO       --------------------------------------------- PAST HISTORY ---------------------------------------------  Past Medical History:  has a past medical history of Arthritis, Bursitis, Class 3 severe obesity due to excess calories with body mass index (BMI) of 40.0 to 44.9 in adult [facility]), COPD (chronic obstructive pulmonary disease) ([facility] [address] Diverticulitis, GERD (gastroesophageal reflux disease), Incisional hernia with obstruction but no gangrene, and Strep throat. Past Surgical History:  has a past surgical history that includes Hysterectomy; Cholecystectomy; Pelvic fracture surgery; colectomy (2016); Nerve Block; hernia repair; hernia repair (2015); ventral hernia repair (2015);  section; Forearm fracture surgery (Left); and hernia repair (N/A, 3/25/2021). Social History:  reports that she quit smoking about 4 months ago. Her smoking use included cigarettes. She has a 30.00 pack-year smoking history. She has never used smokeless tobacco. She reports current alcohol use. She reports previous drug use. Drug: Marijuana. Family History: family history includes Cancer in her paternal grandfather; Diabetes in an other family member; Osteoarthritis in an other family member; Other in her father; Prostate Cancer in her father; Stroke in her brother. The patients home medications have been reviewed.     Allergies: Ibuprofen, Nsaids, and Morphine    -------------------------------------------------- RESULTS -------------------------------------------------    LABS:  Results for orders placed or performed during the hospital encounter of 07/29/21   CBC Auto Differential   Result Value Ref Range    WBC 17.4 (H) 4.5 - 11.5 E9/L    RBC 4.70 3.50 - 5.50 E12/L    Hemoglobin 14.3 11.5 - 15.5 g/dL    Hematocrit 42.7 34.0 - 48.0 %    MCV 90.9 80.0 - 99.9 fL    MCH 30.4 26.0 - 35.0 pg    MCHC 33.5 32.0 - 34.5 %    RDW 14.5 11.5 - 15.0 fL    Platelets 283 712 - 061 E9/L    MPV 9.3 7.0 - 12.0 fL    Neutrophils % 93.9 (H) 43.0 - 80.0 %    Immature Granulocytes % 0.6 0.0 - 5.0 %    Lymphocytes % 2.4 (L) 20.0 - 42.0 %    Monocytes % 2.6 2.0 - 12.0 %    Eosinophils % 0.3 0.0 - 6.0 %    Basophils % 0.2 0.0 - 2.0 %    Neutrophils Absolute 16.36 (H) 1.80 - 7.30 E9/L    Immature Granulocytes # 0.11 E9/L    Lymphocytes Absolute 0.41 (L) 1.50 - 4.00 E9/L    Monocytes Absolute 0.45 0.10 - 0.95 E9/L    Eosinophils Absolute 0.05 0.05 - 0.50 E9/L    Basophils Absolute 0.04 0.00 - 0.20 E9/L    Polychromasia 1+    Comprehensive Metabolic Panel w/ Reflex to MG   Result Value Ref Range    Sodium 135 132 - 146 mmol/L    Potassium reflex Magnesium 3.9 3.5 - 5.0 mmol/L    Chloride 101 98 - 107 mmol/L    CO2 20 (L) 22 - 29 mmol/L    Anion Gap 14 7 - 16 mmol/L    Glucose 116 (H) 74 - 99 mg/dL    BUN 12 6 - 20 mg/dL    CREATININE 1.2 (H) 0.5 - 1.0 mg/dL    GFR Non-African American 47 >=60 mL/min/1.73    GFR African American 57     Calcium 9.2 8.6 - 10.2 mg/dL    Total Protein 6.7 6.4 - 8.3 g/dL    Albumin 3.6 3.5 - 5.2 g/dL    Total Bilirubin 0.7 0.0 - 1.2 mg/dL    Alkaline Phosphatase 82 35 - 104 U/L    ALT 12 0 - 32 U/L    AST 15 0 - 31 U/L   Lactic Acid, Plasma   Result Value Ref Range    Lactic Acid 1.4 0.5 - 2.2 mmol/L   Urinalysis, reflex to microscopic   Result Value Ref Range    Color, UA ORANGE (A) Straw/Yellow    Clarity, UA CLOUDY (A) Clear    Glucose, Ur 100 (A) Negative mg/dL    Bilirubin Urine MODERATE (A) Negative    Ketones, Urine TRACE (A) Negative mg/dL    Specific Gravity, UA >=1.030 1.005 - 1.030    Blood, Urine LARGE (A) Negative    pH, UA 5.0 5.0 - 9.0    Protein,  (A) Negative mg/dL    Urobilinogen, Urine 1.0 <2.0 E.U./dL    Nitrite, Urine POSITIVE (A) Negative    Leukocyte Esterase, Urine Negative Negative   Lipase   Result Value Ref Range    Lipase 17 13 - 60 U/L   Microscopic Urinalysis   Result Value Ref Range    WBC, UA 1-3 0 - 5 /HPF    RBC, UA 2-5 0 - 2 /HPF    Epithelial Cells, UA MODERATE /HPF    Bacteria, UA MANY (A) None Seen /HPF       RADIOLOGY:  CT ABDOMEN PELVIS W IV CONTRAST Additional Contrast? None   Final Result   1. No acute abnormality is seen in the abdomen or the pelvis. 2. Compared to 05/07/2021, the size of the liver has mildly decreased. The   extent of appendix steatosis as significantly decreased. 3. The urinary bladder is decompressed by a Keane catheter and is therefore   suboptimally visualized. It is otherwise grossly unremarkable. 4. 3 mm left lower lobe pulmonary nodule. If the patient is at a high risk   for malignancy, per the recommendations of the Fleischner society, follow-up   CT of the chest may be obtained in 12 months. Otherwise, no future follow-up   is needed. CT Head WO Contrast   Final Result   1. No acute intracranial abnormality. 2. 1.6 cm calcified meningioma along the high left frontal convexity with   mild mass effect on the underlying brain. No edema. CT CHEST W CONTRAST    (Results Pending)   MRI BRAIN W CONTRAST    (Results Pending)           ------------------------- NURSING NOTES AND VITALS REVIEWED ---------------------------  Date / Time Roomed:  7/29/2021  6:34 PM  ED Bed Assignment:  16/16    The nursing notes within the ED encounter and vital signs as below have been reviewed.      Patient Vitals for the past 24 hrs:   BP Temp Pulse Resp SpO2 Height Weight   07/29/21 2058 124/60 -- 84 24 92 % -- --   07/29/21 1820 122/79 97.7 °F (36.5 °C) -- -- -- -- --   07/29/21 1817 -- -- 122 26 (!) 86 % 5' 2\" (1.575 m) 186 lb (84.4 kg)       Oxygen Saturation Interpretation: Normal    ------------------------------------------ PROGRESS NOTES ------------------------------------------    Counseling:  I have spoken with the patient and discussed todays results, in addition to providing specific details for the plan of care and counseling regarding the diagnosis and prognosis. Their questions are answered at this time and they are agreeable with the plan of admission.    --------------------------------- ADDITIONAL PROVIDER NOTES ---------------------------------  Consultations:  Spoke with Dr. Lily Darper. Discussed case. They will admit the patient. This patient's ED course included: a personal history and physicial examination    This patient has remained hemodynamically stable during their ED course. Medications   fentaNYL (SUBLIMAZE) injection 25 mcg (25 mcg Intravenous Given 7/29/21 2011)   fentaNYL (SUBLIMAZE) injection 50 mcg (50 mcg Intravenous Given 7/29/21 1906)   ondansetron (ZOFRAN) injection 4 mg (4 mg Intravenous Given 7/29/21 1906)   0.9 % sodium chloride bolus (0 mLs Intravenous Stopped 7/29/21 2012)   LORazepam (ATIVAN) injection 0.5 mg (0.5 mg Intravenous Given 7/29/21 2010)   iopamidol (ISOVUE-370) 76 % injection 75 mL (75 mLs Intravenous Given 7/29/21 2119)         Diagnosis:  1. Brain mass    2. Difficulty urinating        Disposition:  Patient's disposition: Admit to telemetry  Patient's condition is stable. Patient was discussed with the with the  Resident. Please refer the resident's note for final disposition and medical decision making. Patient presents evaluation of altered mental status lower abdominal pain difficulty urinating. Reports are going on for several weeks and worsened over the past few days.   Is been no seizure activity no change in medication no change in diet no history of drug or alcohol abuse and no trauma. General Appearance:   Alert, cooperative, no distress, appears stated age  Head:   Normocephalic, without obvious abnormality, atraumatic  Eyes:   PERRL, conjunctiva/corneas clear,   EOM's intact, fundi    benign, both eyesEars:   Normal TM's and external ear canals, both earsNose:    Nares normal, septum midline, mucosa normal, no drainage    or sinus tenderness  Throat:  Lips, mucosa, and tongue normal; teeth and gums normal  Neck:  Supple, symmetrical, trachea midline, no adenopathy;    thyroid:  no enlargement/tenderness/nodules; no carotid   bruit or JVD  Back:    Symmetric, no curvature, ROM normal, no CVA tendernessLungs:    Clear to auscultation bilaterally, respirations unlabored  Chest Wall:   No tenderness or deformity Heart:   Regular rate and rhythm, S1 and S2 normal, no murmur, rub   or gallop  Abdomen:    Soft, mild lower abdominal tenderness, bowel sounds active all four quadrants, no masses, no organomegaly  Extremities:  Extremities normal, atraumatic, no cyanosis or edema Pulses:  2+ and symmetric all extremities  Skin:  Skin color, texture, turgor normal, no rashes or lesionsLymph nodes:  Cervical, supraclavicular, and axillary nodes normal  Neurologic:  CNII-XII intact, normal strength, sensation and reflexes throughout    Assessment:  Brain mass  Urinary tension    Plan:  Admit    MD Vikas Wilson,   Resident  07/29/21 [place] Markos Mccann MD  09/08/21 [id number]

## 2021-07-30 ENCOUNTER — APPOINTMENT (OUTPATIENT)
Dept: MRI IMAGING | Age: 51
DRG: 720 | End: 2021-07-30
Payer: MEDICARE

## 2021-07-30 ENCOUNTER — APPOINTMENT (OUTPATIENT)
Dept: GENERAL RADIOLOGY | Age: 51
DRG: 720 | End: 2021-07-30
Payer: MEDICARE

## 2021-07-30 PROBLEM — R41.82 ALTERED MENTAL STATUS: Status: ACTIVE | Noted: 2021-07-30

## 2021-07-30 PROBLEM — A41.9 SEPSIS (HCC): Status: ACTIVE | Noted: 2021-07-30

## 2021-07-30 LAB
ALBUMIN SERPL-MCNC: 2.9 G/DL (ref 3.5–5.2)
ALP BLD-CCNC: 64 U/L (ref 35–104)
ALT SERPL-CCNC: 9 U/L (ref 0–32)
AMMONIA: 24 UMOL/L (ref 11–51)
AMPHETAMINE SCREEN, URINE: NOT DETECTED
ANION GAP SERPL CALCULATED.3IONS-SCNC: 9 MMOL/L (ref 7–16)
AST SERPL-CCNC: 10 U/L (ref 0–31)
BARBITURATE SCREEN URINE: NOT DETECTED
BENZODIAZEPINE SCREEN, URINE: NOT DETECTED
BILIRUB SERPL-MCNC: 0.6 MG/DL (ref 0–1.2)
BUN BLDV-MCNC: 9 MG/DL (ref 6–20)
CALCIUM SERPL-MCNC: 8.3 MG/DL (ref 8.6–10.2)
CANNABINOID SCREEN URINE: NOT DETECTED
CHLORIDE BLD-SCNC: 106 MMOL/L (ref 98–107)
CO2: 24 MMOL/L (ref 22–29)
COCAINE METABOLITE SCREEN URINE: POSITIVE
CREAT SERPL-MCNC: 1.1 MG/DL (ref 0.5–1)
D DIMER: <200 NG/ML DDU
FENTANYL SCREEN, URINE: POSITIVE
FOLATE: 2.9 NG/ML (ref 4.8–24.2)
GFR AFRICAN AMERICAN: >60
GFR NON-AFRICAN AMERICAN: 52 ML/MIN/1.73
GLUCOSE BLD-MCNC: 86 MG/DL (ref 74–99)
LV EF: 70 %
LVEF MODALITY: NORMAL
Lab: ABNORMAL
MAGNESIUM: 2.1 MG/DL (ref 1.6–2.6)
METHADONE SCREEN, URINE: NOT DETECTED
OPIATE SCREEN URINE: NOT DETECTED
OXYCODONE URINE: NOT DETECTED
PHENCYCLIDINE SCREEN URINE: NOT DETECTED
PHOSPHORUS: 3.5 MG/DL (ref 2.5–4.5)
POTASSIUM SERPL-SCNC: 3.3 MMOL/L (ref 3.5–5)
SARS-COV-2, NAAT: NOT DETECTED
SODIUM BLD-SCNC: 139 MMOL/L (ref 132–146)
T4 FREE: 1 NG/DL (ref 0.93–1.7)
TOTAL PROTEIN: 5.3 G/DL (ref 6.4–8.3)
TSH SERPL DL<=0.05 MIU/L-ACNC: 2.33 UIU/ML (ref 0.27–4.2)
VITAMIN B-12: 355 PG/ML (ref 211–946)

## 2021-07-30 PROCEDURE — 84100 ASSAY OF PHOSPHORUS: CPT

## 2021-07-30 PROCEDURE — 6360000004 HC RX CONTRAST MEDICATION: Performed by: RADIOLOGY

## 2021-07-30 PROCEDURE — 71045 X-RAY EXAM CHEST 1 VIEW: CPT

## 2021-07-30 PROCEDURE — 6360000002 HC RX W HCPCS: Performed by: INTERNAL MEDICINE

## 2021-07-30 PROCEDURE — 85378 FIBRIN DEGRADE SEMIQUANT: CPT

## 2021-07-30 PROCEDURE — A9577 INJ MULTIHANCE: HCPCS | Performed by: RADIOLOGY

## 2021-07-30 PROCEDURE — 70553 MRI BRAIN STEM W/O & W/DYE: CPT

## 2021-07-30 PROCEDURE — 82746 ASSAY OF FOLIC ACID SERUM: CPT

## 2021-07-30 PROCEDURE — 84443 ASSAY THYROID STIM HORMONE: CPT

## 2021-07-30 PROCEDURE — 82140 ASSAY OF AMMONIA: CPT

## 2021-07-30 PROCEDURE — 6370000000 HC RX 637 (ALT 250 FOR IP): Performed by: INTERNAL MEDICINE

## 2021-07-30 PROCEDURE — 84439 ASSAY OF FREE THYROXINE: CPT

## 2021-07-30 PROCEDURE — 2060000000 HC ICU INTERMEDIATE R&B

## 2021-07-30 PROCEDURE — 93306 TTE W/DOPPLER COMPLETE: CPT

## 2021-07-30 PROCEDURE — 36415 COLL VENOUS BLD VENIPUNCTURE: CPT

## 2021-07-30 PROCEDURE — 82607 VITAMIN B-12: CPT

## 2021-07-30 PROCEDURE — 87040 BLOOD CULTURE FOR BACTERIA: CPT

## 2021-07-30 PROCEDURE — 2580000003 HC RX 258: Performed by: INTERNAL MEDICINE

## 2021-07-30 PROCEDURE — 87635 SARS-COV-2 COVID-19 AMP PRB: CPT

## 2021-07-30 PROCEDURE — 80053 COMPREHEN METABOLIC PANEL: CPT

## 2021-07-30 PROCEDURE — 83735 ASSAY OF MAGNESIUM: CPT

## 2021-07-30 PROCEDURE — 94640 AIRWAY INHALATION TREATMENT: CPT

## 2021-07-30 RX ORDER — SODIUM CHLORIDE 0.9 % (FLUSH) 0.9 %
5-40 SYRINGE (ML) INJECTION EVERY 12 HOURS SCHEDULED
Status: DISCONTINUED | OUTPATIENT
Start: 2021-07-30 | End: 2021-08-02 | Stop reason: HOSPADM

## 2021-07-30 RX ORDER — ACETAMINOPHEN 650 MG/1
650 SUPPOSITORY RECTAL EVERY 6 HOURS PRN
Status: DISCONTINUED | OUTPATIENT
Start: 2021-07-30 | End: 2021-08-02 | Stop reason: HOSPADM

## 2021-07-30 RX ORDER — HYDROXYZINE HYDROCHLORIDE 50 MG/ML
25 INJECTION, SOLUTION INTRAMUSCULAR ONCE
Status: DISCONTINUED | OUTPATIENT
Start: 2021-07-30 | End: 2021-07-30

## 2021-07-30 RX ORDER — HYDROXYZINE HYDROCHLORIDE 50 MG/ML
50 INJECTION, SOLUTION INTRAMUSCULAR ONCE
Status: COMPLETED | OUTPATIENT
Start: 2021-07-30 | End: 2021-07-30

## 2021-07-30 RX ORDER — IPRATROPIUM BROMIDE AND ALBUTEROL SULFATE 2.5; .5 MG/3ML; MG/3ML
1 SOLUTION RESPIRATORY (INHALATION)
Status: DISCONTINUED | OUTPATIENT
Start: 2021-07-30 | End: 2021-07-31

## 2021-07-30 RX ORDER — SODIUM CHLORIDE 0.9 % (FLUSH) 0.9 %
5-40 SYRINGE (ML) INJECTION PRN
Status: DISCONTINUED | OUTPATIENT
Start: 2021-07-30 | End: 2021-08-02 | Stop reason: HOSPADM

## 2021-07-30 RX ORDER — ACETAMINOPHEN 325 MG/1
650 TABLET ORAL EVERY 6 HOURS PRN
Status: DISCONTINUED | OUTPATIENT
Start: 2021-07-30 | End: 2021-08-02 | Stop reason: HOSPADM

## 2021-07-30 RX ORDER — HYDROXYZINE HYDROCHLORIDE 25 MG/1
50 TABLET, FILM COATED ORAL NIGHTLY PRN
Status: DISCONTINUED | OUTPATIENT
Start: 2021-07-30 | End: 2021-08-02 | Stop reason: HOSPADM

## 2021-07-30 RX ORDER — PROCHLORPERAZINE EDISYLATE 5 MG/ML
10 INJECTION INTRAMUSCULAR; INTRAVENOUS EVERY 6 HOURS PRN
Status: DISCONTINUED | OUTPATIENT
Start: 2021-07-30 | End: 2021-08-02 | Stop reason: HOSPADM

## 2021-07-30 RX ORDER — SODIUM CHLORIDE 9 MG/ML
25 INJECTION, SOLUTION INTRAVENOUS PRN
Status: DISCONTINUED | OUTPATIENT
Start: 2021-07-30 | End: 2021-08-02 | Stop reason: HOSPADM

## 2021-07-30 RX ORDER — BUDESONIDE 0.5 MG/2ML
0.5 INHALANT ORAL 2 TIMES DAILY
Status: DISCONTINUED | OUTPATIENT
Start: 2021-07-30 | End: 2021-08-02 | Stop reason: HOSPADM

## 2021-07-30 RX ORDER — LEVOTHYROXINE SODIUM 0.05 MG/1
50 TABLET ORAL DAILY
Status: DISCONTINUED | OUTPATIENT
Start: 2021-07-30 | End: 2021-08-02 | Stop reason: HOSPADM

## 2021-07-30 RX ORDER — ALBUTEROL SULFATE 90 UG/1
2 AEROSOL, METERED RESPIRATORY (INHALATION) EVERY 6 HOURS PRN
Status: DISCONTINUED | OUTPATIENT
Start: 2021-07-30 | End: 2021-07-30 | Stop reason: CLARIF

## 2021-07-30 RX ORDER — SODIUM CHLORIDE 9 MG/ML
INJECTION, SOLUTION INTRAVENOUS CONTINUOUS
Status: DISCONTINUED | OUTPATIENT
Start: 2021-07-30 | End: 2021-07-31

## 2021-07-30 RX ORDER — DEXAMETHASONE SODIUM PHOSPHATE 4 MG/ML
6 INJECTION, SOLUTION INTRA-ARTICULAR; INTRALESIONAL; INTRAMUSCULAR; INTRAVENOUS; SOFT TISSUE EVERY 24 HOURS
Status: DISCONTINUED | OUTPATIENT
Start: 2021-07-30 | End: 2021-07-30

## 2021-07-30 RX ORDER — LORAZEPAM 2 MG/ML
0.5 INJECTION INTRAMUSCULAR EVERY 4 HOURS PRN
Status: DISCONTINUED | OUTPATIENT
Start: 2021-07-30 | End: 2021-08-01

## 2021-07-30 RX ORDER — POLYETHYLENE GLYCOL 3350 17 G/17G
17 POWDER, FOR SOLUTION ORAL DAILY PRN
Status: DISCONTINUED | OUTPATIENT
Start: 2021-07-30 | End: 2021-08-02 | Stop reason: HOSPADM

## 2021-07-30 RX ORDER — PANTOPRAZOLE SODIUM 40 MG/1
40 TABLET, DELAYED RELEASE ORAL
Status: DISCONTINUED | OUTPATIENT
Start: 2021-07-30 | End: 2021-08-02 | Stop reason: HOSPADM

## 2021-07-30 RX ORDER — DEXAMETHASONE SODIUM PHOSPHATE 4 MG/ML
6 INJECTION, SOLUTION INTRA-ARTICULAR; INTRALESIONAL; INTRAMUSCULAR; INTRAVENOUS; SOFT TISSUE ONCE
Status: COMPLETED | OUTPATIENT
Start: 2021-07-30 | End: 2021-07-30

## 2021-07-30 RX ORDER — ALBUTEROL SULFATE 2.5 MG/3ML
2.5 SOLUTION RESPIRATORY (INHALATION) EVERY 6 HOURS PRN
Status: DISCONTINUED | OUTPATIENT
Start: 2021-07-30 | End: 2021-07-31

## 2021-07-30 RX ORDER — LORAZEPAM 2 MG/ML
1 INJECTION INTRAMUSCULAR EVERY 4 HOURS PRN
Status: DISCONTINUED | OUTPATIENT
Start: 2021-07-30 | End: 2021-07-30

## 2021-07-30 RX ORDER — FOLIC ACID 1 MG/1
1 TABLET ORAL DAILY
Status: DISCONTINUED | OUTPATIENT
Start: 2021-07-30 | End: 2021-08-02 | Stop reason: HOSPADM

## 2021-07-30 RX ORDER — POTASSIUM CHLORIDE 20 MEQ/1
40 TABLET, EXTENDED RELEASE ORAL ONCE
Status: COMPLETED | OUTPATIENT
Start: 2021-07-30 | End: 2021-07-30

## 2021-07-30 RX ADMIN — HYDROXYZINE HYDROCHLORIDE 50 MG: 50 INJECTION, SOLUTION INTRAMUSCULAR at 00:43

## 2021-07-30 RX ADMIN — ENOXAPARIN SODIUM 40 MG: 40 INJECTION SUBCUTANEOUS at 09:33

## 2021-07-30 RX ADMIN — CEFTRIAXONE SODIUM 1000 MG: 1 INJECTION, POWDER, FOR SOLUTION INTRAMUSCULAR; INTRAVENOUS at 06:47

## 2021-07-30 RX ADMIN — IPRATROPIUM BROMIDE AND ALBUTEROL SULFATE 1 AMPULE: .5; 2.5 SOLUTION RESPIRATORY (INHALATION) at 10:28

## 2021-07-30 RX ADMIN — IPRATROPIUM BROMIDE AND ALBUTEROL SULFATE 1 AMPULE: .5; 2.5 SOLUTION RESPIRATORY (INHALATION) at 19:31

## 2021-07-30 RX ADMIN — IPRATROPIUM BROMIDE AND ALBUTEROL SULFATE 1 AMPULE: .5; 2.5 SOLUTION RESPIRATORY (INHALATION) at 06:40

## 2021-07-30 RX ADMIN — LORAZEPAM 0.5 MG: 2 INJECTION INTRAMUSCULAR; INTRAVENOUS at 20:30

## 2021-07-30 RX ADMIN — BUDESONIDE 500 MCG: 0.5 INHALANT RESPIRATORY (INHALATION) at 19:31

## 2021-07-30 RX ADMIN — PANTOPRAZOLE SODIUM 40 MG: 40 TABLET, DELAYED RELEASE ORAL at 06:46

## 2021-07-30 RX ADMIN — IPRATROPIUM BROMIDE AND ALBUTEROL SULFATE 1 AMPULE: .5; 2.5 SOLUTION RESPIRATORY (INHALATION) at 15:51

## 2021-07-30 RX ADMIN — Medication 10 ML: at 09:34

## 2021-07-30 RX ADMIN — LEVOTHYROXINE SODIUM 50 MCG: 0.05 TABLET ORAL at 06:46

## 2021-07-30 RX ADMIN — SODIUM CHLORIDE: 9 INJECTION, SOLUTION INTRAVENOUS at 07:27

## 2021-07-30 RX ADMIN — FOLIC ACID 1 MG: 1 TABLET ORAL at 20:19

## 2021-07-30 RX ADMIN — DEXAMETHASONE SODIUM PHOSPHATE 6 MG: 4 INJECTION, SOLUTION INTRAMUSCULAR; INTRAVENOUS at 06:48

## 2021-07-30 RX ADMIN — BUDESONIDE 500 MCG: 0.5 INHALANT RESPIRATORY (INHALATION) at 06:40

## 2021-07-30 RX ADMIN — GADOBENATE DIMEGLUMINE 17 ML: 529 INJECTION, SOLUTION INTRAVENOUS at 13:27

## 2021-07-30 RX ADMIN — Medication 5 ML: at 21:00

## 2021-07-30 RX ADMIN — HYDROXYZINE HYDROCHLORIDE 50 MG: 25 TABLET ORAL at 23:30

## 2021-07-30 RX ADMIN — POTASSIUM CHLORIDE 40 MEQ: 1500 TABLET, EXTENDED RELEASE ORAL at 15:03

## 2021-07-30 ASSESSMENT — PAIN SCALES - GENERAL
PAINLEVEL_OUTOF10: 0
PAINLEVEL_OUTOF10: 0

## 2021-07-30 NOTE — H&P
Department of Internal Medicine  History and Physical    PCP: Lorenzo Patel DO   Admitting Physician: Dr. Lottie White  Consultants: Dr. Amauri Mathews and Dr. Marilee Maddox   Date of Service: 2021    CHIEF COMPLAINT:  Dysuria/urinary retention/abdominal pain/altered behavior    HISTORY OF PRESENT ILLNESS:    Patient is a 55-year-old female who presented to the ED due to dysuria, urinary retention and abdominal pain. Additionally patient during more anxious lately. Examination patient is anxious and very fidgety. According to patient she takes caffeine pills and had not been able to sleep for the last 2-3 nights. Otherwise patient states that she was diagnosed with UTI few days ago. She was placed on Bactrim without improvement in symptoms. She admits to associated nausea and dry heaves. She does complain of headache. She denies any changes with her vision.     PAST MEDICAL Hx:  Past Medical History:   Diagnosis Date    Arthritis     Bursitis     hips    Class 3 severe obesity due to excess calories with body mass index (BMI) of 40.0 to 44.9 in adult [facility])     COPD (chronic obstructive pulmonary disease) ([address]     Diverticulitis     GERD (gastroesophageal reflux disease)     Incisional hernia with obstruction but no gangrene     Strep throat        PAST SURGICAL Hx:   Past Surgical History:   Procedure Laterality Date     SECTION      CHOLECYSTECTOMY      COLECTOMY  2016    FOREARM FRACTURE SURGERY Left     fracture of ulna s/p repair    HERNIA REPAIR      HERNIA REPAIR  2015    incarcerated hernia repair    HERNIA REPAIR N/A 3/25/2021    INCISIONAL HERNIA REPAIR WITH MESH, POSSIBLE COMPONENT SEPARATION  LAPAROSCOPIC ROBOTIC XI ASSISTED (CPT [id number]) performed by Jossy Alvares MD at [address] [facility]      sacroiliac bilateral 2012    PELVIC FRACTURE SURGERY      VENTRAL HERNIA REPAIR  2015       FAMILY Hx:  Family History   Problem Relation Age of Onset    Osteoarthritis Other     Diabetes Other     Other Father         pancreatitis    Prostate Cancer Father     Stroke Brother         half brother    Cancer Paternal Grandfather        HOME MEDICATIONS:  Prior to Admission medications    Medication Sig Start Date End Date Taking?  Authorizing Provider   levothyroxine (SYNTHROID) 50 MCG tablet Take 1 tablet by mouth Daily 3/30/21   Valentino Sandifer, DO   pantoprazole (PROTONIX) 40 MG tablet Take 1 tablet by mouth every morning (before breakfast) 3/30/21   Valentino Sandifer, DO   albuterol sulfate HFA (PROVENTIL HFA) 108 (90 Base) MCG/ACT inhaler Inhale 2 puffs into the lungs every 6 hours as needed for Wheezing 3/29/21   Valentino Sandifer, DO   traZODone (DESYREL) 50 MG tablet  20   Historical Provider, MD   hydrOXYzine (ATARAX) 50 MG tablet Take 50 mg by mouth nightly  21   Historical Provider, MD   rOPINIRole (REQUIP) 0.25 MG tablet  21   Historical Provider, MD   brexpiprazole (REXULTI) 0.5 MG TABS tablet Take 0.5 mg by mouth daily    Historical Provider, MD   PARoxetine (PAXIL) 10 MG tablet Take 10 mg by mouth every morning    Historical Provider, MD   bumetanide (BUMEX) 1 MG tablet TAKE ONE TABLET 2 TIMES A DAY 19   Historical Provider, MD       ALLERGIES:  Ibuprofen, Nsaids, and Morphine    SOCIAL Hx:  Social History     Socioeconomic History    Marital status:      Spouse name: Shilpa Sanches Number of children: 3    Years of education: 15    Highest education level: Not on file   Occupational History     Employer: [employer]   Tobacco Use    Smoking status: Former Smoker     Packs/day: 1.00     Years: 30.00     Pack years: 30.00     Types: Cigarettes     Quit date: 3/5/2021     Years since quittin.4    Smokeless tobacco: Never Used   Vaping Use    Vaping Use: Never used   Substance and Sexual Activity    Alcohol use: Yes     Comment: daily vodka drinker    Drug use: Not Currently     Types: Marijuana     Comment: medical marijuana.  Sexual activity: Yes     Partners: Male   Other Topics Concern    Not on file   Social History Narrative     twice. Recently  December 2020     Social Determinants of Health     Financial Resource Strain:     Difficulty of Paying Living Expenses:    Food Insecurity:     Worried About [address] in the Last Year:     [address] in the Last Year:    Transportation Needs:     Lack of Transportation (Medical):  Lack of Transportation (Non-Medical):    Physical Activity:     Days of Exercise per Week:     Minutes of Exercise per Session:    Stress:     Feeling of Stress :    Social Connections:     Frequency of Communication with Friends and Family:     Frequency of Social Gatherings with Friends and Family:     Attends [employer] Services:     Active Member of Clubs or Organizations:     Attends Club or Organization Meetings:     Marital Status:    Intimate Partner Violence:     Fear of Current or Ex-Partner:     Emotionally Abused:     Physically Abused:     Sexually Abused:        ROS: Positive in bold  General:   Denies chills, fatigue, fever, malaise, night sweats or weight loss    Psychological:   Denies anxiety, disorientation or hallucinations    ENT:    Denies epistaxis, headaches, vertigo or visual changes    Cardiovascular:   Denies any chest pain, irregular heartbeats, or palpitations. No paroxysmal nocturnal dyspnea. Respiratory:   Denies shortness of breath, coughing, sputum production, hemoptysis, or wheezing. No orthopnea. Gastrointestinal:   Denies nausea, vomiting, diarrhea, or constipation. Denies any abdominal pain. Denies change in bowel habits or stools. Genito-Urinary:    Denies any urgency, frequency, hematuria. Voiding without difficulty. Musculoskeletal:   Denies joint pain, joint stiffness, joint swelling or muscle pain    Neurology:    Denies any headache or focal neurological deficits.  No weakness or paresthesia. Derm:    Denies any rashes, ulcers, or excoriations. Denies bruising. Extremities:   Denies any lower extremity swelling or edema. PHYSICAL EXAM: Abnormal findings noted  VITALS:  Vitals:    07/29/21 2330   BP: 111/68   Pulse: 82   Resp: 30   Temp: 97.9 °F (36.6 °C)   SpO2: 94%         CONSTITUTIONAL:    Awake, alert, cooperative, no apparent distress, and appears stated age  Patient appears anxious    EYES:     EOMI, sclera clear, conjunctiva normal    ENT:    Normocephalic, atraumatic,  External ears without lesions    NECK:    Supple, symmetrical, trachea midline,  no JVD    HEMATOLOGIC/LYMPHATICS:    No cervical lymphadenopathy and no supraclavicular lymphadenopathy    LUNGS:    Symmetric. No increased work of breathing, good air exchange, clear to auscultation bilaterally, no wheezes, rhonchi, or rales,   Patient has diminished breath sounds bilaterally    CARDIOVASCULAR:    Normal apical impulse, regular rate and rhythm, normal S1 and S2, no S3 or S4, and no murmur noted    ABDOMEN:    soft, non-distended, non-tender    MUSCULOSKELETAL:    There is no redness, warmth, or swelling of the joints. NEUROLOGIC:    Awake, alert, oriented to name, place and time. SKIN:    No bruising or bleeding. No redness, warmth, or swelling    EXTREMITIES:    Peripheral pulses present. No edema, cyanosis, or swelling.     LINES/CATHETERS   Keane catheter in place    LABORATORY DATA:  CBC with Differential:    Lab Results   Component Value Date    WBC 17.4 07/29/2021    RBC 4.70 07/29/2021    HGB 14.3 07/29/2021    HCT 42.7 07/29/2021     07/29/2021    MCV 90.9 07/29/2021    MCH 30.4 07/29/2021    MCHC 33.5 07/29/2021    RDW 14.5 07/29/2021    SEGSPCT 82 09/14/2011    LYMPHOPCT 2.4 07/29/2021    MONOPCT 2.6 07/29/2021    BASOPCT 0.2 07/29/2021    MONOSABS 0.45 07/29/2021    LYMPHSABS 0.41 07/29/2021    EOSABS 0.05 07/29/2021    BASOSABS 0.04 07/29/2021     CMP:    Lab Results Component Value Date     07/29/2021    K 3.9 07/29/2021     07/29/2021    CO2 20 07/29/2021    BUN 12 07/29/2021    CREATININE 1.2 07/29/2021    GFRAA 57 07/29/2021    LABGLOM 47 07/29/2021    GLUCOSE 116 07/29/2021    GLUCOSE 89 09/14/2011    PROT 6.7 07/29/2021    LABALBU 3.6 07/29/2021    CALCIUM 9.2 07/29/2021    BILITOT 0.7 07/29/2021    ALKPHOS 82 07/29/2021    AST 15 07/29/2021    ALT 12 07/29/2021       ASSESSMENT/PLAN:  1. Sepsis due to UTI with associated urinary retention  2. Acute versus chronic hypoxic respiratory failure  3. Acute COPD exacerbation  4. Acute encephalopathy  5. Cocaine abuse per urine drug screen  6. 1.6 cm calcified meningioma along the high left frontal convexity with mild mass-effect  7. 4. 3 mm left lower lobe pulmonary nodule.    8. Mild pulmonary hypertension  9. Chronic kidney disease stage III  10. Hypothyroidism  11. GERD  12. Obesity  13. History of tobacco abuse  14. Daily alcohol consumption    Patient presented due to  symptoms. She was also noted to not be acting herself. CT head performed and showed calcified meningioma along left frontal convexity with mild underlying mass-effect. Neurosurgery consulted. Recommended admission and MRI brain and CT chest.  Patient started on IV steroids. neurology to be consulted. Defer to neurology for treatment with steroids. Additionally patient found to have UA positive for likely UTI. Patient started on Rocephin. Cultures ordered. Additionally patient was hypoxic. Unsure whether patient is oxygen dependent at home. Nebulized breathing treatments ordered. Monitor pulse ox. Wean supplemental oxygen to baseline. Additionally patient found to be abusing cocaine. She also takes caffeine pills. We will have a sitter at bedside. Placed on Ativan every 4 hours.     Shayla Snowden  12:34 AM  7/30/2021    Electronically signed by Micky Pool DO on 7/30/21 at 12:34 AM EDT

## 2021-07-30 NOTE — CARE COORDINATION
7/30/21 1604 CM note: COVID (-) 7/30/21. Met with patient at the bedside to discuss transition of care at discharge. Patient resides with her fiance and 8 y.o son in a 2nd floor apartment @ 20 steps to the 2nd floor. Patient is independent with ADLs, drives, and DME includes 2L home O2 prn that she got from [address] [facility]. Pts PCP is Bailey Parra and her pharmacy is [facility] [place]. Patient has no hx HHC or SHERIF. Pts UDS was (+) cocaine & fentanyl. Patient states she has not done cocaine for 2 years, and they states they gave her fentanyl in the ER before doing her UDS. Patient declines speaking with [facility] or needing outpatient counseling or rehab. Discharge plan is home and patient declines [facility] or any other needs. Pts irene will provide transportation home.  Electronically signed by Shruthi Núñez RN on 7/30/2021 at 4:24 PM

## 2021-07-30 NOTE — PROGRESS NOTES
Internal Medicine Progress Note    [facility]    Ara Pereira. SHILPI Bush D.O., SHILPI Arvizu D.O. Lisa Cardenas, MSN, APRN, NP-C  Winnie Dahl. Shorty Sims, MSN, APRN-CNP     Primary Care Physician: Jim Tilley DO   Admitting Physician:  Anne Delgado DO  Admission date and time: 7/29/2021  6:34 PM    Room:  [address] [place] [address]  Admitting diagnosis: Brain mass [G93.89]    Patient Name: Dawn Palomino  MRN: [id number]    Date of Service: 7/30/2021     Subjective:  Hammad Davis is a 46 y.o. female who was seen and examined today,7/30/2021, at the bedside. Patient appears clinically improved at the present time. Resting comfortably in bed with oxygen therapy. Chest x-ray does show pneumonia. Abnormal CT of the head and MRI suggests meningioma. Patient relate to generalized pain and we have discussed the abnormal urine drug screen with her. Boyfriend came out to talk to us in the ryan stating she does have a history of drug use in the past and has been exhibiting bizarre behavior lately. Patient denies drug use    Review of System:   Constitutional:   Denies fever or chills, weight loss or gain, fatigue or malaise. HEENT:   Denies ear pain, sore throat, sinus or eye problems. Cardiovascular:   Denies any chest pain, irregular heartbeats, or palpitations. Respiratory:   Shortness of breath with activity. Slight cough  Gastrointestinal:   Denies nausea, vomiting, diarrhea, or constipation. Denies any abdominal pain. Genitourinary:    Denies any urgency, frequency, hematuria. Voiding  without difficulty. Extremities:   Generalized pain  Neurology:    Denies any headache or focal neurological deficits, Denies generalized weakness or memory difficulty. Psch:   Denies being anxious or depressed. Musculoskeletal:    Denies  myalgias, joint complaints or back pain. Integumentary:   Denies any rashes, ulcers, or excoriations.   Denies bruising. Hematologic/Lymphatic:  Denies bruising or bleeding. Physical Exam:  No intake/output data recorded. Intake/Output Summary (Last 24 hours) at 7/30/2021 1729  Last data filed at 7/30/2021 1056  Gross per 24 hour   Intake 240 ml   Output 200 ml   Net 40 ml   I/O last 3 completed shifts: In: 240 [P.O.:240]  Out: 200 [Urine:200]  Patient Vitals for the past 96 hrs (Last 3 readings):   Weight   07/30/21 0413 182 lb 8 oz (82.8 kg)   07/29/21 1817 186 lb (84.4 kg)     Vital Signs:   Blood pressure 106/66, pulse 75, temperature 98.4 °F (36.9 °C), temperature source Oral, resp. rate 20, height 5' 2\" (1.575 m), weight 182 lb 8 oz (82.8 kg), SpO2 94 %. General appearance:  Alert, responsive, oriented to person, place, and time. Well preserved, alert, no distress. Head:  Normocephalic. No masses, lesions or tenderness. Eyes:  PERRLA. EOMI. Sclera clear. Buccal mucosa moist.  Wearing oxygen  ENT:  Ears normal. Mucosa normal.  Neck:    Supple. Trachea midline. No thyromegaly. No JVD. No bruits. Heart:    Rhythm regular. Rate controlled. No murmurs. Lungs:    Diminished at the bases with slight wheeze. Abdomen:   Soft. Non-tender. Non-distended. Bowel sounds positive. No organomegaly or masses. No pain on palpation. Extremities:    Peripheral pulses present. No peripheral edema. No ulcers. No cyanosis. No clubbing. Neurologic:    Alert x 3. No focal deficit. Cranial nerves grossly intact. No focal weakness. Psych:   Behavior is normal. Mood appears normal. Speech is not rapid and/or pressured. Musculoskeletal:   Spine ROM normal. Muscular strength intact. Gait not assessed. Integumentary:  No rashes  Skin normal color and texture.   Genitalia/Breast:  Deferred    Medication:  Scheduled Meds:   sodium chloride flush  5-40 mL Intravenous 2 times per day    enoxaparin  40 mg Subcutaneous Daily    levothyroxine  50 mcg Oral Daily    pantoprazole  40 mg Oral QAM AC    cefTRIAXone (ROCEPHIN) IV  1,000 mg Intravenous Q24H    ipratropium-albuterol  1 ampule Inhalation Q4H While awake    budesonide  0.5 mg Nebulization BID     Continuous Infusions:   sodium chloride      sodium chloride 75 mL/hr at 07/30/21 0727       Objective Data:  CBC with Differential:    Lab Results   Component Value Date    WBC 17.4 07/29/2021    RBC 4.70 07/29/2021    HGB 14.3 07/29/2021    HCT 42.7 07/29/2021     07/29/2021    MCV 90.9 07/29/2021    MCH 30.4 07/29/2021    MCHC 33.5 07/29/2021    RDW 14.5 07/29/2021    SEGSPCT 82 09/14/2011    LYMPHOPCT 2.4 07/29/2021    MONOPCT 2.6 07/29/2021    BASOPCT 0.2 07/29/2021    MONOSABS 0.45 07/29/2021    LYMPHSABS 0.41 07/29/2021    EOSABS 0.05 07/29/2021    BASOSABS 0.04 07/29/2021     CMP:    Lab Results   Component Value Date     07/30/2021    K 3.3 07/30/2021    K 3.9 07/29/2021     07/30/2021    CO2 24 07/30/2021    BUN 9 07/30/2021    CREATININE 1.1 07/30/2021    GFRAA >60 07/30/2021    LABGLOM 52 07/30/2021    GLUCOSE 86 07/30/2021    GLUCOSE 89 09/14/2011    PROT 5.3 07/30/2021    LABALBU 2.9 07/30/2021    CALCIUM 8.3 07/30/2021    BILITOT 0.6 07/30/2021    ALKPHOS 64 07/30/2021    AST 10 07/30/2021    ALT 9 07/30/2021       Wound Documentation:   Incision 08/25/15 Abdomen Mid (Active)   Number of days: 2165       Assessment:    · Sepsis due to UTI with associated urinary retention  · Acute versus chronic hypoxic respiratory failure with multifocal pneumonia  · Acute COPD exacerbation  · Acute encephalopathy possibly secondary to medication  · Cocaine abuse per urine drug screen  · 1.6 cm calcified meningioma along the high left frontal convexity with mild mass-effect  · 3 mm left lower lobe pulmonary nodule.    · Mild pulmonary hypertension  · Chronic kidney disease stage III  · Hypothyroidism  · GERD  · Obesity  · History of tobacco abuse  · Daily alcohol consumption      Plan:     · Patient has been seen by neurology and MRI suggest meningioma  · Aggressive treatment of underlying pneumonia  · Continue pulmonary respiratory treatment supplemental O2. Continue mucolytic agent and antibiotic therapy  · Echocardiogram to rule out vegetation  · Await results of blood culture  · Discussed positive drug screen with patient    More than 50% of my  time was spent at the bedside counseling/coordinating care with the patient and/or family with face to face contact. This time was spent reviewing notes and laboratory data as well as instructing and counseling the patient. Time I spent with the family or surrogate(s) is included only if the patient was incapable of providing the necessary information or participating in medical decisions. I also discussed the differential diagnosis and all of the proposed management plans with the patient and individuals accompanying the patient. Karli Kwon requires this high level of physician care and nursing on the [address] unit due the complexity of decision management and chance of rapid decline or death. Continued cardiac monitoring and higher level of nursing are required. I am readily available for any further decision-making and intervention.      Harvis Leventhal Bisel, DO, F.A.C.O.I.  7/30/2021  5:29 PM

## 2021-07-30 NOTE — CONSULTS
History Of Present Illness:    CHIEF COMPLAINT:  Dysuria/urinary retention/abdominal pain/altered behavior     HISTORY OF PRESENT ILLNESS:    Patient is a 70-year-old female who presented to the ED due to dysuria, urinary retention and abdominal pain. Additionally patient during more anxious lately. Examination patient is anxious and very fidgety. According to patient she takes caffeine pills and had not been able to sleep for the last 2-3 nights. Otherwise patient states that she was diagnosed with UTI few days ago. She was placed on Bactrim without improvement in symptoms. She admits to associated nausea and dry heaves. She does complain of headache. She denies any changes with her vision. As above per hospitalist.  Patient interviewed: poor insight into reason for hospitalization. Reports not sleeping for days. The patient is a 46 y.o. female with significant past medical history of see below who presents with above. The patient has the following symptoms:    Change in level of consciousness: alert    New Weakness: no    Numbness or Tingling: no    Difficulty Swallowing: no    Current Medications:   Scheduled Meds:   sodium chloride flush  5-40 mL Intravenous 2 times per day    enoxaparin  40 mg Subcutaneous Daily    levothyroxine  50 mcg Oral Daily    pantoprazole  40 mg Oral QAM AC    cefTRIAXone (ROCEPHIN) IV  1,000 mg Intravenous Q24H    ipratropium-albuterol  1 ampule Inhalation Q4H While awake    budesonide  0.5 mg Nebulization BID     Continuous Infusions:   sodium chloride      sodium chloride 75 mL/hr at 07/30/21 0727     PRN Meds:sodium chloride flush, sodium chloride, polyethylene glycol, acetaminophen **OR** acetaminophen, albuterol, LORazepam, prochlorperazine, fentanNYL    Allergies:  Ibuprofen, Nsaids, and Morphine    Social History:   TOBACCO:   reports that she quit smoking about 4 months ago. Her smoking use included cigarettes.  She has a 30.00 pack-year smoking history. She has never used smokeless tobacco.  ETOH:   reports current alcohol use. Past Medical History:        Diagnosis Date    Arthritis     Bursitis     hips    Class 3 severe obesity due to excess calories with body mass index (BMI) of 40.0 to 44.9 in adult [facility])     COPD (chronic obstructive pulmonary disease) ([facility]     Diverticulitis     GERD (gastroesophageal reflux disease)     Incisional hernia with obstruction but no gangrene     Strep throat        Past Surgical History:        Procedure Laterality Date     SECTION      CHOLECYSTECTOMY      COLECTOMY  2016    FOREARM FRACTURE SURGERY Left     fracture of ulna s/p repair    HERNIA REPAIR      HERNIA REPAIR  2015    incarcerated hernia repair    HERNIA REPAIR N/A 3/25/2021    INCISIONAL HERNIA REPAIR WITH MESH, POSSIBLE COMPONENT SEPARATION  LAPAROSCOPIC ROBOTIC XI ASSISTED (CPT [id number]) performed by Rosario Shirley MD at [address] [facility]      sacroiliac bilateral 2012    PELVIC FRACTURE SURGERY      VENTRAL HERNIA REPAIR  2015         Outside reports reviewed: ER records, historical medical records, lab reports and radiology reports. Patient's medications, allergies, past medical, surgical, social and family histories were reviewed and updated as appropriate. Review of Systems  A comprehensive review of systems was negative except for:       Objective:     Neuro exam 106/66 p 74 t 98  General: awake and alert. Manic like/agitated but cooperative  Cranial nerve testing was normal.  Funduscopic eye exam revealed not testable. Motor exam: 5-/5. Deep tendon reflexes were 1+ bilaterally. Plantar responses were flexor bilaterally. Cerebellar exam noted finger to nose without dysmetria. Sensation was normal to joint position sense, light touch and a pin prick . Yary Cruz       Assessment:   Altered mental status probably secondary to drug abuse cocaine/fentanyl  Probable incidental left frontal meningioma with mild mass effect      Plan:   Agree with neurosurgical evaluation   Recommend psychiatric evaluation ?  Bipolar disorder; opiate/cocaine abuse  MRI brain pending

## 2021-07-30 NOTE — ED PROVIDER NOTES
Patient was discussed with the with the  Resident. Please refer the resident's note for final disposition and medical decision making. Marcus Rivas is a 68-year-old woman who was unable to void for the past 2 days. She complains of bilateral flank pain and lower abdominal pelvic pain. She states the pain is a 10 out of 10. She appears to be in acute distress. General Appearance:   Alert, cooperative, significant distress, appears stated age  Head:   Normocephalic, without obvious abnormality, atraumatic  Eyes:   PERRL, conjunctiva/corneas clear,   EOM's intact, fundi    benign, both eyesEars:   Normal TM's and external ear canals, both earsNose:    Nares normal, septum midline, mucosa normal, no drainage    or sinus tenderness  Throat:  Lips, mucosa, and tongue normal; teeth and gums normal  Neck:  Supple, symmetrical, trachea midline, no adenopathy;    thyroid:  no enlargement/tenderness/nodules; no carotid   bruit or JVD  Back:    Symmetric, no curvature, ROM normal, no CVA tendernessLungs:    Clear to auscultation bilaterally, respirations unlabored  Chest Wall:   No tenderness or deformity Heart:   Regular rate and rhythm, S1 and S2 normal, no murmur, rub   or gallop  Abdomen:    Soft, moderate suprapubic tenderness, bowel sounds active all four quadrants, no masses, no organomegaly, bilateral CVA tenderness is noted  Extremities:  Extremities normal, atraumatic, no cyanosis or edema Pulses:  2+ and symmetric all extremities  Skin:  Skin color, texture, turgor normal, no rashes or lesionsLymph nodes:  Cervical, supraclavicular, and axillary nodes normal  Neurologic:  CNII-XII intact, normal strength, sensation and reflexes throughout      Assessment:  Urinary tension  Abdominal pain    Plan:  See the record    MD Jose Juan Rudd MD  07/29/21 2100 2110  Patient is acting more confused in the CT suite.   She is oriented to person and place but slow to answer questions while shaking her head back and forth.       Roslyn Mantilla MD  07/29/21 5700

## 2021-07-31 ENCOUNTER — APPOINTMENT (OUTPATIENT)
Dept: CT IMAGING | Age: 51
DRG: 720 | End: 2021-07-31
Payer: MEDICARE

## 2021-07-31 LAB
ADENOVIRUS BY PCR: NOT DETECTED
ALBUMIN SERPL-MCNC: 2.8 G/DL (ref 3.5–5.2)
ALP BLD-CCNC: 59 U/L (ref 35–104)
ALT SERPL-CCNC: 9 U/L (ref 0–32)
ANION GAP SERPL CALCULATED.3IONS-SCNC: 10 MMOL/L (ref 7–16)
AST SERPL-CCNC: 10 U/L (ref 0–31)
BASOPHILS ABSOLUTE: 0.02 E9/L (ref 0–0.2)
BASOPHILS RELATIVE PERCENT: 0.2 % (ref 0–2)
BILIRUB SERPL-MCNC: <0.2 MG/DL (ref 0–1.2)
BORDETELLA PARAPERTUSSIS BY PCR: NOT DETECTED
BORDETELLA PERTUSSIS BY PCR: NOT DETECTED
BUN BLDV-MCNC: 12 MG/DL (ref 6–20)
C-REACTIVE PROTEIN: 3.7 MG/DL (ref 0–0.4)
CALCIUM SERPL-MCNC: 8.4 MG/DL (ref 8.6–10.2)
CHLAMYDOPHILIA PNEUMONIAE BY PCR: NOT DETECTED
CHLORIDE BLD-SCNC: 108 MMOL/L (ref 98–107)
CO2: 24 MMOL/L (ref 22–29)
CORONAVIRUS 229E BY PCR: NOT DETECTED
CORONAVIRUS HKU1 BY PCR: NOT DETECTED
CORONAVIRUS NL63 BY PCR: NOT DETECTED
CORONAVIRUS OC43 BY PCR: NOT DETECTED
CREAT SERPL-MCNC: 1.2 MG/DL (ref 0.5–1)
EOSINOPHILS ABSOLUTE: 0.2 E9/L (ref 0.05–0.5)
EOSINOPHILS RELATIVE PERCENT: 2.2 % (ref 0–6)
GFR AFRICAN AMERICAN: 57
GFR NON-AFRICAN AMERICAN: 47 ML/MIN/1.73
GLUCOSE BLD-MCNC: 118 MG/DL (ref 74–99)
HCT VFR BLD CALC: 37.6 % (ref 34–48)
HEMOGLOBIN: 12 G/DL (ref 11.5–15.5)
HUMAN METAPNEUMOVIRUS BY PCR: NOT DETECTED
HUMAN RHINOVIRUS/ENTEROVIRUS BY PCR: NOT DETECTED
IMMATURE GRANULOCYTES #: 0.04 E9/L
IMMATURE GRANULOCYTES %: 0.4 % (ref 0–5)
INFLUENZA A BY PCR: NOT DETECTED
INFLUENZA B BY PCR: NOT DETECTED
LYMPHOCYTES ABSOLUTE: 1.18 E9/L (ref 1.5–4)
LYMPHOCYTES RELATIVE PERCENT: 12.7 % (ref 20–42)
MCH RBC QN AUTO: 30.2 PG (ref 26–35)
MCHC RBC AUTO-ENTMCNC: 31.9 % (ref 32–34.5)
MCV RBC AUTO: 94.5 FL (ref 80–99.9)
MONOCYTES ABSOLUTE: 0.4 E9/L (ref 0.1–0.95)
MONOCYTES RELATIVE PERCENT: 4.3 % (ref 2–12)
MYCOPLASMA PNEUMONIAE BY PCR: NOT DETECTED
NEUTROPHILS ABSOLUTE: 7.42 E9/L (ref 1.8–7.3)
NEUTROPHILS RELATIVE PERCENT: 80.2 % (ref 43–80)
PARAINFLUENZA VIRUS 1 BY PCR: NOT DETECTED
PARAINFLUENZA VIRUS 2 BY PCR: NOT DETECTED
PARAINFLUENZA VIRUS 3 BY PCR: NOT DETECTED
PARAINFLUENZA VIRUS 4 BY PCR: NOT DETECTED
PDW BLD-RTO: 14.6 FL (ref 11.5–15)
PLATELET # BLD: 200 E9/L (ref 130–450)
PMV BLD AUTO: 9.9 FL (ref 7–12)
POTASSIUM SERPL-SCNC: 3.6 MMOL/L (ref 3.5–5)
PROCALCITONIN: 0.14 NG/ML (ref 0–0.08)
RBC # BLD: 3.98 E12/L (ref 3.5–5.5)
RESPIRATORY SYNCYTIAL VIRUS BY PCR: NOT DETECTED
SARS-COV-2, PCR: NOT DETECTED
SEDIMENTATION RATE, ERYTHROCYTE: 13 MM/HR (ref 0–20)
SODIUM BLD-SCNC: 142 MMOL/L (ref 132–146)
TOTAL PROTEIN: 5.3 G/DL (ref 6.4–8.3)
WBC # BLD: 9.3 E9/L (ref 4.5–11.5)

## 2021-07-31 PROCEDURE — 6370000000 HC RX 637 (ALT 250 FOR IP): Performed by: INTERNAL MEDICINE

## 2021-07-31 PROCEDURE — 85651 RBC SED RATE NONAUTOMATED: CPT

## 2021-07-31 PROCEDURE — 86738 MYCOPLASMA ANTIBODY: CPT

## 2021-07-31 PROCEDURE — 86140 C-REACTIVE PROTEIN: CPT

## 2021-07-31 PROCEDURE — 80053 COMPREHEN METABOLIC PANEL: CPT

## 2021-07-31 PROCEDURE — 6360000004 HC RX CONTRAST MEDICATION: Performed by: RADIOLOGY

## 2021-07-31 PROCEDURE — 6360000002 HC RX W HCPCS: Performed by: INTERNAL MEDICINE

## 2021-07-31 PROCEDURE — 84145 PROCALCITONIN (PCT): CPT

## 2021-07-31 PROCEDURE — 36415 COLL VENOUS BLD VENIPUNCTURE: CPT

## 2021-07-31 PROCEDURE — 71260 CT THORAX DX C+: CPT

## 2021-07-31 PROCEDURE — 0202U NFCT DS 22 TRGT SARS-COV-2: CPT

## 2021-07-31 PROCEDURE — 94640 AIRWAY INHALATION TREATMENT: CPT

## 2021-07-31 PROCEDURE — 85025 COMPLETE CBC W/AUTO DIFF WBC: CPT

## 2021-07-31 PROCEDURE — 87449 NOS EACH ORGANISM AG IA: CPT

## 2021-07-31 PROCEDURE — 2580000003 HC RX 258: Performed by: INTERNAL MEDICINE

## 2021-07-31 PROCEDURE — 2060000000 HC ICU INTERMEDIATE R&B

## 2021-07-31 PROCEDURE — 2700000000 HC OXYGEN THERAPY PER DAY

## 2021-07-31 RX ORDER — PREDNISONE 10 MG/1
10 TABLET ORAL 2 TIMES DAILY
Status: DISCONTINUED | OUTPATIENT
Start: 2021-07-31 | End: 2021-08-02 | Stop reason: HOSPADM

## 2021-07-31 RX ORDER — ALBUTEROL SULFATE 2.5 MG/3ML
2.5 SOLUTION RESPIRATORY (INHALATION)
Status: DISCONTINUED | OUTPATIENT
Start: 2021-07-31 | End: 2021-08-02 | Stop reason: HOSPADM

## 2021-07-31 RX ORDER — LEVETIRACETAM 500 MG/1
500 TABLET ORAL 2 TIMES DAILY
Status: DISCONTINUED | OUTPATIENT
Start: 2021-07-31 | End: 2021-08-02 | Stop reason: HOSPADM

## 2021-07-31 RX ORDER — METHYLPREDNISOLONE SODIUM SUCCINATE 40 MG/ML
20 INJECTION, POWDER, LYOPHILIZED, FOR SOLUTION INTRAMUSCULAR; INTRAVENOUS EVERY 12 HOURS
Status: DISCONTINUED | OUTPATIENT
Start: 2021-07-31 | End: 2021-07-31

## 2021-07-31 RX ORDER — AZITHROMYCIN 250 MG/1
500 TABLET, FILM COATED ORAL DAILY
Status: DISCONTINUED | OUTPATIENT
Start: 2021-07-31 | End: 2021-08-02 | Stop reason: HOSPADM

## 2021-07-31 RX ADMIN — IPRATROPIUM BROMIDE AND ALBUTEROL SULFATE 1 AMPULE: .5; 2.5 SOLUTION RESPIRATORY (INHALATION) at 06:00

## 2021-07-31 RX ADMIN — POLYETHYLENE GLYCOL 3350 17 G: 17 POWDER, FOR SOLUTION ORAL at 13:27

## 2021-07-31 RX ADMIN — LEVETIRACETAM 500 MG: 500 TABLET ORAL at 20:09

## 2021-07-31 RX ADMIN — IPRATROPIUM BROMIDE AND ALBUTEROL SULFATE 1 AMPULE: .5; 2.5 SOLUTION RESPIRATORY (INHALATION) at 13:56

## 2021-07-31 RX ADMIN — FOLIC ACID 1 MG: 1 TABLET ORAL at 09:15

## 2021-07-31 RX ADMIN — IOPAMIDOL 75 ML: 755 INJECTION, SOLUTION INTRAVENOUS at 08:27

## 2021-07-31 RX ADMIN — AZITHROMYCIN MONOHYDRATE 500 MG: 250 TABLET ORAL at 11:30

## 2021-07-31 RX ADMIN — LORAZEPAM 0.5 MG: 2 INJECTION INTRAMUSCULAR; INTRAVENOUS at 20:09

## 2021-07-31 RX ADMIN — IPRATROPIUM BROMIDE AND ALBUTEROL SULFATE 1 AMPULE: .5; 2.5 SOLUTION RESPIRATORY (INHALATION) at 09:48

## 2021-07-31 RX ADMIN — Medication 5 ML: at 20:13

## 2021-07-31 RX ADMIN — PANTOPRAZOLE SODIUM 40 MG: 40 TABLET, DELAYED RELEASE ORAL at 06:34

## 2021-07-31 RX ADMIN — BUDESONIDE 500 MCG: 0.5 INHALANT RESPIRATORY (INHALATION) at 06:01

## 2021-07-31 RX ADMIN — HYDROXYZINE HYDROCHLORIDE 50 MG: 25 TABLET ORAL at 23:31

## 2021-07-31 RX ADMIN — ENOXAPARIN SODIUM 40 MG: 40 INJECTION SUBCUTANEOUS at 09:15

## 2021-07-31 RX ADMIN — Medication 10 ML: at 09:15

## 2021-07-31 RX ADMIN — CEFTRIAXONE SODIUM 1000 MG: 1 INJECTION, POWDER, FOR SOLUTION INTRAMUSCULAR; INTRAVENOUS at 06:35

## 2021-07-31 RX ADMIN — PREDNISONE 10 MG: 10 TABLET ORAL at 20:09

## 2021-07-31 RX ADMIN — LEVOTHYROXINE SODIUM 50 MCG: 0.05 TABLET ORAL at 06:34

## 2021-07-31 ASSESSMENT — PAIN SCALES - GENERAL
PAINLEVEL_OUTOF10: 0

## 2021-07-31 NOTE — PROGRESS NOTES
Pt. Ambulated to BR on r/a and sats dropped to 87%.  Pt. Back to bed and on 2L oxygen and pulse ox is 92%

## 2021-07-31 NOTE — PROGRESS NOTES
Pt. IV site occluded and discontinued. Several nurses attempted to start IV, unsuccessfully. PICC team not avail and ICU called to assist. ICU states they will try to come if able.

## 2021-07-31 NOTE — PROGRESS NOTES
Physician Progress Note      John Unger  CSN #:                  [id number]  :                       1970  ADMIT DATE:       2021 6:34 PM  DISCH DATE:  RESPONDING  PROVIDER #:        TIFFANIE FORTE DO        QUERY TEXT:    Type of Encephalopathy: Please provide further specificity, if known. Clinical indicators include: alcohol use, fever, sepsis, cute, encephalopathy,   chronic kidney disease, alcohol, acute  Options provided:  -- Anoxic/hypoxic encephalopathy  -- Metabolic encephalopathy  -- Toxic encephalopathy  -- Hepatic encephalopathy  -- Hypertensive encephalopathy  -- Other - I will add my own diagnosis  -- Disagree - Not applicable / Not valid  -- Disagree - Clinically Unable to determine / Unknown        PROVIDER RESPONSE TEXT:    The patient has metabolic encephalopathy.       Electronically signed by:  Isi Starkey DO 2021 6:42 AM

## 2021-07-31 NOTE — PROGRESS NOTES
Internal Medicine Progress Note    [facility]    Vero Voss. MANA JarvisOLEANNA [facility], HILARIA, HILARIA Davis, MSN, APRN, NP-C  Fritz Holbrook. Vijaya Dominique, MSN, APRN-CNP     Primary Care Physician: Lorenzo Patel DO   Admitting Physician:  Clarisse Villegas DO  Admission date and time: 7/29/2021  6:34 PM    Room:  [address] [place] [address]  Admitting diagnosis: Brain mass [G93.89]    Patient Name: Samira Fischer  MRN: [id number]    Date of Service: 7/31/2021     Subjective:  Liliana Ron is a 46 y.o. female who was seen and examined today,7/31/2021, at the bedside. Liliana Ron states that she remains weak and fatigued today. We discussed the results of the CT scan. We also discussed her positive drug screen and she continues to deny cocaine abuse. We discussed the need to become more ambulatory today and we will move forward with Keane catheter removal.  No family members were present during the examination today but apparently the patient's boyfriend spoke with Dr. Yolande Barrow privately yesterday voicing concerns regarding substance abuse. Review of System:   Constitutional:   Admits to ongoing malaise and fatigue. HEENT:   Denies ear pain, sore throat, sinus or eye problems. Admits to irritation associated with the nasal cannula oxygen. Cardiovascular:   Denies any chest pain, irregular heartbeats, or palpitations. Respiratory:   Ongoing exertional dyspnea. Minimal coughing without sputum production. Gastrointestinal:   Denies nausea, vomiting, diarrhea, or constipation. Denies any abdominal pain. Genitourinary:    Denies any urgency, frequency, hematuria. Voiding  without difficulty. Extremities:   Generalized pain  Neurology:    Denies any headache or focal neurological deficits, admits to generalized weakness and deconditioning. Psch:   Denies being anxious or depressed. Musculoskeletal:    Denies myalgias, joint complaints or back pain. Integumentary:   Denies any rashes, ulcers, or excoriations. Denies bruising. Hematologic/Lymphatic:  Denies bruising or bleeding. Physical Exam:  I/O this shift:  In: -   Out: 100 [Urine:100]    Intake/Output Summary (Last 24 hours) at 7/31/2021 1042  Last data filed at 7/31/2021 0800  Gross per 24 hour   Intake 240 ml   Output 495 ml   Net -255 ml   I/O last 3 completed shifts: In: 240 [P.O.:240]  Out: 595 [Urine:595]  Patient Vitals for the past 96 hrs (Last 3 readings):   Weight   07/30/21 0413 182 lb 8 oz (82.8 kg)   07/29/21 1817 186 lb (84.4 kg)     Vital Signs:   Blood pressure 114/68, pulse 71, temperature 98.6 °F (37 °C), temperature source Oral, resp. rate 20, height 5' 2\" (1.575 m), weight 182 lb 8 oz (82.8 kg), SpO2 95 %. General appearance:  Alert, responsive, oriented to person, place, and time. Appears older than stated age. Head:  Normocephalic. No masses, lesions or tenderness. Eyes:  PERRLA. EOMI. Sclera clear. Buccal mucosa moist.  Wearing oxygen  ENT:  Ears normal. Mucosa normal.  Nasal cannula oxygen is in place. Neck:    Supple. Trachea midline. No thyromegaly. No JVD. No bruits. Heart:    Rhythm regular. Rate controlled. No murmurs. Lungs:    Diminished bilaterally but with decent aeration throughout. I do not appreciate any significant wheezes today. Abdomen:   Soft. Non-tender. Non-distended. Bowel sounds positive. No organomegaly or masses. No pain on palpation. Extremities:    Peripheral pulses present. No peripheral edema. No ulcers. No cyanosis. No clubbing. Neurologic:    Alert x 3. No focal deficit. Cranial nerves grossly intact. No focal weakness. Psych:   Behavior is normal. Mood appears normal. Speech is not rapid and/or pressured. Musculoskeletal:   Spine ROM normal. Muscular strength intact. Gait not assessed. Integumentary:  No rashes. Skin normal color and texture.   Genitalia/Breast:  Currently voiding with use of a Keane catheter. Medication:  Scheduled Meds:   azithromycin  500 mg Oral Daily    sodium chloride flush  5-40 mL Intravenous 2 times per day    enoxaparin  40 mg Subcutaneous Daily    levothyroxine  50 mcg Oral Daily    pantoprazole  40 mg Oral QAM AC    cefTRIAXone (ROCEPHIN) IV  1,000 mg Intravenous Q24H    ipratropium-albuterol  1 ampule Inhalation Q4H While awake    budesonide  0.5 mg Nebulization BID    folic acid  1 mg Oral Daily     Continuous Infusions:   sodium chloride      sodium chloride 75 mL/hr at 07/30/21 0727       Objective Data:  CBC with Differential:    Lab Results   Component Value Date    WBC 9.3 07/31/2021    RBC 3.98 07/31/2021    HGB 12.0 07/31/2021    HCT 37.6 07/31/2021     07/31/2021    MCV 94.5 07/31/2021    MCH 30.2 07/31/2021    MCHC 31.9 07/31/2021    RDW 14.6 07/31/2021    SEGSPCT 82 09/14/2011    LYMPHOPCT 12.7 07/31/2021    MONOPCT 4.3 07/31/2021    BASOPCT 0.2 07/31/2021    MONOSABS 0.40 07/31/2021    LYMPHSABS 1.18 07/31/2021    EOSABS 0.20 07/31/2021    BASOSABS 0.02 07/31/2021     CMP:    Lab Results   Component Value Date     07/31/2021    K 3.6 07/31/2021    K 3.9 07/29/2021     07/31/2021    CO2 24 07/31/2021    BUN 12 07/31/2021    CREATININE 1.2 07/31/2021    GFRAA 57 07/31/2021    LABGLOM 47 07/31/2021    GLUCOSE 118 07/31/2021    GLUCOSE 89 09/14/2011    PROT 5.3 07/31/2021    LABALBU 2.8 07/31/2021    CALCIUM 8.4 07/31/2021    BILITOT <0.2 07/31/2021    ALKPHOS 59 07/31/2021    AST 10 07/31/2021    ALT 9 07/31/2021       Assessment:  1. Sepsis that is multifactorial in nature including urinary tract infection and multifocal pneumonia  2. Acute on chronic respiratory failure with hypoxia in the setting of known COPD  3. Acute metabolic encephalopathy on presentation likely induced by illicit drug use with the patient continuing to deny cocaine abuse despite positive drug screen  4.  Dural based extra-axial mass overlying the left frontal convexity likely representing a meningioma  5. 3 mm left lower lobe pulmonary nodule.    6. Mild pulmonary hypertension  7. Chronic kidney disease stage III  8. Hypothyroidism  9. History of tobacco abuse  10. Daily alcohol consumption    Plan:   As the patient is not forthcoming with illicit drug use and other information, it is difficult to determine how significant much of this work-up has been. We will asked the pulmonary team to provide consultation secondary to the abnormal CT scan of the chest.  Antibiotic therapy will be broadened to assess for atypical organisms. Cultures will be obtained along with procalcitonin and inflammatory markers. We will asked the neurosurgical team to provide consultation in the setting of the extra-axial mass although I believe this is benign in nature. We will continue with nebulized respiratory medications and wean off the nasal cannula oxygen. Keane catheter will be removed today and we have asked the patient to become more ambulatory. Again, to note, the patient's boyfriend spoke privately with Dr. Horace Contreras regarding the concern for illicit drug use. The patient adamantly denies this. More than 50% of my  time was spent at the bedside counseling/coordinating care with the patient and/or family with face to face contact. This time was spent reviewing notes and laboratory data as well as instructing and counseling the patient. Time I spent with the family or surrogate(s) is included only if the patient was incapable of providing the necessary information or participating in medical decisions. I also discussed the differential diagnosis and all of the proposed management plans with the patient and individuals accompanying the patient. Gloria Angulo requires this high level of physician care and nursing on the [facility] unit due the complexity of decision management and chance of rapid decline or death. Continued cardiac monitoring and higher level of nursing are required.  I am readily available for any further decision-making and intervention.      Leatha Dorantes DO, F.A.C.O.I.  7/31/2021  10:42 AM

## 2021-07-31 NOTE — RT PROTOCOL NOTE
RT Nebulizer Bronchodilator Protocol Note    There is a bronchodilator order in the chart from a provider indicating to follow the RT Bronchodilator Protocol and there is an Initiate RT Bronchodilator Protocol order as well (see protocol at bottom of note). The findings from the last RT Protocol Assessment were as follows:  Smoking: >15 Pack years  Surgical Status: No surgery  Xray: Multiple lobe infiltrates/atelectasis/pleural effusion/edema  Respiratory Pattern: RR 12-20  Mental Status: Alert and Oriented  Breath Sounds: Diminished and/or crackles  Cough: Strong, spontaneous, non-productive  Activity Level: Walking unassisted  Oxygen Requirement: Room Air - 2LNC/28% or home setting  Indication for Bronchodilator Therapy: None  Bronchodilator Assessment Score: 6    Aerosolized bronchodilator medication orders have been revised according to the RT Bronchodilator Protocol. RT Bronchodilator Protocol:    Respiratory Therapist to perform RT Therapy Protocol Assessment then follow the protocol. No Indications - adjust the frequency to every 6 hours PRN wheezing or bronchospasm, if no treatments needed after 48 hours then discontinue using Per Protocol order mode. If indication present, adjust the RT bronchodilator orders based on the Bronchodilator Assessment Score as follows:    0-6 - enter or revise RT Bronchodilator order to Albuterol Nebulizer order with frequency of every 2 hours PRN for wheezing or increased work of breathing using Per Protocol order mode. Repeat RT Therapy Protocol Assessment as needed. 7-10 - discontinue any other Inpatient aerosolized bronchodilator medication orders and enter or revise two Albuterol Nebulizer orders, one with BID frequency and one with frequency of every 2 hours PRN wheezing or increased work of breathing using Per Protocol order mode. Repeat RT Therapy Protocol Assessment with second treatment then BID and as needed.       11-13 - discontinue any other Inpatient aerosolized bronchodilator medication orders and enter DuoNeb Nebulizer order with QID frequency and an Albuterol Nebulizer order with frequency of every 2 hours PRN wheezing or increased work of breathing using Per Protocol order mode. Repeat RT Therapy Protocol Assessment with second treatment then QID and as needed. Greater than 13 - discontinue any other Inpatient aerosolized bronchodilator medication orders and enter a DuoNeb Nebulizer order with every 4 hours frequency and an Albuterol Nebulizer order with frequency of every 2 hours PRN wheezing or increased work of breathing using Per Protocol order mode. Repeat RT Therapy Protocol Assessment with second treatment then every 4 hours and as needed. RT to enter RT Home Evaluation for COPD & MDI Assessment order using Per Protocol order mode.     Electronically signed by Juvencio Ramos RCP on 7/31/2021 at 2:30 PM

## 2021-07-31 NOTE — CONSULTS
Assessment and Plan  Patient is a 46 y.o. female with the following medical Problems:   1. Crack lungs  2. Traction bronchiectasis  3. Acute hypoxic respiratory failure requiring supplemental oxygen  4. Atelectasis  5. Nicotine dependence  6. COPD with mild acute exacerbation  7. Cocaine abuse  8. Metabolic encephalopathy  9. Secondary pulmonary hypertension  10. 1.6 cm calcified meningioma along the left frontal convexity with mild mass-effect    Plan of care:  1. Patient CT scan finding are most consistent with Crack lungs and related to cocaine abuse. Patient had similar findings on March 29, 2021, however, the most recent CT scan showed worsening of those findings. 2.  Patient will need a follow-up CT scan in 3 to 6 months to ensure resolution  3. Patient was strongly encouraged to quit smoking and quit cocaine abuse  4. Patient has faint wheezing. Solu-Medrol 20 mg was added  5. Continue with azithromycin for 5 days for acute exacerbation of COPD  6. DVT prophylaxis    History:  Patient is a 63-year-old woman who presented to the emergency room on July 29, 2021 with dysuria, urinary retention, and abdominal pain. She has been complaining of dry heaves and nausea as well. Patient had a CT scan of the chest for evaluation of lung nodules. CT scan showed findings are most consistent with cocaine abuse and crack lungs.     Past Medical History:  Past Medical History:   Diagnosis Date    Arthritis     Bursitis     hips    Class 3 severe obesity due to excess calories with body mass index (BMI) of 40.0 to 44.9 in adult [facility])     COPD (chronic obstructive pulmonary disease) (HCC)     Diverticulitis     GERD (gastroesophageal reflux disease)     Incisional hernia with obstruction but no gangrene     Strep throat 2021        Family History:   Family History   Problem Relation Age of Onset    Osteoarthritis Other     Diabetes Other     Other Father         pancreatitis    Prostate Cancer Father     Stroke Brother         half brother    Cancer Paternal Grandfather        Allergies:         Ibuprofen, Nsaids, and Morphine    Social history:  Social History     Socioeconomic History    Marital status:      Spouse name: Mark Lee Number of children: 3    Years of education: 15    Highest education level: Not on file   Occupational History     Employer: [employer]   Tobacco Use    Smoking status: Former Smoker     Packs/day: 1.00     Years: 30.00     Pack years: 30.00     Types: Cigarettes     Quit date: 3/5/2021     Years since quittin.4    Smokeless tobacco: Never Used   Vaping Use    Vaping Use: Never used   Substance and Sexual Activity    Alcohol use: Yes     Comment: daily vodka drinker    Drug use: Not Currently     Types: Marijuana     Comment: medical marijuana.  Sexual activity: Yes     Partners: Male   Other Topics Concern    Not on file   Social History Narrative     twice. Recently  2020     Social Determinants of Health     Financial Resource Strain:     Difficulty of Paying Living Expenses:    Food Insecurity:     Worried About [address] in the Last Year:     [address] [employer] in the Last Year:    Transportation Needs:     Lack of Transportation (Medical):      Lack of Transportation (Non-Medical):    Physical Activity:     Days of Exercise per Week:     Minutes of Exercise per Session:    Stress:     Feeling of Stress :    Social Connections:     Frequency of Communication with Friends and Family:     Frequency of Social Gatherings with Friends and Family:     Attends Worship Services:     Active Member of Clubs or Organizations:     Attends Club or Organization Meetings:     Marital Status:    Intimate Partner Violence:     Fear of Current or Ex-Partner:     Emotionally Abused:     Physically Abused:     Sexually Abused:        Current Medications:     Current Facility-Administered Medications:     azithromycin (ZITHROMAX) tablet 500 mg, 500 mg, Oral, Daily, Janalyn Dorantes, DO, 500 mg at 07/31/21 1130    sodium chloride flush 0.9 % injection 5-40 mL, 5-40 mL, Intravenous, 2 times per day, [employer], DO, 10 mL at 07/31/21 0915    sodium chloride flush 0.9 % injection 5-40 mL, 5-40 mL, Intravenous, PRN, [employer], DO    0.9 % sodium chloride infusion, 25 mL, Intravenous, PRN, [employer], DO    enoxaparin (LOVENOX) injection 40 mg, 40 mg, Subcutaneous, Daily, Ismail U Jozef, DO, 40 mg at 07/31/21 0915    polyethylene glycol (GLYCOLAX) packet 17 g, 17 g, Oral, Daily PRN, [employer], DO, 17 g at 07/31/21 1327    acetaminophen (TYLENOL) tablet 650 mg, 650 mg, Oral, Q6H PRN **OR** acetaminophen (TYLENOL) suppository 650 mg, 650 mg, Rectal, Q6H PRN, [employer], DO    levothyroxine (SYNTHROID) tablet 50 mcg, 50 mcg, Oral, Daily, [employer], DO, 50 mcg at 07/31/21 7990    pantoprazole (PROTONIX) tablet 40 mg, 40 mg, Oral, QAM AC, Ismail U Jozef, DO, 40 mg at 07/31/21 8442    cefTRIAXone (ROCEPHIN) 1,000 mg in sterile water 10 mL IV syringe, 1,000 mg, Intravenous, Q24H, Ismail U Jozef, DO, 1,000 mg at 07/31/21 8920    albuterol (PROVENTIL) nebulizer solution 2.5 mg, 2.5 mg, Nebulization, Q6H PRN, [employer], DO    ipratropium-albuterol (DUONEB) nebulizer solution 1 ampule, 1 ampule, Inhalation, Q4H While awake, [employer], DO, 1 ampule at 07/31/21 1356    budesonide (PULMICORT) nebulizer suspension 500 mcg, 0.5 mg, Nebulization, BID, Ismail U Jozef, DO, 500 mcg at 07/31/21 0601    prochlorperazine (COMPAZINE) injection 10 mg, 10 mg, Intravenous, Q6H PRN, [employer], DO    LORazepam (ATIVAN) injection 0.5 mg, 0.5 mg, Intravenous, Q4H PRN, Jacqualine Calkin Bisel, DO, 0.5 mg at 07/30/21 2030    perflutren lipid microspheres (DEFINITY) injection 1.65 mg, 1.5 mL, Intravenous, ONCE PRN, Jacqualine Calkin Bisel, DO    folic acid (FOLVITE) tablet 1 mg, 1 mg, Oral, Daily, Harvis Leventhal Bisel, DO, 1 mg at 07/31/21 0915    hydrOXYzine (ATARAX) tablet 50 mg, 50 mg, Oral, Nightly PRN, Harvis Leventhal Bisel, DO, 50 mg at 07/30/21 2330      Review of Systems:   General: denies weight gain, denies loss of appetite, fever, chills, night sweats. HEENT: denies headaches, dizziness, head trauma, visual changes, eye pain, tinnitus, nosebleeds, hoarseness or throat pain    Respiratory: denies chest pain, +ve dyspnea and cough but no hemoptysis  Cardiovascular: denies orthopnea, paroxysmal nocturnal dyspnea, leg swelling, and previous heart attack. Gastrointestinal: denies pain, nausea vomiting, diarrhea, constipation, melena or bleeding. Genitourinary: denies hematuria, frequency, urgency or dysuria  Neurology: denies syncope, seizures, paralysis, paraesthesia   Endocrine: denies polyuria, polydipsia, skin or hair changes, and heat or cold intolerance  Musculoskeletal: denies joint pain, swelling, arthritis or myalgia  Hematologic: denies bleeding, adenopathy and easy bruising  Skin: denies rashes and skin discoloration  Psychiatry: denies depression    Physical Exam:   Vital Signs:  /68   Pulse 71   Temp 98.6 °F (37 °C) (Oral)   Resp 20   Ht 5' 2\" (1.575 m)   Wt 182 lb 8 oz (82.8 kg)   SpO2 92%   BMI 33.38 kg/m²     Input/Output: In: 5 [P.O.:420]  Out: 495     Oxygen requirements: NC      General appearance:  not in pain or distress, in no respiratory distress    HEENT: Atraumatic/normocephalic, EOMI, JOSUE, pharynx clear, moist mucosa, redness of the uvula appreciated,   Neck: Supple, no jugular venous distension, lymphadenopathy, thyromegaly or carotid bruits  Chest: Equal normal breath sounds, +ve Faint wheezing, no crackles and no tenderness over ribs   Cardiovascular: Normal S1, S2, regular rate and rhythm, no murmur, rub or gallop  Abdomen: Normal sounds present, soft, lax with no tenderness, no hepatosplenomegaly, and no masses  Extremities: No edema.  Pulses are equally present. Skin: intact, no rashes   Neurologic: Alert and oriented x 3, No focal deficit     Investigations:  Labs, radiological imaging and cardiac work up were reviewed        STAFF PHYSICIAN NOTE OF PERSONAL INVOLVEMENT IN CARE  As the attending physician, I certify that I personally reviewed the patient's history and personally examined the patient to confirm the physical findings described above, and that I reviewed the relevant imaging studies and available reports. I also discussed the differential diagnosis and all of the proposed management plans with the patient and individuals accompanying the patient to this visit. They had the opportunity to ask questions about the proposed management plans and to have those questions answered.       Electronically signed by Hyun Davis MD on 7/31/2021 at 2:19 PM

## 2021-07-31 NOTE — CONSULTS
Infectious Disease Consult Note     Admit Date: 2021  6:34 PM    Chief complaint: none     Reason for Consult:  UTI    Requesting Physician:  Delaney Joyce 31, DO      HISTORY OF PRESENT ILLNESS:    This is a 46year old female with recent history of UTI which she was given Macrobid x 2 days by PCP. As per family (mother and fiance), over the last 3-4 weeks she has not been acting right to including confusion, mood swings, lightheadedness, headaches. She was brought in to the ER with above symptoms stating she has not been to sleep in 3 days. Urine drug screen positive for Cocaine. CT showed 1.6 cm calcified meningioma along the high left frontal convexity with mild mass-effect and 3 mm left lower lobe pulmonary nodule. Id consulted for UTI. REVIEW OF SYSTEMS:    Negative except as above     MEDICAL HISTORY  Past Medical History:   Diagnosis Date    Arthritis     Bursitis     hips    Class 3 severe obesity due to excess calories with body mass index (BMI) of 40.0 to 44.9 in [facility])     COPD (chronic obstructive pulmonary disease) (HCC)     Diverticulitis     GERD (gastroesophageal reflux disease)     Incisional hernia with obstruction but no gangrene     Strep throat         There is no immunization history on file for this patient.   Past Surgical History:   Procedure Laterality Date     SECTION      CHOLECYSTECTOMY      COLECTOMY  2016    FOREARM FRACTURE SURGERY Left     fracture of ulna s/p repair    HERNIA REPAIR      HERNIA REPAIR  2015    incarcerated hernia repair    HERNIA REPAIR N/A 3/25/2021    INCISIONAL HERNIA REPAIR WITH MESH, POSSIBLE COMPONENT SEPARATION  LAPAROSCOPIC ROBOTIC XI ASSISTED (CPT 63388) performed by Tiffany Salgado MD at [address] [place] [address]      sacroiliac bilateral 2012    PELVIC FRACTURE SURGERY      VENTRAL HERNIA REPAIR  2015     FAMILY HISTORY  family history includes Cancer in her paternal grandfather; Diabetes in an Intravenous 2 times per day    enoxaparin  40 mg Subcutaneous Daily    levothyroxine  50 mcg Oral Daily    pantoprazole  40 mg Oral QAM AC    cefTRIAXone (ROCEPHIN) IV  1,000 mg Intravenous Q24H    budesonide  0.5 mg Nebulization BID    folic acid  1 mg Oral Daily     Continuous Infusions:   sodium chloride       PRN Meds:albuterol, sodium chloride flush, sodium chloride, polyethylene glycol, acetaminophen **OR** acetaminophen, prochlorperazine, LORazepam, perflutren lipid microspheres, hydrOXYzine      PHYSICAL EXAM:  Vitals:    07/30/21 2000 07/31/21 0645 07/31/21 0730 07/31/21 1035   BP: 124/64 125/60 114/68    Pulse: 72 75 71    Resp: 26 20 20    Temp: 96 °F (35.6 °C) 96.5 °F (35.8 °C) 98.6 °F (37 °C)    TempSrc: Oral Oral Oral    SpO2: 96% 96% 95% 92%   Weight:       Height:           General Appearance:       Alert, cooperative, no distress, appears stated age -- confused         Heent:    Normocephalic, atraumatic,     PERRL, conjunctiva/corneas clear     no drainage or sinus tenderness      Neck:   Supple, symmetrical, trachea midline   Back:     Symmetric, no CVA tenderness   Lungs:     Clear to auscultation bilaterally, respirations unlabored   Chest Wall:    No tenderness or deformity    Heart:    Regular rate and rhythm, S1 and S2 normal, no murmur, rub or   gallop   Abdomen:     Soft, non-tender, bowel sounds active all four quadrants         Extremities:   Extremities normal, atraumatic, no cyanosis or edema   Pulses:   LE extremities   Skin:   Skin color, texture, turgor normal, no rashes or lesions   Lymph nodes:   Cervical, supraclavicular, and axillary nodes normal   Neurologic:   CNII-XII intact, no focal deficits    LINES : piv        LABS AND DATA REVIEW:     Recent Labs     07/29/21 1912 07/31/21  0615   WBC 17.4* 9.3   HGB 14.3 12.0   HCT 42.7 37.6   MCV 90.9 94.5    200     Recent Labs     07/29/21 1912 07/29/21 1912 07/30/21  0549 07/30/21  0549 07/31/21  0615     --  139  -- 142   K 3.9  --  3.3*   < > 3.6      < > 106   < > 108*   CO2 20*   < > 24   < > 24   BUN 12  --  9  --  12   CREATININE 1.2*  --  1.1*  --  1.2*   GFRAA 57  --  >60  --  57   LABGLOM 47  --  52  --  47   GLUCOSE 116*  --  86  --  118*   PROT 6.7  --  5.3*  --  5.3*   LABALBU 3.6  --  2.9*  --  2.8*   CALCIUM 9.2  --  8.3*  --  8.4*   BILITOT 0.7  --  0.6  --  <0.2   ALKPHOS 82  --  64  --  59   AST 15  --  10  --  10   ALT 12  --  9  --  9    < > = values in this interval not displayed. BLOOD CX #1  Recent Labs     07/30/21  0549   BC 24 Hours no growth     BLOOD CX #2  Recent Labs     07/30/21  0549   BLOODCULT2 24 Hours no growth       WOUND culture  No results for input(s): WNDABS in the last 72 hours. URINE CULTURE  Recent Labs     07/29/21 2005   LABURIN Growth not present, incubation continues         ASSESSMENT & PLAN  Neurology symptoms - headache, confusion, mood swings  UTI symptoms s/p 2 days of Macrobid  Urine culture negative to date   Cocaine abuse - states daily use (does not want mother to know)  CT showed 1.6 cm calcified meningioma along the high left frontal convexity with mild mass-effect and 3 mm left lower lobe pulmonary nodule. Clinically do not feel this is UTI related. Will DC Rocephin. Continue on Steroids   Follow clinically   Monitor labs     Thank you for consult. BOB Glover NP  7/31/2021  2:46 PM    I have discussed the case, including pertinent history and physical  exam findings . I have seen and examined the patient and the key elements of the encounter have been performed by me. I agree with the assessment, plan and orders as documented.       Treatment plan as per my recommendation     Héctor Burrows MD, FACP  7/31/2021  3:04 PM

## 2021-08-01 LAB
ALBUMIN SERPL-MCNC: 2.9 G/DL (ref 3.5–5.2)
ALP BLD-CCNC: 54 U/L (ref 35–104)
ALT SERPL-CCNC: 10 U/L (ref 0–32)
ANION GAP SERPL CALCULATED.3IONS-SCNC: 7 MMOL/L (ref 7–16)
AST SERPL-CCNC: 8 U/L (ref 0–31)
BASOPHILS ABSOLUTE: 0.01 E9/L (ref 0–0.2)
BASOPHILS RELATIVE PERCENT: 0.2 % (ref 0–2)
BILIRUB SERPL-MCNC: <0.2 MG/DL (ref 0–1.2)
BUN BLDV-MCNC: 9 MG/DL (ref 6–20)
CALCIUM SERPL-MCNC: 8 MG/DL (ref 8.6–10.2)
CHLORIDE BLD-SCNC: 108 MMOL/L (ref 98–107)
CO2: 26 MMOL/L (ref 22–29)
CREAT SERPL-MCNC: 1 MG/DL (ref 0.5–1)
EOSINOPHILS ABSOLUTE: 0.06 E9/L (ref 0.05–0.5)
EOSINOPHILS RELATIVE PERCENT: 1 % (ref 0–6)
GFR AFRICAN AMERICAN: >60
GFR NON-AFRICAN AMERICAN: 58 ML/MIN/1.73
GLUCOSE BLD-MCNC: 140 MG/DL (ref 74–99)
HCT VFR BLD CALC: 36.9 % (ref 34–48)
HEMOGLOBIN: 11.8 G/DL (ref 11.5–15.5)
IMMATURE GRANULOCYTES #: 0.02 E9/L
IMMATURE GRANULOCYTES %: 0.3 % (ref 0–5)
L. PNEUMOPHILA SEROGP 1 UR AG: NORMAL
LYMPHOCYTES ABSOLUTE: 0.81 E9/L (ref 1.5–4)
LYMPHOCYTES RELATIVE PERCENT: 13.9 % (ref 20–42)
MCH RBC QN AUTO: 29.9 PG (ref 26–35)
MCHC RBC AUTO-ENTMCNC: 32 % (ref 32–34.5)
MCV RBC AUTO: 93.4 FL (ref 80–99.9)
MONOCYTES ABSOLUTE: 0.27 E9/L (ref 0.1–0.95)
MONOCYTES RELATIVE PERCENT: 4.6 % (ref 2–12)
NEUTROPHILS ABSOLUTE: 4.67 E9/L (ref 1.8–7.3)
NEUTROPHILS RELATIVE PERCENT: 80 % (ref 43–80)
PDW BLD-RTO: 14.5 FL (ref 11.5–15)
PLATELET # BLD: 179 E9/L (ref 130–450)
PMV BLD AUTO: 9.4 FL (ref 7–12)
POTASSIUM SERPL-SCNC: 4.4 MMOL/L (ref 3.5–5)
RBC # BLD: 3.95 E12/L (ref 3.5–5.5)
SODIUM BLD-SCNC: 141 MMOL/L (ref 132–146)
STREP PNEUMONIAE ANTIGEN, URINE: NORMAL
TOTAL PROTEIN: 5.1 G/DL (ref 6.4–8.3)
URINE CULTURE, ROUTINE: NORMAL
WBC # BLD: 5.8 E9/L (ref 4.5–11.5)

## 2021-08-01 PROCEDURE — 2580000003 HC RX 258: Performed by: INTERNAL MEDICINE

## 2021-08-01 PROCEDURE — 85025 COMPLETE CBC W/AUTO DIFF WBC: CPT

## 2021-08-01 PROCEDURE — 6370000000 HC RX 637 (ALT 250 FOR IP): Performed by: INTERNAL MEDICINE

## 2021-08-01 PROCEDURE — 2700000000 HC OXYGEN THERAPY PER DAY

## 2021-08-01 PROCEDURE — 80053 COMPREHEN METABOLIC PANEL: CPT

## 2021-08-01 PROCEDURE — 36415 COLL VENOUS BLD VENIPUNCTURE: CPT

## 2021-08-01 PROCEDURE — 94640 AIRWAY INHALATION TREATMENT: CPT

## 2021-08-01 PROCEDURE — 6360000002 HC RX W HCPCS: Performed by: INTERNAL MEDICINE

## 2021-08-01 PROCEDURE — 2060000000 HC ICU INTERMEDIATE R&B

## 2021-08-01 RX ADMIN — LEVOTHYROXINE SODIUM 50 MCG: 0.05 TABLET ORAL at 06:04

## 2021-08-01 RX ADMIN — PREDNISONE 10 MG: 10 TABLET ORAL at 20:22

## 2021-08-01 RX ADMIN — BUDESONIDE 500 MCG: 0.5 INHALANT RESPIRATORY (INHALATION) at 18:34

## 2021-08-01 RX ADMIN — LEVETIRACETAM 500 MG: 500 TABLET ORAL at 08:37

## 2021-08-01 RX ADMIN — FOLIC ACID 1 MG: 1 TABLET ORAL at 08:37

## 2021-08-01 RX ADMIN — BUDESONIDE 500 MCG: 0.5 INHALANT RESPIRATORY (INHALATION) at 06:21

## 2021-08-01 RX ADMIN — ENOXAPARIN SODIUM 40 MG: 40 INJECTION SUBCUTANEOUS at 08:37

## 2021-08-01 RX ADMIN — AZITHROMYCIN MONOHYDRATE 500 MG: 250 TABLET ORAL at 08:37

## 2021-08-01 RX ADMIN — PANTOPRAZOLE SODIUM 40 MG: 40 TABLET, DELAYED RELEASE ORAL at 06:04

## 2021-08-01 RX ADMIN — Medication 5 ML: at 20:23

## 2021-08-01 RX ADMIN — LEVETIRACETAM 500 MG: 500 TABLET ORAL at 20:23

## 2021-08-01 RX ADMIN — HYDROXYZINE HYDROCHLORIDE 50 MG: 25 TABLET ORAL at 22:17

## 2021-08-01 RX ADMIN — Medication 10 ML: at 08:38

## 2021-08-01 RX ADMIN — PREDNISONE 10 MG: 10 TABLET ORAL at 08:37

## 2021-08-01 ASSESSMENT — PAIN SCALES - GENERAL
PAINLEVEL_OUTOF10: 0
PAINLEVEL_OUTOF10: 1
PAINLEVEL_OUTOF10: 0

## 2021-08-01 ASSESSMENT — PAIN DESCRIPTION - PROGRESSION: CLINICAL_PROGRESSION: GRADUALLY WORSENING

## 2021-08-01 NOTE — PROGRESS NOTES
Infectious Disease   Progress Note     Chief complaint: tired    SUBJECTIVE  Seen at bedside  Awake and alert - lethargic at times   C/o headache, mood swings   Tolerating medications, no side effects   No urinary symptoms     REVIEW OF SYSTEMS:    Negative except as above     Current Medications:     Scheduled Meds:   azithromycin  500 mg Oral Daily    predniSONE  10 mg Oral BID    levETIRAcetam  500 mg Oral BID    sodium chloride flush  5-40 mL Intravenous 2 times per day    enoxaparin  40 mg Subcutaneous Daily    levothyroxine  50 mcg Oral Daily    pantoprazole  40 mg Oral QAM AC    budesonide  0.5 mg Nebulization BID    folic acid  1 mg Oral Daily     Continuous Infusions:   sodium chloride       PRN Meds:albuterol, sodium chloride flush, sodium chloride, polyethylene glycol, acetaminophen **OR** acetaminophen, prochlorperazine, perflutren lipid microspheres, hydrOXYzine      PHYSICAL EXAM:  Vitals:    07/31/21 1035 07/31/21 1600 08/01/21 0645 08/01/21 0826   BP:  115/70 122/70 137/71   Pulse:  75 76 65   Resp:  20 18 18   Temp:  98.2 °F (36.8 °C) 98.2 °F (36.8 °C) 98.2 °F (36.8 °C)   TempSrc:  Oral Oral Oral   SpO2: 92% 92% 94% 92%   Weight:       Height:           General Appearance:       Alert, cooperative, no distress, appears stated age -- confused         Heent:    Normocephalic, atraumatic,     PERRL, conjunctiva/corneas clear     no drainage or sinus tenderness      Neck:   Supple, symmetrical, trachea midline   Back:     Symmetric, no CVA tenderness   Lungs:     Clear to auscultation bilaterally, respirations unlabored   Chest Wall:    No tenderness or deformity    Heart:    Regular rate and rhythm, S1 and S2 normal, no murmur, rub or   gallop   Abdomen:     Soft, non-tender, bowel sounds active all four quadrants         Extremities:   Extremities normal, atraumatic, no cyanosis or edema   Pulses:   LE extremities   Skin:   Skin color, texture, turgor normal, no rashes or lesions   Lymph nodes:   Cervical, supraclavicular, and axillary nodes normal   Neurologic:   CNII-XII intact, no focal deficits    LINES : piv        LABS AND DATA REVIEW:     Recent Labs     07/29/21  1912 07/31/21 0615 08/01/21 0453   WBC 17.4* 9.3 5.8   HGB 14.3 12.0 11.8   HCT 42.7 37.6 36.9   MCV 90.9 94.5 93.4    200 179     Recent Labs     07/30/21  0549 07/30/21  0549 07/31/21 0615 07/31/21 0615 08/01/21 0453     --  142  --  141   K 3.3*   < > 3.6   < > 4.4      < > 108*   < > 108*   CO2 24   < > 24   < > 26   BUN 9  --  12  --  9   CREATININE 1.1*  --  1.2*  --  1.0   GFRAA >60  --  57  --  >60   LABGLOM 52  --  47  --  58   GLUCOSE 86  --  118*  --  140*   PROT 5.3*  --  5.3*  --  5.1*   LABALBU 2.9*  --  2.8*  --  2.9*   CALCIUM 8.3*  --  8.4*  --  8.0*   BILITOT 0.6  --  <0.2  --  <0.2   ALKPHOS 64  --  59  --  54   AST 10  --  10  --  8   ALT 9  --  9  --  10    < > = values in this interval not displayed. BLOOD CX #1  Recent Labs     07/30/21  0549   BC 24 Hours no growth     BLOOD CX #2  Recent Labs     07/30/21  0549   BLOODCULT2 24 Hours no growth       WOUND culture  No results for input(s): WNDABS in the last 72 hours. URINE CULTURE  Recent Labs     07/29/21 2005   LABURIN Growth not present         ASSESSMENT  Neurology symptoms - headache, confusion, mood swings  UTI symptoms s/p 2 days of Macrobid  Urine culture negative to date   Cocaine abuse - states daily use (does not want mother to know)  CT showed 1.6 cm calcified meningioma along the high left frontal convexity with mild mass-effect and 3 mm left lower lobe pulmonary nodule.     PLAN  Azithromycin x 5 days per pulmonary     Continue on Steroids     Neurosurgery seeing for meningioma - Keppra and monitor tumor VS surgery - family and patient will discuss plan     Monitor labs     BOB Andersen NP  8/1/2021  11:51 AM     Patient examined along with the nurse practitioner  Agree with above  Clinically doing better, less headache less confusion  She is on azithromycin started by pulmonologist   From infectious disease standpoint there is no active infection    We can see her as needed    I have discussed the case, including pertinent history and physical  exam findings . I have seen and examined the patient and the key elements of the encounter have been performed by me. I agree with the assessment, plan and orders as documented.       Treatment plan as per my recommendation     Zari Trinidad MD, FACP  8/1/2021  2:12 PM

## 2021-08-01 NOTE — PROGRESS NOTES
Internal Medicine Progress Note    [facility]    Basim Russ. Renea Sahu., RACHEL.A.LILLIEOManoloI. Rocky Wallace D.O., SHILPI Bautista D.O. Andre Levy, MSN, APRN, NP-C  Willard Bre. Launie Klinefelter, MSN, APRN-CNP     Primary Care Physician: Brynn Valverde DO   Admitting Physician:  Mindy Villeda DO  Admission date and time: 7/29/2021  6:34 PM    Room:  [address] [place] [address]  Admitting diagnosis: Brain mass [G93.89]    Patient Name: Hadley Amaro  MRN: [id number]    Date of Service: 8/1/2021     Subjective:  Eunice Del Real is a 46 y.o. female who was seen and examined today,8/1/2021, at the bedside. Eunice Del Real ultimately admitted to cocaine abuse yesterday to both the pulmonary and infectious disease teams after denying it repeatedly during my examination. Her radiographic findings are compatible with cocaine induced lung injury. Antibiotics have been transitioned to oral. She is maintained on her at home nasal cannula oxygen requirement of 2 L of nasal cannula oxygen nocturnally. She continues to request Ativan regularly in the setting of drug-seeking tendencies. She has also been evaluated by the neurosurgical team with recommendations reviewed. We discussed discharge planning at length. No family members were present during my examination. Review of System:   Constitutional:   Admits to ongoing malaise and fatigue. HEENT:   Denies ear pain, sore throat, sinus or eye problems. Admits to irritation associated with the nasal cannula oxygen and therefore noncompliance with usage  Cardiovascular:   Denies any chest pain, irregular heartbeats, or palpitations. Respiratory:   Ongoing exertional dyspnea. Minimal coughing without sputum production. Gastrointestinal:   Denies nausea, vomiting, diarrhea, or constipation. Denies any abdominal pain. Genitourinary:    Denies any urgency, frequency, hematuria. Voiding without difficulty.   Extremities:   Generalized pain  Neurology:    Denies any headache or focal neurological deficits, admits to generalized weakness and deconditioning. Psch:   Denies being anxious or depressed. Musculoskeletal:    Denies myalgias, joint complaints or back pain. Integumentary:   Denies any rashes, ulcers, or excoriations. Denies bruising. Hematologic/Lymphatic:  Denies bruising or bleeding. Physical Exam:  No intake/output data recorded. Intake/Output Summary (Last 24 hours) at 8/1/2021 0910  Last data filed at 8/1/2021 0620  Gross per 24 hour   Intake 420 ml   Output 500 ml   Net -80 ml   I/O last 3 completed shifts: In: 420 [P.O.:420]  Out: 600 [Urine:600]  Patient Vitals for the past 96 hrs (Last 3 readings):   Weight   07/30/21 0413 182 lb 8 oz (82.8 kg)   07/29/21 1817 186 lb (84.4 kg)     Vital Signs:   Blood pressure 137/71, pulse 65, temperature 98.2 °F (36.8 °C), temperature source Oral, resp. rate 18, height 5' 2\" (1.575 m), weight 182 lb 8 oz (82.8 kg), SpO2 92 %. General appearance:  Alert, responsive, oriented to person, place, and time. Appears older than stated age. Head:  Normocephalic. No masses, lesions or tenderness. Eyes:  PERRLA. EOMI. Sclera clear. Buccal mucosa moist.  Wearing oxygen  ENT:  Ears normal. Mucosa normal.  Nasal cannula oxygen is in place. Neck:    Supple. Trachea midline. No thyromegaly. No JVD. No bruits. Heart:    Rhythm regular. Rate controlled. No murmurs. Lungs:    Diminished bilaterally but with decent aeration throughout. I do not appreciate any significant wheezes today. Abdomen:   Soft. Non-tender. Non-distended. Bowel sounds positive. No organomegaly or masses. No pain on palpation. Extremities:    Peripheral pulses present. No peripheral edema. No ulcers. No cyanosis. No clubbing. Neurologic:    Alert x 3. No focal deficit. Cranial nerves grossly intact. No focal weakness. Psych:   Behavior is normal. Mood appears normal. Speech is not rapid and/or pressured.   Musculoskeletal:   Spine ROM normal. Muscular strength intact. Gait not assessed. Integumentary:  No rashes. Skin normal color and texture. Genitalia/Breast:  Currently voiding with use of a Keane catheter. Medication:  Scheduled Meds:   azithromycin  500 mg Oral Daily    predniSONE  10 mg Oral BID    levETIRAcetam  500 mg Oral BID    sodium chloride flush  5-40 mL Intravenous 2 times per day    enoxaparin  40 mg Subcutaneous Daily    levothyroxine  50 mcg Oral Daily    pantoprazole  40 mg Oral QAM AC    budesonide  0.5 mg Nebulization BID    folic acid  1 mg Oral Daily     Continuous Infusions:   sodium chloride         Objective Data:  CBC with Differential:    Lab Results   Component Value Date    WBC 5.8 08/01/2021    RBC 3.95 08/01/2021    HGB 11.8 08/01/2021    HCT 36.9 08/01/2021     08/01/2021    MCV 93.4 08/01/2021    MCH 29.9 08/01/2021    MCHC 32.0 08/01/2021    RDW 14.5 08/01/2021    SEGSPCT 82 09/14/2011    LYMPHOPCT 13.9 08/01/2021    MONOPCT 4.6 08/01/2021    BASOPCT 0.2 08/01/2021    MONOSABS 0.27 08/01/2021    LYMPHSABS 0.81 08/01/2021    EOSABS 0.06 08/01/2021    BASOSABS 0.01 08/01/2021     CMP:    Lab Results   Component Value Date     08/01/2021    K 4.4 08/01/2021    K 3.9 07/29/2021     08/01/2021    CO2 26 08/01/2021    BUN 9 08/01/2021    CREATININE 1.0 08/01/2021    GFRAA >60 08/01/2021    LABGLOM 58 08/01/2021    GLUCOSE 140 08/01/2021    GLUCOSE 89 09/14/2011    PROT 5.1 08/01/2021    LABALBU 2.9 08/01/2021    CALCIUM 8.0 08/01/2021    BILITOT <0.2 08/01/2021    ALKPHOS 54 08/01/2021    AST 8 08/01/2021    ALT 10 08/01/2021       Assessment:  1. Sepsis that is multifactorial in nature including urinary tract infection and multifocal pneumonia  2. Acute on chronic respiratory failure with hypoxia in the setting of known COPD  3.  Acute metabolic encephalopathy on presentation likely induced by illicit drug use with the patient continuing to deny cocaine abuse despite positive drug

## 2021-08-01 NOTE — PROGRESS NOTES
Assessment and Plan  Patient is a 46 y.o. female with the following medical Problems:   1. Crack lungs  2. Traction bronchiectasis  3. Acute hypoxic respiratory failure requiring supplemental oxygen  4. Atelectasis  5. Nicotine dependence  6. COPD with mild acute exacerbation  7. Cocaine abuse  8. Metabolic encephalopathy  9. Secondary pulmonary hypertension  10. 1.6 cm calcified meningioma along the left frontal convexity with mild mass-effect    Plan of care:  1. Patient CT scan finding are most consistent with Crack lungs and related to cocaine abuse. Patient had similar findings on March 29, 2021, however, the most recent CT scan showed worsening of those findings. 2.  Patient will need a follow-up CT scan in 3 to 6 months to ensure resolution  3. Patient was strongly encouraged to quit smoking and quit cocaine abuse  4. Patient has faint wheezing. She was transitioned to prednisone. 5.  Continue with azithromycin for 5 days for acute exacerbation of COPD  6. DVT prophylaxis    History:  Patient is a 70-year-old woman who presented to the emergency room on July 29, 2021 with dysuria, urinary retention, and abdominal pain. She has been complaining of dry heaves and nausea as well. Patient had a CT scan of the chest for evaluation of lung nodules. CT scan showed findings are most consistent with cocaine abuse and crack lungs. Daily progress:  08/01/2021: Patient improved significantly. He is currently on 2 L nasal cannula. She has no fever, chills, or rigors. She denies chest pain.     Past Medical History:  Past Medical History:   Diagnosis Date    Arthritis     Bursitis     hips    Class 3 severe obesity due to excess calories with body mass index (BMI) of 40.0 to 44.9 in adult [facility])     COPD (chronic obstructive pulmonary disease) (HCC)     Diverticulitis     GERD (gastroesophageal reflux disease)     Incisional hernia with obstruction but no gangrene     Strep throat         Family History:   Family History   Problem Relation Age of Onset    Osteoarthritis Other     Diabetes Other     Other Father         pancreatitis    Prostate Cancer Father     Stroke Brother         half brother    Cancer Paternal Grandfather        Allergies:         Ibuprofen, Nsaids, and Morphine    Social history:  Social History     Socioeconomic History    Marital status:      Spouse name: Prabhakar Crews Number of children: 3    Years of education: 15    Highest education level: Not on file   Occupational History     Employer: [employer]   Tobacco Use    Smoking status: Former Smoker     Packs/day: 1.00     Years: 30.00     Pack years: 30.00     Types: Cigarettes     Quit date: 3/5/2021     Years since quittin.4    Smokeless tobacco: Never Used   Vaping Use    Vaping Use: Never used   Substance and Sexual Activity    Alcohol use: Yes     Comment: daily vodka drinker    Drug use: Not Currently     Types: Marijuana     Comment: medical marijuana.  Sexual activity: Yes     Partners: Male   Other Topics Concern    Not on file   Social History Narrative     twice. Recently  2020     Social Determinants of Health     Financial Resource Strain:     Difficulty of Paying Living Expenses:    Food Insecurity:     Worried About [address] in the Last Year:     [address] [employer] in the Last Year:    Transportation Needs:     Lack of Transportation (Medical):      Lack of Transportation (Non-Medical):    Physical Activity:     Days of Exercise per Week:     Minutes of Exercise per Session:    Stress:     Feeling of Stress :    Social Connections:     Frequency of Communication with Friends and Family:     Frequency of Social Gatherings with Friends and Family:     Attends L[employer] Services:     Active Member of Clubs or Organizations:     Attends Club or Organization Meetings:     Marital Status:    Intimate Partner Violence:     Fear of Current or Ex-Partner:     Emotionally Abused:     Physically Abused:     Sexually Abused:        Current Medications:     Current Facility-Administered Medications:     azithromycin (ZITHROMAX) tablet 500 mg, 500 mg, Oral, Daily, Voncile Oven, DO, 500 mg at 08/01/21 0837    albuterol (PROVENTIL) nebulizer solution 2.5 mg, 2.5 mg, Nebulization, Q2H PRN, [place] Abu, DO    predniSONE (DELTASONE) tablet 10 mg, 10 mg, Oral, BID, Ismail U Jozef, DO, 10 mg at 08/01/21 7250    levETIRAcetam (KEPPRA) tablet 500 mg, 500 mg, Oral, BID, Ismail U Jozef, DO, 500 mg at 08/01/21 4259    sodium chloride flush 0.9 % injection 5-40 mL, 5-40 mL, Intravenous, 2 times per day, Marko Abu, DO, 10 mL at 08/01/21 0143    sodium chloride flush 0.9 % injection 5-40 mL, 5-40 mL, Intravenous, PRN, [place] Abu, DO    0.9 % sodium chloride infusion, 25 mL, Intravenous, PRN, Marko Abu, DO    enoxaparin (LOVENOX) injection 40 mg, 40 mg, Subcutaneous, Daily, [place] Abu, DO, 40 mg at 08/01/21 6786    polyethylene glycol (GLYCOLAX) packet 17 g, 17 g, Oral, Daily PRN, Marko Abu, DO, 17 g at 07/31/21 1327    acetaminophen (TYLENOL) tablet 650 mg, 650 mg, Oral, Q6H PRN **OR** acetaminophen (TYLENOL) suppository 650 mg, 650 mg, Rectal, Q6H PRN, [place] Abu, DO    levothyroxine (SYNTHROID) tablet 50 mcg, 50 mcg, Oral, Daily, [place] Abu, DO, 50 mcg at 08/01/21 0604    pantoprazole (PROTONIX) tablet 40 mg, 40 mg, Oral, QAM AC, Ismail U Jozef, DO, 40 mg at 08/01/21 0604    budesonide (PULMICORT) nebulizer suspension 500 mcg, 0.5 mg, Nebulization, BID, Marko Abu, DO, 500 mcg at 08/01/21 9579    prochlorperazine (COMPAZINE) injection 10 mg, 10 mg, Intravenous, Q6H PRN, Marko Villela DO    perflutren lipid microspheres (DEFINITY) injection 1.65 mg, 1.5 mL, Intravenous, ONCE PRN, Daryn Lopez DO    folic acid (FOLVITE) tablet 1 mg, 1 mg, Oral, Daily, Gary Lopez DO, 1 mg at 08/01/21 Neurologic: Alert and oriented x 3, No focal deficit     Investigations:  Labs, radiological imaging and cardiac work up were reviewed        STAFF PHYSICIAN NOTE OF PERSONAL INVOLVEMENT IN CARE  As the attending physician, I certify that I personally reviewed the patient's history and personally examined the patient to confirm the physical findings described above, and that I reviewed the relevant imaging studies and available reports. I also discussed the differential diagnosis and all of the proposed management plans with the patient and individuals accompanying the patient to this visit. They had the opportunity to ask questions about the proposed management plans and to have those questions answered.       Electronically signed by Ian Andre MD on 8/1/2021 at 1:14 PM

## 2021-08-01 NOTE — CONSULTS
[address]                                  CONSULTATION    PATIENT NAME: Perry Watts                        :        1970  MED REC NO:   [id number]                            ROOM:       5402  ACCOUNT NO:   [de-identified]                           ADMIT DATE: 2021  PROVIDER:     Florina Mccloud MD    CONSULT DATE:  2021    CHIEF COMPLAINT:  Headaches. HISTORY OF PRESENT ILLNESS:  This is a 28-year-old female who was  admitted because of multiple symptoms including abdominal pain and  discomfort and urinary symptoms. She does have a history of headaches  more so on the left side retroauricular area. She had a couple of episodes of  blacking out or passing out spells. She denies any weakness of the  extremities and sphincter disturbances. She does feel some odd symptoms  pertaining to her vision, which are not related to the tumor which had  been found on the MRI scan. The MRI scan did show a tumor which most  probably is a meningioma in the left frontal lobe. The patient has other symptoms pertaining to her lungs and chest which  she is recovering. PAST MEDICAL HISTORY:  Past history of arthritis, bursitis, obesity,  COPD, diverticulitis, GERD, strep throat, and incisional hernia. PAST SURGICAL HISTORY:   section, cholecystectomy, colectomy,  forearm fracture surgery, hernia repair, hysterectomy, nerve blocks,  pelvic fracture surgery, and ventral hernia repair. SOCIAL HISTORY:  She is a former smoker. Does use alcohol and  marijuana, not currently. PERSONAL HISTORY:  Allergy to IBUPROFEN, NONSTEROIDAL ANTI-INFLAMMATORY  DRUGS and MORPHINE.    PHYSICAL EXAMINATION:  GENERAL:  She is a well-developed, well-nourished female, slightly  overweight, in no acute distress. NEUROLOGIC:  She is alert, awake, and oriented to time, place, and  person. Speech is good.   Memory is

## 2021-08-02 VITALS
TEMPERATURE: 98.8 F | OXYGEN SATURATION: 93 % | RESPIRATION RATE: 18 BRPM | HEIGHT: 62 IN | SYSTOLIC BLOOD PRESSURE: 133 MMHG | HEART RATE: 62 BPM | WEIGHT: 182.5 LBS | BODY MASS INDEX: 33.58 KG/M2 | DIASTOLIC BLOOD PRESSURE: 75 MMHG

## 2021-08-02 LAB
ALBUMIN SERPL-MCNC: 3 G/DL (ref 3.5–5.2)
ALP BLD-CCNC: 57 U/L (ref 35–104)
ALT SERPL-CCNC: 8 U/L (ref 0–32)
ANION GAP SERPL CALCULATED.3IONS-SCNC: 8 MMOL/L (ref 7–16)
AST SERPL-CCNC: 8 U/L (ref 0–31)
BASOPHILS ABSOLUTE: 0.02 E9/L (ref 0–0.2)
BASOPHILS RELATIVE PERCENT: 0.3 % (ref 0–2)
BILIRUB SERPL-MCNC: <0.2 MG/DL (ref 0–1.2)
BUN BLDV-MCNC: 16 MG/DL (ref 6–20)
CALCIUM SERPL-MCNC: 8.3 MG/DL (ref 8.6–10.2)
CHLORIDE BLD-SCNC: 106 MMOL/L (ref 98–107)
CO2: 26 MMOL/L (ref 22–29)
CREAT SERPL-MCNC: 0.9 MG/DL (ref 0.5–1)
EOSINOPHILS ABSOLUTE: 0.03 E9/L (ref 0.05–0.5)
EOSINOPHILS RELATIVE PERCENT: 0.4 % (ref 0–6)
GFR AFRICAN AMERICAN: >60
GFR NON-AFRICAN AMERICAN: >60 ML/MIN/1.73
GLUCOSE BLD-MCNC: 140 MG/DL (ref 74–99)
HCT VFR BLD CALC: 38.4 % (ref 34–48)
HEMOGLOBIN: 12.6 G/DL (ref 11.5–15.5)
IMMATURE GRANULOCYTES #: 0.06 E9/L
IMMATURE GRANULOCYTES %: 0.8 % (ref 0–5)
LYMPHOCYTES ABSOLUTE: 1.14 E9/L (ref 1.5–4)
LYMPHOCYTES RELATIVE PERCENT: 15.5 % (ref 20–42)
MCH RBC QN AUTO: 30.4 PG (ref 26–35)
MCHC RBC AUTO-ENTMCNC: 32.8 % (ref 32–34.5)
MCV RBC AUTO: 92.5 FL (ref 80–99.9)
MONOCYTES ABSOLUTE: 0.3 E9/L (ref 0.1–0.95)
MONOCYTES RELATIVE PERCENT: 4.1 % (ref 2–12)
NEUTROPHILS ABSOLUTE: 5.82 E9/L (ref 1.8–7.3)
NEUTROPHILS RELATIVE PERCENT: 78.9 % (ref 43–80)
PDW BLD-RTO: 14 FL (ref 11.5–15)
PLATELET # BLD: 221 E9/L (ref 130–450)
PMV BLD AUTO: 9.7 FL (ref 7–12)
POTASSIUM SERPL-SCNC: 4.3 MMOL/L (ref 3.5–5)
RBC # BLD: 4.15 E12/L (ref 3.5–5.5)
SODIUM BLD-SCNC: 140 MMOL/L (ref 132–146)
TOTAL PROTEIN: 5.2 G/DL (ref 6.4–8.3)
WBC # BLD: 7.4 E9/L (ref 4.5–11.5)

## 2021-08-02 PROCEDURE — 2580000003 HC RX 258: Performed by: INTERNAL MEDICINE

## 2021-08-02 PROCEDURE — 6360000002 HC RX W HCPCS: Performed by: INTERNAL MEDICINE

## 2021-08-02 PROCEDURE — 6370000000 HC RX 637 (ALT 250 FOR IP): Performed by: INTERNAL MEDICINE

## 2021-08-02 PROCEDURE — 80053 COMPREHEN METABOLIC PANEL: CPT

## 2021-08-02 PROCEDURE — 94640 AIRWAY INHALATION TREATMENT: CPT

## 2021-08-02 PROCEDURE — 85025 COMPLETE CBC W/AUTO DIFF WBC: CPT

## 2021-08-02 PROCEDURE — 36415 COLL VENOUS BLD VENIPUNCTURE: CPT

## 2021-08-02 RX ORDER — ALBUTEROL SULFATE 90 UG/1
2 AEROSOL, METERED RESPIRATORY (INHALATION) EVERY 6 HOURS PRN
Qty: 1 INHALER | Refills: 3 | Status: SHIPPED | OUTPATIENT
Start: 2021-08-02 | End: 2021-12-12 | Stop reason: ALTCHOICE

## 2021-08-02 RX ORDER — AZITHROMYCIN 500 MG/1
500 TABLET, FILM COATED ORAL DAILY
Qty: 5 TABLET | Refills: 0 | Status: SHIPPED | OUTPATIENT
Start: 2021-08-03 | End: 2021-08-08

## 2021-08-02 RX ORDER — LEVETIRACETAM 500 MG/1
500 TABLET ORAL 2 TIMES DAILY
Qty: 60 TABLET | Refills: 3 | Status: SHIPPED | OUTPATIENT
Start: 2021-08-02

## 2021-08-02 RX ORDER — PREDNISONE 10 MG/1
10 TABLET ORAL 2 TIMES DAILY
Qty: 20 TABLET | Refills: 0 | Status: SHIPPED | OUTPATIENT
Start: 2021-08-02 | End: 2021-08-12

## 2021-08-02 RX ADMIN — BUDESONIDE 500 MCG: 0.5 INHALANT RESPIRATORY (INHALATION) at 06:15

## 2021-08-02 RX ADMIN — Medication 10 ML: at 08:52

## 2021-08-02 RX ADMIN — PREDNISONE 10 MG: 10 TABLET ORAL at 08:52

## 2021-08-02 RX ADMIN — FOLIC ACID 1 MG: 1 TABLET ORAL at 08:52

## 2021-08-02 RX ADMIN — LEVOTHYROXINE SODIUM 50 MCG: 0.05 TABLET ORAL at 06:17

## 2021-08-02 RX ADMIN — AZITHROMYCIN MONOHYDRATE 500 MG: 250 TABLET ORAL at 08:51

## 2021-08-02 RX ADMIN — PANTOPRAZOLE SODIUM 40 MG: 40 TABLET, DELAYED RELEASE ORAL at 06:17

## 2021-08-02 RX ADMIN — ENOXAPARIN SODIUM 40 MG: 40 INJECTION SUBCUTANEOUS at 08:51

## 2021-08-02 RX ADMIN — LEVETIRACETAM 500 MG: 500 TABLET ORAL at 08:52

## 2021-08-02 ASSESSMENT — PAIN SCALES - GENERAL
PAINLEVEL_OUTOF10: 0
PAINLEVEL_OUTOF10: 0

## 2021-08-02 NOTE — CARE COORDINATION
8/2/21 1210 CM note: COVID (-) 7/30/21. UDS (+) cocaine & fentanyl. Patient declines speaking with [facility] or needing outpatient counseling or rehab. Discharge plan is home and [facility] or any other needs. Pts fiance will provide transportation home.  Electronically signed by Lesa Quiñonez RN on 8/2/2021 at 2:24 PM

## 2021-08-02 NOTE — PROGRESS NOTES
CLINICAL PHARMACY NOTE: MEDS TO BEDS    Total # of Prescriptions Filled: 4   The following medications were delivered to the patient:  · Albuterol inhaler   · Prednisone 10 mg  · Azithromycin 500 mg  · levetriacetam 500 mg    Additional Documentation:

## 2021-08-02 NOTE — DISCHARGE SUMMARY
Internal Medicine Progress Note     [facility]     Ara Pereira. Deneen Whitmore, MANAOLENANA Huff D.O., SHILPI Arvizu D.O. Lisa Cardenas, MSN, APRN, NP-C  Winnie Dahl. Shorty Sims, MSN, APRN-CNP       Internal Medicine  Discharge Summary    NAME: Dawn Palomino  :  1970  MRN:  [id number]  PCP:Pancho Gnocalves DO  ADMITTED: 2021      DISCHARGED: 21    ADMITTING PHYSICIAN: Ara Lopez DO    CONSULTANT(S):   IP CONSULT TO NEUROSURGERY  IP CONSULT TO NEUROLOGY  IP CONSULT TO INFECTIOUS DISEASES  IP CONSULT TO PULMONOLOGY  IP CONSULT TO NEUROSURGERY     ADMITTING DIAGNOSIS:   Brain mass [G93.89]     DISCHARGE DIAGNOSES:   1. Sepsis secondary to multifactorial pneumonia in the setting of crack cocaine usage  2. Acute on chronic respiratory failure with hypoxia in the setting of known COPD  3. Acute metabolic encephalopathy on presentation induced by illicit drug use   4. Dural based extra-axial mass overlying the left frontal convexity likely representing a meningioma with plans for outpatient neurosurgical follow-up  5. 3 mm left lower lobe pulmonary nodule.    6. Mild pulmonary hypertension  7. Chronic kidney disease stage III  8. Hypothyroidism  9. History of tobacco abuse  10. Daily alcohol consumption    BRIEF HISTORY OF PRESENT ILLNESS:   Patient is a 25-year-old female who presented to the ED due to dysuria, urinary retention and abdominal pain. Additionally patient during more anxious lately. Examination patient is anxious and very fidgety. According to patient she takes caffeine pills and had not been able to sleep for the last 2-3 nights. Otherwise patient states that she was diagnosed with UTI few days ago. She was placed on Bactrim without improvement in symptoms. She admits to associated nausea and dry heaves. She does complain of headache. She denies any changes with her vision.     LABS[de-identified]  Lab Results   Component Value Date    WBC 7.4 08/02/2021    HGB 12.6 08/02/2021    HCT 38.4 08/02/2021     08/02/2021     08/02/2021    K 4.3 08/02/2021     08/02/2021    CREATININE 0.9 08/02/2021    BUN 16 08/02/2021    CO2 26 08/02/2021    GLUCOSE 140 (H) 08/02/2021    ALT 8 08/02/2021    AST 8 08/02/2021    INR 1.0 03/19/2021     Lab Results   Component Value Date    INR 1.0 03/19/2021    PROTIME 11.2 03/19/2021      Lab Results   Component Value Date    TSH 2.330 07/30/2021     No results found for: TRIG  No results found for: HDL  No results found for: LDLCALC  No results found for: LABA1C    IMAGING:  Echo Complete    Result Date: 7/30/2021  Transthoracic Echocardiography Report (TTE)  Demographics   Patient Name        [address] [address] Gender                Female   Medical Record      [id number]    Room Number           4806  Number   Account #           [de-identified]   Procedure Date        07/30/2021   Corporate ID                    Ordering Physician    Francisco Javier Rothman DO   Accession Number    [id number]  Referring Physician   Date of Birth       1970  Sonographer           Murtaza Franco RDCS   Age                 46 year(s)  Interpreting          Francisco Javier Rothman DO                                  Physician                                   Any Other  Procedure Type of Study   TTE procedure:Echo Complete W/Doppler & Color Flow. Procedure Date Date: 07/30/2021 Start: 01:48 PM Study Location: Echo Lab Technical Quality: Poor visualization due to body habitus. Indications:LV function. Patient Status: Routine Height: 62 inches Weight: 182 pounds BSA: 1.84 m^2 BMI: 33.29 kg/m^2 BP: 106/66 mmHg  Findings   Left Ventricle  Left ventricular size is grossly normal.  Mild left ventricular concentric hypertrophy noted. Ejection fraction is visually estimated at 70%. No evidence of left ventricular mass or thrombus noted. No regional wall motion abnormalities seen. Right Ventricle  Normal right ventricular size and function. Left Atrium  Normal sized left atrium. Interatrial septum appears intact. Right Atrium  Normal right atrium size. Mitral Valve  Physiologic and/or trace mitral regurgitation is present. No evidence of mitral valve stenosis. Tricuspid Valve  Physiologic and/or trace tricuspid regurgitation. RVSP is 18.69 mmHg. Aortic Valve  Aortic valve opens well. The aortic valve is trileaflet. No evidence of aortic valve regurgitation. No hemodynamically significant aortic stenosis is present. Pulmonic Valve  Pulmonic valve is structurally normal.   Pericardial Effusion  No evidence of pericardial effusion. Aorta  The aorta is within normal limits. Miscellaneous  Regular rhythm. No obvious vegetations. Conclusions   Summary  Left ventricular size is grossly normal.  Mild left ventricular concentric hypertrophy noted. Ejection fraction is visually estimated at 70%. No evidence of left ventricular mass or thrombus noted. No regional wall motion abnormalities seen. Normal sized left atrium. Interatrial septum appears intact. Physiologic and/or trace mitral regurgitation is present. No evidence of mitral valve stenosis. Physiologic and/or trace tricuspid regurgitation. RVSP is 18.69 mmHg. Regular rhythm. No obvious vegetations.    Signature   ----------------------------------------------------------------  Electronically signed by Buster Disla DO(Interpreting  physician) on 07/30/2021 09:04 PM  ----------------------------------------------------------------  M-Mode/2D Measurements & Calculations   LV Diastolic    LV Systolic Dimension: 3 cm  AV Cusp Separation: 1.9 cmLA  Dimension: 5.1  LV Volume Diastolic: 758.6   Dimension: 3.6 cmAO Root  cm              ml                           Dimension: 3 cm  LV FS:41.2 %    LV Volume Systolic: 44.9 ml  LV PW           LV EDV/LV EDV Index: 451.8  Diastolic: 1.3  HZ/54 HK/M^6QT ESV/LV ESV  cm              Index: 36.4 ml/20ml/ m^2     RV Diastolic Dimension: 3.1  LV PW Systolic: EF Calculated: 28.6 %        cm  1.6 cm          LV Mass Index: 147 l/min*m^2 RV Systolic Dimension: 2.1 cm  Septum                                       LA/Aorta: 4.02  Diastolic: 1.3  cm              LVOT: 2 cm                   LA volume/Index: 56.9 ml  Septum  Systolic: 1.7  cm   LV Mass: 270.07  g  Doppler Measurements & Calculations   MV Peak E-Wave:   AV Peak Velocity: 1.52 m/s    LVOT Peak Velocity: 1.06  0.87 m/s          AV Peak Gradient: 9.21 mmHg   m/s  MV Peak A-Wave:   AV Mean Velocity: 1.19 m/s    LVOT Mean Velocity: 0.77  0.94 m/s          AV Mean Gradient: 5.9 mmHg    m/s  MV E/A Ratio:     AV VTI: 33.6 cm               LVOT Peak Gradient: 4.5  0.93              AV Area (Continuity):2.2 cm^2 mmHgLVOT Mean Gradient:  MV Peak Gradient:                               2.7 mmHg  4.7 mmHg          LVOT VTI: 23.5 cm             Estimated RVSP: 18.7 mmHg  MV Mean Gradient:                               Estimated RAP:10 mmHg  2.1 mmHg          Estimated PASP: 18.69 mmHg  MV Mean Velocity: Pulm. Vein A Reversal  0.69 m/s          Duration:170.7 msec           TR Velocity:1.47 m/s  MV Deceleration   Pulm. Vein D Velocity:0.49    TR Gradient:8.69 mmHg  Time: 211.9 msec  m/sPulm. Vein A Reversal      PV Peak Velocity: 1.19 m/s  MV P1/2t: 77.9    Velocity:0.31 m/s             PV Peak Gradient: 5.65  msec              Pulm. Vein S Velocity: 0.67   mmHg  MVA by PHT:2.82   m/s                           PV Mean Velocity: 0.86 m/s  cm^2                                            PV Mean Gradient: 3.3 mmHg  MV Area  (continuity): 2  cm^2  [email or web address] [id number]    CT Head WO Contrast    Result Date: 7/29/2021  EXAMINATION: CT OF THE HEAD WITHOUT CONTRAST  7/29/2021 9:19 pm TECHNIQUE: CT of the head was performed without the administration of intravenous contrast. Dose modulation, iterative reconstruction, and/or weight based adjustment of the mA/kV was utilized to reduce the radiation dose to as low as reasonably achievable. COMPARISON: None. HISTORY: ORDERING SYSTEM PROVIDED HISTORY: altered mental status TECHNOLOGIST PROVIDED HISTORY: Reason for exam:->altered mental status Has a \"code stroke\" or \"stroke alert\" been called? ->No Decision Support Exception - unselect if not a suspected or confirmed emergency medical condition->Emergency Medical Condition (MA) FINDINGS: BRAIN/VENTRICLES: Along the high left frontal convexity, there is a 1.6 cm extra-axial hyperdense mass likely representing meningioma. It exerts mild mass effect on the underlying brain. No edema is seen in the brain. No hemorrhage or midline shift. The brain is normal in volume. No significant chronic microvascular ischemic changes appreciated. No hydrocephalus or extra-axial fluid is seen. ORBITS: The visualized portion of the orbits demonstrate no acute abnormality. SINUSES: The visualized paranasal sinuses and mastoid air cells demonstrate no acute abnormality. SOFT TISSUES/SKULL:  No acute abnormality of the visualized skull or soft tissues. 1.  No acute intracranial abnormality. 2. 1.6 cm calcified meningioma along the high left frontal convexity with mild mass effect on the underlying brain. No edema. CT CHEST W CONTRAST    Result Date: 7/31/2021  EXAMINATION: CT OF THE CHEST WITH CONTRAST 7/31/2021 8:26 am TECHNIQUE: CT of the chest was performed with the administration of intravenous contrast. Multiplanar reformatted images are provided for review. Dose modulation, iterative reconstruction, and/or weight based adjustment of the mA/kV was utilized to reduce the radiation dose to as low as reasonably achievable.  COMPARISON: February 20 and March 3 HISTORY: ORDERING SYSTEM PROVIDED HISTORY: New brain mass, lung nodules, evaluate for metastasis TECHNOLOGIST PROVIDED HISTORY: Reason for exam:->New brain mass, lung nodules, evaluate for metastasis Decision Support Exception - unselect if not a suspected or confirmed emergency medical condition->Emergency Medical Condition (MA) FINDINGS: Study not intend to evaluate the pulmonary artery circulation. Cannot see central pulmonary embolus but the study is limited to evaluate segmental and subsegmental branches of the pulmonary artery circulation. For that purpose a specific CT chest will be needed recommended. Since the previous examination there are development of areas of subsegmental atelectasis in the left upper lobe towards the anterior apical region with some traction. In the left lung towards the left lower lobe there are linear areas of atelectasis are present extending to pleural surface forming discrete subpleural consolidations. Minimal pleural reaction is seen in the region of the left lower lobe. Some thickening of the interlobular septi is seen in the more inferior aspect of the left lower lobe. There is also thickening of the pleural fissure with nodular appearance which are suggestive for lymph nodes of the pleural interface. In the right upper lobe there are paraseptal emphysema. There also thickening of the pleural interfaces of the right lung. There is a small right-sided pleural effusion and there is thickening of the interlobular septi in the right lower lung base in the region of the right lower lobe. Since the previous examination there are development of mediastinal adenopathy with multiple lymph nodes in the deep middle mediastinum in pre tracheal/carinal region, infra carinal area and also in the aortic pulmonary window. There is also lymph nodes present in the left hilar/infrahilar region which was not seen previously. The heart is normal size. There is minimal thickening of the pericardial membrane. The diameter for the ascending aorta is 3.5 cm and for the main pulmonary artery is 3 cm.  Upper abdominal structures are not fully covered on this study. What is visualized appear unremarkable. Degenerative changes are seen in the thoracic spine in mild degree. 1.  Development of several findings in the chest since the previous CT scans of February and March 2021. Correlation with the clinical data the needed to narrow the differential diagnosis which can include different possibilities from inflammatory to infectious to malignancy. 2.  Development of areas of subsegmental atelectasis with traction bronchiectasis and confluent densities in the left upper lobe. Discrete linear atelectasis are now present in the left lower lobe. There are thickening of the interlobular septa in both lower lung bases. 3.  Development of thickening of pleural interface is of both lungs with lymph nodes most likely in the pleural fissure interface of the left upper lobe. 4.  Development of discrete pleural reaction is present on the left and mild right-sided pleural effusion. 5.  Development of mediastinal adenopathy and left infrahilar adenopathy. CT ABDOMEN PELVIS W IV CONTRAST Additional Contrast? None    Result Date: 7/29/2021  EXAMINATION: CT OF THE ABDOMEN AND PELVIS WITH CONTRAST 7/29/2021 9:02 pm TECHNIQUE: CT of the abdomen and pelvis was performed with the administration of intravenous contrast. Multiplanar reformatted images are provided for review. Dose modulation, iterative reconstruction, and/or weight based adjustment of the mA/kV was utilized to reduce the radiation dose to as low as reasonably achievable. COMPARISON: CT of the abdomen and pelvis, 05/07/2021 HISTORY: ORDERING SYSTEM PROVIDED HISTORY: Bilateral flank pain, abdominal pain. UTI. TECHNOLOGIST PROVIDED HISTORY: Additional Contrast?->None Reason for exam:->Bilateral flank pain, abdominal pain. UTI.  Decision Support Exception - unselect if not a suspected or confirmed emergency medical condition->Emergency Medical Condition (MA) FINDINGS: Lower Chest: 3 mm nodule in the left lower lobe (series 9, image 1). This region with outside the field of view of the prior CTs. Therefore interval change cannot be assessed. The lung bases are otherwise clear. The heart is normal in size. Organs: Liver: The liver is top-normal in size measuring 18.1 cm. Previously 21.1 cm. Mild steatosis, significantly decreased as of 05/07/2021. Gallbladder: Surgically absent Pancreas: Mild to moderately atrophied. Otherwise, unremarkable. Spleen:  Unremarkable. Adrenals: Unremarkable. Kidneys: Unremarkable. GI/Bowel: No bowel wall thickening or obstruction. Pelvis: A Keane catheter is looped within the decompressed bladder, which is grossly unremarkable. The uterus is surgically absent. Peritoneum/Retroperitoneum: No lymphadenopathy. Minimal calcified atherosclerosis in the infrarenal aorta. No aneurysm or dissection. Bones/Soft Tissues: The visualized bones are intact without fracture or focal lesion. Severe loss of disc height at L4-5 with grade 1 anterolisthesis. Miscellaneous: Postoperative changes from mesh repair of an anterior abdominal wall hernia. No evidence of reherniation. 1.  No acute abnormality is seen in the abdomen or the pelvis. 2. Compared to 05/07/2021, the size of the liver has mildly decreased. The extent of appendix steatosis as significantly decreased. 3. The urinary bladder is decompressed by a Keane catheter and is therefore suboptimally visualized. It is otherwise grossly unremarkable. 4. 3 mm left lower lobe pulmonary nodule. If the patient is at a high risk for malignancy, per the recommendations of the Fleischner society, follow-up CT of the chest may be obtained in 12 months. Otherwise, no future follow-up is needed.      XR CHEST PORTABLE    Result Date: 7/30/2021  EXAMINATION: ONE XRAY VIEW OF THE CHEST 7/30/2021 6:41 am COMPARISON: 07/01/2021 HISTORY: ORDERING SYSTEM PROVIDED HISTORY: hypoxia TECHNOLOGIST PROVIDED HISTORY: Reason for exam:->hypoxia FINDINGS: EKG leads overlie the chest.  Heart size is normal.  No pneumothorax. There has been interval development of mixed interstitial and airspace opacification of each lung. No effusion. Interval development of mixed bilateral infiltrates which may represent edema or multifocal pneumonia. Correlate with clinical presentation. Continued follow-up recommended. MRI BRAIN W WO CONTRAST    Result Date: 7/30/2021  EXAMINATION: MRI OF THE BRAIN WITHOUT AND WITH CONTRAST  7/30/2021 12:58 pm TECHNIQUE: Multiplanar multisequence MRI of the head/brain was performed without and with the administration of intravenous contrast. COMPARISON: No prior MRI. Correlation made with CT head from July 29, 2021 HISTORY: ORDERING SYSTEM PROVIDED HISTORY: Calcified meningioma TECHNOLOGIST PROVIDED HISTORY: Reason for exam:->Calcified meningioma FINDINGS: 17 mL MultiHance utilized There is a homogeneously enhancing, well-circumscribed, dural-based extra-axial mass overlying left frontal sulci measuring 1.6 x 1.5 x 1.4 cm in AP, axial, and cranial caudal dimension, respectively. This lesion is intermediate in signal intensity on T2 weighted imaging. Foci of hypointense gradient signal along lateral margin of the lesion represents calcifications. There is mild local sulcal effacement. No adjacent vasogenic edema. No additional mass evident. There are no areas of restricted diffusion to suggest acute intracranial ischemia. No intracranial hemorrhage. No hydrocephalus. Paranasal sinuses and mastoid air cells are clear. 1.  Dural based extra-axial mass overlying left frontal convexity likely represents a meningioma. 2.  No additional mass, acute ischemia, or edema.          HOSPITAL COURSE:   Marisue Denver was not forthcoming throughout the hospitalization and she was evaluated by multiple subspecialist.  Eventually, she admitted to the use of crack cocaine which confirms findings on CT of the chest.  She was placed empirically on antibiotic therapy and received corticosteroids. These were transitioned to oral.  Complicating matters, she was found to have an extra-axial mass compatible with meningioma. She was evaluated by the neurosurgical team with no current plans for surgical intervention but with plans for outpatient follow-up. She did not go through any signs of withdrawal.  She improved to the point where she was weaned off nasal cannula oxygen during the day with ongoing use at night as per she is at home. Otherwise, she became ambulatory and tolerated her diet. Veronica Duarte had voiced concern regarding her substance abuse but she denies the severity. She is not requesting any help at this point. Otherwise, she has returned to her baseline is acceptable for discharge home. We discussed the absolute need for illicit drug cessation. BRIEF PHYSICAL EXAMINATION AND LABORATORIES ON DAY OF DISCHARGE:  VITALS:  /75   Pulse 62   Temp 98.8 °F (37.1 °C) (Oral)   Resp 18   Ht 5' 2\" (1.575 m)   Wt 182 lb 8 oz (82.8 kg)   SpO2 93%   BMI 33.38 kg/m²     HEENT:  PERRLA. EOMI. Sclera clear. Buccal mucosa moist.    Neck:  Supple. Trachea midline. No thyromegaly. No JVD. No bruits. Heart:  Rhythm regular, rate controlled. No murmurs. Lungs:  Symmetrical. Clear to auscultation bilaterally. No wheezes. No rhonchi. No rales. Abdomen: Soft. Non-tender. Non-distended. Bowel sounds positive. No organomegaly or masses. No pain on palpation    Extremities:  Peripheral pulses present. No peripheral edema. No ulcers. Neurologic:  Alert x 3. No focal deficit. Cranial nerves grossly intact. Skin:  No petechia. No hemorrhage. No wounds. DISPOSITION:  The patient's condition is good. At this time the patient is without objective evidence of an acute process requiring continuing hospitalization or inpatient management. They are stable for discharge with outpatient follow-up.     I have spoken with the patient and discussed the results of the current hospitalization, in addition to providing specific details for the plan of care and counseling regarding the diagnosis and prognosis. The plan has been discussed in detail and they are aware of the specific conditions for emergent return, as well as the importance of follow-up. Their questions are answered at this time and they are agreeable with the plan for discharge to home    DISCHARGE MEDICATIONS:    [place] [address] Medication Instructions VJC:[id number]    Printed on:08/02/21 1103   Medication Information                      albuterol sulfate HFA (PROVENTIL HFA) 108 (90 Base) MCG/ACT inhaler  Inhale 2 puffs into the lungs every 6 hours as needed for Wheezing             albuterol sulfate HFA (PROVENTIL HFA) 108 (90 Base) MCG/ACT inhaler  Inhale 2 puffs into the lungs every 6 hours as needed for Wheezing             azithromycin (ZITHROMAX) 500 MG tablet  Take 1 tablet by mouth daily for 5 days             brexpiprazole (REXULTI) 0.5 MG TABS tablet  Take 0.5 mg by mouth daily             bumetanide (BUMEX) 1 MG tablet  TAKE ONE TABLET 2 TIMES A DAY             hydrOXYzine (ATARAX) 50 MG tablet  Take 50 mg by mouth nightly              levETIRAcetam (KEPPRA) 500 MG tablet  Take 1 tablet by mouth 2 times daily             levothyroxine (SYNTHROID) 50 MCG tablet  Take 1 tablet by mouth Daily             pantoprazole (PROTONIX) 40 MG tablet  Take 1 tablet by mouth every morning (before breakfast)             PARoxetine (PAXIL) 10 MG tablet  Take 10 mg by mouth every morning             predniSONE (DELTASONE) 10 MG tablet  Take 1 tablet by mouth 2 times daily for 10 days                 FOLLOW UP/INSTRUCTIONS:  · This patient is instructed to follow-up with her primary care physician. · Patient is instructed to follow-up with the consults listed above as directed by them. · she is instructed to resume home medications and take new medications as indicated in the list above.   · If the patient has a recurrence of symptoms, she is instructed to go to the ED. Preparing for this patient's discharge, including paperwork, orders, instructions, and meeting with patient did require > 40 minutes.     Jolly England DO   8/2/2021  11:01 AM

## 2021-08-04 LAB
BLOOD CULTURE, ROUTINE: NORMAL
CULTURE, BLOOD 2: NORMAL

## 2021-08-05 LAB — MYCOPLASMA PNEUMONIAE IGM: NORMAL

## 2021-12-12 ENCOUNTER — HOSPITAL ENCOUNTER (EMERGENCY)
Age: 51
Discharge: HOME | End: 2021-12-12
Payer: COMMERCIAL

## 2021-12-12 ENCOUNTER — APPOINTMENT (OUTPATIENT)
Dept: GENERAL RADIOLOGY | Age: 51
End: 2021-12-12
Payer: COMMERCIAL

## 2021-12-12 VITALS
HEART RATE: 84 BPM | DIASTOLIC BLOOD PRESSURE: 66 MMHG | WEIGHT: 180 LBS | OXYGEN SATURATION: 96 % | RESPIRATION RATE: 20 BRPM | BODY MASS INDEX: 32.92 KG/M2 | TEMPERATURE: 98 F | SYSTOLIC BLOOD PRESSURE: 94 MMHG

## 2021-12-12 DIAGNOSIS — J06.9 UPPER RESPIRATORY TRACT INFECTION, UNSPECIFIED TYPE: Primary | ICD-10-CM

## 2021-12-12 PROCEDURE — U0003 INFECTIOUS AGENT DETECTION BY NUCLEIC ACID (DNA OR RNA); SEVERE ACUTE RESPIRATORY SYNDROME CORONAVIRUS 2 (SARS-COV-2) (CORONAVIRUS DISEASE [COVID-19]), AMPLIFIED PROBE TECHNIQUE, MAKING USE OF HIGH THROUGHPUT TECHNOLOGIES AS DESCRIBED BY CMS-2020-01-R: HCPCS

## 2021-12-12 PROCEDURE — 71046 X-RAY EXAM CHEST 2 VIEWS: CPT

## 2021-12-12 PROCEDURE — U0005 INFEC AGEN DETEC AMPLI PROBE: HCPCS

## 2021-12-12 PROCEDURE — 99211 OFF/OP EST MAY X REQ PHY/QHP: CPT

## 2021-12-12 RX ORDER — BROMPHENIRAMINE MALEATE, PSEUDOEPHEDRINE HYDROCHLORIDE, AND DEXTROMETHORPHAN HYDROBROMIDE 2; 30; 10 MG/5ML; MG/5ML; MG/5ML
5 SYRUP ORAL 4 TIMES DAILY PRN
Qty: 100 ML | Refills: 0 | Status: SHIPPED | OUTPATIENT
Start: 2021-12-12 | End: 2021-12-17

## 2021-12-12 RX ORDER — ALBUTEROL SULFATE 90 UG/1
2 AEROSOL, METERED RESPIRATORY (INHALATION) 4 TIMES DAILY PRN
Qty: 18 G | Refills: 0 | Status: SHIPPED | OUTPATIENT
Start: 2021-12-12

## 2021-12-12 ASSESSMENT — PAIN DESCRIPTION - LOCATION: LOCATION: HEAD

## 2021-12-12 ASSESSMENT — PAIN SCALES - GENERAL: PAINLEVEL_OUTOF10: 7

## 2021-12-12 ASSESSMENT — PAIN DESCRIPTION - DESCRIPTORS: DESCRIPTORS: HEADACHE

## 2021-12-12 NOTE — ED PROVIDER NOTES
[facility] [address]                                                                                                                                    Department of Emergency Medicine   ED  Provider Note  Admit Date/RoomTime: 12/12/2021  2:27 PM  ED Room: 02/02        HPI:  12/12/21,   Time: 2:44 PM JENNIFER Flores is a 46 y.o. female presenting to the ED for congestion, PND and cough, beginning few days ago. The complaint has been persistent, moderate in severity, and worsened by nothing. States that her face is hurting with sinus pressure and postnasal drip. She is coughing and bringing up some productive phlegm. She is a chronic smoker. The patient has not been vaccinated for COVID-19. Denies history of asthma or COPD. Denies chest pain or shortness of breath. BP is a little low. ROS:     Constitutional: Negative for fever and chills  HENT: Negative for ear pain, sore throat and sinus pressure  Eyes: Negative for pain, discharge and redness  Respiratory:  See HPI  Cardiovascular: Negative for CP, edema or palpitations  Gastrointestinal: Negative for nausea, vomiting, diarrhea and abdominal distention  Genitourinary: Negative for dysuria and frequency  Musculoskeletal: Negative for back pain and arthralgia  Skin: Negative for rash and wound  Neurological: Negative for weakness and headaches  Hematological: Negative for adenopathy    All other systems reviewed and are negative      -------------------------------- PAST HISTORY ----------------------------------  Past Medical History:  has a past medical history of Arthritis, Brain venous angioma (HCC), Bursitis, Class 3 severe obesity due to excess calories with body mass index (BMI) of 40.0 to 44.9 in adult [facility]), COPD (chronic obstructive pulmonary disease) ([facility] [address] Diverticulitis, GERD (gastroesophageal reflux disease), Incisional hernia with obstruction but no gangrene, and Strep throat.     Past Surgical History:  has a past surgical history that includes Hysterectomy; Cholecystectomy; Pelvic fracture surgery; colectomy (2016); Nerve Block; hernia repair; hernia repair (2015); ventral hernia repair (2015);  section; Forearm fracture surgery (Left); and hernia repair (N/A, 3/25/2021). Social History:  reports that she quit smoking about 9 months ago. Her smoking use included cigarettes. She has a 30.00 pack-year smoking history. She has never used smokeless tobacco. She reports current alcohol use. She reports previous drug use. Drug: Marijuana Berneta Earl). Family History: family history includes Cancer in her paternal grandfather; Diabetes in an other family member; Osteoarthritis in an other family member; Other in her father; Prostate Cancer in her father; Stroke in her brother. The patients home medications have been reviewed. Allergies: Ibuprofen, Nsaids, and Morphine    --------------------------------- RESULTS ------------------------------------------  All laboratory and radiology results have been personally reviewed by myself   LABS:  No results found for this visit on 21. RADIOLOGY:  Interpreted by Radiologist.  XR CHEST (2 VW)   Final Result   No acute process. ----------------- NURSING NOTES AND VITALS REVIEWED ---------------   The nursing notes within the ED encounter and vital signs as below have been reviewed. BP 94/66   Pulse 84   Temp 98 °F (36.7 °C)   Resp 20   Wt 180 lb (81.6 kg)   SpO2 96%   BMI 32.92 kg/m²   Oxygen Saturation Interpretation: Normal      --------------------------------PHYSICAL EXAM------------------------------------      Constitutional/General: Alert and oriented x3, well appearing, non toxic in NAD  Head: NC/AT  Eyes: PERRL, EOMI  TM's intact. Right bulging with small fluid. Posterior pharynx with yellow PND.     Mouth: Oropharynx clear, handling secretions, no trismus  Neck: Supple, full ROM, no meningeal signs  Pulmonary: Lungs clear to auscultation bilaterally, no wheezes, rales, or rhonchi. Not in respiratory distress  Cardiovascular:  Regular rate and rhythm, no murmurs, gallops, or rubs. 2+ distal pulses  Extremities: Moves all extremities x 4. Warm and well perfused  Skin: warm and dry without rash  Neurologic: GCS 15,  Intact. No focal deficits  Psych: Normal Affect      ------------------------ ED COURSE/MEDICAL DECISION MAKING----------------------  Medications - No data to display      Medical Decision Making:    Repeat BP about the same. Did check for orthostatic blood pressures and she was negative. CXR normal   Sent swab for Covid here. This will take a few days. Pt has not been vaccinated. Close follow-up for ongoing symptoms. Plan discharge home with Bromfed DM and inhalers. Counseling: The emergency provider has spoken with the patient and discussed todays results, in addition to providing specific details for the plan of care and counseling regarding the diagnosis and prognosis. Questions are answered at this time and they are agreeable with the plan.      ------------------------ IMPRESSION AND DISPOSITION -------------------------------    IMPRESSION  1.  Upper respiratory tract infection, unspecified type        DISPOSITION  Disposition: Discharge to home  Patient condition is stable                   Lory Boykin PA-C  12/12/21 1600

## 2021-12-13 ENCOUNTER — CARE COORDINATION (OUTPATIENT)
Dept: CARE COORDINATION | Age: 51
End: 2021-12-13

## 2021-12-14 LAB — SARS-COV-2, PCR: DETECTED

## 2021-12-14 NOTE — CARE COORDINATION
[facility]'s second attempt to reach patient by telephone for an ED/COVID follow up call. Unable to leave a message d/t message stating, \"mailbox is full. \"  COVID test is positive. PLAN:  -[facility] will send patient a message via [employer], remove name from Care Team, and resolve the episode.

## 2022-11-10 ENCOUNTER — APPOINTMENT (OUTPATIENT)
Dept: ULTRASOUND IMAGING | Age: 52
DRG: 383 | End: 2022-11-10
Payer: MEDICARE

## 2022-11-10 ENCOUNTER — HOSPITAL ENCOUNTER (INPATIENT)
Age: 52
LOS: 4 days | Discharge: HOME | DRG: 383 | End: 2022-11-14
Attending: EMERGENCY MEDICINE | Admitting: INTERNAL MEDICINE
Payer: MEDICARE

## 2022-11-10 DIAGNOSIS — L03.116 CELLULITIS OF LEFT LOWER EXTREMITY: Primary | ICD-10-CM

## 2022-11-10 DIAGNOSIS — L03.90 CELLULITIS, UNSPECIFIED CELLULITIS SITE: ICD-10-CM

## 2022-11-10 LAB
ALBUMIN SERPL-MCNC: 3.9 G/DL (ref 3.5–5.2)
ALP BLD-CCNC: 82 U/L (ref 35–104)
ALT SERPL-CCNC: 10 U/L (ref 0–32)
ANION GAP SERPL CALCULATED.3IONS-SCNC: 13 MMOL/L (ref 7–16)
ANTISTREPTOLYSIN-O: 388 IU/ML (ref 0–200)
AST SERPL-CCNC: 10 U/L (ref 0–31)
BASOPHILS ABSOLUTE: 0.03 E9/L (ref 0–0.2)
BASOPHILS RELATIVE PERCENT: 0.3 % (ref 0–2)
BILIRUB SERPL-MCNC: 0.5 MG/DL (ref 0–1.2)
BUN BLDV-MCNC: 20 MG/DL (ref 6–20)
C-REACTIVE PROTEIN: 11.9 MG/DL (ref 0–0.4)
CALCIUM SERPL-MCNC: 9.3 MG/DL (ref 8.6–10.2)
CHLORIDE BLD-SCNC: 99 MMOL/L (ref 98–107)
CO2: 25 MMOL/L (ref 22–29)
CREAT SERPL-MCNC: 1 MG/DL (ref 0.5–1)
EOSINOPHILS ABSOLUTE: 0.11 E9/L (ref 0.05–0.5)
EOSINOPHILS RELATIVE PERCENT: 1.2 % (ref 0–6)
GFR SERPL CREATININE-BSD FRML MDRD: >60 ML/MIN/1.73
GLUCOSE BLD-MCNC: 160 MG/DL (ref 74–99)
HCT VFR BLD CALC: 39.2 % (ref 34–48)
HEMOGLOBIN: 12.9 G/DL (ref 11.5–15.5)
IMMATURE GRANULOCYTES #: 0.08 E9/L
IMMATURE GRANULOCYTES %: 0.9 % (ref 0–5)
LACTIC ACID, SEPSIS: 1.2 MMOL/L (ref 0.5–1.9)
LYMPHOCYTES ABSOLUTE: 0.88 E9/L (ref 1.5–4)
LYMPHOCYTES RELATIVE PERCENT: 10 % (ref 20–42)
MCH RBC QN AUTO: 29.3 PG (ref 26–35)
MCHC RBC AUTO-ENTMCNC: 32.9 % (ref 32–34.5)
MCV RBC AUTO: 88.9 FL (ref 80–99.9)
MONOCYTES ABSOLUTE: 0.62 E9/L (ref 0.1–0.95)
MONOCYTES RELATIVE PERCENT: 7 % (ref 2–12)
NEUTROPHILS ABSOLUTE: 7.11 E9/L (ref 1.8–7.3)
NEUTROPHILS RELATIVE PERCENT: 80.6 % (ref 43–80)
PDW BLD-RTO: 13.8 FL (ref 11.5–15)
PLATELET # BLD: 240 E9/L (ref 130–450)
PMV BLD AUTO: 10.4 FL (ref 7–12)
POTASSIUM SERPL-SCNC: 3.3 MMOL/L (ref 3.5–5)
RBC # BLD: 4.41 E12/L (ref 3.5–5.5)
SEDIMENTATION RATE, ERYTHROCYTE: 91 MM/HR (ref 0–20)
SODIUM BLD-SCNC: 137 MMOL/L (ref 132–146)
TOTAL CK: 36 U/L (ref 20–180)
TOTAL PROTEIN: 7.4 G/DL (ref 6.4–8.3)
URIC ACID, SERUM: 3.8 MG/DL (ref 2.4–5.7)
WBC # BLD: 8.8 E9/L (ref 4.5–11.5)

## 2022-11-10 PROCEDURE — 2580000003 HC RX 258: Performed by: STUDENT IN AN ORGANIZED HEALTH CARE EDUCATION/TRAINING PROGRAM

## 2022-11-10 PROCEDURE — 6360000002 HC RX W HCPCS: Performed by: EMERGENCY MEDICINE

## 2022-11-10 PROCEDURE — 96365 THER/PROPH/DIAG IV INF INIT: CPT

## 2022-11-10 PROCEDURE — 82550 ASSAY OF CK (CPK): CPT

## 2022-11-10 PROCEDURE — 2580000003 HC RX 258: Performed by: EMERGENCY MEDICINE

## 2022-11-10 PROCEDURE — 85025 COMPLETE CBC W/AUTO DIFF WBC: CPT

## 2022-11-10 PROCEDURE — 87040 BLOOD CULTURE FOR BACTERIA: CPT

## 2022-11-10 PROCEDURE — 2580000003 HC RX 258

## 2022-11-10 PROCEDURE — 93971 EXTREMITY STUDY: CPT

## 2022-11-10 PROCEDURE — 2580000003 HC RX 258: Performed by: SPECIALIST

## 2022-11-10 PROCEDURE — 87070 CULTURE OTHR SPECIMN AEROBIC: CPT

## 2022-11-10 PROCEDURE — 6370000000 HC RX 637 (ALT 250 FOR IP): Performed by: INTERNAL MEDICINE

## 2022-11-10 PROCEDURE — 86140 C-REACTIVE PROTEIN: CPT

## 2022-11-10 PROCEDURE — 96375 TX/PRO/DX INJ NEW DRUG ADDON: CPT

## 2022-11-10 PROCEDURE — 6360000002 HC RX W HCPCS: Performed by: STUDENT IN AN ORGANIZED HEALTH CARE EDUCATION/TRAINING PROGRAM

## 2022-11-10 PROCEDURE — 1200000000 HC SEMI PRIVATE

## 2022-11-10 PROCEDURE — 86060 ANTISTREPTOLYSIN O TITER: CPT

## 2022-11-10 PROCEDURE — 87186 SC STD MICRODIL/AGAR DIL: CPT

## 2022-11-10 PROCEDURE — 6360000002 HC RX W HCPCS: Performed by: SPECIALIST

## 2022-11-10 PROCEDURE — 84550 ASSAY OF BLOOD/URIC ACID: CPT

## 2022-11-10 PROCEDURE — 99285 EMERGENCY DEPT VISIT HI MDM: CPT

## 2022-11-10 PROCEDURE — 85651 RBC SED RATE NONAUTOMATED: CPT

## 2022-11-10 PROCEDURE — 83605 ASSAY OF LACTIC ACID: CPT

## 2022-11-10 PROCEDURE — 87077 CULTURE AEROBIC IDENTIFY: CPT

## 2022-11-10 PROCEDURE — 80053 COMPREHEN METABOLIC PANEL: CPT

## 2022-11-10 PROCEDURE — 90714 TD VACC NO PRESV 7 YRS+ IM: CPT | Performed by: EMERGENCY MEDICINE

## 2022-11-10 PROCEDURE — 90471 IMMUNIZATION ADMIN: CPT | Performed by: EMERGENCY MEDICINE

## 2022-11-10 RX ORDER — SODIUM CHLORIDE, SODIUM LACTATE, POTASSIUM CHLORIDE, CALCIUM CHLORIDE 600; 310; 30; 20 MG/100ML; MG/100ML; MG/100ML; MG/100ML
INJECTION, SOLUTION INTRAVENOUS
Status: COMPLETED
Start: 2022-11-10 | End: 2022-11-10

## 2022-11-10 RX ORDER — 0.9 % SODIUM CHLORIDE 0.9 %
1000 INTRAVENOUS SOLUTION INTRAVENOUS ONCE
Status: COMPLETED | OUTPATIENT
Start: 2022-11-10 | End: 2022-11-10

## 2022-11-10 RX ORDER — ALPRAZOLAM 0.25 MG/1
0.25 TABLET ORAL EVERY 6 HOURS PRN
Status: DISCONTINUED | OUTPATIENT
Start: 2022-11-10 | End: 2022-11-14 | Stop reason: HOSPADM

## 2022-11-10 RX ORDER — OXYCODONE HYDROCHLORIDE AND ACETAMINOPHEN 5; 325 MG/1; MG/1
1 TABLET ORAL EVERY 4 HOURS PRN
Status: DISCONTINUED | OUTPATIENT
Start: 2022-11-10 | End: 2022-11-14 | Stop reason: HOSPADM

## 2022-11-10 RX ORDER — HYDROMORPHONE HYDROCHLORIDE 1 MG/ML
0.5 INJECTION, SOLUTION INTRAMUSCULAR; INTRAVENOUS; SUBCUTANEOUS ONCE
Status: COMPLETED | OUTPATIENT
Start: 2022-11-10 | End: 2022-11-10

## 2022-11-10 RX ORDER — HYDROMORPHONE HYDROCHLORIDE 1 MG/ML
1 INJECTION, SOLUTION INTRAMUSCULAR; INTRAVENOUS; SUBCUTANEOUS ONCE
Status: COMPLETED | OUTPATIENT
Start: 2022-11-10 | End: 2022-11-10

## 2022-11-10 RX ORDER — ONDANSETRON 2 MG/ML
4 INJECTION INTRAMUSCULAR; INTRAVENOUS ONCE
Status: COMPLETED | OUTPATIENT
Start: 2022-11-10 | End: 2022-11-10

## 2022-11-10 RX ORDER — TETANUS AND DIPHTHERIA TOXOIDS ADSORBED 2; 2 [LF]/.5ML; [LF]/.5ML
0.5 INJECTION INTRAMUSCULAR ONCE
Status: COMPLETED | OUTPATIENT
Start: 2022-11-10 | End: 2022-11-10

## 2022-11-10 RX ORDER — OXYCODONE HYDROCHLORIDE AND ACETAMINOPHEN 5; 325 MG/1; MG/1
2 TABLET ORAL EVERY 4 HOURS PRN
Status: DISCONTINUED | OUTPATIENT
Start: 2022-11-10 | End: 2022-11-14 | Stop reason: HOSPADM

## 2022-11-10 RX ORDER — SODIUM CHLORIDE, SODIUM LACTATE, POTASSIUM CHLORIDE, CALCIUM CHLORIDE 600; 310; 30; 20 MG/100ML; MG/100ML; MG/100ML; MG/100ML
INJECTION, SOLUTION INTRAVENOUS CONTINUOUS
Status: DISCONTINUED | OUTPATIENT
Start: 2022-11-10 | End: 2022-11-12

## 2022-11-10 RX ADMIN — ALPRAZOLAM 0.25 MG: 0.25 TABLET ORAL at 15:26

## 2022-11-10 RX ADMIN — HYALURONIDASE (HUMAN RECOMBINANT) 150 UNITS: 150 INJECTION, SOLUTION SUBCUTANEOUS at 10:12

## 2022-11-10 RX ADMIN — OXYCODONE AND ACETAMINOPHEN 2 TABLET: 5; 325 TABLET ORAL at 12:01

## 2022-11-10 RX ADMIN — SODIUM CHLORIDE, SODIUM LACTATE, POTASSIUM CHLORIDE, CALCIUM CHLORIDE: 600; 310; 30; 20 INJECTION, SOLUTION INTRAVENOUS at 13:26

## 2022-11-10 RX ADMIN — VANCOMYCIN HYDROCHLORIDE 1750 MG: 5 INJECTION, POWDER, LYOPHILIZED, FOR SOLUTION INTRAVENOUS at 09:24

## 2022-11-10 RX ADMIN — OXYCODONE AND ACETAMINOPHEN 2 TABLET: 5; 325 TABLET ORAL at 15:50

## 2022-11-10 RX ADMIN — SODIUM CHLORIDE 1000 ML: 9 INJECTION, SOLUTION INTRAVENOUS at 06:03

## 2022-11-10 RX ADMIN — TETANUS AND DIPHTHERIA TOXOIDS ADSORBED 0.5 ML: 2; 2 INJECTION INTRAMUSCULAR at 10:07

## 2022-11-10 RX ADMIN — AMPICILLIN SODIUM AND SULBACTAM SODIUM 3000 MG: 2; 1 INJECTION, POWDER, FOR SOLUTION INTRAMUSCULAR; INTRAVENOUS at 22:45

## 2022-11-10 RX ADMIN — SODIUM CHLORIDE, POTASSIUM CHLORIDE, SODIUM LACTATE AND CALCIUM CHLORIDE: 600; 310; 30; 20 INJECTION, SOLUTION INTRAVENOUS at 13:26

## 2022-11-10 RX ADMIN — HYDROMORPHONE HYDROCHLORIDE 0.5 MG: 1 INJECTION, SOLUTION INTRAMUSCULAR; INTRAVENOUS; SUBCUTANEOUS at 06:02

## 2022-11-10 RX ADMIN — ONDANSETRON 4 MG: 2 INJECTION INTRAMUSCULAR; INTRAVENOUS at 06:02

## 2022-11-10 RX ADMIN — AMPICILLIN SODIUM AND SULBACTAM SODIUM 3000 MG: 2; 1 INJECTION, POWDER, FOR SOLUTION INTRAMUSCULAR; INTRAVENOUS at 06:41

## 2022-11-10 RX ADMIN — OXYCODONE AND ACETAMINOPHEN 2 TABLET: 5; 325 TABLET ORAL at 20:03

## 2022-11-10 RX ADMIN — HYDROMORPHONE HYDROCHLORIDE 1 MG: 1 INJECTION, SOLUTION INTRAMUSCULAR; INTRAVENOUS; SUBCUTANEOUS at 08:09

## 2022-11-10 RX ADMIN — AMPICILLIN SODIUM AND SULBACTAM SODIUM 3000 MG: 2; 1 INJECTION, POWDER, FOR SOLUTION INTRAMUSCULAR; INTRAVENOUS at 17:59

## 2022-11-10 ASSESSMENT — ENCOUNTER SYMPTOMS
DIARRHEA: 0
COUGH: 0
VOMITING: 1
RHINORRHEA: 0
SHORTNESS OF BREATH: 0
VOICE CHANGE: 0
NAUSEA: 1
CONSTIPATION: 0
ABDOMINAL PAIN: 0

## 2022-11-10 ASSESSMENT — PAIN SCALES - GENERAL
PAINLEVEL_OUTOF10: 6
PAINLEVEL_OUTOF10: 6
PAINLEVEL_OUTOF10: 8
PAINLEVEL_OUTOF10: 10
PAINLEVEL_OUTOF10: 8
PAINLEVEL_OUTOF10: 10

## 2022-11-10 ASSESSMENT — PAIN DESCRIPTION - ORIENTATION
ORIENTATION: LEFT;LOWER
ORIENTATION: LEFT

## 2022-11-10 ASSESSMENT — PAIN - FUNCTIONAL ASSESSMENT
PAIN_FUNCTIONAL_ASSESSMENT: 0-10
PAIN_FUNCTIONAL_ASSESSMENT: 0-10

## 2022-11-10 ASSESSMENT — PAIN DESCRIPTION - DESCRIPTORS
DESCRIPTORS: BURNING
DESCRIPTORS: ACHING;THROBBING;BURNING

## 2022-11-10 ASSESSMENT — PAIN DESCRIPTION - LOCATION
LOCATION: LEG
LOCATION: LEG

## 2022-11-10 NOTE — H&P
Internal Medicine Progress Note    [facility]    Melanie Ramachandran. Hector Coyle., MANAOManoloI. Shay Romero D.O., HILARIA James D.O. Darden Roup, MSN, APRN, NP-C  Lio Mcghee. Jose Pastrana, MSN, APRN-CNP         Department of Internal Medicine  History and Physical    Primary Care Physician: Silvana Shook DO   Admitting Physician:  Autumn Bass DO  Admission date and time: 11/10/2022  5:15 AM    Room:  04/04  Admitting diagnosis: Cellulitis [L03.90]    Patient Name: Veronica Spears  MRN: [id number]    Date of Service: 11/10/2022       CHIEF COMPLAINT:  Nausea, vomiting, diarrhea, redness and swelling left lower extremity- cat scratches    HISTORY OF PRESENT ILLNESS:    The patient is a 51-year-old who presented to the emergency department earlier today with a complaint of left lower leg swelling, erythema, and pain as well as fever, chills, vomiting and diarrhea. Patient states that on Sunday night she developed fever, chills, vomiting diarrhea. She also noticed during that time. That her left lower extremity was becoming very red. She states she has a new kitten and it does like to climb up her leg. She had noticed that she did have scratch marks in the left lower extremity as well. States symptoms continued through Tuesday. Stated on Tuesday that she noticed her left leg had become very swollen was more red very tender to the touch. She noticed she was having swelling from the left groin to her foot. States she developed left groin pain as well and felt a large knot in that area. Patient reports anorexia over this time as well. States today is the first day that she has felt like eating or drinking anything. Patient was found to have cellulitis of the left lower extremity. She was in need of further evaluation and treatment and admitted under the service of Dr. Poppy Reed and Dr. Cruz.   For test results please see EMR summary/test results. PAST MEDICAL Hx:  Past Medical History:   Diagnosis Date    Arthritis     Brain venous angioma (HCC)     Bursitis     hips    Class 3 severe obesity due to excess calories with body mass index (BMI) of 40.0 to 44.9 in adult [facility])     COPD (chronic obstructive pulmonary disease) (HCC)     Diverticulitis     GERD (gastroesophageal reflux disease)     Incisional hernia with obstruction but no gangrene     Strep throat        PAST SURGICAL Hx:   Past Surgical History:   Procedure Laterality Date     SECTION      CHOLECYSTECTOMY      COLECTOMY  2016    FOREARM FRACTURE SURGERY Left     fracture of ulna s/p repair    HERNIA REPAIR      HERNIA REPAIR  2015    incarcerated hernia repair    HERNIA REPAIR N/A 3/25/2021    INCISIONAL HERNIA REPAIR WITH MESH, POSSIBLE COMPONENT SEPARATION  LAPAROSCOPIC ROBOTIC XI ASSISTED (CPT Q4076849) performed by Lavera Baumgarten, MD at [address] [place] [address]      sacroiliac bilateral 2012    PELVIC FRACTURE SURGERY      VENTRAL HERNIA REPAIR  2015       FAMILY Hx:  Family History   Problem Relation Age of Onset    Osteoarthritis Other     Diabetes Other     Other Father         pancreatitis    Prostate Cancer Father     Stroke Brother         half brother    Cancer Paternal Grandfather        HOME MEDICATIONS:  Prior to Admission medications    Medication Sig Start Date End Date Taking?  Authorizing Provider   albuterol sulfate HFA (VENTOLIN HFA) 108 (90 Base) MCG/ACT inhaler Inhale 2 puffs into the lungs 4 times daily as needed for Wheezing  Patient not taking: Reported on 11/10/2022 12/12/21   Chris Coy PA-C   levETIRAcetam (KEPPRA) 500 MG tablet Take 1 tablet by mouth 2 times daily 21   Gopi Grossman,    levothyroxine (SYNTHROID) 50 MCG tablet Take 1 tablet by mouth Daily 3/30/21   Gopi Grossman, DO   pantoprazole (PROTONIX) 40 MG tablet Take 1 tablet by mouth every morning (before breakfast)  Patient not taking: Reported on 11/10/2022 3/30/21   Alan Kaufman DO   hydrOXYzine (ATARAX) 50 MG tablet Take 50 mg by mouth nightly  21   Historical Provider, MD   brexpiprazole (REXULTI) 0.5 MG TABS tablet Take 0.5 mg by mouth daily  Patient not taking: Reported on 11/10/2022    Historical Provider, MD   PARoxetine (PAXIL) 10 MG tablet Take 10 mg by mouth every morning  Patient not taking: Reported on 11/10/2022    Historical Provider, MD   bumetanide (BUMEX) 1 MG tablet TAKE ONE TABLET 2 TIMES A DAY  Patient not taking: Reported on 11/10/2022 7/2/19   Historical Provider, MD       ALLERGIES:  Ibuprofen, Nsaids, and Morphine    SOCIAL Hx:  Social History     Socioeconomic History    Marital status:      Spouse name: Rima Lucas    Number of children: 4    Years of education: 12    Highest education level: Not on file   Occupational History     Employer: [employer]   Tobacco Use    Smoking status: Former     Packs/day: 1.00     Years: 30.00     Pack years: 30.00     Types: Cigarettes     Quit date: 3/5/2021     Years since quittin.6    Smokeless tobacco: Never   Vaping Use    Vaping Use: Never used   Substance and Sexual Activity    Alcohol use: Yes     Comment: daily vodka drinker    Drug use: Not Currently     Types: Marijuana Garrel Calender)     Comment: medical marijuana. Sexual activity: Yes     Partners: Male   Other Topics Concern    Not on file   Social History Narrative     twice. Recently  2020     Social Determinants of Health     Financial Resource Strain: Not on file   Food Insecurity: Not on file   Transportation Needs: Not on file   Physical Activity: Not on file   Stress: Not on file   Social Connections: Not on file   Intimate Partner Violence: Not on file   Housing Stability: Not on file       ROS:  General:   Positive for chills, fatigue, fever, malaise, has not slept well the past couple night secondary to left lower extremity pain.   Denies night sweats or weight loss    Psychological:   Denies anxiety, disorientation or hallucinations    ENT:    Denies epistaxis, headaches, vertigo or visual changes    Cardiovascular:   Denies any chest pain, irregular heartbeats, or palpitations. No paroxysmal nocturnal dyspnea. Respiratory:   Denies shortness of breath, coughing, sputum production, hemoptysis, or wheezing. No orthopnea. Gastrointestinal:   Positive for nausea, vomiting, diarrhea, denies constipation. Denies any abdominal pain. Genito-Urinary:    Denies any urgency, frequency, hematuria. Voiding without difficulty. Musculoskeletal:  Admits to left lower leg stiffness, pain, erythema. Pain left groin notices a \"knot \"    Neurology:    Denies any headache or focal neurological deficits. No weakness or paresthesia. Derm:    Admits to swelling and erythema the left lower extremity. Extremities:   Admits to swelling and erythema the left lower extremity. PHYSICAL EXAM:  VITALS:  Blood pressure 124/87, pulse 77, temperature 98.1 °F (36.7 °C), temperature source Oral, resp. rate 20, height 5' 2\" (1.575 m), weight 180 lb (81.6 kg), SpO2 91 %. CONSTITUTIONAL:    Awake, alert, cooperative, no apparent distress, and appears stated age    EYES:    PERRL, EOMI, sclera clear, conjunctiva normal    ENT:    Normocephalic, atraumatic, sinuses nontender on palpation. External ears without lesions. Oral pharynx with moist mucus membranes. Tonsils without erythema or exudates. NECK:    Supple, symmetrical, trachea midline, no adenopathy, thyroid symmetric, not enlarged and no tenderness, skin normal, no bruits, no JVD    HEMATOLOGIC/LYMPHATICS:    No cervical lymphadenopathy and no supraclavicular lymphadenopathy    LUNGS:    Symmetric.  No increased work of breathing, good air exchange, clear to auscultation bilaterally, no wheezes, rhonchi, or rales,     CARDIOVASCULAR:    Normal apical impulse, regular rate and rhythm, normal S1 and S2, no S3 or S4, and no murmur noted    ABDOMEN:    Obese, normal bowel sounds, soft, non-distended, non-tender, no masses palpated, no hepatosplenomegaly, no rebound or guarding elicited on palpation     MUSCULOSKELETAL:    There is no redness, warmth, or swelling of the joints. Full range of motion noted. Motor strength is 5 out of 5 all extremities bilaterally. Tone is normal.    NEUROLOGIC:    Awake, alert, oriented to name, place and time. Cranial nerves II-XII are grossly intact. Motor is 5 out of 5 bilaterally. SKIN:    patient has erythema/cellulitis circumferential from the left knee to the foot. There crusted areas-consistent with scratch marks from the kitten as patient describes. EXTREMITIES:    Peripheral pulses present. patient has erythema/cellulitis circumferential from the left knee to the foot. Swelling left middle finger- erythema    OSTEOPATHIC:    Examined in seated and supine positions. Normal thoracic kyphosis and lumbar lordosis. No acute somatic dysfunction. Lymph:  Large palpable lymph node left groin. Patient has pain extending from the left groin into the left medial thigh to the left lower extremity.     Skin:      LABORATORY DATA:  CBC with Differential:    Lab Results   Component Value Date/Time    WBC 8.8 11/10/2022 05:54 AM    RBC 4.41 11/10/2022 05:54 AM    HGB 12.9 11/10/2022 05:54 AM    HCT 39.2 11/10/2022 05:54 AM     11/10/2022 05:54 AM    MCV 88.9 11/10/2022 05:54 AM    MCH 29.3 11/10/2022 05:54 AM    MCHC 32.9 11/10/2022 05:54 AM    RDW 13.8 11/10/2022 05:54 AM    SEGSPCT 82 09/14/2011 09:10 AM    LYMPHOPCT 10.0 11/10/2022 05:54 AM    MONOPCT 7.0 11/10/2022 05:54 AM    BASOPCT 0.3 11/10/2022 05:54 AM    MONOSABS 0.62 11/10/2022 05:54 AM    LYMPHSABS 0.88 11/10/2022 05:54 AM    EOSABS 0.11 11/10/2022 05:54 AM    BASOSABS 0.03 11/10/2022 05:54 AM     CMP:    Lab Results   Component Value Date/Time     11/10/2022 05:54 AM    K 3.3 11/10/2022 05:54 AM    K 3.9 07/29/2021 07:12 PM    CL 99 11/10/2022 05:54 AM    CO2 25 11/10/2022 05:54 AM    BUN 20 11/10/2022 05:54 AM    CREATININE 1.0 11/10/2022 05:54 AM    GFRAA >60 08/02/2021 04:46 AM    LABGLOM >60 11/10/2022 05:54 AM    GLUCOSE 160 11/10/2022 05:54 AM    GLUCOSE 89 09/14/2011 09:10 AM    PROT 7.4 11/10/2022 05:54 AM    LABALBU 3.9 11/10/2022 05:54 AM    CALCIUM 9.3 11/10/2022 05:54 AM    BILITOT 0.5 11/10/2022 05:54 AM    ALKPHOS 82 11/10/2022 05:54 AM    AST 10 11/10/2022 05:54 AM    ALT 10 11/10/2022 05:54 AM       ASSESSMENT:  Cellulitis of the left lower extremity. Cat scratch left lower leg  Left inguinal adenopathy with largest lymph node measuring 4.2 x 1.2 x 2.6 cm  Hypokalemia  COPD  GERD  Obesity secondary to excess caloric intake    PLAN:  Patient is to be admitted to the medical surgical unit for further evaluation and treatment. Consultation has been made to infectious disease. We will await their evaluation recommendations. Continue current antibiotic regimen. We will add IV fluids for gentle hydration. Obtain CK, sed rate, CRP, ASO, uric acid level and other labs as warranted. Patient's medications were reviewed/continued/adjusted. Please see orders for further plan of care. Potassium supplementation to replete potassium stores. The patient was seen, examined and then discussed with Dr. Trevor Vides. [facility], APRBRII - CNP,   1:28 PM  11/10/2022    Electronically signed by [facility], APRN - CNP on 11/10/22 at 1:28 PM EST    I have personally participated in the history and medical decision making with the nurse practitioner on the date of service and I agree with all the pertinent clinical information unless otherwise noted. I have also reviewed and agree with the past medical, family, and social history unless otherwise noted.       Miryam Jorgensen DO, D.O., FACOI  3:13 PM  11/10/2022

## 2022-11-10 NOTE — ED PROVIDER NOTES
Patient is a 29-year-old female presents to the emergency department complaint of left lower leg pain patient states 5 days ago she had onset of increased swelling and erythema to her left lower extremity below the knee. She states that she has a kitten that was crawling up her leg prior to the event of the leg becoming swollen and red. Patient endorses subjective fevers, nausea, vomiting, chills. Patient also endorses bilateral hand cramping and unable to make a fist.  Patient denies any headache, blurred vision or double vision, any other neuro symptoms. Patient denies any urinary or other GI symptoms. Patient has not been on antibiotics recently. Patient denies any trauma. She states she has had episode of cellulitis to her legs in the past several years ago. Review of Systems   Constitutional:  Positive for chills and fever. HENT:  Negative for congestion, rhinorrhea and voice change. Eyes:  Negative for visual disturbance. Respiratory:  Negative for cough and shortness of breath. Cardiovascular:  Positive for leg swelling. Negative for chest pain. Gastrointestinal:  Positive for nausea and vomiting. Negative for abdominal pain, constipation and diarrhea. Endocrine: Negative for polyuria. Genitourinary:  Negative for dysuria and urgency. Musculoskeletal:  Negative for arthralgias, myalgias and neck pain. Skin:  Positive for rash. Negative for wound. Allergic/Immunologic: Negative for immunocompromised state. Neurological:  Negative for dizziness, syncope and weakness. Psychiatric/Behavioral:  Negative for confusion. The patient is not nervous/anxious. Physical Exam  Vitals and nursing note reviewed. Constitutional:       General: She is awake. She is not in acute distress. Appearance: She is obese. She is ill-appearing. She is not diaphoretic. HENT:      Head: Normocephalic and atraumatic.       Mouth/Throat:      Mouth: Mucous membranes are moist.   Eyes: Pupils: Pupils are equal, round, and reactive to light. Cardiovascular:      Rate and Rhythm: Normal rate and regular rhythm. Pulses: Normal pulses. Radial pulses are 2+ on the right side and 2+ on the left side. Dorsalis pedis pulses are 2+ on the right side and 2+ on the left side. Heart sounds: Normal heart sounds. Comments: Palpable dorsalis pedis pulse bilaterally. Pulmonary:      Effort: Pulmonary effort is normal.      Breath sounds: Normal breath sounds. Abdominal:      Palpations: Abdomen is soft. Tenderness: There is no abdominal tenderness. Musculoskeletal:         General: Normal range of motion. Cervical back: Normal range of motion. Skin:     General: Skin is warm and dry. Capillary Refill: Capillary refill takes less than 2 seconds. Findings: Erythema and rash present. Comments: Patient has erythema below the left knee down to the foot. It is warm and tender to palpation. No subcutaneous gas palpated. No areas of fluctuance. Compartments are soft. No evidence of compartment syndrome. No pain with passive motion of the digits or foot. Patient does have a few abrasions and superficial ulcerations that appear to be healing on the left lower extremity. None of these have purulent discharge noted. Neurological:      General: No focal deficit present. Mental Status: She is alert and oriented to person, place, and time. Psychiatric:         Behavior: Behavior is cooperative. MDM  Number of Diagnoses or Management Options  Cellulitis of left lower extremity  Diagnosis management comments: Presents to the ED for evaluation of right lower extremity redness. States that she had been scratched by her cat several days ago. Has increased swelling and pain in the affected leg. Subjective fever and chills. On exam she does have circumferential erythema of the right lower extremity.   CBC showed nones of leukocytosis but that show left shift. CMP showed mild hypokalemia at 3.3 but no other electrolyte abnormalities. No evidence of renal insufficiency. Normal liver function. Ultrasound of the left lower extremity showed no evidence of DVT but did show left inguinal adenopathy. Patient is admitted for further treatment evaluation. Patient was given appropriate IV antibiotics in the ED and will be admitted. Patient's tetanus was also updated in the ED. Amount and/or Complexity of Data Reviewed  Clinical lab tests: reviewed       --------------------------------------------- PAST HISTORY ---------------------------------------------  Past Medical History:  has a past medical history of Arthritis, Brain venous angioma (HCC), Bursitis, Class 3 severe obesity due to excess calories with body mass index (BMI) of 40.0 to 44.9 in [facility]), COPD (chronic obstructive pulmonary disease) ([facility] [address] Diverticulitis, GERD (gastroesophageal reflux disease), Incisional hernia with obstruction but no gangrene, and Strep throat. Past Surgical History:  has a past surgical history that includes Hysterectomy; Cholecystectomy; Pelvic fracture surgery; colectomy (2016); Nerve Block; hernia repair; hernia repair (2015); ventral hernia repair (2015);  section; Forearm fracture surgery (Left); and hernia repair (N/A, 3/25/2021). Social History:  reports that she quit smoking about 20 months ago. Her smoking use included cigarettes. She has a 30.00 pack-year smoking history. She has never used smokeless tobacco. She reports current alcohol use. She reports that she does not currently use drugs after having used the following drugs: Marijuana Daril Grabiel). Family History: family history includes Cancer in her paternal grandfather; Diabetes in an other family member; Osteoarthritis in an other family member; Other in her father; Prostate Cancer in her father; Stroke in her brother.      The patients home medications have been reviewed. Allergies: Ibuprofen, Nsaids, and Morphine    -------------------------------------------------- RESULTS -------------------------------------------------    LABS:  Results for orders placed or performed during the hospital encounter of 11/10/22   Lactate, Sepsis   Result Value Ref Range    Lactic Acid, Sepsis 1.2 0.5 - 1.9 mmol/L   CBC with Auto Differential   Result Value Ref Range    WBC 8.8 4.5 - 11.5 E9/L    RBC 4.41 3.50 - 5.50 E12/L    Hemoglobin 12.9 11.5 - 15.5 g/dL    Hematocrit 39.2 34.0 - 48.0 %    MCV 88.9 80.0 - 99.9 fL    MCH 29.3 26.0 - 35.0 pg    MCHC 32.9 32.0 - 34.5 %    RDW 13.8 11.5 - 15.0 fL    Platelets 151 313 - 373 E9/L    MPV 10.4 7.0 - 12.0 fL    Neutrophils % 80.6 (H) 43.0 - 80.0 %    Immature Granulocytes % 0.9 0.0 - 5.0 %    Lymphocytes % 10.0 (L) 20.0 - 42.0 %    Monocytes % 7.0 2.0 - 12.0 %    Eosinophils % 1.2 0.0 - 6.0 %    Basophils % 0.3 0.0 - 2.0 %    Neutrophils Absolute 7.11 1.80 - 7.30 E9/L    Immature Granulocytes # 0.08 E9/L    Lymphocytes Absolute 0.88 (L) 1.50 - 4.00 E9/L    Monocytes Absolute 0.62 0.10 - 0.95 E9/L    Eosinophils Absolute 0.11 0.05 - 0.50 E9/L    Basophils Absolute 0.03 0.00 - 0.20 E9/L   CMP   Result Value Ref Range    Sodium 137 132 - 146 mmol/L    Potassium 3.3 (L) 3.5 - 5.0 mmol/L    Chloride 99 98 - 107 mmol/L    CO2 25 22 - 29 mmol/L    Anion Gap 13 7 - 16 mmol/L    Glucose 160 (H) 74 - 99 mg/dL    BUN 20 6 - 20 mg/dL    Creatinine 1.0 0.5 - 1.0 mg/dL    Est, Glom Filt Rate >60 >=60 mL/min/1.73    Calcium 9.3 8.6 - 10.2 mg/dL    Total Protein 7.4 6.4 - 8.3 g/dL    Albumin 3.9 3.5 - 5.2 g/dL    Total Bilirubin 0.5 0.0 - 1.2 mg/dL    Alkaline Phosphatase 82 35 - 104 U/L    ALT 10 0 - 32 U/L    AST 10 0 - 31 U/L       RADIOLOGY:  US DUP LOWER EXTREMITY LEFT CALEB   Final Result   No evidence of DVT in the left lower extremity. Left inguinal adenopathy. Consider lymph node biopsy and or excision if appropriate. ------------------------- NURSING NOTES AND VITALS REVIEWED ---------------------------  Date / Time Roomed:  11/10/2022  5:15 AM  ED Bed Assignment:  04/04    The nursing notes within the ED encounter and vital signs as below have been reviewed. Patient Vitals for the past 24 hrs:   BP Temp Temp src Pulse Resp SpO2 Height Weight   11/10/22 0733 126/84 99.1 °F (37.3 °C) Oral 80 20 98 % -- --   11/10/22 0524 (!) 163/98 99.3 °F (37.4 °C) Oral 88 20 97 % 5' 2\" (1.575 m) 180 lb (81.6 kg)       Oxygen Saturation Interpretation: Normal    ------------------------------------------ PROGRESS NOTES ------------------------------------------  Re-evaluation(s):  Refer to ED course above. Counseling:  I have spoken with the patient and discussed todays results, in addition to providing specific details for the plan of care and counseling regarding the diagnosis and prognosis. Their questions are answered at this time and they are agreeable with the plan of admission.    --------------------------------- ADDITIONAL PROVIDER NOTES ---------------------------------  Consultations:  Time: 0825. Spoke with Dr. Ora Park. Discussed case. He  will admit the patient. He requested that I also give the patient a dose of vancomycin. Tetanus if not up-to-date. This patient's ED course included: a personal history and physicial examination, re-evaluation prior to disposition, multiple bedside re-evaluations, IV medications, cardiac monitoring, continuous pulse oximetry, and complex medical decision making and emergency management    This patient has remained hemodynamically stable during their ED course. Diagnosis:  1. Cellulitis of left lower extremity        Disposition:  Patient's disposition: Admit to med/surg floor  Patient's condition is fair. 11/10/22, 5:39 AM EST.     This note is prepared by Geovanna Palmer MD -PGY- [address], DO  11/10/22 Christina 128, DO  11/10/22 5788

## 2022-11-10 NOTE — ED NOTES
The pt is complaining of bilateral hand pain. Both hands swollen. Bilateral radial pulses are palpable. The left lower leg is red. Bilateral DP pulses are palpable. Lungs are clear.       Wilver Corona RN  11/10/22 9792

## 2022-11-10 NOTE — LETTER
[facility] Emergency Department  [address] [place] [id number]  Phone: [phone]               November 10, 2022    Patient: Etelvina Metcalf   YOB: 1970   Date of Visit: 11/10/2022       To Whom It May Concern:    Melanie Stockton was seen and treated in our emergency department on 11/10/2022.        Sincerely,               Signature:__________________________________

## 2022-11-10 NOTE — ED NOTES
Report called to floor. Unable to take the patient to the room because the floor is capped due to staffing issues.  They will call when it is ok to take the pt to the floor     Corona Michel RN  11/10/22 8385

## 2022-11-10 NOTE — ED NOTES
HiatalSmall amount of vancomycin had infiltrated. Hyaluronidase was injected around the site. Patient tolerated this procedure well.      Catia Talbot DO  11/10/22 1018

## 2022-11-10 NOTE — ED NOTES
Area injected with medication per dr Thony Bustillo DF/T infiltration     Daljit Johnston, RN  11/10/22 1605

## 2022-11-10 NOTE — ED NOTES
Clinical pharmacist notified of Vancomycin infiltration into the RACF. Per his direction, pinkish fluid was expressed out of the area, enough that there was no longer swelling, and he examined the area also.       Jorden Reed RN  11/10/22 9044

## 2022-11-10 NOTE — PLAN OF CARE
Problem: Discharge Planning  Goal: Discharge to home or other facility with appropriate resources  Outcome: Progressing  Flowsheets (Taken 11/10/2022 1543 by Erickson Farnsworth RN)  Discharge to home or other facility with appropriate resources: Identify discharge learning needs (meds, wound care, etc)     Problem: Pain  Goal: Verbalizes/displays adequate comfort level or baseline comfort level  Outcome: Progressing     Problem: Safety - Adult  Goal: Free from fall injury  Outcome: Progressing

## 2022-11-11 ENCOUNTER — APPOINTMENT (OUTPATIENT)
Dept: CT IMAGING | Age: 52
DRG: 383 | End: 2022-11-11
Payer: MEDICARE

## 2022-11-11 LAB
ALBUMIN SERPL-MCNC: 2.9 G/DL (ref 3.5–5.2)
ALP BLD-CCNC: 65 U/L (ref 35–104)
ALT SERPL-CCNC: 8 U/L (ref 0–32)
ANION GAP SERPL CALCULATED.3IONS-SCNC: 8 MMOL/L (ref 7–16)
AST SERPL-CCNC: 8 U/L (ref 0–31)
BASOPHILS ABSOLUTE: 0.02 E9/L (ref 0–0.2)
BASOPHILS RELATIVE PERCENT: 0.4 % (ref 0–2)
BILIRUB SERPL-MCNC: <0.2 MG/DL (ref 0–1.2)
BUN BLDV-MCNC: 11 MG/DL (ref 6–20)
CALCIUM SERPL-MCNC: 8.3 MG/DL (ref 8.6–10.2)
CHLORIDE BLD-SCNC: 102 MMOL/L (ref 98–107)
CHOLESTEROL, TOTAL: 115 MG/DL (ref 0–199)
CO2: 26 MMOL/L (ref 22–29)
CREAT SERPL-MCNC: 0.9 MG/DL (ref 0.5–1)
EOSINOPHILS ABSOLUTE: 0.13 E9/L (ref 0.05–0.5)
EOSINOPHILS RELATIVE PERCENT: 2.3 % (ref 0–6)
GFR SERPL CREATININE-BSD FRML MDRD: >60 ML/MIN/1.73
GLUCOSE BLD-MCNC: 137 MG/DL (ref 74–99)
HCT VFR BLD CALC: 34.9 % (ref 34–48)
HDLC SERPL-MCNC: 24 MG/DL
HEMOGLOBIN: 10.8 G/DL (ref 11.5–15.5)
IMMATURE GRANULOCYTES #: 0.05 E9/L
IMMATURE GRANULOCYTES %: 0.9 % (ref 0–5)
LDL CHOLESTEROL CALCULATED: 77 MG/DL (ref 0–99)
LYMPHOCYTES ABSOLUTE: 0.87 E9/L (ref 1.5–4)
LYMPHOCYTES RELATIVE PERCENT: 15.7 % (ref 20–42)
MCH RBC QN AUTO: 28.5 PG (ref 26–35)
MCHC RBC AUTO-ENTMCNC: 30.9 % (ref 32–34.5)
MCV RBC AUTO: 92.1 FL (ref 80–99.9)
MONOCYTES ABSOLUTE: 0.55 E9/L (ref 0.1–0.95)
MONOCYTES RELATIVE PERCENT: 9.9 % (ref 2–12)
NEUTROPHILS ABSOLUTE: 3.93 E9/L (ref 1.8–7.3)
NEUTROPHILS RELATIVE PERCENT: 70.8 % (ref 43–80)
PDW BLD-RTO: 13.8 FL (ref 11.5–15)
PLATELET # BLD: 205 E9/L (ref 130–450)
PMV BLD AUTO: 10.3 FL (ref 7–12)
POTASSIUM SERPL-SCNC: 3.6 MMOL/L (ref 3.5–5)
RBC # BLD: 3.79 E12/L (ref 3.5–5.5)
SODIUM BLD-SCNC: 136 MMOL/L (ref 132–146)
TOTAL PROTEIN: 5.8 G/DL (ref 6.4–8.3)
TRIGL SERPL-MCNC: 68 MG/DL (ref 0–149)
TSH SERPL DL<=0.05 MIU/L-ACNC: 1.24 UIU/ML (ref 0.27–4.2)
VLDLC SERPL CALC-MCNC: 14 MG/DL
WBC # BLD: 5.6 E9/L (ref 4.5–11.5)

## 2022-11-11 PROCEDURE — 84443 ASSAY THYROID STIM HORMONE: CPT

## 2022-11-11 PROCEDURE — 2580000003 HC RX 258: Performed by: SPECIALIST

## 2022-11-11 PROCEDURE — 94640 AIRWAY INHALATION TREATMENT: CPT

## 2022-11-11 PROCEDURE — 6370000000 HC RX 637 (ALT 250 FOR IP): Performed by: INTERNAL MEDICINE

## 2022-11-11 PROCEDURE — 85025 COMPLETE CBC W/AUTO DIFF WBC: CPT

## 2022-11-11 PROCEDURE — 6360000002 HC RX W HCPCS: Performed by: INTERNAL MEDICINE

## 2022-11-11 PROCEDURE — 73700 CT LOWER EXTREMITY W/O DYE: CPT

## 2022-11-11 PROCEDURE — 6360000002 HC RX W HCPCS: Performed by: SPECIALIST

## 2022-11-11 PROCEDURE — 1200000000 HC SEMI PRIVATE

## 2022-11-11 PROCEDURE — 80053 COMPREHEN METABOLIC PANEL: CPT

## 2022-11-11 PROCEDURE — 80061 LIPID PANEL: CPT

## 2022-11-11 PROCEDURE — 87081 CULTURE SCREEN ONLY: CPT

## 2022-11-11 PROCEDURE — 2580000003 HC RX 258: Performed by: INTERNAL MEDICINE

## 2022-11-11 PROCEDURE — 36415 COLL VENOUS BLD VENIPUNCTURE: CPT

## 2022-11-11 RX ORDER — LEVOTHYROXINE SODIUM 0.05 MG/1
50 TABLET ORAL DAILY
Status: DISCONTINUED | OUTPATIENT
Start: 2022-11-11 | End: 2022-11-14 | Stop reason: HOSPADM

## 2022-11-11 RX ORDER — LEVETIRACETAM 500 MG/1
500 TABLET ORAL 2 TIMES DAILY
Status: DISCONTINUED | OUTPATIENT
Start: 2022-11-11 | End: 2022-11-14 | Stop reason: HOSPADM

## 2022-11-11 RX ORDER — ENOXAPARIN SODIUM 100 MG/ML
40 INJECTION SUBCUTANEOUS DAILY
Status: DISCONTINUED | OUTPATIENT
Start: 2022-11-11 | End: 2022-11-11

## 2022-11-11 RX ORDER — IPRATROPIUM BROMIDE AND ALBUTEROL SULFATE 2.5; .5 MG/3ML; MG/3ML
1 SOLUTION RESPIRATORY (INHALATION) 4 TIMES DAILY
Status: DISCONTINUED | OUTPATIENT
Start: 2022-11-11 | End: 2022-11-14 | Stop reason: HOSPADM

## 2022-11-11 RX ORDER — FUROSEMIDE 10 MG/ML
20 INJECTION INTRAMUSCULAR; INTRAVENOUS ONCE
Status: COMPLETED | OUTPATIENT
Start: 2022-11-11 | End: 2022-11-11

## 2022-11-11 RX ORDER — ENOXAPARIN SODIUM 100 MG/ML
1 INJECTION SUBCUTANEOUS 2 TIMES DAILY
Status: DISCONTINUED | OUTPATIENT
Start: 2022-11-11 | End: 2022-11-14 | Stop reason: HOSPADM

## 2022-11-11 RX ORDER — HYDROXYZINE HYDROCHLORIDE 25 MG/1
50 TABLET, FILM COATED ORAL NIGHTLY
Status: DISCONTINUED | OUTPATIENT
Start: 2022-11-11 | End: 2022-11-14 | Stop reason: HOSPADM

## 2022-11-11 RX ORDER — PANTOPRAZOLE SODIUM 40 MG/1
40 TABLET, DELAYED RELEASE ORAL
Status: DISCONTINUED | OUTPATIENT
Start: 2022-11-11 | End: 2022-11-14 | Stop reason: HOSPADM

## 2022-11-11 RX ADMIN — ENOXAPARIN SODIUM 80 MG: 100 INJECTION SUBCUTANEOUS at 09:59

## 2022-11-11 RX ADMIN — ENOXAPARIN SODIUM 80 MG: 100 INJECTION SUBCUTANEOUS at 22:08

## 2022-11-11 RX ADMIN — VANCOMYCIN HYDROCHLORIDE 1000 MG: 1 INJECTION, POWDER, LYOPHILIZED, FOR SOLUTION INTRAVENOUS at 09:58

## 2022-11-11 RX ADMIN — HYDROXYZINE HYDROCHLORIDE 50 MG: 25 TABLET ORAL at 22:08

## 2022-11-11 RX ADMIN — OXYCODONE AND ACETAMINOPHEN 2 TABLET: 5; 325 TABLET ORAL at 09:58

## 2022-11-11 RX ADMIN — ALPRAZOLAM 0.25 MG: 0.25 TABLET ORAL at 00:09

## 2022-11-11 RX ADMIN — OXYCODONE AND ACETAMINOPHEN 2 TABLET: 5; 325 TABLET ORAL at 00:09

## 2022-11-11 RX ADMIN — LEVETIRACETAM 500 MG: 500 TABLET, FILM COATED ORAL at 22:08

## 2022-11-11 RX ADMIN — FUROSEMIDE 20 MG: 10 INJECTION, SOLUTION INTRAVENOUS at 17:54

## 2022-11-11 RX ADMIN — VANCOMYCIN HYDROCHLORIDE 1000 MG: 1 INJECTION, POWDER, LYOPHILIZED, FOR SOLUTION INTRAVENOUS at 22:28

## 2022-11-11 RX ADMIN — AMPICILLIN SODIUM AND SULBACTAM SODIUM 3000 MG: 2; 1 INJECTION, POWDER, FOR SOLUTION INTRAMUSCULAR; INTRAVENOUS at 17:20

## 2022-11-11 RX ADMIN — OXYCODONE AND ACETAMINOPHEN 2 TABLET: 5; 325 TABLET ORAL at 17:23

## 2022-11-11 RX ADMIN — OXYCODONE AND ACETAMINOPHEN 2 TABLET: 5; 325 TABLET ORAL at 23:29

## 2022-11-11 RX ADMIN — IPRATROPIUM BROMIDE AND ALBUTEROL SULFATE 1 AMPULE: .5; 2.5 SOLUTION RESPIRATORY (INHALATION) at 16:45

## 2022-11-11 RX ADMIN — ALPRAZOLAM 0.25 MG: 0.25 TABLET ORAL at 09:58

## 2022-11-11 RX ADMIN — LEVOTHYROXINE SODIUM 50 MCG: 0.05 TABLET ORAL at 09:58

## 2022-11-11 RX ADMIN — IPRATROPIUM BROMIDE AND ALBUTEROL SULFATE 1 AMPULE: .5; 2.5 SOLUTION RESPIRATORY (INHALATION) at 13:40

## 2022-11-11 RX ADMIN — PANTOPRAZOLE SODIUM 40 MG: 40 TABLET, DELAYED RELEASE ORAL at 09:58

## 2022-11-11 RX ADMIN — AMPICILLIN SODIUM AND SULBACTAM SODIUM 3000 MG: 2; 1 INJECTION, POWDER, FOR SOLUTION INTRAMUSCULAR; INTRAVENOUS at 05:02

## 2022-11-11 RX ADMIN — ALPRAZOLAM 0.25 MG: 0.25 TABLET ORAL at 22:13

## 2022-11-11 RX ADMIN — AMPICILLIN SODIUM AND SULBACTAM SODIUM 3000 MG: 2; 1 INJECTION, POWDER, FOR SOLUTION INTRAMUSCULAR; INTRAVENOUS at 12:12

## 2022-11-11 RX ADMIN — LEVETIRACETAM 500 MG: 500 TABLET, FILM COATED ORAL at 09:58

## 2022-11-11 ASSESSMENT — PAIN SCALES - GENERAL
PAINLEVEL_OUTOF10: 9
PAINLEVEL_OUTOF10: 6
PAINLEVEL_OUTOF10: 4
PAINLEVEL_OUTOF10: 6

## 2022-11-11 ASSESSMENT — PAIN SCALES - WONG BAKER
WONGBAKER_NUMERICALRESPONSE: 0

## 2022-11-11 ASSESSMENT — PAIN DESCRIPTION - ORIENTATION: ORIENTATION: LEFT

## 2022-11-11 ASSESSMENT — PAIN DESCRIPTION - LOCATION: LOCATION: LEG

## 2022-11-11 ASSESSMENT — PAIN DESCRIPTION - DESCRIPTORS: DESCRIPTORS: ACHING;DISCOMFORT;SORE

## 2022-11-11 NOTE — PLAN OF CARE
Problem: Discharge Planning  Goal: Discharge to home or other facility with appropriate resources  11/11/2022 1521 by Christine Ramírez RN  Outcome: Progressing  11/11/2022 0731 by Lola French RN  Outcome: Progressing     Problem: Pain  Goal: Verbalizes/displays adequate comfort level or baseline comfort level  11/11/2022 1521 by Christine Ramírez RN  Outcome: Progressing  11/11/2022 0731 by Lola French RN  Outcome: Progressing     Problem: Safety - Adult  Goal: Free from fall injury  11/11/2022 1521 by Christine Ramírez RN  Outcome: Progressing  11/11/2022 0731 by Lola French RN  Outcome: Progressing

## 2022-11-11 NOTE — PROGRESS NOTES
Internal Medicine Progress Note    [facility]    Michael Haney. Boom Fontaine, F.A.C.O.I. Virgin Crigler, D.O., HILARIA Layton D.O. Amador Sat, MSN, APRN, NP-C  Hui Baca. Odalis Juares, MSN, APRN-CNP     Primary Care Physician: Martine Duncan DO   Admitting Physician:  Jadiel Amin DO  Admission date and time: 11/10/2022  5:15 AM    Room:  [address] [place] [address]  Admitting diagnosis: Cellulitis [L03.90]  Cellulitis of left lower extremity [L03.116]    Patient Name: Serena Bryan  MRN: [id number]    Date of Service: 11/11/2022     Subjective:  Judy Zapata is a 46 y.o. female who was seen and examined today,11/11/2022, at the bedside. Patient pain seems to be somewhat improved today. Significant swelling of the left leg below the level of the knee. Leg appear to be erythematous and skin is discolored. Less pain on dorsiflexion. Prominent lymph node present in groin. Denies chest pain or shortness of breath    No family present during my examination. Review of System:   Constitutional:   Denies fever or chills, weight loss or gain, fatigue or malaise. HEENT:   Denies ear pain, sore throat, sinus or eye problems. Cardiovascular:   Denies any chest pain, irregular heartbeats, or palpitations. Respiratory:   Denies shortness of breath, coughing, sputum production, hemoptysis, or wheezing. Gastrointestinal:   Denies nausea, vomiting, diarrhea, or constipation. Denies any abdominal pain. Genitourinary:    Denies any urgency, frequency, hematuria. Voiding  without difficulty. Extremities:   Significant pain and swelling of the left lower extremities left lower extremity markedly swollen compared to the right  Neurology:    Denies any headache or focal neurological deficits, Denies generalized weakness or memory difficulty. Psch:   Denies being anxious or depressed.   Musculoskeletal:    Denies  myalgias, joint complaints or back pain. Integumentary:   Denies any rashes, ulcers, or excoriations. Denies bruising. Hematologic/Lymphatic:  Denies bruising or bleeding. Physical Exam:  I/O this shift:  In: 360 [P.O.:360]  Out: -     Intake/Output Summary (Last 24 hours) at 11/11/2022 1414  Last data filed at 11/11/2022 0917  Gross per 24 hour   Intake 1570.99 ml   Output --   Net 1570.99 ml   I/O last 3 completed shifts: In: 8337 [P.O.:240; I.V.:300.4; IV Piggyback:670.6]  Out: -   Patient Vitals for the past 96 hrs (Last 3 readings):   Weight   11/10/22 0524 180 lb (81.6 kg)     Vital Signs:   Blood pressure 99/60, pulse 72, temperature 97.9 °F (36.6 °C), temperature source Oral, resp. rate 20, height 5' 2\" (1.575 m), weight 180 lb (81.6 kg), SpO2 92 %. General appearance:  Alert, responsive, oriented to person, place, and time. Well preserved, alert, no distress. Head:  Normocephalic. No masses, lesions or tenderness. Eyes:  PERRLA. EOMI. Sclera clear. Buccal mucosa moist.  ENT:  Ears normal. Mucosa normal.  Neck:    Supple. Trachea midline. No thyromegaly. No JVD. No bruits. Heart:    Rhythm regular. Rate controlled. No murmurs. Lungs:    Symmetrical. Clear to auscultation bilaterally. No wheezes. No rhonchi. No rales. Abdomen:   Soft. Non-tender. Non-distended. Bowel sounds positive. No organomegaly or masses. No pain on palpation. Extremities:    Left lower extremity markedly swollen compared to the right. Pulses present. Skin erythematous. Left inguinal node present  Neurologic:    Alert x 3. No focal deficit. Cranial nerves grossly intact. No focal weakness. Psych:   Behavior is normal. Mood appears normal. Speech is not rapid and/or pressured. Musculoskeletal:   Spine ROM normal. Muscular strength intact. Gait not assessed. Integumentary:  No rashes  Skin normal color and texture.   Genitalia/Breast:  Deferred    Medication:  Scheduled Meds:   hydrOXYzine HCl  50 mg Oral Nightly    levETIRAcetam  500 mg Oral BID    levothyroxine  50 mcg Oral Daily    pantoprazole  40 mg Oral QAM AC    ipratropium-albuterol  1 ampule Inhalation 4x daily    enoxaparin  1 mg/kg SubCUTAneous BID    vancomycin  1,000 mg IntraVENous Q12H    ampicillin-sulbactam  3,000 mg IntraVENous Q6H     Continuous Infusions:   lactated ringers Stopped (11/10/22 3759)       Objective Data:  Recent Labs     11/10/22  0554 11/11/22  0308   WBC 8.8 5.6   RBC 4.41 3.79   HGB 12.9 10.8*   HCT 39.2 34.9   MCV 88.9 92.1   MCH 29.3 28.5   MCHC 32.9 30.9*   RDW 13.8 13.8    205   MPV 10.4 10.3     Recent Labs     11/10/22  0554 11/11/22  0308    136   K 3.3* 3.6   CL 99 102   CO2 25 26   BUN 20 11   CREATININE 1.0 0.9   GLUCOSE 160* 137*   CALCIUM 9.3 8.3*   PROT 7.4 5.8*   LABALBU 3.9 2.9*   BILITOT 0.5 <0.2   ALKPHOS 82 65   AST 10 8   ALT 10 8     Lab Results   Component Value Date    TROPONINI <0.01 04/23/2021    TROPONINI <0.01 03/26/2021    TROPONINI <0.01 03/18/2021          Wound Documentation:   Incision 08/25/15 Abdomen Mid (Active)   Number of days: 2634       Assessment:    Cellulitis of the left lower extremity. Cat scratch left lower leg  Left inguinal adenopathy with largest lymph node measuring 4.2 x 1.2 x 2.6 cm  Hypokalemia  COPD  GERD  Obesity secondary to excess caloric intake with elevated BMI of 32.92  Elevated ASO titer          Plan:     Continue IV antibiotics. Infectious diseases following  Elevate left leg  Localized wound care  CT left lower extremities  Continue DVT prophylaxis  Pain management    More than 50% of my  time was spent at the bedside counseling/coordinating care with the patient and/or family with face to face contact. This time was spent reviewing notes and laboratory data as well as instructing and counseling the patient. Time I spent with the family or surrogate(s) is included only if the patient was incapable of providing the necessary information or participating in medical decisions.  I also discussed the differential diagnosis and all of the proposed management plans with the patient and individuals accompanying the patient.     Chey Lopez DO, LATONIAA.C.O.I.  11/11/2022  2:14 PM

## 2022-11-11 NOTE — CARE COORDINATION
CM note: transition of care planning, chart reviewed. Will follow for antibiotics, may need prior authorization or home care dependent upon final antibiotic choice. No other needs identified. Pt here for cellulitis of left lower extremity from cat scratch 2 weeks ago. Does have hx of substance abuse.

## 2022-11-11 NOTE — CONSULTS
[address] [id number] [facility], [address] [place] [address]  Phone [phone]   [phone]      Admit Date: 11/10/2022  5:15 AM  Pt Name: Maurice Chong  MRN: [id number]  : 1970  Reason for Consult:    Chief Complaint   Patient presents with    Other     Cellulitis, chills, hand pain since  night     Requesting Physician:  Mundo Iniguez DO  PCP: Keerthi Lizama DO  History Obtained From:  patient, chart   ID consulted for Cellulitis [L03.90]  Cellulitis of left lower extremity [L03.116]  on hospital day 1  Face to face encounter  [facility]       Chief Complaint   Patient presents with    Other     Cellulitis, chills, hand pain since  night     HISTORYOF PRESENT ILLNESS   Maurice Chong is a 46 y.o. female who presents with   has a past medical history of Arthritis, Brain venous angioma ([facility] [address] Bursitis, Cat scratch of left lower leg, Class 3 severe obesity due to excess calories with body mass index (BMI) of 40.0 to 44.9 in [facility]), COPD (chronic obstructive pulmonary disease) ([address] Diverticulitis, GERD (gastroesophageal reflux disease), Incisional hernia with obstruction but no gangrene, and Strep throat. ED TRIAGEVITALS  BP: 99/60, Temp: 97.9 °F (36.6 °C), Heart Rate: 72, Resp: 20, SpO2: 92 %  HPI:  ID was consulted on 22 for infection management  Pt presented to ER on 11/10/2022 with diagnosis of  Cellulitis [L03.90]  Cellulitis of left lower extremity [L03.116]    Patient is a 46year old female who presented to ER with lower extremity cellulitis with left groin adenopathy. Patient reported her cat climbs up her legs and scratches them. She reports the cat scratched her legs almost 2 weeks ago. She has not been on any antibiotics at home. She reports feeling malaise , she had fevers, chills, nausea, vomiting, at home prior to admission. She then noticed her leeft leg was red. Her ASO- is elevated at 388.     Cultures are pending  Patient was started on Iv unasyn and vancomycin. Patient is known to ID service- was seen in August of 2021- for UTI. Patient has a history of substance abuse. ID was asked to evaluate and manage atbs.    REVIEW OF SYSTEMS     CONSTITUTIONAL:   + fever, chills, weight loss  ALLERGIES:    No rash or itch  EYES:     No visual changes  ENT:      No  hearing loss or sore throat  CARDIOVASCULAR:   No chest pain or palpitations  RESPIRATORY:   No cough, sob  ENDOCRINE:    No increase thirst, urination   HEME-LYMPH:   No easy bruising or bleeding disorder  GI:     No nausea, vomiting or diarrhea, + GERD, + diverticulitis   :     No urinary complaints  NEURO:    No seizures, stroke, HA  MUSCULOSKELETAL:  + bursitis + arthritis   SKIN:     No rash, ulcers or wounds  PSYCH:    No depression or anxiety    Medications Prior to Admission: albuterol sulfate HFA (VENTOLIN HFA) 108 (90 Base) MCG/ACT inhaler, Inhale 2 puffs into the lungs 4 times daily as needed for Wheezing (Patient not taking: Reported on 11/10/2022)  levETIRAcetam (KEPPRA) 500 MG tablet, Take 1 tablet by mouth 2 times daily  levothyroxine (SYNTHROID) 50 MCG tablet, Take 1 tablet by mouth Daily  pantoprazole (PROTONIX) 40 MG tablet, Take 1 tablet by mouth every morning (before breakfast) (Patient not taking: Reported on 11/10/2022)  hydrOXYzine (ATARAX) 50 MG tablet, Take 50 mg by mouth nightly   brexpiprazole (REXULTI) 0.5 MG TABS tablet, Take 0.5 mg by mouth daily (Patient not taking: Reported on 11/10/2022)  PARoxetine (PAXIL) 10 MG tablet, Take 10 mg by mouth every morning (Patient not taking: Reported on 11/10/2022)  bumetanide (BUMEX) 1 MG tablet, TAKE ONE TABLET 2 TIMES A DAY (Patient not taking: Reported on 11/10/2022)  CURRENT MEDICATIONS     Current Facility-Administered Medications:     hydrOXYzine HCl (ATARAX) tablet 50 mg, 50 mg, Oral, Nightly, Gary Lopez, DO    levETIRAcetam (KEPPRA) tablet 500 mg, 500 mg, Oral, BID, Gary Lopez DO    levothyroxine (SYNTHROID) tablet 50 mcg, 50 mcg, Oral, Daily, Gary Lopez DO    pantoprazole (PROTONIX) tablet 40 mg, 40 mg, Oral, QAM AC, Gary Lopez DO    ipratropium-albuterol (DUONEB) nebulizer solution 1 ampule, 1 ampule, Inhalation, 4x daily, Gary Lopez DO    enoxaparin (LOVENOX) injection 80 mg, 1 mg/kg, SubCUTAneous, BID, Gary Lopez DO    vancomycin (VANCOCIN) 1,000 mg in sodium chloride 0.9 % 250 mL IVPB ([id number]), 1,000 mg, IntraVENous, Q12H, Gary Lopez DO    oxyCODONE-acetaminophen (PERCOCET) 5-325 MG per tablet 1 tablet, 1 tablet, Oral, Q4H PRN **OR** oxyCODONE-acetaminophen (PERCOCET) 5-325 MG per tablet 2 tablet, 2 tablet, Oral, Q4H PRN, Noberto Ground Mirael, DO, 2 tablet at 11/11/22 0009    lactated ringers infusion, , IntraVENous, Continuous, BOB Yao - CNP, Stopped at 11/10/22 1759    ALPRAZolam (XANAX) tablet 0.25 mg, 0.25 mg, Oral, Q6H PRN, Noberto Ground Bisel, DO, 0.25 mg at 11/11/22 0009    ampicillin-sulbactam (UNASYN) 3,000 mg in sodium chloride 0.9 % 100 mL IVPB ([id number]), 3,000 mg, IntraVENous, Q6H, Dione Fregoso MD, Stopped at 11/11/22 0532  ALLERGIES     Ibuprofen, Nsaids, and Morphine  Immunization History   Administered Date(s) Administered    Td (Adult), 2 Lf Tetanus Toxoid, Pf (Td, Absorbed) 11/10/2022      Internal Administration   First Dose      Second Dose           Last COVID Lab SARS-CoV-2, PCR (no units)   Date Value   12/12/2021 DETECTED (A)     SARS-CoV-2, NAAT (no units)   Date Value   07/30/2021 Not Detected        PAST MEDICAL HISTORY     Past Medical History:   Diagnosis Date    Arthritis     Brain venous angioma (HCC)     Bursitis     hips    Cat scratch of left lower leg     Class 3 severe obesity due to excess calories with body mass index (BMI) of 40.0 to 44.9 in adult [facility])     COPD (chronic obstructive pulmonary disease) (HCC)     Diverticulitis     GERD (gastroesophageal reflux disease)     Incisional hernia with obstruction but no gangrene     Strep throat 2021 SURGICAL HISTORY       Past Surgical History:   Procedure Laterality Date     SECTION      CHOLECYSTECTOMY      COLECTOMY  2016    FOREARM FRACTURE SURGERY Left     fracture of ulna s/p repair    HERNIA REPAIR      HERNIA REPAIR  2015    incarcerated hernia repair    HERNIA REPAIR N/A 3/25/2021    INCISIONAL HERNIA REPAIR WITH MESH, POSSIBLE COMPONENT SEPARATION  LAPAROSCOPIC ROBOTIC XI ASSISTED (CPT 49465) performed by Elizabeth Aguilar MD at [address] [address] (CERVIX STATUS UNKNOWN)      NERVE BLOCK      sacroiliac bilateral 2012    PELVIC FRACTURE SURGERY      VENTRAL HERNIA REPAIR  2015     FAMILY HISTORY       Family History   Problem Relation Age of Onset    Osteoarthritis Other     Diabetes Other     Other Father         pancreatitis    Prostate Cancer Father     Stroke Brother         half brother    Cancer Paternal Grandfather      SOCIAL HISTORY       Social History     Socioeconomic History    Marital status:      Spouse name: Yue Bonilla    Number of children: 4    Years of education: 12    Highest education level: None   Occupational History     Employer: [employer]   Tobacco Use    Smoking status: Former     Packs/day: 1.00     Years: 30.00     Pack years: 30.00     Types: Cigarettes     Quit date: 3/5/2021     Years since quittin.6    Smokeless tobacco: Never   Vaping Use    Vaping Use: Never used   Substance and Sexual Activity    Alcohol use: Yes     Comment: daily vodka drinker    Drug use: Not Currently     Types: Marijuana Mami Eleazar)     Comment: medical marijuana. Sexual activity: Yes     Partners: Male   Social History Narrative     twice.  Recently  2020     PHYSICAL EXAM        Vitals:    Vitals:    11/10/22 1550 11/10/22 1651 22 0009 22 0600   BP:  108/84 108/68 99/60   Pulse:  80 75 72   Resp: 20 18  20   Temp:  98.3 °F (36.8 °C)  97.9 °F (36.6 °C)   TempSrc:  Oral  Oral   SpO2:  96%  92%   Weight: Height:         CONSTITUTIONAL:  awake, alert, cooperative, no apparent distress, and appears stated age  ENT:  Normocephalic, without obvious abnormality, atraumatic  NECK:  Supple, symmetrical, trachea midline  LUNGS:  No increased work of breathing, good air exchange, clear to auscultation bilaterally, no crackles or wheezing  CARDIOVASCULAR:  Normal apical impulse, regular rate and rhythm  ABDOMEN:  No scars, normal bowel sounds, soft, non-distended, non-tender, no masses palpated   MUSCULOSKELETAL:  There is no redness, warmth, or swelling of the joints. Full range of motion noted. NEUROLOGIC:  Awake, alert, oriented to name, place and time. Cranial nerves II-XII are grossly intact. SKIN:  no bruising or bleeding and normal skin color, texture, turgor  ++ edema Left > right. Left with erythema, warmth, and tenderness  Left groin adenopathy          Peripheral Intravenous Line:  Peripheral IV 11/10/22 Left Hand (Active)   Site Assessment Clean, dry & intact 11/11/22 0400   Line Status Infusing 11/11/22 0400   Line Care Connections checked and tightened 11/10/22 1905   Phlebitis Assessment No symptoms 11/10/22 1905   Infiltration Assessment 0 11/10/22 1905   Alcohol Cap Used Yes 11/10/22 1905   Dressing Status Clean, dry & intact 11/11/22 0400   Dressing Type Transparent 11/11/22 0400   Dressing Intervention New 11/10/22 1905     DIAGNOSTIC RESULTS   RADIOLOGY:   US DUP LOWER EXTREMITY LEFT CALEB    Result Date: 11/10/2022  EXAMINATION: DUPLEX VENOUS ULTRASOUND OF THE LEFT LOWER EXTREMITY 11/10/2022 6:25 am TECHNIQUE: Duplex ultrasound using B-mode/gray scaled imaging and Doppler spectral analysis and color flow was obtained of the deep venous structures of the left lower extremity.  COMPARISON: 20 February 2021 HISTORY: ORDERING SYSTEM PROVIDED HISTORY: Discomfort TECHNOLOGIST PROVIDED HISTORY: Reason for exam:->Discomfort What reading provider will be dictating this exam?->CRC FINDINGS: The visualized veins of the left lower extremity are patent and free of echogenic thrombus. The veins demonstrate good compressibility with normal color flow study and spectral analysis. There is left inguinal adenopathy with the largest node measuring 4.2 x 1.2 by 2.6 cm. Consider lymph node biopsy or excision if appropriate. No evidence of DVT in the left lower extremity. Left inguinal adenopathy. Consider lymph node biopsy and or excision if appropriate. LABS  Recent Labs     11/10/22  0554 11/11/22  0308   WBC 8.8 5.6   HGB 12.9 10.8*   HCT 39.2 34.9   MCV 88.9 92.1    205     Recent Labs     11/10/22  0554 11/11/22  0308    136   K 3.3* 3.6   CL 99 102   CO2 25 26   BUN 20 11   CREATININE 1.0 0.9   LABGLOM >60 >60   GLUCOSE 160* 137*   PROT 7.4 5.8*   LABALBU 3.9 2.9*   CALCIUM 9.3 8.3*   BILITOT 0.5 <0.2   ALKPHOS 82 65   AST 10 8   ALT 10 8     No results for input(s): PROCAL in the last 72 hours.   Lab Results   Component Value Date    CRP 11.9 (H) 11/10/2022    CRP 3.7 (H) 07/31/2021    CRP 0.4 05/07/2021     Lab Results   Component Value Date    SEDRATE 91 (H) 11/10/2022    SEDRATE 13 07/31/2021     No results found for: [id number]  Lab Results   Component Value Date/Time    ADVIRPCR Not Detected 07/31/2021 11:00 AM    BPARAPPCR Not Detected 07/31/2021 11:00 AM    BPPTXPPCR Not Detected 07/31/2021 11:00 AM    CHLPNEPCR Not Detected 07/31/2021 11:00 AM    [id number] Not Detected 07/31/2021 11:00 AM    MFWQQE9YKN Not Detected 07/31/2021 11:00 AM    IZDWI86WSZ Not Detected 07/31/2021 11:00 AM    WNTSY89PIC Not Detected 07/31/2021 11:00 AM    COVID19 DETECTED 12/12/2021 02:37 PM    METAPNEPCR Not Detected 07/31/2021 11:00 AM    RHENTPCR Not Detected 07/31/2021 11:00 AM    FLUBPCR Not Detected 07/31/2021 11:00 AM    MYCPNEPCR Not Detected 07/31/2021 11:00 AM    NVCUFFD6KQA Not Detected 07/31/2021 11:00 AM    VOXCXMQ0PTG Not Detected 07/31/2021 11:00 AM    CIRPLSQ0LCU Not Detected 07/31/2021 11:00 AM RMTCBJR0GSD Not Detected 07/31/2021 11:00 AM    RSVPCR Not Detected 07/31/2021 11:00 AM        Lab Results   Component Value Date/Time    COVID19 DETECTED 12/12/2021 02:37 PM     COVID-19/SHERIF-COV2 LABS  Recent Labs     11/10/22  0554 11/10/22  1316 11/11/22  0308   CRP  --  11.9*  --    AST 10  --  8   ALT 10  --  8   TRIG  --   --  68     Lab Results   Component Value Date/Time    CHOL 115 11/11/2022 03:08 AM    TRIG 68 11/11/2022 03:08 AM    HDL 24 11/11/2022 03:08 AM    LDLCALC 77 11/11/2022 03:08 AM    LABVLDL 14 11/11/2022 03:08 AM       Lab Results   Component Value Date    HEPAIGM Non-Reactive 03/19/2021    HEPBIGM Non-Reactive 03/19/2021    HCVABI Non-Reactive 03/19/2021     Hep C Ab Interp   Date Value Ref Range Status   03/19/2021 Non-Reactive NON REACT Final     Comment:     Testing performed at [facility], [address] [place] [address], [address] [place] [address]       MICROBIOLOGY:  Cultures :     Recent Labs     11/10/22  1316   *     Urine Culture, Routine   Date Value Ref Range Status   07/29/2021 Growth not present  Final   03/09/2021 <10,000 CFU/mL  Mixed gram positive organisms    Final   03/03/2021 <10,000 CFU/mL  Mixed gram positive organisms    Final     FINAL IMPRESSION    Patient is a 46 y.o. female who presented with   Chief Complaint   Patient presents with    Other     Cellulitis, chills, hand pain since Sunday night    and admitted for Cellulitis [L03.90]  Cellulitis of left lower extremity [L03.116]  Fever, chills   Left inguinal adenopathy   History of COVID-2021      Plan:   Continue Unasyn for now  Continue Vancomycin IV  Pharmacy dosing   Check MRSA screen  Sed rate-91  ASO- 388  Blood cultures- no growth to date   Wound culture pending   CT and ultrasound reviewed   Monitor labs     Available labs, imaging studies, microbiologic studies have been reviewed. The patient/FAMILY  was educated about the diagnosis, prognosis, indications, risks and benefits of treatment.       An opportunity to ask questions was given to the patient/FAMILY and questions were answered. Thank you for involving me in the care of SYSCO. Please do not hesitate to call [phone]  for any questions or concerns. Electronically signed by BOB Doshi on 11/11/2022 at 9:21 AM      As above    The patient has COVID-19 infection. To prevent self exposure and cross contamination care will be coordinated with patient's care team. All relevant records and diagnostic tests were reviewed in EMR, including laboratory results and imaging. Elements of this note, including Diagnosis,  Interval History, Past Medical/Surgical/Family/Social Histories, ROS, physical exam, and Assessment and Plan were copied and pasted from Previous. Updates have been made where noted and reflect current exam and medical decision making from the DOS of this encounter. This is a face to face encounter with SYSCO on 11/11/22. I discussed the findings and plans with  NURSE PRACTITIONER, BOB Doshi and agree as documented in her note. See below for additional details and treatment plan. SYSCO is a 46 y.o. female who presents with   Chief Complaint   Patient presents with    Other     Cellulitis, chills, hand pain since Sunday night    and has  has a past medical history of Arthritis, Brain venous angioma (HCC), Bursitis, Cat scratch of left lower leg, Class 3 severe obesity due to excess calories with body mass index (BMI) of 40.0 to 44.9 in [facility]), COPD (chronic obstructive pulmonary disease) ([facility] [address] Diverticulitis, GERD (gastroesophageal reflux disease), Incisional hernia with obstruction but no gangrene, and Strep throat.      Admit DX:  Cellulitis [L03.90]  Cellulitis of left lower extremity [L03.116]  Has cats prob has component of Pasturella  [id number]  [id number]    ID was consulted for cellulitis       vancomycin (VANCOCIN) 1,000 mg in sodium chloride 0.9 % 250 mL IVPB ([id number]), Q12H     ampicillin-sulbactam (UNASYN) 3,000 mg in sodium chloride 0.9 % 100 mL IVPB ([id number]), Q6H    Elevate may need compression       Imaging and labs were reviewed. Sherryle Pence was informed of their diagnosis, indications, risks and benefits of treatment. Sherryle Pence had the opportunity to ask questions. All questions were answered. Thank you for involving me in the care of Sherryle Pence. Please do not hesitate to call for any questions or concerns.     Electronically signed by Alis Connors MD on 11/11/2022 at 2:40 PM

## 2022-11-11 NOTE — PROGRESS NOTES
Pharmacy Consultation Note  (Antibiotic Dosing and Monitoring)    Initial consult date: 11/11/2022  Consulting physician/provider: Dr. Mathew Melissa  Drug: Vancomycin  Indication: SSTI    Age/  Gender Height Weight IBW  Allergy Information   52 y.o./female 5' 2\" (157.5 cm) 180 lb (81.6 kg)     Ideal body weight: 50.1 kg (110 lb 7.2 oz)  Adjusted ideal body weight: 62.7 kg (138 lb 4.3 oz)   Ibuprofen, Nsaids, and Morphine      Renal Function:  Recent Labs     11/10/22  0554 11/11/22  0308   BUN 20 11   CREATININE 1.0 0.9       Intake/Output Summary (Last 24 hours) at 11/11/2022 0834  Last data filed at 11/10/2022 1816  Gross per 24 hour   Intake 1210.99 ml   Output --   Net 1210.99 ml       Vancomycin Monitoring:  Trough:  No results for input(s): VANCOTROUGH in the last 72 hours. Random:  No results for input(s): VANCORANDOM in the last 72 hours. No results for input(s): Jeanne Busman in the last 72 hours. Historical Cultures:  No results found for: ORG  No results for input(s): BC in the last 72 hours. Vancomycin Administration Times:  Recent vancomycin administrations                     vancomycin (VANCOCIN) 1,750 mg in dextrose 5 % 500 mL IVPB (mg) 1,750 mg New Bag 11/10/22 7392             Assessment:  Patient is a 46 y.o. female who has been initiated on vancomycin for cellulitis  + cat scratches  Estimated Creatinine Clearance: 72 mL/min (based on SCr of 0.9 mg/dL). To dose vancomycin, pharmacy will be utilizing [employer] calculation software for goal AUC/ISIDRA 400-600 mg/L-hr  11/10: Vancomycin 1750 mg IV given in ED. Dose did infiltrate and hyaluronidase was administered. Unclear if patient received full dose or not.      Plan:  Start vancomycin 1000 mg IV q12hr (est AUC/ISIDRA 528)   Will check vancomycin levels when appropriate  Today is day 1/7 of therapy per consult order  Will continue to monitor renal function   Pharmacy to follow      Myra Hurt PharmD, BCPS 11/11/2022 8:34 AM   Ext: [phone]

## 2022-11-12 LAB
ALBUMIN SERPL-MCNC: 2.8 G/DL (ref 3.5–5.2)
ALP BLD-CCNC: 63 U/L (ref 35–104)
ALT SERPL-CCNC: 8 U/L (ref 0–32)
ANION GAP SERPL CALCULATED.3IONS-SCNC: 6 MMOL/L (ref 7–16)
AST SERPL-CCNC: 7 U/L (ref 0–31)
BASOPHILS ABSOLUTE: 0.03 E9/L (ref 0–0.2)
BASOPHILS RELATIVE PERCENT: 0.5 % (ref 0–2)
BILIRUB SERPL-MCNC: <0.2 MG/DL (ref 0–1.2)
BUN BLDV-MCNC: 11 MG/DL (ref 6–20)
CALCIUM SERPL-MCNC: 8.1 MG/DL (ref 8.6–10.2)
CHLORIDE BLD-SCNC: 103 MMOL/L (ref 98–107)
CO2: 29 MMOL/L (ref 22–29)
CREAT SERPL-MCNC: 0.9 MG/DL (ref 0.5–1)
EOSINOPHILS ABSOLUTE: 0.13 E9/L (ref 0.05–0.5)
EOSINOPHILS RELATIVE PERCENT: 2 % (ref 0–6)
GFR SERPL CREATININE-BSD FRML MDRD: >60 ML/MIN/1.73
GLUCOSE BLD-MCNC: 127 MG/DL (ref 74–99)
HCT VFR BLD CALC: 34.6 % (ref 34–48)
HEMOGLOBIN: 10.8 G/DL (ref 11.5–15.5)
IMMATURE GRANULOCYTES #: 0.13 E9/L
IMMATURE GRANULOCYTES %: 2 % (ref 0–5)
LYMPHOCYTES ABSOLUTE: 1.43 E9/L (ref 1.5–4)
LYMPHOCYTES RELATIVE PERCENT: 22.3 % (ref 20–42)
MCH RBC QN AUTO: 28.9 PG (ref 26–35)
MCHC RBC AUTO-ENTMCNC: 31.2 % (ref 32–34.5)
MCV RBC AUTO: 92.5 FL (ref 80–99.9)
MONOCYTES ABSOLUTE: 0.47 E9/L (ref 0.1–0.95)
MONOCYTES RELATIVE PERCENT: 7.3 % (ref 2–12)
NEUTROPHILS ABSOLUTE: 4.21 E9/L (ref 1.8–7.3)
NEUTROPHILS RELATIVE PERCENT: 65.9 % (ref 43–80)
PDW BLD-RTO: 14.1 FL (ref 11.5–15)
PLATELET # BLD: 217 E9/L (ref 130–450)
PMV BLD AUTO: 10 FL (ref 7–12)
POTASSIUM SERPL-SCNC: 4 MMOL/L (ref 3.5–5)
RBC # BLD: 3.74 E12/L (ref 3.5–5.5)
SODIUM BLD-SCNC: 138 MMOL/L (ref 132–146)
TOTAL PROTEIN: 5.8 G/DL (ref 6.4–8.3)
WBC # BLD: 6.4 E9/L (ref 4.5–11.5)

## 2022-11-12 PROCEDURE — 85025 COMPLETE CBC W/AUTO DIFF WBC: CPT

## 2022-11-12 PROCEDURE — 6360000002 HC RX W HCPCS: Performed by: SPECIALIST

## 2022-11-12 PROCEDURE — 1200000000 HC SEMI PRIVATE

## 2022-11-12 PROCEDURE — 36415 COLL VENOUS BLD VENIPUNCTURE: CPT

## 2022-11-12 PROCEDURE — 6360000002 HC RX W HCPCS: Performed by: INTERNAL MEDICINE

## 2022-11-12 PROCEDURE — 6370000000 HC RX 637 (ALT 250 FOR IP): Performed by: INTERNAL MEDICINE

## 2022-11-12 PROCEDURE — 2580000003 HC RX 258: Performed by: INTERNAL MEDICINE

## 2022-11-12 PROCEDURE — 80053 COMPREHEN METABOLIC PANEL: CPT

## 2022-11-12 PROCEDURE — 94640 AIRWAY INHALATION TREATMENT: CPT

## 2022-11-12 PROCEDURE — 2580000003 HC RX 258: Performed by: SPECIALIST

## 2022-11-12 RX ORDER — DEXAMETHASONE SODIUM PHOSPHATE 4 MG/ML
4 INJECTION, SOLUTION INTRA-ARTICULAR; INTRALESIONAL; INTRAMUSCULAR; INTRAVENOUS; SOFT TISSUE EVERY 6 HOURS
Status: COMPLETED | OUTPATIENT
Start: 2022-11-12 | End: 2022-11-13

## 2022-11-12 RX ORDER — FUROSEMIDE 10 MG/ML
20 INJECTION INTRAMUSCULAR; INTRAVENOUS ONCE
Status: COMPLETED | OUTPATIENT
Start: 2022-11-12 | End: 2022-11-12

## 2022-11-12 RX ADMIN — IPRATROPIUM BROMIDE AND ALBUTEROL SULFATE 1 AMPULE: .5; 2.5 SOLUTION RESPIRATORY (INHALATION) at 09:41

## 2022-11-12 RX ADMIN — ALPRAZOLAM 0.25 MG: 0.25 TABLET ORAL at 09:01

## 2022-11-12 RX ADMIN — HYDROXYZINE HYDROCHLORIDE 50 MG: 25 TABLET ORAL at 20:56

## 2022-11-12 RX ADMIN — DEXAMETHASONE SODIUM PHOSPHATE 4 MG: 4 INJECTION, SOLUTION INTRAMUSCULAR; INTRAVENOUS at 15:32

## 2022-11-12 RX ADMIN — ALPRAZOLAM 0.25 MG: 0.25 TABLET ORAL at 15:40

## 2022-11-12 RX ADMIN — ALPRAZOLAM 0.25 MG: 0.25 TABLET ORAL at 21:59

## 2022-11-12 RX ADMIN — AMPICILLIN SODIUM AND SULBACTAM SODIUM 3000 MG: 2; 1 INJECTION, POWDER, FOR SOLUTION INTRAMUSCULAR; INTRAVENOUS at 15:33

## 2022-11-12 RX ADMIN — DEXAMETHASONE SODIUM PHOSPHATE 4 MG: 4 INJECTION, SOLUTION INTRAMUSCULAR; INTRAVENOUS at 20:55

## 2022-11-12 RX ADMIN — VANCOMYCIN HYDROCHLORIDE 1000 MG: 1 INJECTION, POWDER, LYOPHILIZED, FOR SOLUTION INTRAVENOUS at 10:01

## 2022-11-12 RX ADMIN — OXYCODONE AND ACETAMINOPHEN 2 TABLET: 5; 325 TABLET ORAL at 15:40

## 2022-11-12 RX ADMIN — IPRATROPIUM BROMIDE AND ALBUTEROL SULFATE 1 AMPULE: .5; 2.5 SOLUTION RESPIRATORY (INHALATION) at 05:31

## 2022-11-12 RX ADMIN — AMPICILLIN SODIUM AND SULBACTAM SODIUM 3000 MG: 2; 1 INJECTION, POWDER, FOR SOLUTION INTRAMUSCULAR; INTRAVENOUS at 22:42

## 2022-11-12 RX ADMIN — FUROSEMIDE 20 MG: 10 INJECTION, SOLUTION INTRAMUSCULAR; INTRAVENOUS at 09:01

## 2022-11-12 RX ADMIN — AMPICILLIN SODIUM AND SULBACTAM SODIUM 3000 MG: 2; 1 INJECTION, POWDER, FOR SOLUTION INTRAMUSCULAR; INTRAVENOUS at 09:11

## 2022-11-12 RX ADMIN — LEVETIRACETAM 500 MG: 500 TABLET, FILM COATED ORAL at 20:56

## 2022-11-12 RX ADMIN — VANCOMYCIN HYDROCHLORIDE 1000 MG: 1 INJECTION, POWDER, LYOPHILIZED, FOR SOLUTION INTRAVENOUS at 20:54

## 2022-11-12 RX ADMIN — LEVETIRACETAM 500 MG: 500 TABLET, FILM COATED ORAL at 08:57

## 2022-11-12 RX ADMIN — DEXAMETHASONE SODIUM PHOSPHATE 4 MG: 4 INJECTION, SOLUTION INTRAMUSCULAR; INTRAVENOUS at 09:01

## 2022-11-12 RX ADMIN — OXYCODONE AND ACETAMINOPHEN 2 TABLET: 5; 325 TABLET ORAL at 08:57

## 2022-11-12 RX ADMIN — ENOXAPARIN SODIUM 80 MG: 100 INJECTION SUBCUTANEOUS at 08:57

## 2022-11-12 RX ADMIN — OXYCODONE AND ACETAMINOPHEN 2 TABLET: 5; 325 TABLET ORAL at 20:59

## 2022-11-12 RX ADMIN — PANTOPRAZOLE SODIUM 40 MG: 40 TABLET, DELAYED RELEASE ORAL at 06:46

## 2022-11-12 RX ADMIN — IPRATROPIUM BROMIDE AND ALBUTEROL SULFATE 1 AMPULE: .5; 2.5 SOLUTION RESPIRATORY (INHALATION) at 13:46

## 2022-11-12 RX ADMIN — ENOXAPARIN SODIUM 80 MG: 100 INJECTION SUBCUTANEOUS at 20:54

## 2022-11-12 RX ADMIN — LEVOTHYROXINE SODIUM 50 MCG: 0.05 TABLET ORAL at 06:46

## 2022-11-12 RX ADMIN — OXYCODONE AND ACETAMINOPHEN 2 TABLET: 5; 325 TABLET ORAL at 03:23

## 2022-11-12 RX ADMIN — IPRATROPIUM BROMIDE AND ALBUTEROL SULFATE 1 AMPULE: .5; 2.5 SOLUTION RESPIRATORY (INHALATION) at 18:41

## 2022-11-12 ASSESSMENT — PAIN DESCRIPTION - ORIENTATION
ORIENTATION: LEFT
ORIENTATION: LEFT;LOWER

## 2022-11-12 ASSESSMENT — PAIN DESCRIPTION - LOCATION
LOCATION: LEG

## 2022-11-12 ASSESSMENT — PAIN SCALES - GENERAL
PAINLEVEL_OUTOF10: 9
PAINLEVEL_OUTOF10: 7
PAINLEVEL_OUTOF10: 4
PAINLEVEL_OUTOF10: 0
PAINLEVEL_OUTOF10: 10
PAINLEVEL_OUTOF10: 8

## 2022-11-12 ASSESSMENT — PAIN SCALES - WONG BAKER
WONGBAKER_NUMERICALRESPONSE: 0
WONGBAKER_NUMERICALRESPONSE: 0

## 2022-11-12 ASSESSMENT — PAIN - FUNCTIONAL ASSESSMENT
PAIN_FUNCTIONAL_ASSESSMENT: PREVENTS OR INTERFERES SOME ACTIVE ACTIVITIES AND ADLS
PAIN_FUNCTIONAL_ASSESSMENT: PREVENTS OR INTERFERES WITH ALL ACTIVE AND SOME PASSIVE ACTIVITIES

## 2022-11-12 ASSESSMENT — PAIN DESCRIPTION - DESCRIPTORS
DESCRIPTORS: THROBBING;BURNING
DESCRIPTORS: ACHING
DESCRIPTORS: THROBBING

## 2022-11-12 NOTE — PROGRESS NOTES
Internal Medicine Progress Note    [facility]    Lars Sosa., MANAOKEON. Lucho Hernandez D.O., HILARIA Villa D.O. Ellene Headings, MSN, APRN, NP-C  Elijah Hutner. Rodrigo García, MSN, APRN-CNP     Primary Care Physician: Jennifer Miles DO   Admitting Physician:  Gonzalo Angeles DO  Admission date and time: 11/10/2022  5:15 AM    Room:  [facility]30-  Admitting diagnosis: Cellulitis [L03.90]  Cellulitis of left lower extremity [L03.116]    Patient Name: Renuka Miose  MRN: [id number]    Date of Service: 11/12/2022     Subjective:  Quincy Wise is a 46 y.o. female who was seen and examined today,11/12/2022, at the bedside. Patient still have pain present on left leg. Results of CT discussed with patient. Cannot take nonsteroidal anti-inflammatories due to allergy, will try Decadron for 24 hours. No family present during my examination. Review of System:   Constitutional:   Denies fever or chills, weight loss or gain, fatigue or malaise. HEENT:   Denies ear pain, sore throat, sinus or eye problems. Cardiovascular:   Denies any chest pain, irregular heartbeats, or palpitations. Respiratory:   Denies shortness of breath, coughing, sputum production, hemoptysis, or wheezing. Gastrointestinal:   Denies nausea, vomiting, diarrhea, or constipation. Denies any abdominal pain. Genitourinary:    Denies any urgency, frequency, hematuria. Voiding  without difficulty. Extremities:   Significant pain and swelling of the left lower extremities left lower extremity markedly swollen compared to the right  Neurology:    Denies any headache or focal neurological deficits, Denies generalized weakness or memory difficulty. Psch:   Denies being anxious or depressed. Musculoskeletal:    Denies  myalgias, joint complaints or back pain. Integumentary:   Denies any rashes, ulcers, or excoriations.   Denies bruising. Hematologic/Lymphatic:  Denies bruising or bleeding. Physical Exam:  I/O this shift:  In: 1140 [P.O.:1140]  Out: -     Intake/Output Summary (Last 24 hours) at 11/12/2022 1437  Last data filed at 11/12/2022 1348  Gross per 24 hour   Intake 4176 ml   Output --   Net 4176 ml   I/O last 3 completed shifts: In: 0454 [P.O.:1080; I.V.:2676]  Out: -   Patient Vitals for the past 96 hrs (Last 3 readings):   Weight   11/10/22 0524 180 lb (81.6 kg)     Vital Signs:   Blood pressure (!) 90/48, pulse 78, temperature 98.3 °F (36.8 °C), temperature source Oral, resp. rate 16, height 5' 2\" (1.575 m), weight 180 lb (81.6 kg), SpO2 93 %. General appearance:  Alert, responsive, oriented to person, place, and time. Well preserved, alert, no distress. Head:  Normocephalic. No masses, lesions or tenderness. Eyes:  PERRLA. EOMI. Sclera clear. Buccal mucosa moist.  ENT:  Ears normal. Mucosa normal.  Neck:    Supple. Trachea midline. No thyromegaly. No JVD. No bruits. Heart:    Rhythm regular. Rate controlled. No murmurs. Lungs:    Symmetrical. Clear to auscultation bilaterally. No wheezes. No rhonchi. No rales. Abdomen:   Soft. Non-tender. Non-distended. Bowel sounds positive. No organomegaly or masses. No pain on palpation. Extremities:    Left lower extremity markedly swollen compared to the right. Pulses present. Skin erythematous. Left inguinal node present  Neurologic:    Alert x 3. No focal deficit. Cranial nerves grossly intact. No focal weakness. Psych:   Behavior is normal. Mood appears normal. Speech is not rapid and/or pressured. Musculoskeletal:   Spine ROM normal. Muscular strength intact. Gait not assessed. Integumentary:  No rashes  Skin normal color and texture.   Genitalia/Breast:  Deferred    Medication:  Scheduled Meds:   dexamethasone  4 mg IntraVENous Q6H    hydrOXYzine HCl  50 mg Oral Nightly    levETIRAcetam  500 mg Oral BID    levothyroxine  50 mcg Oral Daily    pantoprazole  40 mg Oral QAM AC    ipratropium-albuterol  1 ampule Inhalation 4x daily    enoxaparin  1 mg/kg SubCUTAneous BID    vancomycin  1,000 mg IntraVENous Q12H    ampicillin-sulbactam  3,000 mg IntraVENous Q6H     Continuous Infusions:        Objective Data:  Recent Labs     11/10/22  0554 11/11/22  0308 11/12/22  0400   WBC 8.8 5.6 6.4   RBC 4.41 3.79 3.74   HGB 12.9 10.8* 10.8*   HCT 39.2 34.9 34.6   MCV 88.9 92.1 92.5   MCH 29.3 28.5 28.9   MCHC 32.9 30.9* 31.2*   RDW 13.8 13.8 14.1    205 217   MPV 10.4 10.3 10.0     Recent Labs     11/10/22  0554 11/11/22  0308 11/12/22  0400    136 138   K 3.3* 3.6 4.0   CL 99 102 103   CO2 25 26 29   BUN 20 11 11   CREATININE 1.0 0.9 0.9   GLUCOSE 160* 137* 127*   CALCIUM 9.3 8.3* 8.1*   PROT 7.4 5.8* 5.8*   LABALBU 3.9 2.9* 2.8*   BILITOT 0.5 <0.2 <0.2   ALKPHOS 82 65 63   AST 10 8 7   ALT 10 8 8     Lab Results   Component Value Date    TROPONINI <0.01 04/23/2021    TROPONINI <0.01 03/26/2021    TROPONINI <0.01 03/18/2021          Wound Documentation:   Incision 08/25/15 Abdomen Mid (Active)   Number of days: 2634       Assessment:    Cellulitis of the left lower extremity. Cat scratch left lower leg  Left inguinal adenopathy with largest lymph node measuring 4.2 x 1.2 x 2.6 cm  Hypokalemia  COPD  GERD  Obesity secondary to excess caloric intake with elevated BMI of 32.92  Elevated ASO titer          Plan:     Continue IV antibiotics. Infectious diseases following  Elevate left leg  Localized wound care  Discussed CT finding with patient  Trial of steroids for 24 hours    More than 50% of my  time was spent at the bedside counseling/coordinating care with the patient and/or family with face to face contact. This time was spent reviewing notes and laboratory data as well as instructing and counseling the patient.  Time I spent with the family or surrogate(s) is included only if the patient was incapable of providing the necessary information or participating in medical decisions. I also discussed the differential diagnosis and all of the proposed management plans with the patient and individuals accompanying the patient.     Kenny Anand DO, F.A.C.OManoloI.  11/12/2022  2:37 PM

## 2022-11-12 NOTE — PROGRESS NOTES
NAME: Sharyon Castleman  MR:  [id number]  :   1970  Admit Date:  11/10/2022      This is a face to face encounter with Sharyon Castleman 46 y.o. female on 22  Elements of this note, including Diagnosis,  Interval History, Past Medical/Surgical/Family/Social Histories, ROS, physical exam, and Assessment and Plan were copied and pasted from Previous. Updates have been made where noted and reflect current exam and medical decision making from the DOS of this encounter. CHIEF COMPLAINT     ID following for   Chief Complaint   Patient presents with    Other     Cellulitis, chills, hand pain since  night     HISTORY OF PRESENT ILLNESS     Sharyon Castleman is a 46 y.o. female who presents with   Chief Complaint   Patient presents with    Other     Cellulitis, chills, hand pain since  night    and has  has a past medical history of Arthritis, Brain venous angioma ([facility] [address] Bursitis, Cat scratch of left lower leg, Class 3 severe obesity due to excess calories with body mass index (BMI) of 40.0 to 44.9 in [facility]), COPD (chronic obstructive pulmonary disease) ([facility] [address] Diverticulitis, GERD (gastroesophageal reflux disease), Incisional hernia with obstruction but no gangrene, and Strep throat. Assessment & Plan   Cellulitis [L03.90]  Cellulitis of left lower extremity [L03.116]  Has cats prob has component of Pasturella  [id number]  [id number]  Elevate may need compression   vancomycin (VANCOCIN) 1,000 mg in sodium chloride 0.9 % 250 mL    ampicillin-sulbactam (UNASYN) 3,000 mg in sodium chloride 0.9 % 100 mL IVPB (Rytm1Cqi), Q6H      Pt seen and examined  Afebrile  In bed laying on her left side  has no c/o dec pain and redness   Patient is tolerating medications. No reported adverse drug reactions.     Microbiology:       Lab Results   Component Value Date/Time    BC 24 Hours no growth 11/10/2022 05:54 AM    BC 5 Days no growth 2021 05:49 AM    BC 5 Days no growth 2021 05:10 PM    BLOODCULT2 24 Hours no growth 11/10/2022 05:54 AM    BLOODCULT2 5 Days no growth 07/30/2021 05:49 AM    BLOODCULT2 5 Days no growth 07/01/2021 05:20 PM      LABS:     Recent Labs     11/10/22  0554 11/11/22  0308 11/12/22  0400   WBC 8.8 5.6 6.4   RBC 4.41 3.79 3.74   HGB 12.9 10.8* 10.8*   HCT 39.2 34.9 34.6   MCV 88.9 92.1 92.5   MCH 29.3 28.5 28.9   MCHC 32.9 30.9* 31.2*   RDW 13.8 13.8 14.1    205 217   MPV 10.4 10.3 10.0     Recent Labs     11/10/22  0554 11/11/22  0308 11/12/22  0400    136 138   K 3.3* 3.6 4.0   CL 99 102 103   CO2 25 26 29   BUN 20 11 11   CREATININE 1.0 0.9 0.9   GLUCOSE 160* 137* 127*   PROT 7.4 5.8* 5.8*   LABALBU 3.9 2.9* 2.8*   CALCIUM 9.3 8.3* 8.1*   BILITOT 0.5 <0.2 <0.2   ALKPHOS 82 65 63   AST 10 8 7   ALT 10 8 8     Lab Results   Component Value Date    CRP 11.9 (H) 11/10/2022    CRP 3.7 (H) 07/31/2021    CRP 0.4 05/07/2021     Lab Results   Component Value Date    SEDRATE 91 (H) 11/10/2022    SEDRATE 13 07/31/2021     Recent Labs     11/10/22  0554 11/10/22  1316 11/11/22  0308 11/12/22  0400   CRP  --  11.9*  --   --    AST 10  --  8 7   ALT 10  --  8 8   TRIG  --   --  68  --      Lab Results   Component Value Date/Time    CHOL 115 11/11/2022 03:08 AM    TRIG 68 11/11/2022 03:08 AM    HDL 24 11/11/2022 03:08 AM    LDLCALC 77 11/11/2022 03:08 AM    LABVLDL 14 11/11/2022 03:08 AM     Lab Results   Component Value Date/Time    VITD25 29 (L) 04/26/2021 04:37 AM       REVIEW OF SYSTEMS     As stated above in the chief complaint, otherwise negative.   CURRENT MEDICATIONS     Current Facility-Administered Medications:     dexamethasone (DECADRON) injection 4 mg, 4 mg, IntraVENous, Q6H, Gary Lopez DO, 4 mg at 11/12/22 0901    hydrOXYzine HCl (ATARAX) tablet 50 mg, 50 mg, Oral, Nightly, Gary Lopez DO, 50 mg at 11/11/22 2208    levETIRAcetam (KEPPRA) tablet 500 mg, 500 mg, Oral, BID, Gary Lopez DO, 500 mg at 11/12/22 0857    levothyroxine (SYNTHROID) tablet 50 mcg, 50 mcg, Oral, Daily, Hilary Chacorta Lopez DO, 50 mcg at 11/12/22 0646    pantoprazole (PROTONIX) tablet 40 mg, 40 mg, Oral, QAM AC, Gary Lopez DO, 40 mg at 11/12/22 0646    ipratropium-albuterol (DUONEB) nebulizer solution 1 ampule, 1 ampule, Inhalation, 4x daily, Gary DAVISON Mirasergei DO, 1 ampule at 11/12/22 0941    enoxaparin (LOVENOX) injection 80 mg, 1 mg/kg, SubCUTAneous, BID, Hilary Whatley Jessica DO, 80 mg at 11/12/22 0857    vancomycin (VANCOCIN) 1,000 mg in sodium chloride 0.9 % 250 mL IVPB ([id number]), 1,000 mg, IntraVENous, Q12H, Hilaryrenetta Lopez DO, Last Rate: 250 mL/hr at 11/11/22 2228, 1,000 mg at 11/11/22 2228    oxyCODONE-acetaminophen (PERCOCET) 5-325 MG per tablet 1 tablet, 1 tablet, Oral, Q4H PRN **OR** oxyCODONE-acetaminophen (PERCOCET) 5-325 MG per tablet 2 tablet, 2 tablet, Oral, Q4H PRN, [place] Chacorta Lopez DO, 2 tablet at 11/12/22 0857    ALPRAZolam (XANAX) tablet 0.25 mg, 0.25 mg, Oral, Q6H PRN, [place] Ovidiokings Jessica, DO, 0.25 mg at 11/12/22 0901    ampicillin-sulbactam (UNASYN) 3,000 mg in sodium chloride 0.9 % 100 mL IVPB ([id number]), 3,000 mg, IntraVENous, Q6H, Ramon Soto MD, Last Rate: 200 mL/hr at 11/12/22 0911, 3,000 mg at 11/12/22 0911  DIAGNOSTIC RESULTS   Radiology:  CT TIBIA FIBULA LEFT WO CONTRAST    Result Date: 11/11/2022  EXAMINATION: CT OF THE LEFT TIBIA AND FIBULA WITHOUT CONTRAST 11/11/2022 9:10 am TECHNIQUE: CT of the left tibia and fibula was performed without the administration of intravenous contrast.  Multiplanar reformatted images are provided for review. Automated exposure control, iterative reconstruction, and/or weight based adjustment of the mA/kV was utilized to reduce the radiation dose to as low as reasonably achievable. COMPARISON: None. HISTORY ORDERING SYSTEM PROVIDED HISTORY: swelling/pain TECHNOLOGIST PROVIDED HISTORY: Reason for exam:->swelling/pain FINDINGS: Bones: No evidence of acute fracture or dislocation. No evidence of bone destruction.   Minimal periosteal reaction involving the distal tibia and fibula can be related to chronic venous insufficiency or hyperemia. Soft Tissue: There is moderate diffuse subcutaneous edema throughout the left calf, no extension into the muscular compartment. No evidence of drainable abscess. Joint: Mild degenerative changes at the ankle. Knee joint appears normal.     1.  Moderate diffuse calf edema without drainable abscess. 2.  Mild diffuse periosteal thickening of the distal tibia and fibula can be related to venous insufficiency and chronic inflammation. No evidence of bone destruction to suggest osteomyelitis. US DUP LOWER EXTREMITY LEFT CALEB    Result Date: 11/10/2022  EXAMINATION: DUPLEX VENOUS ULTRASOUND OF THE LEFT LOWER EXTREMITY 11/10/2022 6:25 am TECHNIQUE: Duplex ultrasound using B-mode/gray scaled imaging and Doppler spectral analysis and color flow was obtained of the deep venous structures of the left lower extremity. COMPARISON: 2021 HISTORY: ORDERING SYSTEM PROVIDED HISTORY: Discomfort TECHNOLOGIST PROVIDED HISTORY: Reason for exam:->Discomfort What reading provider will be dictating this exam?->CRC FINDINGS: The visualized veins of the left lower extremity are patent and free of echogenic thrombus. The veins demonstrate good compressibility with normal color flow study and spectral analysis. There is left inguinal adenopathy with the largest node measuring 4.2 x 1.2 by 2.6 cm. Consider lymph node biopsy or excision if appropriate. No evidence of DVT in the left lower extremity. Left inguinal adenopathy. Consider lymph node biopsy and or excision if appropriate.      PHYSICAL EXAM     BP (!) 90/48   Pulse 78   Temp 98.3 °F (36.8 °C) (Oral)   Resp 16   Ht 5' 2\" (1.575 m)   Wt 180 lb (81.6 kg)   SpO2 93%   BMI 32.92 kg/m²   Temp  Av.2 °F (36.8 °C)  Min: 98 °F (36.7 °C)  Max: 98.3 °F (36.8 °C)  CONSTITUTIONAL:  no apparent distress, and appears stated age  ENT:  Normocephalic, atraumatic,  external ears without lesions, oral pharynx with moist mucus membranes,    LUNGS:  No increased work of breathing, clear to auscultation bilaterally, no crackles or wheezing  CARDIOVASCULAR:  Normal apical impulse, regular rate and rhythm, normal S1 and S2,  no murmur noted  ABDOMEN:  normal bowel sounds, soft, non-distended, non-tender  MUSCULOSKELETAL:  There is no redness, warmth, or swelling  BLE. Full range of motion    left le edema dec erythema   NEUROLOGIC:  Awake, alert, oriented. Cranial nerves II-XII are grossly intact. SKIN:  normal skin color, texture, turgor and no rashes        Peripheral Intravenous Line:  Peripheral IV 11/12/22 Right; Anterior Forearm (Active)        Imaging and labs were reviewed per medical records. POC was discussed with Issac Dubin. An opportunity to ask questions was given to the patient/FAMILY. Thank you for involving me in the care of Issac Dubin I will continue to follow. Please do not hesitate to call [phone] for any questions or concerns.     Electronically signed by Esha Conde MD on 11/12/2022 at 9:52 AM

## 2022-11-12 NOTE — PROGRESS NOTES
Pharmacy Consultation Note  (Antibiotic Dosing and Monitoring)    Initial consult date: 11/11/2022  Consulting physician/provider: Dr. Alfredo Mancera  Drug: Vancomycin  Indication: SSTI    Age/  Gender Height Weight IBW  Allergy Information   52 y.o./female 5' 2\" (157.5 cm) 180 lb (81.6 kg)     Ideal body weight: 50.1 kg (110 lb 7.2 oz)  Adjusted ideal body weight: 62.7 kg (138 lb 4.3 oz)   Ibuprofen, Nsaids, and Morphine      Renal Function:  Recent Labs     11/10/22  0554 11/11/22  0308 11/12/22  0400   BUN 20 11 11   CREATININE 1.0 0.9 0.9         Intake/Output Summary (Last 24 hours) at 11/12/2022 1359  Last data filed at 11/12/2022 1348  Gross per 24 hour   Intake 4536 ml   Output --   Net 4536 ml         Vancomycin Monitoring:  Trough:  No results for input(s): VANCOTROUGH in the last 72 hours. Random:  No results for input(s): VANCORANDOM in the last 72 hours. Recent Labs     11/10/22  0554   BLOODCULT2 24 Hours no growth          Historical Cultures:  No results found for: [facility]  Recent Labs     11/10/22  0554   BC 24 Hours no growth       Vancomycin Administration Times:  Recent vancomycin administrations                     vancomycin (VANCOCIN) 1,000 mg in sodium chloride 0.9 % 250 mL IVPB ([id number]) (mg) 1,000 mg New Bag 11/12/22 1001     1,000 mg New Bag 11/11/22 2228     1,000 mg New Bag  0958    vancomycin (VANCOCIN) 1,750 mg in dextrose 5 % 500 mL IVPB (mg) 1,750 mg New Bag 11/10/22 3748               Assessment:  Patient is a 46 y.o. female who has been initiated on vancomycin for cellulitis  + cat scratches  Estimated Creatinine Clearance: 72 mL/min (based on SCr of 0.9 mg/dL). To dose vancomycin, pharmacy will be utilizing [employer] calculation software for goal AUC/ISIDRA 400-600 mg/L-hr  11/10: Vancomycin 1750 mg IV given in ED. Dose did infiltrate and hyaluronidase was administered. Unclear if patient received full dose or not.      Plan:  Vancomycin 1000 mg IV q12hr  Random level tomorrow morning  Will continue to monitor renal function   Pharmacy to follow      Sunni Lugo, PharmD 11/12/2022 2:01 PM   [phone]

## 2022-11-12 NOTE — PLAN OF CARE
Problem: Pain  Goal: Verbalizes/displays adequate comfort level or baseline comfort level  11/12/2022 0521 by Tam Santa RN  Outcome: Progressing  11/12/2022 0239 by Tam Santa RN  Outcome: Progressing  11/11/2022 1521 by Satnam Sarkar RN  Outcome: Progressing     Problem: Safety - Adult  Goal: Free from fall injury  11/12/2022 0521 by Tam Santa RN  Outcome: Progressing  11/12/2022 0239 by Tam Santa RN  Outcome: Progressing  11/11/2022 1521 by Satnam Sarkar RN  Outcome: Progressing

## 2022-11-12 NOTE — PROGRESS NOTES
Patient pleasant and cooperative this shift, complains or pain in left leg and frequent anxiety throughout shift that is controlled with prn pain and antianxiety medication. Patient continues on antibiotic therapy with no adverse reactions and remains afebrile. Assessment complete. Will continue to monitor.

## 2022-11-12 NOTE — PLAN OF CARE
Problem: Pain  Goal: Verbalizes/displays adequate comfort level or baseline comfort level  11/12/2022 0239 by Molly Treviño RN  Outcome: Progressing  11/11/2022 1521 by Katherine Mcneill RN  Outcome: Progressing     Problem: Safety - Adult  Goal: Free from fall injury  11/12/2022 0239 by Molly Treviño RN  Outcome: Progressing  11/11/2022 1521 by Katherine Mcneill RN  Outcome: Progressing

## 2022-11-13 LAB
ALBUMIN SERPL-MCNC: 3.5 G/DL (ref 3.5–5.2)
ALP BLD-CCNC: 74 U/L (ref 35–104)
ALT SERPL-CCNC: 8 U/L (ref 0–32)
ANION GAP SERPL CALCULATED.3IONS-SCNC: 10 MMOL/L (ref 7–16)
AST SERPL-CCNC: 6 U/L (ref 0–31)
BASOPHILS ABSOLUTE: 0 E9/L (ref 0–0.2)
BASOPHILS RELATIVE PERCENT: 0.3 % (ref 0–2)
BILIRUB SERPL-MCNC: <0.2 MG/DL (ref 0–1.2)
BUN BLDV-MCNC: 11 MG/DL (ref 6–20)
CALCIUM SERPL-MCNC: 9 MG/DL (ref 8.6–10.2)
CHLORIDE BLD-SCNC: 99 MMOL/L (ref 98–107)
CO2: 25 MMOL/L (ref 22–29)
CREAT SERPL-MCNC: 0.9 MG/DL (ref 0.5–1)
EOSINOPHILS ABSOLUTE: 0 E9/L (ref 0.05–0.5)
EOSINOPHILS RELATIVE PERCENT: 0.1 % (ref 0–6)
GFR SERPL CREATININE-BSD FRML MDRD: >60 ML/MIN/1.73
GLUCOSE BLD-MCNC: 206 MG/DL (ref 74–99)
HCT VFR BLD CALC: 38.2 % (ref 34–48)
HEMOGLOBIN: 11.9 G/DL (ref 11.5–15.5)
LYMPHOCYTES ABSOLUTE: 0.51 E9/L (ref 1.5–4)
LYMPHOCYTES RELATIVE PERCENT: 4.3 % (ref 20–42)
MCH RBC QN AUTO: 28.7 PG (ref 26–35)
MCHC RBC AUTO-ENTMCNC: 31.2 % (ref 32–34.5)
MCV RBC AUTO: 92.3 FL (ref 80–99.9)
METAMYELOCYTES RELATIVE PERCENT: 2.6 % (ref 0–1)
MONOCYTES ABSOLUTE: 0.51 E9/L (ref 0.1–0.95)
MONOCYTES RELATIVE PERCENT: 3.5 % (ref 2–12)
MRSA CULTURE ONLY: NORMAL
MYELOCYTE PERCENT: 0.9 % (ref 0–0)
NEUTROPHILS ABSOLUTE: 11.68 E9/L (ref 1.8–7.3)
NEUTROPHILS RELATIVE PERCENT: 88.7 % (ref 43–80)
NUCLEATED RED BLOOD CELLS: 0.9 /100 WBC
PDW BLD-RTO: 13.7 FL (ref 11.5–15)
PLATELET # BLD: 303 E9/L (ref 130–450)
PMV BLD AUTO: 10.5 FL (ref 7–12)
POTASSIUM SERPL-SCNC: 4.2 MMOL/L (ref 3.5–5)
RBC # BLD: 4.14 E12/L (ref 3.5–5.5)
SODIUM BLD-SCNC: 134 MMOL/L (ref 132–146)
TOTAL PROTEIN: 7 G/DL (ref 6.4–8.3)
VANCOMYCIN RANDOM: 12.2 MCG/ML (ref 5–40)
WBC # BLD: 12.7 E9/L (ref 4.5–11.5)

## 2022-11-13 PROCEDURE — 2580000003 HC RX 258: Performed by: SPECIALIST

## 2022-11-13 PROCEDURE — 6370000000 HC RX 637 (ALT 250 FOR IP): Performed by: SPECIALIST

## 2022-11-13 PROCEDURE — 6360000002 HC RX W HCPCS: Performed by: INTERNAL MEDICINE

## 2022-11-13 PROCEDURE — 36415 COLL VENOUS BLD VENIPUNCTURE: CPT

## 2022-11-13 PROCEDURE — 2580000003 HC RX 258: Performed by: INTERNAL MEDICINE

## 2022-11-13 PROCEDURE — 80053 COMPREHEN METABOLIC PANEL: CPT

## 2022-11-13 PROCEDURE — 94640 AIRWAY INHALATION TREATMENT: CPT

## 2022-11-13 PROCEDURE — 6360000002 HC RX W HCPCS: Performed by: SPECIALIST

## 2022-11-13 PROCEDURE — 1200000000 HC SEMI PRIVATE

## 2022-11-13 PROCEDURE — 6370000000 HC RX 637 (ALT 250 FOR IP): Performed by: INTERNAL MEDICINE

## 2022-11-13 PROCEDURE — 80202 ASSAY OF VANCOMYCIN: CPT

## 2022-11-13 PROCEDURE — 85025 COMPLETE CBC W/AUTO DIFF WBC: CPT

## 2022-11-13 RX ORDER — LINEZOLID 600 MG/1
600 TABLET, FILM COATED ORAL EVERY 12 HOURS SCHEDULED
Status: DISCONTINUED | OUTPATIENT
Start: 2022-11-13 | End: 2022-11-14 | Stop reason: HOSPADM

## 2022-11-13 RX ORDER — AMOXICILLIN AND CLAVULANATE POTASSIUM 875; 125 MG/1; MG/1
1 TABLET, FILM COATED ORAL EVERY 12 HOURS SCHEDULED
Status: DISCONTINUED | OUTPATIENT
Start: 2022-11-13 | End: 2022-11-14 | Stop reason: HOSPADM

## 2022-11-13 RX ORDER — AMOXICILLIN AND CLAVULANATE POTASSIUM 875; 125 MG/1; MG/1
1 TABLET, FILM COATED ORAL EVERY 12 HOURS SCHEDULED
Qty: 14 TABLET | Refills: 0 | Status: SHIPPED | OUTPATIENT
Start: 2022-11-13 | End: 2022-11-20

## 2022-11-13 RX ORDER — LINEZOLID 600 MG/1
600 TABLET, FILM COATED ORAL EVERY 12 HOURS SCHEDULED
Qty: 14 TABLET | Refills: 0 | Status: SHIPPED | OUTPATIENT
Start: 2022-11-13 | End: 2022-11-20

## 2022-11-13 RX ADMIN — OXYCODONE AND ACETAMINOPHEN 2 TABLET: 5; 325 TABLET ORAL at 16:53

## 2022-11-13 RX ADMIN — AMOXICILLIN AND CLAVULANATE POTASSIUM 1 TABLET: 875; 125 TABLET, FILM COATED ORAL at 21:47

## 2022-11-13 RX ADMIN — LEVETIRACETAM 500 MG: 500 TABLET, FILM COATED ORAL at 21:47

## 2022-11-13 RX ADMIN — HYDROXYZINE HYDROCHLORIDE 50 MG: 25 TABLET ORAL at 21:47

## 2022-11-13 RX ADMIN — ALPRAZOLAM 0.25 MG: 0.25 TABLET ORAL at 05:43

## 2022-11-13 RX ADMIN — LINEZOLID 600 MG: 600 TABLET, FILM COATED ORAL at 11:07

## 2022-11-13 RX ADMIN — ALPRAZOLAM 0.25 MG: 0.25 TABLET ORAL at 16:56

## 2022-11-13 RX ADMIN — AMOXICILLIN AND CLAVULANATE POTASSIUM 1 TABLET: 875; 125 TABLET, FILM COATED ORAL at 11:07

## 2022-11-13 RX ADMIN — IPRATROPIUM BROMIDE AND ALBUTEROL SULFATE 1 AMPULE: .5; 2.5 SOLUTION RESPIRATORY (INHALATION) at 04:41

## 2022-11-13 RX ADMIN — IPRATROPIUM BROMIDE AND ALBUTEROL SULFATE 1 AMPULE: .5; 2.5 SOLUTION RESPIRATORY (INHALATION) at 18:11

## 2022-11-13 RX ADMIN — IPRATROPIUM BROMIDE AND ALBUTEROL SULFATE 1 AMPULE: .5; 2.5 SOLUTION RESPIRATORY (INHALATION) at 09:31

## 2022-11-13 RX ADMIN — OXYCODONE AND ACETAMINOPHEN 2 TABLET: 5; 325 TABLET ORAL at 21:47

## 2022-11-13 RX ADMIN — DEXAMETHASONE SODIUM PHOSPHATE 4 MG: 4 INJECTION, SOLUTION INTRAMUSCULAR; INTRAVENOUS at 03:58

## 2022-11-13 RX ADMIN — VANCOMYCIN HYDROCHLORIDE 1000 MG: 1 INJECTION, POWDER, LYOPHILIZED, FOR SOLUTION INTRAVENOUS at 08:53

## 2022-11-13 RX ADMIN — OXYCODONE AND ACETAMINOPHEN 2 TABLET: 5; 325 TABLET ORAL at 01:12

## 2022-11-13 RX ADMIN — ENOXAPARIN SODIUM 80 MG: 100 INJECTION SUBCUTANEOUS at 21:47

## 2022-11-13 RX ADMIN — LEVETIRACETAM 500 MG: 500 TABLET, FILM COATED ORAL at 08:53

## 2022-11-13 RX ADMIN — IPRATROPIUM BROMIDE AND ALBUTEROL SULFATE 1 AMPULE: .5; 2.5 SOLUTION RESPIRATORY (INHALATION) at 14:08

## 2022-11-13 RX ADMIN — AMPICILLIN SODIUM AND SULBACTAM SODIUM 3000 MG: 2; 1 INJECTION, POWDER, FOR SOLUTION INTRAMUSCULAR; INTRAVENOUS at 05:35

## 2022-11-13 RX ADMIN — PANTOPRAZOLE SODIUM 40 MG: 40 TABLET, DELAYED RELEASE ORAL at 05:36

## 2022-11-13 RX ADMIN — LINEZOLID 600 MG: 600 TABLET, FILM COATED ORAL at 21:52

## 2022-11-13 RX ADMIN — OXYCODONE AND ACETAMINOPHEN 2 TABLET: 5; 325 TABLET ORAL at 05:37

## 2022-11-13 RX ADMIN — ENOXAPARIN SODIUM 80 MG: 100 INJECTION SUBCUTANEOUS at 08:53

## 2022-11-13 RX ADMIN — LEVOTHYROXINE SODIUM 50 MCG: 0.05 TABLET ORAL at 05:36

## 2022-11-13 RX ADMIN — OXYCODONE AND ACETAMINOPHEN 2 TABLET: 5; 325 TABLET ORAL at 11:09

## 2022-11-13 ASSESSMENT — PAIN SCALES - GENERAL
PAINLEVEL_OUTOF10: 0
PAINLEVEL_OUTOF10: 7
PAINLEVEL_OUTOF10: 9
PAINLEVEL_OUTOF10: 7
PAINLEVEL_OUTOF10: 0

## 2022-11-13 ASSESSMENT — PAIN DESCRIPTION - ORIENTATION
ORIENTATION: LEFT

## 2022-11-13 ASSESSMENT — PAIN DESCRIPTION - DESCRIPTORS
DESCRIPTORS: THROBBING
DESCRIPTORS: SHOOTING
DESCRIPTORS: SHOOTING

## 2022-11-13 ASSESSMENT — PAIN DESCRIPTION - LOCATION
LOCATION: LEG

## 2022-11-13 ASSESSMENT — PAIN - FUNCTIONAL ASSESSMENT
PAIN_FUNCTIONAL_ASSESSMENT: PREVENTS OR INTERFERES SOME ACTIVE ACTIVITIES AND ADLS
PAIN_FUNCTIONAL_ASSESSMENT: ACTIVITIES ARE NOT PREVENTED
PAIN_FUNCTIONAL_ASSESSMENT: PREVENTS OR INTERFERES SOME ACTIVE ACTIVITIES AND ADLS

## 2022-11-13 NOTE — PLAN OF CARE
Problem: Discharge Planning  Goal: Discharge to home or other facility with appropriate resources  Outcome: Progressing     Problem: Pain  Goal: Verbalizes/displays adequate comfort level or baseline comfort level  Outcome: Progressing     Problem: Safety - Adult  Goal: Free from fall injury  Outcome: Progressing     Problem: Skin/Tissue Integrity - Adult  Goal: Incisions, wounds, or drain sites healing without S/S of infection  Outcome: Progressing     Problem: Musculoskeletal - Adult  Goal: Return mobility to safest level of function  Outcome: Progressing     Problem: Infection - Adult  Goal: Absence of infection at discharge  Outcome: Progressing

## 2022-11-13 NOTE — PROGRESS NOTES
Pharmacy Consultation Note  (Antibiotic Dosing and Monitoring)    Initial consult date: 11/11/2022  Consulting physician/provider: Dr. Nelson Garza  Drug: Vancomycin  Indication: SSTI      Vancomycin IV has been discontinued. Pharmacy will sign off on the consult. Please re-consult if needed.       Joya Alcazar, Wilner 11/13/2022 11:29 AM   [phone]

## 2022-11-13 NOTE — PROGRESS NOTES
NAME: Miah Arreola  MR:  [id number]  :   1970  Admit Date:  11/10/2022      This is a face to face encounter with Miah Arreola 46 y.o. female on 22  Elements of this note, including Diagnosis,  Interval History, Past Medical/Surgical/Family/Social Histories, ROS, physical exam, and Assessment and Plan were copied and pasted from Previous. Updates have been made where noted and reflect current exam and medical decision making from the DOS of this encounter. CHIEF COMPLAINT     ID following for   Chief Complaint   Patient presents with    Other     Cellulitis, chills, hand pain since  night     HISTORY OF PRESENT ILLNESS     Miah Arreola is a 46 y.o. female who presents with   Chief Complaint   Patient presents with    Other     Cellulitis, chills, hand pain since  night    and has  has a past medical history of Arthritis, Brain venous angioma ([facility] [address] Bursitis, Cat scratch of left lower leg, Class 3 severe obesity due to excess calories with body mass index (BMI) of 40.0 to 44.9 in [facility]), COPD (chronic obstructive pulmonary disease) ([facility] [address] Diverticulitis, GERD (gastroesophageal reflux disease), Incisional hernia with obstruction but no gangrene, and Strep throat. Assessment & Plan   Cellulitis [L03.90]  Cellulitis of left lower extremity [L03.116]  Has cats prob has component of Pasturella  [id number]  [id number]  Elevate may need compression   vancomycin (VANCOCIN) 1,000 mg in sodium chloride 0.9 % 250 mL    ampicillin-sulbactam (UNASYN) 3,000 mg in sodium chloride 0.9 % 100 mL IVPB ([id number]), Q6H    Med rec for d/c  linezolid (ZYVOX) tablet 600 mg, 2 times per day PT IS NOT TAKING SSRI  amoxicillin-clavulanate (AUGMENTIN) 875-125 MG per tablet 1 tablet, 2 times per day    CAN F/U IF NOT RESOLVED    Pt seen and examined  Afebrile  In bed laying on her left side  Does not stay awake      Patient is tolerating medications. No reported adverse drug reactions.     Microbiology:    11/10 left le S aureus    Lab Results   Component Value Date/Time    BC 24 Hours no growth 11/10/2022 05:54 AM    BC 5 Days no growth 07/30/2021 05:49 AM    BC 5 Days no growth 07/01/2021 05:10 PM    BLOODCULT2 24 Hours no growth 11/10/2022 05:54 AM    BLOODCULT2 5 Days no growth 07/30/2021 05:49 AM    BLOODCULT2 5 Days no growth 07/01/2021 05:20 PM    ORG Staphylococcus aureus 11/10/2022 08:20 PM      LABS:     Recent Labs     11/11/22  0308 11/12/22  0400 11/13/22  0440   WBC 5.6 6.4 12.7*   RBC 3.79 3.74 4.14   HGB 10.8* 10.8* 11.9   HCT 34.9 34.6 38.2   MCV 92.1 92.5 92.3   MCH 28.5 28.9 28.7   MCHC 30.9* 31.2* 31.2*   RDW 13.8 14.1 13.7    217 303   MPV 10.3 10.0 10.5       Recent Labs     11/11/22  0308 11/12/22  0400 11/13/22  0440    138 134   K 3.6 4.0 4.2    103 99   CO2 26 29 25   BUN 11 11 11   CREATININE 0.9 0.9 0.9   GLUCOSE 137* 127* 206*   PROT 5.8* 5.8* 7.0   LABALBU 2.9* 2.8* 3.5   CALCIUM 8.3* 8.1* 9.0   BILITOT <0.2 <0.2 <0.2   ALKPHOS 65 63 74   AST 8 7 6   ALT 8 8 8       Lab Results   Component Value Date    CRP 11.9 (H) 11/10/2022    CRP 3.7 (H) 07/31/2021    CRP 0.4 05/07/2021     Lab Results   Component Value Date    SEDRATE 91 (H) 11/10/2022    SEDRATE 13 07/31/2021     Recent Labs     11/10/22  1316 11/11/22  0308 11/12/22  0400 11/13/22  0440   CRP 11.9*  --   --   --    AST  --  8 7 6   ALT  --  8 8 8   TRIG  --  68  --   --        Lab Results   Component Value Date/Time    CHOL 115 11/11/2022 03:08 AM    TRIG 68 11/11/2022 03:08 AM    HDL 24 11/11/2022 03:08 AM    LDLCALC 77 11/11/2022 03:08 AM    LABVLDL 14 11/11/2022 03:08 AM     Lab Results   Component Value Date/Time    VITD25 29 (L) 04/26/2021 04:37 AM       REVIEW OF SYSTEMS     As stated above in the chief complaint, otherwise negative.   CURRENT MEDICATIONS     Current Facility-Administered Medications:     linezolid (ZYVOX) tablet 600 mg, 600 mg, Oral, 2 times per day, Nishant Cabral MD, 600 mg at 11/13/22 1101 amoxicillin-clavulanate (AUGMENTIN) 875-125 MG per tablet 1 tablet, 1 tablet, Oral, 2 times per day, Lorraine Glover MD, 1 tablet at 11/13/22 1107    hydrOXYzine HCl (ATARAX) tablet 50 mg, 50 mg, Oral, Nightly, Gary Lopez, DO, 50 mg at 11/12/22 2056    levETIRAcetam (KEPPRA) tablet 500 mg, 500 mg, Oral, BID, Gary Lopez, DO, 500 mg at 11/13/22 4943    levothyroxine (SYNTHROID) tablet 50 mcg, 50 mcg, Oral, Daily, Gary Lopez, DO, 50 mcg at 11/13/22 0536    pantoprazole (PROTONIX) tablet 40 mg, 40 mg, Oral, QAM AC, Gary Lopez, DO, 40 mg at 11/13/22 0536    ipratropium-albuterol (DUONEB) nebulizer solution 1 ampule, 1 ampule, Inhalation, 4x daily, Gary Lopez DO, 1 ampule at 11/13/22 0931    enoxaparin (LOVENOX) injection 80 mg, 1 mg/kg, SubCUTAneous, BID, Gary Lopez, DO, 80 mg at 11/13/22 7835    oxyCODONE-acetaminophen (PERCOCET) 5-325 MG per tablet 1 tablet, 1 tablet, Oral, Q4H PRN **OR** oxyCODONE-acetaminophen (PERCOCET) 5-325 MG per tablet 2 tablet, 2 tablet, Oral, Q4H PRN, Lilian Lopez, DO, 2 tablet at 11/13/22 1109    ALPRAZolam (XANAX) tablet 0.25 mg, 0.25 mg, Oral, Q6H PRN, Lilian Lopez, DO, 0.25 mg at 11/13/22 0543  DIAGNOSTIC RESULTS   Radiology:  CT TIBIA FIBULA LEFT WO CONTRAST    Result Date: 11/11/2022  EXAMINATION: CT OF THE LEFT TIBIA AND FIBULA WITHOUT CONTRAST 11/11/2022 9:10 am TECHNIQUE: CT of the left tibia and fibula was performed without the administration of intravenous contrast.  Multiplanar reformatted images are provided for review. Automated exposure control, iterative reconstruction, and/or weight based adjustment of the mA/kV was utilized to reduce the radiation dose to as low as reasonably achievable. COMPARISON: None. HISTORY ORDERING SYSTEM PROVIDED HISTORY: swelling/pain TECHNOLOGIST PROVIDED HISTORY: Reason for exam:->swelling/pain FINDINGS: Bones: No evidence of acute fracture or dislocation. No evidence of bone destruction.   Minimal periosteal reaction involving the distal tibia and fibula can be related to chronic venous insufficiency or hyperemia. Soft Tissue: There is moderate diffuse subcutaneous edema throughout the left calf, no extension into the muscular compartment. No evidence of drainable abscess. Joint: Mild degenerative changes at the ankle. Knee joint appears normal.     1.  Moderate diffuse calf edema without drainable abscess. 2.  Mild diffuse periosteal thickening of the distal tibia and fibula can be related to venous insufficiency and chronic inflammation. No evidence of bone destruction to suggest osteomyelitis. US DUP LOWER EXTREMITY LEFT CALEB    Result Date: 11/10/2022  EXAMINATION: DUPLEX VENOUS ULTRASOUND OF THE LEFT LOWER EXTREMITY 11/10/2022 6:25 am TECHNIQUE: Duplex ultrasound using B-mode/gray scaled imaging and Doppler spectral analysis and color flow was obtained of the deep venous structures of the left lower extremity. COMPARISON: 2021 HISTORY: ORDERING SYSTEM PROVIDED HISTORY: Discomfort TECHNOLOGIST PROVIDED HISTORY: Reason for exam:->Discomfort What reading provider will be dictating this exam?->CRC FINDINGS: The visualized veins of the left lower extremity are patent and free of echogenic thrombus. The veins demonstrate good compressibility with normal color flow study and spectral analysis. There is left inguinal adenopathy with the largest node measuring 4.2 x 1.2 by 2.6 cm. Consider lymph node biopsy or excision if appropriate. No evidence of DVT in the left lower extremity. Left inguinal adenopathy. Consider lymph node biopsy and or excision if appropriate.      PHYSICAL EXAM     /80   Pulse 85   Temp 97.9 °F (36.6 °C) (Oral)   Resp 17   Ht 5' 2\" (1.575 m)   Wt 180 lb (81.6 kg)   SpO2 98%   BMI 32.92 kg/m²   Temp  Av.6 °F (36.4 °C)  Min: 97.2 °F (36.2 °C)  Max: 97.9 °F (36.6 °C)  CONSTITUTIONAL: in bed nad   ENT:  Normocephalic, atraumatic,    LUNGS:  No increased work of breathing, clear to auscultation bilaterally,  RA  CARDIOVASCULAR:   regular rate and rhythm, normal S1 and S2,    ABDOMEN:  normal bowel sounds, soft,  distended, non-tender  MUSCULOSKELETAL:    Full range of motion    left le edema dec erythema   NEUROLOGIC:  Awake, alert,   SKIN:  normal skin color, texture, turgor         Peripheral Intravenous Line:  Peripheral IV 11/12/22 Right; Anterior Forearm (Active)        Imaging and labs were reviewed per medical records. An opportunity to ask questions was given to the patient/FAMILY. Thank you for involving me in the care of Etelvina Metcalf I will continue to follow. Please do not hesitate to call [phone] for any questions or concerns.     Electronically signed by Emily Menendez MD on 11/13/2022 at 11:58 AM

## 2022-11-13 NOTE — PROGRESS NOTES
Internal Medicine Progress Note    [facility]    Zeinayinka Rodney. Eduardo Monteiro., F.A.CManoloOManoloI. Kipp Snellen, D.O., HILARIA Junior D.O. Arnetta Bristle, MSN, APRN, NP-C  Evangelista Britton. Ruy Jorge, MSN, APRN-CNP     Primary Care Physician: Toño Wallace DO   Admitting Physician:  Brianna Dobbins DO  Admission date and time: 11/10/2022  5:15 AM    Room:  [address] [place] [address]  Admitting diagnosis: Cellulitis [L03.90]  Cellulitis of left lower extremity [L03.116]    Patient Name: Mamie Garcia  MRN: [id number]    Date of Service: 11/13/2022     Subjective:  Lorena Santana is a 46 y.o. female who was seen and examined today,11/13/2022, at the bedside. Patient anxious for discharge. Improvement noted in the left lower extremity edema and erythema. Slight leukocytosis from short-term steroids. Discharge planning    No family present during my examination. Review of System:   Constitutional:   Denies fever or chills, weight loss or gain, fatigue or malaise. HEENT:   Denies ear pain, sore throat, sinus or eye problems. Cardiovascular:   Denies any chest pain, irregular heartbeats, or palpitations. Respiratory:   Denies shortness of breath, coughing, sputum production, hemoptysis, or wheezing. Gastrointestinal:   Denies nausea, vomiting, diarrhea, or constipation. Denies any abdominal pain. Genitourinary:    Denies any urgency, frequency, hematuria. Voiding  without difficulty. Extremities:   Improvement noted in the edema. Improvement noted in the erythema  Neurology:    Denies any headache or focal neurological deficits, Denies generalized weakness or memory difficulty. Psch:   Denies being anxious or depressed. Musculoskeletal:    Denies  myalgias, joint complaints or back pain. Integumentary:   Denies any rashes, ulcers, or excoriations. Denies bruising. Hematologic/Lymphatic:  Denies bruising or bleeding.     Physical Exam:  I/O this shift: In: 900 [P.O.:900]  Out: -     Intake/Output Summary (Last 24 hours) at 11/13/2022 1632  Last data filed at 11/13/2022 1229  Gross per 24 hour   Intake 1490 ml   Output --   Net 1490 ml   I/O last 3 completed shifts: In: 4406 [P.O.:1380; I.V.:3026]  Out: -   Patient Vitals for the past 96 hrs (Last 3 readings):   Weight   11/10/22 0524 180 lb (81.6 kg)     Vital Signs:   Blood pressure 131/80, pulse 85, temperature 97.9 °F (36.6 °C), temperature source Oral, resp. rate 17, height 5' 2\" (1.575 m), weight 180 lb (81.6 kg), SpO2 98 %. General appearance:  Alert, responsive, oriented to person, place, and time. Well preserved, alert, no distress. Head:  Normocephalic. No masses, lesions or tenderness. Eyes:  PERRLA. EOMI. Sclera clear. Buccal mucosa moist.  ENT:  Ears normal. Mucosa normal.  Neck:    Supple. Trachea midline. No thyromegaly. No JVD. No bruits. Heart:    Rhythm regular. Rate controlled. No murmurs. Lungs:    Symmetrical. Clear to auscultation bilaterally. No wheezes. No rhonchi. No rales. Abdomen:   Soft. Non-tender. Non-distended. Bowel sounds positive. No organomegaly or masses. No pain on palpation. Extremities:    Left lower extremity swollen compared to the right--improved. Pulses present. Skin erythematous. Left inguinal node present  Neurologic:    Alert x 3. No focal deficit. Cranial nerves grossly intact. No focal weakness. Psych:   Behavior is normal. Mood appears normal. Speech is not rapid and/or pressured. Musculoskeletal:   Spine ROM normal. Muscular strength intact. Gait not assessed. Integumentary:  No rashes  Skin normal color and texture.   Genitalia/Breast:  Deferred    Medication:  Scheduled Meds:   linezolid  600 mg Oral 2 times per day    amoxicillin-clavulanate  1 tablet Oral 2 times per day    hydrOXYzine HCl  50 mg Oral Nightly    levETIRAcetam  500 mg Oral BID    levothyroxine  50 mcg Oral Daily    pantoprazole  40 mg Oral QAM AC ipratropium-albuterol  1 ampule Inhalation 4x daily    enoxaparin  1 mg/kg SubCUTAneous BID     Continuous Infusions:        Objective Data:  Recent Labs     11/11/22 0308 11/12/22 0400 11/13/22 0440   WBC 5.6 6.4 12.7*   RBC 3.79 3.74 4.14   HGB 10.8* 10.8* 11.9   HCT 34.9 34.6 38.2   MCV 92.1 92.5 92.3   MCH 28.5 28.9 28.7   MCHC 30.9* 31.2* 31.2*   RDW 13.8 14.1 13.7    217 303   MPV 10.3 10.0 10.5     Recent Labs     11/11/22 0308 11/12/22 0400 11/13/22 0440    138 134   K 3.6 4.0 4.2    103 99   CO2 26 29 25   BUN 11 11 11   CREATININE 0.9 0.9 0.9   GLUCOSE 137* 127* 206*   CALCIUM 8.3* 8.1* 9.0   PROT 5.8* 5.8* 7.0   LABALBU 2.9* 2.8* 3.5   BILITOT <0.2 <0.2 <0.2   ALKPHOS 65 63 74   AST 8 7 6   ALT 8 8 8     Lab Results   Component Value Date    TROPONINI <0.01 04/23/2021    TROPONINI <0.01 03/26/2021    TROPONINI <0.01 03/18/2021          Wound Documentation:   Incision 08/25/15 Abdomen Mid (Active)   Number of days: 2634       Assessment:    Cellulitis of the left lower extremity. Cat scratch left lower leg  Left inguinal adenopathy with largest lymph node measuring 4.2 x 1.2 x 2.6 cm  Hypokalemia  COPD  GERD  Obesity secondary to excess caloric intake with elevated BMI of 32.92  Elevated ASO titer          Plan:     Continue conservative treatment including elevation  Switch to oral medication we will plan for discharge tomorrow  Continue pain medication upon discharge  Repeat lab      More than 50% of my  time was spent at the bedside counseling/coordinating care with the patient and/or family with face to face contact. This time was spent reviewing notes and laboratory data as well as instructing and counseling the patient. Time I spent with the family or surrogate(s) is included only if the patient was incapable of providing the necessary information or participating in medical decisions.  I also discussed the differential diagnosis and all of the proposed management plans with the patient and individuals accompanying the patient.     Yoselin Sun DO, F.A.C.O.I.  11/13/2022  4:32 PM

## 2022-11-14 VITALS
WEIGHT: 180 LBS | OXYGEN SATURATION: 92 % | RESPIRATION RATE: 18 BRPM | SYSTOLIC BLOOD PRESSURE: 120 MMHG | TEMPERATURE: 97.6 F | HEIGHT: 62 IN | BODY MASS INDEX: 33.13 KG/M2 | DIASTOLIC BLOOD PRESSURE: 79 MMHG | HEART RATE: 76 BPM

## 2022-11-14 LAB
ALBUMIN SERPL-MCNC: 3 G/DL (ref 3.5–5.2)
ALP BLD-CCNC: 68 U/L (ref 35–104)
ALT SERPL-CCNC: 8 U/L (ref 0–32)
ANION GAP SERPL CALCULATED.3IONS-SCNC: 6 MMOL/L (ref 7–16)
AST SERPL-CCNC: 12 U/L (ref 0–31)
BASOPHILS ABSOLUTE: 0 E9/L (ref 0–0.2)
BASOPHILS RELATIVE PERCENT: 0.6 % (ref 0–2)
BILIRUB SERPL-MCNC: <0.2 MG/DL (ref 0–1.2)
BUN BLDV-MCNC: 14 MG/DL (ref 6–20)
CALCIUM SERPL-MCNC: 8.6 MG/DL (ref 8.6–10.2)
CHLORIDE BLD-SCNC: 104 MMOL/L (ref 98–107)
CO2: 29 MMOL/L (ref 22–29)
CREAT SERPL-MCNC: 0.9 MG/DL (ref 0.5–1)
EOSINOPHILS ABSOLUTE: 0.11 E9/L (ref 0.05–0.5)
EOSINOPHILS RELATIVE PERCENT: 0.9 % (ref 0–6)
GFR SERPL CREATININE-BSD FRML MDRD: >60 ML/MIN/1.73
GLUCOSE BLD-MCNC: 154 MG/DL (ref 74–99)
HCT VFR BLD CALC: 37.3 % (ref 34–48)
HEMOGLOBIN: 11.3 G/DL (ref 11.5–15.5)
LYMPHOCYTES ABSOLUTE: 1.61 E9/L (ref 1.5–4)
LYMPHOCYTES RELATIVE PERCENT: 13 % (ref 20–42)
MCH RBC QN AUTO: 28.9 PG (ref 26–35)
MCHC RBC AUTO-ENTMCNC: 30.3 % (ref 32–34.5)
MCV RBC AUTO: 95.4 FL (ref 80–99.9)
METAMYELOCYTES RELATIVE PERCENT: 1.7 % (ref 0–1)
MONOCYTES ABSOLUTE: 0.5 E9/L (ref 0.1–0.95)
MONOCYTES RELATIVE PERCENT: 3.5 % (ref 2–12)
MYELOCYTE PERCENT: 2.6 % (ref 0–0)
NEUTROPHILS ABSOLUTE: 10.29 E9/L (ref 1.8–7.3)
NEUTROPHILS RELATIVE PERCENT: 78.3 % (ref 43–80)
NUCLEATED RED BLOOD CELLS: 0.9 /100 WBC
ORGANISM: ABNORMAL
PDW BLD-RTO: 14.3 FL (ref 11.5–15)
PLATELET # BLD: 282 E9/L (ref 130–450)
PMV BLD AUTO: 10.1 FL (ref 7–12)
POLYCHROMASIA: ABNORMAL
POTASSIUM SERPL-SCNC: 3.8 MMOL/L (ref 3.5–5)
RBC # BLD: 3.91 E12/L (ref 3.5–5.5)
SODIUM BLD-SCNC: 139 MMOL/L (ref 132–146)
TOTAL PROTEIN: 6.2 G/DL (ref 6.4–8.3)
WBC # BLD: 12.4 E9/L (ref 4.5–11.5)
WOUND/ABSCESS: ABNORMAL

## 2022-11-14 PROCEDURE — 80053 COMPREHEN METABOLIC PANEL: CPT

## 2022-11-14 PROCEDURE — 6370000000 HC RX 637 (ALT 250 FOR IP): Performed by: INTERNAL MEDICINE

## 2022-11-14 PROCEDURE — 36415 COLL VENOUS BLD VENIPUNCTURE: CPT

## 2022-11-14 PROCEDURE — 85025 COMPLETE CBC W/AUTO DIFF WBC: CPT

## 2022-11-14 PROCEDURE — 6360000002 HC RX W HCPCS: Performed by: INTERNAL MEDICINE

## 2022-11-14 PROCEDURE — 94640 AIRWAY INHALATION TREATMENT: CPT

## 2022-11-14 PROCEDURE — 6370000000 HC RX 637 (ALT 250 FOR IP): Performed by: SPECIALIST

## 2022-11-14 RX ORDER — OXYCODONE HYDROCHLORIDE AND ACETAMINOPHEN 5; 325 MG/1; MG/1
1 TABLET ORAL EVERY 4 HOURS PRN
Qty: 30 TABLET | Refills: 0 | Status: SHIPPED | OUTPATIENT
Start: 2022-11-14 | End: 2022-11-19

## 2022-11-14 RX ORDER — LEVOTHYROXINE SODIUM 0.05 MG/1
50 TABLET ORAL DAILY
Qty: 30 TABLET | Refills: 3 | Status: ON HOLD | OUTPATIENT
Start: 2022-11-15

## 2022-11-14 RX ADMIN — LINEZOLID 600 MG: 600 TABLET, FILM COATED ORAL at 09:02

## 2022-11-14 RX ADMIN — LEVETIRACETAM 500 MG: 500 TABLET, FILM COATED ORAL at 09:02

## 2022-11-14 RX ADMIN — ALPRAZOLAM 0.25 MG: 0.25 TABLET ORAL at 03:11

## 2022-11-14 RX ADMIN — LEVOTHYROXINE SODIUM 50 MCG: 0.05 TABLET ORAL at 05:40

## 2022-11-14 RX ADMIN — IPRATROPIUM BROMIDE AND ALBUTEROL SULFATE 1 AMPULE: .5; 2.5 SOLUTION RESPIRATORY (INHALATION) at 07:00

## 2022-11-14 RX ADMIN — OXYCODONE AND ACETAMINOPHEN 2 TABLET: 5; 325 TABLET ORAL at 01:53

## 2022-11-14 RX ADMIN — AMOXICILLIN AND CLAVULANATE POTASSIUM 1 TABLET: 875; 125 TABLET, FILM COATED ORAL at 09:02

## 2022-11-14 RX ADMIN — OXYCODONE AND ACETAMINOPHEN 2 TABLET: 5; 325 TABLET ORAL at 10:06

## 2022-11-14 RX ADMIN — OXYCODONE AND ACETAMINOPHEN 2 TABLET: 5; 325 TABLET ORAL at 06:03

## 2022-11-14 RX ADMIN — ENOXAPARIN SODIUM 80 MG: 100 INJECTION SUBCUTANEOUS at 09:02

## 2022-11-14 RX ADMIN — PANTOPRAZOLE SODIUM 40 MG: 40 TABLET, DELAYED RELEASE ORAL at 05:40

## 2022-11-14 ASSESSMENT — PAIN SCALES - WONG BAKER
WONGBAKER_NUMERICALRESPONSE: 0
WONGBAKER_NUMERICALRESPONSE: 0

## 2022-11-14 ASSESSMENT — PAIN SCALES - GENERAL
PAINLEVEL_OUTOF10: 0
PAINLEVEL_OUTOF10: 4
PAINLEVEL_OUTOF10: 8
PAINLEVEL_OUTOF10: 0
PAINLEVEL_OUTOF10: 7

## 2022-11-14 ASSESSMENT — PAIN DESCRIPTION - ORIENTATION
ORIENTATION: LEFT

## 2022-11-14 ASSESSMENT — PAIN DESCRIPTION - FREQUENCY: FREQUENCY: INTERMITTENT

## 2022-11-14 ASSESSMENT — PAIN DESCRIPTION - DESCRIPTORS
DESCRIPTORS: THROBBING
DESCRIPTORS: ACHING;DISCOMFORT
DESCRIPTORS: ACHING

## 2022-11-14 ASSESSMENT — PAIN DESCRIPTION - ONSET: ONSET: GRADUAL

## 2022-11-14 ASSESSMENT — PAIN - FUNCTIONAL ASSESSMENT
PAIN_FUNCTIONAL_ASSESSMENT: ACTIVITIES ARE NOT PREVENTED

## 2022-11-14 ASSESSMENT — PAIN DESCRIPTION - LOCATION
LOCATION: LEG

## 2022-11-14 ASSESSMENT — PAIN DESCRIPTION - PAIN TYPE: TYPE: ACUTE PAIN

## 2022-11-14 NOTE — PLAN OF CARE
Problem: Discharge Planning  Goal: Discharge to home or other facility with appropriate resources  11/14/2022 1004 by Naresh Salomon RN  Outcome: Progressing  11/13/2022 2241 by Richard Hennessy RN  Outcome: Progressing     Problem: Pain  Goal: Verbalizes/displays adequate comfort level or baseline comfort level  11/14/2022 1004 by Naresh Salomon RN  Outcome: Progressing  11/13/2022 2241 by Richard Hennessy RN  Outcome: Progressing     Problem: Safety - Adult  Goal: Free from fall injury  11/14/2022 1004 by Naresh Salomon RN  Outcome: Progressing  11/13/2022 2241 by Richard Hennessy RN  Outcome: Progressing     Problem: Skin/Tissue Integrity - Adult  Goal: Incisions, wounds, or drain sites healing without S/S of infection  11/14/2022 1004 by Naresh Salomon RN  Outcome: Progressing  11/13/2022 2241 by Richard Hennessy RN  Outcome: Progressing     Problem: Musculoskeletal - Adult  Goal: Return mobility to safest level of function  11/14/2022 1004 by Naresh Salomon RN  Outcome: Progressing  11/13/2022 2241 by Richard Hennessy RN  Outcome: Progressing

## 2022-11-14 NOTE — DISCHARGE SUMMARY
Internal Medicine Progress Note     [facility]     Parker Benedict. Alex Rasmussen., F.A.C.OManoloI. Ricki Bruce D.O., MANAOLEANNA Garcia D.O. Gerhardt Hirschfeld, MSN, APRN, NP-C  Cleo Langston. Raphael Croft, MSN, [address] [facility]       Internal Medicine  Discharge Summary    NAME: Savana Gonzales  :  1970  MRN:  [id number]  PCP:Pancho Goncalves DO  ADMITTED: 11/10/2022      DISCHARGED: 22    ADMITTING PHYSICIAN: Parker Lopez DO    CONSULTANT(S):   IP CONSULT TO INFECTIOUS DISEASES  PHARMACY TO DOSE VANCOMYCIN  IP CONSULT TO INFECTIOUS DISEASES     ADMITTING DIAGNOSIS:   Cellulitis [L03.90]  Cellulitis of left lower extremity [L03.116]     DISCHARGE DIAGNOSES:   Cellulitis of the left lower extremity. Cat scratch left lower leg  Left inguinal adenopathy with largest lymph node measuring 4.2 x 1.2 x 2.6 cm  Non-oxygen dependent COPD  GERD  Obesity secondary to excess caloric intake with elevated BMI of 32.92    BRIEF HISTORY OF PRESENT ILLNESS:   The patient is a 51-year-old who presented to the emergency department earlier today with a complaint of left lower leg swelling, erythema, and pain as well as fever, chills, vomiting and diarrhea. Patient states that on  night she developed fever, chills, vomiting diarrhea. She also noticed during that time. That her left lower extremity was becoming very red. She states she has a new kitten and it does like to climb up her leg. She had noticed that she did have scratch marks in the left lower extremity as well. States symptoms continued through Tuesday. Stated on Tuesday that she noticed her left leg had become very swollen was more red very tender to the touch. She noticed she was having swelling from the left groin to her foot. States she developed left groin pain as well and felt a large knot in that area. Patient reports anorexia over this time as well.   States today is the first day that she has felt like eating or drinking anything. Patient was found to have cellulitis of the left lower extremity. She was in need of further evaluation and treatment and admitted under the service of Dr. Beena Petty and Dr. Hermes Sage. For test results please see EMR summary/test results. LABS[de-identified]  Lab Results   Component Value Date    WBC 12.4 (H) 11/14/2022    HGB 11.3 (L) 11/14/2022    HCT 37.3 11/14/2022     11/14/2022     11/14/2022    K 3.8 11/14/2022     11/14/2022    CREATININE 0.9 11/14/2022    BUN 14 11/14/2022    CO2 29 11/14/2022    GLUCOSE 154 (H) 11/14/2022    ALT 8 11/14/2022    AST 12 11/14/2022    INR 1.0 03/19/2021     Lab Results   Component Value Date    INR 1.0 03/19/2021    PROTIME 11.2 03/19/2021      Lab Results   Component Value Date    TSH 1.240 11/11/2022     Lab Results   Component Value Date    TRIG 68 11/11/2022     Lab Results   Component Value Date    HDL 24 11/11/2022     Lab Results   Component Value Date    LDLCALC 77 11/11/2022     No results found for: LABA1C    IMAGING:  CT TIBIA FIBULA LEFT WO CONTRAST    Result Date: 11/11/2022  EXAMINATION: CT OF THE LEFT TIBIA AND FIBULA WITHOUT CONTRAST 11/11/2022 9:10 am TECHNIQUE: CT of the left tibia and fibula was performed without the administration of intravenous contrast.  Multiplanar reformatted images are provided for review. Automated exposure control, iterative reconstruction, and/or weight based adjustment of the mA/kV was utilized to reduce the radiation dose to as low as reasonably achievable. COMPARISON: None. HISTORY ORDERING SYSTEM PROVIDED HISTORY: swelling/pain TECHNOLOGIST PROVIDED HISTORY: Reason for exam:->swelling/pain FINDINGS: Bones: No evidence of acute fracture or dislocation. No evidence of bone destruction. Minimal periosteal reaction involving the distal tibia and fibula can be related to chronic venous insufficiency or hyperemia. Soft Tissue:  There is moderate diffuse subcutaneous edema throughout the left calf, no extension into the muscular compartment. No evidence of drainable abscess. Joint: Mild degenerative changes at the ankle. Knee joint appears normal.     1.  Moderate diffuse calf edema without drainable abscess. 2.  Mild diffuse periosteal thickening of the distal tibia and fibula can be related to venous insufficiency and chronic inflammation. No evidence of bone destruction to suggest osteomyelitis. US DUP LOWER EXTREMITY LEFT CALEB    Result Date: 11/10/2022  EXAMINATION: DUPLEX VENOUS ULTRASOUND OF THE LEFT LOWER EXTREMITY 11/10/2022 6:25 am TECHNIQUE: Duplex ultrasound using B-mode/gray scaled imaging and Doppler spectral analysis and color flow was obtained of the deep venous structures of the left lower extremity. COMPARISON: 20 February 2021 HISTORY: ORDERING SYSTEM PROVIDED HISTORY: Discomfort TECHNOLOGIST PROVIDED HISTORY: Reason for exam:->Discomfort What reading provider will be dictating this exam?->CRC FINDINGS: The visualized veins of the left lower extremity are patent and free of echogenic thrombus. The veins demonstrate good compressibility with normal color flow study and spectral analysis. There is left inguinal adenopathy with the largest node measuring 4.2 x 1.2 by 2.6 cm. Consider lymph node biopsy or excision if appropriate. No evidence of DVT in the left lower extremity. Left inguinal adenopathy. Consider lymph node biopsy and or excision if appropriate. HOSPITAL COURSE:   Emma Lara presented to the hospital with significant cellulitis and was evaluated by the infectious disease team.  She was placed empirically on IV antibiotics and ultimately transition to oral antibiotics. The cellulitis seems to have been precipitated by a cat bite. She will complete a course of Zyvox and Augmentin upon discharge. The cellulitis has improved. Her pain is better controlled and she will complete a short course of pain medications upon discharge. She is acceptable for discharge home. Patient is medically stable and acceptable for discharge today. BRIEF PHYSICAL EXAMINATION AND LABORATORIES ON DAY OF DISCHARGE:  VITALS:  /79   Pulse 73   Temp 97.6 °F (36.4 °C) (Oral)   Resp 20   Ht 5' 2\" (1.575 m)   Wt 180 lb (81.6 kg)   SpO2 96%   BMI 32.92 kg/m²     HEENT:  PERRLA. EOMI. Sclera clear. Buccal mucosa moist.    Neck:  Supple. Trachea midline. No thyromegaly. No JVD. No bruits. Heart:  Rhythm regular, rate controlled. No murmurs. Lungs:  Symmetrical. Clear to auscultation bilaterally. No wheezes. No rhonchi. No rales. Abdomen: Soft. Non-tender. Non-distended. Bowel sounds positive. No organomegaly or masses. No pain on palpation    Extremities:  Peripheral pulses present. Neurologic:  Alert x 3. No focal deficit. Cranial nerves grossly intact. Skin:  No petechia. No hemorrhage. No wounds. DISPOSITION:  The patient's condition is stable. At this time the patient is without objective evidence of an acute process requiring continuing hospitalization or inpatient management. They are stable for discharge with outpatient follow-up. I have spoken with the patient and discussed the results of the current hospitalization, in addition to providing specific details for the plan of care and counseling regarding the diagnosis and prognosis. The plan has been discussed in detail and they are aware of the specific conditions for emergent return, as well as the importance of follow-up. Their questions are answered at this time and they are agreeable with the plan for discharge to home    DISCHARGE MEDICATIONS:   Current Discharge Medication List             Details   oxyCODONE-acetaminophen (PERCOCET) 5-325 MG per tablet Take 1 tablet by mouth every 4 hours as needed for Pain for up to 5 days.   Qty: 30 tablet, Refills: 0    Comments: Reduce doses taken as pain becomes manageable  Associated Diagnoses: Cellulitis, unspecified cellulitis site      linezolid (ZYVOX) 600 MG tablet Take 1 tablet by mouth every 12 hours for 7 days  Qty: 14 tablet, Refills: 0      amoxicillin-clavulanate (AUGMENTIN) 875-125 MG per tablet Take 1 tablet by mouth every 12 hours for 7 days  Qty: 14 tablet, Refills: 0                Details   levothyroxine (SYNTHROID) 50 MCG tablet Take 1 tablet by mouth Daily  Qty: 30 tablet, Refills: 3                Details   levETIRAcetam (KEPPRA) 500 MG tablet Take 1 tablet by mouth 2 times daily  Qty: 60 tablet, Refills: 3      hydrOXYzine (ATARAX) 50 MG tablet Take 50 mg by mouth nightly              FOLLOW UP/INSTRUCTIONS:  This patient is instructed to follow-up with her primary care physician. Patient is instructed to follow-up with the consults listed above as directed by them. she is instructed to resume home medications and take new medications as indicated in the list above. If the patient has a recurrence of symptoms, she is instructed to go to the ED. Preparing for this patient's discharge, including paperwork, orders, instructions, and meeting with patient did require > 40 minutes.     Fanta Santiago DO   11/14/2022  7:22 AM

## 2022-11-14 NOTE — DISCHARGE INSTRUCTIONS
Your information:  Name: Renuka Lung  : 1970    Your instructions:  Discharge Home    What to do after you leave the hospital:    Recommended diet: regular diet    Recommended activity: activity as tolerated    The following personal items were collected during your admission and were returned to you:    Belongings  Dental Appliances: None  Vision - Corrective Lenses: None  Hearing Aid: None  Clothing: Shirt, Socks, Pants  Jewelry: Bracelet, Ring (2 bracelets)  Body Piercings Removed: N/A  Electronic Devices: , Cell Phone  Weapons (Notify Protective Services/Security): None  Other Valuables: Purse  Home Medications: None  Valuables Given To: Patient  Provide Name(s) of Who Valuable(s) Were Given To: self  Responsible person(s) in the waiting room: none  Patient approves for provider to speak to responsible person post operatively: Yes    Information obtained by:  By signing below, I understand that if any problems occur once I leave the hospital I am to contact PCP. I understand and acknowledge receipt of the instructions indicated above. Cellulitis: Care Instructions  Your Care Instructions     Cellulitis is a skin infection caused by bacteria, most often strep or staph. It often occurs after a break in the skin from a scrape, cut, bite, or puncture, or after a rash. Cellulitis may be treated without doing tests to find out what caused it. But your doctor may do tests, if needed, to look for a specific bacteria, like methicillin-resistant Staphylococcus aureus (MRSA). The doctor has checked you carefully, but problems can develop later. If you notice any problems or new symptoms, get medical treatment right away. Follow-up care is a key part of your treatment and safety. Be sure to make and go to all appointments, and call your doctor if you are having problems. It's also a good idea to know your test results and keep a list of the medicines you take.   How can you care for yourself at home?  Take your antibiotics as directed. Do not stop taking them just because you feel better. You need to take the full course of antibiotics. Prop up the infected area on pillows to reduce pain and swelling. Try to keep the area above the level of your heart as often as you can. If your doctor told you how to care for your wound, follow your doctor's instructions. If you did not get instructions, follow this general advice:  Wash the wound with clean water 2 times a day. Don't use hydrogen peroxide or alcohol, which can slow healing. You may cover the wound with a thin layer of petroleum jelly, such as Vaseline, and a nonstick bandage. Apply more petroleum jelly and replace the bandage as needed. Be safe with medicines. Take pain medicines exactly as directed. If the doctor gave you a prescription medicine for pain, take it as prescribed. If you are not taking a prescription pain medicine, ask your doctor if you can take an over-the-counter medicine. To prevent cellulitis in the future  Try to prevent cuts, scrapes, or other injuries to your skin. Cellulitis most often occurs where there is a break in the skin. If you get a scrape, cut, mild burn, or bite, wash the wound with clean water as soon as you can to help avoid infection. Don't use hydrogen peroxide or alcohol, which can slow healing. If you have swelling in your legs (edema), support stockings and good skin care may help prevent leg sores and cellulitis. Take care of your feet, especially if you have diabetes or other conditions that increase the risk of infection. Wear shoes and socks. Do not go barefoot. If you have athlete's foot or other skin problems on your feet, talk to your doctor about how to treat them. When should you call for help? Call your doctor now or seek immediate medical care if:    You have signs that your infection is getting worse, such as: Increased pain, swelling, warmth, or redness.   Red streaks leading from the area.  Pus draining from the area. A fever. You get a rash. Watch closely for changes in your health, and be sure to contact your doctor if:    You do not get better as expected. Where can you learn more? Go to [email or web address] and sign in to your [employer] account. Enter C373 in the [employer] box to learn more about \"Cellulitis: Care Instructions. \"     If you do not have an account, please click on the \"Sign Up Now\" link. Current as of: November 15, 2021               Content Version: 13.4  © 4774-6879 Healthwise, Incorporated. Care instructions adapted under license by [facility] [place] ([facility]). If you have questions about a medical condition or this instruction, always ask your healthcare professional. [address] any warranty or liability for your use of this information.

## 2022-11-14 NOTE — PLAN OF CARE
Problem: Discharge Planning  Goal: Discharge to home or other facility with appropriate resources  Outcome: Progressing     Problem: Pain  Goal: Verbalizes/displays adequate comfort level or baseline comfort level  Outcome: Progressing     Problem: Safety - Adult  Goal: Free from fall injury  Outcome: Progressing     Problem: Skin/Tissue Integrity - Adult  Goal: Incisions, wounds, or drain sites healing without S/S of infection  Outcome: Progressing     Problem: Musculoskeletal - Adult  Goal: Return mobility to safest level of function  Outcome: Progressing     Problem: Infection - Adult  Goal: Absence of infection at discharge  Outcome: Progressing     Problem: ABCDS Injury Assessment  Goal: Absence of physical injury  Outcome: Progressing

## 2022-11-14 NOTE — PROGRESS NOTES
NAME: Sharyon Castleman  MR:  [id number]  :   1970  Admit Date:  11/10/2022      This is a face to face encounter with Sharyon Castleman 46 y.o. female on 22  Elements of this note, including Diagnosis,  Interval History, Past Medical/Surgical/Family/Social Histories, ROS, physical exam, and Assessment and Plan were copied and pasted from Previous. Updates have been made where noted and reflect current exam and medical decision making from the DOS of this encounter. CHIEF COMPLAINT     ID following for   Chief Complaint   Patient presents with    Other     Cellulitis, chills, hand pain since  night     HISTORY OF PRESENT ILLNESS     Sharyon Castleman is a 46 y.o. female who presents with   Chief Complaint   Patient presents with    Other     Cellulitis, chills, hand pain since  night    and has  has a past medical history of Arthritis, Brain venous angioma ([facility] [address] Bursitis, Cat scratch of left lower leg, Class 3 severe obesity due to excess calories with body mass index (BMI) of 40.0 to 44.9 in [facility]), COPD (chronic obstructive pulmonary disease) ([facility] [address] Diverticulitis, GERD (gastroesophageal reflux disease), Incisional hernia with obstruction but no gangrene, and Strep throat. Assessment & Plan   Cellulitis [L03.90]  Cellulitis of left lower extremity [L03.116]  Has cats prob has component of [id number]      Plan:   Continue Zyvox and Augmentin  Med rec for discharge- patient has her medications  Monitor labs  CAN F/U IF NOT RESOLVED    Pt seen and examined  Afebrile  In bed laying asleep- does arouse- but does not stay up for long   No fevers - on room air. Patient is tolerating medications. No reported adverse drug reactions.     Microbiology:    11/10 left le S aureus    Lab Results   Component Value Date/Time    BC 24 Hours no growth 11/10/2022 05:54 AM    BC 5 Days no growth 2021 05:49 AM    BC 5 Days no growth 2021 05:10 PM    BLOODCULT2 24 Hours no growth 11/10/2022 05:54 AM    BLOODCULT2 5 Days no growth 07/30/2021 05:49 AM    BLOODCULT2 5 Days no growth 07/01/2021 05:20 PM    ORG Staphylococcus aureus 11/10/2022 08:20 PM      LABS:     Recent Labs     11/12/22  0400 11/13/22  0440 11/14/22  0526   WBC 6.4 12.7* 12.4*   RBC 3.74 4.14 3.91   HGB 10.8* 11.9 11.3*   HCT 34.6 38.2 37.3   MCV 92.5 92.3 95.4   MCH 28.9 28.7 28.9   MCHC 31.2* 31.2* 30.3*   RDW 14.1 13.7 14.3    303 282   MPV 10.0 10.5 10.1       Recent Labs     11/12/22 0400 11/13/22 0440 11/14/22  0526    134 139   K 4.0 4.2 3.8    99 104   CO2 29 25 29   BUN 11 11 14   CREATININE 0.9 0.9 0.9   GLUCOSE 127* 206* 154*   PROT 5.8* 7.0 6.2*   LABALBU 2.8* 3.5 3.0*   CALCIUM 8.1* 9.0 8.6   BILITOT <0.2 <0.2 <0.2   ALKPHOS 63 74 68   AST 7 6 12   ALT 8 8 8       Lab Results   Component Value Date    CRP 11.9 (H) 11/10/2022    CRP 3.7 (H) 07/31/2021    CRP 0.4 05/07/2021     Lab Results   Component Value Date    SEDRATE 91 (H) 11/10/2022    SEDRATE 13 07/31/2021     Recent Labs     11/12/22  0400 11/13/22  0440 11/14/22  0526   AST 7 6 12   ALT 8 8 8       Lab Results   Component Value Date/Time    CHOL 115 11/11/2022 03:08 AM    TRIG 68 11/11/2022 03:08 AM    HDL 24 11/11/2022 03:08 AM    LDLCALC 77 11/11/2022 03:08 AM    LABVLDL 14 11/11/2022 03:08 AM     Lab Results   Component Value Date/Time    VITD25 29 (L) 04/26/2021 04:37 AM       REVIEW OF SYSTEMS     As stated above in the chief complaint, otherwise negative.   CURRENT MEDICATIONS     Current Facility-Administered Medications:     linezolid (ZYVOX) tablet 600 mg, 600 mg, Oral, 2 times per day, Naif Zhang MD, 600 mg at 11/14/22 0902    amoxicillin-clavulanate (AUGMENTIN) 875-125 MG per tablet 1 tablet, 1 tablet, Oral, 2 times per day, Naif Zhang MD, 1 tablet at 11/14/22 0902    hydrOXYzine HCl (ATARAX) tablet 50 mg, 50 mg, Oral, Nightly, Nusrat Lopez DO, 50 mg at 11/13/22 2147    levETIRAcetam (KEPPRA) tablet 500 mg, 500 mg, Oral, BID, Zan Eldridgen Bisel, DO, 500 mg at 11/14/22 1864    levothyroxine (SYNTHROID) tablet 50 mcg, 50 mcg, Oral, Daily, Gary DAVISON Bisel, DO, 50 mcg at 11/14/22 0540    pantoprazole (PROTONIX) tablet 40 mg, 40 mg, Oral, QAM AC, Gary DAVISON Bisel, DO, 40 mg at 11/14/22 0540    ipratropium-albuterol (DUONEB) nebulizer solution 1 ampule, 1 ampule, Inhalation, 4x daily, Gary Meyersel, DO, 1 ampule at 11/14/22 0700    enoxaparin (LOVENOX) injection 80 mg, 1 mg/kg, SubCUTAneous, BID, Gary Meyersel, DO, 80 mg at 11/14/22 6723    oxyCODONE-acetaminophen (PERCOCET) 5-325 MG per tablet 1 tablet, 1 tablet, Oral, Q4H PRN **OR** oxyCODONE-acetaminophen (PERCOCET) 5-325 MG per tablet 2 tablet, 2 tablet, Oral, Q4H PRN, Zan Madison Bisel, DO, 2 tablet at 11/14/22 1006    ALPRAZolam (XANAX) tablet 0.25 mg, 0.25 mg, Oral, Q6H PRN, Zan Madison Bisel, DO, 0.25 mg at 11/14/22 4981  DIAGNOSTIC RESULTS   Radiology:  CT TIBIA FIBULA LEFT WO CONTRAST    Result Date: 11/11/2022  EXAMINATION: CT OF THE LEFT TIBIA AND FIBULA WITHOUT CONTRAST 11/11/2022 9:10 am TECHNIQUE: CT of the left tibia and fibula was performed without the administration of intravenous contrast.  Multiplanar reformatted images are provided for review. Automated exposure control, iterative reconstruction, and/or weight based adjustment of the mA/kV was utilized to reduce the radiation dose to as low as reasonably achievable. COMPARISON: None. HISTORY ORDERING SYSTEM PROVIDED HISTORY: swelling/pain TECHNOLOGIST PROVIDED HISTORY: Reason for exam:->swelling/pain FINDINGS: Bones: No evidence of acute fracture or dislocation. No evidence of bone destruction. Minimal periosteal reaction involving the distal tibia and fibula can be related to chronic venous insufficiency or hyperemia. Soft Tissue: There is moderate diffuse subcutaneous edema throughout the left calf, no extension into the muscular compartment. No evidence of drainable abscess.  Joint: Mild degenerative changes at the ankle. Knee joint appears normal.     1.  Moderate diffuse calf edema without drainable abscess. 2.  Mild diffuse periosteal thickening of the distal tibia and fibula can be related to venous insufficiency and chronic inflammation. No evidence of bone destruction to suggest osteomyelitis. US DUP LOWER EXTREMITY LEFT CALEB    Result Date: 11/10/2022  EXAMINATION: DUPLEX VENOUS ULTRASOUND OF THE LEFT LOWER EXTREMITY 11/10/2022 6:25 am TECHNIQUE: Duplex ultrasound using B-mode/gray scaled imaging and Doppler spectral analysis and color flow was obtained of the deep venous structures of the left lower extremity. COMPARISON: 2021 HISTORY: ORDERING SYSTEM PROVIDED HISTORY: Discomfort TECHNOLOGIST PROVIDED HISTORY: Reason for exam:->Discomfort What reading provider will be dictating this exam?->CRC FINDINGS: The visualized veins of the left lower extremity are patent and free of echogenic thrombus. The veins demonstrate good compressibility with normal color flow study and spectral analysis. There is left inguinal adenopathy with the largest node measuring 4.2 x 1.2 by 2.6 cm. Consider lymph node biopsy or excision if appropriate. No evidence of DVT in the left lower extremity. Left inguinal adenopathy. Consider lymph node biopsy and or excision if appropriate.      PHYSICAL EXAM     /79   Pulse 76   Temp 97.6 °F (36.4 °C) (Oral)   Resp 18   Ht 5' 2\" (1.575 m)   Wt 180 lb (81.6 kg)   SpO2 92%   BMI 32.92 kg/m²   Temp  Av.7 °F (36.5 °C)  Min: 97.5 °F (36.4 °C)  Max: 98.1 °F (36.7 °C)  CONSTITUTIONAL: in bed nad - sleeping   ENT:  Normocephalic, atraumatic,    LUNGS:  No increased work of breathing, clear to auscultation bilaterally,  RA  CARDIOVASCULAR:   regular rate and rhythm, normal S1 and S2,    ABDOMEN:  normal bowel sounds, soft,  distended, non-tender  MUSCULOSKELETAL:    Full range of motion    left le edema dec erythema - skin peeling   NEUROLOGIC:  Awake, alert, SKIN:  normal skin color, texture, turgor      Peripheral Intravenous Line:  Peripheral IV 11/12/22 Right; Anterior Forearm (Active)        Imaging and labs were reviewed per medical records. An opportunity to ask questions was given to the patient/FAMILY. Thank you for involving me in the care of Sekou Encarnacion I will continue to follow. Please do not hesitate to call [phone] for any questions or concerns.     Electronically signed by BOB Kirkpatrick on 11/14/2022 at 1:41 PM      Pt was d/c before rounds  POC was discussed  D/c with po   Can f/u id not resolved  Emily Riggs MD

## 2022-11-14 NOTE — CARE COORDINATION
11-14-Cm note: Pt discharged , her prescription coverage is not current in the computer at her [address]. Pt asking if she can pay cash for her Zyvox RX instead of waiting for insurance update and prior authorization, her cost for the Zyvox is approx 38.00 per pharmacist Edilsontc Lindo, pt is going to borrow the money . Pt denies any further needs for home .  Electronically signed by Kavon Fulton RN on 11/14/2022 at 11:08 AM

## 2022-11-15 LAB
BLOOD CULTURE, ROUTINE: NORMAL
CULTURE, BLOOD 2: NORMAL

## 2022-11-28 ENCOUNTER — HOSPITAL ENCOUNTER (INPATIENT)
Age: 52
LOS: 4 days | Discharge: LEFT BEFORE DISCHARGE | End: 2022-12-02
Attending: EMERGENCY MEDICINE | Admitting: INTERNAL MEDICINE
Payer: MEDICARE

## 2022-11-28 ENCOUNTER — APPOINTMENT (OUTPATIENT)
Dept: ULTRASOUND IMAGING | Age: 52
End: 2022-11-28
Payer: MEDICARE

## 2022-11-28 ENCOUNTER — APPOINTMENT (OUTPATIENT)
Dept: MRI IMAGING | Age: 52
End: 2022-11-28
Payer: MEDICARE

## 2022-11-28 ENCOUNTER — APPOINTMENT (OUTPATIENT)
Dept: CT IMAGING | Age: 52
End: 2022-11-28
Payer: MEDICARE

## 2022-11-28 DIAGNOSIS — L03.116 CELLULITIS OF LEFT LOWER EXTREMITY: Primary | ICD-10-CM

## 2022-11-28 LAB
ADENOVIRUS BY PCR: NOT DETECTED
ALBUMIN SERPL-MCNC: 4.5 G/DL (ref 3.5–5.2)
ALP BLD-CCNC: 95 U/L (ref 35–104)
ALT SERPL-CCNC: 10 U/L (ref 0–32)
ANION GAP SERPL CALCULATED.3IONS-SCNC: 13 MMOL/L (ref 7–16)
ANTISTREPTOLYSIN-O: 521 IU/ML (ref 0–200)
AST SERPL-CCNC: 10 U/L (ref 0–31)
BASOPHILS ABSOLUTE: 0.02 E9/L (ref 0–0.2)
BASOPHILS RELATIVE PERCENT: 0.2 % (ref 0–2)
BILIRUB SERPL-MCNC: 0.4 MG/DL (ref 0–1.2)
BORDETELLA PARAPERTUSSIS BY PCR: NOT DETECTED
BORDETELLA PERTUSSIS BY PCR: NOT DETECTED
BUN BLDV-MCNC: 16 MG/DL (ref 6–20)
C-REACTIVE PROTEIN: 0.6 MG/DL (ref 0–0.4)
CALCIUM SERPL-MCNC: 9.6 MG/DL (ref 8.6–10.2)
CHLAMYDOPHILIA PNEUMONIAE BY PCR: NOT DETECTED
CHLORIDE BLD-SCNC: 104 MMOL/L (ref 98–107)
CO2: 23 MMOL/L (ref 22–29)
CORONAVIRUS 229E BY PCR: NOT DETECTED
CORONAVIRUS HKU1 BY PCR: NOT DETECTED
CORONAVIRUS NL63 BY PCR: NOT DETECTED
CORONAVIRUS OC43 BY PCR: NOT DETECTED
CREAT SERPL-MCNC: 1 MG/DL (ref 0.5–1)
EOSINOPHILS ABSOLUTE: 0.15 E9/L (ref 0.05–0.5)
EOSINOPHILS RELATIVE PERCENT: 1.8 % (ref 0–6)
GFR SERPL CREATININE-BSD FRML MDRD: >60 ML/MIN/1.73
GLUCOSE BLD-MCNC: 112 MG/DL (ref 74–99)
HCT VFR BLD CALC: 44 % (ref 34–48)
HEMOGLOBIN: 14 G/DL (ref 11.5–15.5)
HUMAN METAPNEUMOVIRUS BY PCR: NOT DETECTED
HUMAN RHINOVIRUS/ENTEROVIRUS BY PCR: NOT DETECTED
IMMATURE GRANULOCYTES #: 0.03 E9/L
IMMATURE GRANULOCYTES %: 0.4 % (ref 0–5)
INFLUENZA A BY PCR: NOT DETECTED
INFLUENZA B BY PCR: NOT DETECTED
LACTIC ACID, SEPSIS: 1.4 MMOL/L (ref 0.5–1.9)
LYMPHOCYTES ABSOLUTE: 1.22 E9/L (ref 1.5–4)
LYMPHOCYTES RELATIVE PERCENT: 14.4 % (ref 20–42)
MCH RBC QN AUTO: 28.9 PG (ref 26–35)
MCHC RBC AUTO-ENTMCNC: 31.8 % (ref 32–34.5)
MCV RBC AUTO: 90.9 FL (ref 80–99.9)
MONOCYTES ABSOLUTE: 0.47 E9/L (ref 0.1–0.95)
MONOCYTES RELATIVE PERCENT: 5.6 % (ref 2–12)
MYCOPLASMA PNEUMONIAE BY PCR: NOT DETECTED
NEUTROPHILS ABSOLUTE: 6.56 E9/L (ref 1.8–7.3)
NEUTROPHILS RELATIVE PERCENT: 77.6 % (ref 43–80)
PARAINFLUENZA VIRUS 1 BY PCR: NOT DETECTED
PARAINFLUENZA VIRUS 2 BY PCR: NOT DETECTED
PARAINFLUENZA VIRUS 3 BY PCR: NOT DETECTED
PARAINFLUENZA VIRUS 4 BY PCR: NOT DETECTED
PDW BLD-RTO: 14.6 FL (ref 11.5–15)
PLATELET # BLD: 320 E9/L (ref 130–450)
PMV BLD AUTO: 9.3 FL (ref 7–12)
POTASSIUM SERPL-SCNC: 3.8 MMOL/L (ref 3.5–5)
PROCALCITONIN: 0.06 NG/ML (ref 0–0.08)
RBC # BLD: 4.84 E12/L (ref 3.5–5.5)
RESPIRATORY SYNCYTIAL VIRUS BY PCR: NOT DETECTED
SARS-COV-2, PCR: NOT DETECTED
SEDIMENTATION RATE, ERYTHROCYTE: 31 MM/HR (ref 0–20)
SODIUM BLD-SCNC: 140 MMOL/L (ref 132–146)
TOTAL PROTEIN: 7.8 G/DL (ref 6.4–8.3)
WBC # BLD: 8.5 E9/L (ref 4.5–11.5)

## 2022-11-28 PROCEDURE — 73700 CT LOWER EXTREMITY W/O DYE: CPT

## 2022-11-28 PROCEDURE — 6360000004 HC RX CONTRAST MEDICATION: Performed by: RADIOLOGY

## 2022-11-28 PROCEDURE — 86060 ANTISTREPTOLYSIN O TITER: CPT

## 2022-11-28 PROCEDURE — 6370000000 HC RX 637 (ALT 250 FOR IP): Performed by: INTERNAL MEDICINE

## 2022-11-28 PROCEDURE — 99285 EMERGENCY DEPT VISIT HI MDM: CPT

## 2022-11-28 PROCEDURE — 96374 THER/PROPH/DIAG INJ IV PUSH: CPT

## 2022-11-28 PROCEDURE — 87040 BLOOD CULTURE FOR BACTERIA: CPT

## 2022-11-28 PROCEDURE — 85025 COMPLETE CBC W/AUTO DIFF WBC: CPT

## 2022-11-28 PROCEDURE — 84145 PROCALCITONIN (PCT): CPT

## 2022-11-28 PROCEDURE — 2580000003 HC RX 258: Performed by: INTERNAL MEDICINE

## 2022-11-28 PROCEDURE — 87449 NOS EACH ORGANISM AG IA: CPT

## 2022-11-28 PROCEDURE — 6360000002 HC RX W HCPCS: Performed by: NURSE PRACTITIONER

## 2022-11-28 PROCEDURE — 93971 EXTREMITY STUDY: CPT

## 2022-11-28 PROCEDURE — 6360000002 HC RX W HCPCS: Performed by: INTERNAL MEDICINE

## 2022-11-28 PROCEDURE — 80053 COMPREHEN METABOLIC PANEL: CPT

## 2022-11-28 PROCEDURE — 1200000000 HC SEMI PRIVATE

## 2022-11-28 PROCEDURE — 2500000003 HC RX 250 WO HCPCS: Performed by: STUDENT IN AN ORGANIZED HEALTH CARE EDUCATION/TRAINING PROGRAM

## 2022-11-28 PROCEDURE — 83605 ASSAY OF LACTIC ACID: CPT

## 2022-11-28 PROCEDURE — 6360000002 HC RX W HCPCS: Performed by: STUDENT IN AN ORGANIZED HEALTH CARE EDUCATION/TRAINING PROGRAM

## 2022-11-28 PROCEDURE — 36415 COLL VENOUS BLD VENIPUNCTURE: CPT

## 2022-11-28 PROCEDURE — 0202U NFCT DS 22 TRGT SARS-COV-2: CPT

## 2022-11-28 PROCEDURE — 6370000000 HC RX 637 (ALT 250 FOR IP): Performed by: NURSE PRACTITIONER

## 2022-11-28 PROCEDURE — 86140 C-REACTIVE PROTEIN: CPT

## 2022-11-28 PROCEDURE — A9577 INJ MULTIHANCE: HCPCS | Performed by: RADIOLOGY

## 2022-11-28 PROCEDURE — 73723 MRI JOINT LWR EXTR W/O&W/DYE: CPT

## 2022-11-28 PROCEDURE — 85651 RBC SED RATE NONAUTOMATED: CPT

## 2022-11-28 PROCEDURE — 96375 TX/PRO/DX INJ NEW DRUG ADDON: CPT

## 2022-11-28 RX ORDER — LEVETIRACETAM 500 MG/1
500 TABLET ORAL 2 TIMES DAILY
Status: DISCONTINUED | OUTPATIENT
Start: 2022-11-28 | End: 2022-12-02 | Stop reason: HOSPADM

## 2022-11-28 RX ORDER — SODIUM CHLORIDE 0.9 % (FLUSH) 0.9 %
5-40 SYRINGE (ML) INJECTION PRN
Status: DISCONTINUED | OUTPATIENT
Start: 2022-11-28 | End: 2022-12-02 | Stop reason: HOSPADM

## 2022-11-28 RX ORDER — KETAMINE HYDROCHLORIDE 10 MG/ML
20 INJECTION, SOLUTION INTRAMUSCULAR; INTRAVENOUS ONCE
Status: COMPLETED | OUTPATIENT
Start: 2022-11-28 | End: 2022-11-28

## 2022-11-28 RX ORDER — SODIUM CHLORIDE 9 MG/ML
INJECTION, SOLUTION INTRAVENOUS PRN
Status: DISCONTINUED | OUTPATIENT
Start: 2022-11-28 | End: 2022-12-02 | Stop reason: HOSPADM

## 2022-11-28 RX ORDER — OXYCODONE HYDROCHLORIDE AND ACETAMINOPHEN 5; 325 MG/1; MG/1
1 TABLET ORAL EVERY 4 HOURS PRN
Status: DISCONTINUED | OUTPATIENT
Start: 2022-11-28 | End: 2022-11-29

## 2022-11-28 RX ORDER — LINEZOLID 600 MG/1
600 TABLET, FILM COATED ORAL EVERY 12 HOURS SCHEDULED
Status: DISCONTINUED | OUTPATIENT
Start: 2022-11-28 | End: 2022-12-02 | Stop reason: HOSPADM

## 2022-11-28 RX ORDER — HYDROMORPHONE HYDROCHLORIDE 1 MG/ML
1 INJECTION, SOLUTION INTRAMUSCULAR; INTRAVENOUS; SUBCUTANEOUS ONCE
Status: COMPLETED | OUTPATIENT
Start: 2022-11-28 | End: 2022-11-28

## 2022-11-28 RX ORDER — LEVOTHYROXINE SODIUM 0.05 MG/1
50 TABLET ORAL DAILY
Status: DISCONTINUED | OUTPATIENT
Start: 2022-11-28 | End: 2022-12-02 | Stop reason: HOSPADM

## 2022-11-28 RX ORDER — ACETAMINOPHEN 650 MG/1
650 SUPPOSITORY RECTAL EVERY 6 HOURS PRN
Status: DISCONTINUED | OUTPATIENT
Start: 2022-11-28 | End: 2022-12-02 | Stop reason: HOSPADM

## 2022-11-28 RX ORDER — ACETAMINOPHEN 325 MG/1
650 TABLET ORAL EVERY 6 HOURS PRN
Status: DISCONTINUED | OUTPATIENT
Start: 2022-11-28 | End: 2022-12-02 | Stop reason: HOSPADM

## 2022-11-28 RX ORDER — ENOXAPARIN SODIUM 100 MG/ML
40 INJECTION SUBCUTANEOUS DAILY
Status: DISCONTINUED | OUTPATIENT
Start: 2022-11-28 | End: 2022-12-02 | Stop reason: HOSPADM

## 2022-11-28 RX ORDER — POTASSIUM CHLORIDE 20 MEQ/1
40 TABLET, EXTENDED RELEASE ORAL PRN
Status: DISCONTINUED | OUTPATIENT
Start: 2022-11-28 | End: 2022-12-01

## 2022-11-28 RX ORDER — ONDANSETRON 2 MG/ML
4 INJECTION INTRAMUSCULAR; INTRAVENOUS EVERY 6 HOURS PRN
Status: DISCONTINUED | OUTPATIENT
Start: 2022-11-28 | End: 2022-12-02 | Stop reason: HOSPADM

## 2022-11-28 RX ORDER — HYDROXYZINE HYDROCHLORIDE 25 MG/1
50 TABLET, FILM COATED ORAL NIGHTLY
Status: DISCONTINUED | OUTPATIENT
Start: 2022-11-28 | End: 2022-12-02 | Stop reason: HOSPADM

## 2022-11-28 RX ORDER — POTASSIUM CHLORIDE AND SODIUM CHLORIDE 900; 300 MG/100ML; MG/100ML
INJECTION, SOLUTION INTRAVENOUS CONTINUOUS
Status: DISCONTINUED | OUTPATIENT
Start: 2022-11-28 | End: 2022-11-29

## 2022-11-28 RX ORDER — HYDROMORPHONE HYDROCHLORIDE 1 MG/ML
0.5 INJECTION, SOLUTION INTRAMUSCULAR; INTRAVENOUS; SUBCUTANEOUS ONCE
Status: COMPLETED | OUTPATIENT
Start: 2022-11-28 | End: 2022-11-28

## 2022-11-28 RX ORDER — POLYETHYLENE GLYCOL 3350 17 G/17G
17 POWDER, FOR SOLUTION ORAL DAILY PRN
Status: DISCONTINUED | OUTPATIENT
Start: 2022-11-28 | End: 2022-12-02 | Stop reason: HOSPADM

## 2022-11-28 RX ORDER — MAGNESIUM SULFATE 1 G/100ML
1000 INJECTION INTRAVENOUS PRN
Status: DISCONTINUED | OUTPATIENT
Start: 2022-11-28 | End: 2022-12-02 | Stop reason: HOSPADM

## 2022-11-28 RX ORDER — ONDANSETRON 4 MG/1
4 TABLET, ORALLY DISINTEGRATING ORAL EVERY 8 HOURS PRN
Status: DISCONTINUED | OUTPATIENT
Start: 2022-11-28 | End: 2022-12-02 | Stop reason: HOSPADM

## 2022-11-28 RX ORDER — ALBUTEROL SULFATE 2.5 MG/3ML
2.5 SOLUTION RESPIRATORY (INHALATION) EVERY 4 HOURS PRN
Status: DISCONTINUED | OUTPATIENT
Start: 2022-11-28 | End: 2022-12-02 | Stop reason: HOSPADM

## 2022-11-28 RX ORDER — POTASSIUM CHLORIDE 7.45 MG/ML
10 INJECTION INTRAVENOUS PRN
Status: DISCONTINUED | OUTPATIENT
Start: 2022-11-28 | End: 2022-12-01

## 2022-11-28 RX ORDER — SODIUM CHLORIDE 0.9 % (FLUSH) 0.9 %
5-40 SYRINGE (ML) INJECTION EVERY 12 HOURS SCHEDULED
Status: DISCONTINUED | OUTPATIENT
Start: 2022-11-28 | End: 2022-12-02 | Stop reason: HOSPADM

## 2022-11-28 RX ADMIN — ENOXAPARIN SODIUM 40 MG: 100 INJECTION SUBCUTANEOUS at 15:25

## 2022-11-28 RX ADMIN — PIPERACILLIN AND TAZOBACTAM 4500 MG: 4; .5 INJECTION, POWDER, FOR SOLUTION INTRAVENOUS at 15:23

## 2022-11-28 RX ADMIN — HYDROXYZINE HYDROCHLORIDE 50 MG: 25 TABLET ORAL at 21:41

## 2022-11-28 RX ADMIN — GADOBENATE DIMEGLUMINE 19 ML: 529 INJECTION, SOLUTION INTRAVENOUS at 16:50

## 2022-11-28 RX ADMIN — HYDROMORPHONE HYDROCHLORIDE 1 MG: 1 INJECTION, SOLUTION INTRAMUSCULAR; INTRAVENOUS; SUBCUTANEOUS at 16:19

## 2022-11-28 RX ADMIN — SODIUM CHLORIDE, PRESERVATIVE FREE 10 ML: 5 INJECTION INTRAVENOUS at 21:44

## 2022-11-28 RX ADMIN — KETAMINE HYDROCHLORIDE 20 MG: 10 INJECTION, SOLUTION INTRAMUSCULAR; INTRAVENOUS at 12:02

## 2022-11-28 RX ADMIN — LEVETIRACETAM 500 MG: 500 TABLET, FILM COATED ORAL at 15:25

## 2022-11-28 RX ADMIN — LINEZOLID 600 MG: 600 TABLET, FILM COATED ORAL at 23:07

## 2022-11-28 RX ADMIN — PIPERACILLIN SODIUM AND TAZOBACTAM SODIUM 3375 MG: 3; 375 INJECTION, POWDER, FOR SOLUTION INTRAVENOUS at 21:43

## 2022-11-28 RX ADMIN — HYDROMORPHONE HYDROCHLORIDE 0.5 MG: 1 INJECTION, SOLUTION INTRAMUSCULAR; INTRAVENOUS; SUBCUTANEOUS at 09:52

## 2022-11-28 RX ADMIN — POTASSIUM CHLORIDE AND SODIUM CHLORIDE: 900; 300 INJECTION, SOLUTION INTRAVENOUS at 17:35

## 2022-11-28 RX ADMIN — LEVOTHYROXINE SODIUM 50 MCG: 0.05 TABLET ORAL at 15:25

## 2022-11-28 RX ADMIN — LEVETIRACETAM 500 MG: 500 TABLET, FILM COATED ORAL at 21:41

## 2022-11-28 RX ADMIN — OXYCODONE AND ACETAMINOPHEN 1 TABLET: 5; 325 TABLET ORAL at 23:07

## 2022-11-28 ASSESSMENT — PAIN SCALES - GENERAL
PAINLEVEL_OUTOF10: 9
PAINLEVEL_OUTOF10: 10
PAINLEVEL_OUTOF10: 8
PAINLEVEL_OUTOF10: 10
PAINLEVEL_OUTOF10: 5
PAINLEVEL_OUTOF10: 10
PAINLEVEL_OUTOF10: 10

## 2022-11-28 ASSESSMENT — PAIN DESCRIPTION - LOCATION
LOCATION: LEG

## 2022-11-28 ASSESSMENT — ENCOUNTER SYMPTOMS
RHINORRHEA: 0
SHORTNESS OF BREATH: 0
NAUSEA: 0
VOMITING: 0
DIARRHEA: 0
ABDOMINAL PAIN: 0
CONSTIPATION: 0

## 2022-11-28 ASSESSMENT — PAIN DESCRIPTION - DESCRIPTORS
DESCRIPTORS: BURNING;PRESSURE
DESCRIPTORS: BURNING;PRESSURE
DESCRIPTORS: PRESSURE;BURNING

## 2022-11-28 ASSESSMENT — PAIN DESCRIPTION - ORIENTATION
ORIENTATION: LEFT

## 2022-11-28 NOTE — ED NOTES
Pt in [address]. Will obtain weight upon return.      Bonilla Olson RN  11/28/22 1189

## 2022-11-28 NOTE — H&P
Department of Internal Medicine  History and Physical Examination     Primary Care Physician: Trish Whyte DO   Admitting Physician:  Romy Kay DO  Admission date and time: 11/28/2022  9:23 AM    Room:  09/09  Admitting diagnosis: Cellulitis of left lower extremity [L03.116]    Patient Name: Dyan Maher  MRN: [id number]    Date of Service: 11/28/2022     Chief Complaint: Left lower extremity pain    HISTORY OF PRESENT ILLNESS:    Dyan Maher is a 63-year-old female recently cared for and discharged under our service for similar complaint. She suffered cat scratch to left lower extremity and developed cellulitis. ID provided consultation and extensive work-up was undertaken. She had some associated inguinal adenopathy in the region of the left lower extremity infection was felt to be reactive/inflammatory related to the infectious process. She was started on broad-spectrum IV and transition to oral antimicrobials upon discharge with Zyvox and Augmentin. She completed these courses as an outpatient with improvement and then developed some progression and worsening of symptoms. Pain increased today to the point where she was evaluated in the ER. Labs revealed no significant leukocytosis or neutrophilic predominance. Metabolic panel was essentially unremarkable as was lactic acid. Ultrasound lower extremity showed no DVT. CT of the tibia, fibula and foot on the left without contrast were obtained showing diffuse subcutaneous tissue swelling and edema involving the left ankle and foot with no evidence of soft tissue fluid collection or abscess, gas formation. Osteodegenerative changes were noted as was the suggestion of potential AVN of the left talar dome. Case was discussed with ER physician with plan for admission, further care and management under the service of Dr. Kevin Choi. Please is seen and examined at bedside today. She confirmed the above given history.   She admits to pain is her predominant complaint. She denies any additional injury or trauma to the area. No fevers or chills, constitutional symptoms of illness. No headache or acute neurologic symptoms. No chest pain or palpitations, fluttering. No increased shortness of breath, cough, URI complaints. No abdominal pain, nausea, vomiting, bowel changes, hematochezia or melena. No acute urinary complaints. Tolerated oral antibiotic regimen well without difficulty. PAST MEDICAL Hx:  Past Medical History:   Diagnosis Date    Arthritis     Brain venous angioma (HCC)     Bursitis     hips    Cat scratch of left lower leg     Class 3 severe obesity due to excess calories with body mass index (BMI) of 40.0 to 44.9 in adult [facility])     COPD (chronic obstructive pulmonary disease) (HCC)     Diverticulitis     GERD (gastroesophageal reflux disease)     Incisional hernia with obstruction but no gangrene     Strep throat        PAST SURGICAL Hx:   Past Surgical History:   Procedure Laterality Date     SECTION      CHOLECYSTECTOMY      COLECTOMY  2016    FOREARM FRACTURE SURGERY Left     fracture of ulna s/p repair    HERNIA REPAIR      HERNIA REPAIR  2015    incarcerated hernia repair    HERNIA REPAIR N/A 3/25/2021    INCISIONAL HERNIA REPAIR WITH MESH, POSSIBLE COMPONENT SEPARATION  LAPAROSCOPIC ROBOTIC XI ASSISTED (CPT 53202) performed by Michelle See MD at [address] [facility] (CERVIX STATUS UNKNOWN)      NERVE BLOCK      sacroiliac bilateral 2012    PELVIC FRACTURE SURGERY      VENTRAL HERNIA REPAIR  2015       FAMILY Hx:  Family History   Problem Relation Age of Onset    Osteoarthritis Other     Diabetes Other     Other Father         pancreatitis    Prostate Cancer Father     Stroke Brother         half brother    Cancer Paternal Grandfather        HOME MEDICATIONS:  Prior to Admission medications    Medication Sig Start Date End Date Taking?  Authorizing Provider   levothyroxine (SYNTHROID) 50 MCG tablet Take 1 tablet by mouth Daily 11/15/22   Jade Mcclelland DO   levETIRAcetam (KEPPRA) 500 MG tablet Take 1 tablet by mouth 2 times daily 21   Jade Mcclelland DO   hydrOXYzine (ATARAX) 50 MG tablet Take 50 mg by mouth nightly  21   Historical Provider, MD       ALLERGIES:  Ibuprofen, Nsaids, and Morphine    SOCIAL Hx:  Social History     Socioeconomic History    Marital status:      Spouse name: Regine Brandon    Number of children: 4    Years of education: 12    Highest education level: Not on file   Occupational History     Employer: [employer] title   Tobacco Use    Smoking status: Former     Packs/day: 1.00     Years: 30.00     Pack years: 30.00     Types: Cigarettes     Quit date: 3/5/2021     Years since quittin.7    Smokeless tobacco: Never   Vaping Use    Vaping Use: Never used   Substance and Sexual Activity    Alcohol use: Yes     Comment: daily vodka drinker    Drug use: Not Currently     Types: Marijuana [facility])     Comment: medical marijuana. Sexual activity: Yes     Partners: Male   Other Topics Concern    Not on file   Social History Narrative     twice. Recently  2020     Social Determinants of Health     Financial Resource Strain: Not on file   Food Insecurity: Not on file   Transportation Needs: Not on file   Physical Activity: Not on file   Stress: Not on file   Social Connections: Not on file   Intimate Partner Violence: Not on file   Housing Stability: Not on file       ROS: 12 point review of symptoms was conducted, pertinent positives and negative were reviewed, aside from that all 12 systems were reviewed and negative.          PHYSICAL EXAM:  VITALS:  Vitals:    22 1148   BP: (!) 145/85   Pulse: 78   Resp: 19   Temp:    SpO2: 97%         CONSTITUTIONAL:    Awake, alert, cooperative, uncomfortable but without apparent distress    EYES:    PERRL, EOMI, sclera clear without icterus, conjunctiva normal    ENT:    Normocephalic, atraumatic, sinuses nontender on palpation. External ears without lesions. Oral pharynx with moist mucus membranes. NECK:    Supple, symmetrical, trachea midline, no adenopathy, thyroid symmetric, not enlarged and no tenderness, skin normal, no bruits, no JVD    HEMATOLOGIC/LYMPHATICS:    No cervical lymphadenopathy and no supraclavicular lymphadenopathy    LUNGS:    Symmetric. No increased work of breathing, essentially normal air exchange, clear to auscultation bilaterally, no wheezes, rhonchi, or rales,     CARDIOVASCULAR:    Normal apical impulse, regular rate and rhythm, normal S1 and S2, systolic murmur noted    ABDOMEN:    Obese contour, normal bowel sounds, soft, non-distended, non-tender, no rebound or guarding elicited on palpation     MUSCULOSKELETAL:    Left lower extremity changes as below, otherwise there is no redness, warmth, or swelling of the joints. Tone is normal.    NEUROLOGIC:    Awake, alert, oriented to name, place and time. Cranial nerves II-XII are grossly intact. Motor is 5 out of 5 bilaterally. SKIN:    Left lower extremity as below, otherwise no bruising or bleeding. No redness, warmth, or swelling    EXTREMITIES:    Peripheral pulses present. Lower extremity is without edema, but is with erythema, warmth and tenderness. Muscle compartments are soft and nontender with intact distal neurovascular examination. Granulated wounds noted on the anterior aspect of the lower extremity. No fluctuant regions or indurated areas to suggest phlegmon or abscess.     LABORATORY DATA:  CBC with Differential:    Lab Results   Component Value Date/Time    WBC 8.5 11/28/2022 09:42 AM    RBC 4.84 11/28/2022 09:42 AM    HGB 14.0 11/28/2022 09:42 AM    HCT 44.0 11/28/2022 09:42 AM     11/28/2022 09:42 AM    MCV 90.9 11/28/2022 09:42 AM    MCH 28.9 11/28/2022 09:42 AM    MCHC 31.8 11/28/2022 09:42 AM    RDW 14.6 11/28/2022 09:42 AM    NRBC 0.9 11/14/2022 05:26 AM    SEGSPCT 82 09/14/2011 09:10 AM    METASPCT 1.7 11/14/2022 05:26 AM    LYMPHOPCT 14.4 11/28/2022 09:42 AM    MONOPCT 5.6 11/28/2022 09:42 AM    MYELOPCT 2.6 11/14/2022 05:26 AM    BASOPCT 0.2 11/28/2022 09:42 AM    MONOSABS 0.47 11/28/2022 09:42 AM    LYMPHSABS 1.22 11/28/2022 09:42 AM    EOSABS 0.15 11/28/2022 09:42 AM    BASOSABS 0.02 11/28/2022 09:42 AM     BMP:    Lab Results   Component Value Date/Time     11/28/2022 09:42 AM    K 3.8 11/28/2022 09:42 AM    K 3.9 07/29/2021 07:12 PM     11/28/2022 09:42 AM    CO2 23 11/28/2022 09:42 AM    BUN 16 11/28/2022 09:42 AM    LABALBU 4.5 11/28/2022 09:42 AM    CREATININE 1.0 11/28/2022 09:42 AM    CALCIUM 9.6 11/28/2022 09:42 AM    GFRAA >60 08/02/2021 04:46 AM    LABGLOM >60 11/28/2022 09:42 AM    GLUCOSE 112 11/28/2022 09:42 AM    GLUCOSE 89 09/14/2011 09:10 AM       ASSESSMENT:  Recurrent cellulitis of the left lower extremity with changes of the talar dome concerning for AVN versus osteomyelitis  Cat scratch left lower leg with resultant cellulitis treated with Augmentin and Zyvox upon discharge November 14  Left inguinal adenopathy with largest lymph node measuring 4.2 x 1.2 x 2.6 cm noted on last inpatient work-up  Non-oxygen dependent COPD  GERD  Obesity secondary to excess caloric intake with elevated BMI of 37.31 kg/m2    PLAN:  Christina Sutton is a 75-year-old female who presented to [address] [place] [address] with recurrent left lower extremity infection. Cat scratch was the impetus for the previous cellulitis however there is been no repeat injury or trauma. She completed antibiotics. She will again be seen by the ID team.  Antimicrobials will be employed. Symptomatic and supportive care. MRI of the left ankle for further evaluation of the AVN versus myelitis. Podiatry will provide consultation as well. PT, OT and social work will follow for discharge planning purposes. .  Infectious/inflammatory work-up to be undertaken.   She will be resumed on Zyvox and Zosyn with further adjustments as per the ID team.  Underlying co-morbidites will be addressed during hospitalization as well. Labs and vital signs will be monitored closely and addressed accordingly. See additional orders for details.      BOB Can CNP  2:23 PM  11/28/2022    Electronically signed by BOB Can CNP on 11/28/22 at 2:23 PM EST

## 2022-11-28 NOTE — CONSULTS
Podiatry Consult H&P  2022   [facility]       Consulting Diagnosis: Recurrent lower extremity cellulitis questionable left AVN versus OM talus  Consulting Physician: Dr. Christopher Bruce    Chief Complaint: Recalcitrant left lower extremity cellulitis    SUBJECTIVE: Renuka Moise is a 46 y.o. nondiabetic female who presented to the [address] [place] [address] ED on 2022 with complaint of recurrent and recalcitrant left lower extremity infection secondary to cat scratch. Patient first presented to the ED on 11/10/2022 and received treatment for 4 days. She states that on 11/3/2022 her cat scratched her left leg with progressively worsening redness and swelling. She states that the swelling and pain decreased after her last inpatient stay however the redness was persistent. She was discharged on oral antibiotics and states that she completed her oral regimen as prescribed. Wound cultures obtained in the last encounter and grew MSSA. She states that on  day 2022 she noticed that it began to worsen and redness and swelling. She denies any nausea, vomiting, diarrhea, fevers, chills, shortness of breath, calf pain, chest pain. On presentation today her white count was 8500, ESR and CRP are pending. A CT of the left leg and foot was obtained revealing cortical subchondral irregularities along the medial talus and distal fibula.   MRI of the left ankle is pending        Past Medical History:   Diagnosis Date    Arthritis     Brain venous angioma (HCC)     Bursitis     hips    Cat scratch of left lower leg     Class 3 severe obesity due to excess calories with body mass index (BMI) of 40.0 to 44.9 in adult [facility])     COPD (chronic obstructive pulmonary disease) (HCC)     Diverticulitis     GERD (gastroesophageal reflux disease)     Incisional hernia with obstruction but no gangrene     Strep throat         Past Surgical History:   Procedure Laterality Date     SECTION      CHOLECYSTECTOMY      COLECTOMY 2016    FOREARM FRACTURE SURGERY Left     fracture of ulna s/p repair    HERNIA REPAIR      HERNIA REPAIR  2015    incarcerated hernia repair    HERNIA REPAIR N/A 3/25/2021    INCISIONAL HERNIA REPAIR WITH MESH, POSSIBLE COMPONENT SEPARATION  LAPAROSCOPIC ROBOTIC XI ASSISTED (CPT 96261) performed by Omer Shook MD at [address] [facility] (CERVIX STATUS UNKNOWN)      NERVE BLOCK      sacroiliac bilateral 2012    PELVIC FRACTURE SURGERY      VENTRAL HERNIA REPAIR  2015         Family History   Problem Relation Age of Onset    Osteoarthritis Other     Diabetes Other     Other Father         pancreatitis    Prostate Cancer Father     Stroke Brother         half brother    Cancer Paternal Grandfather         Social History     Tobacco Use    Smoking status: Former     Packs/day: 1.00     Years: 30.00     Pack years: 30.00     Types: Cigarettes     Quit date: 3/5/2021     Years since quittin.7    Smokeless tobacco: Never   Substance Use Topics    Alcohol use: Yes     Comment: daily vodka drinker        Prior to Admission medications    Medication Sig Start Date End Date Taking? Authorizing Provider   levothyroxine (SYNTHROID) 50 MCG tablet Take 1 tablet by mouth Daily 11/15/22   Manny Coronado DO   levETIRAcetam (KEPPRA) 500 MG tablet Take 1 tablet by mouth 2 times daily 21   Manny Coronado DO   hydrOXYzine (ATARAX) 50 MG tablet Take 50 mg by mouth nightly  21   Historical Provider, MD        Ibuprofen, Nsaids, and Morphine         OBJECTIVE:        Vitals:    22 1429   BP: 124/71   Pulse: 68   Resp: 15   Temp:    SpO2: 95%                        EXAM:        Pt is AAOx3, mild distress    Vascular Exam: DP and PT pulses bilaterally strong palpable, brisk capillary fill time to all pedal digits bilaterally. Induration, erythema, nonpitting edema left lower leg, ankle. +1 pitting edema right lower extremity. Positive pedal hair growth right lower extremity.     Neuro Exam: Intact protective sensation bilaterally    Dermatologic Exam:    -Diffuse erythema to left lower extremity from proximal lower leg extending distally just distal to the ankle joint. Multiple well adhered scabs, no active drainage, no malodor or crepitus, no fluctuance, no open wounds. Positive induration left posterior calf, positive Homans' sign, negative DVT on venous duplex  -Multiple hyperkeratotic lesions subfirst bilateral MPJ, MPJ, inferior heel  -Remaining skin is well-hydrated intact    MSK: Muscle strength 5/5 Bilateral.  Painful range of motion left ankle dorsiflexion and plantarflexion. Intact eversion inversion. Positive Hubscher maneuver. Pes planus on weightbearing.   Indurated, erythematous, edematous, painful calf squeeze left posterior calf, negative DVT on venous duplex    Current Facility-Administered Medications   Medication Dose Route Frequency Provider Last Rate Last Admin    levETIRAcetam (KEPPRA) tablet 500 mg  500 mg Oral BID Cindia Radha, DO        levothyroxine (SYNTHROID) tablet 50 mcg  50 mcg Oral Daily Cindia Radha, DO        hydrOXYzine HCl (ATARAX) tablet 50 mg  50 mg Oral Nightly Cindia Radha, DO        magnesium sulfate 1000 mg in dextrose 5% 100 mL IVPB  1,000 mg IntraVENous PRN Cindia Radha, DO        sodium phosphate 14.79 mmol in sodium chloride 0.9 % 250 mL IVPB  0.16 mmol/kg IntraVENous PRN Cindia Radha, DO        Or    sodium phosphate 29.61 mmol in sodium chloride 0.9 % 250 mL IVPB  0.32 mmol/kg IntraVENous PRN Cindia Radha, DO        potassium chloride (KLOR-CON M) extended release tablet 40 mEq  40 mEq Oral PRN Cindia Radha, DO        Or    potassium bicarb-citric acid (EFFER-K) effervescent tablet 40 mEq  40 mEq Oral PRN Cindia Radha, DO        Or    potassium chloride 10 mEq/100 mL IVPB (Peripheral Line)  10 mEq IntraVENous PRN Cindia Radha, DO        sodium chloride flush 0.9 % injection 5-40 mL  5-40 mL IntraVENous 2 times per day Cindia Radha, DO        sodium chloride flush 0.9 % injection 5-40 mL  5-40 mL IntraVENous PRN Wilma Staple, DO        0.9 % sodium chloride infusion   IntraVENous PRN Wilma Staple, DO        enoxaparin (LOVENOX) injection 40 mg  40 mg SubCUTAneous Daily Wilma Staple, DO        ondansetron (ZOFRAN-ODT) disintegrating tablet 4 mg  4 mg Oral Q8H PRN Wilma Staple, DO        Or    ondansetron [facility]) injection 4 mg  4 mg IntraVENous Q6H PRN Wilma Staple, DO        polyethylene glycol (GLYCOLAX) packet 17 g  17 g Oral Daily PRN Wilma Staple, DO        acetaminophen (TYLENOL) tablet 650 mg  650 mg Oral Q6H PRN Wilma Staple, DO        Or    acetaminophen (TYLENOL) suppository 650 mg  650 mg Rectal Q6H PRN Wilma Staple, DO        linezolid (ZYVOX) tablet 600 mg  600 mg Oral 2 times per day Wilma Staple, DO        piperacillin-tazobactam (ZOSYN) 4,500 mg in sodium chloride 0.9 % 100 mL IVPB ([id number])  4,500 mg IntraVENous Once Wilma Staple, DO        Followed by    piperacillin-tazobactam (ZOSYN) 3,375 mg in sodium chloride 0.9 % 50 mL IVPB ([id number])  3,375 mg IntraVENous Q8H Wilma Staple, DO        albuterol (PROVENTIL) nebulizer solution 2.5 mg  2.5 mg Nebulization Q4H PRN Foster Flatten, APRN - CNP        0.9% NaCl with KCl 40 mEq infusion   IntraVENous Continuous Foster Flatten, APRN - CNP         Current Outpatient Medications   Medication Sig Dispense Refill    levothyroxine (SYNTHROID) 50 MCG tablet Take 1 tablet by mouth Daily 30 tablet 3    levETIRAcetam (KEPPRA) 500 MG tablet Take 1 tablet by mouth 2 times daily 60 tablet 3    hydrOXYzine (ATARAX) 50 MG tablet Take 50 mg by mouth nightly           Lab Results   Component Value Date    WBC 8.5 11/28/2022    HCT 44.0 11/28/2022    HGB 14.0 11/28/2022     11/28/2022     11/28/2022    K 3.8 11/28/2022     11/28/2022    CO2 23 11/28/2022    BUN 16 11/28/2022    CREATININE 1.0 11/28/2022    GLUCOSE 112 (H) 11/28/2022    CRP 11.9 (H) 11/10/2022       ASSESSMENT:  - Recalcitrant Cellulitis left lower leg secondary to cat scratch, POA, infected pending MRI  - 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 MRI pending  - Rule out septic ankle joint, MRI pending  -Venous stasis insufficiency bilateral lower extremity  -Morbid obesity    PLAN:  - Examined and evaluated  - All labs, imaging, and charts reviewed  - WBC: 8.5, ESR/CRP pending  - CT Left foot and tib/fib 11/28/2022: Mild diffuse left foot and lower leg subcutaneous soft tissue edema no evidence of abscess or gas, small radiolucencies to medial talar dome subchondrally with slight increase in sclerosis, rule out ATN. Subchondral cyst formation lateral CC joint. Mild periosteal thickening distal fibula  - MRI left ankle ordered and pending 11/28/2022  - No open wounds to culture at this time.  -Wound culture 11/10/2022: MSSA  - ABX: Zosyn/Zyvox, ID following  -Venous duplex 11/28/2022: Negative for DVT  -We will hold surgical invention at this time pending MRI findings. If there is further concern for AVN/OCD of the talus/septic ankle joint will consider left ankle arthroscopy.   -No dressings  -WBAT bilateral lower extremity    D/W: Vera Brian DPM FACFAS  Fellowship-Trained Foot and Ankle Surgeon  Diplomate, American Board of Foot and Ankle Surgeons  [phone]       Thank you for involving podiatry in this patients care. Please do not hesitate to call with any questions or concerns.      Gabriella Butler Podiatry PGY3  11/28/2022   3:03 PM

## 2022-11-28 NOTE — ED NOTES
Department of Emergency Medicine  FIRST PROVIDER TRIAGE NOTE             Independent MLP           11/28/22  8:57 AM EST    Date of Encounter: 11/28/22   MRN: [id number]      HPI: Tamiko Syed is a 46 y.o. female who presents to the ED for Cellulitis (Left lower leg, recently dx with cellulitis and was admitted x 1 week ago.)   Patient reports she just finished zyvox and augmentin however not getting any better. Recently hospitalized for this. ROS: Negative for cp or sob. PE: Gen Appearance/Constitutional: alert  Musculoskeletal: moves all extremities x 4      The patient has given verbal consent to have photos taken and inserted into their medical chart as part of their permanent medical record for purposes of documentation, treatment management and/or medical review. All Images taken on 11/28/22 of patient name: Tamiko Syed were transmitted and stored on secured [employer] located within [employer] by a registered [employer] Mobile Application Device       Initial Plan of Care: All treatment areas with department are currently occupied. Plan to order/Initiate the following while awaiting opening in ED: labs and imaging studies.   Initiate Treatment-Testing, Proceed toTreatment Area When Bed Available for ED Attending/MLP to Continue Care    Electronically signed by TIM Perez   DD: 11/28/22       Silva Perez  11/28/22 7036

## 2022-11-28 NOTE — ED NOTES
Pt in MRI, unable to keep IV fluids on pump. Will start nacl with [facility] upon return.      Berdie Apley, RN  11/28/22 1412

## 2022-11-28 NOTE — ED NOTES
Pt on phone with family at this time. Provided with call light and educated to use for any needs. Pt verbalizes understanding.      Montez Almonte RN  11/28/22 8528

## 2022-11-28 NOTE — ED NOTES
Attempted to call report, 4th floor states that per nursing supervisor, they aren't getting admit. Charge nurse notified.      Lizzie Steven RN  11/28/22 9746

## 2022-11-28 NOTE — ED PROVIDER NOTES
Patient is a 70-year-old female who presents to the emergency department for increasing pain to the left lower leg. Patient states she was diagnosed with cellulitis on 11/10/2022 after cat scratch. Patient was admitted to the hospital, seen by infectious disease and discharged home on 11/15 with Augmentin and Zyvox. Patient states that rash initially decreased however increased over the past 3 days with increasing pain and swelling to the left lower extremity. Patient denies any paresthesias or numbness. Patient states that her been working. Patient has been taking her medications. Patient is to follow-up with primary care around now. Patient denies any fevers or chills, nausea or vomiting, diarrhea, decreased appetite. Patient endorses pain to the left lower leg that is burning, nonradiating, nothing makes it better, nothing makes it worse, constant and progressive. Patient denies any decreased range of motion. Review of Systems   Constitutional:  Negative for chills and fever. HENT:  Negative for congestion and rhinorrhea. Eyes:  Negative for visual disturbance. Respiratory:  Negative for shortness of breath. Cardiovascular:  Positive for leg swelling. Negative for chest pain and palpitations. Gastrointestinal:  Negative for abdominal pain, constipation, diarrhea, nausea and vomiting. Endocrine: Negative for polyuria. Genitourinary:  Negative for dysuria and hematuria. Musculoskeletal:  Negative for arthralgias and myalgias. Skin:  Positive for rash and wound. Allergic/Immunologic: Negative for immunocompromised state. Neurological:  Negative for dizziness, syncope, weakness, light-headedness, numbness and headaches. Psychiatric/Behavioral:  Negative for confusion. The patient is not nervous/anxious. Physical Exam  Vitals and nursing note reviewed. Constitutional:       General: She is not in acute distress. Appearance: She is ill-appearing.    HENT:      Head: Normocephalic and atraumatic. Mouth/Throat:      Mouth: Mucous membranes are moist.   Eyes:      Pupils: Pupils are equal, round, and reactive to light. Cardiovascular:      Rate and Rhythm: Normal rate and regular rhythm. Pulses: Normal pulses. Radial pulses are 2+ on the right side and 2+ on the left side. Dorsalis pedis pulses are 2+ on the right side and 2+ on the left side. Heart sounds: Normal heart sounds. Pulmonary:      Effort: Pulmonary effort is normal.      Breath sounds: Normal breath sounds. Abdominal:      Palpations: Abdomen is soft. Tenderness: There is no abdominal tenderness. Musculoskeletal:         General: Normal range of motion. Cervical back: Normal range of motion. Right lower leg: No edema. Left lower leg: No edema. Skin:     General: Skin is warm and dry. Capillary Refill: Capillary refill takes less than 2 seconds. Findings: Erythema, rash and wound present. Comments: Left lower leg appears swollen, erythematous, tender to touch. No pitting edema. DP pulses are bilaterally easily palpable. Neurological:      General: No focal deficit present. Mental Status: She is alert and oriented to person, place, and time. MDM  Number of Diagnoses or Management Options  Cellulitis of left lower extremity  Diagnosis management comments: Patient is a 59-year-old female presents to the emergency department for increasing swelling and erythema to the left lower extremity. Patient with recent diagnosis of cellulitis, has been on antibiotics and finished her regimen today of Augmentin and Zyvox. Patient denies any fevers or chills. Patient presented awake, alert, visibly uncomfortable. Vital signs were noted, afebrile. Physical exam shows erythema, swelling to the left lower extremity, exquisitely painful to touch. DP pulses were present.   Patient was treated with analgesia, work-up including labs shows no leukocytosis, no lactic acidosis. Ultrasound duplex shows no DVT, CTs of the foot and tibia show improving cellulitis. Patient was given additional analgesia, and is very comfortable on reevaluation. Patient states that area has been increasing in swelling, erythema, possible suboptimal effectiveness of antibiotics, decision was made to admit, this was discussed with patient she is agreeable. Hospitalist was consulted, accepted the patient, infectious disease was able to see patient at bedside, they will make recommendation for antibiotics. Patient was monitored in the emergency department and the plan will be for admission to bed when room available. Amount and/or Complexity of Data Reviewed  Clinical lab tests: ordered and reviewed  Tests in the radiology section of CPT®: ordered and reviewed         ED Course as of 22 1543      1424 Infectious disease was paged. [QC]      ED Course User Index  [QC] Brayton Saint, MD      ED Course as of 22 1543      1424 Infectious disease was paged. [QC]      ED Course User Index  [QC] Brayton Saint, MD       --------------------------------------------- PAST HISTORY ---------------------------------------------  Past Medical History:  has a past medical history of Arthritis, Brain venous angioma ([facility] [address] Bursitis, Cat scratch of left lower leg, Class 3 severe obesity due to excess calories with body mass index (BMI) of 40.0 to 44.9 in adult [facility]), COPD (chronic obstructive pulmonary disease) ([facility] [address] Diverticulitis, GERD (gastroesophageal reflux disease), Incisional hernia with obstruction but no gangrene, and Strep throat. Past Surgical History:  has a past surgical history that includes Hysterectomy; Cholecystectomy; Pelvic fracture surgery; colectomy (2016); Nerve Block; hernia repair; hernia repair (2015); ventral hernia repair (2015);   section; Forearm fracture surgery (Left); and hernia repair (N/A, 3/25/2021). Social History:  reports that she quit smoking about 20 months ago. Her smoking use included cigarettes. She has a 30.00 pack-year smoking history. She has never used smokeless tobacco. She reports current alcohol use. She reports that she does not currently use drugs after having used the following drugs: Marijuana Seabron Barban). Family History: family history includes Cancer in her paternal grandfather; Diabetes in an other family member; Osteoarthritis in an other family member; Other in her father; Prostate Cancer in her father; Stroke in her brother. The patients home medications have been reviewed.     Allergies: Ibuprofen, Nsaids, and Morphine    -------------------------------------------------- RESULTS -------------------------------------------------    LABS:  Results for orders placed or performed during the hospital encounter of 11/28/22   CBC with Auto Differential   Result Value Ref Range    WBC 8.5 4.5 - 11.5 E9/L    RBC 4.84 3.50 - 5.50 E12/L    Hemoglobin 14.0 11.5 - 15.5 g/dL    Hematocrit 44.0 34.0 - 48.0 %    MCV 90.9 80.0 - 99.9 fL    MCH 28.9 26.0 - 35.0 pg    MCHC 31.8 (L) 32.0 - 34.5 %    RDW 14.6 11.5 - 15.0 fL    Platelets 481 422 - 338 E9/L    MPV 9.3 7.0 - 12.0 fL    Neutrophils % 77.6 43.0 - 80.0 %    Immature Granulocytes % 0.4 0.0 - 5.0 %    Lymphocytes % 14.4 (L) 20.0 - 42.0 %    Monocytes % 5.6 2.0 - 12.0 %    Eosinophils % 1.8 0.0 - 6.0 %    Basophils % 0.2 0.0 - 2.0 %    Neutrophils Absolute 6.56 1.80 - 7.30 E9/L    Immature Granulocytes # 0.03 E9/L    Lymphocytes Absolute 1.22 (L) 1.50 - 4.00 E9/L    Monocytes Absolute 0.47 0.10 - 0.95 E9/L    Eosinophils Absolute 0.15 0.05 - 0.50 E9/L    Basophils Absolute 0.02 0.00 - 0.20 E9/L   CMP   Result Value Ref Range    Sodium 140 132 - 146 mmol/L    Potassium 3.8 3.5 - 5.0 mmol/L    Chloride 104 98 - 107 mmol/L    CO2 23 22 - 29 mmol/L    Anion Gap 13 7 - 16 mmol/L    Glucose 112 (H) 74 - 99 mg/dL    BUN 16 6 - 20 mg/dL    Creatinine 1.0 0.5 - 1.0 mg/dL    Est, Glom Filt Rate >60 >=60 mL/min/1.73    Calcium 9.6 8.6 - 10.2 mg/dL    Total Protein 7.8 6.4 - 8.3 g/dL    Albumin 4.5 3.5 - 5.2 g/dL    Total Bilirubin 0.4 0.0 - 1.2 mg/dL    Alkaline Phosphatase 95 35 - 104 U/L    ALT 10 0 - 32 U/L    AST 10 0 - 31 U/L   Lactate, Sepsis   Result Value Ref Range    Lactic Acid, Sepsis 1.4 0.5 - 1.9 mmol/L       RADIOLOGY:  CT TIBIA FIBULA LEFT WO CONTRAST   Final Result   1. Interval decrease in the subcutaneous soft tissue edema throughout the   visualized left lower extremity, when compared to the previous study   performed 11/11/2022.      2.  Better identified on this study is a small popliteal cyst.      3.  No evidence of soft tissue fluid collection/abscess or gas. CT FOOT LEFT WO CONTRAST   Final Result   1. Mild, diffuse subcutaneous soft tissue edema involving the visualized   left ankle and foot. 2.  No evidence of soft tissue fluid collection/abscess or gas. 3.  Osteo-degenerative changes, as described above. 4.  Rule out AVN of the medial talar dome, as described above. 5.  Mild periosteal thickening involving the distal fibula, which could be   related to venous insufficiency in chronic inflammation. 6.  Moderate-sized plantar calcaneal bone spur. US DUP LOWER EXTREMITY LEFT CALEB   Final Result   No sonographic evidence of deep venous thrombosis in the left lower extremity. MRI ANKLE LEFT W CONTRAST    (Results Pending)     ------------------------- NURSING NOTES AND VITALS REVIEWED ---------------------------  Date / Time Roomed:  11/28/2022  9:23 AM  ED Bed Assignment:  09/09    The nursing notes within the ED encounter and vital signs as below have been reviewed.      Patient Vitals for the past 24 hrs:   BP Temp Temp src Pulse Resp SpO2 Weight   11/28/22 1528 (!) 132/90 98.1 °F (36.7 °C) Oral 77 14 98 % --   11/28/22 1429 124/71 -- -- 68 15 95 % --   11/28/22 1148 (!) 145/85 -- -- 78 19 97 % --   11/28/22 1147 -- -- -- -- -- -- 204 lb (92.5 kg)   11/28/22 0856 (!) 182/112 98.1 °F (36.7 °C) Oral 92 20 98 % --       Oxygen Saturation Interpretation: Normal    ------------------------------------------ PROGRESS NOTES ------------------------------------------  Re-evaluation(s):  Patients symptoms are improving  Repeat physical examination is improved    Counseling:  I have spoken with the patient and discussed todays results, in addition to providing specific details for the plan of care and counseling regarding the diagnosis and prognosis. Their questions are answered at this time and they are agreeable with the plan of admission.    --------------------------------- ADDITIONAL PROVIDER NOTES ---------------------------------  Consultations:  Spoke with Dr. Yasmin Voss. Discussed case. They will admit the patient. This patient's ED course included: a personal history and physicial examination, multiple bedside re-evaluations, IV medications, cardiac monitoring, and continuous pulse oximetry    This patient has remained hemodynamically stable during their ED course. Diagnosis:  1. Cellulitis of left lower extremity      Disposition:  Patient's disposition: Admit to telemetry  Patient's condition is stable. 11/28/22, 3:39 PM EST.     This note is prepared by Du Andre MD -PGY- 3                 Du Andre MD  Resident  11/28/22 [id number]

## 2022-11-28 NOTE — CONSULTS
[address] [id number] [facility], [address] [place] [address]  Phone [phone]   [phone]      Admit Date: 2022  9:23 AM  Pt Name: Breanne Alford  MRN: [id number]  : 1970  Reason for Consult:    Chief Complaint   Patient presents with    Cellulitis     Left lower leg, recently dx with cellulitis and was admitted x 1 week ago. Requesting Physician:  Benita Garcia DO  PCP: Abdirashid Marmolejo DO  History Obtained From:  patient, chart   ID consulted for Cellulitis of left lower extremity [O65.275]  on hospital day 0  CHIEF COMPLAINT       Chief Complaint   Patient presents with    Cellulitis     Left lower leg, recently dx with cellulitis and was admitted x 1 week ago. HISTORYOF PRESENT ILLNESS   Breanne Alford is a 46 y.o. female who presents with   has a past medical history of Arthritis, Brain venous angioma ([facility] [address] Bursitis, Cat scratch of left lower leg, Class 3 severe obesity due to excess calories with body mass index (BMI) of 40.0 to 44.9 in [facility]), COPD (chronic obstructive pulmonary disease) ([facility] [address] Diverticulitis, GERD (gastroesophageal reflux disease), Incisional hernia with obstruction but no gangrene, and Strep throat. ED TRIAGEVITALS  BP: 124/71, Temp: 98.1 °F (36.7 °C), Heart Rate: 68, Resp: 15, SpO2: 95 %  HPI:  ID was consulted on 22 for infection management  Known to ID  Last seen on 11/15 for LLE cellulitis presumed from her cats  Cx MSSA  She was d/c with Augmentin and linezolid  She came in due to inc pain and swelling with redness  Pt presented to ER on 2022 with diagnosis of  Cellulitis of left lower extremity [X49.983]  Pt was seen in ER   Afebrile on RA  wbc8.5 cr 1   Has pian lle that started few days ago   Has cough no n/v/d/rash/itch  No uti   No bleeding   Ct  1. Mild, diffuse subcutaneous soft tissue edema involving the visualized   left ankle and foot. 2.  No evidence of soft tissue fluid collection/abscess or gas.      3.  Osteo-degenerative changes, as described above. 4.  Rule out AVN of the medial talar dome, as described above. 5.  Mild periosteal thickening involving the distal fibula, which could be   related to venous insufficiency in chronic inflammation. 6.  Moderate-sized plantar calcaneal bone spur. Podiatry to see   Mri was ordered    Currently on     linezolid (ZYVOX) tablet 600 mg, 2 times per day  piperacillin-tazobactam (ZOSYN) 4,500 mg in sodium chloride 0.9 % 100 mL IVPB ([id number]), Once   Followed by  piperacillin-tazobactam (ZOSYN) 3,375 mg in sodium chloride 0.9 % 50 mL IVPB (Jlxh5Tla), Q8H         REVIEW OF SYSTEMS     CONSTITUTIONAL:   No fever, chills, weight loss  ALLERGIES:    No rash or itch  EYES:     No visual changes  ENT:      No  hearing loss or sore throat  CARDIOVASCULAR:   No chest pain or palpitations  RESPIRATORY:   No cough, sob  ENDOCRINE:    No increase thirst, urination   HEME-LYMPH:   No easy bruising or bleeding disorder  GI:     No nausea, vomiting or diarrhea  :     No urinary complaints  NEURO:    No seizures, stroke, HA  MUSCULOSKELETAL:  No muscle aches or pain, no joint pain  SKIN:     No rash, ulcers or wounds  PSYCH:    No depression or anxiety    Not in a hospital admission.   CURRENT MEDICATIONS     Current Facility-Administered Medications:     levETIRAcetam (KEPPRA) tablet 500 mg, 500 mg, Oral, BID, Shady Punches, DO    levothyroxine (SYNTHROID) tablet 50 mcg, 50 mcg, Oral, Daily, Shady Punches, DO    hydrOXYzine HCl (ATARAX) tablet 50 mg, 50 mg, Oral, Nightly, Shady Punches, DO    magnesium sulfate 1000 mg in dextrose 5% 100 mL IVPB, 1,000 mg, IntraVENous, PRN, Shady Punches, DO    sodium phosphate 14.79 mmol in sodium chloride 0.9 % 250 mL IVPB, 0.16 mmol/kg, IntraVENous, PRN **OR** sodium phosphate 29.61 mmol in sodium chloride 0.9 % 250 mL IVPB, 0.32 mmol/kg, IntraVENous, PRN, Shady Punches, DO    potassium chloride (KLOR-CON M) extended release tablet 40 mEq, 40 mEq, Oral, PRN **OR** potassium bicarb-citric acid (EFFER-K) effervescent tablet 40 mEq, 40 mEq, Oral, PRN **OR** potassium chloride 10 mEq/100 mL IVPB (Peripheral Line), 10 mEq, IntraVENous, PRN, Delta , DO    sodium chloride flush 0.9 % injection 5-40 mL, 5-40 mL, IntraVENous, 2 times per day, Delta Leader, DO    sodium chloride flush 0.9 % injection 5-40 mL, 5-40 mL, IntraVENous, PRN, Delta , DO    0.9 % sodium chloride infusion, , IntraVENous, PRN, Delta Leader, DO    enoxaparin (LOVENOX) injection 40 mg, 40 mg, SubCUTAneous, Daily, Shivam Carcamo, DO    ondansetron (ZOFRAN-ODT) disintegrating tablet 4 mg, 4 mg, Oral, Q8H PRN **OR** ondansetron (ZOFRAN) injection 4 mg, 4 mg, IntraVENous, Q6H PRN, Delta , DO    polyethylene glycol (GLYCOLAX) packet 17 g, 17 g, Oral, Daily PRN, Delta Rausch, DO    acetaminophen (TYLENOL) tablet 650 mg, 650 mg, Oral, Q6H PRN **OR** acetaminophen (TYLENOL) suppository 650 mg, 650 mg, Rectal, Q6H PRN, Delta Leader, DO    linezolid (ZYVOX) tablet 600 mg, 600 mg, Oral, 2 times per day, Delta Rausch, DO    piperacillin-tazobactam (ZOSYN) 4,500 mg in sodium chloride 0.9 % 100 mL IVPB (Csfz8Kgi), 4,500 mg, IntraVENous, Once **FOLLOWED BY** piperacillin-tazobactam (ZOSYN) 3,375 mg in sodium chloride 0.9 % 50 mL IVPB (Gjqk4Tdu), 3,375 mg, IntraVENous, Q8H, Delta Rausch, DO    albuterol (PROVENTIL) nebulizer solution 2.5 mg, 2.5 mg, Nebulization, Q4H PRN, Bud Nehal APRN - CNP    0.9% NaCl with KCl 40 mEq infusion, , IntraVENous, Continuous, Bud Vaneo, APRN - CNP    Current Outpatient Medications:     levothyroxine (SYNTHROID) 50 MCG tablet, Take 1 tablet by mouth Daily, Disp: 30 tablet, Rfl: 3    levETIRAcetam (KEPPRA) 500 MG tablet, Take 1 tablet by mouth 2 times daily, Disp: 60 tablet, Rfl: 3    hydrOXYzine (ATARAX) 50 MG tablet, Take 50 mg by mouth nightly , Disp: , Rfl:   ALLERGIES     Ibuprofen, Nsaids, and Morphine  Immunization History   Administered Date(s) Administered    Td (Adult), 2 Lf Tetanus Toxoid, Pf (Td, Absorbed) 11/10/2022      Internal Administration   First Dose      Second Dose           Last COVID Lab SARS-CoV-2, PCR (no units)   Date Value   2021 DETECTED (A)     SARS-CoV-2, NAAT (no units)   Date Value   2021 Not Detected          PAST MEDICAL HISTORY     Past Medical History:   Diagnosis Date    Arthritis     Brain venous angioma (HCC)     Bursitis     hips    Cat scratch of left lower leg     Class 3 severe obesity due to excess calories with body mass index (BMI) of 40.0 to 44.9 in adult [facility])     COPD (chronic obstructive pulmonary disease) (HCC)     Diverticulitis     GERD (gastroesophageal reflux disease)     Incisional hernia with obstruction but no gangrene     Strep throat      SURGICAL HISTORY       Past Surgical History:   Procedure Laterality Date     SECTION      CHOLECYSTECTOMY      COLECTOMY  2016    FOREARM FRACTURE SURGERY Left     fracture of ulna s/p repair    HERNIA REPAIR      HERNIA REPAIR  2015    incarcerated hernia repair    HERNIA REPAIR N/A 3/25/2021    INCISIONAL HERNIA REPAIR WITH MESH, POSSIBLE COMPONENT SEPARATION  LAPAROSCOPIC ROBOTIC XI ASSISTED (CPT A2706064) performed by Aleja Anderson MD at [address] [place] [address] (CERVIX STATUS UNKNOWN)      NERVE BLOCK      sacroiliac bilateral 2012    PELVIC FRACTURE SURGERY      VENTRAL HERNIA REPAIR  2015     FAMILY HISTORY       Family History   Problem Relation Age of Onset    Osteoarthritis Other     Diabetes Other     Other Father         pancreatitis    Prostate Cancer Father     Stroke Brother         half brother    Cancer Paternal Grandfather      SOCIAL HISTORY       Social History     Socioeconomic History    Marital status:      Spouse name: Glenn Barnacle    Number of children: 4    Years of education: 12   Occupational History     Employer: [employer]   Tobacco Use    Smoking status: Former     Packs/day: 1.00     Years: 30.00     Pack years: 30.00 Types: Cigarettes     Quit date: 3/5/2021     Years since quittin.7    Smokeless tobacco: Never   Vaping Use    Vaping Use: Never used   Substance and Sexual Activity    Alcohol use: Yes     Comment: daily vodka drinker    Drug use: Not Currently     Types: Marijuana Gabo Goldberg)     Comment: medical marijuana. Sexual activity: Yes     Partners: Male   Social History Narrative     twice. Recently  2020     PHYSICAL EXAM        Vitals:    Vitals:    22 0856 22 1147 22 1148 22 1429   BP: (!) 182/112  (!) 145/85 124/71   Pulse: 92  78 68   Resp: 20  19 15   Temp: 98.1 °F (36.7 °C)      TempSrc: Oral      SpO2: 98%  97% 95%   Weight:  204 lb (92.5 kg)          CONSTITUTIONAL:  awake, alert, cooperative, no apparent distress, and appears stated age  ENT:  Normocephalic, without obvious abnormality, atraumatic, sinuses nontender on palpation, external ears without lesions, oral pharynx with moist mucus membranes, tonsils without erythema or exudates, gums normal and good dentition. NECK:  Supple, symmetrical, trachea midline, no adenopathy, thyroid symmetric, not enlarged and no tenderness, skin normal  LUNGS:  No increased work of breathing, good air exchange, clear to auscultation bilaterally, no crackles or wheezing  CARDIOVASCULAR:  Normal apical impulse, regular rate and rhythm, normal S1 and S2, no S3 or S4, and no murmur noted  ABDOMEN:  No scars, normal bowel sounds, soft, non-distended, non-tender, no masses palpated   CHEST/BREASTS:  Breasts symmetrical   GENITAL/URINARY:    MUSCULOSKELETAL:  There is no redness, warmth, or swelling of the joints. Full range of motion noted. NEUROLOGIC:  Awake, alert, oriented to name, place and time. Cranial nerves II-XII are grossly intact.      SKIN:    bruising  no bleeding              Peripheral Intravenous Line:  Peripheral IV 22 Right Forearm (Active)           DIAGNOSTIC RESULTS   RADIOLOGY:   CT TIBIA FIBULA LEFT WO CONTRAST    Result Date: 11/28/2022  EXAMINATION: CT OF THE LEFT TIBIA AND FIBULA WITHOUT CONTRAST 11/28/2022 11:39 am TECHNIQUE: CT of the left tibia and fibula was performed without the administration of intravenous contrast.  Multiplanar reformatted images are provided for review. Automated exposure control, iterative reconstruction, and/or weight based adjustment of the mA/kV was utilized to reduce the radiation dose to as low as reasonably achievable. COMPARISON: The previous study performed 11/11/2022. HISTORY ORDERING SYSTEM PROVIDED HISTORY: eval gas TECHNOLOGIST PROVIDED HISTORY: Reason for exam:->eval gas Decision Support Exception - unselect if not a suspected or confirmed emergency medical condition->Emergency Medical Condition (MA) FINDINGS: Bones: No evidence of acute fracture or dislocation. No aggressive appearing osseous abnormality. There is again minimal periosteal reaction involving the distal tibia and fibula and can be attributable to chronic venous insufficiency or hyperemia. Soft Tissue: There has been an interval decrease in the subcutaneous soft tissue edema throughout the visualized lower extremity. Better identified on this study is a small popliteal cyst.  It measures 3.2 x 2.4 x 1.4 cm in size. There is no evidence of soft tissue fluid collection/abscess or gas. Joint: No significant degenerative changes. No osseous erosions. 1.  Interval decrease in the subcutaneous soft tissue edema throughout the visualized left lower extremity, when compared to the previous study performed 11/11/2022. 2.  Better identified on this study is a small popliteal cyst. 3.  No evidence of soft tissue fluid collection/abscess or gas.      CT TIBIA FIBULA LEFT WO CONTRAST    Result Date: 11/11/2022  EXAMINATION: CT OF THE LEFT TIBIA AND FIBULA WITHOUT CONTRAST 11/11/2022 9:10 am TECHNIQUE: CT of the left tibia and fibula was performed without the administration of intravenous contrast. Multiplanar reformatted images are provided for review. Automated exposure control, iterative reconstruction, and/or weight based adjustment of the mA/kV was utilized to reduce the radiation dose to as low as reasonably achievable. COMPARISON: None. HISTORY ORDERING SYSTEM PROVIDED HISTORY: swelling/pain TECHNOLOGIST PROVIDED HISTORY: Reason for exam:->swelling/pain FINDINGS: Bones: No evidence of acute fracture or dislocation. No evidence of bone destruction. Minimal periosteal reaction involving the distal tibia and fibula can be related to chronic venous insufficiency or hyperemia. Soft Tissue: There is moderate diffuse subcutaneous edema throughout the left calf, no extension into the muscular compartment. No evidence of drainable abscess. Joint: Mild degenerative changes at the ankle. Knee joint appears normal.     1.  Moderate diffuse calf edema without drainable abscess. 2.  Mild diffuse periosteal thickening of the distal tibia and fibula can be related to venous insufficiency and chronic inflammation. No evidence of bone destruction to suggest osteomyelitis. CT FOOT LEFT WO CONTRAST    Result Date: 11/28/2022  EXAMINATION: CT OF THE LEFT FOOT WITHOUT CONTRAST 11/28/2022 11:39 am TECHNIQUE: CT of the left foot was performed without the administration of intravenous contrast.  Multiplanar reformatted images are provided for review. Automated exposure control, iterative reconstruction, and/or weight based adjustment of the mA/kV was utilized to reduce the radiation dose to as low as reasonably achievable. COMPARISON: None. HISTORY ORDERING SYSTEM PROVIDED HISTORY: eval gas TECHNOLOGIST PROVIDED HISTORY: Reason for exam:->eval gas Decision Support Exception - unselect if not a suspected or confirmed emergency medical condition->Emergency Medical Condition (MA) FINDINGS: Bones: No evidence of acute fracture or dislocation.   There are tiny radiolucencies identified in the subchondral region of the medial talar dome, interspersed with areas of slight increased sclerosis. Rule out AVN. Moderate osteo-degenerative changes are noted involving the midfoot and hindfoot, with osteophyte formation. Associated joint space narrowing is seen at the calcaneocuboid joint. Tiny subchondral cysts are identified along both sides of the calcaneocuboid joint. Subchondral cyst formation is also noted involving the lateral cuneiform bone at the level of the cuboid-cuneiform joint. Tiny subchondral cyst formation is also seen involving the head of the 1st metatarsal bone. Mild osteo-degenerative changes are present at the medial talonavicular joint. There is associated joint space narrowing. A moderate-sized plantar calcaneal bone spur is noted. Mild periosteal thickening is seen involving the distal fibula, which could be related to venous insufficiency and chronic inflammation. No other significant osseous abnormality is appreciated. Soft Tissue: Mild, diffuse subcutaneous soft tissue edema is noted involving the visualized ankle and foot. No soft tissue fluid collection/abscess or gas is appreciated. Joint: As above. 1.  Mild, diffuse subcutaneous soft tissue edema involving the visualized left ankle and foot. 2.  No evidence of soft tissue fluid collection/abscess or gas. 3.  Osteo-degenerative changes, as described above. 4.  Rule out AVN of the medial talar dome, as described above. 5.  Mild periosteal thickening involving the distal fibula, which could be related to venous insufficiency in chronic inflammation. 6.  Moderate-sized plantar calcaneal bone spur. US DUP LOWER EXTREMITY LEFT CALEB    Result Date: 11/28/2022  EXAMINATION: DUPLEX VENOUS ULTRASOUND OF THE LEFT LOWER EXTREMITY 11/28/2022 11:14 am TECHNIQUE: Duplex ultrasound using B-mode/gray scaled imaging and Doppler spectral analysis and color flow was obtained of the deep venous structures of the left lower extremity. COMPARISON: None.  HISTORY: ORDERING SYSTEM PROVIDED HISTORY: LEG SWELLING, PAIN, DVT SUSPECTED TECHNOLOGIST PROVIDED HISTORY: Reason for exam:->r/o DVT. current cellulitis What reading provider will be dictating this exam?->CRC FINDINGS: The visualized veins of the left lower extremity are patent and free of echogenic thrombus. The veins demonstrate good compressibility with normal color flow study and spectral analysis. No sonographic evidence of deep venous thrombosis in the left lower extremity. US DUP LOWER EXTREMITY LEFT CALEB    Result Date: 11/10/2022  EXAMINATION: DUPLEX VENOUS ULTRASOUND OF THE LEFT LOWER EXTREMITY 11/10/2022 6:25 am TECHNIQUE: Duplex ultrasound using B-mode/gray scaled imaging and Doppler spectral analysis and color flow was obtained of the deep venous structures of the left lower extremity. COMPARISON: 20 February 2021 HISTORY: ORDERING SYSTEM PROVIDED HISTORY: Discomfort TECHNOLOGIST PROVIDED HISTORY: Reason for exam:->Discomfort What reading provider will be dictating this exam?->CRC FINDINGS: The visualized veins of the left lower extremity are patent and free of echogenic thrombus. The veins demonstrate good compressibility with normal color flow study and spectral analysis. There is left inguinal adenopathy with the largest node measuring 4.2 x 1.2 by 2.6 cm. Consider lymph node biopsy or excision if appropriate. No evidence of DVT in the left lower extremity. Left inguinal adenopathy. Consider lymph node biopsy and or excision if appropriate. LABS  Recent Labs     11/28/22  0942   WBC 8.5   HGB 14.0   HCT 44.0   MCV 90.9        Recent Labs     11/28/22  0942      K 3.8      CO2 23   BUN 16   CREATININE 1.0   LABGLOM >60   GLUCOSE 112*   PROT 7.8   LABALBU 4.5   CALCIUM 9.6   BILITOT 0.4   ALKPHOS 95   AST 10   ALT 10     No results for input(s): PROCAL in the last 72 hours.   Lab Results   Component Value Date    CRP 11.9 (H) 11/10/2022    CRP 3.7 (H) 07/31/2021    CRP 0.4 05/07/2021     Lab Results   Component Value Date    SEDRATE 91 (H) 11/10/2022    SEDRATE 13 07/31/2021     No results found for: [id number]  Lab Results   Component Value Date/Time    ADVIRPCR Not Detected 07/31/2021 11:00 AM    BPARAPPCR Not Detected 07/31/2021 11:00 AM    BPPTXPPCR Not Detected 07/31/2021 11:00 AM    CHLPNEPCR Not Detected 07/31/2021 11:00 AM    [id number] Not Detected 07/31/2021 11:00 AM    CHWSWS2HBW Not Detected 07/31/2021 11:00 AM    KBTFA12XIG Not Detected 07/31/2021 11:00 AM    TWVXW51QFX Not Detected 07/31/2021 11:00 AM    COVID19 DETECTED 12/12/2021 02:37 PM    METAPNEPCR Not Detected 07/31/2021 11:00 AM    RHENTPCR Not Detected 07/31/2021 11:00 AM    FLUBPCR Not Detected 07/31/2021 11:00 AM    MYCPNEPCR Not Detected 07/31/2021 11:00 AM    FLKMXQV1BRA Not Detected 07/31/2021 11:00 AM    KTZUKJA4NLW Not Detected 07/31/2021 11:00 AM    GZZFYMU9CRN Not Detected 07/31/2021 11:00 AM    RLWCLRJ5LIO Not Detected 07/31/2021 11:00 AM    RSVPCR Not Detected 07/31/2021 11:00 AM        Lab Results   Component Value Date/Time    COVID19 DETECTED 12/12/2021 02:37 PM     COVID-19/SHERIF-COV2 LABS  Recent Labs     11/28/22  0942   AST 10   ALT 10     Lab Results   Component Value Date/Time    CHOL 115 11/11/2022 03:08 AM    TRIG 68 11/11/2022 03:08 AM    HDL 24 11/11/2022 03:08 AM    LDLCALC 77 11/11/2022 03:08 AM    LABVLDL 14 11/11/2022 03:08 AM         Lab Results   Component Value Date    HEPAIGM Non-Reactive 03/19/2021    HEPBIGM Non-Reactive 03/19/2021    HCVABI Non-Reactive 03/19/2021     Hep C Ab Interp   Date Value Ref Range Status   03/19/2021 Non-Reactive NON REACT Final     Comment:     Testing performed at [facility], [address], [address] [place] [address]            MICROBIOLOGY:          WOUND/ABSCESS   Date Value Ref Range Status   11/10/2022 Light growth  Final        Urine Culture, Routine   Date Value Ref Range Status   07/29/2021 Growth not present  Final   03/09/2021 <10,000 CFU/mL  Mixed gram positive organisms    Final   03/03/2021 <10,000 CFU/mL  Mixed gram positive organisms    Final     MRSA Culture Only   Date Value Ref Range Status   11/11/2022 Methicillin resistant Staph aureus not isolated  Final     No results for input(s): CDIFFTOXANT in the last 72 hours. FINAL IMPRESSION    Patient is a 46 y.o. female who presented with   Chief Complaint   Patient presents with    Cellulitis     Left lower leg, recently dx with cellulitis and was admitted x 1 week ago. and admitted for Cellulitis of left lower extremity [L03.116]  Rule out AVN of the medial talar dome  Mild periosteal thickening involving the distal fibula    -check esr/aso  Mrsa screen neg previously   Covid  Cont atbx  Mri pending  Cx pending         linezolid (ZYVOX) tablet 600 mg, 2 times per day  piperacillin-tazobactam (ZOSYN) 4,500 mg in sodium chloride 0.9 % 100 mL IVPB (Vuyc5Ger), Once   Followed by  piperacillin-tazobactam (ZOSYN) 3,375 mg in sodium chloride 0.9 % 50 mL IVPB ([id number]), Q8H  albuterol (PROVENTIL) nebulizer solution 2.5 mg, Q4H PRN  0.9% NaCl with KCl 40 mEq infusion, Continuous        Available labs, imaging studies, microbiologic studies have been reviewed. The patient/FAMILY  was educated about the diagnosis, prognosis, indications, risks and benefits of treatment. An opportunity to ask questions was given to the patient/FAMILY and questions were answered. Thank you for involving me in the care of [employer]. Please do not hesitate to call [phone]  for any questions or concerns.          Electronically signed by Jeff Figueredo MD on 11/28/2022 at 2:37 PM

## 2022-11-29 LAB
ALBUMIN SERPL-MCNC: 3.7 G/DL (ref 3.5–5.2)
ALP BLD-CCNC: 76 U/L (ref 35–104)
ALT SERPL-CCNC: 8 U/L (ref 0–32)
ANION GAP SERPL CALCULATED.3IONS-SCNC: 5 MMOL/L (ref 7–16)
AST SERPL-CCNC: 9 U/L (ref 0–31)
BASOPHILS ABSOLUTE: 0.02 E9/L (ref 0–0.2)
BASOPHILS RELATIVE PERCENT: 0.4 % (ref 0–2)
BILIRUB SERPL-MCNC: 0.2 MG/DL (ref 0–1.2)
BILIRUBIN DIRECT: <0.2 MG/DL (ref 0–0.3)
BILIRUBIN, INDIRECT: NORMAL MG/DL (ref 0–1)
BUN BLDV-MCNC: 13 MG/DL (ref 6–20)
CALCIUM SERPL-MCNC: 8.8 MG/DL (ref 8.6–10.2)
CHLORIDE BLD-SCNC: 107 MMOL/L (ref 98–107)
CO2: 28 MMOL/L (ref 22–29)
CREAT SERPL-MCNC: 1.2 MG/DL (ref 0.5–1)
EOSINOPHILS ABSOLUTE: 0.21 E9/L (ref 0.05–0.5)
EOSINOPHILS RELATIVE PERCENT: 4.6 % (ref 0–6)
GFR SERPL CREATININE-BSD FRML MDRD: 54 ML/MIN/1.73
GLUCOSE BLD-MCNC: 95 MG/DL (ref 74–99)
HCT VFR BLD CALC: 38.7 % (ref 34–48)
HEMOGLOBIN: 12.3 G/DL (ref 11.5–15.5)
IMMATURE GRANULOCYTES #: 0.02 E9/L
IMMATURE GRANULOCYTES %: 0.4 % (ref 0–5)
LYMPHOCYTES ABSOLUTE: 1.55 E9/L (ref 1.5–4)
LYMPHOCYTES RELATIVE PERCENT: 33.9 % (ref 20–42)
MAGNESIUM: 2.1 MG/DL (ref 1.6–2.6)
MCH RBC QN AUTO: 29.6 PG (ref 26–35)
MCHC RBC AUTO-ENTMCNC: 31.8 % (ref 32–34.5)
MCV RBC AUTO: 93.3 FL (ref 80–99.9)
MONOCYTES ABSOLUTE: 0.41 E9/L (ref 0.1–0.95)
MONOCYTES RELATIVE PERCENT: 9 % (ref 2–12)
NEUTROPHILS ABSOLUTE: 2.36 E9/L (ref 1.8–7.3)
NEUTROPHILS RELATIVE PERCENT: 51.7 % (ref 43–80)
PDW BLD-RTO: 14.9 FL (ref 11.5–15)
PHOSPHORUS: 3.6 MG/DL (ref 2.5–4.5)
PLATELET # BLD: 251 E9/L (ref 130–450)
PMV BLD AUTO: 9.4 FL (ref 7–12)
POTASSIUM SERPL-SCNC: 5.2 MMOL/L (ref 3.5–5)
RBC # BLD: 4.15 E12/L (ref 3.5–5.5)
SODIUM BLD-SCNC: 140 MMOL/L (ref 132–146)
TOTAL PROTEIN: 6.5 G/DL (ref 6.4–8.3)
WBC # BLD: 4.6 E9/L (ref 4.5–11.5)

## 2022-11-29 PROCEDURE — 84100 ASSAY OF PHOSPHORUS: CPT

## 2022-11-29 PROCEDURE — 97161 PT EVAL LOW COMPLEX 20 MIN: CPT | Performed by: PHYSICAL THERAPIST

## 2022-11-29 PROCEDURE — 6370000000 HC RX 637 (ALT 250 FOR IP): Performed by: NURSE PRACTITIONER

## 2022-11-29 PROCEDURE — 2580000003 HC RX 258: Performed by: INTERNAL MEDICINE

## 2022-11-29 PROCEDURE — 85025 COMPLETE CBC W/AUTO DIFF WBC: CPT

## 2022-11-29 PROCEDURE — 80076 HEPATIC FUNCTION PANEL: CPT

## 2022-11-29 PROCEDURE — 80048 BASIC METABOLIC PNL TOTAL CA: CPT

## 2022-11-29 PROCEDURE — 1200000000 HC SEMI PRIVATE

## 2022-11-29 PROCEDURE — 6370000000 HC RX 637 (ALT 250 FOR IP): Performed by: INTERNAL MEDICINE

## 2022-11-29 PROCEDURE — 6360000002 HC RX W HCPCS: Performed by: INTERNAL MEDICINE

## 2022-11-29 PROCEDURE — 36415 COLL VENOUS BLD VENIPUNCTURE: CPT

## 2022-11-29 PROCEDURE — 83735 ASSAY OF MAGNESIUM: CPT

## 2022-11-29 RX ORDER — FAMOTIDINE 10 MG/ML
20 INJECTION, SOLUTION INTRAVENOUS ONCE
Status: CANCELLED | OUTPATIENT
Start: 2022-11-29 | End: 2022-11-29

## 2022-11-29 RX ORDER — ACETAMINOPHEN 500 MG
1000 TABLET ORAL ONCE
Status: CANCELLED | OUTPATIENT
Start: 2022-11-29 | End: 2022-11-29

## 2022-11-29 RX ORDER — SODIUM CHLORIDE 9 MG/ML
INJECTION, SOLUTION INTRAVENOUS CONTINUOUS
Status: DISCONTINUED | OUTPATIENT
Start: 2022-11-29 | End: 2022-12-02 | Stop reason: HOSPADM

## 2022-11-29 RX ORDER — OXYCODONE HYDROCHLORIDE AND ACETAMINOPHEN 5; 325 MG/1; MG/1
2 TABLET ORAL EVERY 4 HOURS PRN
Status: DISCONTINUED | OUTPATIENT
Start: 2022-11-29 | End: 2022-12-02 | Stop reason: HOSPADM

## 2022-11-29 RX ORDER — METOCLOPRAMIDE HYDROCHLORIDE 5 MG/ML
10 INJECTION INTRAMUSCULAR; INTRAVENOUS ONCE
Status: CANCELLED | OUTPATIENT
Start: 2022-11-29 | End: 2022-11-29

## 2022-11-29 RX ADMIN — LEVETIRACETAM 500 MG: 500 TABLET, FILM COATED ORAL at 20:08

## 2022-11-29 RX ADMIN — ENOXAPARIN SODIUM 40 MG: 100 INJECTION SUBCUTANEOUS at 12:16

## 2022-11-29 RX ADMIN — OXYCODONE AND ACETAMINOPHEN 1 TABLET: 5; 325 TABLET ORAL at 11:47

## 2022-11-29 RX ADMIN — PIPERACILLIN SODIUM AND TAZOBACTAM SODIUM 3375 MG: 3; 375 INJECTION, POWDER, FOR SOLUTION INTRAVENOUS at 20:20

## 2022-11-29 RX ADMIN — SODIUM CHLORIDE: 9 INJECTION, SOLUTION INTRAVENOUS at 12:20

## 2022-11-29 RX ADMIN — SODIUM CHLORIDE, PRESERVATIVE FREE 10 ML: 5 INJECTION INTRAVENOUS at 12:16

## 2022-11-29 RX ADMIN — HYDROXYZINE HYDROCHLORIDE 50 MG: 25 TABLET ORAL at 20:13

## 2022-11-29 RX ADMIN — LINEZOLID 600 MG: 600 TABLET, FILM COATED ORAL at 12:15

## 2022-11-29 RX ADMIN — LINEZOLID 600 MG: 600 TABLET, FILM COATED ORAL at 20:13

## 2022-11-29 RX ADMIN — PIPERACILLIN SODIUM AND TAZOBACTAM SODIUM 3375 MG: 3; 375 INJECTION, POWDER, FOR SOLUTION INTRAVENOUS at 12:38

## 2022-11-29 RX ADMIN — OXYCODONE AND ACETAMINOPHEN 2 TABLET: 5; 325 TABLET ORAL at 20:07

## 2022-11-29 RX ADMIN — OXYCODONE AND ACETAMINOPHEN 1 TABLET: 5; 325 TABLET ORAL at 16:51

## 2022-11-29 RX ADMIN — SODIUM CHLORIDE, PRESERVATIVE FREE 10 ML: 5 INJECTION INTRAVENOUS at 20:13

## 2022-11-29 RX ADMIN — LEVOTHYROXINE SODIUM 50 MCG: 0.05 TABLET ORAL at 12:16

## 2022-11-29 RX ADMIN — PIPERACILLIN SODIUM AND TAZOBACTAM SODIUM 3375 MG: 3; 375 INJECTION, POWDER, FOR SOLUTION INTRAVENOUS at 06:39

## 2022-11-29 RX ADMIN — LEVETIRACETAM 500 MG: 500 TABLET, FILM COATED ORAL at 12:15

## 2022-11-29 ASSESSMENT — PAIN SCALES - GENERAL
PAINLEVEL_OUTOF10: 8
PAINLEVEL_OUTOF10: 9
PAINLEVEL_OUTOF10: 8
PAINLEVEL_OUTOF10: 9

## 2022-11-29 ASSESSMENT — PAIN SCALES - WONG BAKER: WONGBAKER_NUMERICALRESPONSE: 0

## 2022-11-29 ASSESSMENT — PAIN DESCRIPTION - PAIN TYPE: TYPE: ACUTE PAIN

## 2022-11-29 NOTE — PROGRESS NOTES
NAME: Jesús Knowles  MR:  [id number]  :   1970  Admit Date:  2022      This is a face to face encounter with Jesús Knowles 46 y.o. female on 22  Elements of this note, including Diagnosis,  Interval History, Past Medical/Surgical/Family/Social Histories, ROS, physical exam, and Assessment and Plan were copied and pasted from Previous. Updates have been made where noted and reflect current exam and medical decision making from the DOS of this encounter. CHIEF COMPLAINT     ID following for   Chief Complaint   Patient presents with    Cellulitis     Left lower leg, recently dx with cellulitis and was admitted x 1 week ago. HISTORY OF PRESENT ILLNESS     Jesús Knowles is a 46 y.o. female who presents with   Chief Complaint   Patient presents with    Cellulitis     Left lower leg, recently dx with cellulitis and was admitted x 1 week ago. and has  has a past medical history of Arthritis, Brain venous angioma (HCC), Bursitis, Cat scratch of left lower leg, Class 3 severe obesity due to excess calories with body mass index (BMI) of 40.0 to 44.9 in [facility]), COPD (chronic obstructive pulmonary disease) ([facility] [address] Diverticulitis, GERD (gastroesophageal reflux disease), Incisional hernia with obstruction but no gangrene, and Strep throat. Assessment & Plan   Cellulitis of left lower extremity [L03.116]  nondisplaced osteochondral fracture medial talar dome. Looks better less red has some edema   Cont atbx      linezolid (ZYVOX) tablet 600 mg, 2 times per day  piperacillin-tazobactam (ZOSYN) 3,375 mg in sodium chloride 0.9 % 50 mL IVPB ([id number]), Q8H             Pt seen and examined  Seen in ER asleep on her side has no c/o f/c  Has pain   Patient is tolerating medications. No reported adverse drug reactions.   PHYSICAL EXAM     /70   Pulse 62   Temp 98.4 °F (36.9 °C) (Oral)   Resp 20   Ht 5' 2\" (1.575 m)   Wt 206 lb (93.4 kg)   SpO2 94%   BMI 37.68 kg/m²   Temp  Av.1 °F (36.7 °C)  Min: 97.8 °F (36.6 °C)  Max: 98.4 °F (36.9 °C)  CONSTITUTIONAL:  no apparent distress, and appears stated age  ENT:  Normocephalic, atraumatic,     LUNGS:  No increased work of breathing, clear to auscultation bilaterally, no crackles or wheezing  CARDIOVASCULAR:  Normal apical impulse, regular rate and rhythm, normal S1 and S2,  no murmur noted  ABDOMEN:  normal bowel sounds, soft, non-distended, non-tender  MUSCULOSKELETAL:left le dec erythema  dec  range of motion     NEUROLOGIC:  Awake, alert, oriented. Cranial nerves II-XII are grossly intact.      SKIN:  normal skin color, texture, turgor and no rashes  Lines:          Peripheral Intravenous Line:  Peripheral IV 11/28/22 Left Antecubital (Active)   Site Assessment Clean, dry & intact 11/29/22 1158   Line Status Normal saline locked 11/29/22 1158   Phlebitis Assessment No symptoms 11/28/22 1513   Infiltration Assessment 0 11/28/22 1513   Dressing Status Clean, dry & intact 11/29/22 1158   Dressing Type Transparent 11/28/22 1513      Microbiology:   Recent Labs     11/28/22  1743   COVID19 Not Detected     Lab Results   Component Value Date/Time    BC 5 Days no growth 11/10/2022 05:54 AM    BC 5 Days no growth 07/30/2021 05:49 AM    BC 5 Days no growth 07/01/2021 05:10 PM    BLOODCULT2 5 Days no growth 11/10/2022 05:54 AM    BLOODCULT2 5 Days no growth 07/30/2021 05:49 AM    BLOODCULT2 5 Days no growth 07/01/2021 05:20 PM    ORG Staphylococcus aureus 11/10/2022 08:20 PM     WOUND/ABSCESS   Date Value Ref Range Status   11/10/2022 Light growth  Final        LABS:  MRSA Culture Only   Date Value Ref Range Status   11/11/2022 Methicillin resistant Staph aureus not isolated  Final        Recent Labs     11/28/22  0942 11/29/22  0646   WBC 8.5 4.6   RBC 4.84 4.15   HGB 14.0 12.3   HCT 44.0 38.7   MCV 90.9 93.3   MCH 28.9 29.6   MCHC 31.8* 31.8*   RDW 14.6 14.9    251   MPV 9.3 9.4     Recent Labs     11/28/22  0942 11/29/22  0646    140   K 3.8 5.2*  107   CO2 23 28   BUN 16 13   CREATININE 1.0 1.2*   GLUCOSE 112* 95   PROT 7.8 6.5   LABALBU 4.5 3.7   CALCIUM 9.6 8.8   BILITOT 0.4 0.2   ALKPHOS 95 76   AST 10 9   ALT 10 8     Lab Results   Component Value Date    CRP 0.6 (H) 11/28/2022    CRP 11.9 (H) 11/10/2022    CRP 3.7 (H) 07/31/2021     Lab Results   Component Value Date    SEDRATE 31 (H) 11/28/2022    SEDRATE 91 (H) 11/10/2022    SEDRATE 13 07/31/2021     Recent Labs     11/28/22  0942 11/29/22  0646   CRP 0.6*  --    PROCAL 0.06  --    AST 10 9   ALT 10 8     Lab Results   Component Value Date/Time    CHOL 115 11/11/2022 03:08 AM    TRIG 68 11/11/2022 03:08 AM    HDL 24 11/11/2022 03:08 AM    LDLCALC 77 11/11/2022 03:08 AM    LABVLDL 14 11/11/2022 03:08 AM     Lab Results   Component Value Date/Time    VITD25 29 (L) 04/26/2021 04:37 AM       REVIEW OF SYSTEMS     As stated above in the chief complaint, otherwise negative.   CURRENT MEDICATIONS     Current Facility-Administered Medications:     0.9 % sodium chloride infusion, , IntraVENous, Continuous, Radha Rushing DO    levETIRAcetam (KEPPRA) tablet 500 mg, 500 mg, Oral, BID, Radha Rushing DO, 500 mg at 11/28/22 2141    levothyroxine (SYNTHROID) tablet 50 mcg, 50 mcg, Oral, Daily, Radha Rushing DO, 50 mcg at 11/28/22 1525    hydrOXYzine HCl (ATARAX) tablet 50 mg, 50 mg, Oral, Nightly, Radha Rushing DO, 50 mg at 11/28/22 2141    magnesium sulfate 1000 mg in dextrose 5% 100 mL IVPB, 1,000 mg, IntraVENous, PRN, Radha Rushing DO    sodium phosphate 14.79 mmol in sodium chloride 0.9 % 250 mL IVPB, 0.16 mmol/kg, IntraVENous, PRN **OR** sodium phosphate 29.61 mmol in sodium chloride 0.9 % 250 mL IVPB, 0.32 mmol/kg, IntraVENous, PRN, Radha Rushing,     potassium chloride (KLOR-CON M) extended release tablet 40 mEq, 40 mEq, Oral, PRN **OR** potassium bicarb-citric acid (EFFER-K) effervescent tablet 40 mEq, 40 mEq, Oral, PRN **OR** potassium chloride 10 mEq/100 mL IVPB (Peripheral Line), 10 mEq, IntraVENous, PRN, Langelene Sandifer, DO    sodium chloride flush 0.9 % injection 5-40 mL, 5-40 mL, IntraVENous, 2 times per day, Mabelene Sandifer, DO, 10 mL at 11/28/22 2144    sodium chloride flush 0.9 % injection 5-40 mL, 5-40 mL, IntraVENous, PRN, Lollye Sandifer, DO    0.9 % sodium chloride infusion, , IntraVENous, PRN, Lollye Sandifer, DO    enoxaparin (LOVENOX) injection 40 mg, 40 mg, SubCUTAneous, Daily, Langelene Sandifer, DO, 40 mg at 11/28/22 1525    ondansetron (ZOFRAN-ODT) disintegrating tablet 4 mg, 4 mg, Oral, Q8H PRN **OR** ondansetron (ZOFRAN) injection 4 mg, 4 mg, IntraVENous, Q6H PRN, Lollye Sandifer, DO    polyethylene glycol (GLYCOLAX) packet 17 g, 17 g, Oral, Daily PRN, Lollye Sandifer, DO    acetaminophen (TYLENOL) tablet 650 mg, 650 mg, Oral, Q6H PRN **OR** acetaminophen (TYLENOL) suppository 650 mg, 650 mg, Rectal, Q6H PRN, Lollye Sandifer, DO    linezolid (ZYVOX) tablet 600 mg, 600 mg, Oral, 2 times per day, Langelene Sandifer, DO, 600 mg at 11/28/22 2307    [COMPLETED] piperacillin-tazobactam (ZOSYN) 4,500 mg in sodium chloride 0.9 % 100 mL IVPB ([id number]), 4,500 mg, IntraVENous, Once, Stopped at 11/28/22 1611 **FOLLOWED BY** piperacillin-tazobactam (ZOSYN) 3,375 mg in sodium chloride 0.9 % 50 mL IVPB ([id number]), 3,375 mg, IntraVENous, Q8H, Mabelene Sandifer, DO, Stopped at 11/29/22 1048    albuterol (PROVENTIL) nebulizer solution 2.5 mg, 2.5 mg, Nebulization, Q4H PRN, Thong Snipe, APRN - CNP    oxyCODONE-acetaminophen (PERCOCET) 5-325 MG per tablet 1 tablet, 1 tablet, Oral, Q4H PRN, Thong Snipe, APRN - CNP, 1 tablet at 11/29/22 1147  DIAGNOSTIC RESULTS   Radiology:  CT TIBIA FIBULA LEFT WO CONTRAST    Result Date: 11/28/2022  EXAMINATION: CT OF THE LEFT TIBIA AND FIBULA WITHOUT CONTRAST 11/28/2022 11:39 am TECHNIQUE: CT of the left tibia and fibula was performed without the administration of intravenous contrast.  Multiplanar reformatted images are provided for review.  Automated exposure control, iterative reconstruction, and/or weight based adjustment of the mA/kV was utilized to reduce the radiation dose to as low as reasonably achievable. COMPARISON: The previous study performed 11/11/2022. HISTORY ORDERING SYSTEM PROVIDED HISTORY: eval gas TECHNOLOGIST PROVIDED HISTORY: Reason for exam:->eval gas Decision Support Exception - unselect if not a suspected or confirmed emergency medical condition->Emergency Medical Condition (MA) FINDINGS: Bones: No evidence of acute fracture or dislocation. No aggressive appearing osseous abnormality. There is again minimal periosteal reaction involving the distal tibia and fibula and can be attributable to chronic venous insufficiency or hyperemia. Soft Tissue: There has been an interval decrease in the subcutaneous soft tissue edema throughout the visualized lower extremity. Better identified on this study is a small popliteal cyst.  It measures 3.2 x 2.4 x 1.4 cm in size. There is no evidence of soft tissue fluid collection/abscess or gas. Joint: No significant degenerative changes. No osseous erosions. 1.  Interval decrease in the subcutaneous soft tissue edema throughout the visualized left lower extremity, when compared to the previous study performed 11/11/2022. 2.  Better identified on this study is a small popliteal cyst. 3.  No evidence of soft tissue fluid collection/abscess or gas. CT TIBIA FIBULA LEFT WO CONTRAST    Result Date: 11/11/2022  EXAMINATION: CT OF THE LEFT TIBIA AND FIBULA WITHOUT CONTRAST 11/11/2022 9:10 am TECHNIQUE: CT of the left tibia and fibula was performed without the administration of intravenous contrast.  Multiplanar reformatted images are provided for review. Automated exposure control, iterative reconstruction, and/or weight based adjustment of the mA/kV was utilized to reduce the radiation dose to as low as reasonably achievable. COMPARISON: None.  HISTORY ORDERING SYSTEM PROVIDED HISTORY: swelling/pain TECHNOLOGIST PROVIDED HISTORY: Reason for exam:->swelling/pain FINDINGS: Bones: No evidence of acute fracture or dislocation. No evidence of bone destruction. Minimal periosteal reaction involving the distal tibia and fibula can be related to chronic venous insufficiency or hyperemia. Soft Tissue: There is moderate diffuse subcutaneous edema throughout the left calf, no extension into the muscular compartment. No evidence of drainable abscess. Joint: Mild degenerative changes at the ankle. Knee joint appears normal.     1.  Moderate diffuse calf edema without drainable abscess. 2.  Mild diffuse periosteal thickening of the distal tibia and fibula can be related to venous insufficiency and chronic inflammation. No evidence of bone destruction to suggest osteomyelitis. CT FOOT LEFT WO CONTRAST    Result Date: 11/28/2022  EXAMINATION: CT OF THE LEFT FOOT WITHOUT CONTRAST 11/28/2022 11:39 am TECHNIQUE: CT of the left foot was performed without the administration of intravenous contrast.  Multiplanar reformatted images are provided for review. Automated exposure control, iterative reconstruction, and/or weight based adjustment of the mA/kV was utilized to reduce the radiation dose to as low as reasonably achievable. COMPARISON: None. HISTORY ORDERING SYSTEM PROVIDED HISTORY: eval gas TECHNOLOGIST PROVIDED HISTORY: Reason for exam:->eval gas Decision Support Exception - unselect if not a suspected or confirmed emergency medical condition->Emergency Medical Condition (MA) FINDINGS: Bones: No evidence of acute fracture or dislocation. There are tiny radiolucencies identified in the subchondral region of the medial talar dome, interspersed with areas of slight increased sclerosis. Rule out AVN. Moderate osteo-degenerative changes are noted involving the midfoot and hindfoot, with osteophyte formation. Associated joint space narrowing is seen at the calcaneocuboid joint. Tiny subchondral cysts are identified along both sides of the calcaneocuboid joint.   Subchondral cyst formation is also noted involving the lateral cuneiform bone at the level of the cuboid-cuneiform joint. Tiny subchondral cyst formation is also seen involving the head of the 1st metatarsal bone. Mild osteo-degenerative changes are present at the medial talonavicular joint. There is associated joint space narrowing. A moderate-sized plantar calcaneal bone spur is noted. Mild periosteal thickening is seen involving the distal fibula, which could be related to venous insufficiency and chronic inflammation. No other significant osseous abnormality is appreciated. Soft Tissue: Mild, diffuse subcutaneous soft tissue edema is noted involving the visualized ankle and foot. No soft tissue fluid collection/abscess or gas is appreciated. Joint: As above. 1.  Mild, diffuse subcutaneous soft tissue edema involving the visualized left ankle and foot. 2.  No evidence of soft tissue fluid collection/abscess or gas. 3.  Osteo-degenerative changes, as described above. 4.  Rule out AVN of the medial talar dome, as described above. 5.  Mild periosteal thickening involving the distal fibula, which could be related to venous insufficiency in chronic inflammation. 6.  Moderate-sized plantar calcaneal bone spur. MRI ANKLE LEFT W WO CONTRAST    Result Date: 11/28/2022  EXAMINATION: MRI OF THE LEFT ANKLE WITHOUT AND WITH CONTRAST, 11/28/2022 4:26 pm TECHNIQUE: Multiplanar multisequence MRI of the left ankle was performed without and with the administration of intravenous contrast. COMPARISON: None.  HISTORY: ORDERING SYSTEM PROVIDED HISTORY: Bony changes in particular overlying the talar dome with recurrent cellulitis and concern for osteomyelitis TECHNOLOGIST PROVIDED HISTORY: Bony changes in particular overlying the talar dome with recurrent cellulitis and concern for osteomyelitis Reason for exam:->Bony changes in particular overlying the talar dome with recurrent cellulitis and concern for osteomyelitis FINDINGS: SYNDESMOTIC LIGAMENTS: Intact anterior inferior tibiofibular ligament and posterior inferior tibiofibular ligament. No significant periligamentous edema or interval widening. LATERAL COLLATERAL LIGAMENT COMPLEX: Intact anterior talofibular ligament, posterior talofibular ligament and calcaneofibular ligament. DELTOID LIGAMENT COMPLEX: Intact superficial and deep components. SINUS TARSI AND SPRING LIGAMENT: Visualized portions of the spring ligament are intact including the tibiospring ligament. Normal appearance of the sinus tarsi. MEDIAL TENDONS: Intact posterior tibial tendon, flexor digitorum and flexor hallucis longus tendons. LATERAL TENDONS: Intact peroneus brevis and longus tendons. ANTERIOR TENDONS: The tibialis anterior, extensor hallucis longus and extensor digitorum longus tendons are normal in position, morphology and signal. ACHILLES TENDON: The Achilles tendon is normal in position, morphology and signal.  No associated bursitis. PLANTAR FASCIA: The medial and lateral bundles of the plantar fascia are normal in morphology and signal. There is no evidence of acute plantar fasciitis or tear. No evidence of plantar fascial nodules. TARSAL TUNNEL: There are no obstructing lesions within the tarsal tunnel. BONE MARROW: 5 mm nondisplaced osteochondral fracture medial talar dome. No osseous erosion to suggest osteomyelitis. JOINT SPACES: Chronic pes planus. Question chronic calcaneo navicular coalition. Focal severe degenerative change of the calcaneocuboid joint with osseous edema on both sides of the joint. 1. No evidence for osteomyelitis. 2. 5 mm nondisplaced osteochondral fracture medial talar dome. 3. 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 4. Ankle ligaments and tendons appear intact. RECOMMENDATIONS: Careful clinical correlation and follow up recommended.      US DUP LOWER EXTREMITY LEFT CALEB    Result Date: 11/28/2022  EXAMINATION: DUPLEX VENOUS ULTRASOUND OF THE LEFT LOWER EXTREMITY 11/28/2022 11:14 am TECHNIQUE: Duplex ultrasound using B-mode/gray scaled imaging and Doppler spectral analysis and color flow was obtained of the deep venous structures of the left lower extremity. COMPARISON: None. HISTORY: ORDERING SYSTEM PROVIDED HISTORY: LEG SWELLING, PAIN, DVT SUSPECTED TECHNOLOGIST PROVIDED HISTORY: Reason for exam:->r/o DVT. current cellulitis What reading provider will be dictating this exam?->CRC FINDINGS: The visualized veins of the left lower extremity are patent and free of echogenic thrombus. The veins demonstrate good compressibility with normal color flow study and spectral analysis. No sonographic evidence of deep venous thrombosis in the left lower extremity. US DUP LOWER EXTREMITY LEFT CALEB    Result Date: 11/10/2022  EXAMINATION: DUPLEX VENOUS ULTRASOUND OF THE LEFT LOWER EXTREMITY 11/10/2022 6:25 am TECHNIQUE: Duplex ultrasound using B-mode/gray scaled imaging and Doppler spectral analysis and color flow was obtained of the deep venous structures of the left lower extremity. COMPARISON: 20 February 2021 HISTORY: ORDERING SYSTEM PROVIDED HISTORY: Discomfort TECHNOLOGIST PROVIDED HISTORY: Reason for exam:->Discomfort What reading provider will be dictating this exam?->CRC FINDINGS: The visualized veins of the left lower extremity are patent and free of echogenic thrombus. The veins demonstrate good compressibility with normal color flow study and spectral analysis. There is left inguinal adenopathy with the largest node measuring 4.2 x 1.2 by 2.6 cm. Consider lymph node biopsy or excision if appropriate. No evidence of DVT in the left lower extremity. Left inguinal adenopathy. Consider lymph node biopsy and or excision if appropriate. Imaging and labs were reviewed per medical records. POC was discussed with Hermila Hancock. An opportunity to ask questions was given to the patient/FAMILY.   Thank you for involving me in the care of Maurice Chong I will continue to follow. Please do not hesitate to call [phone] for any questions or concerns.     Electronically signed by Patrice Castle MD on 11/29/2022 at 12:06 PM

## 2022-11-29 NOTE — PROGRESS NOTES
Podiatry Progress Note  2022   Sherryle Pence     -Planned procedure: Left leg incision and drainage, Left ankle arthroscopic treatment of talar OCD. Scheduled 2022  - NPO after midnight tomorrow    SUBJECTIVE: Sherryle Pence is a 46 y.o. nondiabetic female seen bedside for follow-up of left ankle talar OCD and left leg cellulitis and possible deep abscess. CT and MRI are negative for any acute findings however the leg is relatively unchanged in erythema and edema. We will plan for procedure as stated above. Denies any acute events overnight or new constitutional symptoms morning.      Past Medical History:   Diagnosis Date    Arthritis     Brain venous angioma (HCC)     Bursitis     hips    Cat scratch of left lower leg     Class 3 severe obesity due to excess calories with body mass index (BMI) of 40.0 to 44.9 in [facility])     COPD (chronic obstructive pulmonary disease) (HCC)     Diverticulitis     GERD (gastroesophageal reflux disease)     Incisional hernia with obstruction but no gangrene     Strep throat         Past Surgical History:   Procedure Laterality Date     SECTION      CHOLECYSTECTOMY      COLECTOMY  2016    FOREARM FRACTURE SURGERY Left     fracture of ulna s/p repair    HERNIA REPAIR      HERNIA REPAIR  2015    incarcerated hernia repair    HERNIA REPAIR N/A 3/25/2021    INCISIONAL HERNIA REPAIR WITH MESH, POSSIBLE COMPONENT SEPARATION  LAPAROSCOPIC ROBOTIC XI ASSISTED (CPT E2719841) performed by Omer Shook MD at [facility] (CERVIX STATUS UNKNOWN)      NERVE BLOCK      sacroiliac bilateral 2012    PELVIC FRACTURE SURGERY      VENTRAL HERNIA REPAIR  2015         Family History   Problem Relation Age of Onset    Osteoarthritis Other     Diabetes Other     Other Father         pancreatitis    Prostate Cancer Father     Stroke Brother         half brother    Cancer Paternal Grandfather         Social History     Tobacco Use Smoking status: Former     Packs/day: 1.00     Years: 30.00     Pack years: 30.00     Types: Cigarettes     Quit date: 3/5/2021     Years since quittin.7    Smokeless tobacco: Never   Substance Use Topics    Alcohol use: Yes     Comment: daily vodka drinker        Prior to Admission medications    Medication Sig Start Date End Date Taking? Authorizing Provider   levothyroxine (SYNTHROID) 50 MCG tablet Take 1 tablet by mouth Daily 11/15/22   Ana Cristina Odor, DO   levETIRAcetam (KEPPRA) 500 MG tablet Take 1 tablet by mouth 2 times daily 21   Ana Cristina Odor, DO   hydrOXYzine (ATARAX) 50 MG tablet Take 50 mg by mouth nightly  21   Historical Provider, MD        Ibuprofen, Nsaids, and Morphine         OBJECTIVE:        Vitals:    22 1152   BP: 120/70   Pulse: 62   Resp: 20   Temp: 98.4 °F (36.9 °C)   SpO2: 94%            EXAM:        Pt is AAOx3, NAD    Vascular Exam: DP and PT pulses bilaterally strong palpable, brisk capillary fill time to all pedal digits bilaterally. Induration, erythema, nonpitting edema left lower leg, ankle. +1 pitting edema right lower extremity. Positive pedal hair growth right lower extremity. Neuro Exam: Intact protective sensation bilaterally     Dermatologic Exam:    -Diffuse erythema to left lower extremity from proximal lower leg extending distally just distal to the ankle joint. Multiple well adhered scabs, no active drainage, no malodor or crepitus, no fluctuance, no open wounds. Positive induration left posterior calf, positive Homans' sign, negative DVT on venous duplex  -Multiple hyperkeratotic lesions subfirst bilateral MPJ, MPJ, inferior heel  -Remaining skin is well-hydrated intact     MSK: Muscle strength 5/5 Bilateral.  Painful range of motion left ankle dorsiflexion and plantarflexion. Intact eversion inversion. Positive Hubscher maneuver. Pes planus on weightbearing.   Indurated, erythematous, edematous, painful calf squeeze left posterior calf, negative DVT on venous duplex    Current Facility-Administered Medications   Medication Dose Route Frequency Provider Last Rate Last Admin    0.9 % sodium chloride infusion   IntraVENous Continuous Rosalva Gills, DO 60 mL/hr at 11/29/22 1220 New Bag at 11/29/22 1220    levETIRAcetam (KEPPRA) tablet 500 mg  500 mg Oral BID Rosalva Gills, DO   500 mg at 11/29/22 1215    levothyroxine (SYNTHROID) tablet 50 mcg  50 mcg Oral Daily Rosalva Gills, DO   50 mcg at 11/29/22 1216    hydrOXYzine HCl (ATARAX) tablet 50 mg  50 mg Oral Nightly Rosalva Gills, DO   50 mg at 11/28/22 2141    magnesium sulfate 1000 mg in dextrose 5% 100 mL IVPB  1,000 mg IntraVENous PRN Rosalva Gills, DO        sodium phosphate 14.79 mmol in sodium chloride 0.9 % 250 mL IVPB  0.16 mmol/kg IntraVENous PRN Rosalva Gills, DO        Or    sodium phosphate 29.61 mmol in sodium chloride 0.9 % 250 mL IVPB  0.32 mmol/kg IntraVENous PRN Rosalva Gills, DO        potassium chloride (KLOR-CON M) extended release tablet 40 mEq  40 mEq Oral PRN Rosalva Gills, DO        Or    potassium bicarb-citric acid (EFFER-K) effervescent tablet 40 mEq  40 mEq Oral PRN Rosalva Gills, DO        Or    potassium chloride 10 mEq/100 mL IVPB (Peripheral Line)  10 mEq IntraVENous PRN Rosalva Gills, DO        sodium chloride flush 0.9 % injection 5-40 mL  5-40 mL IntraVENous 2 times per day Rosalva Gills, DO   10 mL at 11/29/22 1216    sodium chloride flush 0.9 % injection 5-40 mL  5-40 mL IntraVENous PRN Rosalva Gills, DO        0.9 % sodium chloride infusion   IntraVENous PRN Rosalva Gills, DO        enoxaparin (LOVENOX) injection 40 mg  40 mg SubCUTAneous Daily Rosalva Gills, DO   40 mg at 11/29/22 1216    ondansetron (ZOFRAN-ODT) disintegrating tablet 4 mg  4 mg Oral Q8H PRN Rosalva Gills, DO        Or    ondansetron [facility]) injection 4 mg  4 mg IntraVENous Q6H PRN Rosalva Gills, DO        polyethylene glycol (GLYCOLAX) packet 17 g  17 g Oral Daily PRN Rosalva Howe DO        acetaminophen (TYLENOL) tablet 650 mg  650 mg Oral Q6H PRN Comfort Villalta, DO        Or    acetaminophen (TYLENOL) suppository 650 mg  650 mg Rectal Q6H PRN Comfort Villalta, DO        linezolid (ZYVOX) tablet 600 mg  600 mg Oral 2 times per day Comfort Villalta, DO   600 mg at 11/29/22 1215    piperacillin-tazobactam (ZOSYN) 3,375 mg in sodium chloride 0.9 % 50 mL IVPB ([id number])  3,375 mg IntraVENous Q8H Comfort Villalta, DO 12.5 mL/hr at 11/29/22 1238 3,375 mg at 11/29/22 1238    albuterol (PROVENTIL) nebulizer solution 2.5 mg  2.5 mg Nebulization Q4H PRN Delwyn Slider, APRN - CNP        oxyCODONE-acetaminophen (PERCOCET) 5-325 MG per tablet 1 tablet  1 tablet Oral Q4H PRN Delwyn Slider, APRN - CNP   1 tablet at 11/29/22 1147        Lab Results   Component Value Date    WBC 4.6 11/29/2022    HCT 38.7 11/29/2022    HGB 12.3 11/29/2022     11/29/2022     11/29/2022    K 5.2 (H) 11/29/2022     11/29/2022    CO2 28 11/29/2022    BUN 13 11/29/2022    CREATININE 1.2 (H) 11/29/2022    GLUCOSE 95 11/29/2022    CRP 0.6 (H) 11/28/2022         ASSESSMENT:  - Recalcitrant Cellulitis left lower leg secondary to cat scratch, suspected abscess, POA, infected   - Talar dome Osteochondritis dessicans, >5mm  -Venous stasis insufficiency bilateral lower extremity  -Morbid obesity    PLAN:  - Examined and evaluated  - All labs, imaging, and charts reviewed   - WBC: 4.6, CRP 0.6, ESR 31  - CT Left foot and tib/fib 11/28/2022: Mild diffuse left foot and lower leg subcutaneous soft tissue edema no evidence of abscess or gas, small radiolucencies to medial talar dome subchondrally with slight increase in sclerosis, rule out ATN. Subchondral cyst formation lateral CC joint.   Mild periosteal thickening distal fibula  - MRI left ankle 11/28/2022: 5 mm nondisplaced osteochondral fracture medial talar dome  -We will obtain intraoperative cultures 11/30/2022  -Wound culture 11/10/2022: MSSA  - ABX: Zosyn/Zyvox, ID following  -Venous duplex 11/28/2022: Negative for DVT  -Planned procedure: Left leg incision and drainage, Left ankle arthroscopic treatment of talar OCD. Scheduled 8am tomorrow 12/1/2022  - NPO after midnight tomorrow  -No dressings  -WBAT bilateral lower extremity for now, NWB LLE after tomorrow    D/W: Sandee Velasco DPM FACFAS  Fellowship-Trained Foot and Ankle Surgeon  Diplomate, American Board of Foot and Ankle Surgeons  [phone]       Thank you for involving podiatry in this patients care. Please do not hesitate to call with any questions or concerns.      Aruna Arevalo Podiatry PGY3  11/29/2022   1:00 PM

## 2022-11-29 NOTE — ED NOTES
Pt called out wanting more for pain. Pt was offered tylenol as that is the only PRN order in admission orders.  Pt refused tylenol     Lianne Nelson RN  11/28/22 1932

## 2022-11-29 NOTE — CARE COORDINATION
Ss note:11/29/20222:56 PM Neg covid on 11-28-22. Met with pt, resides with her 15 yr old son whom is currently being cared for by her friend Manan Rivera. Pt is on room air. ID has been consulted, pt is currently on IV Zozyn Q8 and oral Zyvox, pt has a peripheral line. PCP is Dr. Josy Joya. No hx of HHC or SNF. Uses [facility]. Plan is home, will follow for abx needs at discharge. Pt has [employer] insurance.  MOON Briceño

## 2022-11-29 NOTE — PROGRESS NOTES
Physical Therapy    Physical Therapy Initial Evaluation/Plan of Care    Room #:  8396/1615-73  Patient Name: Shanti Mathur  YOB: 1970  MRN: [id number]    Date of Service: 11/29/2022     Tentative placement recommendation: Home Health Physical Therapy   Equipment recommendation: To be determined  possible knee scooter vs walker pending wt restrictions for left foot d/t fracture    Evaluating Physical Therapist: Gabo Hammond, PT  #[id number]      Specific Provider Orders/Date/Referring Provider :  11/28/22 1430    PT eval and treat  Start:  11/28/22 1430,   End:  11/28/22 1430,   ONE TIME,   Standing Count:  1 Occurrences,   R         Josh Holiday, DO     Admitting Diagnosis:   Cellulitis of left lower extremity [L03.116]     Admitted with    as above, recent d/c, pain and swelling Left lower extremity   Surgery: none      Patient Active Problem List   Diagnosis    GERD (gastroesophageal reflux disease)    Osteoarthritis cervical spine    Osteoarthritis of lumbar spine    Small bowel obstruction (HCC)    Morbid obesity (HCC)    Hypokalemia    Venous insufficiency    SBO (small bowel obstruction) ([address]    Incarcerated hernia    Intractable abdominal pain    Incarcerated incisional hernia    Disruption or dehiscence of closure of fascia, superficial or muscular    Intractable nausea and vomiting    Intractable vomiting    Brain mass    Altered mental status    Sepsis secondary to UTI ([address]    Cellulitis    Cellulitis of left lower extremity        ASSESSMENT of Current Deficits Patient exhibits decreased strength, balance, endurance, and pain left foot  impairing functional mobility, transfers, gait , gait distance, and tolerance to activity are barriers to d/c and require skilled intervention to address concerns listed above to increase safety and independence at discharge. Decreased strength, balance and endurance  increases patient's risk for fall.    MRI non displaced fx talar dome on left foot with no wt restrictions in chart; rn made aware       PHYSICAL THERAPY  PLAN OF CARE       Physical therapy plan of care is established based on physician order,  patient diagnosis and clinical assessment    Current Treatment Recommendations:    -Transfers: Provide instruction on proper hand and foot position for adequate transfer of weight onto lower extremities and use of gait device if needed and Cues for hand placement, technique and safety. Provide stabilization to prevent fall   -Gait: Gait training and Standing activities to improve: base of support, weight shift, weight bearing    -Endurance: Utilize Supervised activities to increase level of endurance to allow for safe functional mobility including transfers and gait   -Stairs: Stair training with instruction on proper technique and hand placement on rail    PT long term treatment goals are located in below grid    Patient and or family understand(s) diagnosis, prognosis, and plan of care. Frequency of treatments: Patient will be seen  daily.          Prior Level of Function: Patient ambulated independently    Rehab Potential: good   for baseline    Past medical history:   Past Medical History:   Diagnosis Date    Arthritis     Brain venous angioma ([address]     Bursitis     hips    Cat scratch of left lower leg     Class 3 severe obesity due to excess calories with body mass index (BMI) of 40.0 to 44.9 in adult [facility])     COPD (chronic obstructive pulmonary disease) (HCC)     Diverticulitis     GERD (gastroesophageal reflux disease)     Incisional hernia with obstruction but no gangrene     Strep throat      Past Surgical History:   Procedure Laterality Date     SECTION      CHOLECYSTECTOMY      COLECTOMY  2016    FOREARM FRACTURE SURGERY Left     fracture of ulna s/p repair    HERNIA REPAIR      HERNIA REPAIR  2015    incarcerated hernia repair    HERNIA REPAIR N/A 3/25/2021    INCISIONAL HERNIA REPAIR WITH MESH, POSSIBLE COMPONENT SEPARATION LAPAROSCOPIC ROBOTIC XI ASSISTED (CPT D6343767) performed by Omer Shook MD at [facility] (CERVIX STATUS UNKNOWN)      NERVE BLOCK      sacroiliac bilateral 12-    PELVIC FRACTURE SURGERY      VENTRAL HERNIA REPAIR  08/25/2015       SUBJECTIVE:    Precautions: Up as tolerated, falls and alarm , mri finding of fx talar dome left foot. Trf only until wt bearing clarified with non weight bearing Left lower extremity     Social history: Patient lives with son 15 yo in a two story home bedroom and bathroom 2nd floor full flight stairs with Rail  with 6 steps  to enter with [employer] owned: None,       [address]   How much difficulty turning over in bed?: None  How much difficulty sitting down on / standing up from a chair with arms?: A Little  How much difficulty moving from lying on back to sitting on side of bed?: None  How much help from another person moving to and from a bed to a chair?: A Little  How much help from another person needed to walk in hospital room?: A Little  How much help from another person for climbing 3-5 steps with a railing?: A Lot  AM-PAC Inpatient Mobility Raw Score : 19  AM-PAC Inpatient T-Scale Score : 45.44  Mobility Inpatient CMS 0-100% Score: 41.77  Mobility Inpatient CMS G-Code Modifier : CK    Nursing cleared patient for PT evaluation. The admitting diagnosis and active problem list as listed above have been reviewed prior to the initiation of this evaluation. OBJECTIVE;   Initial Evaluation  Date: 11/29/2022 Treatment Date:     Short Term/ Long Term   Goals   Was pt agreeable to Eval/treatment? Yes  To be met in 3 days   Pain level   8/10  left foot      Bed Mobility    Rolling: Independent    Supine to sit:  Independent    Sit to supine: Independent    Scooting: Independent        Transfers Sit to stand: Supervision     Sit to stand: Independent with ordered wt bearing   Ambulation     2x15 feet using  wheeled walker with Minimal assist of 1   for balance and limited wt bearing and cues for limited wt bearing however patient with poor ability    15 feet using  least restrictive device with Supervision  maintaining wt bearing ordered   Stair negotiation: ascended and descended   Not assessed     6 steps min a with rail and ordered wt bearing status and least restrictive device    ROM Within functional limits        Strength BUE:   3+/5  RLE:  3+/5  LLE:  3+/5  Increase strength in affected mm groups by 1/3 grade   Balance Sitting EOB:  good    Dynamic Standing:  fair    Sitting EOB:  good    Dynamic Standing: good with wheeled walker      Patient is Alert & Oriented x person, place, time, and situation and follows directions    Sensation:  Patient  denies numbness/tingling   Edema:  yes left lower extremity   Endurance: poor         Patient education  Patient educated on role of Physical Therapy, risks of immobility, safety and plan of care,  importance of mobility while in hospital , safety , and weight bearing status      Patient response to education:   Pt verbalized understanding Pt demonstrated skill Pt requires further education in this area   Yes Partial Yes      Treatment:  Patient practiced and was instructed/facilitated in the following treatment: Patient   Sat edge of bed 10 minutes with Supervision  to increase dynamic sitting balance and activity tolerance. Reviewed information from mri and need to limit wt bearing until orders written to clarify status. Requested Bedside commode to limit standing necessitiy     Therapeutic Exercises:  not performed        At end of session, patient in bed with nursing present call light and phone within reach,  all lines and tubes intact, nursing notified. Patient would benefit from continued skilled Physical Therapy to improve functional independence and quality of life.          Patient's/ family goals   home    Time in  1155  Time out  1208    Total Treatment Time  0 minutes    Evaluation time includes thorough review of current medical information, gathering information on past medical history/social history and prior level of function, completion of standardized testing/informal observation of tasks, assessment of data, and development of Plan of care and goals.      CPT codes:  Low Complexity PT evaluation (33636)  No treatment billed    Naresh Morgan, PT

## 2022-11-29 NOTE — PROGRESS NOTES
Internal Medicine Progress Note    [facility]    Dimas Armstrong. Tamiko Klein, F.A.CManoloOManoloI. Arik Orellana D.O., MANAOLEANNA Hammond D.O. Mary Huerta, MSN, APRN, NP-C  Gucci Odell. Tabatha Wallace, MSN, APRN-CNP     Primary Care Physician: Navjot Haley DO   Admitting Physician:  Wilma Potter DO  Admission date and time: 11/28/2022  9:23 AM    Room:  [place] [address]  Admitting diagnosis: Cellulitis of left lower extremity [L03.116]    Patient Name: Roberth Nelson  MRN: [id number]    Date of Service: 11/29/2022     Subjective:  Rohith Gilliam is a 46 y.o. female who was seen and examined today,11/29/2022, at the bedside. She continues to have pain and does not feel the infection has changed much since yesterday's admission. She been evaluated by the podiatric and ID teams and we reviewed their recommendations. Results of the work-up and plan of care moving forward have been discussed as well. She unfortunately remains boarded in the emergency department which limits her ability to ambulate and become more active. No family present during my examination. Review of System:   Constitutional:   Denies fever or chills, weight loss or gain, fatigue or malaise. HEENT:   Denies ear pain, sore throat, sinus or eye problems. Cardiovascular:   Denies any chest pain, irregular heartbeats, or palpitations. Respiratory:   Denies shortness of breath, coughing, sputum production, hemoptysis, or wheezing. Gastrointestinal:   Denies nausea, vomiting, diarrhea, or constipation. Denies any abdominal pain. Genitourinary:    Denies any urgency, frequency, hematuria. Voiding  without difficulty. Extremities:   Positive for left lower extremity redness, infection and pain with no significant changes since yesterday's exam, otherwise denies lower extremity swelling, edema or cyanosis. Neurology:    Denies any headache or focal neurological deficits, Denies generalized weakness or memory difficulty. Psch:   Denies being anxious or depressed. Musculoskeletal:    Denies  myalgias, joint complaints or back pain. Integumentary:   Left lower extremity as above. Denies any rashes, ulcers, or excoriations. Denies bruising. Hematologic/Lymphatic:  Denies bruising or bleeding. Physical Exam:  No intake/output data recorded. No intake or output data in the 24 hours ending 11/29/22 0758No intake/output data recorded. Patient Vitals for the past 96 hrs (Last 3 readings):   Weight   11/28/22 1147 204 lb (92.5 kg)     Vital Signs:   Blood pressure 135/81, pulse 77, temperature 98.1 °F (36.7 °C), temperature source Oral, resp. rate 16, weight 204 lb (92.5 kg), SpO2 99 %. General appearance:  Alert, responsive, oriented to person, place, and time. Somewhat uncomfortable appearing, no distress. Head:  Normocephalic. No masses, lesions or tenderness. Eyes:  PERRLA. EOMI. Sclera clear. ENT:  Ears normal. Mucosa normal.  Neck:    Supple. Trachea midline. No thyromegaly. No JVD. No bruits. Heart:    Rhythm regular. Rate controlled.  1 S2. Systolic murmur. Lungs:    Symmetrical.  Bibasilar air exchange. Clear to auscultation bilaterally. No wheezes. No rhonchi. No rales. Abdomen:   Soft. Obese. Non-tender. Non-distended. Bowel sounds positive. No organomegaly or masses. No pain on palpation. Extremities:    Peripheral pulses present. Lower extremity is mildly edematous without pitting, erythematous, somewhat warm and tender to the touch. Granulated wounds are noted. There is no crepitus on passive range of motion of the ankle. Muscle compartments otherwise soft and nontender with intact distal neurovascular examination. Otherwise, no significant pitting peripheral edema. No ulcers. No cyanosis. No clubbing. Neurologic:    Alert x 3. No focal deficit. Cranial nerves grossly intact. No focal weakness.   Psych:   Behavior is normal. Mood appears normal. Speech is not rapid and/or pressured. Musculoskeletal:   Left lower extremity as above. No unilateral joint edema, erythema or warmth. Gait not assessed. Integumentary:  Left lower extremity as above. No rashes  Skin normal color and texture.   Genitalia/Breast:  Deferred    Medication:  Scheduled Meds:   levETIRAcetam  500 mg Oral BID    levothyroxine  50 mcg Oral Daily    hydrOXYzine HCl  50 mg Oral Nightly    sodium chloride flush  5-40 mL IntraVENous 2 times per day    enoxaparin  40 mg SubCUTAneous Daily    linezolid  600 mg Oral 2 times per day    piperacillin-tazobactam  3,375 mg IntraVENous Q8H     Continuous Infusions:   sodium chloride      0.9% NaCl with KCl 40 mEq 60 mL/hr at 11/28/22 6465       Objective Data:  CBC with Differential:    Lab Results   Component Value Date/Time    WBC 4.6 11/29/2022 06:46 AM    RBC 4.15 11/29/2022 06:46 AM    HGB 12.3 11/29/2022 06:46 AM    HCT 38.7 11/29/2022 06:46 AM     11/29/2022 06:46 AM    MCV 93.3 11/29/2022 06:46 AM    MCH 29.6 11/29/2022 06:46 AM    MCHC 31.8 11/29/2022 06:46 AM    RDW 14.9 11/29/2022 06:46 AM    NRBC 0.9 11/14/2022 05:26 AM    SEGSPCT 82 09/14/2011 09:10 AM    METASPCT 1.7 11/14/2022 05:26 AM    LYMPHOPCT 33.9 11/29/2022 06:46 AM    MONOPCT 9.0 11/29/2022 06:46 AM    MYELOPCT 2.6 11/14/2022 05:26 AM    BASOPCT 0.4 11/29/2022 06:46 AM    MONOSABS 0.41 11/29/2022 06:46 AM    LYMPHSABS 1.55 11/29/2022 06:46 AM    EOSABS 0.21 11/29/2022 06:46 AM    BASOSABS 0.02 11/29/2022 06:46 AM     BMP:    Lab Results   Component Value Date/Time     11/28/2022 09:42 AM    K 3.8 11/28/2022 09:42 AM    K 3.9 07/29/2021 07:12 PM     11/28/2022 09:42 AM    CO2 23 11/28/2022 09:42 AM    BUN 16 11/28/2022 09:42 AM    LABALBU 4.5 11/28/2022 09:42 AM    CREATININE 1.0 11/28/2022 09:42 AM    CALCIUM 9.6 11/28/2022 09:42 AM    GFRAA >60 08/02/2021 04:46 AM    LABGLOM >60 11/28/2022 09:42 AM    GLUCOSE 112 11/28/2022 09:42 AM    GLUCOSE 89 09/14/2011 09:10 AM       Wound Documentation:   Incision 08/25/15 Abdomen Mid (Active)   Number of days: 2652       Assessment:  Recurrent cellulitis of the left lower extremity  Poorly tractable left lower extremity and left ankle pain with MRI suggesting nondisplaced fracture of the talar dome  Cat scratch left lower leg with resultant cellulitis treated with Augmentin and Zyvox upon discharge November 14  Left inguinal adenopathy with largest lymph node measuring 4.2 x 1.2 x 2.6 cm noted on last inpatient work-up  Non-oxygen dependent COPD  GERD  Obesity secondary to excess caloric intake with elevated BMI of 37.31 kg/m2    Plan:   Elenita Mckeon is a 40-year-old female patient who presented to [address] [place] [address] with recurrent left lower extremity pain and infection. Cellulitis was again noted. Initial imaging suggested abnormal appearance of the talar dome with potential AVN versus osteomyelitis. MRI suggest nondisplaced fracture. Patient reports no traumatic injury or hard falls to the area. Podiatry is following and we will defer further management in this regard to their service. Pain medications being employed. PT, OT and social work will follow for discharge planning purposes. Infectious work-up is being undertaken with elevated inflammatory markers and ASO. She is on Zyvox and Zosyn with ID following. Further adjustments as per their service. Her chronic morbidities, labs and vital signs are being monitored and addressed accordingly. Anticipate discharge in the next 48 hours pending final podiatry and ID recommendations and continued improvement. Continue current therapy. See orders for further plan of care. Due to extremely high hospital inpatient census and bed limitations, along with staff shortages, we have had a travis discussion with the patient/family regarding the likelihood of prolonged ER boarding status and potential delays/disruptions in care related to this.   They understand and agree to maintain hospitalization at this facility while accepting these risks. We have made every attempt to coordinate care with the ER nursing staff as well to avoid any disruptions in care. More than 50% of my time was spent at the bedside counseling/coordinating care with the patient and/or family with face to face contact. This time was spent reviewing notes and laboratory data as well as instructing and counseling the patient. Time I spent with the family or surrogate(s) is included only if the patient was incapable of providing the necessary information or participating in medical decisions. I also discussed the differential diagnosis and all of the proposed management plans with the patient and individuals accompanying the patient. I am readily available for any further decision-making and intervention.        BOB Arellano CNP  11/29/2022  7:58 AM

## 2022-11-29 NOTE — ED NOTES
Spoke with hospitalist about pt pain meds.  Orders will be put in     [facility]  11/28/22 7570

## 2022-11-30 LAB
ALBUMIN SERPL-MCNC: 3.5 G/DL (ref 3.5–5.2)
ALP BLD-CCNC: 73 U/L (ref 35–104)
ALT SERPL-CCNC: 7 U/L (ref 0–32)
ANION GAP SERPL CALCULATED.3IONS-SCNC: 8 MMOL/L (ref 7–16)
AST SERPL-CCNC: 9 U/L (ref 0–31)
BASOPHILS ABSOLUTE: 0.02 E9/L (ref 0–0.2)
BASOPHILS RELATIVE PERCENT: 0.5 % (ref 0–2)
BILIRUB SERPL-MCNC: <0.2 MG/DL (ref 0–1.2)
BILIRUBIN DIRECT: <0.2 MG/DL (ref 0–0.3)
BILIRUBIN, INDIRECT: ABNORMAL MG/DL (ref 0–1)
BUN BLDV-MCNC: 21 MG/DL (ref 6–20)
CALCIUM SERPL-MCNC: 8.7 MG/DL (ref 8.6–10.2)
CHLORIDE BLD-SCNC: 108 MMOL/L (ref 98–107)
CO2: 25 MMOL/L (ref 22–29)
CREAT SERPL-MCNC: 1.4 MG/DL (ref 0.5–1)
EOSINOPHILS ABSOLUTE: 0.2 E9/L (ref 0.05–0.5)
EOSINOPHILS RELATIVE PERCENT: 4.7 % (ref 0–6)
GFR SERPL CREATININE-BSD FRML MDRD: 45 ML/MIN/1.73
GLUCOSE BLD-MCNC: 81 MG/DL (ref 74–99)
HCT VFR BLD CALC: 39.3 % (ref 34–48)
HEMOGLOBIN: 12.5 G/DL (ref 11.5–15.5)
IMMATURE GRANULOCYTES #: 0.02 E9/L
IMMATURE GRANULOCYTES %: 0.5 % (ref 0–5)
LYMPHOCYTES ABSOLUTE: 1.53 E9/L (ref 1.5–4)
LYMPHOCYTES RELATIVE PERCENT: 36 % (ref 20–42)
MAGNESIUM: 2.1 MG/DL (ref 1.6–2.6)
MCH RBC QN AUTO: 29.6 PG (ref 26–35)
MCHC RBC AUTO-ENTMCNC: 31.8 % (ref 32–34.5)
MCV RBC AUTO: 92.9 FL (ref 80–99.9)
MONOCYTES ABSOLUTE: 0.37 E9/L (ref 0.1–0.95)
MONOCYTES RELATIVE PERCENT: 8.7 % (ref 2–12)
NEUTROPHILS ABSOLUTE: 2.11 E9/L (ref 1.8–7.3)
NEUTROPHILS RELATIVE PERCENT: 49.6 % (ref 43–80)
PDW BLD-RTO: 14.6 FL (ref 11.5–15)
PHOSPHORUS: 3.3 MG/DL (ref 2.5–4.5)
PLATELET # BLD: 238 E9/L (ref 130–450)
PMV BLD AUTO: 9.7 FL (ref 7–12)
POTASSIUM SERPL-SCNC: 4.6 MMOL/L (ref 3.5–5)
RBC # BLD: 4.23 E12/L (ref 3.5–5.5)
SODIUM BLD-SCNC: 141 MMOL/L (ref 132–146)
TOTAL PROTEIN: 6.3 G/DL (ref 6.4–8.3)
WBC # BLD: 4.3 E9/L (ref 4.5–11.5)

## 2022-11-30 PROCEDURE — 83735 ASSAY OF MAGNESIUM: CPT

## 2022-11-30 PROCEDURE — 36415 COLL VENOUS BLD VENIPUNCTURE: CPT

## 2022-11-30 PROCEDURE — 6370000000 HC RX 637 (ALT 250 FOR IP): Performed by: INTERNAL MEDICINE

## 2022-11-30 PROCEDURE — 80048 BASIC METABOLIC PNL TOTAL CA: CPT

## 2022-11-30 PROCEDURE — 85025 COMPLETE CBC W/AUTO DIFF WBC: CPT

## 2022-11-30 PROCEDURE — 2580000003 HC RX 258: Performed by: INTERNAL MEDICINE

## 2022-11-30 PROCEDURE — 80076 HEPATIC FUNCTION PANEL: CPT

## 2022-11-30 PROCEDURE — 1200000000 HC SEMI PRIVATE

## 2022-11-30 PROCEDURE — 51798 US URINE CAPACITY MEASURE: CPT

## 2022-11-30 PROCEDURE — 6360000002 HC RX W HCPCS: Performed by: SPECIALIST

## 2022-11-30 PROCEDURE — 6360000002 HC RX W HCPCS: Performed by: INTERNAL MEDICINE

## 2022-11-30 PROCEDURE — 2580000003 HC RX 258: Performed by: SPECIALIST

## 2022-11-30 PROCEDURE — 84100 ASSAY OF PHOSPHORUS: CPT

## 2022-11-30 RX ADMIN — OXYCODONE AND ACETAMINOPHEN 2 TABLET: 5; 325 TABLET ORAL at 05:25

## 2022-11-30 RX ADMIN — SODIUM CHLORIDE: 9 INJECTION, SOLUTION INTRAVENOUS at 05:27

## 2022-11-30 RX ADMIN — OXYCODONE AND ACETAMINOPHEN 2 TABLET: 5; 325 TABLET ORAL at 16:02

## 2022-11-30 RX ADMIN — LEVETIRACETAM 500 MG: 500 TABLET, FILM COATED ORAL at 20:18

## 2022-11-30 RX ADMIN — OXYCODONE AND ACETAMINOPHEN 2 TABLET: 5; 325 TABLET ORAL at 20:18

## 2022-11-30 RX ADMIN — LINEZOLID 600 MG: 600 TABLET, FILM COATED ORAL at 09:29

## 2022-11-30 RX ADMIN — PIPERACILLIN SODIUM AND TAZOBACTAM SODIUM 3.38 MG: 3; 375 INJECTION, POWDER, FOR SOLUTION INTRAVENOUS at 04:09

## 2022-11-30 RX ADMIN — LEVOTHYROXINE SODIUM 50 MCG: 0.05 TABLET ORAL at 05:25

## 2022-11-30 RX ADMIN — HYDROXYZINE HYDROCHLORIDE 50 MG: 25 TABLET ORAL at 20:18

## 2022-11-30 RX ADMIN — SODIUM CHLORIDE, PRESERVATIVE FREE 10 ML: 5 INJECTION INTRAVENOUS at 20:18

## 2022-11-30 RX ADMIN — ENOXAPARIN SODIUM 40 MG: 100 INJECTION SUBCUTANEOUS at 09:30

## 2022-11-30 RX ADMIN — OXYCODONE AND ACETAMINOPHEN 2 TABLET: 5; 325 TABLET ORAL at 09:28

## 2022-11-30 RX ADMIN — LINEZOLID 600 MG: 600 TABLET, FILM COATED ORAL at 20:18

## 2022-11-30 RX ADMIN — LEVETIRACETAM 500 MG: 500 TABLET, FILM COATED ORAL at 09:28

## 2022-11-30 RX ADMIN — OXYCODONE AND ACETAMINOPHEN 2 TABLET: 5; 325 TABLET ORAL at 00:00

## 2022-11-30 RX ADMIN — SODIUM CHLORIDE: 9 INJECTION, SOLUTION INTRAVENOUS at 20:19

## 2022-11-30 RX ADMIN — CEFEPIME 2000 MG: 2 INJECTION, POWDER, FOR SOLUTION INTRAVENOUS at 16:53

## 2022-11-30 RX ADMIN — PIPERACILLIN SODIUM AND TAZOBACTAM SODIUM 3375 MG: 3; 375 INJECTION, POWDER, FOR SOLUTION INTRAVENOUS at 13:51

## 2022-11-30 ASSESSMENT — PAIN DESCRIPTION - DESCRIPTORS
DESCRIPTORS: DISCOMFORT;BURNING;STABBING
DESCRIPTORS: BURNING;STABBING;SHARP
DESCRIPTORS: BURNING;SHARP;STABBING
DESCRIPTORS: ACHING

## 2022-11-30 ASSESSMENT — PAIN DESCRIPTION - ORIENTATION
ORIENTATION: LEFT

## 2022-11-30 ASSESSMENT — PAIN SCALES - GENERAL
PAINLEVEL_OUTOF10: 9
PAINLEVEL_OUTOF10: 8
PAINLEVEL_OUTOF10: 9
PAINLEVEL_OUTOF10: 9
PAINLEVEL_OUTOF10: 0
PAINLEVEL_OUTOF10: 0

## 2022-11-30 ASSESSMENT — PAIN DESCRIPTION - LOCATION
LOCATION: FOOT
LOCATION: LEG
LOCATION: LEG
LOCATION: FOOT

## 2022-11-30 ASSESSMENT — PAIN SCALES - WONG BAKER: WONGBAKER_NUMERICALRESPONSE: 0

## 2022-11-30 NOTE — PROGRESS NOTES
Occupational Therapy  OT SESSION ATTEMPT     Date:2022  Patient Name: Radha Singer  MRN: [id number]  : 1970  Room: [address] [place] [address]     Attempted OT session this date:    [x] unavailable due to other medical staff currently with pt   [] on hold per nursing staff   [] on hold per nursing staff secondary to lab / radiology results    [] declined treatment  this date due to ____. Benefits of participation in therapy reviewed with pt.    [] off unit   [] Other:     Will reattempt OT eval at a later time.     Denise OTR/L #[id number]

## 2022-11-30 NOTE — PROGRESS NOTES
Podiatry Progress Note  2022   Roberth Nelson     -Planned procedure: Left leg incision and drainage, Left ankle arthroscopic treatment of talar OCD. Scheduled 2022  - NPO after midnight    SUBJECTIVE: Roberth Nelson is a 46 y.o. nondiabetic female seen bedside for follow-up of left ankle talar OCD and left leg cellulitis. CT and MRI are negative for any acute findings however the leg is relatively unchanged in erythema and edema. We will plan for procedure as stated above will consider leg I&D although of note the erythema appears significantly improved today, may forego the I&D if not indicated at time of surgery, will proceed with ankle scope. Denies any acute events overnight or new constitutional symptoms morning.      Past Medical History:   Diagnosis Date    Arthritis     Brain venous angioma (HCC)     Bursitis     hips    Cat scratch of left lower leg     Class 3 severe obesity due to excess calories with body mass index (BMI) of 40.0 to 44.9 in adult [facility])     COPD (chronic obstructive pulmonary disease) (HCC)     Diverticulitis     GERD (gastroesophageal reflux disease)     Incisional hernia with obstruction but no gangrene     Strep throat         Past Surgical History:   Procedure Laterality Date     SECTION      CHOLECYSTECTOMY      COLECTOMY  2016    FOREARM FRACTURE SURGERY Left     fracture of ulna s/p repair    HERNIA REPAIR      HERNIA REPAIR  2015    incarcerated hernia repair    HERNIA REPAIR N/A 3/25/2021    INCISIONAL HERNIA REPAIR WITH MESH, POSSIBLE COMPONENT SEPARATION  LAPAROSCOPIC ROBOTIC XI ASSISTED (CPT I9159068) performed by Chay Darling MD at [address] [place] [address] (CERVIX STATUS UNKNOWN)      NERVE BLOCK      sacroiliac bilateral 2012    PELVIC FRACTURE SURGERY      VENTRAL HERNIA REPAIR  2015         Family History   Problem Relation Age of Onset    Osteoarthritis Other     Diabetes Other     Other Father         pancreatitis Prostate Cancer Father     Stroke Brother         half brother    Cancer Paternal Grandfather         Social History     Tobacco Use    Smoking status: Former     Packs/day: 1.00     Years: 30.00     Pack years: 30.00     Types: Cigarettes     Quit date: 3/5/2021     Years since quittin.7    Smokeless tobacco: Never   Substance Use Topics    Alcohol use: Yes     Comment: daily vodka drinker        Prior to Admission medications    Medication Sig Start Date End Date Taking? Authorizing Provider   levothyroxine (SYNTHROID) 50 MCG tablet Take 1 tablet by mouth Daily 11/15/22   Willim Slider, DO   levETIRAcetam (KEPPRA) 500 MG tablet Take 1 tablet by mouth 2 times daily 21   Willim Slider, DO   hydrOXYzine (ATARAX) 50 MG tablet Take 50 mg by mouth nightly  21   Historical Provider, MD        Ibuprofen, Nsaids, and Morphine         OBJECTIVE:        Vitals:    22 0741   BP: 126/89   Pulse: 70   Resp: 17   Temp: 97 °F (36.1 °C)   SpO2: 95%            EXAM:        Pt is AAOx3, NAD    Vascular Exam: DP and PT pulses bilaterally strong palpable, brisk capillary fill time to all pedal digits bilaterally. Induration, erythema, nonpitting edema left lower leg, ankle. +1 pitting edema right lower extremity. Positive pedal hair growth right lower extremity. Neuro Exam: Intact protective sensation bilaterally     Dermatologic Exam:    -improved erythema to left lower extremity from proximal lower leg extending distally just distal to the ankle joint within prior outlined region of blue marker used to indicate original borders of erythema. Multiple well adhered scabs, no active drainage, no malodor or crepitus, no fluctuance, no open wounds.   improvement to induration left posterior calf  -Multiple hyperkeratotic lesions subfirst bilateral MPJ, MPJ, inferior heel  -Remaining skin is well-hydrated intact     MSK: Muscle strength 5/5 Bilateral.  Painful range of motion left ankle dorsiflexion and plantarflexion. Intact eversion inversion. Positive Hubscher maneuver. Pes planus on weightbearing.   Indurated, erythematous, edematous, painful calf squeeze left posterior calf, negative DVT on venous duplex    Current Facility-Administered Medications   Medication Dose Route Frequency Provider Last Rate Last Admin    0.9 % sodium chloride infusion   IntraVENous Continuous Chanel Rivera, DO 60 mL/hr at 11/30/22 0527 New Bag at 11/30/22 0527    oxyCODONE-acetaminophen (PERCOCET) 5-325 MG per tablet 2 tablet  2 tablet Oral Q4H PRN Chanel Rivera, DO   2 tablet at 11/30/22 0525    levETIRAcetam (KEPPRA) tablet 500 mg  500 mg Oral BID Chanel Rivera, DO   500 mg at 11/29/22 2008    levothyroxine (SYNTHROID) tablet 50 mcg  50 mcg Oral Daily Chanel Rivera, DO   50 mcg at 11/30/22 0046    hydrOXYzine HCl (ATARAX) tablet 50 mg  50 mg Oral Nightly Chanel Rivera, DO   50 mg at 11/29/22 2013    magnesium sulfate 1000 mg in dextrose 5% 100 mL IVPB  1,000 mg IntraVENous PRN Chanel Rivera, DO        sodium phosphate 14.79 mmol in sodium chloride 0.9 % 250 mL IVPB  0.16 mmol/kg IntraVENous PRN Chanel Rivera, DO        Or    sodium phosphate 29.61 mmol in sodium chloride 0.9 % 250 mL IVPB  0.32 mmol/kg IntraVENous PRN Chanel Rivera, DO        potassium chloride (KLOR-CON M) extended release tablet 40 mEq  40 mEq Oral PRN Chanel Rivera, DO        Or    potassium bicarb-citric acid (EFFER-K) effervescent tablet 40 mEq  40 mEq Oral PRN Chanel Rivera, DO        Or    potassium chloride 10 mEq/100 mL IVPB (Peripheral Line)  10 mEq IntraVENous PRN Chanel Rivera, DO        sodium chloride flush 0.9 % injection 5-40 mL  5-40 mL IntraVENous 2 times per day Chanel Rivera, DO   10 mL at 11/29/22 2013    sodium chloride flush 0.9 % injection 5-40 mL  5-40 mL IntraVENous PRN Chanel Rivera, DO        0.9 % sodium chloride infusion   IntraVENous PRN Chanel Rivera, DO        enoxaparin (LOVENOX) injection 40 mg  40 mg SubCUTAneous Daily Chanel Rivera DO   40 mg at 11/29/22 1216    ondansetron (ZOFRAN-ODT) disintegrating tablet 4 mg  4 mg Oral Q8H PRN Asbury Callas, DO        Or    ondansetron [facility]) injection 4 mg  4 mg IntraVENous Q6H PRN Asbury Callas, DO        polyethylene glycol (GLYCOLAX) packet 17 g  17 g Oral Daily PRN Asbury Callas, DO        acetaminophen (TYLENOL) tablet 650 mg  650 mg Oral Q6H PRN Asbury Callas, DO        Or    acetaminophen (TYLENOL) suppository 650 mg  650 mg Rectal Q6H PRN Asbury Callas, DO        linezolid (ZYVOX) tablet 600 mg  600 mg Oral 2 times per day Conor Callas, DO   600 mg at 11/29/22 2013    piperacillin-tazobactam (ZOSYN) 3,375 mg in sodium chloride 0.9 % 50 mL IVPB ([id number])  3,375 mg IntraVENous Q8H Asbury Callas, DO 12.5 mL/hr at 11/30/22 0409 3.375 mg at 11/30/22 0409    albuterol (PROVENTIL) nebulizer solution 2.5 mg  2.5 mg Nebulization Q4H PRN Nicole Lott, APRN - CNP            Lab Results   Component Value Date    WBC 4.3 (L) 11/30/2022    HCT 39.3 11/30/2022    HGB 12.5 11/30/2022     11/30/2022     11/30/2022    K 4.6 11/30/2022     (H) 11/30/2022    CO2 25 11/30/2022    BUN 21 (H) 11/30/2022    CREATININE 1.4 (H) 11/30/2022    GLUCOSE 81 11/30/2022    CRP 0.6 (H) 11/28/2022         ASSESSMENT:  - Recalcitrant Cellulitis left lower leg secondary to cat scratch, suspected abscess, POA, infected   - Talar dome Osteochondritis dessicans, >5mm  -Venous stasis insufficiency bilateral lower extremity  -Morbid obesity    PLAN:  - Examined and evaluated  - All labs, imaging, and charts reviewed   - WBC: 4.3  - CT Left foot and tib/fib 11/28/2022: Mild diffuse left foot and lower leg subcutaneous soft tissue edema no evidence of abscess or gas, small radiolucencies to medial talar dome subchondrally with slight increase in sclerosis, rule out ATN. Subchondral cyst formation lateral CC joint.   Mild periosteal thickening distal fibula  - MRI left ankle 11/28/2022: 5 mm nondisplaced osteochondral fracture medial talar dome  -We will obtain intraoperative cultures 11/30/2022  -Wound culture 11/10/2022: MSSA  - ABX: Zosyn/Zyvox, ID following  -Venous duplex 11/28/2022: Negative for DVT  -Planned procedure: Left leg incision and drainage, Left ankle arthroscopic treatment of talar OCD. Scheduled 12/1/2022  - NPO after midnight   -No dressings  -WBAT bilateral lower extremity for now, NWB LLE after tomorrow    D/W: Marie Batres DPM FACFAS  Fellowship-Trained Foot and Ankle Surgeon  Diplomate, American Board of Foot and Ankle Surgeons  [phone]     Thank you for involving podiatry in this patients care. Please do not hesitate to call with any questions or concerns.      Rosina Carcamo Podiatry PGY3  11/30/2022   9:11 AM

## 2022-11-30 NOTE — PLAN OF CARE
Problem: Pain  Goal: Verbalizes/displays adequate comfort level or baseline comfort level  11/30/2022 0921 by Cyndi Spears RN  Outcome: Progressing     Problem: Safety - Adult  Goal: Free from fall injury  11/30/2022 0921 by Cyndi Spears RN  Outcome: Progressing

## 2022-11-30 NOTE — CARE COORDINATION
Ss note:11/30/202210:07 AM Neg covid on 11-28-22. Per [facility] Podiatry is on, pt for IND. Pt is independent, from home is on room air. Pt currently on IV zozyn and oral Zyvox, has peripheral line Pt has [employer] insurance, PCP Dr. Lloyd Whittaker. Follow up to room, pt is agreeable to [facility] if needed at discharge initial referral made to central intake with liaison relaying start of care would be Sunday. SW will follow for wound care/final ABX orders at discharge and any DME need. MOON Mims    SS NOTE:11/30/20223:20 PM ADDENDUM; PT HAS NOT SEEN DR. RODGERS IN OVER A YEAR, HE WILL NOT FOLLOW FOR HHC IF NEEDED per our liaison Juan Esqueda with [facility]. If home care and abx are needed at discharge will need a PHYSICIAN  to follow. Per Juan Esqueda with [facility] pt has a hx of drug abuse. Pt is currently on IV zozyn and oral Zyvox. SW will follow.  MOON Levy

## 2022-11-30 NOTE — PROGRESS NOTES
NAME: Escobar Bernstein  MR:  [id number]  :   1970  Admit Date:  2022      This is a face to face encounter with Escobar Bernstein 46 y.o. female on 22  Elements of this note, including Diagnosis,  Interval History, Past Medical/Surgical/Family/Social Histories, ROS, physical exam, and Assessment and Plan were copied and pasted from Previous. Updates have been made where noted and reflect current exam and medical decision making from the DOS of this encounter. CHIEF COMPLAINT     ID following for   Chief Complaint   Patient presents with    Cellulitis     Left lower leg, recently dx with cellulitis and was admitted x 1 week ago. HISTORY OF PRESENT ILLNESS     Escobar Bernstein is a 46 y.o. female who presents with   Chief Complaint   Patient presents with    Cellulitis     Left lower leg, recently dx with cellulitis and was admitted x 1 week ago. and has  has a past medical history of Arthritis, Brain venous angioma (HCC), Bursitis, Cat scratch of left lower leg, Class 3 severe obesity due to excess calories with body mass index (BMI) of 40.0 to 44.9 in [facility]), COPD (chronic obstructive pulmonary disease) ([facility] [address] Diverticulitis, GERD (gastroesophageal reflux disease), Incisional hernia with obstruction but no gangrene, and Strep throat. Assessment & Plan   Cellulitis of left lower extremity [L03.116]  Nondisplaced osteochondral fracture medial talar dome. 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 Scheduled 2022  Cont atbx      linezolid (ZYVOX) tablet 600 mg, 2 times per day  piperacillin-tazobactam (ZOSYN) 3,375 mg in sodium chloride 0.9 % 50 mL IVPB ([id number]), Q8H     Pt seen and examined  In bed asleep but arousable  has no c/o f/c  Has pain   Spoke with podiatry   Patient is tolerating medications.  No reported adverse drug reactions. PHYSICAL EXAM     /89   Pulse 70   Temp 97 °F (36.1 °C) (Temporal)   Resp 17   Ht 5' 2\" (1.575 m)   Wt 206 lb (93.4 kg)   SpO2 95%   BMI 37.68 kg/m²   Temp  Av.4 °F (36.9 °C)  Min: 97 °F (36.1 °C)  Max: 100 °F (37.8 °C)  CONSTITUTIONAL:  no apparent distress, and appears stated age  ENT:  Normocephalic, atraumatic,     LUNGS: clear to auscultation bilaterally, no crackles or wheezing  CARDIOVASCULAR: regular rate and rhythm, normal S1 and S2  ABDOMEN:  normal bowel sounds, soft, non-distended, non-tender  MUSCULOSKELETAL:left le dec erythema  dec  range of motion     NEUROLOGIC:  Cranial nerves II-XII are grossly intact.      SKIN:  normal skin color, texture, turgor and no rashes    Peripheral Intravenous Line:  Peripheral IV 22 Left Antecubital (Active)   Site Assessment Clean, dry & intact 22 1158   Line Status Normal saline locked 22 1158   Phlebitis Assessment No symptoms 22 1513   Infiltration Assessment 0 22 1513   Dressing Status Clean, dry & intact 22 1158   Dressing Type Transparent 22 1513      Microbiology:   Recent Labs     22  1743   COVID19 Not Detected       Lab Results   Component Value Date/Time    BC 24 Hours no growth 2022 09:42 AM    BC 5 Days no growth 11/10/2022 05:54 AM    BC 5 Days no growth 2021 05:49 AM    BLOODCULT2 24 Hours no growth 2022 09:42 AM    BLOODCULT2 5 Days no growth 11/10/2022 05:54 AM    BLOODCULT2 5 Days no growth 2021 05:49 AM    ORG Staphylococcus aureus 11/10/2022 08:20 PM     WOUND/ABSCESS   Date Value Ref Range Status   11/10/2022 Light growth  Final        LABS:  MRSA Culture Only   Date Value Ref Range Status   2022 Methicillin resistant Staph aureus not isolated  Final        Recent Labs     22  0942 22  0646 22  0524   WBC 8.5 4.6 4.3*   RBC 4.84 4.15 4.23   HGB 14.0 12.3 12.5   HCT 44.0 38.7 39.3   MCV 90.9 93.3 92.9   MCH 28.9 29.6 29.6 MCHC 31.8* 31.8* 31.8*   RDW 14.6 14.9 14.6    251 238   MPV 9.3 9.4 9.7       Recent Labs     11/28/22  0942 11/29/22  0646 11/30/22  0524    140 141   K 3.8 5.2* 4.6    107 108*   CO2 23 28 25   BUN 16 13 21*   CREATININE 1.0 1.2* 1.4*   GLUCOSE 112* 95 81   PROT 7.8 6.5 6.3*   LABALBU 4.5 3.7 3.5   CALCIUM 9.6 8.8 8.7   BILITOT 0.4 0.2 <0.2   ALKPHOS 95 76 73   AST 10 9 9   ALT 10 8 7       Lab Results   Component Value Date    CRP 0.6 (H) 11/28/2022    CRP 11.9 (H) 11/10/2022    CRP 3.7 (H) 07/31/2021     Lab Results   Component Value Date    SEDRATE 31 (H) 11/28/2022    SEDRATE 91 (H) 11/10/2022    SEDRATE 13 07/31/2021     Recent Labs     11/28/22  0942 11/29/22  0646 11/30/22  0524   CRP 0.6*  --   --    PROCAL 0.06  --   --    AST 10 9 9   ALT 10 8 7       Lab Results   Component Value Date/Time    CHOL 115 11/11/2022 03:08 AM    TRIG 68 11/11/2022 03:08 AM    HDL 24 11/11/2022 03:08 AM    LDLCALC 77 11/11/2022 03:08 AM    LABVLDL 14 11/11/2022 03:08 AM     Lab Results   Component Value Date/Time    VITD25 29 (L) 04/26/2021 04:37 AM       REVIEW OF SYSTEMS     As stated above in the chief complaint, otherwise negative.   CURRENT MEDICATIONS     Current Facility-Administered Medications:     0.9 % sodium chloride infusion, , IntraVENous, Continuous, Reji Del Valle DO, Last Rate: 60 mL/hr at 11/30/22 0527, New Bag at 11/30/22 0527    oxyCODONE-acetaminophen (PERCOCET) 5-325 MG per tablet 2 tablet, 2 tablet, Oral, Q4H PRN, Reji Del Valle DO, 2 tablet at 11/30/22 3385    levETIRAcetam (KEPPRA) tablet 500 mg, 500 mg, Oral, BID, Reji Del Valle DO, 500 mg at 11/30/22 7565    levothyroxine (SYNTHROID) tablet 50 mcg, 50 mcg, Oral, Daily, Reji Del Valle DO, 50 mcg at 11/30/22 1010    hydrOXYzine HCl (ATARAX) tablet 50 mg, 50 mg, Oral, Nightly, Reji Del Valle DO, 50 mg at 11/29/22 2013    magnesium sulfate 1000 mg in dextrose 5% 100 mL IVPB, 1,000 mg, IntraVENous, PRN, Reji Del Valle DO    sodium phosphate 14.79 mmol in sodium chloride 0.9 % 250 mL IVPB, 0.16 mmol/kg, IntraVENous, PRN **OR** sodium phosphate 29.61 mmol in sodium chloride 0.9 % 250 mL IVPB, 0.32 mmol/kg, IntraVENous, PRN, Conor Mccoy, DO    potassium chloride (KLOR-CON M) extended release tablet 40 mEq, 40 mEq, Oral, PRN **OR** potassium bicarb-citric acid (EFFER-K) effervescent tablet 40 mEq, 40 mEq, Oral, PRN **OR** potassium chloride 10 mEq/100 mL IVPB (Peripheral Line), 10 mEq, IntraVENous, PRN, Conor Mccoy, DO    sodium chloride flush 0.9 % injection 5-40 mL, 5-40 mL, IntraVENous, 2 times per day, Conor Mccoy DO, 10 mL at 11/29/22 2013    sodium chloride flush 0.9 % injection 5-40 mL, 5-40 mL, IntraVENous, PRN, Conor Mccoy, DO    0.9 % sodium chloride infusion, , IntraVENous, PRN, Conor Mccoy, DO    enoxaparin (LOVENOX) injection 40 mg, 40 mg, SubCUTAneous, Daily, Gina Carcamo, DO, 40 mg at 11/30/22 0930    ondansetron (ZOFRAN-ODT) disintegrating tablet 4 mg, 4 mg, Oral, Q8H PRN **OR** ondansetron (ZOFRAN) injection 4 mg, 4 mg, IntraVENous, Q6H PRN, Conor Mccoy, DO    polyethylene glycol (GLYCOLAX) packet 17 g, 17 g, Oral, Daily PRN, Conor Mccoy,     acetaminophen (TYLENOL) tablet 650 mg, 650 mg, Oral, Q6H PRN **OR** acetaminophen (TYLENOL) suppository 650 mg, 650 mg, Rectal, Q6H PRN, Conor Mccoy, DO    linezolid (ZYVOX) tablet 600 mg, 600 mg, Oral, 2 times per day, Conor Mccoy, , 600 mg at 11/30/22 0929    [COMPLETED] piperacillin-tazobactam (ZOSYN) 4,500 mg in sodium chloride 0.9 % 100 mL IVPB ([id number]), 4,500 mg, IntraVENous, Once, Stopped at 11/28/22 1611 **FOLLOWED BY** piperacillin-tazobactam (ZOSYN) 3,375 mg in sodium chloride 0.9 % 50 mL IVPB ([id number]), 3,375 mg, IntraVENous, Q8H, Conor Mccoy DO, Last Rate: 12.5 mL/hr at 11/30/22 1351, 3,375 mg at 11/30/22 1351    albuterol (PROVENTIL) nebulizer solution 2.5 mg, 2.5 mg, Nebulization, Q4H PRN, Nicole Lott, APRN - CNP  DIAGNOSTIC RESULTS   Radiology:  CT TIBIA FIBULA LEFT WO CONTRAST    Result Date: 11/28/2022  EXAMINATION: CT OF THE LEFT TIBIA AND FIBULA WITHOUT CONTRAST 11/28/2022 11:39 am TECHNIQUE: CT of the left tibia and fibula was performed without the administration of intravenous contrast.  Multiplanar reformatted images are provided for review. Automated exposure control, iterative reconstruction, and/or weight based adjustment of the mA/kV was utilized to reduce the radiation dose to as low as reasonably achievable. COMPARISON: The previous study performed 11/11/2022. HISTORY ORDERING SYSTEM PROVIDED HISTORY: eval gas TECHNOLOGIST PROVIDED HISTORY: Reason for exam:->eval gas Decision Support Exception - unselect if not a suspected or confirmed emergency medical condition->Emergency Medical Condition (MA) FINDINGS: Bones: No evidence of acute fracture or dislocation. No aggressive appearing osseous abnormality. There is again minimal periosteal reaction involving the distal tibia and fibula and can be attributable to chronic venous insufficiency or hyperemia. Soft Tissue: There has been an interval decrease in the subcutaneous soft tissue edema throughout the visualized lower extremity. Better identified on this study is a small popliteal cyst.  It measures 3.2 x 2.4 x 1.4 cm in size. There is no evidence of soft tissue fluid collection/abscess or gas. Joint: No significant degenerative changes. No osseous erosions. 1.  Interval decrease in the subcutaneous soft tissue edema throughout the visualized left lower extremity, when compared to the previous study performed 11/11/2022. 2.  Better identified on this study is a small popliteal cyst. 3.  No evidence of soft tissue fluid collection/abscess or gas.      CT TIBIA FIBULA LEFT WO CONTRAST    Result Date: 11/11/2022  EXAMINATION: CT OF THE LEFT TIBIA AND FIBULA WITHOUT CONTRAST 11/11/2022 9:10 am TECHNIQUE: CT of the left tibia and fibula was performed without the administration of intravenous contrast.  Multiplanar reformatted images are provided for review. Automated exposure control, iterative reconstruction, and/or weight based adjustment of the mA/kV was utilized to reduce the radiation dose to as low as reasonably achievable. COMPARISON: None. HISTORY ORDERING SYSTEM PROVIDED HISTORY: swelling/pain TECHNOLOGIST PROVIDED HISTORY: Reason for exam:->swelling/pain FINDINGS: Bones: No evidence of acute fracture or dislocation. No evidence of bone destruction. Minimal periosteal reaction involving the distal tibia and fibula can be related to chronic venous insufficiency or hyperemia. Soft Tissue: There is moderate diffuse subcutaneous edema throughout the left calf, no extension into the muscular compartment. No evidence of drainable abscess. Joint: Mild degenerative changes at the ankle. Knee joint appears normal.     1.  Moderate diffuse calf edema without drainable abscess. 2.  Mild diffuse periosteal thickening of the distal tibia and fibula can be related to venous insufficiency and chronic inflammation. No evidence of bone destruction to suggest osteomyelitis. CT FOOT LEFT WO CONTRAST    Result Date: 11/28/2022  EXAMINATION: CT OF THE LEFT FOOT WITHOUT CONTRAST 11/28/2022 11:39 am TECHNIQUE: CT of the left foot was performed without the administration of intravenous contrast.  Multiplanar reformatted images are provided for review. Automated exposure control, iterative reconstruction, and/or weight based adjustment of the mA/kV was utilized to reduce the radiation dose to as low as reasonably achievable. COMPARISON: None. HISTORY ORDERING SYSTEM PROVIDED HISTORY: eval gas TECHNOLOGIST PROVIDED HISTORY: Reason for exam:->eval gas Decision Support Exception - unselect if not a suspected or confirmed emergency medical condition->Emergency Medical Condition (MA) FINDINGS: Bones: No evidence of acute fracture or dislocation.   There are tiny radiolucencies identified in the subchondral region of the medial talar dome, interspersed with areas of slight increased sclerosis. Rule out AVN. Moderate osteo-degenerative changes are noted involving the midfoot and hindfoot, with osteophyte formation. Associated joint space narrowing is seen at the calcaneocuboid joint. Tiny subchondral cysts are identified along both sides of the calcaneocuboid joint. Subchondral cyst formation is also noted involving the lateral cuneiform bone at the level of the cuboid-cuneiform joint. Tiny subchondral cyst formation is also seen involving the head of the 1st metatarsal bone. Mild osteo-degenerative changes are present at the medial talonavicular joint. There is associated joint space narrowing. A moderate-sized plantar calcaneal bone spur is noted. Mild periosteal thickening is seen involving the distal fibula, which could be related to venous insufficiency and chronic inflammation. No other significant osseous abnormality is appreciated. Soft Tissue: Mild, diffuse subcutaneous soft tissue edema is noted involving the visualized ankle and foot. No soft tissue fluid collection/abscess or gas is appreciated. Joint: As above. 1.  Mild, diffuse subcutaneous soft tissue edema involving the visualized left ankle and foot. 2.  No evidence of soft tissue fluid collection/abscess or gas. 3.  Osteo-degenerative changes, as described above. 4.  Rule out AVN of the medial talar dome, as described above. 5.  Mild periosteal thickening involving the distal fibula, which could be related to venous insufficiency in chronic inflammation. 6.  Moderate-sized plantar calcaneal bone spur. MRI ANKLE LEFT W WO CONTRAST    Result Date: 11/28/2022  EXAMINATION: MRI OF THE LEFT ANKLE WITHOUT AND WITH CONTRAST, 11/28/2022 4:26 pm TECHNIQUE: Multiplanar multisequence MRI of the left ankle was performed without and with the administration of intravenous contrast. COMPARISON: None.  HISTORY: ORDERING SYSTEM PROVIDED HISTORY: Bony changes in particular overlying the talar dome with recurrent cellulitis and concern for osteomyelitis TECHNOLOGIST PROVIDED HISTORY: Bony changes in particular overlying the talar dome with recurrent cellulitis and concern for osteomyelitis Reason for exam:->Bony changes in particular overlying the talar dome with recurrent cellulitis and concern for osteomyelitis FINDINGS: SYNDESMOTIC LIGAMENTS: Intact anterior inferior tibiofibular ligament and posterior inferior tibiofibular ligament. No significant periligamentous edema or interval widening. LATERAL COLLATERAL LIGAMENT COMPLEX: Intact anterior talofibular ligament, posterior talofibular ligament and calcaneofibular ligament. DELTOID LIGAMENT COMPLEX: Intact superficial and deep components. SINUS TARSI AND SPRING LIGAMENT: Visualized portions of the spring ligament are intact including the tibiospring ligament. Normal appearance of the sinus tarsi. MEDIAL TENDONS: Intact posterior tibial tendon, flexor digitorum and flexor hallucis longus tendons. LATERAL TENDONS: Intact peroneus brevis and longus tendons. ANTERIOR TENDONS: The tibialis anterior, extensor hallucis longus and extensor digitorum longus tendons are normal in position, morphology and signal. ACHILLES TENDON: The Achilles tendon is normal in position, morphology and signal.  No associated bursitis. PLANTAR FASCIA: The medial and lateral bundles of the plantar fascia are normal in morphology and signal. There is no evidence of acute plantar fasciitis or tear. No evidence of plantar fascial nodules. TARSAL TUNNEL: There are no obstructing lesions within the tarsal tunnel. BONE MARROW: 5 mm nondisplaced osteochondral fracture medial talar dome. No osseous erosion to suggest osteomyelitis. JOINT SPACES: Chronic pes planus. Question chronic calcaneo navicular coalition. Focal severe degenerative change of the calcaneocuboid joint with osseous edema on both sides of the joint. 1. No evidence for osteomyelitis.  2. 5 mm biopsy or excision if appropriate. No evidence of DVT in the left lower extremity. Left inguinal adenopathy. Consider lymph node biopsy and or excision if appropriate. Imaging and labs were reviewed per medical records. POC was discussed with Megan Bose. An opportunity to ask questions was given to the patient/FAMILY. Thank you for involving me in the care of Megan Bose I will continue to follow. Please do not hesitate to call [phone] for any questions or concerns.     Electronically signed by Lazaro Ribera MD on 11/30/2022 at 2:33 PM

## 2022-11-30 NOTE — PLAN OF CARE
Problem: Pain  Goal: Verbalizes/displays adequate comfort level or baseline comfort level  11/29/2022 2138 by Debby Olivo RN  Outcome: Progressing     Problem: Safety - Adult  Goal: Free from fall injury  11/29/2022 2138 by Debby Olivo RN  Outcome: Progressing

## 2022-11-30 NOTE — PROGRESS NOTES
Internal Medicine Progress Note    [facility]NALEE=[facility]    [facility]. Luna Faith., MANAOKEON. Barney Bentley D.O., SHILPI Meyer D.O. Fahad Ellison D.O. Urszula Diop, MSN, APRN, NP-C  Darwin Croft. Ariana Gonzales, MSN, APRN-CNP       Primary Care Physician: Javi De La Rosa DO   Admitting Physician:  Nixon Jameson DO  Admission date and time: 11/28/2022  9:23 AM    Room:  [address] [place], [place] [id number]  Admitting diagnosis: Cellulitis of left lower extremity [L03.116]    Patient Name: Miah Arreola  MRN: [id number]    Date of Service: 11/30/2022     Subjective:  Emma Lara is a 46 y.o. female who was seen and examined today,11/30/2022, at the bedside. Patient does complain of pain in the left lower extremity 9/10. The patient is scheduled for I&D of the left ankle arthroscopically tomorrow. The patient was doing well upon discharge but after discontinuing antibiotic did seem to have a recurrent. He does have some pain and swelling of the left lower extremities. Denies any chest pain or shortness of breath    No family present during my examination. Review of System:   Constitutional:   Denies fever or chills, weight loss or gain. Negative pain of left leg  HEENT:   Denies ear pain, sore throat, sinus or eye problems. Cardiovascular:   Denies any chest pain, irregular heartbeats, or palpitations. Respiratory:   Denies shortness of breath, coughing, sputum production, hemoptysis, or wheezing. Gastrointestinal:   Denies nausea, vomiting, diarrhea, or constipation. Denies any abdominal pain. Genitourinary:    Denies any urgency, frequency, hematuria. Voiding  without difficulty. Extremities:   Complaining of pain or discomfort to left lower extremities with erythema and edema  Neurology:    Denies any headache or focal neurological deficits, Denies generalized weakness or memory difficulty.    Psch:   Denies being anxious or depressed. Musculoskeletal:    Denies  myalgias, joint complaints or back pain. Integumentary:   Denies any rashes, ulcers, or excoriations. Denies bruising. Hematologic/Lymphatic:  Denies bruising or bleeding. Physical Exam:  I/O this shift:  In: 250 [P.O.:250]  Out: 91 [Urine:91]    Intake/Output Summary (Last 24 hours) at 11/30/2022 1802  Last data filed at 11/30/2022 1700  Gross per 24 hour   Intake 1427.2 ml   Output 91 ml   Net 1336.2 ml   I/O last 3 completed shifts: In: 1177.2 [P.O.:360; I.V.:817.2]  Out: -   Patient Vitals for the past 96 hrs (Last 3 readings):   Weight   11/29/22 1155 206 lb (93.4 kg)   11/28/22 1147 204 lb (92.5 kg)     Vital Signs:   Blood pressure 126/89, pulse 70, temperature 97 °F (36.1 °C), temperature source Temporal, resp. rate 17, height 5' 2\" (1.575 m), weight 206 lb (93.4 kg), SpO2 95 %. General appearance:  Alert, responsive, oriented to person, place, and time. Well preserved, alert, no distress. Head:  Normocephalic. No masses, lesions or tenderness. Eyes:  PERRLA. EOMI. Sclera clear. Buccal mucosa moist.  ENT:  Ears normal. Mucosa normal.  Neck:    Supple. Trachea midline. No thyromegaly. No JVD. No bruits. Heart:    Rhythm regular. Rate controlled. No murmurs. Lungs:    Symmetrical. Clear to auscultation bilaterally. No wheezes. No rhonchi. No rales. Abdomen:   Soft. Non-tender. Non-distended. Bowel sounds positive. No organomegaly or masses. No pain on palpation. Extremities:    Left lower extremity erythema scaling skin. +1 edema  Neurologic:    Alert x 3. No focal deficit. Cranial nerves grossly intact. No focal weakness. Psych:   Behavior is normal. Mood appears normal. Speech is not rapid and/or pressured. Musculoskeletal:   Spine ROM normal. Muscular strength intact. Gait not assessed. Integumentary:  No rashes  Skin normal color and texture.   Genitalia/Breast:  Deferred    Medication:  Scheduled Meds:   [START ON 12/1/2022] cefepime 2,000 mg IntraVENous Q24H    levETIRAcetam  500 mg Oral BID    levothyroxine  50 mcg Oral Daily    hydrOXYzine HCl  50 mg Oral Nightly    sodium chloride flush  5-40 mL IntraVENous 2 times per day    enoxaparin  40 mg SubCUTAneous Daily    linezolid  600 mg Oral 2 times per day     Continuous Infusions:   sodium chloride 60 mL/hr at 11/30/22 0527    sodium chloride         Objective Data:  Recent Labs     11/28/22  0942 11/29/22  0646 11/30/22  0524   WBC 8.5 4.6 4.3*   RBC 4.84 4.15 4.23   HGB 14.0 12.3 12.5   HCT 44.0 38.7 39.3   MCV 90.9 93.3 92.9   MCH 28.9 29.6 29.6   MCHC 31.8* 31.8* 31.8*   RDW 14.6 14.9 14.6    251 238   MPV 9.3 9.4 9.7     Lab Results   Component Value Date/Time     11/30/2022 05:24 AM    K 4.6 11/30/2022 05:24 AM    K 3.9 07/29/2021 07:12 PM     11/30/2022 05:24 AM    CO2 25 11/30/2022 05:24 AM    ANIONGAP 8 11/30/2022 05:24 AM    GLUCOSE 81 11/30/2022 05:24 AM    GLUCOSE 89 09/14/2011 09:10 AM    BUN 21 11/30/2022 05:24 AM    CREATININE 1.4 11/30/2022 05:24 AM    GFRAA >60 08/02/2021 04:46 AM    LABGLOM 45 11/30/2022 05:24 AM    CALCIUM 8.7 11/30/2022 05:24 AM    BILITOT <0.2 11/30/2022 05:24 AM    ALT 7 11/30/2022 05:24 AM    AST 9 11/30/2022 05:24 AM    ALKPHOS 73 11/30/2022 05:24 AM    PROT 6.3 11/30/2022 05:24 AM    LABALBU 3.5 11/30/2022 05:24 AM     Recent Labs     11/30/22  0524 11/29/22  0646   MG 2.1 2.1      Lab Results   Component Value Date/Time    PHOS 3.3 11/30/2022 05:24 AM    PHOS 3.6 11/29/2022 06:46 AM    PHOS 3.5 07/30/2021 05:49 AM     No results for input(s): TROPONINI in the last 72 hours.     Wound Documentation:   Incision 08/25/15 Abdomen Mid (Active)   Number of days: 9590       Assessment:      Recurrent cellulitis of the left lower extremity  Poorly tractable left lower extremity and left ankle pain with MRI suggesting nondisplaced fracture of the talar dome  Cat scratch left lower leg with resultant cellulitis treated with Augmentin and Zyvox upon discharge November 14  Left inguinal adenopathy with largest lymph node measuring 4.2 x 1.2 x 2.6 cm noted on last inpatient work-up  Non-oxygen dependent COPD  GERD  Obesity secondary to excess caloric intake with elevated BMI of 37.31 kg/m2      Plan:     Continue IV antibiotics and follow with infectious disease  Podiatry plan on left ankle arthroscopy treatment tomorrow with I&D  Follow appropriate lab  DVT prophylaxis  Encourage use of incentive spirometry  Pain control        More than 50% of my time was spent at the bedside counseling/coordinating care with the patient and/or family with face to face contact. This time was spent reviewing notes and laboratory data as well as instructing and counseling the patient. Time I spent with the family or surrogate(s) is included only if the patient was incapable of providing the necessary information or participating in medical decisions. I also discussed the differential diagnosis and all of the proposed management plans with the patient and individuals accompanying the patient. I am readily available for any further decision-making and intervention.        Vinh Lopez DO, F.A.C.O.I.  11/30/2022  6:02 PM

## 2022-11-30 NOTE — PROGRESS NOTES
Pharmacy Note - Extended Infusion Beta-Lactam Adjustment    Cefepime 2000mg Q12h for treatment of Skin and soft tissue infection. Per [facility] Extended Infusion Beta-Lactam Policy, cefepime will be changed to 2000mg load followed by 2000mg Q24h extended infusion for CrCl 30-59mL/min    Estimated Creatinine Clearance: Estimated Creatinine Clearance: 50 mL/min (A) (based on SCr of 1.4 mg/dL (H)). BMI: Body mass index is 37.68 kg/m². Please call with any questions.     Thank you,    Francisco Javier Miller, [facility]

## 2022-11-30 NOTE — HOME CARE
Araceli Moses has 100% coverage FOR HOME IVAT. Shereen Pradhan LPN   [facility] AT [facility]       CALLED OUT TO DR DINH [facility] HOSPITAL OFFICE AND PATIENT HASN'T BEEN THERE SINCE April 2021 SO THERE IS NO PCP LISTED. TRIED CALLING PATIENT ON HER CELL IN HER ROOM AND CALLED HER MOTHER. DEMO'S NEED VERIFIED AND PATIENT NEEDS UPDATED WITH IV COVERAGE AND NEEDS TO LEARN. CALLED OUT TO Paulie Reach CM AND UPDATED WE NEED NAME OF PHYSICIAN WHO WILL FOLLOW. POSSIBLE DR Gauri Sears. DR Diana Henderson WILL NOT FOLLOW.      Shereen Pradhan LPN   [facility] AT [facility]

## 2022-12-01 LAB
(1,3)-BETA-D-GLUCAN (FUNGITELL) INTERPRETATION: NEGATIVE
(1,3)-BETA-D-GLUCAN (FUNGITELL): <31 PG/ML
ALBUMIN SERPL-MCNC: 4.2 G/DL (ref 3.5–5.2)
ALP BLD-CCNC: 82 U/L (ref 35–104)
ALT SERPL-CCNC: 6 U/L (ref 0–32)
ANION GAP SERPL CALCULATED.3IONS-SCNC: 9 MMOL/L (ref 7–16)
AST SERPL-CCNC: 10 U/L (ref 0–31)
BASOPHILS ABSOLUTE: 0.02 E9/L (ref 0–0.2)
BASOPHILS RELATIVE PERCENT: 0.4 % (ref 0–2)
BILIRUB SERPL-MCNC: 0.2 MG/DL (ref 0–1.2)
BILIRUBIN DIRECT: <0.2 MG/DL (ref 0–0.3)
BILIRUBIN, INDIRECT: NORMAL MG/DL (ref 0–1)
BUN BLDV-MCNC: 17 MG/DL (ref 6–20)
CALCIUM SERPL-MCNC: 9.3 MG/DL (ref 8.6–10.2)
CHLORIDE BLD-SCNC: 103 MMOL/L (ref 98–107)
CO2: 24 MMOL/L (ref 22–29)
CREAT SERPL-MCNC: 1.1 MG/DL (ref 0.5–1)
EOSINOPHILS ABSOLUTE: 0.2 E9/L (ref 0.05–0.5)
EOSINOPHILS RELATIVE PERCENT: 3.5 % (ref 0–6)
GFR SERPL CREATININE-BSD FRML MDRD: >60 ML/MIN/1.73
GLUCOSE BLD-MCNC: 102 MG/DL (ref 74–99)
HCT VFR BLD CALC: 42 % (ref 34–48)
HEMOGLOBIN: 13.5 G/DL (ref 11.5–15.5)
IMMATURE GRANULOCYTES #: 0.02 E9/L
IMMATURE GRANULOCYTES %: 0.4 % (ref 0–5)
LYMPHOCYTES ABSOLUTE: 1.15 E9/L (ref 1.5–4)
LYMPHOCYTES RELATIVE PERCENT: 20.2 % (ref 20–42)
MAGNESIUM: 2.1 MG/DL (ref 1.6–2.6)
MCH RBC QN AUTO: 29.7 PG (ref 26–35)
MCHC RBC AUTO-ENTMCNC: 32.1 % (ref 32–34.5)
MCV RBC AUTO: 92.5 FL (ref 80–99.9)
MONOCYTES ABSOLUTE: 0.3 E9/L (ref 0.1–0.95)
MONOCYTES RELATIVE PERCENT: 5.3 % (ref 2–12)
NEUTROPHILS ABSOLUTE: 4 E9/L (ref 1.8–7.3)
NEUTROPHILS RELATIVE PERCENT: 70.2 % (ref 43–80)
PDW BLD-RTO: 14.2 FL (ref 11.5–15)
PHOSPHORUS: 3.3 MG/DL (ref 2.5–4.5)
PLATELET # BLD: 250 E9/L (ref 130–450)
PMV BLD AUTO: 9.7 FL (ref 7–12)
POTASSIUM SERPL-SCNC: 4.6 MMOL/L (ref 3.5–5)
RBC # BLD: 4.54 E12/L (ref 3.5–5.5)
SODIUM BLD-SCNC: 136 MMOL/L (ref 132–146)
TOTAL PROTEIN: 7.5 G/DL (ref 6.4–8.3)
WBC # BLD: 5.7 E9/L (ref 4.5–11.5)

## 2022-12-01 PROCEDURE — 6360000002 HC RX W HCPCS: Performed by: INTERNAL MEDICINE

## 2022-12-01 PROCEDURE — 2580000003 HC RX 258: Performed by: INTERNAL MEDICINE

## 2022-12-01 PROCEDURE — 6370000000 HC RX 637 (ALT 250 FOR IP): Performed by: INTERNAL MEDICINE

## 2022-12-01 PROCEDURE — 80048 BASIC METABOLIC PNL TOTAL CA: CPT

## 2022-12-01 PROCEDURE — 80076 HEPATIC FUNCTION PANEL: CPT

## 2022-12-01 PROCEDURE — 83735 ASSAY OF MAGNESIUM: CPT

## 2022-12-01 PROCEDURE — 36415 COLL VENOUS BLD VENIPUNCTURE: CPT

## 2022-12-01 PROCEDURE — 97165 OT EVAL LOW COMPLEX 30 MIN: CPT

## 2022-12-01 PROCEDURE — 85025 COMPLETE CBC W/AUTO DIFF WBC: CPT

## 2022-12-01 PROCEDURE — 1200000000 HC SEMI PRIVATE

## 2022-12-01 PROCEDURE — 84100 ASSAY OF PHOSPHORUS: CPT

## 2022-12-01 RX ADMIN — OXYCODONE AND ACETAMINOPHEN 2 TABLET: 5; 325 TABLET ORAL at 02:40

## 2022-12-01 RX ADMIN — LINEZOLID 600 MG: 600 TABLET, FILM COATED ORAL at 22:20

## 2022-12-01 RX ADMIN — CEFEPIME 2000 MG: 2 INJECTION, POWDER, FOR SOLUTION INTRAVENOUS at 15:16

## 2022-12-01 RX ADMIN — LEVOTHYROXINE SODIUM 50 MCG: 0.05 TABLET ORAL at 05:16

## 2022-12-01 RX ADMIN — LEVETIRACETAM 500 MG: 500 TABLET, FILM COATED ORAL at 22:20

## 2022-12-01 RX ADMIN — OXYCODONE AND ACETAMINOPHEN 2 TABLET: 5; 325 TABLET ORAL at 09:00

## 2022-12-01 RX ADMIN — OXYCODONE AND ACETAMINOPHEN 2 TABLET: 5; 325 TABLET ORAL at 17:04

## 2022-12-01 RX ADMIN — OXYCODONE AND ACETAMINOPHEN 2 TABLET: 5; 325 TABLET ORAL at 22:20

## 2022-12-01 RX ADMIN — LEVETIRACETAM 500 MG: 500 TABLET, FILM COATED ORAL at 09:00

## 2022-12-01 RX ADMIN — LINEZOLID 600 MG: 600 TABLET, FILM COATED ORAL at 09:00

## 2022-12-01 RX ADMIN — SODIUM CHLORIDE, PRESERVATIVE FREE 10 ML: 5 INJECTION INTRAVENOUS at 22:21

## 2022-12-01 RX ADMIN — HYDROXYZINE HYDROCHLORIDE 50 MG: 25 TABLET ORAL at 22:20

## 2022-12-01 ASSESSMENT — PAIN DESCRIPTION - DESCRIPTORS: DESCRIPTORS: BURNING;STABBING;SHARP

## 2022-12-01 ASSESSMENT — PAIN DESCRIPTION - LOCATION
LOCATION: LEG
LOCATION: LEG

## 2022-12-01 ASSESSMENT — PAIN SCALES - WONG BAKER: WONGBAKER_NUMERICALRESPONSE: 0

## 2022-12-01 ASSESSMENT — PAIN DESCRIPTION - ORIENTATION
ORIENTATION: LEFT;LOWER
ORIENTATION: LEFT

## 2022-12-01 ASSESSMENT — PAIN SCALES - GENERAL
PAINLEVEL_OUTOF10: 9
PAINLEVEL_OUTOF10: 9
PAINLEVEL_OUTOF10: 8
PAINLEVEL_OUTOF10: 8

## 2022-12-01 ASSESSMENT — PAIN DESCRIPTION - PAIN TYPE: TYPE: ACUTE PAIN

## 2022-12-01 NOTE — PROGRESS NOTES
Podiatry Progress Note  2022   Mamie Garcia     -Planned procedure: Left ankle arthroscopic treatment of talar OCD. Scheduled 2022 to follow prior procedures  - NPO after midnight    SUBJECTIVE: Mamie Garcia is a 46 y.o. nondiabetic female seen bedside for follow-up of left ankle talar OCD and left leg cellulitis. CT and MRI are negative for any acute findings however the leg is relatively significantly improved in both erythema and edema. Patient does state that she has noticed unchanged pain in the leg. We will continue forward with the planned left ankle arthroscopy scheduled tomorrow as stated above. Denies any acute events overnight or new constitutional symptoms morning.        Past Medical History:   Diagnosis Date    Arthritis     Brain venous angioma (HCC)     Bursitis     hips    Cat scratch of left lower leg     Class 3 severe obesity due to excess calories with body mass index (BMI) of 40.0 to 44.9 in [facility])     COPD (chronic obstructive pulmonary disease) (HCC)     Diverticulitis     GERD (gastroesophageal reflux disease)     Incisional hernia with obstruction but no gangrene     Strep throat         Past Surgical History:   Procedure Laterality Date     SECTION      CHOLECYSTECTOMY      COLECTOMY  2016    FOREARM FRACTURE SURGERY Left     fracture of ulna s/p repair    HERNIA REPAIR      HERNIA REPAIR  2015    incarcerated hernia repair    HERNIA REPAIR N/A 3/25/2021    INCISIONAL HERNIA REPAIR WITH MESH, POSSIBLE COMPONENT SEPARATION  LAPAROSCOPIC ROBOTIC XI ASSISTED (CPT L3728237) performed by Dc Kennedy MD at [facility] (CERVIX STATUS UNKNOWN)      NERVE BLOCK      sacroiliac bilateral 2012    PELVIC FRACTURE SURGERY      VENTRAL HERNIA REPAIR  2015         Family History   Problem Relation Age of Onset    Osteoarthritis Other     Diabetes Other     Other Father         pancreatitis    Prostate Cancer Father     Stroke Brother         half brother    Cancer Paternal Grandfather         Social History     Tobacco Use    Smoking status: Former     Packs/day: 1.00     Years: 30.00     Pack years: 30.00     Types: Cigarettes     Quit date: 3/5/2021     Years since quittin.7    Smokeless tobacco: Never   Substance Use Topics    Alcohol use: Yes     Comment: daily vodka drinker        Prior to Admission medications    Medication Sig Start Date End Date Taking? Authorizing Provider   levothyroxine (SYNTHROID) 50 MCG tablet Take 1 tablet by mouth Daily 11/15/22   Rosalva Howe DO   levETIRAcetam (KEPPRA) 500 MG tablet Take 1 tablet by mouth 2 times daily 21   Rosalva Howe DO   hydrOXYzine (ATARAX) 50 MG tablet Take 50 mg by mouth nightly  21   Historical Provider, MD        Ibuprofen, Nsaids, and Morphine         OBJECTIVE:        Vitals:    22 0930   BP:    Pulse:    Resp: 18   Temp:    SpO2:       EXAM:        Pt is AAOx3, NAD    Vascular Exam: DP and PT pulses bilaterally strong palpable, brisk capillary fill time to all pedal digits bilaterally. Induration, erythema, nonpitting edema left lower leg, ankle. +1 pitting edema right lower extremity. Positive pedal hair growth right lower extremity. Neuro Exam: Intact protective sensation bilaterally     Dermatologic Exam:    -improved erythema to left lower extremity from proximal lower leg extending distally just distal to the ankle joint within prior outlined region of blue marker used to indicate original borders of erythema. Multiple well adhered scabs, no active drainage, no malodor or crepitus, no fluctuance, no open wounds. improvement to induration left posterior calf  -Multiple hyperkeratotic lesions subfirst bilateral MPJ, MPJ, inferior heel  -Remaining skin is well-hydrated intact     MSK: Muscle strength 5/5 Bilateral.  Painful range of motion left ankle dorsiflexion and plantarflexion. Intact eversion inversion. Positive Hubscher maneuver.   Pes planus on weightbearing.   Indurated, erythematous, edematous, painful calf squeeze left posterior calf, negative DVT on venous duplex    Current Facility-Administered Medications   Medication Dose Route Frequency Provider Last Rate Last Admin    cefepime (MAXIPIME) 2,000 mg in sodium chloride 0.9 % 50 mL IVPB ([id number])  2,000 mg IntraVENous Q24H Lorraine Glover MD        0.9 % sodium chloride infusion   IntraVENous Continuous Marcelina Daggett, DO 60 mL/hr at 11/30/22 2019 New Bag at 11/30/22 2019    oxyCODONE-acetaminophen (PERCOCET) 5-325 MG per tablet 2 tablet  2 tablet Oral Q4H PRN Marcelina Daggett, DO   2 tablet at 12/01/22 0900    levETIRAcetam (KEPPRA) tablet 500 mg  500 mg Oral BID Marcelina Daggett, DO   500 mg at 12/01/22 0900    levothyroxine (SYNTHROID) tablet 50 mcg  50 mcg Oral Daily Marcelina Daggett, DO   50 mcg at 12/01/22 0516    hydrOXYzine HCl (ATARAX) tablet 50 mg  50 mg Oral Nightly Marcelina Daggett, DO   50 mg at 11/30/22 2018    magnesium sulfate 1000 mg in dextrose 5% 100 mL IVPB  1,000 mg IntraVENous PRN Marcelina Daggett, DO        sodium phosphate 14.79 mmol in sodium chloride 0.9 % 250 mL IVPB  0.16 mmol/kg IntraVENous PRN Marcelina Daggett, DO        Or    sodium phosphate 29.61 mmol in sodium chloride 0.9 % 250 mL IVPB  0.32 mmol/kg IntraVENous PRN Marcelina Daggett, DO        potassium chloride (KLOR-CON M) extended release tablet 40 mEq  40 mEq Oral PRN Marcelina Daggett, DO        Or    potassium bicarb-citric acid (EFFER-K) effervescent tablet 40 mEq  40 mEq Oral PRN Marcelina Daggett, DO        Or    potassium chloride 10 mEq/100 mL IVPB (Peripheral Line)  10 mEq IntraVENous PRN Marcelina Daggett, DO        sodium chloride flush 0.9 % injection 5-40 mL  5-40 mL IntraVENous 2 times per day Marcelina Daggett, DO   10 mL at 11/30/22 2018    sodium chloride flush 0.9 % injection 5-40 mL  5-40 mL IntraVENous PRN Marcleina Daggett, DO        0.9 % sodium chloride infusion   IntraVENous PRN Marcelina Daggett, DO        enoxaparin (LOVENOX) injection 40 mg  40 mg SubCUTAneous Daily [place] Callas, DO   40 mg at 11/30/22 0930    ondansetron (ZOFRAN-ODT) disintegrating tablet 4 mg  4 mg Oral Q8H PRN Conor Callas, DO        Or    ondansetron [facility]) injection 4 mg  4 mg IntraVENous Q6H PRN Conor Callas, DO        polyethylene glycol (GLYCOLAX) packet 17 g  17 g Oral Daily PRN [place] Callas, DO        acetaminophen (TYLENOL) tablet 650 mg  650 mg Oral Q6H PRN [place] Callas, DO        Or    acetaminophen (TYLENOL) suppository 650 mg  650 mg Rectal Q6H PRN Conor Callas, DO        linezolid (ZYVOX) tablet 600 mg  600 mg Oral 2 times per day Conor Callas, DO   600 mg at 12/01/22 0900    albuterol (PROVENTIL) nebulizer solution 2.5 mg  2.5 mg Nebulization Q4H PRN BOB Wilburn - CNP            Lab Results   Component Value Date    WBC 5.7 12/01/2022    HCT 42.0 12/01/2022    HGB 13.5 12/01/2022     12/01/2022     12/01/2022    K 4.6 12/01/2022     12/01/2022    CO2 24 12/01/2022    BUN 17 12/01/2022    CREATININE 1.1 (H) 12/01/2022    GLUCOSE 102 (H) 12/01/2022    CRP 0.6 (H) 11/28/2022         ASSESSMENT:  - Recalcitrant Cellulitis left lower leg secondary to cat scratch, suspected abscess, POA, infected   - Talar dome Osteochondritis dessicans, >5mm  -Venous stasis insufficiency bilateral lower extremity  -Morbid obesity     PLAN:  - Examined and evaluated  - All labs, imaging, and charts reviewed   - WBC: 5.7  - CT Left foot and tib/fib 11/28/2022: Mild diffuse left foot and lower leg subcutaneous soft tissue edema no evidence of abscess or gas, small radiolucencies to medial talar dome subchondrally with slight increase in sclerosis, rule out ATN. Subchondral cyst formation lateral CC joint.   Mild periosteal thickening distal fibula  - MRI left ankle 11/28/2022: 5 mm nondisplaced osteochondral fracture medial talar dome  -We will obtain intraoperative cultures 12/1/2022  -Wound culture 11/10/2022: MSSA  - ABX: Zosyn/Zyvox, ID following  -Venous duplex 11/28/2022: Negative for DVT  -Planned procedure: Left ankle arthroscopic treatment of talar OCD. Scheduled 12/2/2022  - NPO after midnight   -No dressings  -WBAT bilateral lower extremity for now, NWB LLE after tomorrow    D/W: Donzella Riedel, DPM FACFAS  Fellowship-Trained Foot and Ankle Surgeon  Diplomate, American Board of Foot and Ankle Surgeons  [phone]     Thank you for involving podiatry in this patients care. Please do not hesitate to call with any questions or concerns.      Lourdes Terry Podiatry PGY3  12/1/2022   12:47 PM

## 2022-12-01 NOTE — PROGRESS NOTES
[address] [facility]  [facility]        Date:2022                                                  Patient Name: Serena Bryan    MRN: [id number]    : 1970    Room: [address] [place] [address]      Evaluating OT: Ange Stern OTR/L; [id number]     Referring Provider and Specific Provider Orders/Date:      22 143  OT eval and treat  Start:  22 143,   End:  22 143,   ONE TIME,   Standing Count:  1 Occurrences,   R         Ernesto Holliday, DO      Placement Recommendation: Subacute vs. HHOT       Diagnosis:   1.  Cellulitis of left lower extremity         Surgery: anticipate podiatry sx Friday      Pertinent Medical History:       Past Medical History:   Diagnosis Date    Arthritis     Brain venous angioma ([facility] [address]     Bursitis     hips    Cat scratch of left lower leg     Class 3 severe obesity due to excess calories with body mass index (BMI) of 40.0 to 44.9 in adult [facility])     COPD (chronic obstructive pulmonary disease) (HCC)     Diverticulitis     GERD (gastroesophageal reflux disease)     Incisional hernia with obstruction but no gangrene     Strep throat          Past Surgical History:   Procedure Laterality Date     SECTION      CHOLECYSTECTOMY      COLECTOMY  2016    FOREARM FRACTURE SURGERY Left     fracture of ulna s/p repair    HERNIA REPAIR      HERNIA REPAIR  2015    incarcerated hernia repair    HERNIA REPAIR N/A 3/25/2021    INCISIONAL HERNIA REPAIR WITH MESH, POSSIBLE COMPONENT SEPARATION  LAPAROSCOPIC ROBOTIC XI ASSISTED (CPT 18696) performed by Sera Gray MD at [address] ([address] [place] [address])      NERVE BLOCK      sacroiliac bilateral 2012    PELVIC FRACTURE SURGERY      VENTRAL HERNIA REPAIR  2015      Precautions:  Fall Risk, WBAT: L LE prior to surgery, NWB: L LE post surgery     Assessment of current deficits:     [x] Functional mobility [x]ADLs  [x] Strength               []Cognition    [x] Functional transfers   [x] IADLs         [] Safety Awareness   [x]Endurance    [] Fine Coordination              [x] Balance      [] Vision/perception   []Sensation     []Gross Motor Coordination  [] ROM  [] Delirium                   [] Motor Control     OT PLAN OF CARE   OT POC based on physician orders, patient diagnosis and results of clinical assessment    Frequency/Duration 1-3 days/wk for 2 weeks PRN     Specific OT Treatment Interventions to include:   * Instruction/training on adapted ADL techniques and AE recommendations to increase functional independence within precautions       * Training on energy conservation strategies, correct breathing pattern and techniques to improve independence/tolerance for self-care routine  * Functional transfer/mobility training/DME recommendations for increased independence, safety, and fall prevention  * Patient/Family education to increase follow through with safety techniques and functional independence  * Recommendation of environmental modifications for increased safety with functional transfers/mobility and ADLs  * Therapeutic exercise to improve motor endurance, ROM, and functional strength for ADLs/functional transfers  * Therapeutic activities to facilitate/challenge dynamic balance, stand tolerance for increased safety and independence with ADLs  * Positioning to improve skin integrity, interaction with environment and functional independence    Recommended Adaptive Equipment: wheeled walker  Comment: pt states a physician told her she would be getting a knee scooter, spoke to a social working and  and they stated it is not covered by her insurance not would one be ordered. Pt advised to order one online. Home Living: with son (14 years old); single family home, 2 story, 2-3 steps to enter with rail, bedroom and bathroom on 2nd floor, tub shower.        Equipment owned: none     Prior Level of Function: Independent with ADLs , Independent with IADLs; ambulated with no device    Driving: no  Occupation: not working  Interest: board games, music    Pain Level: 7/10 pain in L LE; Nursing notified. Cognition: A&O: 4/4; Follows 3 step directions   Memory: good    Sequencing: good    Problem solving: good    Judgement/safety: good     [facility]   AM-PAC Daily Activity Inpatient   How much help for putting on and taking off regular lower body clothing?: A Little  How much help for Bathing?: A Little  How much help for Toileting?: A Little  How much help for putting on and taking off regular upper body clothing?: A Little  How much help for taking care of personal grooming?: A Little  How much help for eating meals?: None  AM-PAC Inpatient Daily Activity Raw Score: 19  AM-PAC Inpatient ADL T-Scale Score : 40.22  ADL Inpatient CMS 0-100% Score: 42.8  ADL Inpatient CMS G-Code Modifier : CK     Functional Assessment:    Initial Eval Status  Date: 12/1/22 Treatment Status  Date: STGs = LTGs  Time frame: 10-14 days   Feeding Independent   Independent    Grooming Supervision   Independent    UB Dressing Supervision   Independent    LB Dressing Supervision   Independent    Bathing Supervision  Independent    Toileting Supervision for hygiene and to stand at sink level to wash and dry hands  Independent    Bed Mobility  Supine to sit: N/T as pt was in bathroom  Sit to supine: Supervision   Supine to sit: Independent   Sit to supine: Independent    Functional Transfers Supervision from commode. Supervision for transfer to and from chair in hallway. Supervision for transfer to bedside. Transfer training with verbal cues for hand placement throughout session to improve safety. Demo provided for transfers being NWB in anticipation of post surgery. Independent    Functional Mobility Supervision with no device from bathroom, in hallway, and within the room.    Modified Rankin    Balance Sitting:     Static: good     Dynamic: fair   Standing: fair  with no device     Activity Tolerance good   good    Visual/  Perceptual Glasses: no                 Hand Dominance: right      AROM (PROM) Strength Additional Info:    RUE  WFL 5/5 good  and wfl FMC/dexterity noted during ADL tasks     LUE WFL 5/5 good  and wfl FMC/dexterity noted during ADL tasks       Hearing: WFL   Sensation:  No c/o numbness or tingling  Tone: WFL   Edema: yes, L LE    Comments: Upon arrival the patient was on commode. At end of session, patient was supine with call light and phone within reach, all lines and tubes intact. Overall patient demonstrated decreased independence and safety during completion of ADL/functional transfer/mobility tasks. Pt would benefit from continued skilled OT to increase safety and independence with completion of ADL/IADL tasks for functional independence and quality of life. Treatment: OT treatment provided this date includes:   Instruction/training on safety and adapted techniques for completion of ADLs   Instruction/training on safe functional mobility/transfer techniques   Instruction/training on energy conservation/work simplification for completion of ADLs   Proper Positioning/Alignment   Instruction/training in NWB post surgery   Instruction/training on DME/AE available     Rehab Potential: Good for established goals. Patient / Family Goal: return home      Patient and/or family were instructed on functional diagnosis, prognosis/goals and OT plan of care. Demonstrated good understanding.      Eval Complexity: Low    Time In: 3:00pm   Time Out: 3:20pm    Total Treatment Time: 0      Min Units   OT Eval Low 97165  X  1    OT Eval Medium 43894      OT Eval High [id number]      OT Re-Eval [id number]            ADL/Self Care 58044     Therapeutic Activities 87812       Therapeutic Ex 69412       Orthotic Management 10508       Manual 09280     Neuro Re-Ed 33932       Non-Billable Time        Evaluation Time additionally includes thorough review of current medical information, gathering information on past medical history/social history and prior level of function, interpretation of standardized testing/informal observation of tasks, assessment of data and development of plan of care and goals.         Evaluating OT: Martina Rios OTR/L; [id number]

## 2022-12-01 NOTE — PROGRESS NOTES
Internal Medicine Progress Note    ANNALEE=[facility]    [facility]. Suhail Jama., MANAOManoloI. Rogelio Stiles D.O., SHILPI Warner D.O. Nathalie Dickinson D.O. Fatou Cartwright, MSN, APRN, NP-C  [place]. Raciel Arciniega, MSN, APRN-CNP       Primary Care Physician: Consuelo Jensen DO   Admitting Physician:  Rosario De La Cruz DO  Admission date and time: 11/28/2022  9:23 AM    Room:  [facility]  Admitting diagnosis: Cellulitis of left lower extremity [L03.116]    Patient Name: Issac Dubin  MRN: [id number]    Date of Service: 12/1/2022     Subjective:  Enoc Rogers is a 46 y.o. female who was seen and examined today,12/1/2022, at the bedside. Patient surgical intervention has been rescheduled for tomorrow. Does have evidence of active cellulitis. Discussed with other consultant about possible delaying surgery due to active infection unless I&D is planned      No family present during my examination. Review of System:   Constitutional:   Denies fever or chills, weight loss or gain. Negative pain of left leg  HEENT:   Denies ear pain, sore throat, sinus or eye problems. Cardiovascular:   Denies any chest pain, irregular heartbeats, or palpitations. Respiratory:   Denies shortness of breath, coughing, sputum production, hemoptysis, or wheezing. Gastrointestinal:   Denies nausea, vomiting, diarrhea, or constipation. Denies any abdominal pain. Genitourinary:    Denies any urgency, frequency, hematuria. Voiding  without difficulty. Extremities:   Complaining of pain or discomfort to left lower extremities with erythema and edema  Neurology:    Denies any headache or focal neurological deficits, Denies generalized weakness or memory difficulty. Psch:   Denies being anxious or depressed. Musculoskeletal:    Denies  myalgias, joint complaints or back pain. Integumentary:   Denies any rashes, ulcers, or excoriations.   Denies bruising. Hematologic/Lymphatic:  Denies bruising or bleeding. Physical Exam:  No intake/output data recorded. Intake/Output Summary (Last 24 hours) at 12/1/2022 1809  Last data filed at 11/30/2022 2031  Gross per 24 hour   Intake 500 ml   Output --   Net 500 ml   I/O last 3 completed shifts: In: 1927.2 [P.O.:1110; I.V.:817.2]  Out: 80 [Urine:91]  Patient Vitals for the past 96 hrs (Last 3 readings):   Weight   11/29/22 1155 206 lb (93.4 kg)   11/28/22 1147 204 lb (92.5 kg)     Vital Signs:   Blood pressure (!) 135/92, pulse 68, temperature 97.8 °F (36.6 °C), temperature source Oral, resp. rate 18, height 5' 2\" (1.575 m), weight 206 lb (93.4 kg), SpO2 96 %. General appearance:  Alert, responsive, oriented to person, place, and time. Well preserved, alert, no distress. Head:  Normocephalic. No masses, lesions or tenderness. Eyes:  PERRLA. EOMI. Sclera clear. Buccal mucosa moist.  ENT:  Ears normal. Mucosa normal.  Neck:    Supple. Trachea midline. No thyromegaly. No JVD. No bruits. Heart:    Rhythm regular. Rate controlled. No murmurs. Lungs:    Symmetrical. Clear to auscultation bilaterally. No wheezes. No rhonchi. No rales. Abdomen:   Soft. Non-tender. Non-distended. Bowel sounds positive. No organomegaly or masses. No pain on palpation. Extremities:    Left lower extremity erythema scaling skin. +1 edema  Neurologic:    Alert x 3. No focal deficit. Cranial nerves grossly intact. No focal weakness. Psych:   Behavior is normal. Mood appears normal. Speech is not rapid and/or pressured. Musculoskeletal:   Spine ROM normal. Muscular strength intact. Gait not assessed. Integumentary:  No rashes  Skin normal color and texture.   Genitalia/Breast:  Deferred    Medication:  Scheduled Meds:   cefepime  2,000 mg IntraVENous Q12H    levETIRAcetam  500 mg Oral BID    levothyroxine  50 mcg Oral Daily    hydrOXYzine HCl  50 mg Oral Nightly    sodium chloride flush  5-40 mL IntraVENous 2 times per day enoxaparin  40 mg SubCUTAneous Daily    linezolid  600 mg Oral 2 times per day     Continuous Infusions:   sodium chloride 60 mL/hr at 11/30/22 2019    sodium chloride         Objective Data:  Recent Labs     11/29/22  0646 11/30/22  0524 12/01/22  0554   WBC 4.6 4.3* 5.7   RBC 4.15 4.23 4.54   HGB 12.3 12.5 13.5   HCT 38.7 39.3 42.0   MCV 93.3 92.9 92.5   MCH 29.6 29.6 29.7   MCHC 31.8* 31.8* 32.1   RDW 14.9 14.6 14.2    238 250   MPV 9.4 9.7 9.7     Lab Results   Component Value Date/Time     12/01/2022 05:54 AM    K 4.6 12/01/2022 05:54 AM    K 3.9 07/29/2021 07:12 PM     12/01/2022 05:54 AM    CO2 24 12/01/2022 05:54 AM    ANIONGAP 9 12/01/2022 05:54 AM    GLUCOSE 102 12/01/2022 05:54 AM    GLUCOSE 89 09/14/2011 09:10 AM    BUN 17 12/01/2022 05:54 AM    CREATININE 1.1 12/01/2022 05:54 AM    GFRAA >60 08/02/2021 04:46 AM    LABGLOM >60 12/01/2022 05:54 AM    CALCIUM 9.3 12/01/2022 05:54 AM    BILITOT 0.2 12/01/2022 05:54 AM    ALT 6 12/01/2022 05:54 AM    AST 10 12/01/2022 05:54 AM    ALKPHOS 82 12/01/2022 05:54 AM    PROT 7.5 12/01/2022 05:54 AM    LABALBU 4.2 12/01/2022 05:54 AM     Recent Labs     12/01/22  0554 11/30/22  0524 11/29/22  0646   MG 2.1 2.1 2.1      Lab Results   Component Value Date/Time    PHOS 3.3 12/01/2022 05:54 AM    PHOS 3.3 11/30/2022 05:24 AM    PHOS 3.6 11/29/2022 06:46 AM     No results for input(s): TROPONINI in the last 72 hours.     Wound Documentation:   Incision 08/25/15 Abdomen Mid (Active)   Number of days: 1373       Assessment:      Recurrent cellulitis of the left lower extremity  Poorly tractable left lower extremity and left ankle pain with MRI suggesting nondisplaced fracture of the talar dome  Cat scratch left lower leg with resultant cellulitis treated with Augmentin and Zyvox upon discharge November 14  Left inguinal adenopathy with largest lymph node measuring 4.2 x 1.2 x 2.6 cm noted on last inpatient work-up  Non-oxygen dependent COPD  GERD  Obesity secondary to excess caloric intake with elevated BMI of 37.31 kg/m2      Plan:     Continue antibiotic therapy  Discussed possible delay of bone surgery due to active infection  Continue pain management  Glycemic control  DVT prophylaxis        More than 50% of my time was spent at the bedside counseling/coordinating care with the patient and/or family with face to face contact. This time was spent reviewing notes and laboratory data as well as instructing and counseling the patient. Time I spent with the family or surrogate(s) is included only if the patient was incapable of providing the necessary information or participating in medical decisions. I also discussed the differential diagnosis and all of the proposed management plans with the patient and individuals accompanying the patient. I am readily available for any further decision-making and intervention.        Liz Lopez DO, F.A.C.O.I.  12/1/2022  6:09 PM

## 2022-12-01 NOTE — HOME CARE
WILL NEED PATIENTS PHARMACY CONSENT FORM, SIGNED IV SCRIPTS AND PICC LINE AGREEMENT FORM FAXED TO [facility] UPON DISCHARGE.      [phone]    Ashlee Joshua LPN   [place] [id number]

## 2022-12-01 NOTE — PROGRESS NOTES
Patient left the floor looking for an AMISHA stating she needed to give her son money at 0100. Patient left the floor again (dressed and packed) to give her son money at 0400.

## 2022-12-01 NOTE — PROGRESS NOTES
Pharmacist Review and Automatic Dose Adjustment of Extended Antibiotic Infusions    The reviewing pharmacist has made an adjustment to the ordered Cefepime dose per the approved [facility] protocol and table as identified below. Tamiko Syed is a 46 y.o. female. Renal Function Assessment:    Date Body Weight IBW  Adjusted BW SCr  CrCl Dialysis status   12/1/2022 206 lb (93.4 kg)  Ideal body weight: 50.1 kg (110 lb 7.2 oz)  Adjusted ideal body weight: 67.4 kg (148 lb 10.7 oz) Serum creatinine: 1.1 mg/dL (H) 12/01/22 0554  Estimated creatinine clearance: 64 mL/min (A) N/a     Estimated Creatinine Clearance: 64 mL/min (A) (based on SCr of 1.1 mg/dL (H)). Recent Labs     11/29/22  0646 11/30/22  0524 12/01/22  0554   CREATININE 1.2* 1.4* 1.1*       Height:   Ht Readings from Last 1 Encounters:   11/29/22 5' 2\" (1.575 m)     Weight:  Wt Readings from Last 1 Encounters:   11/29/22 206 lb (93.4 kg)             Plan: Based upon the patient's weight, antibiotic indication, and renal function, the ordered Cefepime dose of 2000 mg every 24 hours has been changed/converted to 2000 mg every 12 hours. Thank you,  Sawyer Burch PharmD.   12/1/2022   1:43 PM

## 2022-12-02 VITALS
HEIGHT: 62 IN | HEART RATE: 65 BPM | WEIGHT: 206 LBS | TEMPERATURE: 98 F | SYSTOLIC BLOOD PRESSURE: 148 MMHG | RESPIRATION RATE: 15 BRPM | OXYGEN SATURATION: 99 % | DIASTOLIC BLOOD PRESSURE: 80 MMHG | BODY MASS INDEX: 37.91 KG/M2

## 2022-12-02 LAB
ALBUMIN SERPL-MCNC: 3.9 G/DL (ref 3.5–5.2)
ALP BLD-CCNC: 83 U/L (ref 35–104)
ALT SERPL-CCNC: 9 U/L (ref 0–32)
ANION GAP SERPL CALCULATED.3IONS-SCNC: 13 MMOL/L (ref 7–16)
AST SERPL-CCNC: 11 U/L (ref 0–31)
BASOPHILS ABSOLUTE: 0.02 E9/L (ref 0–0.2)
BASOPHILS RELATIVE PERCENT: 0.4 % (ref 0–2)
BILIRUB SERPL-MCNC: <0.2 MG/DL (ref 0–1.2)
BUN BLDV-MCNC: 23 MG/DL (ref 6–20)
CALCIUM SERPL-MCNC: 9.5 MG/DL (ref 8.6–10.2)
CHLORIDE BLD-SCNC: 103 MMOL/L (ref 98–107)
CO2: 22 MMOL/L (ref 22–29)
CREAT SERPL-MCNC: 1.2 MG/DL (ref 0.5–1)
EOSINOPHILS ABSOLUTE: 0.3 E9/L (ref 0.05–0.5)
EOSINOPHILS RELATIVE PERCENT: 5.9 % (ref 0–6)
GFR SERPL CREATININE-BSD FRML MDRD: 54 ML/MIN/1.73
GLUCOSE BLD-MCNC: 79 MG/DL (ref 74–99)
HCT VFR BLD CALC: 41.6 % (ref 34–48)
HEMOGLOBIN: 13.3 G/DL (ref 11.5–15.5)
IMMATURE GRANULOCYTES #: 0.01 E9/L
IMMATURE GRANULOCYTES %: 0.2 % (ref 0–5)
LYMPHOCYTES ABSOLUTE: 1.22 E9/L (ref 1.5–4)
LYMPHOCYTES RELATIVE PERCENT: 24 % (ref 20–42)
MAGNESIUM: 2.2 MG/DL (ref 1.6–2.6)
MCH RBC QN AUTO: 29 PG (ref 26–35)
MCHC RBC AUTO-ENTMCNC: 32 % (ref 32–34.5)
MCV RBC AUTO: 90.6 FL (ref 80–99.9)
MONOCYTES ABSOLUTE: 0.34 E9/L (ref 0.1–0.95)
MONOCYTES RELATIVE PERCENT: 6.7 % (ref 2–12)
NEUTROPHILS ABSOLUTE: 3.19 E9/L (ref 1.8–7.3)
NEUTROPHILS RELATIVE PERCENT: 62.8 % (ref 43–80)
PDW BLD-RTO: 14.1 FL (ref 11.5–15)
PHOSPHORUS: 4.1 MG/DL (ref 2.5–4.5)
PLATELET # BLD: 229 E9/L (ref 130–450)
PMV BLD AUTO: 9.4 FL (ref 7–12)
POTASSIUM SERPL-SCNC: 3.9 MMOL/L (ref 3.5–5)
RBC # BLD: 4.59 E12/L (ref 3.5–5.5)
SODIUM BLD-SCNC: 138 MMOL/L (ref 132–146)
TOTAL PROTEIN: 7.2 G/DL (ref 6.4–8.3)
WBC # BLD: 5.1 E9/L (ref 4.5–11.5)

## 2022-12-02 PROCEDURE — 85025 COMPLETE CBC W/AUTO DIFF WBC: CPT

## 2022-12-02 PROCEDURE — 80053 COMPREHEN METABOLIC PANEL: CPT

## 2022-12-02 PROCEDURE — 6370000000 HC RX 637 (ALT 250 FOR IP): Performed by: INTERNAL MEDICINE

## 2022-12-02 PROCEDURE — 36415 COLL VENOUS BLD VENIPUNCTURE: CPT

## 2022-12-02 PROCEDURE — 2580000003 HC RX 258: Performed by: INTERNAL MEDICINE

## 2022-12-02 PROCEDURE — 84100 ASSAY OF PHOSPHORUS: CPT

## 2022-12-02 PROCEDURE — 6360000002 HC RX W HCPCS: Performed by: INTERNAL MEDICINE

## 2022-12-02 PROCEDURE — 83735 ASSAY OF MAGNESIUM: CPT

## 2022-12-02 RX ADMIN — CEFEPIME 2000 MG: 2 INJECTION, POWDER, FOR SOLUTION INTRAVENOUS at 02:05

## 2022-12-02 RX ADMIN — LEVOTHYROXINE SODIUM 50 MCG: 0.05 TABLET ORAL at 05:11

## 2022-12-02 RX ADMIN — OXYCODONE AND ACETAMINOPHEN 2 TABLET: 5; 325 TABLET ORAL at 05:09

## 2022-12-02 ASSESSMENT — PAIN SCALES - GENERAL
PAINLEVEL_OUTOF10: 9
PAINLEVEL_OUTOF10: 9

## 2022-12-02 ASSESSMENT — PAIN DESCRIPTION - DESCRIPTORS: DESCRIPTORS: BURNING;STABBING;SHARP

## 2022-12-02 ASSESSMENT — PAIN DESCRIPTION - LOCATION: LOCATION: LEG

## 2022-12-02 ASSESSMENT — PAIN DESCRIPTION - ORIENTATION: ORIENTATION: LEFT;LOWER

## 2022-12-02 ASSESSMENT — PAIN - FUNCTIONAL ASSESSMENT: PAIN_FUNCTIONAL_ASSESSMENT: ACTIVITIES ARE NOT PREVENTED

## 2022-12-02 NOTE — DISCHARGE SUMMARY
Internal Medicine Progress Note     [facility]     Nusrat Huanganika. Eduardo Monteiro., F.A.C.O.I. Kipp Snellen, D.O., RACHEL.A.CManoloOLEANNA Barahona D.O. Pari Chung, MSN, APRN, NP-C  Evangelista Britton. Ruy Jorge, MSN, [address] [facility]       Internal Medicine  Discharge Summary    NAME: Mamie Garcia  :  1970  MRN:  [id number]  PCP:Pancho Goncalves DO  ADMITTED: 2022      DISCHARGED: 22    ADMITTING PHYSICIAN: No att. providers found    CONSULTANT(S):   IP CONSULT TO INFECTIOUS DISEASES  IP CONSULT TO SOCIAL WORK  IP CONSULT TO PODIATRY     ADMITTING DIAGNOSIS:   Cellulitis of left lower extremity [L03.116]     DISCHARGE DIAGNOSES:   Patient left AGAINST MEDICAL ADVICE  Recurrent cellulitis of the left lower extremity  Poorly tractable left lower extremity and left ankle pain with MRI suggesting nondisplaced fracture of the talar dome  Cat scratch left lower leg with resultant cellulitis treated with Augmentin and Zyvox upon discharge   Left inguinal adenopathy with largest lymph node measuring 4.2 x 1.2 x 2.6 cm noted on last inpatient work-up  Non-oxygen dependent COPD  GERD  Obesity secondary to excess caloric intake with elevated BMI of 37.31 kg/m2    BRIEF HISTORY OF PRESENT ILLNESS:   Mamie Garcia is a 51-year-old female recently cared for and discharged under our service for similar complaint. She suffered cat scratch to left lower extremity and developed cellulitis. ID provided consultation and extensive work-up was undertaken. She had some associated inguinal adenopathy in the region of the left lower extremity infection was felt to be reactive/inflammatory related to the infectious process. She was started on broad-spectrum IV and transition to oral antimicrobials upon discharge with Zyvox and Augmentin. She completed these courses as an outpatient with improvement and then developed some progression and worsening of symptoms.   Pain increased today to the point where she was evaluated in the ER. Labs revealed no significant leukocytosis or neutrophilic predominance. Metabolic panel was essentially unremarkable as was lactic acid. Ultrasound lower extremity showed no DVT. CT of the tibia, fibula and foot on the left without contrast were obtained showing diffuse subcutaneous tissue swelling and edema involving the left ankle and foot with no evidence of soft tissue fluid collection or abscess, gas formation. Osteodegenerative changes were noted as was the suggestion of potential AVN of the left talar dome. Case was discussed with ER physician with plan for admission, further care and management under the service of Dr. Amelia Peterson. Please is seen and examined at bedside today. She confirmed the above given history. She admits to pain is her predominant complaint. She denies any additional injury or trauma to the area. No fevers or chills, constitutional symptoms of illness. No headache or acute neurologic symptoms. No chest pain or palpitations, fluttering. No increased shortness of breath, cough, URI complaints. No abdominal pain, nausea, vomiting, bowel changes, hematochezia or melena. No acute urinary complaints. Tolerated oral antibiotic regimen well without difficulty.     LABS[de-identified]  Lab Results   Component Value Date    WBC 5.1 12/02/2022    HGB 13.3 12/02/2022    HCT 41.6 12/02/2022     12/02/2022     12/01/2022    K 4.6 12/01/2022     12/01/2022    CREATININE 1.1 (H) 12/01/2022    BUN 17 12/01/2022    CO2 24 12/01/2022    GLUCOSE 102 (H) 12/01/2022    ALT 9 12/02/2022    AST 11 12/02/2022    INR 1.0 03/19/2021     Lab Results   Component Value Date    INR 1.0 03/19/2021    PROTIME 11.2 03/19/2021      Lab Results   Component Value Date    TSH 1.240 11/11/2022     Lab Results   Component Value Date    TRIG 68 11/11/2022     Lab Results   Component Value Date    HDL 24 11/11/2022     Lab Results   Component Value Date    LDLCALC 77 11/11/2022     No results found for: LABA1C    IMAGING:  CT TIBIA FIBULA LEFT WO CONTRAST    Result Date: 11/28/2022  EXAMINATION: CT OF THE LEFT TIBIA AND FIBULA WITHOUT CONTRAST 11/28/2022 11:39 am TECHNIQUE: CT of the left tibia and fibula was performed without the administration of intravenous contrast.  Multiplanar reformatted images are provided for review. Automated exposure control, iterative reconstruction, and/or weight based adjustment of the mA/kV was utilized to reduce the radiation dose to as low as reasonably achievable. COMPARISON: The previous study performed 11/11/2022. HISTORY ORDERING SYSTEM PROVIDED HISTORY: eval gas TECHNOLOGIST PROVIDED HISTORY: Reason for exam:->eval gas Decision Support Exception - unselect if not a suspected or confirmed emergency medical condition->Emergency Medical Condition (MA) FINDINGS: Bones: No evidence of acute fracture or dislocation. No aggressive appearing osseous abnormality. There is again minimal periosteal reaction involving the distal tibia and fibula and can be attributable to chronic venous insufficiency or hyperemia. Soft Tissue: There has been an interval decrease in the subcutaneous soft tissue edema throughout the visualized lower extremity. Better identified on this study is a small popliteal cyst.  It measures 3.2 x 2.4 x 1.4 cm in size. There is no evidence of soft tissue fluid collection/abscess or gas. Joint: No significant degenerative changes. No osseous erosions. 1.  Interval decrease in the subcutaneous soft tissue edema throughout the visualized left lower extremity, when compared to the previous study performed 11/11/2022. 2.  Better identified on this study is a small popliteal cyst. 3.  No evidence of soft tissue fluid collection/abscess or gas.      CT TIBIA FIBULA LEFT WO CONTRAST    Result Date: 11/11/2022  EXAMINATION: CT OF THE LEFT TIBIA AND FIBULA WITHOUT CONTRAST 11/11/2022 9:10 am TECHNIQUE: CT of the left tibia and fibula was performed without the administration of intravenous contrast.  Multiplanar reformatted images are provided for review. Automated exposure control, iterative reconstruction, and/or weight based adjustment of the mA/kV was utilized to reduce the radiation dose to as low as reasonably achievable. COMPARISON: None. HISTORY ORDERING SYSTEM PROVIDED HISTORY: swelling/pain TECHNOLOGIST PROVIDED HISTORY: Reason for exam:->swelling/pain FINDINGS: Bones: No evidence of acute fracture or dislocation. No evidence of bone destruction. Minimal periosteal reaction involving the distal tibia and fibula can be related to chronic venous insufficiency or hyperemia. Soft Tissue: There is moderate diffuse subcutaneous edema throughout the left calf, no extension into the muscular compartment. No evidence of drainable abscess. Joint: Mild degenerative changes at the ankle. Knee joint appears normal.     1.  Moderate diffuse calf edema without drainable abscess. 2.  Mild diffuse periosteal thickening of the distal tibia and fibula can be related to venous insufficiency and chronic inflammation. No evidence of bone destruction to suggest osteomyelitis. CT FOOT LEFT WO CONTRAST    Result Date: 11/28/2022  EXAMINATION: CT OF THE LEFT FOOT WITHOUT CONTRAST 11/28/2022 11:39 am TECHNIQUE: CT of the left foot was performed without the administration of intravenous contrast.  Multiplanar reformatted images are provided for review. Automated exposure control, iterative reconstruction, and/or weight based adjustment of the mA/kV was utilized to reduce the radiation dose to as low as reasonably achievable. COMPARISON: None. HISTORY ORDERING SYSTEM PROVIDED HISTORY: eval gas TECHNOLOGIST PROVIDED HISTORY: Reason for exam:->eval gas Decision Support Exception - unselect if not a suspected or confirmed emergency medical condition->Emergency Medical Condition (MA) FINDINGS: Bones: No evidence of acute fracture or dislocation.   There are tiny radiolucencies identified in the subchondral region of the medial talar dome, interspersed with areas of slight increased sclerosis. Rule out AVN. Moderate osteo-degenerative changes are noted involving the midfoot and hindfoot, with osteophyte formation. Associated joint space narrowing is seen at the calcaneocuboid joint. Tiny subchondral cysts are identified along both sides of the calcaneocuboid joint. Subchondral cyst formation is also noted involving the lateral cuneiform bone at the level of the cuboid-cuneiform joint. Tiny subchondral cyst formation is also seen involving the head of the 1st metatarsal bone. Mild osteo-degenerative changes are present at the medial talonavicular joint. There is associated joint space narrowing. A moderate-sized plantar calcaneal bone spur is noted. Mild periosteal thickening is seen involving the distal fibula, which could be related to venous insufficiency and chronic inflammation. No other significant osseous abnormality is appreciated. Soft Tissue: Mild, diffuse subcutaneous soft tissue edema is noted involving the visualized ankle and foot. No soft tissue fluid collection/abscess or gas is appreciated. Joint: As above. 1.  Mild, diffuse subcutaneous soft tissue edema involving the visualized left ankle and foot. 2.  No evidence of soft tissue fluid collection/abscess or gas. 3.  Osteo-degenerative changes, as described above. 4.  Rule out AVN of the medial talar dome, as described above. 5.  Mild periosteal thickening involving the distal fibula, which could be related to venous insufficiency in chronic inflammation. 6.  Moderate-sized plantar calcaneal bone spur.      MRI ANKLE LEFT W WO CONTRAST    Result Date: 11/28/2022  EXAMINATION: MRI OF THE LEFT ANKLE WITHOUT AND WITH CONTRAST, 11/28/2022 4:26 pm TECHNIQUE: Multiplanar multisequence MRI of the left ankle was performed without and with the administration of intravenous contrast. COMPARISON: None. HISTORY: ORDERING SYSTEM PROVIDED HISTORY: Bony changes in particular overlying the talar dome with recurrent cellulitis and concern for osteomyelitis TECHNOLOGIST PROVIDED HISTORY: Bony changes in particular overlying the talar dome with recurrent cellulitis and concern for osteomyelitis Reason for exam:->Bony changes in particular overlying the talar dome with recurrent cellulitis and concern for osteomyelitis FINDINGS: SYNDESMOTIC LIGAMENTS: Intact anterior inferior tibiofibular ligament and posterior inferior tibiofibular ligament. No significant periligamentous edema or interval widening. LATERAL COLLATERAL LIGAMENT COMPLEX: Intact anterior talofibular ligament, posterior talofibular ligament and calcaneofibular ligament. DELTOID LIGAMENT COMPLEX: Intact superficial and deep components. SINUS TARSI AND SPRING LIGAMENT: Visualized portions of the spring ligament are intact including the tibiospring ligament. Normal appearance of the sinus tarsi. MEDIAL TENDONS: Intact posterior tibial tendon, flexor digitorum and flexor hallucis longus tendons. LATERAL TENDONS: Intact peroneus brevis and longus tendons. ANTERIOR TENDONS: The tibialis anterior, extensor hallucis longus and extensor digitorum longus tendons are normal in position, morphology and signal. ACHILLES TENDON: The Achilles tendon is normal in position, morphology and signal.  No associated bursitis. PLANTAR FASCIA: The medial and lateral bundles of the plantar fascia are normal in morphology and signal. There is no evidence of acute plantar fasciitis or tear. No evidence of plantar fascial nodules. TARSAL TUNNEL: There are no obstructing lesions within the tarsal tunnel. BONE MARROW: 5 mm nondisplaced osteochondral fracture medial talar dome. No osseous erosion to suggest osteomyelitis. JOINT SPACES: Chronic pes planus. Question chronic calcaneo navicular coalition.   Focal severe degenerative change of the calcaneocuboid joint with osseous edema on both sides of the joint. 1. No evidence for osteomyelitis. 2. 5 mm nondisplaced osteochondral fracture medial talar dome. 3. 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 4. Ankle ligaments and tendons appear intact. RECOMMENDATIONS: Careful clinical correlation and follow up recommended. US DUP LOWER EXTREMITY LEFT CALEB    Result Date: 11/28/2022  EXAMINATION: DUPLEX VENOUS ULTRASOUND OF THE LEFT LOWER EXTREMITY 11/28/2022 11:14 am TECHNIQUE: Duplex ultrasound using B-mode/gray scaled imaging and Doppler spectral analysis and color flow was obtained of the deep venous structures of the left lower extremity. COMPARISON: None. HISTORY: ORDERING SYSTEM PROVIDED HISTORY: LEG SWELLING, PAIN, DVT SUSPECTED TECHNOLOGIST PROVIDED HISTORY: Reason for exam:->r/o DVT. current cellulitis What reading provider will be dictating this exam?->CRC FINDINGS: The visualized veins of the left lower extremity are patent and free of echogenic thrombus. The veins demonstrate good compressibility with normal color flow study and spectral analysis. No sonographic evidence of deep venous thrombosis in the left lower extremity. US DUP LOWER EXTREMITY LEFT CALEB    Result Date: 11/10/2022  EXAMINATION: DUPLEX VENOUS ULTRASOUND OF THE LEFT LOWER EXTREMITY 11/10/2022 6:25 am TECHNIQUE: Duplex ultrasound using B-mode/gray scaled imaging and Doppler spectral analysis and color flow was obtained of the deep venous structures of the left lower extremity. COMPARISON: 20 February 2021 HISTORY: ORDERING SYSTEM PROVIDED HISTORY: Discomfort TECHNOLOGIST PROVIDED HISTORY: Reason for exam:->Discomfort What reading provider will be dictating this exam?->CRC FINDINGS: The visualized veins of the left lower extremity are patent and free of echogenic thrombus. The veins demonstrate good compressibility with normal color flow study and spectral analysis.   There is left inguinal adenopathy with the largest node measuring 4.2 x 1.2 by 2.6 cm. Consider lymph node biopsy or excision if appropriate. No evidence of DVT in the left lower extremity. Left inguinal adenopathy. Consider lymph node biopsy and or excision if appropriate. HOSPITAL COURSE:   Elenita Mckeon was treated with antibiotic therapy and was evaluated by both the podiatric and infectious disease teams. The cellulitic process seem to improved to some extent and she was being considered for podiatric intervention. Ultimately, she left AGAINST MEDICAL ADVICE early on the morning of 12/2/2022. I was not notified of this event. It does not appear that she left with any antibiotic therapy. [place]'s noncompliance is well-documented on previous hospitalizations. Her long-term prognosis is poor in the setting of her behavior. Patient left AGAINST MEDICAL ADVICE prior to my examination this morning.     Ana Cristina Stockton DO   12/2/2022  8:54 AM

## 2022-12-03 LAB
BLOOD CULTURE, ROUTINE: NORMAL
CULTURE, BLOOD 2: NORMAL

## 2022-12-19 ENCOUNTER — HOSPITAL ENCOUNTER (EMERGENCY)
Age: 52
Discharge: HOME | End: 2022-12-19
Attending: FAMILY MEDICINE
Payer: MEDICARE

## 2022-12-19 ENCOUNTER — APPOINTMENT (OUTPATIENT)
Dept: CT IMAGING | Age: 52
End: 2022-12-19
Payer: MEDICARE

## 2022-12-19 VITALS
DIASTOLIC BLOOD PRESSURE: 87 MMHG | BODY MASS INDEX: 40.48 KG/M2 | OXYGEN SATURATION: 97 % | SYSTOLIC BLOOD PRESSURE: 138 MMHG | WEIGHT: 220 LBS | TEMPERATURE: 97.7 F | RESPIRATION RATE: 16 BRPM | HEIGHT: 62 IN | HEART RATE: 85 BPM

## 2022-12-19 DIAGNOSIS — S00.83XA CONTUSION OF FACE, INITIAL ENCOUNTER: Primary | ICD-10-CM

## 2022-12-19 PROCEDURE — 99284 EMERGENCY DEPT VISIT MOD MDM: CPT

## 2022-12-19 PROCEDURE — 70486 CT MAXILLOFACIAL W/O DYE: CPT

## 2022-12-19 PROCEDURE — 6370000000 HC RX 637 (ALT 250 FOR IP): Performed by: FAMILY MEDICINE

## 2022-12-19 RX ORDER — TRAMADOL HYDROCHLORIDE 50 MG/1
50 TABLET ORAL EVERY 8 HOURS PRN
Qty: 9 TABLET | Refills: 0 | Status: SHIPPED | OUTPATIENT
Start: 2022-12-19 | End: 2022-12-22

## 2022-12-19 RX ORDER — TRAMADOL HYDROCHLORIDE 50 MG/1
50 TABLET ORAL ONCE
Status: COMPLETED | OUTPATIENT
Start: 2022-12-19 | End: 2022-12-19

## 2022-12-19 RX ORDER — BACITRACIN ZINC AND POLYMYXIN B SULFATE 500; 1000 [USP'U]/G; [USP'U]/G
OINTMENT TOPICAL
Qty: 30 G | Refills: 0 | Status: SHIPPED | OUTPATIENT
Start: 2022-12-19 | End: 2022-12-26

## 2022-12-19 RX ADMIN — TRAMADOL HYDROCHLORIDE 50 MG: 50 TABLET ORAL at 17:53

## 2022-12-19 ASSESSMENT — PAIN - FUNCTIONAL ASSESSMENT: PAIN_FUNCTIONAL_ASSESSMENT: 0-10

## 2022-12-19 ASSESSMENT — PAIN DESCRIPTION - PAIN TYPE: TYPE: ACUTE PAIN

## 2022-12-19 ASSESSMENT — PAIN DESCRIPTION - DESCRIPTORS: DESCRIPTORS: SORE;TIGHTNESS

## 2022-12-19 ASSESSMENT — PAIN DESCRIPTION - FREQUENCY: FREQUENCY: CONTINUOUS

## 2022-12-19 ASSESSMENT — PAIN DESCRIPTION - LOCATION: LOCATION: FACE

## 2022-12-19 ASSESSMENT — PAIN SCALES - GENERAL: PAINLEVEL_OUTOF10: 10

## 2022-12-19 ASSESSMENT — PAIN DESCRIPTION - ORIENTATION: ORIENTATION: RIGHT

## 2022-12-20 NOTE — ED NOTES
Dr. Diana Mora called the CT dept at [facility] to speak with patient regarding CT. Patient had already left the dept.      Juan Liu RN  12/19/22 2049

## 2022-12-20 NOTE — ED PROVIDER NOTES
HPI:  22,   Time: 8:52 PM JENNIFER Hernandez is a 46 y.o. female presenting to the ED for slip and fall on slick pavement, landing face forward, on the right cheek area where she has a large abrasion. She denies loss of consciousness. Denies headache or vision changes. She denies nausea or vomiting. She complains of a moderate aching type pain in the right cheek area. ROS:   Pertinent positives and negatives are stated within HPI, all other systems reviewed and are negative.  --------------------------------------------- PAST HISTORY ---------------------------------------------  Past Medical History:  has a past medical history of Arthritis, Brain venous angioma (HCC), Bursitis, Cat scratch of left lower leg, Class 3 severe obesity due to excess calories with body mass index (BMI) of 40.0 to 44.9 in [facility]), COPD (chronic obstructive pulmonary disease) ([facility] [address] Diverticulitis, GERD (gastroesophageal reflux disease), Incisional hernia with obstruction but no gangrene, and Strep throat. Past Surgical History:  has a past surgical history that includes Hysterectomy; Cholecystectomy; Pelvic fracture surgery; colectomy (2016); Nerve Block; hernia repair; hernia repair (2015); ventral hernia repair (2015);  section; Forearm fracture surgery (Left); and hernia repair (N/A, 3/25/2021). Social History:  reports that she quit smoking about 21 months ago. Her smoking use included cigarettes. She has a 30.00 pack-year smoking history. She has never used smokeless tobacco. She reports current alcohol use. She reports that she does not currently use drugs after having used the following drugs: Marijuana Kenard Snooks). Family History: family history includes Cancer in her paternal grandfather; Diabetes in an other family member; Osteoarthritis in an other family member; Other in her father; Prostate Cancer in her father; Stroke in her brother.      The patients home medications have been reviewed. Allergies: Ibuprofen, Nsaids, and Morphine    -------------------------------------------------- RESULTS -------------------------------------------------  All laboratory and radiology results have been personally reviewed by myself   LABS:  No results found for this visit on 12/19/22. RADIOLOGY:  Interpreted by Radiologist.  [facility]   Final Result   Right infraorbital soft tissue swelling. No right orbital abnormality   identified. No acute fracture.             ------------------------- NURSING NOTES AND VITALS REVIEWED ---------------------------   The nursing notes within the ED encounter and vital signs as below have been reviewed. /87   Pulse 85   Temp 97.7 °F (36.5 °C) (Temporal)   Resp 16   Ht 5' 2\" (1.575 m)   Wt 220 lb (99.8 kg)   SpO2 97%   BMI 40.24 kg/m²   Oxygen Saturation Interpretation: Normal      ---------------------------------------------------PHYSICAL EXAM--------------------------------------    Constitutional/General: Alert and oriented x3, well appearing, non toxic in NAD  Head: NC/AT; there there is a large superficial abrasion in the right Maller area, no active bleeding visualized. There is tenderness palpation on the lateral aspect of the right orbit. Eyes: PERRL, EOMI, vision fields intact, no nystagmus. Mouth: Oropharynx clear, handling secretions, no trismus  Neck: Supple, full ROM, no meningeal signs  Pulmonary: Lungs clear to auscultation bilaterally, no wheezes, rales, or rhonchi. Not in respiratory distress  Cardiovascular:  Regular rate and rhythm, no murmurs, gallops, or rubs. 2+ distal pulses  Abdomen: Soft, non tender, non distended,   Extremities: Moves all extremities x 4.  Warm and well perfused  Skin: warm and dry without rash  Neurologic: GCS 15,  Psych: Normal Affect      ------------------------------ ED COURSE/MEDICAL DECISION MAKING----------------------  Medications   traMADol (ULTRAM) tablet 50 mg (50 mg Oral Given 12/19/22 5626)         Medical Decision Making:    The patient was sent for CT [facility] of the facial bones [facility] scan of the facial bones results were negative for acute fracture. Patient was discharged home with instructions to follow-up with her primary care doctor. Counseling: The emergency provider has spoken with the patient and discussed todays results, in addition to providing specific details for the plan of care and counseling regarding the diagnosis and prognosis. Questions are answered at this time and they are agreeable with the plan.      --------------------------------- IMPRESSION AND DISPOSITION ---------------------------------    IMPRESSION  1.  Contusion of face, initial encounter        DISPOSITION  Disposition: Discharge to home  Patient condition is stable                 Crow Hinojosa MD  12/19/22 1986

## 2022-12-27 ENCOUNTER — APPOINTMENT (OUTPATIENT)
Dept: GENERAL RADIOLOGY | Age: 52
End: 2022-12-27
Payer: MEDICARE

## 2022-12-27 ENCOUNTER — HOSPITAL ENCOUNTER (EMERGENCY)
Age: 52
Discharge: HOME | End: 2022-12-28
Attending: EMERGENCY MEDICINE
Payer: MEDICARE

## 2022-12-27 ENCOUNTER — APPOINTMENT (OUTPATIENT)
Dept: CT IMAGING | Age: 52
End: 2022-12-27
Payer: MEDICARE

## 2022-12-27 VITALS
WEIGHT: 194 LBS | DIASTOLIC BLOOD PRESSURE: 85 MMHG | SYSTOLIC BLOOD PRESSURE: 135 MMHG | RESPIRATION RATE: 19 BRPM | HEART RATE: 77 BPM | TEMPERATURE: 97.3 F | BODY MASS INDEX: 35.48 KG/M2 | OXYGEN SATURATION: 100 %

## 2022-12-27 DIAGNOSIS — S82.892P CLOSED FRACTURE OF LEFT ANKLE WITH MALUNION, SUBSEQUENT ENCOUNTER: Primary | ICD-10-CM

## 2022-12-27 DIAGNOSIS — R42 LIGHTHEADEDNESS: ICD-10-CM

## 2022-12-27 LAB
ALBUMIN SERPL-MCNC: 3.8 G/DL (ref 3.5–5.2)
ALP BLD-CCNC: 81 U/L (ref 35–104)
ALT SERPL-CCNC: 13 U/L (ref 0–32)
ANION GAP SERPL CALCULATED.3IONS-SCNC: 12 MMOL/L (ref 7–16)
AST SERPL-CCNC: 12 U/L (ref 0–31)
BACTERIA: ABNORMAL /HPF
BASOPHILS ABSOLUTE: 0.02 E9/L (ref 0–0.2)
BASOPHILS RELATIVE PERCENT: 0.3 % (ref 0–2)
BILIRUB SERPL-MCNC: <0.2 MG/DL (ref 0–1.2)
BILIRUBIN URINE: NEGATIVE
BLOOD, URINE: ABNORMAL
BUN BLDV-MCNC: 15 MG/DL (ref 6–20)
CALCIUM SERPL-MCNC: 8.8 MG/DL (ref 8.6–10.2)
CHLORIDE BLD-SCNC: 106 MMOL/L (ref 98–107)
CLARITY: ABNORMAL
CO2: 24 MMOL/L (ref 22–29)
COLOR: YELLOW
CREAT SERPL-MCNC: 0.9 MG/DL (ref 0.5–1)
EOSINOPHILS ABSOLUTE: 0.14 E9/L (ref 0.05–0.5)
EOSINOPHILS RELATIVE PERCENT: 2.2 % (ref 0–6)
EPITHELIAL CELLS, UA: ABNORMAL /HPF
GFR SERPL CREATININE-BSD FRML MDRD: >60 ML/MIN/1.73
GLUCOSE BLD-MCNC: 157 MG/DL (ref 74–99)
GLUCOSE URINE: 100 MG/DL
HCT VFR BLD CALC: 41.1 % (ref 34–48)
HEMOGLOBIN: 13.5 G/DL (ref 11.5–15.5)
IMMATURE GRANULOCYTES #: 0.05 E9/L
IMMATURE GRANULOCYTES %: 0.8 % (ref 0–5)
KETONES, URINE: NEGATIVE MG/DL
LEUKOCYTE ESTERASE, URINE: NEGATIVE
LYMPHOCYTES ABSOLUTE: 1.77 E9/L (ref 1.5–4)
LYMPHOCYTES RELATIVE PERCENT: 28.2 % (ref 20–42)
MCH RBC QN AUTO: 29.7 PG (ref 26–35)
MCHC RBC AUTO-ENTMCNC: 32.8 % (ref 32–34.5)
MCV RBC AUTO: 90.5 FL (ref 80–99.9)
MONOCYTES ABSOLUTE: 0.25 E9/L (ref 0.1–0.95)
MONOCYTES RELATIVE PERCENT: 4 % (ref 2–12)
NEUTROPHILS ABSOLUTE: 4.04 E9/L (ref 1.8–7.3)
NEUTROPHILS RELATIVE PERCENT: 64.5 % (ref 43–80)
NITRITE, URINE: NEGATIVE
PDW BLD-RTO: 14.1 FL (ref 11.5–15)
PH UA: 5.5 (ref 5–9)
PLATELET # BLD: 263 E9/L (ref 130–450)
PMV BLD AUTO: 10.1 FL (ref 7–12)
POTASSIUM REFLEX MAGNESIUM: 4 MMOL/L (ref 3.5–5)
PROTEIN UA: NEGATIVE MG/DL
RBC # BLD: 4.54 E12/L (ref 3.5–5.5)
RBC UA: ABNORMAL /HPF (ref 0–2)
SODIUM BLD-SCNC: 142 MMOL/L (ref 132–146)
SPECIFIC GRAVITY UA: >=1.03 (ref 1–1.03)
TOTAL PROTEIN: 6.8 G/DL (ref 6.4–8.3)
TROPONIN, HIGH SENSITIVITY: 7 NG/L (ref 0–9)
UROBILINOGEN, URINE: 0.2 E.U./DL
WBC # BLD: 6.3 E9/L (ref 4.5–11.5)
WBC UA: ABNORMAL /HPF (ref 0–5)

## 2022-12-27 PROCEDURE — 72125 CT NECK SPINE W/O DYE: CPT

## 2022-12-27 PROCEDURE — 85025 COMPLETE CBC W/AUTO DIFF WBC: CPT

## 2022-12-27 PROCEDURE — 81001 URINALYSIS AUTO W/SCOPE: CPT

## 2022-12-27 PROCEDURE — 84484 ASSAY OF TROPONIN QUANT: CPT

## 2022-12-27 PROCEDURE — 80053 COMPREHEN METABOLIC PANEL: CPT

## 2022-12-27 PROCEDURE — 93005 ELECTROCARDIOGRAM TRACING: CPT | Performed by: STUDENT IN AN ORGANIZED HEALTH CARE EDUCATION/TRAINING PROGRAM

## 2022-12-27 PROCEDURE — 96374 THER/PROPH/DIAG INJ IV PUSH: CPT

## 2022-12-27 PROCEDURE — 36415 COLL VENOUS BLD VENIPUNCTURE: CPT

## 2022-12-27 PROCEDURE — 6360000002 HC RX W HCPCS: Performed by: STUDENT IN AN ORGANIZED HEALTH CARE EDUCATION/TRAINING PROGRAM

## 2022-12-27 PROCEDURE — 73562 X-RAY EXAM OF KNEE 3: CPT

## 2022-12-27 PROCEDURE — 73610 X-RAY EXAM OF ANKLE: CPT

## 2022-12-27 PROCEDURE — 70450 CT HEAD/BRAIN W/O DYE: CPT

## 2022-12-27 PROCEDURE — 99285 EMERGENCY DEPT VISIT HI MDM: CPT

## 2022-12-27 PROCEDURE — 71045 X-RAY EXAM CHEST 1 VIEW: CPT

## 2022-12-27 PROCEDURE — 6370000000 HC RX 637 (ALT 250 FOR IP): Performed by: STUDENT IN AN ORGANIZED HEALTH CARE EDUCATION/TRAINING PROGRAM

## 2022-12-27 PROCEDURE — 29515 APPLICATION SHORT LEG SPLINT: CPT

## 2022-12-27 RX ORDER — OXYCODONE HYDROCHLORIDE AND ACETAMINOPHEN 5; 325 MG/1; MG/1
1 TABLET ORAL ONCE
Status: COMPLETED | OUTPATIENT
Start: 2022-12-27 | End: 2022-12-27

## 2022-12-27 RX ORDER — OXYCODONE HYDROCHLORIDE AND ACETAMINOPHEN 5; 325 MG/1; MG/1
1 TABLET ORAL EVERY 6 HOURS PRN
Qty: 10 TABLET | Refills: 0 | Status: SHIPPED | OUTPATIENT
Start: 2022-12-27 | End: 2022-12-30

## 2022-12-27 RX ORDER — FENTANYL CITRATE 0.05 MG/ML
25 INJECTION, SOLUTION INTRAMUSCULAR; INTRAVENOUS ONCE
Status: COMPLETED | OUTPATIENT
Start: 2022-12-27 | End: 2022-12-27

## 2022-12-27 RX ADMIN — FENTANYL CITRATE 25 MCG: 50 INJECTION INTRAMUSCULAR; INTRAVENOUS at 21:11

## 2022-12-27 RX ADMIN — OXYCODONE AND ACETAMINOPHEN 1 TABLET: 5; 325 TABLET ORAL at 22:34

## 2022-12-27 ASSESSMENT — PAIN SCALES - GENERAL
PAINLEVEL_OUTOF10: 9

## 2022-12-27 ASSESSMENT — ENCOUNTER SYMPTOMS
VOMITING: 0
SHORTNESS OF BREATH: 0
COUGH: 0
ABDOMINAL PAIN: 0
DIARRHEA: 0
NAUSEA: 0
BACK PAIN: 0
COLOR CHANGE: 0

## 2022-12-27 ASSESSMENT — PAIN - FUNCTIONAL ASSESSMENT: PAIN_FUNCTIONAL_ASSESSMENT: 0-10

## 2022-12-27 ASSESSMENT — PAIN DESCRIPTION - LOCATION: LOCATION: ANKLE;HEAD

## 2022-12-27 ASSESSMENT — PAIN DESCRIPTION - PAIN TYPE: TYPE: ACUTE PAIN

## 2022-12-28 LAB
EKG ATRIAL RATE: 75 BPM
EKG P AXIS: 43 DEGREES
EKG P-R INTERVAL: 126 MS
EKG Q-T INTERVAL: 410 MS
EKG QRS DURATION: 84 MS
EKG QTC CALCULATION (BAZETT): 457 MS
EKG R AXIS: 49 DEGREES
EKG T AXIS: 52 DEGREES
EKG VENTRICULAR RATE: 75 BPM

## 2022-12-28 PROCEDURE — 93010 ELECTROCARDIOGRAM REPORT: CPT | Performed by: INTERNAL MEDICINE

## 2022-12-28 NOTE — ED PROVIDER NOTES
HPI   75-year-old female patient here for evaluation ankle pain. Patient reportedly had a nondisplaced left talar dome fracture. This occurred about a month ago patient was admitted here and scheduled for surgery however she states that she had to leave due to no childcare. Patient left AGAINST MEDICAL ADVICE at that time. Patient reporting since then she had fallen twice since and resulting in contusion to the fractured left ankle. she fell earlier today after tripping on the shoelaces on her right shoe. She notes intermittent occasional dizziness describing as lightheadedness sensation. No chest pain, shortness of breath, nausea, vomiting, diarrhea. She states she did hit her head but had no syncope. Patient states she was told to be nonweightbearing but she has been bearing weight on the left ankle and she has not followed up with anybody for it. She denies any numbness in the foot or ankle. She states it is swollen and painful. No lightheadedness currently. Review of Systems   Constitutional:  Negative for chills and fever. HENT:  Negative for congestion. Respiratory:  Negative for cough and shortness of breath. Cardiovascular:  Negative for chest pain. Gastrointestinal:  Negative for abdominal pain, diarrhea, nausea and vomiting. Genitourinary:  Negative for difficulty urinating, dysuria and hematuria. Musculoskeletal:  Negative for back pain. Left ankle pain and swelling   Skin:  Negative for color change. Neurological:  Positive for light-headedness. All other systems reviewed and are negative. Physical Exam  Vitals and nursing note reviewed. Constitutional:       Appearance: Normal appearance. HENT:      Head: Normocephalic and atraumatic. Nose: Nose normal. No congestion. Mouth/Throat:      Mouth: Mucous membranes are moist.      Pharynx: Oropharynx is clear.    Eyes:      Conjunctiva/sclera: Conjunctivae normal.      Pupils: Pupils are equal, round, and reactive to light. Cardiovascular:      Rate and Rhythm: Normal rate and regular rhythm. Pulses: Normal pulses. Dorsalis pedis pulses are 2+ on the left side. Heart sounds: Normal heart sounds. Pulmonary:      Effort: Pulmonary effort is normal. No respiratory distress. Breath sounds: Normal breath sounds. Abdominal:      General: Bowel sounds are normal. There is no distension. Tenderness: There is no abdominal tenderness. Musculoskeletal:         General: Normal range of motion. Cervical back: Normal range of motion and neck supple. Comments: No tenderness to palpation over the left proximal fibular head. Generalized ankle swelling, generalized tenderness to palpation. No ecchymosis. Left knee good flexion and extension. Feet:      Comments: Left foot and ankle sensation grossly intact  Skin:     General: Skin is warm and dry. Capillary Refill: Capillary refill takes less than 2 seconds. Neurological:      General: No focal deficit present. Mental Status: She is alert. Procedures     MDM  This a 59-year-old female patient present to emergency department for evaluation of left lower extremity pain. She has known talar dome fracture. She has had few falls and reinjured that left ankle. It has not been splinted and she has not followed up with anybody regarding it. She notes occasional lightheadedness. No symptoms of lightheadedness today no chest pain or shortness of breath. In the left lower extremity there is no evidence for compartment syndrome she has 2+ dorsalis pedis pulse present. She did have a previous infection/cellulitis. No evidence for infection or cellulitis today of the left lower extremity. EKG is negative her troponin is within normal limits. No acute electrolyte abnormalities head neck imaging was obtained found to be without any acute findings.   There is benign meningioma present which is previously documented from MRI brain a year and a half ago. Ankle x-ray shows fracture of medial talar dome and possible medial malleolus fx. Patient's injury is a month old at this point, I consulted with podiatry and they recommended follow-up in the office. I ordered splint for patient here and she was neurovascularly intact after splint application which was performed by nursing staff. At this time patient stable for discharge. ED Course as of 22 2355   Tue Dec 27, 2022   2224 Spoke with Dr. Willa Urban, appears to be an old injury at this point, can follow-up as an outpatient. [FG]    EKG showing normal sinus rhythm 75 bpm.  No acute ST elevations or depressions. Normal QTC. Similar as compared to prior. Interpretation performed by ED physician. [FG]      ED Course User Index  [FG] Lisa Gallegos, DO       --------------------------------------------- PAST HISTORY ---------------------------------------------  Past Medical History:  has a past medical history of Arthritis, Brain venous angioma (HCC), Bursitis, Cat scratch of left lower leg, Class 3 severe obesity due to excess calories with body mass index (BMI) of 40.0 to 44.9 in adult [facility]), COPD (chronic obstructive pulmonary disease) ([facility] [address] Diverticulitis, GERD (gastroesophageal reflux disease), Incisional hernia with obstruction but no gangrene, and Strep throat. Past Surgical History:  has a past surgical history that includes Hysterectomy; Cholecystectomy; Pelvic fracture surgery; colectomy (2016); Nerve Block; hernia repair; hernia repair (2015); ventral hernia repair (2015);  section; Forearm fracture surgery (Left); and hernia repair (N/A, 3/25/2021). Social History:  reports that she quit smoking about 21 months ago. Her smoking use included cigarettes. She has a 30.00 pack-year smoking history. She has never used smokeless tobacco. She reports current alcohol use.  She reports that she does not currently use drugs after having used the following drugs: Marijuana Otto Mendiola). Family History: family history includes Cancer in her paternal grandfather; Diabetes in an other family member; Osteoarthritis in an other family member; Other in her father; Prostate Cancer in her father; Stroke in her brother. The patients home medications have been reviewed.     Allergies: Ibuprofen, Nsaids, and Morphine    -------------------------------------------------- RESULTS -------------------------------------------------  Labs:  Results for orders placed or performed during the hospital encounter of 12/27/22   Urinalysis with Microscopic   Result Value Ref Range    Color, UA Yellow Straw/Yellow    Clarity, UA CLOUDY (A) Clear    Glucose, Ur 100 (A) Negative mg/dL    Bilirubin Urine Negative Negative    Ketones, Urine Negative Negative mg/dL    Specific Gravity, UA >=1.030 1.005 - 1.030    Blood, Urine SMALL (A) Negative    pH, UA 5.5 5.0 - 9.0    Protein, UA Negative Negative mg/dL    Urobilinogen, Urine 0.2 <2.0 E.U./dL    Nitrite, Urine Negative Negative    Leukocyte Esterase, Urine Negative Negative    WBC, UA 0-1 0 - 5 /HPF    RBC, UA 1-3 0 - 2 /HPF    Epithelial Cells, UA MODERATE /HPF    Bacteria, UA FEW (A) None Seen /HPF   CBC with Auto Differential   Result Value Ref Range    WBC 6.3 4.5 - 11.5 E9/L    RBC 4.54 3.50 - 5.50 E12/L    Hemoglobin 13.5 11.5 - 15.5 g/dL    Hematocrit 41.1 34.0 - 48.0 %    MCV 90.5 80.0 - 99.9 fL    MCH 29.7 26.0 - 35.0 pg    MCHC 32.8 32.0 - 34.5 %    RDW 14.1 11.5 - 15.0 fL    Platelets 705 847 - 615 E9/L    MPV 10.1 7.0 - 12.0 fL    Neutrophils % 64.5 43.0 - 80.0 %    Immature Granulocytes % 0.8 0.0 - 5.0 %    Lymphocytes % 28.2 20.0 - 42.0 %    Monocytes % 4.0 2.0 - 12.0 %    Eosinophils % 2.2 0.0 - 6.0 %    Basophils % 0.3 0.0 - 2.0 %    Neutrophils Absolute 4.04 1.80 - 7.30 E9/L    Immature Granulocytes # 0.05 E9/L    Lymphocytes Absolute 1.77 1.50 - 4.00 E9/L    Monocytes Absolute 0.25 0.10 - 0.95 E9/L Eosinophils Absolute 0.14 0.05 - 0.50 E9/L    Basophils Absolute 0.02 0.00 - 0.20 E9/L   Comprehensive Metabolic Panel w/ Reflex to MG   Result Value Ref Range    Sodium 142 132 - 146 mmol/L    Potassium reflex Magnesium 4.0 3.5 - 5.0 mmol/L    Chloride 106 98 - 107 mmol/L    CO2 24 22 - 29 mmol/L    Anion Gap 12 7 - 16 mmol/L    Glucose 157 (H) 74 - 99 mg/dL    BUN 15 6 - 20 mg/dL    Creatinine 0.9 0.5 - 1.0 mg/dL    Est, Glom Filt Rate >60 >=60 mL/min/1.73    Calcium 8.8 8.6 - 10.2 mg/dL    Total Protein 6.8 6.4 - 8.3 g/dL    Albumin 3.8 3.5 - 5.2 g/dL    Total Bilirubin <0.2 0.0 - 1.2 mg/dL    Alkaline Phosphatase 81 35 - 104 U/L    ALT 13 0 - 32 U/L    AST 12 0 - 31 U/L   Troponin   Result Value Ref Range    Troponin, High Sensitivity 7 0 - 9 ng/L   EKG 12 Lead   Result Value Ref Range    Ventricular Rate 75 BPM    Atrial Rate 75 BPM    P-R Interval 126 ms    QRS Duration 84 ms    Q-T Interval 410 ms    QTc Calculation (Bazett) 457 ms    P Axis 43 degrees    R Axis 49 degrees    T Axis 52 degrees       Radiology:  CT HEAD WO CONTRAST   Final Result   No acute intracranial abnormality. Dural-based extra-axial mass overlying the left frontal convexity which is   partially calcified and classic for a benign meningioma which is previously   documented on the MRI of 07/30/2021. CT CERVICAL SPINE WO CONTRAST   Final Result   No acute abnormality of the cervical spine. XR ANKLE LEFT (MIN 3 VIEWS)   Final Result   1. There appears to be a fracture involving the medial talar dome and   possibly the medial malleolus. 2.  Asymmetric widening of the lateral tibiotalar joint measuring 7-8 mm. Possibly reflects a component of underlying instability. 3.  Decreased osseous mineralization. Mild left ankle soft tissue swelling. Suggestion of pes planus deformity. Dorsal midfoot degenerative spurring.    Calcaneal enthesophyte         XR KNEE LEFT (3 VIEWS)   Final Result   No acute abnormality of the knee. XR CHEST 1 VIEW   Final Result   No acute process. ------------------------- NURSING NOTES AND VITALS REVIEWED ---------------------------  Date / Time Roomed:  12/27/2022  6:49 PM  ED Bed Assignment:  23/23    The nursing notes within the ED encounter and vital signs as below have been reviewed. /85   Pulse 77   Temp 97.3 °F (36.3 °C)   Resp 19   Wt 194 lb (88 kg)   SpO2 100%   BMI 35.48 kg/m²   Oxygen Saturation Interpretation: Normal      --------------------------------- ADDITIONAL PROVIDER NOTES ---------------------------------  At this time the patient is without objective evidence of an acute process requiring hospitalization or inpatient management. They have remained hemodynamically stable throughout their entire ED visit and are stable for discharge with outpatient follow-up. The plan has been discussed in detail and they are aware of the specific conditions for emergent return, as well as the importance of follow-up. New Prescriptions    OXYCODONE-ACETAMINOPHEN (PERCOCET) 5-325 MG PER TABLET    Take 1 tablet by mouth every 6 hours as needed for Pain for up to 3 days. Intended supply: 3 days. Take lowest dose possible to manage pain Max Daily Amount: 4 tablets       Diagnosis:  1. Closed fracture of left ankle with malunion, subsequent encounter    2. Lightheadedness        Disposition:  Patient's disposition: Discharge to home  Patient's condition is stable.        Mumtaz Rosen DO  Resident  12/27/22 3414

## 2023-01-25 ENCOUNTER — HOSPITAL ENCOUNTER (OUTPATIENT)
Dept: INTERVENTIONAL RADIOLOGY/VASCULAR | Age: 53
Discharge: HOME | End: 2023-01-27
Payer: MEDICARE

## 2023-01-25 DIAGNOSIS — I73.9 PERIPHERAL VASCULAR DISEASE, UNSPECIFIED (HCC): ICD-10-CM

## 2023-01-25 PROCEDURE — 93923 UPR/LXTR ART STDY 3+ LVLS: CPT

## 2023-02-21 RX ORDER — SODIUM CHLORIDE, SODIUM LACTATE, POTASSIUM CHLORIDE, CALCIUM CHLORIDE 600; 310; 30; 20 MG/100ML; MG/100ML; MG/100ML; MG/100ML
INJECTION, SOLUTION INTRAVENOUS CONTINUOUS
Status: CANCELLED | OUTPATIENT
Start: 2023-02-24

## 2023-02-22 ENCOUNTER — HOSPITAL ENCOUNTER (OUTPATIENT)
Dept: PREADMISSION TESTING | Age: 53
Discharge: HOME | End: 2023-02-22
Payer: MEDICAID

## 2023-02-22 VITALS
HEIGHT: 62 IN | OXYGEN SATURATION: 98 % | RESPIRATION RATE: 16 BRPM | SYSTOLIC BLOOD PRESSURE: 144 MMHG | HEART RATE: 81 BPM | DIASTOLIC BLOOD PRESSURE: 76 MMHG | BODY MASS INDEX: 42.14 KG/M2 | TEMPERATURE: 97.8 F | WEIGHT: 229 LBS

## 2023-02-22 DIAGNOSIS — M19.072 OSTEOARTHRITIS OF LEFT ANKLE, UNSPECIFIED OSTEOARTHRITIS TYPE: Primary | ICD-10-CM

## 2023-02-22 LAB
ANION GAP SERPL CALCULATED.3IONS-SCNC: 9 MMOL/L (ref 7–16)
BUN BLDV-MCNC: 24 MG/DL (ref 6–20)
CALCIUM SERPL-MCNC: 9.4 MG/DL (ref 8.6–10.2)
CHLORIDE BLD-SCNC: 102 MMOL/L (ref 98–107)
CO2: 25 MMOL/L (ref 22–29)
CREAT SERPL-MCNC: 0.9 MG/DL (ref 0.5–1)
GFR SERPL CREATININE-BSD FRML MDRD: >60 ML/MIN/1.73
GLUCOSE BLD-MCNC: 104 MG/DL (ref 74–99)
HCT VFR BLD CALC: 45.7 % (ref 34–48)
HEMOGLOBIN: 14.3 G/DL (ref 11.5–15.5)
MCH RBC QN AUTO: 30.4 PG (ref 26–35)
MCHC RBC AUTO-ENTMCNC: 31.3 % (ref 32–34.5)
MCV RBC AUTO: 97 FL (ref 80–99.9)
PDW BLD-RTO: 16 FL (ref 11.5–15)
PLATELET # BLD: 266 E9/L (ref 130–450)
PMV BLD AUTO: 10.1 FL (ref 7–12)
POTASSIUM REFLEX MAGNESIUM: 4.4 MMOL/L (ref 3.5–5)
RBC # BLD: 4.71 E12/L (ref 3.5–5.5)
SODIUM BLD-SCNC: 136 MMOL/L (ref 132–146)
WBC # BLD: 6.8 E9/L (ref 4.5–11.5)

## 2023-02-22 PROCEDURE — 80048 BASIC METABOLIC PNL TOTAL CA: CPT

## 2023-02-22 PROCEDURE — 85027 COMPLETE CBC AUTOMATED: CPT

## 2023-02-22 PROCEDURE — 36415 COLL VENOUS BLD VENIPUNCTURE: CPT

## 2023-02-22 ASSESSMENT — PAIN DESCRIPTION - PAIN TYPE: TYPE: ACUTE PAIN

## 2023-02-22 ASSESSMENT — PAIN DESCRIPTION - DESCRIPTORS: DESCRIPTORS: THROBBING

## 2023-02-22 ASSESSMENT — PAIN DESCRIPTION - ORIENTATION: ORIENTATION: LEFT

## 2023-02-22 ASSESSMENT — PAIN DESCRIPTION - FREQUENCY: FREQUENCY: CONTINUOUS

## 2023-02-22 ASSESSMENT — PAIN SCALES - GENERAL: PAINLEVEL_OUTOF10: 8

## 2023-02-22 ASSESSMENT — PAIN DESCRIPTION - LOCATION: LOCATION: ANKLE

## 2023-02-22 ASSESSMENT — PAIN - FUNCTIONAL ASSESSMENT: PAIN_FUNCTIONAL_ASSESSMENT: PREVENTS OR INTERFERES SOME ACTIVE ACTIVITIES AND ADLS

## 2023-02-22 NOTE — PROGRESS NOTES
[address] [facility]                                                                                                                    PRE OP INSTRUCTIONS FOR  Mamie Garcia        Date: 2/22/2023    Date of surgery: 02/24/23   Arrival Time: Hospital will call you between 5pm and 7pm with your final arrival time for surgery    Do not eat or drink anything after Midnight prior to surgery. This includes no water, chewing gum, mints or ice chips. Take the following medications with a small sip of water on the morning of Surgery: Keppra, Levothyroxine       Aspirin, Ibuprofen, Advil, Naproxen, Vitamin E and other Anti-inflammatory products should be stopped  before surgery  as directed by your physician. Take Tylenol only unless instructed otherwise by your surgeon. Check with your Doctor regarding stopping Plavix, Coumadin, Lovenox, Eliquis, Effient, or other blood thinners. Do not smoke,use illicit drugs and do not drink any alcoholic beverages 24 hours prior to surgery. You may brush your teeth the morning of surgery. DO NOT SWALLOW WATER    You MUST make arrangements for a responsible adult to take you home after your surgery. You will not be allowed to leave alone or drive yourself home. It is strongly suggested someone stay with you the first 24 hrs. Your surgery will be cancelled if you do not have a ride home. Please wear simple, loose fitting clothing to the hospital.  Dino Gonzalezill not bring valuables (money, credit cards, checkbooks, etc.) Do not wear any makeup (including no eye makeup) or nail polish on your fingers or toes. DO NOT wear any jewelry or piercings on day of surgery. All body piercing jewelry must be removed. Shower the night before surgery with ___Antibacterial soap /AWA WIPES________      If you have a Living Will and Durable Power of  for Healthcare, please bring in a copy. If appropriate bring crutches, inspirex, WALKER, CANE etc...     Notify your Surgeon if you develop any illness between now and surgery time, cough, cold, fever, sore throat, nausea, vomiting, etc.  Please notify your surgeon if you experience dizziness, shortness of breath or blurred vision between now & the time of your surgery. If you have ___dentures, they will be removed before going to the OR; we will provide you a container. If you wear ___contact lenses or ___glasses, they will be removed; please bring a case for them. To provide excellent care visitors will be limited to 1 in the room at any given time. Please bring picture ID and insurance card. Sleep apnea patients need to bring CPAP AND SETTINGS to hospital on day of surgery. During flu season no children under the age of 15 are permitted in the hospital for the safety of all patients. Other On day of surgery please come to the main entrance and stop at the information desk. Please call AMBULATORY CARE if you have any further questions.    [address]     [address]

## 2023-02-23 ENCOUNTER — ANESTHESIA EVENT (OUTPATIENT)
Dept: OPERATING ROOM | Age: 53
End: 2023-02-23
Payer: MEDICAID

## 2023-02-24 ENCOUNTER — ANESTHESIA (OUTPATIENT)
Dept: OPERATING ROOM | Age: 53
End: 2023-02-24
Payer: MEDICAID

## 2023-02-24 ENCOUNTER — HOSPITAL ENCOUNTER (INPATIENT)
Age: 53
LOS: 7 days | Discharge: NURSING, REHAB OR LONG-TERM CARE | DRG: 313 | End: 2023-03-03
Attending: PODIATRIST | Admitting: INTERNAL MEDICINE
Payer: MEDICAID

## 2023-02-24 ENCOUNTER — APPOINTMENT (OUTPATIENT)
Dept: GENERAL RADIOLOGY | Age: 53
DRG: 313 | End: 2023-02-24
Attending: PODIATRIST
Payer: MEDICAID

## 2023-02-24 DIAGNOSIS — G89.18 POSTOPERATIVE PAIN: Primary | ICD-10-CM

## 2023-02-24 DIAGNOSIS — M19.072 OSTEOARTHRITIS OF LEFT ANKLE, UNSPECIFIED OSTEOARTHRITIS TYPE: ICD-10-CM

## 2023-02-24 LAB
ADENOVIRUS BY PCR: NOT DETECTED
BORDETELLA PARAPERTUSSIS BY PCR: NOT DETECTED
BORDETELLA PERTUSSIS BY PCR: NOT DETECTED
CHLAMYDOPHILIA PNEUMONIAE BY PCR: NOT DETECTED
CORONAVIRUS 229E BY PCR: NOT DETECTED
CORONAVIRUS HKU1 BY PCR: NOT DETECTED
CORONAVIRUS NL63 BY PCR: NOT DETECTED
CORONAVIRUS OC43 BY PCR: NOT DETECTED
HUMAN METAPNEUMOVIRUS BY PCR: NOT DETECTED
HUMAN RHINOVIRUS/ENTEROVIRUS BY PCR: NOT DETECTED
INFLUENZA A BY PCR: NOT DETECTED
INFLUENZA B BY PCR: NOT DETECTED
MYCOPLASMA PNEUMONIAE BY PCR: NOT DETECTED
PARAINFLUENZA VIRUS 1 BY PCR: NOT DETECTED
PARAINFLUENZA VIRUS 2 BY PCR: NOT DETECTED
PARAINFLUENZA VIRUS 3 BY PCR: NOT DETECTED
PARAINFLUENZA VIRUS 4 BY PCR: NOT DETECTED
PROCALCITONIN: 0.03 NG/ML (ref 0–0.08)
RESPIRATORY SYNCYTIAL VIRUS BY PCR: NOT DETECTED
SARS-COV-2, PCR: NOT DETECTED

## 2023-02-24 PROCEDURE — 94640 AIRWAY INHALATION TREATMENT: CPT

## 2023-02-24 PROCEDURE — 1200000000 HC SEMI PRIVATE

## 2023-02-24 PROCEDURE — 6360000002 HC RX W HCPCS: Performed by: NURSE PRACTITIONER

## 2023-02-24 PROCEDURE — 87449 NOS EACH ORGANISM AG IA: CPT

## 2023-02-24 PROCEDURE — 2500000003 HC RX 250 WO HCPCS: Performed by: PODIATRIST

## 2023-02-24 PROCEDURE — C1762 CONN TISS, HUMAN(INC FASCIA): HCPCS | Performed by: PODIATRIST

## 2023-02-24 PROCEDURE — 3209999900 FLUORO FOR SURGICAL PROCEDURES

## 2023-02-24 PROCEDURE — 2500000003 HC RX 250 WO HCPCS

## 2023-02-24 PROCEDURE — 6370000000 HC RX 637 (ALT 250 FOR IP)

## 2023-02-24 PROCEDURE — 2580000003 HC RX 258: Performed by: PODIATRIST

## 2023-02-24 PROCEDURE — 0SGG07Z FUSION OF LEFT ANKLE JOINT WITH AUTOLOGOUS TISSUE SUBSTITUTE, OPEN APPROACH: ICD-10-PCS | Performed by: PODIATRIST

## 2023-02-24 PROCEDURE — 3600000014 HC SURGERY LEVEL 4 ADDTL 15MIN: Performed by: PODIATRIST

## 2023-02-24 PROCEDURE — 87040 BLOOD CULTURE FOR BACTERIA: CPT

## 2023-02-24 PROCEDURE — 2580000003 HC RX 258: Performed by: ANESTHESIOLOGY

## 2023-02-24 PROCEDURE — 3700000001 HC ADD 15 MINUTES (ANESTHESIA): Performed by: PODIATRIST

## 2023-02-24 PROCEDURE — 64445 NJX AA&/STRD SCIATIC NRV IMG: CPT | Performed by: ANESTHESIOLOGY

## 2023-02-24 PROCEDURE — 6360000002 HC RX W HCPCS: Performed by: PODIATRIST

## 2023-02-24 PROCEDURE — 6360000002 HC RX W HCPCS: Performed by: ANESTHESIOLOGY

## 2023-02-24 PROCEDURE — 3600000004 HC SURGERY LEVEL 4 BASE: Performed by: PODIATRIST

## 2023-02-24 PROCEDURE — 36415 COLL VENOUS BLD VENIPUNCTURE: CPT

## 2023-02-24 PROCEDURE — C1769 GUIDE WIRE: HCPCS | Performed by: PODIATRIST

## 2023-02-24 PROCEDURE — C1734 ORTH/DEVIC/DRUG BN/BN,TIS/BN: HCPCS | Performed by: PODIATRIST

## 2023-02-24 PROCEDURE — 6360000002 HC RX W HCPCS

## 2023-02-24 PROCEDURE — 0QBK0ZZ EXCISION OF LEFT FIBULA, OPEN APPROACH: ICD-10-PCS | Performed by: PODIATRIST

## 2023-02-24 PROCEDURE — 0SGJ07Z FUSION OF LEFT TARSAL JOINT WITH AUTOLOGOUS TISSUE SUBSTITUTE, OPEN APPROACH: ICD-10-PCS | Performed by: PODIATRIST

## 2023-02-24 PROCEDURE — 2709999900 HC NON-CHARGEABLE SUPPLY: Performed by: PODIATRIST

## 2023-02-24 PROCEDURE — 97165 OT EVAL LOW COMPLEX 30 MIN: CPT

## 2023-02-24 PROCEDURE — 7100000000 HC PACU RECOVERY - FIRST 15 MIN: Performed by: PODIATRIST

## 2023-02-24 PROCEDURE — 6370000000 HC RX 637 (ALT 250 FOR IP): Performed by: PODIATRIST

## 2023-02-24 PROCEDURE — 2500000003 HC RX 250 WO HCPCS: Performed by: NURSE ANESTHETIST, CERTIFIED REGISTERED

## 2023-02-24 PROCEDURE — 6360000002 HC RX W HCPCS: Performed by: NURSE ANESTHETIST, CERTIFIED REGISTERED

## 2023-02-24 PROCEDURE — 0202U NFCT DS 22 TRGT SARS-COV-2: CPT

## 2023-02-24 PROCEDURE — 3700000000 HC ANESTHESIA ATTENDED CARE: Performed by: PODIATRIST

## 2023-02-24 PROCEDURE — 6370000000 HC RX 637 (ALT 250 FOR IP): Performed by: NURSE PRACTITIONER

## 2023-02-24 PROCEDURE — 7100000001 HC PACU RECOVERY - ADDTL 15 MIN: Performed by: PODIATRIST

## 2023-02-24 PROCEDURE — 84145 PROCALCITONIN (PCT): CPT

## 2023-02-24 PROCEDURE — 2720000010 HC SURG SUPPLY STERILE: Performed by: PODIATRIST

## 2023-02-24 PROCEDURE — C1713 ANCHOR/SCREW BN/BN,TIS/BN: HCPCS | Performed by: PODIATRIST

## 2023-02-24 PROCEDURE — 3E0T3BZ INTRODUCTION OF ANESTHETIC AGENT INTO PERIPHERAL NERVES AND PLEXI, PERCUTANEOUS APPROACH: ICD-10-PCS | Performed by: ANESTHESIOLOGY

## 2023-02-24 PROCEDURE — 2580000003 HC RX 258

## 2023-02-24 DEVICE — LOCKING SCREW, FULLY THREADED: Type: IMPLANTABLE DEVICE | Site: FOOT | Status: FUNCTIONAL

## 2023-02-24 DEVICE — TIM-GRAFT BONE AUGMENT 3ML INJ: Type: IMPLANTABLE DEVICE | Site: ANKLE | Status: FUNCTIONAL

## 2023-02-24 DEVICE — ANKLE ARTHRODESIS NAIL, LEFT
Type: IMPLANTABLE DEVICE | Site: FOOT | Status: FUNCTIONAL
Brand: T2

## 2023-02-24 DEVICE — COMPRESSION SCREW CANNULATED
Type: IMPLANTABLE DEVICE | Site: ANKLE | Status: FUNCTIONAL
Brand: T2

## 2023-02-24 DEVICE — END CAP, SCN
Type: IMPLANTABLE DEVICE | Site: FOOT | Status: FUNCTIONAL
Brand: T2

## 2023-02-24 DEVICE — GRAFT HUM TISS AMBIENT 2 CC FLOWABLE PLCNTA TISS VIAFLOW: Type: IMPLANTABLE DEVICE | Site: FOOT | Status: FUNCTIONAL

## 2023-02-24 RX ORDER — IPRATROPIUM BROMIDE AND ALBUTEROL SULFATE 2.5; .5 MG/3ML; MG/3ML
SOLUTION RESPIRATORY (INHALATION)
Status: COMPLETED
Start: 2023-02-24 | End: 2023-02-24

## 2023-02-24 RX ORDER — SODIUM CHLORIDE 0.9 % (FLUSH) 0.9 %
5-40 SYRINGE (ML) INJECTION PRN
Status: DISCONTINUED | OUTPATIENT
Start: 2023-02-24 | End: 2023-03-02

## 2023-02-24 RX ORDER — LABETALOL HYDROCHLORIDE 5 MG/ML
10 INJECTION, SOLUTION INTRAVENOUS
Status: DISCONTINUED | OUTPATIENT
Start: 2023-02-24 | End: 2023-02-24 | Stop reason: HOSPADM

## 2023-02-24 RX ORDER — SODIUM CHLORIDE 9 MG/ML
INJECTION, SOLUTION INTRAVENOUS PRN
Status: DISCONTINUED | OUTPATIENT
Start: 2023-02-24 | End: 2023-03-03 | Stop reason: HOSPADM

## 2023-02-24 RX ORDER — ACETAMINOPHEN 325 MG/1
650 TABLET ORAL EVERY 6 HOURS PRN
Status: DISCONTINUED | OUTPATIENT
Start: 2023-02-24 | End: 2023-03-03 | Stop reason: HOSPADM

## 2023-02-24 RX ORDER — GLYCOPYRROLATE 0.2 MG/ML
INJECTION INTRAMUSCULAR; INTRAVENOUS PRN
Status: DISCONTINUED | OUTPATIENT
Start: 2023-02-24 | End: 2023-02-24 | Stop reason: SDUPTHER

## 2023-02-24 RX ORDER — HYDROCODONE BITARTRATE AND ACETAMINOPHEN 5; 325 MG/1; MG/1
1 TABLET ORAL EVERY 6 HOURS PRN
Status: DISCONTINUED | OUTPATIENT
Start: 2023-02-24 | End: 2023-02-27

## 2023-02-24 RX ORDER — SODIUM CHLORIDE AND POTASSIUM CHLORIDE 300; 900 MG/100ML; MG/100ML
INJECTION, SOLUTION INTRAVENOUS CONTINUOUS
Status: DISCONTINUED | OUTPATIENT
Start: 2023-02-24 | End: 2023-02-26

## 2023-02-24 RX ORDER — DIPHENHYDRAMINE HYDROCHLORIDE 50 MG/ML
12.5 INJECTION INTRAMUSCULAR; INTRAVENOUS
Status: DISCONTINUED | OUTPATIENT
Start: 2023-02-24 | End: 2023-02-24 | Stop reason: HOSPADM

## 2023-02-24 RX ORDER — LEVOTHYROXINE SODIUM 0.05 MG/1
50 TABLET ORAL DAILY
Status: DISCONTINUED | OUTPATIENT
Start: 2023-02-24 | End: 2023-03-03 | Stop reason: HOSPADM

## 2023-02-24 RX ORDER — LIDOCAINE HYDROCHLORIDE 20 MG/ML
INJECTION, SOLUTION INTRAVENOUS PRN
Status: DISCONTINUED | OUTPATIENT
Start: 2023-02-24 | End: 2023-02-24 | Stop reason: SDUPTHER

## 2023-02-24 RX ORDER — FENTANYL CITRATE 50 UG/ML
50 INJECTION, SOLUTION INTRAMUSCULAR; INTRAVENOUS EVERY 5 MIN PRN
Status: DISCONTINUED | OUTPATIENT
Start: 2023-02-24 | End: 2023-02-24 | Stop reason: HOSPADM

## 2023-02-24 RX ORDER — ONDANSETRON 2 MG/ML
INJECTION INTRAMUSCULAR; INTRAVENOUS PRN
Status: DISCONTINUED | OUTPATIENT
Start: 2023-02-24 | End: 2023-02-24 | Stop reason: SDUPTHER

## 2023-02-24 RX ORDER — ARFORMOTEROL TARTRATE 15 UG/2ML
15 SOLUTION RESPIRATORY (INHALATION) 2 TIMES DAILY
Status: DISCONTINUED | OUTPATIENT
Start: 2023-02-24 | End: 2023-03-03 | Stop reason: HOSPADM

## 2023-02-24 RX ORDER — SODIUM CHLORIDE 0.9 % (FLUSH) 0.9 %
5-40 SYRINGE (ML) INJECTION EVERY 12 HOURS SCHEDULED
Status: DISCONTINUED | OUTPATIENT
Start: 2023-02-24 | End: 2023-03-02

## 2023-02-24 RX ORDER — BUDESONIDE 0.5 MG/2ML
0.5 INHALANT ORAL 2 TIMES DAILY
Status: DISCONTINUED | OUTPATIENT
Start: 2023-02-24 | End: 2023-03-03 | Stop reason: HOSPADM

## 2023-02-24 RX ORDER — SODIUM CHLORIDE, SODIUM LACTATE, POTASSIUM CHLORIDE, CALCIUM CHLORIDE 600; 310; 30; 20 MG/100ML; MG/100ML; MG/100ML; MG/100ML
INJECTION, SOLUTION INTRAVENOUS CONTINUOUS
Status: DISCONTINUED | OUTPATIENT
Start: 2023-02-24 | End: 2023-02-24 | Stop reason: HOSPADM

## 2023-02-24 RX ORDER — PROPOFOL 10 MG/ML
INJECTION, EMULSION INTRAVENOUS PRN
Status: DISCONTINUED | OUTPATIENT
Start: 2023-02-24 | End: 2023-02-24 | Stop reason: SDUPTHER

## 2023-02-24 RX ORDER — FENTANYL CITRATE 50 UG/ML
INJECTION, SOLUTION INTRAMUSCULAR; INTRAVENOUS PRN
Status: DISCONTINUED | OUTPATIENT
Start: 2023-02-24 | End: 2023-02-24 | Stop reason: SDUPTHER

## 2023-02-24 RX ORDER — HYDROMORPHONE HYDROCHLORIDE 1 MG/ML
1 INJECTION, SOLUTION INTRAMUSCULAR; INTRAVENOUS; SUBCUTANEOUS EVERY 4 HOURS PRN
Status: DISCONTINUED | OUTPATIENT
Start: 2023-02-24 | End: 2023-02-27

## 2023-02-24 RX ORDER — MIDAZOLAM HYDROCHLORIDE 1 MG/ML
2 INJECTION INTRAMUSCULAR; INTRAVENOUS ONCE
Status: COMPLETED | OUTPATIENT
Start: 2023-02-24 | End: 2023-02-24

## 2023-02-24 RX ORDER — POLYETHYLENE GLYCOL 3350 17 G/17G
17 POWDER, FOR SOLUTION ORAL DAILY PRN
Status: DISCONTINUED | OUTPATIENT
Start: 2023-02-24 | End: 2023-03-03 | Stop reason: HOSPADM

## 2023-02-24 RX ORDER — FENTANYL CITRATE 50 UG/ML
25 INJECTION, SOLUTION INTRAMUSCULAR; INTRAVENOUS EVERY 5 MIN PRN
Status: DISCONTINUED | OUTPATIENT
Start: 2023-02-24 | End: 2023-02-24 | Stop reason: HOSPADM

## 2023-02-24 RX ORDER — IPRATROPIUM BROMIDE AND ALBUTEROL SULFATE 2.5; .5 MG/3ML; MG/3ML
1 SOLUTION RESPIRATORY (INHALATION)
Status: DISCONTINUED | OUTPATIENT
Start: 2023-02-24 | End: 2023-02-24 | Stop reason: HOSPADM

## 2023-02-24 RX ORDER — ACETAMINOPHEN 650 MG/1
650 SUPPOSITORY RECTAL EVERY 6 HOURS PRN
Status: DISCONTINUED | OUTPATIENT
Start: 2023-02-24 | End: 2023-03-03 | Stop reason: HOSPADM

## 2023-02-24 RX ORDER — HYDRALAZINE HYDROCHLORIDE 20 MG/ML
5 INJECTION INTRAMUSCULAR; INTRAVENOUS EVERY 4 HOURS PRN
Status: DISCONTINUED | OUTPATIENT
Start: 2023-02-24 | End: 2023-03-03 | Stop reason: HOSPADM

## 2023-02-24 RX ORDER — HYDRALAZINE HYDROCHLORIDE 20 MG/ML
10 INJECTION INTRAMUSCULAR; INTRAVENOUS
Status: DISCONTINUED | OUTPATIENT
Start: 2023-02-24 | End: 2023-02-24 | Stop reason: HOSPADM

## 2023-02-24 RX ORDER — SODIUM CHLORIDE, SODIUM LACTATE, POTASSIUM CHLORIDE, CALCIUM CHLORIDE 600; 310; 30; 20 MG/100ML; MG/100ML; MG/100ML; MG/100ML
INJECTION, SOLUTION INTRAVENOUS CONTINUOUS PRN
Status: DISCONTINUED | OUTPATIENT
Start: 2023-02-24 | End: 2023-02-24 | Stop reason: SDUPTHER

## 2023-02-24 RX ORDER — DEXAMETHASONE SODIUM PHOSPHATE 10 MG/ML
INJECTION, SOLUTION INTRAMUSCULAR; INTRAVENOUS PRN
Status: DISCONTINUED | OUTPATIENT
Start: 2023-02-24 | End: 2023-02-24 | Stop reason: SDUPTHER

## 2023-02-24 RX ORDER — ENOXAPARIN SODIUM 100 MG/ML
40 INJECTION SUBCUTANEOUS DAILY
Status: DISCONTINUED | OUTPATIENT
Start: 2023-02-24 | End: 2023-02-24 | Stop reason: DRUGHIGH

## 2023-02-24 RX ORDER — ENOXAPARIN SODIUM 100 MG/ML
30 INJECTION SUBCUTANEOUS 2 TIMES DAILY
Status: DISCONTINUED | OUTPATIENT
Start: 2023-02-24 | End: 2023-03-03 | Stop reason: HOSPADM

## 2023-02-24 RX ORDER — POTASSIUM CHLORIDE 20 MEQ/1
40 TABLET, EXTENDED RELEASE ORAL PRN
Status: DISCONTINUED | OUTPATIENT
Start: 2023-02-24 | End: 2023-03-03 | Stop reason: HOSPADM

## 2023-02-24 RX ORDER — HYDROXYZINE HYDROCHLORIDE 25 MG/1
50 TABLET, FILM COATED ORAL NIGHTLY
Status: DISCONTINUED | OUTPATIENT
Start: 2023-02-24 | End: 2023-03-03 | Stop reason: HOSPADM

## 2023-02-24 RX ORDER — ROPIVACAINE HYDROCHLORIDE 5 MG/ML
INJECTION, SOLUTION EPIDURAL; INFILTRATION; PERINEURAL
Status: COMPLETED
Start: 2023-02-24 | End: 2023-02-24

## 2023-02-24 RX ORDER — ROPIVACAINE HYDROCHLORIDE 5 MG/ML
INJECTION, SOLUTION EPIDURAL; INFILTRATION; PERINEURAL
Status: COMPLETED | OUTPATIENT
Start: 2023-02-24 | End: 2023-02-24

## 2023-02-24 RX ORDER — NEOSTIGMINE METHYLSULFATE 1 MG/ML
INJECTION, SOLUTION INTRAVENOUS PRN
Status: DISCONTINUED | OUTPATIENT
Start: 2023-02-24 | End: 2023-02-24 | Stop reason: SDUPTHER

## 2023-02-24 RX ORDER — ONDANSETRON 4 MG/1
4 TABLET, ORALLY DISINTEGRATING ORAL EVERY 8 HOURS PRN
Status: DISCONTINUED | OUTPATIENT
Start: 2023-02-24 | End: 2023-03-03 | Stop reason: HOSPADM

## 2023-02-24 RX ORDER — ACETYLCYSTEINE 100 MG/ML
4 SOLUTION ORAL; RESPIRATORY (INHALATION) 3 TIMES DAILY
Status: COMPLETED | OUTPATIENT
Start: 2023-02-24 | End: 2023-02-26

## 2023-02-24 RX ORDER — ALBUTEROL SULFATE 2.5 MG/3ML
2.5 SOLUTION RESPIRATORY (INHALATION) EVERY 4 HOURS PRN
Status: DISCONTINUED | OUTPATIENT
Start: 2023-02-24 | End: 2023-03-03 | Stop reason: HOSPADM

## 2023-02-24 RX ORDER — ALBUTEROL SULFATE 2.5 MG/3ML
2.5 SOLUTION RESPIRATORY (INHALATION) 3 TIMES DAILY
Status: COMPLETED | OUTPATIENT
Start: 2023-02-24 | End: 2023-02-26

## 2023-02-24 RX ORDER — ROCURONIUM BROMIDE 10 MG/ML
INJECTION, SOLUTION INTRAVENOUS PRN
Status: DISCONTINUED | OUTPATIENT
Start: 2023-02-24 | End: 2023-02-24 | Stop reason: SDUPTHER

## 2023-02-24 RX ORDER — FENTANYL CITRATE 50 UG/ML
100 INJECTION, SOLUTION INTRAMUSCULAR; INTRAVENOUS ONCE
Status: COMPLETED | OUTPATIENT
Start: 2023-02-24 | End: 2023-02-24

## 2023-02-24 RX ORDER — POTASSIUM CHLORIDE 7.45 MG/ML
10 INJECTION INTRAVENOUS PRN
Status: DISCONTINUED | OUTPATIENT
Start: 2023-02-24 | End: 2023-03-03 | Stop reason: HOSPADM

## 2023-02-24 RX ORDER — MIDAZOLAM HYDROCHLORIDE 1 MG/ML
2 INJECTION INTRAMUSCULAR; INTRAVENOUS
Status: DISCONTINUED | OUTPATIENT
Start: 2023-02-24 | End: 2023-02-24 | Stop reason: HOSPADM

## 2023-02-24 RX ORDER — MAGNESIUM SULFATE 1 G/100ML
1000 INJECTION INTRAVENOUS PRN
Status: DISCONTINUED | OUTPATIENT
Start: 2023-02-24 | End: 2023-03-03 | Stop reason: HOSPADM

## 2023-02-24 RX ORDER — FENTANYL CITRATE 50 UG/ML
INJECTION, SOLUTION INTRAMUSCULAR; INTRAVENOUS
Status: COMPLETED
Start: 2023-02-24 | End: 2023-02-24

## 2023-02-24 RX ORDER — MIDAZOLAM HYDROCHLORIDE 1 MG/ML
INJECTION INTRAMUSCULAR; INTRAVENOUS
Status: COMPLETED
Start: 2023-02-24 | End: 2023-02-24

## 2023-02-24 RX ORDER — LEVETIRACETAM 500 MG/1
500 TABLET ORAL 2 TIMES DAILY
Status: DISCONTINUED | OUTPATIENT
Start: 2023-02-24 | End: 2023-03-03 | Stop reason: HOSPADM

## 2023-02-24 RX ORDER — MEPERIDINE HYDROCHLORIDE 25 MG/ML
12.5 INJECTION INTRAMUSCULAR; INTRAVENOUS; SUBCUTANEOUS EVERY 5 MIN PRN
Status: DISCONTINUED | OUTPATIENT
Start: 2023-02-24 | End: 2023-02-24 | Stop reason: HOSPADM

## 2023-02-24 RX ORDER — ONDANSETRON 2 MG/ML
4 INJECTION INTRAMUSCULAR; INTRAVENOUS EVERY 6 HOURS PRN
Status: DISCONTINUED | OUTPATIENT
Start: 2023-02-24 | End: 2023-03-03 | Stop reason: HOSPADM

## 2023-02-24 RX ADMIN — Medication 3 MG: at 09:56

## 2023-02-24 RX ADMIN — PHENYLEPHRINE HYDROCHLORIDE 100 MCG: 10 INJECTION INTRAVENOUS at 08:30

## 2023-02-24 RX ADMIN — HYDROCODONE BITARTRATE AND ACETAMINOPHEN 1 TABLET: 5; 325 TABLET ORAL at 15:07

## 2023-02-24 RX ADMIN — HYDROMORPHONE HYDROCHLORIDE 1 MG: 1 INJECTION, SOLUTION INTRAMUSCULAR; INTRAVENOUS; SUBCUTANEOUS at 10:45

## 2023-02-24 RX ADMIN — PROPOFOL 60 MG: 10 INJECTION, EMULSION INTRAVENOUS at 08:48

## 2023-02-24 RX ADMIN — FENTANYL CITRATE 50 MCG: 50 INJECTION, SOLUTION INTRAMUSCULAR; INTRAVENOUS at 09:50

## 2023-02-24 RX ADMIN — CEFAZOLIN 2000 MG: 2 INJECTION, POWDER, FOR SOLUTION INTRAMUSCULAR; INTRAVENOUS at 22:50

## 2023-02-24 RX ADMIN — HYDROCODONE BITARTRATE AND ACETAMINOPHEN 1 TABLET: 5; 325 TABLET ORAL at 22:54

## 2023-02-24 RX ADMIN — ARFORMOTEROL TARTRATE 15 MCG: 15 SOLUTION RESPIRATORY (INHALATION) at 20:29

## 2023-02-24 RX ADMIN — SODIUM CHLORIDE, POTASSIUM CHLORIDE, SODIUM LACTATE AND CALCIUM CHLORIDE: 600; 310; 30; 20 INJECTION, SOLUTION INTRAVENOUS at 08:59

## 2023-02-24 RX ADMIN — FENTANYL CITRATE 100 MCG: 50 INJECTION INTRAMUSCULAR; INTRAVENOUS at 07:09

## 2023-02-24 RX ADMIN — LIDOCAINE HYDROCHLORIDE 60 MG: 20 INJECTION, SOLUTION INTRAVENOUS at 07:34

## 2023-02-24 RX ADMIN — DEXAMETHASONE SODIUM PHOSPHATE 10 MG: 10 INJECTION INTRAMUSCULAR; INTRAVENOUS at 07:40

## 2023-02-24 RX ADMIN — PROPOFOL 140 MG: 10 INJECTION, EMULSION INTRAVENOUS at 07:34

## 2023-02-24 RX ADMIN — PHENYLEPHRINE HYDROCHLORIDE 100 MCG: 10 INJECTION INTRAVENOUS at 08:35

## 2023-02-24 RX ADMIN — HYDROMORPHONE HYDROCHLORIDE 1 MG: 1 INJECTION, SOLUTION INTRAMUSCULAR; INTRAVENOUS; SUBCUTANEOUS at 13:31

## 2023-02-24 RX ADMIN — HYDROMORPHONE HYDROCHLORIDE 1 MG: 1 INJECTION, SOLUTION INTRAMUSCULAR; INTRAVENOUS; SUBCUTANEOUS at 17:33

## 2023-02-24 RX ADMIN — ENOXAPARIN SODIUM 30 MG: 100 INJECTION SUBCUTANEOUS at 20:36

## 2023-02-24 RX ADMIN — POTASSIUM CHLORIDE AND SODIUM CHLORIDE: 900; 300 INJECTION, SOLUTION INTRAVENOUS at 12:54

## 2023-02-24 RX ADMIN — ROCURONIUM BROMIDE 50 MG: 10 INJECTION, SOLUTION INTRAVENOUS at 07:34

## 2023-02-24 RX ADMIN — BUDESONIDE 500 MCG: 0.5 SUSPENSION RESPIRATORY (INHALATION) at 20:29

## 2023-02-24 RX ADMIN — MIDAZOLAM HYDROCHLORIDE 2 MG: 1 INJECTION INTRAMUSCULAR; INTRAVENOUS at 07:09

## 2023-02-24 RX ADMIN — FENTANYL CITRATE 50 MCG: 50 INJECTION, SOLUTION INTRAMUSCULAR; INTRAVENOUS at 09:48

## 2023-02-24 RX ADMIN — CEFAZOLIN 2000 MG: 2 INJECTION, POWDER, FOR SOLUTION INTRAMUSCULAR; INTRAVENOUS at 07:30

## 2023-02-24 RX ADMIN — HYDROXYZINE HYDROCHLORIDE 50 MG: 25 TABLET ORAL at 20:35

## 2023-02-24 RX ADMIN — IPRATROPIUM BROMIDE AND ALBUTEROL SULFATE 3 ML: 2.5; .5 SOLUTION RESPIRATORY (INHALATION) at 10:20

## 2023-02-24 RX ADMIN — ROPIVACAINE HYDROCHLORIDE 30 ML: 5 INJECTION, SOLUTION EPIDURAL; INFILTRATION; PERINEURAL at 07:16

## 2023-02-24 RX ADMIN — HYDROMORPHONE HYDROCHLORIDE 1 MG: 1 INJECTION, SOLUTION INTRAMUSCULAR; INTRAVENOUS; SUBCUTANEOUS at 21:40

## 2023-02-24 RX ADMIN — ONDANSETRON 4 MG: 2 INJECTION INTRAMUSCULAR; INTRAVENOUS at 09:44

## 2023-02-24 RX ADMIN — MIDAZOLAM 2 MG: 1 INJECTION INTRAMUSCULAR; INTRAVENOUS at 07:09

## 2023-02-24 RX ADMIN — FENTANYL CITRATE 50 MCG: 50 INJECTION, SOLUTION INTRAMUSCULAR; INTRAVENOUS at 07:34

## 2023-02-24 RX ADMIN — LEVETIRACETAM 500 MG: 500 TABLET, FILM COATED ORAL at 20:35

## 2023-02-24 RX ADMIN — ACETYLCYSTEINE 400 MG: 100 SOLUTION ORAL; RESPIRATORY (INHALATION) at 20:29

## 2023-02-24 RX ADMIN — ALBUTEROL SULFATE 2.5 MG: 2.5 SOLUTION RESPIRATORY (INHALATION) at 20:29

## 2023-02-24 RX ADMIN — FENTANYL CITRATE 50 MCG: 50 INJECTION, SOLUTION INTRAMUSCULAR; INTRAVENOUS at 08:50

## 2023-02-24 RX ADMIN — ALBUTEROL SULFATE 2.5 MG: 2.5 SOLUTION RESPIRATORY (INHALATION) at 15:49

## 2023-02-24 RX ADMIN — FENTANYL CITRATE 100 MCG: 50 INJECTION, SOLUTION INTRAMUSCULAR; INTRAVENOUS at 07:09

## 2023-02-24 RX ADMIN — LEVETIRACETAM 500 MG: 500 TABLET, FILM COATED ORAL at 13:30

## 2023-02-24 RX ADMIN — SODIUM CHLORIDE, POTASSIUM CHLORIDE, SODIUM LACTATE AND CALCIUM CHLORIDE: 600; 310; 30; 20 INJECTION, SOLUTION INTRAVENOUS at 07:26

## 2023-02-24 RX ADMIN — ROCURONIUM BROMIDE 10 MG: 10 INJECTION, SOLUTION INTRAVENOUS at 08:48

## 2023-02-24 RX ADMIN — ACETYLCYSTEINE 400 MG: 100 SOLUTION ORAL; RESPIRATORY (INHALATION) at 15:49

## 2023-02-24 RX ADMIN — GLYCOPYRROLATE 0.4 MG: 0.2 INJECTION INTRAMUSCULAR; INTRAVENOUS at 09:56

## 2023-02-24 RX ADMIN — SUGAMMADEX 250 MG: 100 INJECTION, SOLUTION INTRAVENOUS at 10:31

## 2023-02-24 RX ADMIN — CEFAZOLIN 2000 MG: 2 INJECTION, POWDER, FOR SOLUTION INTRAMUSCULAR; INTRAVENOUS at 16:16

## 2023-02-24 RX ADMIN — SODIUM CHLORIDE, POTASSIUM CHLORIDE, SODIUM LACTATE AND CALCIUM CHLORIDE: 600; 310; 30; 20 INJECTION, SOLUTION INTRAVENOUS at 06:37

## 2023-02-24 ASSESSMENT — PAIN DESCRIPTION - LOCATION
LOCATION: ANKLE
LOCATION: OTHER (COMMENT)
LOCATION: ANKLE
LOCATION: ANKLE;FOOT;LEG

## 2023-02-24 ASSESSMENT — PAIN DESCRIPTION - ONSET
ONSET: AWAKENED FROM SLEEP
ONSET: ON-GOING

## 2023-02-24 ASSESSMENT — PAIN DESCRIPTION - PAIN TYPE
TYPE: SURGICAL PAIN
TYPE: SURGICAL PAIN

## 2023-02-24 ASSESSMENT — PAIN SCALES - GENERAL
PAINLEVEL_OUTOF10: 8
PAINLEVEL_OUTOF10: 10
PAINLEVEL_OUTOF10: 10
PAINLEVEL_OUTOF10: 9
PAINLEVEL_OUTOF10: 0
PAINLEVEL_OUTOF10: 4
PAINLEVEL_OUTOF10: 7
PAINLEVEL_OUTOF10: 10
PAINLEVEL_OUTOF10: 4
PAINLEVEL_OUTOF10: 8
PAINLEVEL_OUTOF10: 10

## 2023-02-24 ASSESSMENT — PAIN DESCRIPTION - FREQUENCY
FREQUENCY: CONTINUOUS
FREQUENCY: CONTINUOUS

## 2023-02-24 ASSESSMENT — PAIN DESCRIPTION - ORIENTATION
ORIENTATION: LEFT
ORIENTATION: LEFT;INNER
ORIENTATION: LEFT;INNER
ORIENTATION: LEFT
ORIENTATION: LEFT

## 2023-02-24 ASSESSMENT — PAIN - FUNCTIONAL ASSESSMENT
PAIN_FUNCTIONAL_ASSESSMENT: PREVENTS OR INTERFERES SOME ACTIVE ACTIVITIES AND ADLS
PAIN_FUNCTIONAL_ASSESSMENT: PREVENTS OR INTERFERES SOME ACTIVE ACTIVITIES AND ADLS
PAIN_FUNCTIONAL_ASSESSMENT: 0-10

## 2023-02-24 ASSESSMENT — PAIN DESCRIPTION - DESCRIPTORS
DESCRIPTORS: ACHING
DESCRIPTORS: SHARP
DESCRIPTORS: ACHING
DESCRIPTORS: SHARP;ACHING
DESCRIPTORS: SHARP;THROBBING
DESCRIPTORS: SHARP;ACHING

## 2023-02-24 ASSESSMENT — COPD QUESTIONNAIRES: CAT_SEVERITY: MILD

## 2023-02-24 ASSESSMENT — LIFESTYLE VARIABLES: SMOKING_STATUS: 1

## 2023-02-24 NOTE — H&P
H and P reviewed. No changes   Plan for left TTC fusion going forward.   NPO since midnight      Kaela Faria DPM FACFAS  Fellowship-Trained Foot and Ankle Surgeon  Diplomate, American Board of Foot and Ankle Surgeons  [phone]

## 2023-02-24 NOTE — H&P
Department of Internal Medicine  History and Physical Examination     Primary Care Physician: Radha Leal DO   Admitting Physician:  Miracle Hansen DO  Admission date and time: 2023  5:19 AM    Room:  OR POOL/NONE  Admitting diagnosis: Osteoarthritis of left ankle, unspecified osteoarthritis type [M19.072]  Postoperative pain [G89.18]  Arthritis of left ankle [M19.072]    Patient Name: Caron Toribio  MRN: [id number]    Date of Service: 2023     Chief Complaint: Postop pain    HISTORY OF PRESENT ILLNESS:    Caron Toribio is a 46year old female patient who presented to [address] [place] [address] for planned podiatric surgery. She is seen and examined in the PACU. She is drowsy in the recent administration of pain medication and anesthesia. We have reviewed her medical and surgical history at bedside since her last hospitalization. Aside from postoperative pain, she voices no symptoms or concerns. Per the PACU nurse, they have concerned that she may have aspirated.     PAST MEDICAL Hx:  Past Medical History:   Diagnosis Date    Arthritis     Brain venous angioma (HCC)     Bursitis     hips    Cat scratch of left lower leg     Class 3 severe obesity due to excess calories with body mass index (BMI) of 40.0 to 44.9 in adult [facility])     COPD (chronic obstructive pulmonary disease) (HCC)     Diverticulitis     GERD (gastroesophageal reflux disease)     Incisional hernia with obstruction but no gangrene     Strep throat        PAST SURGICAL Hx:   Past Surgical History:   Procedure Laterality Date    CARPAL TUNNEL RELEASE Left      SECTION      CHOLECYSTECTOMY      COLECTOMY  2016    FOREARM FRACTURE SURGERY Left     fracture of ulna s/p repair    HERNIA REPAIR      HERNIA REPAIR  2015    incarcerated hernia repair    HERNIA REPAIR N/A 2021    INCISIONAL HERNIA REPAIR WITH MESH, POSSIBLE COMPONENT SEPARATION  LAPAROSCOPIC ROBOTIC XI ASSISTED (CPT V7893542) performed by Rebecca Abreu MD at [address] [facility] [address] HYSTERECTOMY (CERVIX STATUS UNKNOWN)      NERVE BLOCK      sacroiliac bilateral 2012    PELVIC FRACTURE SURGERY      VENTRAL HERNIA REPAIR  2015       FAMILY Hx:  Family History   Problem Relation Age of Onset    Osteoarthritis Other     Diabetes Other     Other Father         pancreatitis    Prostate Cancer Father     Stroke Brother         half brother    Cancer Paternal Grandfather        HOME MEDICATIONS:  Prior to Admission medications    Medication Sig Start Date End Date Taking? Authorizing Provider   levothyroxine (SYNTHROID) 50 MCG tablet Take 1 tablet by mouth Daily 11/15/22   Deniz Michel, DO   levETIRAcetam (KEPPRA) 500 MG tablet Take 1 tablet by mouth 2 times daily 21   Deniz Michel,    hydrOXYzine (ATARAX) 50 MG tablet Take 50 mg by mouth nightly  21   Historical Provider, MD       ALLERGIES:  Ibuprofen, Nsaids, and Morphine    SOCIAL Hx:  Social History     Socioeconomic History    Marital status:      Spouse name: Magan Hair    Number of children: 4    Years of education: 12    Highest education level: Not on file   Occupational History     Employer: [employer]   Tobacco Use    Smoking status: Former     Packs/day: 0.50     Years: 30.00     Pack years: 15.00     Types: Cigarettes     Quit date: 3/5/2021     Years since quittin.9    Smokeless tobacco: Never   Vaping Use    Vaping Use: Never used   Substance and Sexual Activity    Alcohol use: Yes     Comment: socially    Drug use: Not Currently     Types: Marijuana Katelyn Garnica     Comment: medical marijuana. Sexual activity: Yes     Partners: Male   Other Topics Concern    Not on file   Social History Narrative     twice.  Recently  2020     Social Determinants of Health     Financial Resource Strain: Not on file   Food Insecurity: Not on file   Transportation Needs: Not on file   Physical Activity: Not on file   Stress: Not on file   Social Connections: Not on file   Intimate Partner Violence: Not on file   Housing Stability: Not on file     Review of systems:  Constitutional:   Positive for significant fatigue and malaise , - fever/chills  HEENT:   Denies ear pain, sore throat, sinus or eye problems. Cardiovascular:   Denies any chest pain, irregular heartbeats, or palpitations. Respiratory:   - dyspnea at rest, - dyspnea on exertion, + coughing, + congestion, - sputum, - hemoptysis  Gastrointestinal:   - nausea, -vomiting. - diarrhea, - constipation. - poor appetite and poor intake. - abdominal pain. Genitourinary:    Denies any urgency, frequency, hematuria. Voiding  without difficulty. Extremities:   Postoperative pain as to be expected. Therapy splint is in place to the left lower extremity. Denies lower extremity swelling, edema or cyanosis. Neurology:    Denies any headache or focal neurological deficits, positive for generalized weakness and fatigue without focal component  Psch:   Denies being anxious or depressed. Musculoskeletal:    Denies  myalgias, joint complaints or back pain. Integumentary:   Denies any rashes, ulcers, or excoriations. Denies bruising. Hematologic/Lymphatic:  Denies bruising or bleeding. PHYSICAL EXAM:  VITALS:  Vitals:    02/24/23 1045   BP:    Pulse:    Resp: 20   Temp:    SpO2:          CONSTITUTIONAL:    Drowsy under the recent ministration of pain medication and anesthesia. Uncomfortable appearing. No distress. EYES:    PERRL, EOMI, sclera clear without icterus, conjunctiva normal    ENT:    Normocephalic, atraumatic, sinuses nontender on palpation. External ears without lesions. Oral pharynx with moist mucus membranes. NECK:    Supple, symmetrical, trachea midline, no adenopathy, thyroid symmetric, not enlarged and no tenderness, skin normal, no bruits, no JVD    HEMATOLOGIC/LYMPHATICS:    No cervical lymphadenopathy and no supraclavicular lymphadenopathy    LUNGS:    Symmetric. Shallow and mildly tachypneic pattern without labor.   Coarse breath sounds scattered without seeing or rales. CARDIOVASCULAR:    Normal apical impulse, regular rate and rhythm, normal S1 and S2, systolic murmur noted    ABDOMEN:    Obese contour, normal bowel sounds, soft, non-distended, non-tender, no rebound or guarding elicited on palpation     MUSCULOSKELETAL:    The left lower extremity is immobilized with splint and wrapped in Ace. MARLENE drain in the place. There is no redness, warmth, or swelling of the joints. Tone is normal.    NEUROLOGIC:    Drowsy under the recent administration of pain medication and anesthesia. Nonfocal.      SKIN:    No bruising or bleeding. No redness, warmth, or swelling    EXTREMITIES:    Peripheral pulses present. No edema, cyanosis, or swelling.     LABORATORY DATA:  CBC with Differential:    Lab Results   Component Value Date/Time    WBC 6.8 02/22/2023 10:30 AM    RBC 4.71 02/22/2023 10:30 AM    HGB 14.3 02/22/2023 10:30 AM    HCT 45.7 02/22/2023 10:30 AM     02/22/2023 10:30 AM    MCV 97.0 02/22/2023 10:30 AM    MCH 30.4 02/22/2023 10:30 AM    MCHC 31.3 02/22/2023 10:30 AM    RDW 16.0 02/22/2023 10:30 AM    NRBC 0.9 11/14/2022 05:26 AM    SEGSPCT 82 09/14/2011 09:10 AM    METASPCT 1.7 11/14/2022 05:26 AM    LYMPHOPCT 28.2 12/27/2022 08:26 PM    MONOPCT 4.0 12/27/2022 08:26 PM    MYELOPCT 2.6 11/14/2022 05:26 AM    BASOPCT 0.3 12/27/2022 08:26 PM    MONOSABS 0.25 12/27/2022 08:26 PM    LYMPHSABS 1.77 12/27/2022 08:26 PM    EOSABS 0.14 12/27/2022 08:26 PM    BASOSABS 0.02 12/27/2022 08:26 PM     BMP:    Lab Results   Component Value Date/Time     02/22/2023 10:30 AM    K 4.4 02/22/2023 10:30 AM     02/22/2023 10:30 AM    CO2 25 02/22/2023 10:30 AM    BUN 24 02/22/2023 10:30 AM    LABALBU 3.8 12/27/2022 08:26 PM    CREATININE 0.9 02/22/2023 10:30 AM    CALCIUM 9.4 02/22/2023 10:30 AM    GFRAA >60 08/02/2021 04:46 AM    LABGLOM >60 02/22/2023 10:30 AM    GLUCOSE 104 02/22/2023 10:30 AM    GLUCOSE 89 09/14/2011 09:10 AM ASSESSMENT:  Symptomatic osteoarthritis of the left ankle status post TTC fusion of the left ankle and Mcalister of graft in February 24, 2023 by Dr. Luis Antonio Cho  Perioperative aspiration, suspected  Nonoxygen dependent COPD with exacerbation secondary to #2  Gastroesophageal reflux disease  Obesity secondary to excess caloric intake with BMI 41.88 kg meter squared  Inguinal adenopathy previously reported with plans for outpatient follow-up, unsure if followed through by patient    PLAN:  Greg Fuller is a 60-year-old female patient who presented to [address] [place] [address] for planned podiatric intervention. She was seen in the PACU. She is uncomfortable following the surgery and we will defer all pain medications to the podiatric team.  Weightbearing as per their recommendations as well. PT, OT and social work will follow as the patient is planning for skilled nursing facility upon discharge. Per PACU nurse, there is concern for perioperative aspiration with resultant COPD flare. Respiratory supportive measures, scheduled beta agonist and mucolytic's and spirometer with flutter valve usage will be required. Underlying co-morbidites will be addressed during hospitalization as well. Labs and vital signs will be monitored closely and addressed accordingly. See additional orders for details.      BOB Herring CNP  10:48 AM  2/24/2023    Electronically signed by BOB Herring CNP on 2/24/23 at 10:48 AM EST

## 2023-02-24 NOTE — ANESTHESIA POSTPROCEDURE EVALUATION
Department of Anesthesiology  Postprocedure Note    Patient: Mamie Garcia  MRN: [id number]  YOB: 1970  Date of evaluation: 2/24/2023      Procedure Summary     Date: 02/24/23 Room / Location: [address] [place] [address] / [facility] ([facility])    Anesthesia Start: 8418 Anesthesia Stop: 1034    Procedure: TTC FUSION LEFT ANKLE AND HARVEST OF GRAFT (Left: Foot) Diagnosis:       Osteoarthritis of left ankle, unspecified osteoarthritis type      (Osteoarthritis of left ankle, unspecified osteoarthritis type [M19.072])    Surgeons: Arik Cuevas DPM Responsible Provider: Sofia Alba MD    Anesthesia Type: general, regional ASA Status: 3          Anesthesia Type: No value filed.     Russell Phase I: Russell Score: 9    Russell Phase II:        Anesthesia Post Evaluation    Patient location during evaluation: PACU  Patient participation: complete - patient participated  Level of consciousness: awake  Airway patency: patent  Nausea & Vomiting: no nausea and no vomiting  Complications: no  Cardiovascular status: hemodynamically stable  Respiratory status: acceptable  Hydration status: euvolemic  Multimodal analgesia pain management approach

## 2023-02-24 NOTE — CARE COORDINATION
Case Management Assessment  Initial Evaluation    Date/Time of Evaluation: 2/24/2023 1:27 PM  Assessment Completed by: Tania Castorena RN    If patient is discharged prior to next notation, then this note serves as note for discharge by case management. Patient Name: Caron Toribio                   YOB: 1970  Diagnosis: Osteoarthritis of left ankle, unspecified osteoarthritis type [M19.072]  Postoperative pain [G89.18]  Arthritis of left ankle [M19.072]                   Date / Time: 2/24/2023  5:19 AM    Patient Admission Status: Inpatient   Readmission Risk (Low < 19, Mod (19-27), High > 27): Readmission Risk Score: 14.8    Current PCP: Radha Leal, DO  PCP verified by CM? Yes    Chart Reviewed: Yes      History Provided by: Patient, Medical Record  Patient Orientation: Alert and Oriented    Patient Cognition: Alert    Hospitalization in the last 30 days (Readmission):  No    If yes, Readmission Assessment in CM Navigator will be completed. Advance Directives:      Code Status: Full Code   Patient's Primary Decision Maker is:      Primary Decision Maker: Blase Fabry - [phone]    Secondary Decision Maker: Jaylene Sandoval - Parent - [phone]    Discharge Planning:    Patient lives with: Children Type of Home: House  Primary Care Giver: Self  Patient Support Systems include: Family Members, Children     Current services prior to admission: None                        Type of Home Care services:  None    ADLS  Prior functional level: Independent in ADLs/IADLs  Current functional level: Mobility, Assistance with the following:        Family can provide assistance at DC: Yes  Would you like Case Management to discuss the discharge plan with any other family members/significant others, and if so, who?  No  Plans to Return to Present Housing: Unknown at present    Potential Assistance needed at discharge: N/A              Patient expects to discharge to: Acute rehab      Financial    Payor: AMBAR Shannon / Plan: AMBAR JONES MEDICAID / Product Type: *No Product type* /     Does insurance require precert for SNF: Yes    Potential assistance Purchasing Medications:    Meds-to-Beds request: Yes      [facility] [place] - [address] [place] [address]  [facility] [place] [address]  Phone: [phone] Fax: [phone]      Notes:    Factors facilitating achievement of predicted outcomes: Family support    Barriers to discharge: PT/OT evals, SNF acceptance/pre-cert    Additional Case Management Notes: CM note: consult noted for \"rehab facility placement post op\". Pt has a new Medicaid insurance plan and per [employer] the only facility within [place] miles of [facility] that is in network is in [facility]. Pt will NEED PT/OT evals to determine if she will even qualify for rehab, will follow up with patient after evals completed. May need to find facility that can get a one-time contract with [facility]. Placement to rehab will require PRE-CERT, a neg covid test, a HENS and VIVEK completion.         Macey Pardo RN  Case Management Department  Ph: [phone]

## 2023-02-24 NOTE — PROGRESS NOTES
[facility]  [facility]. [place]        Date:2023                                                  Patient Name: Brandon Baeza    MRN: [id number]    : 1970    Room: [facility]-      Evaluating OT: Guillermo Conner OTR/L #[id number]     Referring Provider and Specific Provider Orders/Date:      23  OT eval and treat  Start:  23,   End:  23,   ONE TIME,   Standing Count:  1 Occurrences,   R        Order went unreviewed at transfer on  12:46 PM    Elroy Pickens DPM      Placement Recommendation: Subacute Rehab        Diagnosis:   1.  Osteoarthritis of left ankle, unspecified osteoarthritis type         Surgery: S/p TTC FUSION LEFT ANKLE AND HARVEST OF GRAFT 23 by Dr. Paola Nur History:       Past Medical History:   Diagnosis Date    Arthritis     Brain venous angioma ([facility] [address]     Bursitis     hips    Cat scratch of left lower leg     Class 3 severe obesity due to excess calories with body mass index (BMI) of 40.0 to 44.9 in adult [facility])     COPD (chronic obstructive pulmonary disease) (HCC)     Diverticulitis     GERD (gastroesophageal reflux disease)     Incisional hernia with obstruction but no gangrene     Strep throat          Past Surgical History:   Procedure Laterality Date    CARPAL TUNNEL RELEASE Left      SECTION      CHOLECYSTECTOMY      COLECTOMY  2016    FOREARM FRACTURE SURGERY Left     fracture of ulna s/p repair    HERNIA REPAIR      HERNIA REPAIR  2015    incarcerated hernia repair    HERNIA REPAIR N/A 2021    INCISIONAL HERNIA REPAIR WITH MESH, POSSIBLE COMPONENT SEPARATION  LAPAROSCOPIC ROBOTIC XI ASSISTED (CPT M4948818) performed by Claudia Saini MD at [address] [place] [address] (CERVIX STATUS UNKNOWN)      NERVE BLOCK      sacroiliac bilateral 2012    PELVIC FRACTURE SURGERY      VENTRAL HERNIA REPAIR  08/25/2015      Precautions:  Fall Risk, non-weight bearing left LE, left LE MARLENE drain      Assessment of current deficits    [x] Functional mobility  [x]ADLs  [x] Strength               []Cognition    [x] Functional transfers   [x] IADLs         [x] Safety Awareness   [x]Endurance    [] Fine Coordination              [x] Balance      [] Vision/perception   []Sensation     []Gross Motor Coordination  [] ROM  [] Delirium                   [] Motor Control     OT PLAN OF CARE   OT POC based on physician orders, patient diagnosis and results of clinical assessment    Frequency/Duration 1-3 days/wk for 2 weeks PRN     Specific OT Treatment Interventions to include:   * Instruction/training on adapted ADL techniques and AE recommendations to increase functional independence within precautions       * Training on energy conservation strategies, correct breathing pattern and techniques to improve independence/tolerance for self-care routine  * Functional transfer/mobility training/DME recommendations for increased independence, safety, and fall prevention  * Patient/Family education to increase follow through with safety techniques and functional independence  * Recommendation of environmental modifications for increased safety with functional transfers/mobility and ADLs  * Therapeutic exercise to improve motor endurance, ROM, and functional strength for ADLs/functional transfers  * Therapeutic activities to facilitate/challenge dynamic balance, stand tolerance for increased safety and independence with ADLs  * Therapeutic activities to facilitate gross/fine motor skills for increased independence with ADLs  * Positioning to improve skin integrity, interaction with environment and functional independence    Recommended Adaptive Equipment: TBD      Home Living: Lives with daughter, single family home, 1 story with basement dwelling, 3 steps to enter with rail.    Bathroom set-up:         Equipment owned: Crutches     Prior Level of Function: Independent with ADLs , Independent with IADLs; ambulated independently with crutches. Driving: N/A  Occupation: On disability      Pain Level: 10/10 pain in left LE; Nursing notified. Cognition: A&O: 4/4; Follows 2-3 step directions   Memory: intact   Sequencing: fair    Problem solving: fair    Judgement/safety: fair     [facility]   AM-PAC Daily Activity - Inpatient   How much help is needed for putting on and taking off regular lower body clothing?: A Lot  How much help is needed for bathing (which includes washing, rinsing, drying)?: A Lot  How much help is needed for toileting (which includes using toilet, bedpan, or urinal)?: A Lot  How much help is needed for putting on and taking off regular upper body clothing?: A Little  How much help is needed for taking care of personal grooming?: A Little  How much help for eating meals?: A Little  AM-[facility] Inpatient Daily Activity Raw Score: 15  AM-PAC Inpatient ADL T-Scale Score : 34.69  ADL Inpatient CMS 0-100% Score: 56.46  ADL Inpatient CMS G-Code Modifier : CK     Functional Assessment:    Initial Eval Status  Date: 2/24/23   Treatment Status  Date: STGs = LTGs  Time frame: 10-14 days   Feeding Supervision     Independent    Grooming Supervision     Moderate [place]    UB Dressing Minimal Assist    Moderate [place]    LB Dressing Stand by Assist   To don sock long-sitting in bed. Increased assist suspected for clothing mgmt upon standing and maintain WB restriction. Moderate [place]    Bathing Moderate Assist     Moderate [place]    Toileting Moderate Assist     Moderate [place]    Bed Mobility  Supine to sit: Supervision   Sit to supine: Supervision   Scooting: Supervision   Rolling: Supervision     Supine to sit: Independent   Sit to supine: Independent    Functional Transfers Not Assessed due to blood observed from patient's left heel/bandage. RN informed/aware.      Moderate [place] , non-weight bearing L LE    Functional Mobility Not Assessed   Moderate [place] with use of appropriate AD, non-weight bearing L LE      Balance Sitting:     Static: good    Dynamic: good  Standing: n/a     Sitting:     Static: good    Dynamic: good  Standing: good with AD    Activity Tolerance fair    Increase standing tolerance >3 minutes for improved engagement with functional transfers and indep in ADLs     Visual/  Perceptual WFL   NA      Hand Dominance:      AROM (PROM) Strength Additional Info:  Goal:   RUE  WFL 4-/5 good  and wfl FMC/dexterity noted during ADL tasks   Improve overall RUE strength  for participation in functional tasks   LUE WFL 4-/5 good  and wfl FMC/dexterity noted during ADL tasks   Improve overall LUE strength  for participation in functional tasks     Hearing:  [facility]   Sensation:   No c/o numbness or tingling  Tone:  WFL   Edema:     Comments: RN cleared patient for OT. Upon arrival patient in supine. Therapist facilitated and instructed pt on adapted  techniques & compensatory strategies to improve safety and independence with basic ADLs, bed mobility, functional transfers and mobility to allow pt to achieve highest level of independence and safely. Pt demonstrated fair understanding of education & follow through. At end of session, patient was supine, alarm on, with call light and phone within reach, all lines and tubes intact. Overall, patient demonstrated  decreased independence and safety during completion of ADL tasks. Pt would benefit from continued skilled OT to increase safety and independence with completion of ADL tasks and functional mobility for improved quality of life. Rehab Potential: Good for established goals. Patient / Family Goal: not stated       Patient and/or family were instructed on functional diagnosis, prognosis/goals and OT plan of care. Demonstrated fair understanding.      Eval Complexity: Low    Time In: 3:25 PM   Time Out: 3:45 PM    Total Treatment Time: 0       Min Units   OT Eval Low 97165  X  1    OT Eval Medium 92195      OT Eval High 22238      OT Re-Eval [id number]            ADL/Self Care 15166     Therapeutic Activities 88932       Therapeutic Ex 78833       Orthotic Management 07592       Manual 59047     Neuro Re-Ed 17892       Non-Billable Time        Evaluation Time additionally includes thorough review of current medical information, gathering information on past medical history/social history and prior level of function, interpretation of standardized testing/informal observation of tasks, assessment of data and development of plan of care and goals.         Evaluating OT: Marlene Triplett OTR/L #[id number]

## 2023-02-24 NOTE — ANESTHESIA PRE PROCEDURE
Department of Anesthesiology  Preprocedure Note       Name:  Escobar Bernstein   Age:  46 y.o.  :  1970                                          MRN:  [id number]         Date:  2023      Surgeon: Melanie Ibarra):  Elroy Veliz DPM    Procedure: Procedure(s):  TTC FUSION LEFT ANKLE AND HARVEST OF GRAFT (CPT 51616, 17082) **POPLITEAL BLOCK** (C-ARM, FARTUN, T2 NAIL) *MOVE UP  IF POSSIBLE*    Medications prior to admission:   Prior to Admission medications    Medication Sig Start Date End Date Taking? Authorizing Provider   levothyroxine (SYNTHROID) 50 MCG tablet Take 1 tablet by mouth Daily 11/15/22   Gopi Pastel, DO   levETIRAcetam (KEPPRA) 500 MG tablet Take 1 tablet by mouth 2 times daily 21   Gopi Pastel, DO   hydrOXYzine (ATARAX) 50 MG tablet Take 50 mg by mouth nightly  21   Historical Provider, MD       Current medications:    Current Facility-Administered Medications   Medication Dose Route Frequency Provider Last Rate Last Admin    ceFAZolin (ANCEF) 2,000 mg in sterile water 20 mL IV syringe  2,000 mg IntraVENous Once Elroy Strauss DPM        fentaNYL (SUBLIMAZE) 100 MCG/2ML injection             midazolam (VERSED) 2 MG/2ML injection             ropivacaine (NAROPIN) 0.5% injection             lactated ringers IV soln infusion   IntraVENous Continuous Hyun Montaño MD 10 mL/hr at 23 0637 New Bag at 23 2980       Allergies:     Allergies   Allergen Reactions    Ibuprofen Hives    Nsaids Hives    Morphine Hives and Anxiety       Problem List:    Patient Active Problem List   Diagnosis Code    GERD (gastroesophageal reflux disease) K21.9    Osteoarthritis cervical spine M47.812    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 32XA    Intractable nausea and vomiting R11.2    Intractable vomiting R11.10    Brain mass G93.89    Altered mental status R41.82    Sepsis secondary to UTI (HCC) A41.9    Cellulitis L03.90    Cellulitis of left lower extremity L03. 116    Postoperative pain G89.18    Arthritis of left ankle M19.072       Past Medical History:        Diagnosis Date    Arthritis     Brain venous angioma (HCC)     Bursitis     hips    Cat scratch of left lower leg     Class 3 severe obesity due to excess calories with body mass index (BMI) of 40.0 to 44.9 in adult [facility])     COPD (chronic obstructive pulmonary disease) (HCC)     Diverticulitis     GERD (gastroesophageal reflux disease)     Incisional hernia with obstruction but no gangrene     Strep throat        Past Surgical History:        Procedure Laterality Date    CARPAL TUNNEL RELEASE Left      SECTION      CHOLECYSTECTOMY      COLECTOMY  2016    FOREARM FRACTURE SURGERY Left     fracture of ulna s/p repair    HERNIA REPAIR      HERNIA REPAIR  2015    incarcerated hernia repair    HERNIA REPAIR N/A 2021    INCISIONAL HERNIA REPAIR WITH MESH, POSSIBLE COMPONENT SEPARATION  LAPAROSCOPIC ROBOTIC XI ASSISTED (CPT 02307) performed by Shelli Lira MD at [place] [address] (CERVIX STATUS UNKNOWN)      NERVE BLOCK      sacroiliac bilateral 2012    PELVIC FRACTURE SURGERY      VENTRAL HERNIA REPAIR  2015       Social History:    Social History     Tobacco Use    Smoking status: Former     Packs/day: 0.50     Years: 30.00     Pack years: 15.00     Types: Cigarettes     Quit date: 3/5/2021     Years since quittin.9    Smokeless tobacco: Never   Substance Use Topics    Alcohol use: Yes     Comment: socially                                Counseling given: Not Answered      Vital Signs (Current):   Vitals: 02/24/23 0618   BP: 117/63   Pulse: 75   Resp: 16   Temp: 36.7 °C (98 °F)   TempSrc: Infrared   SpO2: 95%                                              BP Readings from Last 3 Encounters:   02/24/23 117/63   02/22/23 (!) 144/76   12/27/22 135/85       NPO Status: Time of last liquid consumption: 2300                        Time of last solid consumption: 2230                        Date of last liquid consumption: 02/23/23                        Date of last solid food consumption: 02/23/23    BMI:   Wt Readings from Last 3 Encounters:   02/22/23 229 lb (103.9 kg)   12/27/22 194 lb (88 kg)   12/19/22 220 lb (99.8 kg)     There is no height or weight on file to calculate BMI.    CBC:   Lab Results   Component Value Date/Time    WBC 6.8 02/22/2023 10:30 AM    RBC 4.71 02/22/2023 10:30 AM    HGB 14.3 02/22/2023 10:30 AM    HCT 45.7 02/22/2023 10:30 AM    MCV 97.0 02/22/2023 10:30 AM    RDW 16.0 02/22/2023 10:30 AM     02/22/2023 10:30 AM       CMP:   Lab Results   Component Value Date/Time     02/22/2023 10:30 AM    K 4.4 02/22/2023 10:30 AM     02/22/2023 10:30 AM    CO2 25 02/22/2023 10:30 AM    BUN 24 02/22/2023 10:30 AM    CREATININE 0.9 02/22/2023 10:30 AM    GFRAA >60 08/02/2021 04:46 AM    LABGLOM >60 02/22/2023 10:30 AM    GLUCOSE 104 02/22/2023 10:30 AM    GLUCOSE 89 09/14/2011 09:10 AM    PROT 6.8 12/27/2022 08:26 PM    CALCIUM 9.4 02/22/2023 10:30 AM    BILITOT <0.2 12/27/2022 08:26 PM    ALKPHOS 81 12/27/2022 08:26 PM    AST 12 12/27/2022 08:26 PM    ALT 13 12/27/2022 08:26 PM       POC Tests: No results for input(s): POCGLU, POCNA, POCK, POCCL, POCBUN, POCHEMO, POCHCT in the last 72 hours.     Coags:   Lab Results   Component Value Date/Time    PROTIME 11.2 03/19/2021 04:00 AM    INR 1.0 03/19/2021 04:00 AM       HCG (If Applicable):   Lab Results   Component Value Date    PREGTESTUR NEGATIVE 01/17/2018        ABGs: No results found for: PHART, PO2ART, XIX0WPW, YTA1TJB, BEART, K9RFOXKL Type & Screen (If Applicable):  No results found for: LABABO, LABRH    Drug/Infectious Status (If Applicable):  No results found for: HIV, HEPCAB    COVID-19 Screening (If Applicable):   Lab Results   Component Value Date/Time    COVID19 Not Detected 11/28/2022 05:43 PM           Anesthesia Evaluation  Patient summary reviewed and Nursing notes reviewed no history of anesthetic complications:   Airway: Mallampati: II  TM distance: >3 FB   Neck ROM: full  Mouth opening: > = 3 FB   Dental: normal exam         Pulmonary:normal exam  breath sounds clear to auscultation  (+) COPD: mild,  current smoker (0.5 ppd for 35+ years; 1 cigarette today @ 0400)          Patient smoked on day of surgery. Cardiovascular:Negative CV ROS  Exercise tolerance: good (>4 METS),         ECG reviewed  Rhythm: regular  Rate: normal  Echocardiogram reviewed         Beta Blocker:  Not on Beta Blocker         Neuro/Psych:   (+) depression/anxiety  (anxiety)            GI/Hepatic/Renal:   (+) GERD (intermittent; no medications):,           Endo/Other:    (+) hypothyroidism: arthritis:., .                 Abdominal:   (+) obese,     Abdomen: soft. Vascular: negative vascular ROS. Other Findings:      EKG: Normal sinus rhythm  Normal ECG  When compared with ECG of 01-JUL-2021 17:38,  QT has shortened  Confirmed by Norberto Bear ([id number]) on 12/28/2022 11:27:59 AM     Anesthesia Plan      general and regional     ASA 3       Induction: intravenous. BIS  MIPS: Postoperative opioids intended and Prophylactic antiemetics administered. Anesthetic plan and risks discussed with patient. Use of blood products discussed with patient whom consented to blood products. Plan discussed with CRNA and attending.     Attending anesthesiologist reviewed and agrees with Preprocedure content      Post-op pain plan if not by surgeon: single peripheral nerve block            Jeanne Bob RN   2/24/2023

## 2023-02-24 NOTE — BRIEF OP NOTE
Brief Postoperative Note      Patient: Martha Mahmood  YOB: 1970  MRN: [id number]    Date of Procedure: 2/24/2023    Pre-Op Diagnosis: Osteoarthritis of left ankle, unspecified osteoarthritis type [M19.072]    Post-Op Diagnosis: Same       Procedure(s):  TTC FUSION LEFT ANKLE AND HARVEST OF GRAFT    Surgeon(s):  Elroy Alejo DPM    Assistant:  Resident: José Antonio Church DPM    Anesthesia: General    Estimated Blood Loss (mL): less than 851     Complications: None    Specimens:   * No specimens in log *    Implants:  Implant Name Type Inv.  Item Serial No.  Lot No. LRB No. Used Action   SCREW BNE L14.5MM DIA8MM CRISTIAN TI TORI NONLOCKING COMPR FOR - [id number]  SCREW BNE L14.5MM DIA8MM CRISTIAN TI TORI NONLOCKING COMPR FOR  [employer] [facility] [id number] Left 1 Implanted   ANUPAM-GRAFT BONE AUGMENT 3ML INJ - [id number] Bone/Graft/Tissue/Human/Synth ANUPAM-GRAFT BONE AUGMENT 3ML INJ  [employer] [facility] [id number] Left 1 Implanted   NAIL IM L200MM [id number] L ANK TI LAT BEND NONLOCKING FLUT - [id number]  NAIL IM L200MM [id number] L ANK TI LAT BEND NONLOCKING FLUT  [employer] [facility] [id number] Left 1 Implanted   SCREW BNE L42.5MM DIA5MM CRISTIAN TI VALE FULL THRD SHFT FOR T2 - [id number]  SCREW BNE L42.5MM DIA5MM CRISTIAN TI VALE FULL THRD SHFT FOR T2  [employer] [facility] [id number] Left 1 Implanted   SCREW BNE L30MM DIA5MM CRISTIAN TI VALE FULL THRD SHFT FOR T2 IM - [id number]  SCREW BNE L30MM DIA5MM CRISTIAN TI VALE FULL THRD SHFT FOR T2 IM  [employer] [facility] [id number] Left 1 Implanted   SCREW BNE L30MM DIA5MM CRISTIAN TI VALE FULL THRD SHFT FOR T2 IM - [id number]  SCREW BNE L30MM DIA5MM CRISTIAN TI VALE FULL THRD SHFT FOR T2 IM  [employer] [facility] [id number] Left 1 Implanted   SCREW BNE L45MM DIA5MM CRISTIAN TI VALE FULL THRD SHFT FOR T2 IM - [id number]  SCREW BNE L45MM DIA5MM CRISTIAN TI VALE FULL THRD SHFT FOR T2 IM  [employer] HOW-WD [id number] Left 1 Implanted   ENDCAP ORTH L4MM DIA8MM L ANK TI EXTN - [id number]  ENDCAP ORTH L4MM DIA8MM L ANK TI EXTN  [employer] HOW-WD [id number] Left 1 Implanted   GRAFT HUM TISS AMBIENT 2 CC FLOWABLE PLCNTA TISS VIAFLOW - [id number]  GRAFT HUM TISS AMBIENT 2 CC FLOWABLE PLCNTA TISS VIAFLOW [id number] [employer] HOW-  Left 1 Implanted         Drains:   Closed/Suction Drain Distal;Inferior;Left; Anterior Leg Bulb (Active)       Findings: successful restoration of alignment and arthrodesis.     Electronically signed by Semaj Scott DPM on 2/24/2023 at 10:27 AM

## 2023-02-24 NOTE — PROGRESS NOTES
Pharmacist Review and Automatic Dose Adjustment of Prophylactic Enoxaparin         The reviewing pharmacist has made an adjustment to the ordered enoxaparin dose or converted to UFH per the approved [facility] protocol and table as identified below. Caron Toribio is a 46 y.o. female. Recent Labs     02/22/23  1030   CREATININE 0.9       Estimated Creatinine Clearance: 83 mL/min (based on SCr of 0.9 mg/dL). Recent Labs     02/22/23  1030   HGB 14.3   HCT 45.7        No results for input(s): INR in the last 72 hours.     Height:   Ht Readings from Last 1 Encounters:   02/22/23 5' 2\" (1.575 m)     Weight:  Wt Readings from Last 1 Encounters:   02/22/23 229 lb (103.9 kg)               Plan: Based upon the patient's weight and renal function    Ordered: Enoxaparin 40mg SUBQ Daily    Changed/converted to    New Order: Enoxaparin 30mg SUBQ BID      Thank you,  Judy García [id number], [facility]  2/24/2023, 12:49 PM

## 2023-02-24 NOTE — ANESTHESIA PROCEDURE NOTES
Peripheral Block    Patient location during procedure: PACU  Reason for block: at surgeon's request  Start time: 2/24/2023 7:08 AM  End time: 2/24/2023 7:21 AM  Staffing  Performed: anesthesiologist   Anesthesiologist: Samantha Taveras MD  Preanesthetic Checklist  Completed: patient identified, IV checked, site marked, risks and benefits discussed, surgical/procedural consents, equipment checked, pre-op evaluation, timeout performed, anesthesia consent given, oxygen available, monitors applied/VS acknowledged, fire risk safety assessment completed and verbalized and blood product R/B/A discussed and consented  Peripheral Block   Patient position: supine  Provider prep: mask and sterile gloves  Patient monitoring: cardiac monitor, continuous pulse ox, frequent blood pressure checks, IV access, oxygen and responsive to questions  Block type: Sciatic  Popliteal  Laterality: left  Injection technique: single-shot  Guidance: ultrasound guided  Local infiltration: lidocaine  Infiltration strength: 1 %  Local infiltration: lidocaine  Dose: 3 mL    Needle   Needle type: insulated echogenic nerve stimulator needle   Needle gauge: 20 G  Needle localization: ultrasound guidance  Needle length: 10 cm  Assessment   Injection assessment: negative aspiration for heme, no paresthesia on injection, local visualized surrounding nerve on ultrasound and no intravascular symptoms  Paresthesia pain: none  Slow fractionated injection: yes  Hemodynamics: stable  Real-time US image taken/store: yes  Outcomes: uncomplicated and patient tolerated procedure well    Medications Administered  ropivacaine (NAROPIN) injection 0.5% - Perineural   30 mL - 2/24/2023 7:16:00 AM

## 2023-02-24 NOTE — PROGRESS NOTES
700 to PACU for left popliteal block. Connected to all monitors and O2 applied at 3 liters nasal canula  708 time out performed and premedicated per orders. Positioned on right side with left lower leg up on wedge  713 block started using ultrasound and nerve stimulator  718 good movement of left toes noted  719 30 ml 0.5% ropivacaine injected to left popliteal by dr Khan Amado well.

## 2023-02-24 NOTE — CARE COORDINATION
NADER note: CM/SW manager reached out to patient's insurance provider and was able to secure a larger list of [facility] facilities that are in-network. If patient qualifies for rehab after being evaluated by therapy CM/VENKATESH will review choices with patient and make appropriate referral for her. Pt will NEED PRE-CERT.

## 2023-02-24 NOTE — CONSULTS
Podiatry Consult H&P  2023   Rosaura Pelaez       SUBJECTIVE: This is a 46 y.o. female with past medical history significant for arthritis of left ankle, morbid obesity and GERD who is being seen s/p left TTC fusion(DOS:). Patient is being admitted after surgery for post op pain control and for SNF placement. Posterior splint intact to LLE, pt to continue elevating.          Past Medical History:   Diagnosis Date    Arthritis     Brain venous angioma (HCC)     Bursitis     hips    Cat scratch of left lower leg     Class 3 severe obesity due to excess calories with body mass index (BMI) of 40.0 to 44.9 in adult [facility])     COPD (chronic obstructive pulmonary disease) (HCC)     Diverticulitis     GERD (gastroesophageal reflux disease)     Incisional hernia with obstruction but no gangrene     Strep throat         Past Surgical History:   Procedure Laterality Date    CARPAL TUNNEL RELEASE Left      SECTION      CHOLECYSTECTOMY      COLECTOMY  2016    FOREARM FRACTURE SURGERY Left     fracture of ulna s/p repair    HERNIA REPAIR      HERNIA REPAIR  2015    incarcerated hernia repair    HERNIA REPAIR N/A 2021    INCISIONAL HERNIA REPAIR WITH MESH, POSSIBLE COMPONENT SEPARATION  LAPAROSCOPIC ROBOTIC XI ASSISTED (CPT K103966) performed by Julian Stoll MD at . [address] (CERVIX STATUS UNKNOWN)      NERVE BLOCK      sacroiliac bilateral 2012    PELVIC FRACTURE SURGERY      VENTRAL HERNIA REPAIR  2015         Family History   Problem Relation Age of Onset    Osteoarthritis Other     Diabetes Other     Other Father         pancreatitis    Prostate Cancer Father     Stroke Brother         half brother    Cancer Paternal Grandfather         Social History     Tobacco Use    Smoking status: Former     Packs/day: 0.50     Years: 30.00     Pack years: 15.00     Types: Cigarettes     Quit date: 3/5/2021     Years since quittin.9    Smokeless tobacco: Never   Substance Use Topics    Alcohol use: Yes     Comment: socially        Prior to Admission medications    Medication Sig Start Date End Date Taking? Authorizing Provider   levothyroxine (SYNTHROID) 50 MCG tablet Take 1 tablet by mouth Daily 11/15/22   Jaimie Cain DO   levETIRAcetam (KEPPRA) 500 MG tablet Take 1 tablet by mouth 2 times daily 8/2/21   Jaimie Cain DO   hydrOXYzine (ATARAX) 50 MG tablet Take 50 mg by mouth nightly  1/27/21   Historical Provider, MD        Ibuprofen, Nsaids, and Morphine         OBJECTIVE:        Vitals:    02/24/23 1733   BP:    Pulse:    Resp: 20   Temp:    SpO2:               EXAM:        Pt is AAOx3  Posterior splint intact to LLE. Patient able to actively range all digits. Digits 1-5 well perfused with no signs of venous congestion.       Current Facility-Administered Medications   Medication Dose Route Frequency Provider Last Rate Last Admin    ceFAZolin (ANCEF) 2,000 mg in sterile water 20 mL IV syringe  2,000 mg IntraVENous Q8H Elroy Strauss DPM   2,000 mg at 02/24/23 1616    hydrOXYzine HCl (ATARAX) tablet 50 mg  50 mg Oral Nightly BOB Doan CNP        levothyroxine (SYNTHROID) tablet 50 mcg  50 mcg Oral Daily BOB Doan CNP        0.9% NaCl with KCl 40 mEq infusion   IntraVENous Continuous BOB Doan CNP 75 mL/hr at 02/24/23 1300 Rate Verify at 02/24/23 1300    albuterol (PROVENTIL) nebulizer solution 2.5 mg  2.5 mg Nebulization Q4H PRN BOB Doan CNP        budesonide (PULMICORT) nebulizer suspension 500 mcg  0.5 mg Nebulization BID BOB Doan CNP        hydrALAZINE (APRESOLINE) injection 5 mg  5 mg IntraVENous Q4H PRN BOB Doan CNP        magnesium sulfate 1000 mg in dextrose 5% 100 mL IVPB  1,000 mg IntraVENous PRN BOB Doan CNP        sodium phosphate 16.62 mmol in sodium chloride 0.9 % 250 mL IVPB  0.16 mmol/kg IntraVENous PRN BOB Doan CNP        Or    sodium phosphate 33.24 mmol in sodium chloride 0.9 % 250 mL IVPB  0.32 mmol/kg IntraVENous PRN [place], APRN - CNP        potassium chloride (KLOR-CON M) extended release tablet 40 mEq  40 mEq Oral PRN Elayne Leopard, APRN - CNP        Or    potassium bicarb-citric acid (EFFER-K) effervescent tablet 40 mEq  40 mEq Oral PRN Elayne Leopard, APRN - CNP        Or    potassium chloride 10 mEq/100 mL IVPB (Peripheral Line)  10 mEq IntraVENous PRN Elayne Leopard, APRN - CNP        arformoterol tartrate (BROVANA) nebulizer solution 15 mcg  15 mcg Nebulization BID Elayne Leopard, APRN - CNP        sodium chloride flush 0.9 % injection 5-40 mL  5-40 mL IntraVENous 2 times per day Elayne Leopard, APRN - CNP        sodium chloride flush 0.9 % injection 5-40 mL  5-40 mL IntraVENous PRN Elayne Leopard, APRN - CNP        0.9 % sodium chloride infusion   IntraVENous PRN Elayne Leopard, APRN - CNP        ondansetron (ZOFRAN-ODT) disintegrating tablet 4 mg  4 mg Oral Q8H PRN [place], APRN - CNP        Or    ondansetron (ZOFRAN) injection 4 mg  4 mg IntraVENous Q6H PRN [place], APRN - CNP        polyethylene glycol (GLYCOLAX) packet 17 g  17 g Oral Daily PRN [place], APRN - CNP        acetaminophen (TYLENOL) tablet 650 mg  650 mg Oral Q6H PRN Elayne Leopard, APRN - CNP        Or    acetaminophen (TYLENOL) suppository 650 mg  650 mg Rectal Q6H PRN [place], APRN - CNP        levETIRAcetam (KEPPRA) tablet 500 mg  500 mg Oral BID [place], APRN - CNP   500 mg at 02/24/23 1330    albuterol (PROVENTIL) nebulizer solution 2.5 mg  2.5 mg Nebulization TID Elayne Leopard, APRN - CNP   2.5 mg at 02/24/23 1549    acetylcysteine (MUCOMYST) 10 % solution 400 mg  4 mL Inhalation TID Elayne Leopard, APRN - CNP   400 mg at 02/24/23 1549    enoxaparin Sodium (LOVENOX) injection 30 mg  30 mg SubCUTAneous BID BOB Arevalo CNP        HYDROmorphone HCl PF (DILAUDID) injection 1 mg  1 mg IntraVENous Q4H PRN Elroy Strauss DPM   1 mg at 02/24/23 0632 HYDROcodone-acetaminophen (NORCO) 5-325 MG per tablet 1 tablet  1 tablet Oral Q6H PRN Elroy Strauss DPM   1 tablet at 02/24/23 1507        Lab Results   Component Value Date    WBC 6.8 02/22/2023    HCT 45.7 02/22/2023    HGB 14.3 02/22/2023     02/22/2023     02/22/2023    K 4.4 02/22/2023     02/22/2023    CO2 25 02/22/2023    BUN 24 (H) 02/22/2023    CREATININE 0.9 02/22/2023    GLUCOSE 104 (H) 02/22/2023    CRP 0.6 (H) 11/28/2022         Radiographs:    ASSESEMENT:  -S/P left TTC fusion(DOS:2/24)  - Obesity        PLAN:  - Examined and evaluated  - All labs, imaging, and charts reviewed  - Abx: ancef 2 gm x3   - Appreciate IM assistance with pain control   - CM consulted for assistance with SNF placement. - Posterior splint to LLE to remain clean, dry, and intact   - NWB to LLE   - Will continue to monitor while in house    Discussed with   Eris Garcia DPM [facility]  Fellowship-Trained Foot and Ankle Surgeon  Diplomate, [employer] Board of Foot and Ankle Surgeons  [phone]       Thank you for involving podiatry in this patient's care.  Please do not hesitate to call with any questions, concerns, or new findings      Toya Diane DPM   2/24/2023   5:50 PM

## 2023-02-24 NOTE — CARE COORDINATION
CM note: consult noted for \"rehab facility placement post op\". Pt has a new Medicaid insurance plan and per [employer] the only facility within 100 miles of [place] that is in network is in [facility]. Pt will NEED PT/OT evals to determine if she will even qualify for rehab, will follow up with patient after evals completed. May need to find facility that can get a one-time contract with [facility]. Placement to rehab will require PRE-CERT, a neg covid test, a HENS and VIVEK completion.

## 2023-02-25 LAB
ALBUMIN SERPL-MCNC: 3.7 G/DL (ref 3.5–5.2)
ALP BLD-CCNC: 62 U/L (ref 35–104)
ALT SERPL-CCNC: 12 U/L (ref 0–32)
ANION GAP SERPL CALCULATED.3IONS-SCNC: 9 MMOL/L (ref 7–16)
AST SERPL-CCNC: 13 U/L (ref 0–31)
BASOPHILS ABSOLUTE: 0.01 E9/L (ref 0–0.2)
BASOPHILS RELATIVE PERCENT: 0.1 % (ref 0–2)
BILIRUB SERPL-MCNC: <0.2 MG/DL (ref 0–1.2)
BILIRUBIN DIRECT: <0.2 MG/DL (ref 0–0.3)
BILIRUBIN, INDIRECT: ABNORMAL MG/DL (ref 0–1)
BUN BLDV-MCNC: 18 MG/DL (ref 6–20)
CALCIUM SERPL-MCNC: 8.8 MG/DL (ref 8.6–10.2)
CHLORIDE BLD-SCNC: 106 MMOL/L (ref 98–107)
CHOLESTEROL, TOTAL: 147 MG/DL (ref 0–199)
CO2: 21 MMOL/L (ref 22–29)
CREAT SERPL-MCNC: 0.8 MG/DL (ref 0.5–1)
EOSINOPHILS ABSOLUTE: 0.02 E9/L (ref 0.05–0.5)
EOSINOPHILS RELATIVE PERCENT: 0.2 % (ref 0–6)
GFR SERPL CREATININE-BSD FRML MDRD: >60 ML/MIN/1.73
GLUCOSE BLD-MCNC: 130 MG/DL (ref 74–99)
HBA1C MFR BLD: 5.7 % (ref 4–5.6)
HCT VFR BLD CALC: 34.4 % (ref 34–48)
HDLC SERPL-MCNC: 51 MG/DL
HEMOGLOBIN: 10.9 G/DL (ref 11.5–15.5)
IMMATURE GRANULOCYTES #: 0.06 E9/L
IMMATURE GRANULOCYTES %: 0.6 % (ref 0–5)
L. PNEUMOPHILA SEROGP 1 UR AG: NORMAL
LDL CHOLESTEROL CALCULATED: 77 MG/DL (ref 0–99)
LYMPHOCYTES ABSOLUTE: 1.22 E9/L (ref 1.5–4)
LYMPHOCYTES RELATIVE PERCENT: 12.6 % (ref 20–42)
MAGNESIUM: 2.1 MG/DL (ref 1.6–2.6)
MCH RBC QN AUTO: 31.5 PG (ref 26–35)
MCHC RBC AUTO-ENTMCNC: 31.7 % (ref 32–34.5)
MCV RBC AUTO: 99.4 FL (ref 80–99.9)
MONOCYTES ABSOLUTE: 0.84 E9/L (ref 0.1–0.95)
MONOCYTES RELATIVE PERCENT: 8.7 % (ref 2–12)
NEUTROPHILS ABSOLUTE: 7.54 E9/L (ref 1.8–7.3)
NEUTROPHILS RELATIVE PERCENT: 77.8 % (ref 43–80)
PDW BLD-RTO: 15.7 FL (ref 11.5–15)
PHOSPHORUS: 4.1 MG/DL (ref 2.5–4.5)
PLATELET # BLD: 194 E9/L (ref 130–450)
PMV BLD AUTO: 9.9 FL (ref 7–12)
POTASSIUM SERPL-SCNC: 5.1 MMOL/L (ref 3.5–5)
RBC # BLD: 3.46 E12/L (ref 3.5–5.5)
SODIUM BLD-SCNC: 136 MMOL/L (ref 132–146)
STREP PNEUMONIAE ANTIGEN, URINE: NORMAL
T4 FREE: 0.82 NG/DL (ref 0.93–1.7)
TOTAL PROTEIN: 6.2 G/DL (ref 6.4–8.3)
TRIGL SERPL-MCNC: 97 MG/DL (ref 0–149)
TSH SERPL DL<=0.05 MIU/L-ACNC: 0.46 UIU/ML (ref 0.27–4.2)
VLDLC SERPL CALC-MCNC: 19 MG/DL
WBC # BLD: 9.7 E9/L (ref 4.5–11.5)

## 2023-02-25 PROCEDURE — 97530 THERAPEUTIC ACTIVITIES: CPT | Performed by: PHYSICAL THERAPIST

## 2023-02-25 PROCEDURE — 80076 HEPATIC FUNCTION PANEL: CPT

## 2023-02-25 PROCEDURE — 83036 HEMOGLOBIN GLYCOSYLATED A1C: CPT

## 2023-02-25 PROCEDURE — 6370000000 HC RX 637 (ALT 250 FOR IP): Performed by: PODIATRIST

## 2023-02-25 PROCEDURE — 6370000000 HC RX 637 (ALT 250 FOR IP): Performed by: NURSE PRACTITIONER

## 2023-02-25 PROCEDURE — 83735 ASSAY OF MAGNESIUM: CPT

## 2023-02-25 PROCEDURE — 36415 COLL VENOUS BLD VENIPUNCTURE: CPT

## 2023-02-25 PROCEDURE — 84443 ASSAY THYROID STIM HORMONE: CPT

## 2023-02-25 PROCEDURE — 1200000000 HC SEMI PRIVATE

## 2023-02-25 PROCEDURE — 6360000002 HC RX W HCPCS: Performed by: NURSE PRACTITIONER

## 2023-02-25 PROCEDURE — 94640 AIRWAY INHALATION TREATMENT: CPT

## 2023-02-25 PROCEDURE — 97161 PT EVAL LOW COMPLEX 20 MIN: CPT | Performed by: PHYSICAL THERAPIST

## 2023-02-25 PROCEDURE — 6360000002 HC RX W HCPCS: Performed by: PODIATRIST

## 2023-02-25 PROCEDURE — 80061 LIPID PANEL: CPT

## 2023-02-25 PROCEDURE — 2580000003 HC RX 258: Performed by: PODIATRIST

## 2023-02-25 PROCEDURE — 84439 ASSAY OF FREE THYROXINE: CPT

## 2023-02-25 PROCEDURE — 84100 ASSAY OF PHOSPHORUS: CPT

## 2023-02-25 PROCEDURE — 80048 BASIC METABOLIC PNL TOTAL CA: CPT

## 2023-02-25 PROCEDURE — 97110 THERAPEUTIC EXERCISES: CPT | Performed by: PHYSICAL THERAPIST

## 2023-02-25 PROCEDURE — 85025 COMPLETE CBC W/AUTO DIFF WBC: CPT

## 2023-02-25 RX ADMIN — HYDROMORPHONE HYDROCHLORIDE 1 MG: 1 INJECTION, SOLUTION INTRAMUSCULAR; INTRAVENOUS; SUBCUTANEOUS at 17:05

## 2023-02-25 RX ADMIN — ENOXAPARIN SODIUM 30 MG: 100 INJECTION SUBCUTANEOUS at 09:13

## 2023-02-25 RX ADMIN — ACETYLCYSTEINE 400 MG: 100 SOLUTION ORAL; RESPIRATORY (INHALATION) at 17:19

## 2023-02-25 RX ADMIN — HYDROMORPHONE HYDROCHLORIDE 1 MG: 1 INJECTION, SOLUTION INTRAMUSCULAR; INTRAVENOUS; SUBCUTANEOUS at 21:51

## 2023-02-25 RX ADMIN — BUDESONIDE 500 MCG: 0.5 SUSPENSION RESPIRATORY (INHALATION) at 04:23

## 2023-02-25 RX ADMIN — LEVOTHYROXINE SODIUM 50 MCG: 0.05 TABLET ORAL at 07:02

## 2023-02-25 RX ADMIN — ARFORMOTEROL TARTRATE 15 MCG: 15 SOLUTION RESPIRATORY (INHALATION) at 04:23

## 2023-02-25 RX ADMIN — ALBUTEROL SULFATE 2.5 MG: 2.5 SOLUTION RESPIRATORY (INHALATION) at 17:18

## 2023-02-25 RX ADMIN — HYDROCODONE BITARTRATE AND ACETAMINOPHEN 1 TABLET: 5; 325 TABLET ORAL at 20:43

## 2023-02-25 RX ADMIN — ALBUTEROL SULFATE 2.5 MG: 2.5 SOLUTION RESPIRATORY (INHALATION) at 04:24

## 2023-02-25 RX ADMIN — ENOXAPARIN SODIUM 30 MG: 100 INJECTION SUBCUTANEOUS at 20:42

## 2023-02-25 RX ADMIN — ACETYLCYSTEINE 400 MG: 100 SOLUTION ORAL; RESPIRATORY (INHALATION) at 04:24

## 2023-02-25 RX ADMIN — CEFAZOLIN 2000 MG: 2 INJECTION, POWDER, FOR SOLUTION INTRAMUSCULAR; INTRAVENOUS at 07:01

## 2023-02-25 RX ADMIN — POTASSIUM CHLORIDE AND SODIUM CHLORIDE: 900; 300 INJECTION, SOLUTION INTRAVENOUS at 13:40

## 2023-02-25 RX ADMIN — HYDROMORPHONE HYDROCHLORIDE 1 MG: 1 INJECTION, SOLUTION INTRAMUSCULAR; INTRAVENOUS; SUBCUTANEOUS at 03:37

## 2023-02-25 RX ADMIN — HYDROMORPHONE HYDROCHLORIDE 1 MG: 1 INJECTION, SOLUTION INTRAMUSCULAR; INTRAVENOUS; SUBCUTANEOUS at 13:33

## 2023-02-25 RX ADMIN — ALBUTEROL SULFATE 2.5 MG: 2.5 SOLUTION RESPIRATORY (INHALATION) at 12:30

## 2023-02-25 RX ADMIN — LEVETIRACETAM 500 MG: 500 TABLET, FILM COATED ORAL at 09:13

## 2023-02-25 RX ADMIN — HYDROCODONE BITARTRATE AND ACETAMINOPHEN 1 TABLET: 5; 325 TABLET ORAL at 11:34

## 2023-02-25 RX ADMIN — LEVETIRACETAM 500 MG: 500 TABLET, FILM COATED ORAL at 20:43

## 2023-02-25 RX ADMIN — HYDROCODONE BITARTRATE AND ACETAMINOPHEN 1 TABLET: 5; 325 TABLET ORAL at 05:33

## 2023-02-25 RX ADMIN — ARFORMOTEROL TARTRATE 15 MCG: 15 SOLUTION RESPIRATORY (INHALATION) at 17:19

## 2023-02-25 RX ADMIN — ACETYLCYSTEINE 400 MG: 100 SOLUTION ORAL; RESPIRATORY (INHALATION) at 12:30

## 2023-02-25 RX ADMIN — BUDESONIDE 500 MCG: 0.5 SUSPENSION RESPIRATORY (INHALATION) at 17:19

## 2023-02-25 RX ADMIN — HYDROMORPHONE HYDROCHLORIDE 1 MG: 1 INJECTION, SOLUTION INTRAMUSCULAR; INTRAVENOUS; SUBCUTANEOUS at 09:13

## 2023-02-25 RX ADMIN — HYDROXYZINE HYDROCHLORIDE 50 MG: 25 TABLET ORAL at 20:43

## 2023-02-25 ASSESSMENT — PAIN DESCRIPTION - DESCRIPTORS
DESCRIPTORS: ACHING
DESCRIPTORS: TENDER;SORE;DISCOMFORT

## 2023-02-25 ASSESSMENT — PAIN SCALES - GENERAL
PAINLEVEL_OUTOF10: 7
PAINLEVEL_OUTOF10: 10
PAINLEVEL_OUTOF10: 10
PAINLEVEL_OUTOF10: 4
PAINLEVEL_OUTOF10: 10
PAINLEVEL_OUTOF10: 8
PAINLEVEL_OUTOF10: 4

## 2023-02-25 ASSESSMENT — PAIN DESCRIPTION - ORIENTATION
ORIENTATION: LEFT

## 2023-02-25 ASSESSMENT — PAIN DESCRIPTION - LOCATION
LOCATION: ANKLE
LOCATION: FOOT
LOCATION: LEG
LOCATION: FOOT
LOCATION: FOOT
LOCATION: ANKLE
LOCATION: ANKLE

## 2023-02-25 NOTE — PROGRESS NOTES
Physical Therapy  Physical Therapy Initial Evaluation/Plan of Care    Room #:  0330/0330-01  Patient Name: Dm Verdin  YOB: 1970  MRN: [id number]    Date of Service: 2/25/2023     Tentative placement recommendation: Subacute Rehab  Equipment recommendation: Levi Figueroa, Bedside commode, and Tub transfer bench if d/jasmyn home    Evaluating Physical Therapist: Cristian Vázquez, PT, DPT #[id number]      Specific Provider Orders/Date/Referring Provider :     02/24/23 1300    PT eval and treat  Start:  02/24/23 1300,   End:  02/24/23 1300,   ONE TIME,   Standing Count:  1 Occurrences,   R         George Grimm, APRN - CNP Acknowledge New     Admitting Diagnosis:   Osteoarthritis of left ankle, unspecified osteoarthritis type [M19.072]  Postoperative pain [G89.18]  Arthritis of left ankle [M19.072]      Surgery:  TTC Fusion L ankle 2/24/23  Visit Diagnoses         Codes    Osteoarthritis of left ankle, unspecified osteoarthritis type     M19.072            Patient Active Problem List   Diagnosis    GERD (gastroesophageal reflux disease)    Osteoarthritis cervical spine    Osteoarthritis of lumbar spine    Small bowel obstruction ([address]    Morbid obesity ([address]    Hypokalemia    Venous insufficiency    SBO (small bowel obstruction) ([address]    Incarcerated hernia    Intractable abdominal pain    Incarcerated incisional hernia    Disruption or dehiscence of closure of fascia, superficial or muscular    Intractable nausea and vomiting    Intractable vomiting    Brain mass    Altered mental status    Sepsis secondary to UTI ([address]    Cellulitis    Cellulitis of left lower extremity    Postoperative pain    Arthritis of left ankle        ASSESSMENT of Current Deficits Patient exhibits decreased strength, balance, and endurance impairing functional mobility, transfers, gait , gait distance, and tolerance to activity. Pt required increased time for mobility largely limited by high level pain during session.  Pt required Sally for WB activities and was able to maintain NWB through LLE during session. Pt agreeable to seated exercises following function with VC for technique. PHYSICAL THERAPY  PLAN OF CARE       Physical therapy plan of care is established based on physician order,  patient diagnosis and clinical assessment    Current Treatment Recommendations:    -Bed Mobility: Lower extremity exercises  and Trunk control activities   -Sitting Balance: Incorporate reaching activities to activate trunk muscles , Hands on support to maintain midline , Facilitate active trunk muscle engagement , Facilitate postural control in all planes , and Engage in core activities to allow for movement within base of support   -Standing Balance: Perform strengthening exercises in standing to promote motor control with or without upper extremity support , Instruct patient on adequate base of support to maintain balance, and Challenge balance utilizing reaching  activities beyond center of gravity    -Transfers: Provide instruction on proper hand and foot position for adequate transfer of weight onto lower extremities and use of gait device if needed, Cues for hand placement, technique and safety.  Provide stabilization to prevent fall , Facilitate weight shift forward on to lower extremities and provide necessary stabilization of bilateral lower extremities , Support transfer of weight on to lower extremities, and Assist with extension of knees trunk and hip to accept weight transfer   -Gait: Gait training, Standing activities to improve: base of support, weight shift, weight bearing , Exercises to improve trunk control, Exercises to improve hip and knee control, Performance of protected weight bearing activities, and Activities to increase weight bearing   -Endurance: Utilize Supervised activities to increase level of endurance to allow for safe functional mobility including transfers and gait  and Use graduated activities to promote good breathing techniques and provide support and education to maximize respiratory function  -Stairs: Stair training with instruction on proper technique and hand placement on rail    PT long term treatment goals are located in below grid    Patient and or family understand(s) diagnosis, prognosis, and plan of care. Frequency of treatments: Patient will be seen  dailyyy. Prior Level of Function: Patient ambulated with crutches   Rehab Potential: good - for baseline    Past medical history:   Past Medical History:   Diagnosis Date    Arthritis     Brain venous angioma (HCC)     Bursitis     hips    Cat scratch of left lower leg     Class 3 severe obesity due to excess calories with body mass index (BMI) of 40.0 to 44.9 in adult [facility])     COPD (chronic obstructive pulmonary disease) (HCC)     Diverticulitis     GERD (gastroesophageal reflux disease)     Incisional hernia with obstruction but no gangrene     Strep throat      Past Surgical History:   Procedure Laterality Date    CARPAL TUNNEL RELEASE Left      SECTION      CHOLECYSTECTOMY      COLECTOMY  2016    FOREARM FRACTURE SURGERY Left     fracture of ulna s/p repair    HERNIA REPAIR      HERNIA REPAIR  2015    incarcerated hernia repair    HERNIA REPAIR N/A 2021    INCISIONAL HERNIA REPAIR WITH MESH, POSSIBLE COMPONENT SEPARATION  LAPAROSCOPIC ROBOTIC XI ASSISTED (CPT P114240) [facility] (CERVIX STATUS UNKNOWN)      NERVE BLOCK      sacroiliac bilateral 2012    PELVIC FRACTURE SURGERY      VENTRAL HERNIA REPAIR  2015       SUBJECTIVE:    Precautions: Up with assistance, falls and non weight bearing LLE      Social history: Patient lives with daughter, son, and grandchildren  in a ranch home  with 3 steps, bilateral rail  to enter home.   Tub shower       Equipment owned: Crutches,  cane    AM-PAC Basic Mobility       AM-PAC Basic Mobility - Inpatient   How much help is needed turning from your back to your side while in a flat bed without using bedrails?: A Little  How much help is needed moving from lying on your back to sitting on the side of a flat bed without using bedrails?: A Little  How much help is needed moving to and from a bed to a chair?: A Little  How much help is needed standing up from a chair using your arms?: A Little  How much help is needed walking in hospital room?: A Little  How much help is needed climbing 3-5 steps with a railing?: A Lot  AM-PAC Inpatient Mobility Raw Score : 17  AM-PAC Inpatient T-Scale Score : 42.13  Mobility Inpatient CMS 0-100% Score: 50.57  Mobility Inpatient CMS G-Code Modifier : CK    Nursing cleared patient for PT evaluation. The admitting diagnosis and active problem list as listed above have been reviewed prior to the initiation of this evaluation. OBJECTIVE;   Initial Evaluation  Date: 2/25/2023 Treatment Date:     Short Term/ Long Term   Goals   Was pt agreeable to Eval/treatment? Yes  To be met in 3 days   Pain level   \"11\"/10  L ankle     Bed Mobility    Rolling: Supervision     Supine to sit: Supervision     Sit to supine: Supervision     Scooting: Supervision     Rolling: Independent    Supine to sit:  Independent    Sit to supine: Independent    Scooting: Independent     Transfers Sit to stand: Minimal assist of 1   Sit to stand: Independent    Ambulation     2 x 15 feet using  wheeled walker with Minimal assist of 1   for walker control, walker approximation, balance, upright, weight shift, and safety    > 100 feet using  wheeled walker with Supervision     Stair negotiation: ascended and descended   Not assessed     3 steps, bilateral rail with Sally   ROM Within functional limits    Increase range of motion 10% of affected joints    Strength BUE:  refer to OT eval  RLE:  4+/5  LLE:  4/5  Increase strength in affected mm groups by 1/3 grade   Balance Sitting EOB:  good -  Dynamic Standing:  fair with [employer]  Sitting EOB:  good   Dynamic Standing: fair +     Patient is Alert & Oriented x person, place, time, and situation and follows directions    Sensation:  Patient  denies numbness/tingling   Edema:  no   Endurance: fair  -    Vitals: room air   Blood Pressure at rest  Blood Pressure during session    Heart Rate at rest  Heart Rate during session    SPO2 at rest %  SPO2 during session 89-93%     Patient education  Patient educated on role of Physical Therapy, risks of immobility, safety and plan of care, energy conservation,  importance of mobility while in hospital , purse lip breathing, ankle pumps, quad set and glut set for edema control, blood clot prevention, importance and purpose of adaptive device and adjusted to proper height for the patient. , safety , and stair training      Patient response to education:   Pt verbalized understanding Pt demonstrated skill Pt requires further education in this area   Yes Partial Yes      Treatment:  Patient practiced and was instructed/facilitated in the following treatment: Patient Sat edge of bed 10 minutes with Supervision  to increase dynamic sitting balance and activity tolerance. Pt performed bed mobility, transfers, ambulation in room, seated exercises, educated regarding WB precautions. Therapeutic Exercises:  ankle pumps, heel raises, hip abduction/adduction, long arc quad, and seated marching  x 10 reps x 2 sets. At end of session, patient in chair with     call light and phone within reach,  all lines and tubes intact, nursing notified. Patient would benefit from continued skilled Physical Therapy to improve functional independence and quality of life.          Patient's/ family goals   rehab    Time in  1317  Time out  1403    Total Treatment Time  26 minutes    Evaluation time includes thorough review of current medical information, gathering information on past medical history/social history and prior level of function, completion of standardized testing/informal observation of tasks, assessment of data, and development of Plan of care and goals.      CPT codes:  Low Complexity PT evaluation (62606)  Therapeutic activities (42884)   15 minutes  1 unit(s)  Therapeutic exercises (78491)   11 minutes  1 unit(s)    Courtney Andino, PT

## 2023-02-25 NOTE — PROGRESS NOTES
Patient call bell on, and when answered she stated that this nurse took to long to come, so she urinated in the trash can. Went over her restrictions for her left leg (operative side) no weight bearing. Placed bedside commode and instructed and assisted with pivot to the commode. Patient gown and linens changed.

## 2023-02-25 NOTE — OP NOTE
[address]                                OPERATIVE REPORT    PATIENT NAME: Marco A Chin                        :        1970  MED REC NO:   [id number]                            ROOM:       0330  ACCOUNT NO:   [de-identified]                           ADMIT DATE: 2023  PROVIDER:     Bhargav Cisneros DPM    DATE OF PROCEDURE:  2023    PREOPERATIVE DIAGNOSES:  1. Left degenerative joint disease of the foot and ankle. 2.  Deformity of left foot and ankle. POSTOPERATIVE DIAGNOSES:  1. Left degenerative joint disease of the foot and ankle. 2.  Deformity of left foot and ankle. PROCEDURES:  1. Tibiotalocalcaneal arthrodesis of left lower extremity. 2.  [place] of distal fibular graft. HEMOSTASIS:  Pneumatic thigh tourniquet set at 300 mmHg continuous for a  total of 180 minutes. COMPLICATIONS:  None. ANESTHESIA:  General.    INJECTABLES:  Preoperative popliteal block was given. MATERIALS USED:  3-0 nylon suture. 10 x 200 mm [place] T2 nail, 5.0 mm  screws x4. INTRAOPERATIVE FINDINGS:  Restored alignment noted, successful  correction of the patient's preoperative deformity as well as successful  arthrodesis of the aforementioned joints. INDICATIONS:  This is a pleasant 17-year-old female who presents today  for left tibiotalocalcaneal arthrodesis. The patient sustained trauma  to her talus almost three months ago and subsequently had significant  pain and discomfort. We elected to move forward with the  tibiotalocalcaneal arthrodesis given the patient's functionality and  activity level. I counseled the patient the nature of problem, proposed  course of procedure, potential benefits, risks, complications, and  convalescence in detail. All questions were answered to the patient's  satisfaction. No guarantees were given as to the outcome of procedure.     DESCRIPTION OF PROCEDURE: Under mild sedation, the patient was brought  to the operating room and was placed on the operating table in supine  position. The left lower extremity was scrubbed, prepped, and draped in  the usual sterile fashion. At this time, using a #10 blade, I performed  a curvilinear incision on the lateral aspect of the left ankle. The  incision was taken through the subcutaneous tissue. Bleeders were  ligated as appropriate. Next, using sharp dissection and blunt  dissection, I was able to then expose the distal fibula which was then  marked with osteotome and mallet and next I was able to then resect the  distal fibula. At this point, I was able to then valentin out the side of  the distal fibula just at the level of syndesmosis and below the  syndesmosis. Using the osteotome and mallet, I was able to valentin out the  osteotomy sites. Next, using reciprocating saw, I was able to resect  the distal fibula. At this point, once we resected the distal fibula,  we then morselized this and harvested the cancellous bone for the  arthrodesis. At this point, the distal tibia plafond was then  identified and then resected using reciprocating saw, resected also the  medial malleolus in the process for alignment, also resected the  articular cartilage side of the talar dome. At this point, I was able  to then align the tibiotalar joint _____, however, using the osteotome  and mallet, I was able to then open up the subtalar joint and prep it,  exposing cancellous bone. So, at this point, I was able to align the  tibiotalocalcaneal joints in good alignment, introduced guidewire from  the inferior calcaneus to the superior to the tibia. Then, I was able  to ream over that for the entry portion of the nail and then I was able  to introduce long guidewire from the inferior calcaneus to the superior  tibia and then I was able to ream in increments, 0.5 mm increments up to  11 mm to introduce a 10 x 200 mm nail.   In fact, once we introduced the  nail, excellent stability and compression was maintained. Slight  gapping was noted at the medial side, which was then backfilled with  harvested cancellous bone graft from the fibula. I introduced per  's recommendations the distal screw first and then superior  tibial screws. This was a _____ nail as we maintained the position as  it is given the stability of the arthrodesis sites. At this point,  position was deemed to be excellent, closed the incisions using 3-0  nylon, applied also the 15-[place] drain. Postoperative bandage was then  applied as well as the modified [place] P[place] compression dressing  with a posterior splint. The patient overall tolerated the procedure and anesthesia well in  apparent satisfactory condition with vital signs stable and vascular  status intact to the left lower extremity. The patient was then  transferred to the PACU for further monitoring prior to readmission to  the nursing floor. The patient will be receiving initial postoperative  management there.         Bj Falcon DPM    D: 02/24/2023 10:42:35       T: 02/24/2023 21:59:37     SAL  Job#: [id number]     Doc#: [id number]    CC:

## 2023-02-25 NOTE — PROGRESS NOTES
Podiatry Progress Note  2023   Beto Chiu       SUBJECTIVE: Patient is being seen s/p left TTC fusion(DOS:). In significant pain during encounter. Spoke with pt's nurse who was getting ready to administer pain medication. Past Medical History:   Diagnosis Date    Arthritis     Brain venous angioma (HCC)     Bursitis     hips    Cat scratch of left lower leg     Class 3 severe obesity due to excess calories with body mass index (BMI) of 40.0 to 44.9 in adult [facility])     COPD (chronic obstructive pulmonary disease) (HCC)     Diverticulitis     GERD (gastroesophageal reflux disease)     Incisional hernia with obstruction but no gangrene     Strep throat         Past Surgical History:   Procedure Laterality Date    CARPAL TUNNEL RELEASE Left      SECTION      CHOLECYSTECTOMY      COLECTOMY  2016    FOREARM FRACTURE SURGERY Left     fracture of ulna s/p repair    HERNIA REPAIR      HERNIA REPAIR  2015    incarcerated hernia repair    HERNIA REPAIR N/A 2021    INCISIONAL HERNIA REPAIR WITH MESH, POSSIBLE COMPONENT SEPARATION  LAPAROSCOPIC ROBOTIC XI ASSISTED (CPT B0467108) performed by Ryan Cardoso MD at [address] [place] [address] (CERVIX STATUS UNKNOWN)      NERVE BLOCK      sacroiliac bilateral 2012    PELVIC FRACTURE SURGERY      VENTRAL HERNIA REPAIR  2015         Family History   Problem Relation Age of Onset    Osteoarthritis Other     Diabetes Other     Other Father         pancreatitis    Prostate Cancer Father     Stroke Brother         half brother    Cancer Paternal Grandfather         Social History     Tobacco Use    Smoking status: Former     Packs/day: 0.50     Years: 30.00     Pack years: 15.00     Types: Cigarettes     Quit date: 3/5/2021     Years since quittin.9    Smokeless tobacco: Never   Substance Use Topics    Alcohol use: Yes     Comment: socially        Prior to Admission medications    Medication Sig Start Date End Date Taking? Authorizing Provider   levothyroxine (SYNTHROID) 50 MCG tablet Take 1 tablet by mouth Daily 11/15/22   Patti Khan DO   levETIRAcetam (KEPPRA) 500 MG tablet Take 1 tablet by mouth 2 times daily 8/2/21   Patti Khan DO   hydrOXYzine (ATARAX) 50 MG tablet Take 50 mg by mouth nightly  1/27/21   Historical Provider, MD        Ibuprofen, Nsaids, and Morphine         OBJECTIVE:        Vitals:    02/25/23 0913   BP:    Pulse:    Resp: 18   Temp:    SpO2:               EXAM:        Pt is AAOx3  Posterior splint intact to LLE. Patient able to actively range all digits. Digits 1-5 well perfused with no signs of venous congestion.  Drain intact and with minimal serosanguinous drainage      Current Facility-Administered Medications   Medication Dose Route Frequency Provider Last Rate Last Admin    hydrOXYzine HCl (ATARAX) tablet 50 mg  50 mg Oral Nightly Jensen Nordmann, APRN - CNP   50 mg at 02/24/23 2035    levothyroxine (SYNTHROID) tablet 50 mcg  50 mcg Oral Daily Jensen Nordmann, APRN - CNP   50 mcg at 02/25/23 0571    0.9% NaCl with KCl 40 mEq infusion   IntraVENous Continuous Jensen Nordmann, APRN - CNP 75 mL/hr at 02/25/23 0630 Rate Verify at 02/25/23 0630    albuterol (PROVENTIL) nebulizer solution 2.5 mg  2.5 mg Nebulization Q4H PRN Jensen Nordmann, APRN - CNP        budesonide (PULMICORT) nebulizer suspension 500 mcg  0.5 mg Nebulization BID Jensen Nordmann, APRN - CNP   500 mcg at 02/25/23 0423    hydrALAZINE (APRESOLINE) injection 5 mg  5 mg IntraVENous Q4H PRN Jensen Nordmann, APRN - CNP        magnesium sulfate 1000 mg in dextrose 5% 100 mL IVPB  1,000 mg IntraVENous PRN Jensen Nordmann, APRN - CNP        sodium phosphate 16.62 mmol in sodium chloride 0.9 % 250 mL IVPB  0.16 mmol/kg IntraVENous PRN Jensen Nordmann, APRN - CNP        Or    sodium phosphate 33.24 mmol in sodium chloride 0.9 % 250 mL IVPB  0.32 mmol/kg IntraVENous PRN Jensen Nordmann, APRN - CNP        potassium chloride (KLOR-CON M) extended release tablet 40 mEq 40 mEq Oral PRN [place] Sneddon, APRN - CNP        Or    potassium bicarb-citric acid (EFFER-K) effervescent tablet 40 mEq  40 mEq Oral PRN [place] Sneddon, APRN - CNP        Or    potassium chloride 10 mEq/100 mL IVPB (Peripheral Line)  10 mEq IntraVENous PRN Melina Sneddon, APRN - CNP        arformoterol tartrate (BROVANA) nebulizer solution 15 mcg  15 mcg Nebulization BID [place] Sneddon, APRN - CNP   15 mcg at 02/25/23 0423    sodium chloride flush 0.9 % injection 5-40 mL  5-40 mL IntraVENous 2 times per day [place] Sneddon, APRN - CNP        sodium chloride flush 0.9 % injection 5-40 mL  5-40 mL IntraVENous PRN Melina Sneddon, APRN - CNP        0.9 % sodium chloride infusion   IntraVENous PRN Melina Sneddon, APRN - CNP        ondansetron (ZOFRAN-ODT) disintegrating tablet 4 mg  4 mg Oral Q8H PRN [place] Sneddon, APRN - CNP        Or    ondansetron (ZOFRAN) injection 4 mg  4 mg IntraVENous Q6H PRN [place] Sneddon, APRN - CNP        polyethylene glycol (GLYCOLAX) packet 17 g  17 g Oral Daily PRN [place] Sneddon, APRN - CNP        acetaminophen (TYLENOL) tablet 650 mg  650 mg Oral Q6H PRN [place] Sneddon, APRN - CNP        Or    acetaminophen (TYLENOL) suppository 650 mg  650 mg Rectal Q6H PRN Melina Sneddon, APRN - CNP        levETIRAcetam (KEPPRA) tablet 500 mg  500 mg Oral BID Melina Sneddon, APRN - CNP   500 mg at 02/25/23 0913    albuterol (PROVENTIL) nebulizer solution 2.5 mg  2.5 mg Nebulization TID [place] Sneddon, APRN - CNP   2.5 mg at 02/25/23 0424    acetylcysteine (MUCOMYST) 10 % solution 400 mg  4 mL Inhalation TID Melina Sneddon, APRN - CNP   400 mg at 02/25/23 0424    enoxaparin Sodium (LOVENOX) injection 30 mg  30 mg SubCUTAneous BID [place] Sneddon, APRN - CNP   30 mg at 02/25/23 0913    HYDROmorphone HCl PF (DILAUDID) injection 1 mg  1 mg IntraVENous Q4H PRN Elroy X Fahim, DPM   1 mg at 02/25/23 0913    HYDROcodone-acetaminophen (NORCO) 5-325 MG per tablet 1 tablet  1 tablet Oral Q6H PRN Elroy X Fahim, DPM   1 tablet at 02/25/23 0533        Lab Results   Component Value Date    WBC 9.7 02/25/2023    HCT 34.4 02/25/2023    HGB 10.9 (L) 02/25/2023     02/25/2023     02/25/2023    K 5.1 (H) 02/25/2023     02/25/2023    CO2 21 (L) 02/25/2023    BUN 18 02/25/2023    CREATININE 0.8 02/25/2023    GLUCOSE 130 (H) 02/25/2023    CRP 0.6 (H) 11/28/2022         Radiographs:    ASSESEMENT:  -S/P left TTC fusion(DOS:2/24)  - Obesity        PLAN:  - Examined and evaluated  - All labs, imaging, and charts reviewed  - Abx: ancef 2 gm x3   - Appreciate IM assistance with pain control   - Case management consulted for assistance with SNF placement. - Posterior splint to LLE to remain clean, dry, and intact. Drain to remain intact   - NWB to LLE   - Will continue to monitor while in house    Discussed with   Raymond Bhatti DPM Pod [place] [phone]  Fellowship-Trained Foot and Ankle Surgeon  Diplomate, American Board of Foot and Ankle Surgeons  [phone]       Thank you for involving podiatry in this patient's care.  Please do not hesitate to call with any questions, concerns, or new findings      Briseida De La Rosa DPM   2/25/2023   9:43 AM

## 2023-02-25 NOTE — PLAN OF CARE
Problem: Discharge Planning  Goal: Discharge to home or other facility with appropriate resources  Outcome: Progressing     Problem: Pain  Goal: Verbalizes/displays adequate comfort level or baseline comfort level  Outcome: Progressing     Problem: Safety - Adult  Goal: Free from fall injury  Outcome: Progressing  Flowsheets (Taken 2/25/2023 0915 by Ryan De La Cruz RN)  Free From Fall Injury: Instruct family/caregiver on patient safety     Problem: ABCDS Injury Assessment  Goal: Absence of physical injury  Outcome: Progressing

## 2023-02-25 NOTE — CARE COORDINATION
SS NOTE: SW recd the SS Consult: \". Woody Christelle Drayton Christelle Patient is acceptable for discharge once this can be arranged. Woody Christelle Woody Christelle \" Per NADER Araujo this pt will need PRECERT and an accepting SNF. MOON Pinedo.2/25/2023.8:31 AM.     CM note: Consult for [facility] noted, pt will need PT/OT evals to see if she will qualify for rehab, will follow and review [facility] choices once evals completed. Pt will NEED PRE-CERT, a neg covid test, a HENS and VIVEK completed for [facility] transfer.   Electronically signed by Eris Holcomb RN on 2/24/2023 at 2:50 PM

## 2023-02-25 NOTE — PROGRESS NOTES
Internal Medicine Progress Note    [facility]    Allyson Watson. Rajinder Mendez., F.A.CManoloOManoloI. Yohannes Estrella D.O., MANAOHILARIA Antoine, MSN, APRN, NP-C  Kianna Cramer, MSN, APRN-CNP     Primary Care Physician: Manuel Peña DO   Admitting Physician:  Jaycob Penn DO  Admission date and time: 2/24/2023  5:19 AM    Room:  [address] [place] [address]  Admitting diagnosis: Osteoarthritis of left ankle, unspecified osteoarthritis type [M19.072]  Postoperative pain [G89.18]  Arthritis of left ankle [M19.072]    Patient Name: Nolan Chao  MRN: [id number]    Date of Service: 2/25/2023     Subjective:  Ulysses Grow is a 46 y.o. female who was seen and examined today,2/25/2023, at the bedside. Ulysses Grow describes postoperative foot pain as to be expected. Her respiratory status has improved. We are awaiting acceptance for discharge to a skilled nursing facility. Review of System:   Constitutional:   Denies fever or chills, weight loss or gain, fatigue or malaise. HEENT:   Denies ear pain, sore throat, sinus or eye problems. Cardiovascular:   Denies any chest pain, irregular heartbeats, or palpitations. Respiratory:   Denies shortness of breath, coughing, sputum production, hemoptysis, or wheezing. Gastrointestinal:   Denies nausea, vomiting, diarrhea, or constipation. Denies any abdominal pain. Genitourinary:    Denies any urgency, frequency, hematuria. Voiding  without difficulty. Extremities:   Describes postoperative left foot pain as to be expected  Neurology:    Denies any headache or focal neurological deficits, Denies generalized weakness or memory difficulty. Psch:   Denies being anxious or depressed. Musculoskeletal:    Denies  myalgias, joint complaints or back pain. Integumentary:   Denies any rashes, ulcers, or excoriations. Denies bruising. Hematologic/Lymphatic:  Denies bruising or bleeding.     Physical Exam:  I/O this shift:  In: 662.9 [I.V.:662.9]  Out: -     Intake/Output Summary (Last 24 hours) at 2/25/2023 0637  Last data filed at 2/25/2023 0630  Gross per 24 hour   Intake 2722.86 ml   Output 120 ml   Net 2602.86 ml   I/O last 3 completed shifts: In: 2060 [P.O.:560; I.V.:1500]  Out: 120 [Drains:60; Other:60]  Patient Vitals for the past 96 hrs (Last 3 readings):   Weight   02/24/23 1300 229 lb (103.9 kg)     Vital Signs:   Blood pressure (!) 93/58, pulse 68, temperature 98.2 °F (36.8 °C), temperature source Oral, resp. rate 16, height 5' 2\" (1.575 m), weight 229 lb (103.9 kg), SpO2 97 %. General appearance:  Alert, responsive, oriented to person, place, and time. Well preserved, alert, no distress. Head:  Normocephalic. No masses, lesions or tenderness. Eyes:  PERRLA. EOMI. Sclera clear. Buccal mucosa moist.  ENT:  Ears normal. Mucosa normal.  Neck:    Supple. Trachea midline. No thyromegaly. No JVD. No bruits. Heart:    Rhythm regular. Rate controlled. No murmurs. Lungs:    Symmetrical. Clear to auscultation bilaterally. No wheezes. No rhonchi. No rales. Abdomen:   Soft. Non-tender. Non-distended. Bowel sounds positive. No organomegaly or masses. No pain on palpation. Extremities:    Peripheral pulses present. Dressings in place to the left foot. Neurologic:    Alert x 3. No focal deficit. Cranial nerves grossly intact. No focal weakness. Psych:   Behavior is normal. Mood appears normal. Speech is not rapid and/or pressured. Musculoskeletal:   Spine ROM normal. Muscular strength intact. Gait not assessed. Integumentary:  No rashes  Skin normal color and texture.   Genitalia/Breast:  Deferred    Medication:  Scheduled Meds:   ceFAZolin  2,000 mg IntraVENous Q8H    hydrOXYzine HCl  50 mg Oral Nightly    levothyroxine  50 mcg Oral Daily    budesonide  0.5 mg Nebulization BID    arformoterol tartrate  15 mcg Nebulization BID    sodium chloride flush  5-40 mL IntraVENous 2 times per day    levETIRAcetam  500 mg Oral BID albuterol  2.5 mg Nebulization TID    acetylcysteine  4 mL Inhalation TID    enoxaparin  30 mg SubCUTAneous BID     Continuous Infusions:   0.9% NaCl with KCl 40 mEq 75 mL/hr at 02/25/23 0630    sodium chloride         Objective Data:  CBC with Differential:    Lab Results   Component Value Date/Time    WBC 9.7 02/25/2023 05:23 AM    RBC 3.46 02/25/2023 05:23 AM    HGB 10.9 02/25/2023 05:23 AM    HCT 34.4 02/25/2023 05:23 AM     02/25/2023 05:23 AM    MCV 99.4 02/25/2023 05:23 AM    MCH 31.5 02/25/2023 05:23 AM    MCHC 31.7 02/25/2023 05:23 AM    RDW 15.7 02/25/2023 05:23 AM    NRBC 0.9 11/14/2022 05:26 AM    SEGSPCT 82 09/14/2011 09:10 AM    METASPCT 1.7 11/14/2022 05:26 AM    LYMPHOPCT 12.6 02/25/2023 05:23 AM    MONOPCT 8.7 02/25/2023 05:23 AM    MYELOPCT 2.6 11/14/2022 05:26 AM    BASOPCT 0.1 02/25/2023 05:23 AM    MONOSABS 0.84 02/25/2023 05:23 AM    LYMPHSABS 1.22 02/25/2023 05:23 AM    EOSABS 0.02 02/25/2023 05:23 AM    BASOSABS 0.01 02/25/2023 05:23 AM     BMP:    Lab Results   Component Value Date/Time     02/22/2023 10:30 AM    K 4.4 02/22/2023 10:30 AM     02/22/2023 10:30 AM    CO2 25 02/22/2023 10:30 AM    BUN 24 02/22/2023 10:30 AM    LABALBU 3.8 12/27/2022 08:26 PM    CREATININE 0.9 02/22/2023 10:30 AM    CALCIUM 9.4 02/22/2023 10:30 AM    GFRAA >60 08/02/2021 04:46 AM    LABGLOM >60 02/22/2023 10:30 AM    GLUCOSE 104 02/22/2023 10:30 AM    GLUCOSE 89 09/14/2011 09:10 AM       Assessment:  Symptomatic osteoarthritis of the left ankle status post TTC fusion of the left ankle and [place] of graft in February 24, 2023 by Dr. Mike Hoff  Perioperative aspiration, suspected  Nonoxygen dependent COPD with exacerbation secondary to #2  Gastroesophageal reflux disease  Obesity secondary to excess caloric intake with BMI 41.88 kg meter squared  Inguinal adenopathy previously reported with plans for outpatient follow-up, unsure if followed through by patient    Plan:   I have discussed the case with the case management team.  We are attempting to procure a skilled nursing facility for discharge. The patient describes postoperative pain as to be expected and all pain medications will come from the podiatric team.  Chronic comorbidities are otherwise well controlled and home medications have been resumed. Continue current therapy. See orders for further plan of care. More than 50% of my time was spent at the bedside counseling/coordinating care with the patient and/or family with face to face contact. This time was spent reviewing notes and laboratory data as well as instructing and counseling the patient. Time I spent with the family or surrogate(s) is included only if the patient was incapable of providing the necessary information or participating in medical decisions. I also discussed the differential diagnosis and all of the proposed management plans with the patient and individuals accompanying the patient. I am readily available for any further decision-making and intervention.        Master Davis DO, ARACELI.C.O.I.  2/25/2023  6:37 AM

## 2023-02-26 LAB
ALBUMIN SERPL-MCNC: 3.6 G/DL (ref 3.5–5.2)
ALP BLD-CCNC: 61 U/L (ref 35–104)
ALT SERPL-CCNC: 27 U/L (ref 0–32)
ANION GAP SERPL CALCULATED.3IONS-SCNC: 6 MMOL/L (ref 7–16)
AST SERPL-CCNC: 29 U/L (ref 0–31)
BASOPHILS ABSOLUTE: 0.01 E9/L (ref 0–0.2)
BASOPHILS RELATIVE PERCENT: 0.2 % (ref 0–2)
BILIRUB SERPL-MCNC: <0.2 MG/DL (ref 0–1.2)
BILIRUBIN DIRECT: <0.2 MG/DL (ref 0–0.3)
BILIRUBIN, INDIRECT: ABNORMAL MG/DL (ref 0–1)
BUN BLDV-MCNC: 17 MG/DL (ref 6–20)
CALCIUM SERPL-MCNC: 8.4 MG/DL (ref 8.6–10.2)
CHLORIDE BLD-SCNC: 107 MMOL/L (ref 98–107)
CO2: 25 MMOL/L (ref 22–29)
CREAT SERPL-MCNC: 0.7 MG/DL (ref 0.5–1)
EOSINOPHILS ABSOLUTE: 0.12 E9/L (ref 0.05–0.5)
EOSINOPHILS RELATIVE PERCENT: 2 % (ref 0–6)
GFR SERPL CREATININE-BSD FRML MDRD: >60 ML/MIN/1.73
GLUCOSE BLD-MCNC: 93 MG/DL (ref 74–99)
HCT VFR BLD CALC: 32.4 % (ref 34–48)
HEMOGLOBIN: 10.5 G/DL (ref 11.5–15.5)
IMMATURE GRANULOCYTES #: 0.03 E9/L
IMMATURE GRANULOCYTES %: 0.5 % (ref 0–5)
LYMPHOCYTES ABSOLUTE: 1.41 E9/L (ref 1.5–4)
LYMPHOCYTES RELATIVE PERCENT: 23.1 % (ref 20–42)
MAGNESIUM: 2 MG/DL (ref 1.6–2.6)
MCH RBC QN AUTO: 31 PG (ref 26–35)
MCHC RBC AUTO-ENTMCNC: 32.4 % (ref 32–34.5)
MCV RBC AUTO: 95.6 FL (ref 80–99.9)
MONOCYTES ABSOLUTE: 0.55 E9/L (ref 0.1–0.95)
MONOCYTES RELATIVE PERCENT: 9 % (ref 2–12)
NEUTROPHILS ABSOLUTE: 3.99 E9/L (ref 1.8–7.3)
NEUTROPHILS RELATIVE PERCENT: 65.2 % (ref 43–80)
PDW BLD-RTO: 15.9 FL (ref 11.5–15)
PHOSPHORUS: 3.4 MG/DL (ref 2.5–4.5)
PLATELET # BLD: 167 E9/L (ref 130–450)
PMV BLD AUTO: 10.4 FL (ref 7–12)
POTASSIUM SERPL-SCNC: 4.6 MMOL/L (ref 3.5–5)
RBC # BLD: 3.39 E12/L (ref 3.5–5.5)
SODIUM BLD-SCNC: 138 MMOL/L (ref 132–146)
TOTAL PROTEIN: 6.3 G/DL (ref 6.4–8.3)
WBC # BLD: 6.1 E9/L (ref 4.5–11.5)

## 2023-02-26 PROCEDURE — 6370000000 HC RX 637 (ALT 250 FOR IP): Performed by: NURSE PRACTITIONER

## 2023-02-26 PROCEDURE — 6360000002 HC RX W HCPCS: Performed by: PODIATRIST

## 2023-02-26 PROCEDURE — 1200000000 HC SEMI PRIVATE

## 2023-02-26 PROCEDURE — 2700000000 HC OXYGEN THERAPY PER DAY

## 2023-02-26 PROCEDURE — 84100 ASSAY OF PHOSPHORUS: CPT

## 2023-02-26 PROCEDURE — 6360000002 HC RX W HCPCS: Performed by: NURSE PRACTITIONER

## 2023-02-26 PROCEDURE — 36415 COLL VENOUS BLD VENIPUNCTURE: CPT

## 2023-02-26 PROCEDURE — 6370000000 HC RX 637 (ALT 250 FOR IP): Performed by: PODIATRIST

## 2023-02-26 PROCEDURE — 2580000003 HC RX 258: Performed by: NURSE PRACTITIONER

## 2023-02-26 PROCEDURE — 85025 COMPLETE CBC W/AUTO DIFF WBC: CPT

## 2023-02-26 PROCEDURE — 80048 BASIC METABOLIC PNL TOTAL CA: CPT

## 2023-02-26 PROCEDURE — 83735 ASSAY OF MAGNESIUM: CPT

## 2023-02-26 PROCEDURE — 80076 HEPATIC FUNCTION PANEL: CPT

## 2023-02-26 PROCEDURE — 94640 AIRWAY INHALATION TREATMENT: CPT

## 2023-02-26 RX ADMIN — HYDROMORPHONE HYDROCHLORIDE 1 MG: 1 INJECTION, SOLUTION INTRAMUSCULAR; INTRAVENOUS; SUBCUTANEOUS at 02:03

## 2023-02-26 RX ADMIN — HYDROCODONE BITARTRATE AND ACETAMINOPHEN 1 TABLET: 5; 325 TABLET ORAL at 23:26

## 2023-02-26 RX ADMIN — SODIUM CHLORIDE, PRESERVATIVE FREE 10 ML: 5 INJECTION INTRAVENOUS at 16:25

## 2023-02-26 RX ADMIN — HYDROMORPHONE HYDROCHLORIDE 1 MG: 1 INJECTION, SOLUTION INTRAMUSCULAR; INTRAVENOUS; SUBCUTANEOUS at 06:12

## 2023-02-26 RX ADMIN — HYDROMORPHONE HYDROCHLORIDE 1 MG: 1 INJECTION, SOLUTION INTRAMUSCULAR; INTRAVENOUS; SUBCUTANEOUS at 20:43

## 2023-02-26 RX ADMIN — ENOXAPARIN SODIUM 30 MG: 100 INJECTION SUBCUTANEOUS at 20:58

## 2023-02-26 RX ADMIN — ENOXAPARIN SODIUM 30 MG: 100 INJECTION SUBCUTANEOUS at 08:16

## 2023-02-26 RX ADMIN — LEVOTHYROXINE SODIUM 50 MCG: 0.05 TABLET ORAL at 06:11

## 2023-02-26 RX ADMIN — HYDROXYZINE HYDROCHLORIDE 50 MG: 25 TABLET ORAL at 20:58

## 2023-02-26 RX ADMIN — HYDROMORPHONE HYDROCHLORIDE 1 MG: 1 INJECTION, SOLUTION INTRAMUSCULAR; INTRAVENOUS; SUBCUTANEOUS at 11:55

## 2023-02-26 RX ADMIN — HYDROMORPHONE HYDROCHLORIDE 1 MG: 1 INJECTION, SOLUTION INTRAMUSCULAR; INTRAVENOUS; SUBCUTANEOUS at 16:25

## 2023-02-26 RX ADMIN — ALBUTEROL SULFATE 2.5 MG: 2.5 SOLUTION RESPIRATORY (INHALATION) at 06:19

## 2023-02-26 RX ADMIN — ACETYLCYSTEINE 400 MG: 100 SOLUTION ORAL; RESPIRATORY (INHALATION) at 06:19

## 2023-02-26 RX ADMIN — LEVETIRACETAM 500 MG: 500 TABLET, FILM COATED ORAL at 08:16

## 2023-02-26 RX ADMIN — BUDESONIDE 500 MCG: 0.5 SUSPENSION RESPIRATORY (INHALATION) at 06:19

## 2023-02-26 RX ADMIN — ARFORMOTEROL TARTRATE 15 MCG: 15 SOLUTION RESPIRATORY (INHALATION) at 06:19

## 2023-02-26 RX ADMIN — ARFORMOTEROL TARTRATE 15 MCG: 15 SOLUTION RESPIRATORY (INHALATION) at 18:00

## 2023-02-26 RX ADMIN — SODIUM CHLORIDE, PRESERVATIVE FREE 10 ML: 5 INJECTION INTRAVENOUS at 20:59

## 2023-02-26 RX ADMIN — HYDROCODONE BITARTRATE AND ACETAMINOPHEN 1 TABLET: 5; 325 TABLET ORAL at 14:41

## 2023-02-26 RX ADMIN — HYDROCODONE BITARTRATE AND ACETAMINOPHEN 1 TABLET: 5; 325 TABLET ORAL at 08:16

## 2023-02-26 RX ADMIN — LEVETIRACETAM 500 MG: 500 TABLET, FILM COATED ORAL at 20:58

## 2023-02-26 RX ADMIN — BUDESONIDE 500 MCG: 0.5 SUSPENSION RESPIRATORY (INHALATION) at 18:00

## 2023-02-26 ASSESSMENT — PAIN DESCRIPTION - LOCATION
LOCATION: LEG
LOCATION: FOOT;ANKLE
LOCATION: LEG
LOCATION: FOOT;ANKLE
LOCATION: LEG
LOCATION: ANKLE;FOOT
LOCATION: ANKLE;FOOT
LOCATION: LEG
LOCATION: LEG

## 2023-02-26 ASSESSMENT — PAIN SCALES - GENERAL
PAINLEVEL_OUTOF10: 4
PAINLEVEL_OUTOF10: 10
PAINLEVEL_OUTOF10: 6
PAINLEVEL_OUTOF10: 10
PAINLEVEL_OUTOF10: 10
PAINLEVEL_OUTOF10: 0
PAINLEVEL_OUTOF10: 10
PAINLEVEL_OUTOF10: 10
PAINLEVEL_OUTOF10: 0
PAINLEVEL_OUTOF10: 10

## 2023-02-26 ASSESSMENT — PAIN DESCRIPTION - ORIENTATION
ORIENTATION: LEFT

## 2023-02-26 ASSESSMENT — PAIN DESCRIPTION - DESCRIPTORS
DESCRIPTORS: ACHING;DISCOMFORT;SORE;TENDER
DESCRIPTORS: ACHING;DISCOMFORT;TENDER;SORE
DESCRIPTORS: ACHING;DISCOMFORT;SORE;TENDER
DESCRIPTORS: ACHING;DISCOMFORT;SORE;TENDER

## 2023-02-26 ASSESSMENT — PAIN - FUNCTIONAL ASSESSMENT
PAIN_FUNCTIONAL_ASSESSMENT: PREVENTS OR INTERFERES SOME ACTIVE ACTIVITIES AND ADLS

## 2023-02-26 ASSESSMENT — PAIN DESCRIPTION - PAIN TYPE: TYPE: ACUTE PAIN;SURGICAL PAIN

## 2023-02-26 ASSESSMENT — PAIN DESCRIPTION - FREQUENCY: FREQUENCY: CONTINUOUS

## 2023-02-26 ASSESSMENT — PAIN DESCRIPTION - ONSET: ONSET: ON-GOING

## 2023-02-26 NOTE — PROGRESS NOTES
Podiatry Progress Note  2023   Wilma Salgado       SUBJECTIVE: Patient is being seen s/p left TTC fusion(DOS:). States that her pain is better controlled today. States that she prefers to go to [facility] for SNF. Past Medical History:   Diagnosis Date    Arthritis     Brain venous angioma (HCC)     Bursitis     hips    Cat scratch of left lower leg     Class 3 severe obesity due to excess calories with body mass index (BMI) of 40.0 to 44.9 in adult [facility])     COPD (chronic obstructive pulmonary disease) (HCC)     Diverticulitis     GERD (gastroesophageal reflux disease)     Incisional hernia with obstruction but no gangrene     Strep throat         Past Surgical History:   Procedure Laterality Date    CARPAL TUNNEL RELEASE Left      SECTION      CHOLECYSTECTOMY      COLECTOMY  2016    FOREARM FRACTURE SURGERY Left     fracture of ulna s/p repair    HERNIA REPAIR      HERNIA REPAIR  2015    incarcerated hernia repair    HERNIA REPAIR N/A 2021    INCISIONAL HERNIA REPAIR WITH MESH, POSSIBLE COMPONENT SEPARATION  LAPAROSCOPIC ROBOTIC XI ASSISTED (CPT V2612296) performed by Mary Ann Gonzalez MD at [address] [facility] (CERVIX STATUS UNKNOWN)      NERVE BLOCK      sacroiliac bilateral 2012    PELVIC FRACTURE SURGERY      VENTRAL HERNIA REPAIR  2015         Family History   Problem Relation Age of Onset    Osteoarthritis Other     Diabetes Other     Other Father         pancreatitis    Prostate Cancer Father     Stroke Brother         half brother    Cancer Paternal Grandfather         Social History     Tobacco Use    Smoking status: Former     Packs/day: 0.50     Years: 30.00     Pack years: 15.00     Types: Cigarettes     Quit date: 3/5/2021     Years since quittin.9    Smokeless tobacco: Never   Substance Use Topics    Alcohol use: Yes     Comment: socially        Prior to Admission medications    Medication Sig Start Date End Date Taking?  Authorizing Provider levothyroxine (SYNTHROID) 50 MCG tablet Take 1 tablet by mouth Daily 11/15/22   Cathleen Lanier DO   levETIRAcetam (KEPPRA) 500 MG tablet Take 1 tablet by mouth 2 times daily 8/2/21   Cathleen Lanier DO   hydrOXYzine (ATARAX) 50 MG tablet Take 50 mg by mouth nightly  1/27/21   Historical Provider, MD        Ibuprofen, Nsaids, and Morphine         OBJECTIVE:        Vitals:    02/26/23 0815   BP:    Pulse:    Resp:    Temp:    SpO2: 94%              EXAM:        Pt is AAOx3  Posterior splint intact to LLE. Patient able to actively range all digits. Digits 1-5 well perfused with no signs of venous congestion.  Drain intact and with minimal serosanguinous drainage      Current Facility-Administered Medications   Medication Dose Route Frequency Provider Last Rate Last Admin    hydrOXYzine HCl (ATARAX) tablet 50 mg  50 mg Oral Nightly Compa Kwasi, APRN - CNP   50 mg at 02/25/23 2043    levothyroxine (SYNTHROID) tablet 50 mcg  50 mcg Oral Daily Compa Kwasi, APRN - CNP   50 mcg at 02/26/23 1626    albuterol (PROVENTIL) nebulizer solution 2.5 mg  2.5 mg Nebulization Q4H PRN Compa Kwasi, APRN - CNP        budesonide (PULMICORT) nebulizer suspension 500 mcg  0.5 mg Nebulization BID Compa Villalpando, APRN - CNP   500 mcg at 02/26/23 6816    hydrALAZINE (APRESOLINE) injection 5 mg  5 mg IntraVENous Q4H PRN Compa Kwasi, APRN - CNP        magnesium sulfate 1000 mg in dextrose 5% 100 mL IVPB  1,000 mg IntraVENous PRN Compa Kwasi, APRN - CNP        sodium phosphate 16.62 mmol in sodium chloride 0.9 % 250 mL IVPB  0.16 mmol/kg IntraVENous PRN Compa Kwasi, APRN - CNP        Or    sodium phosphate 33.24 mmol in sodium chloride 0.9 % 250 mL IVPB  0.32 mmol/kg IntraVENous PRN Compa Kwasi, APRN - CNP        potassium chloride (KLOR-CON M) extended release tablet 40 mEq  40 mEq Oral PRN Compa Shade, APRN - CNP        Or    potassium bicarb-citric acid (EFFER-K) effervescent tablet 40 mEq  40 mEq Oral PRN Compa Shade, APRN - CNP Or    potassium chloride 10 mEq/100 mL IVPB (Peripheral Line)  10 mEq IntraVENous PRN Nichols Sneddon, APRN - CNP        arformoterol tartrate (BROVANA) nebulizer solution 15 mcg  15 mcg Nebulization BID Nichols Sneddon, APRN - CNP   15 mcg at 02/26/23 2874    sodium chloride flush 0.9 % injection 5-40 mL  5-40 mL IntraVENous 2 times per day Melina Sneddon, APRN - CNP        sodium chloride flush 0.9 % injection 5-40 mL  5-40 mL IntraVENous PRN Nichols Sneddon, APRN - CNP        0.9 % sodium chloride infusion   IntraVENous PRN Nichols Sneddon, APRN - CNP        ondansetron (ZOFRAN-ODT) disintegrating tablet 4 mg  4 mg Oral Q8H PRN Nichols Sneddon, APRN - CNP        Or    ondansetron (ZOFRAN) injection 4 mg  4 mg IntraVENous Q6H PRN Melina Sneddon, APRN - CNP        polyethylene glycol (GLYCOLAX) packet 17 g  17 g Oral Daily PRN Melina Sneddon, APRN - CNP        acetaminophen (TYLENOL) tablet 650 mg  650 mg Oral Q6H PRN Nichols Sneddon, APRN - CNP        Or    acetaminophen (TYLENOL) suppository 650 mg  650 mg Rectal Q6H PRN Melina Sneddon, APRN - CNP        levETIRAcetam (KEPPRA) tablet 500 mg  500 mg Oral BID Nichols Sneddon, APRN - CNP   500 mg at 02/26/23 0816    enoxaparin Sodium (LOVENOX) injection 30 mg  30 mg SubCUTAneous BID Nichols Sneddon, APRN - CNP   30 mg at 02/26/23 0816    HYDROmorphone HCl PF (DILAUDID) injection 1 mg  1 mg IntraVENous Q4H PRN Elroy X Fahim, DPM   1 mg at 02/26/23 0612    HYDROcodone-acetaminophen (NORCO) 5-325 MG per tablet 1 tablet  1 tablet Oral Q6H PRN Elroy X Fahim, DPM   1 tablet at 02/26/23 0816        Lab Results   Component Value Date    WBC 6.1 02/26/2023    HCT 32.4 (L) 02/26/2023    HGB 10.5 (L) 02/26/2023     02/26/2023     02/26/2023    K 4.6 02/26/2023     02/26/2023    CO2 25 02/26/2023    BUN 17 02/26/2023    CREATININE 0.7 02/26/2023    GLUCOSE 93 02/26/2023    CRP 0.6 (H) 11/28/2022         Radiographs:    ASSESEMENT:  -S/P left TTC fusion(DOS:2/24)  - Obesity PLAN:  - Examined and evaluated  - All labs, imaging, and charts reviewed  - Abx: ancef 2 gm x3   - Appreciate IM assistance with pain control   - Case management consulted for assistance with SNF placement. - Posterior splint to LLE to remain clean, dry, and intact. Drain to remain intact   - NWB to LLE   - Stable for d/c from Podiatry standpoint once cleared by all teams on board   - Will continue to monitor while in house    Discussed with   Alyson Warner DPM Pod [facility] [address]  Fellowship-Trained Foot and Ankle Surgeon  Diplomate, American Board of Foot and Ankle Surgeons  [phone]       Thank you for involving podiatry in this patient's care.  Please do not hesitate to call with any questions, concerns, or new findings      Nini Armijo DPM   2/26/2023   10:10 AM

## 2023-02-26 NOTE — PROGRESS NOTES
Internal Medicine Progress Note    [facility]    Eusebia Guthrie. Felipe Monge, F.A.LILLIEOManoloI. Natasha Govea D.O., MANAOLEANNA Santana D.O. Lamar Hampton, MSN, APRN, NP-C  Adolph Cain. Kavon Villalba, MSN, APRN-CNP     Primary Care Physician: Kasie Duke DO   Admitting Physician:  Krzysztof Tinoco DO  Admission date and time: 2/24/2023  5:19 AM    Room:  [address] [place] [address]  Admitting diagnosis: Osteoarthritis of left ankle, unspecified osteoarthritis type [M19.072]  Postoperative pain [G89.18]  Arthritis of left ankle [M19.072]    Patient Name: Wilma Salgado  MRN: [id number]    Date of Service: 2/26/2023     Subjective:  Bre Abernathy is a 46 y.o. female who was seen and examined today,2/26/2023, at the bedside. Bre Abernathy seems more comfortable on examination today. She did well with the therapy teams yesterday. She has minimal output from the indwelling drain. We continue to work on the discharge plan. Review of System:   Constitutional:   Denies fever or chills, weight loss or gain, fatigue or malaise. HEENT:   Denies ear pain, sore throat, sinus or eye problems. Cardiovascular:   Denies any chest pain, irregular heartbeats, or palpitations. Respiratory:   Denies shortness of breath, coughing, sputum production, hemoptysis, or wheezing. Gastrointestinal:   Denies nausea, vomiting, diarrhea, or constipation. Denies any abdominal pain. Genitourinary:    Denies any urgency, frequency, hematuria. Voiding  without difficulty. Extremities:   Describes postoperative left foot pain as to be expected  Neurology:    Denies any headache or focal neurological deficits, Denies generalized weakness or memory difficulty. Psch:   Denies being anxious or depressed. Musculoskeletal:    Denies  myalgias, joint complaints or back pain. Integumentary:   Denies any rashes, ulcers, or excoriations. Denies bruising. Hematologic/Lymphatic:  Denies bruising or bleeding.     Physical Exam:  I/O this shift:  In: -   Out: 1603 [Urine:1600; Drains:3]    Intake/Output Summary (Last 24 hours) at 2/26/2023 0640  Last data filed at 2/25/2023 2221  Gross per 24 hour   Intake 840 ml   Output 2303 ml   Net -1463 ml   I/O last 3 completed shifts: In: 3562.9 [P.O.:1400; I.V.:2162.9]  Out: 36 [Urine:700; Drains:60; Other:60]  Patient Vitals for the past 96 hrs (Last 3 readings):   Weight   02/24/23 1300 229 lb (103.9 kg)     Vital Signs:   Blood pressure 120/79, pulse 74, temperature 98 °F (36.7 °C), temperature source Oral, resp. rate 18, height 5' 2\" (1.575 m), weight 229 lb (103.9 kg), SpO2 92 %. General appearance:  Alert, responsive, oriented to person, place, and time. Well preserved, alert, no distress. Head:  Normocephalic. No masses, lesions or tenderness. Eyes:  PERRLA. EOMI. Sclera clear. Buccal mucosa moist.  ENT:  Ears normal. Mucosa normal.  Neck:    Supple. Trachea midline. No thyromegaly. No JVD. No bruits. Heart:    Rhythm regular. Rate controlled. No murmurs. Lungs:    Symmetrical. Clear to auscultation bilaterally. No wheezes. No rhonchi. No rales. Abdomen:   Soft. Non-tender. Non-distended. Bowel sounds positive. No organomegaly or masses. No pain on palpation. Extremities:    Peripheral pulses present. Dressings in place to the left foot. Neurologic:    Alert x 3. No focal deficit. Cranial nerves grossly intact. No focal weakness. Psych:   Behavior is normal. Mood appears normal. Speech is not rapid and/or pressured. Musculoskeletal:   Spine ROM normal. Muscular strength intact. Gait not assessed. Integumentary:  No rashes  Skin normal color and texture.   Genitalia/Breast:  Deferred    Medication:  Scheduled Meds:   hydrOXYzine HCl  50 mg Oral Nightly    levothyroxine  50 mcg Oral Daily    budesonide  0.5 mg Nebulization BID    arformoterol tartrate  15 mcg Nebulization BID    sodium chloride flush  5-40 mL IntraVENous 2 times per day    levETIRAcetam  500 mg Oral BID enoxaparin  30 mg SubCUTAneous BID     Continuous Infusions:   0.9% NaCl with KCl 40 mEq 75 mL/hr at 02/25/23 1340    sodium chloride         Objective Data:  CBC with Differential:    Lab Results   Component Value Date/Time    WBC 6.1 02/26/2023 05:30 AM    RBC 3.39 02/26/2023 05:30 AM    HGB 10.5 02/26/2023 05:30 AM    HCT 32.4 02/26/2023 05:30 AM     02/26/2023 05:30 AM    MCV 95.6 02/26/2023 05:30 AM    MCH 31.0 02/26/2023 05:30 AM    MCHC 32.4 02/26/2023 05:30 AM    RDW 15.9 02/26/2023 05:30 AM    NRBC 0.9 11/14/2022 05:26 AM    SEGSPCT 82 09/14/2011 09:10 AM    METASPCT 1.7 11/14/2022 05:26 AM    LYMPHOPCT 23.1 02/26/2023 05:30 AM    MONOPCT 9.0 02/26/2023 05:30 AM    MYELOPCT 2.6 11/14/2022 05:26 AM    BASOPCT 0.2 02/26/2023 05:30 AM    MONOSABS 0.55 02/26/2023 05:30 AM    LYMPHSABS 1.41 02/26/2023 05:30 AM    EOSABS 0.12 02/26/2023 05:30 AM    BASOSABS 0.01 02/26/2023 05:30 AM     BMP:    Lab Results   Component Value Date/Time     02/25/2023 05:23 AM    K 5.1 02/25/2023 05:23 AM    K 4.4 02/22/2023 10:30 AM     02/25/2023 05:23 AM    CO2 21 02/25/2023 05:23 AM    BUN 18 02/25/2023 05:23 AM    LABALBU 3.7 02/25/2023 05:23 AM    CREATININE 0.8 02/25/2023 05:23 AM    CALCIUM 8.8 02/25/2023 05:23 AM    GFRAA >60 08/02/2021 04:46 AM    LABGLOM >60 02/25/2023 05:23 AM    GLUCOSE 130 02/25/2023 05:23 AM    GLUCOSE 89 09/14/2011 09:10 AM       Assessment:  Symptomatic osteoarthritis of the left ankle status post TTC fusion of the left ankle and Cory of graft in February 24, 2023 by Dr. Adam Madison  Perioperative aspiration, suspected  Nonoxygen dependent COPD with exacerbation secondary to #2  Gastroesophageal reflux disease  Obesity secondary to excess caloric intake with BMI 41.88 kg meter squared  Inguinal adenopathy previously reported with plans for outpatient follow-up, unsure if followed through by patient    Plan:   Bre Abernathy did work with the therapy teams yesterday.   We are now awaiting acceptance and precertification for skilled nursing facility placement. I anticipate this will be a long process. Further pain control as per the podiatric team.  Chronic comorbidities are otherwise well controlled. More than 50% of my time was spent at the bedside counseling/coordinating care with the patient and/or family with face to face contact. This time was spent reviewing notes and laboratory data as well as instructing and counseling the patient. Time I spent with the family or surrogate(s) is included only if the patient was incapable of providing the necessary information or participating in medical decisions. I also discussed the differential diagnosis and all of the proposed management plans with the patient and individuals accompanying the patient. I am readily available for any further decision-making and intervention.        Abdiaziz Hernandez DO, F.A.C.O.I.  2/26/2023  6:40 AM

## 2023-02-27 LAB
ALBUMIN SERPL-MCNC: 3.6 G/DL (ref 3.5–5.2)
ALP BLD-CCNC: 60 U/L (ref 35–104)
ALT SERPL-CCNC: 23 U/L (ref 0–32)
ANION GAP SERPL CALCULATED.3IONS-SCNC: 9 MMOL/L (ref 7–16)
AST SERPL-CCNC: 18 U/L (ref 0–31)
BASOPHILS ABSOLUTE: 0.02 E9/L (ref 0–0.2)
BASOPHILS RELATIVE PERCENT: 0.4 % (ref 0–2)
BILIRUB SERPL-MCNC: <0.2 MG/DL (ref 0–1.2)
BILIRUBIN DIRECT: <0.2 MG/DL (ref 0–0.3)
BILIRUBIN, INDIRECT: ABNORMAL MG/DL (ref 0–1)
BUN BLDV-MCNC: 16 MG/DL (ref 6–20)
CALCIUM SERPL-MCNC: 8.8 MG/DL (ref 8.6–10.2)
CHLORIDE BLD-SCNC: 104 MMOL/L (ref 98–107)
CO2: 27 MMOL/L (ref 22–29)
CREAT SERPL-MCNC: 0.8 MG/DL (ref 0.5–1)
EOSINOPHILS ABSOLUTE: 0.16 E9/L (ref 0.05–0.5)
EOSINOPHILS RELATIVE PERCENT: 3 % (ref 0–6)
GFR SERPL CREATININE-BSD FRML MDRD: >60 ML/MIN/1.73
GLUCOSE BLD-MCNC: 98 MG/DL (ref 74–99)
HCT VFR BLD CALC: 33.2 % (ref 34–48)
HEMOGLOBIN: 10.9 G/DL (ref 11.5–15.5)
IMMATURE GRANULOCYTES #: 0.02 E9/L
IMMATURE GRANULOCYTES %: 0.4 % (ref 0–5)
LYMPHOCYTES ABSOLUTE: 1.23 E9/L (ref 1.5–4)
LYMPHOCYTES RELATIVE PERCENT: 23.3 % (ref 20–42)
MAGNESIUM: 2 MG/DL (ref 1.6–2.6)
MCH RBC QN AUTO: 31 PG (ref 26–35)
MCHC RBC AUTO-ENTMCNC: 32.8 % (ref 32–34.5)
MCV RBC AUTO: 94.3 FL (ref 80–99.9)
MONOCYTES ABSOLUTE: 0.48 E9/L (ref 0.1–0.95)
MONOCYTES RELATIVE PERCENT: 9.1 % (ref 2–12)
NEUTROPHILS ABSOLUTE: 3.36 E9/L (ref 1.8–7.3)
NEUTROPHILS RELATIVE PERCENT: 63.8 % (ref 43–80)
PDW BLD-RTO: 15.5 FL (ref 11.5–15)
PHOSPHORUS: 4.4 MG/DL (ref 2.5–4.5)
PLATELET # BLD: 187 E9/L (ref 130–450)
PMV BLD AUTO: 10.1 FL (ref 7–12)
POTASSIUM SERPL-SCNC: 4.8 MMOL/L (ref 3.5–5)
RBC # BLD: 3.52 E12/L (ref 3.5–5.5)
SODIUM BLD-SCNC: 140 MMOL/L (ref 132–146)
TOTAL PROTEIN: 6.2 G/DL (ref 6.4–8.3)
WBC # BLD: 5.3 E9/L (ref 4.5–11.5)

## 2023-02-27 PROCEDURE — 97110 THERAPEUTIC EXERCISES: CPT

## 2023-02-27 PROCEDURE — 6360000002 HC RX W HCPCS: Performed by: NURSE PRACTITIONER

## 2023-02-27 PROCEDURE — 6360000002 HC RX W HCPCS: Performed by: PODIATRIST

## 2023-02-27 PROCEDURE — 6370000000 HC RX 637 (ALT 250 FOR IP): Performed by: PODIATRIST

## 2023-02-27 PROCEDURE — 85025 COMPLETE CBC W/AUTO DIFF WBC: CPT

## 2023-02-27 PROCEDURE — 97530 THERAPEUTIC ACTIVITIES: CPT

## 2023-02-27 PROCEDURE — 36415 COLL VENOUS BLD VENIPUNCTURE: CPT

## 2023-02-27 PROCEDURE — 1200000000 HC SEMI PRIVATE

## 2023-02-27 PROCEDURE — 80076 HEPATIC FUNCTION PANEL: CPT

## 2023-02-27 PROCEDURE — 6370000000 HC RX 637 (ALT 250 FOR IP): Performed by: NURSE PRACTITIONER

## 2023-02-27 PROCEDURE — 83735 ASSAY OF MAGNESIUM: CPT

## 2023-02-27 PROCEDURE — 94640 AIRWAY INHALATION TREATMENT: CPT

## 2023-02-27 PROCEDURE — 84100 ASSAY OF PHOSPHORUS: CPT

## 2023-02-27 PROCEDURE — 80048 BASIC METABOLIC PNL TOTAL CA: CPT

## 2023-02-27 RX ORDER — KETOROLAC TROMETHAMINE 30 MG/ML
30 INJECTION, SOLUTION INTRAMUSCULAR; INTRAVENOUS EVERY 6 HOURS PRN
Status: DISCONTINUED | OUTPATIENT
Start: 2023-02-27 | End: 2023-03-03 | Stop reason: HOSPADM

## 2023-02-27 RX ORDER — HYDROCODONE BITARTRATE AND ACETAMINOPHEN 5; 325 MG/1; MG/1
1 TABLET ORAL EVERY 4 HOURS PRN
Qty: 42 TABLET | Refills: 0 | Status: SHIPPED | OUTPATIENT
Start: 2023-02-27 | End: 2023-03-06

## 2023-02-27 RX ORDER — HYDROCODONE BITARTRATE AND ACETAMINOPHEN 5; 325 MG/1; MG/1
1 TABLET ORAL EVERY 4 HOURS PRN
Status: DISCONTINUED | OUTPATIENT
Start: 2023-02-27 | End: 2023-03-03 | Stop reason: HOSPADM

## 2023-02-27 RX ORDER — HYDROMORPHONE HYDROCHLORIDE 1 MG/ML
1 INJECTION, SOLUTION INTRAMUSCULAR; INTRAVENOUS; SUBCUTANEOUS ONCE
Status: COMPLETED | OUTPATIENT
Start: 2023-02-27 | End: 2023-02-27

## 2023-02-27 RX ADMIN — HYDROCODONE BITARTRATE AND ACETAMINOPHEN 1 TABLET: 5; 325 TABLET ORAL at 05:25

## 2023-02-27 RX ADMIN — LEVETIRACETAM 500 MG: 500 TABLET, FILM COATED ORAL at 20:33

## 2023-02-27 RX ADMIN — LEVETIRACETAM 500 MG: 500 TABLET, FILM COATED ORAL at 08:47

## 2023-02-27 RX ADMIN — HYDROCODONE BITARTRATE AND ACETAMINOPHEN 1 TABLET: 5; 325 TABLET ORAL at 18:49

## 2023-02-27 RX ADMIN — ARFORMOTEROL TARTRATE 15 MCG: 15 SOLUTION RESPIRATORY (INHALATION) at 17:54

## 2023-02-27 RX ADMIN — ENOXAPARIN SODIUM 30 MG: 100 INJECTION SUBCUTANEOUS at 08:47

## 2023-02-27 RX ADMIN — BUDESONIDE 500 MCG: 0.5 SUSPENSION RESPIRATORY (INHALATION) at 06:22

## 2023-02-27 RX ADMIN — HYDROMORPHONE HYDROCHLORIDE 1 MG: 1 INJECTION, SOLUTION INTRAMUSCULAR; INTRAVENOUS; SUBCUTANEOUS at 06:38

## 2023-02-27 RX ADMIN — HYDROCODONE BITARTRATE AND ACETAMINOPHEN 1 TABLET: 5; 325 TABLET ORAL at 22:54

## 2023-02-27 RX ADMIN — HYDROXYZINE HYDROCHLORIDE 50 MG: 25 TABLET ORAL at 20:33

## 2023-02-27 RX ADMIN — BUDESONIDE 500 MCG: 0.5 SUSPENSION RESPIRATORY (INHALATION) at 17:54

## 2023-02-27 RX ADMIN — HYDROCODONE BITARTRATE AND ACETAMINOPHEN 1 TABLET: 5; 325 TABLET ORAL at 11:57

## 2023-02-27 RX ADMIN — ENOXAPARIN SODIUM 30 MG: 100 INJECTION SUBCUTANEOUS at 20:34

## 2023-02-27 RX ADMIN — HYDROMORPHONE HYDROCHLORIDE 1 MG: 1 INJECTION, SOLUTION INTRAMUSCULAR; INTRAVENOUS; SUBCUTANEOUS at 10:02

## 2023-02-27 RX ADMIN — HYDROMORPHONE HYDROCHLORIDE 1 MG: 1 INJECTION, SOLUTION INTRAMUSCULAR; INTRAVENOUS; SUBCUTANEOUS at 02:21

## 2023-02-27 RX ADMIN — LEVOTHYROXINE SODIUM 50 MCG: 0.05 TABLET ORAL at 05:25

## 2023-02-27 RX ADMIN — ARFORMOTEROL TARTRATE 15 MCG: 15 SOLUTION RESPIRATORY (INHALATION) at 06:22

## 2023-02-27 ASSESSMENT — PAIN DESCRIPTION - ORIENTATION
ORIENTATION: LEFT

## 2023-02-27 ASSESSMENT — PAIN SCALES - GENERAL
PAINLEVEL_OUTOF10: 10
PAINLEVEL_OUTOF10: 10
PAINLEVEL_OUTOF10: 5
PAINLEVEL_OUTOF10: 10
PAINLEVEL_OUTOF10: 9
PAINLEVEL_OUTOF10: 10

## 2023-02-27 ASSESSMENT — PAIN DESCRIPTION - LOCATION
LOCATION: FOOT
LOCATION: FOOT
LOCATION: LEG;FOOT
LOCATION: FOOT

## 2023-02-27 ASSESSMENT — PAIN DESCRIPTION - DESCRIPTORS
DESCRIPTORS: GNAWING;SORE
DESCRIPTORS: DISCOMFORT;SORE;THROBBING
DESCRIPTORS: GNAWING

## 2023-02-27 NOTE — PROGRESS NOTES
Internal Medicine Progress Note    [facility]    Tricia Corey. Bubba Dandy., RACHEL.BRIAN.LILLIEOManoloI. Gilford Lah, D.O., SHILPI Lewis D.O. Herb Alvarez, MSN, APRN, NP-C  Pierre Sherwood. Galindo Gomez, MSN, APRN-CNP     Primary Care Physician: Wendy Stevenson DO   Admitting Physician:  Viki Crabtree DO  Admission date and time: 2/24/2023  5:19 AM    Room:  [address] [place] [address]  Admitting diagnosis: Osteoarthritis of left ankle, unspecified osteoarthritis type [M19.072]  Postoperative pain [G89.18]  Arthritis of left ankle [M19.072]    Patient Name: Cheri Staton  MRN: [id number]    Date of Service: 2/27/2023     Subjective:  Patrick Ruiz is a 46 y.o. female who was seen and examined today,2/27/2023, at the bedside. Patrick Ruiz seems to be resting comfortably during my examination as we continue to await nursing home acceptance and precertification. She has been acceptable for discharge for many days. Review of System:   Constitutional:   Denies fever or chills, weight loss or gain, fatigue or malaise. HEENT:   Denies ear pain, sore throat, sinus or eye problems. Cardiovascular:   Denies any chest pain, irregular heartbeats, or palpitations. Respiratory:   Denies shortness of breath, coughing, sputum production, hemoptysis, or wheezing. Gastrointestinal:   Denies nausea, vomiting, diarrhea, or constipation. Denies any abdominal pain. Genitourinary:    Denies any urgency, frequency, hematuria. Voiding  without difficulty. Extremities:   Describes postoperative left foot pain as to be expected  Neurology:    Denies any headache or focal neurological deficits, Denies generalized weakness or memory difficulty. Psch:   Denies being anxious or depressed. Musculoskeletal:    Denies  myalgias, joint complaints or back pain. Integumentary:   Denies any rashes, ulcers, or excoriations. Denies bruising. Hematologic/Lymphatic:  Denies bruising or bleeding. Physical Exam:  I/O this shift:   In: 960 [P.O.:960]  Out: 300 [Urine:300]    Intake/Output Summary (Last 24 hours) at 2/27/2023 0611  Last data filed at 2/27/2023 0539  Gross per 24 hour   Intake 2560 ml   Output 2200 ml   Net 360 ml   I/O last 3 completed shifts: In: 0041 [P.O.:2140; I.V.:300]  Out: 7009 [Urine:4900; Drains:3]  Patient Vitals for the past 96 hrs (Last 3 readings):   Weight   02/24/23 1300 229 lb (103.9 kg)     Vital Signs:   Blood pressure 111/74, pulse 75, temperature 97.7 °F (36.5 °C), temperature source Oral, resp. rate 16, height 5' 2\" (1.575 m), weight 229 lb (103.9 kg), SpO2 93 %. General appearance:  Alert, responsive, oriented to person, place, and time. Well preserved, alert, no distress. Head:  Normocephalic. No masses, lesions or tenderness. Eyes:  PERRLA. EOMI. Sclera clear. Buccal mucosa moist.  ENT:  Ears normal. Mucosa normal.  Neck:    Supple. Trachea midline. No thyromegaly. No JVD. No bruits. Heart:    Rhythm regular. Rate controlled. No murmurs. Lungs:    Symmetrical. Clear to auscultation bilaterally. No wheezes. No rhonchi. No rales. Abdomen:   Soft. Non-tender. Non-distended. Bowel sounds positive. No organomegaly or masses. No pain on palpation. Extremities:    Peripheral pulses present. Dressings in place to the left foot. Neurologic:    Alert x 3. No focal deficit. Cranial nerves grossly intact. No focal weakness. Psych:   Behavior is normal. Mood appears normal. Speech is not rapid and/or pressured. Musculoskeletal:   Spine ROM normal. Muscular strength intact. Gait not assessed. Integumentary:  No rashes  Skin normal color and texture.   Genitalia/Breast:  Deferred    Medication:  Scheduled Meds:   hydrOXYzine HCl  50 mg Oral Nightly    levothyroxine  50 mcg Oral Daily    budesonide  0.5 mg Nebulization BID    arformoterol tartrate  15 mcg Nebulization BID    sodium chloride flush  5-40 mL IntraVENous 2 times per day    levETIRAcetam  500 mg Oral BID    enoxaparin  30 mg SubCUTAneous BID     Continuous Infusions:   sodium chloride         Objective Data:  CBC with Differential:    Lab Results   Component Value Date/Time    WBC 5.3 02/27/2023 05:11 AM    RBC 3.52 02/27/2023 05:11 AM    HGB 10.9 02/27/2023 05:11 AM    HCT 33.2 02/27/2023 05:11 AM     02/27/2023 05:11 AM    MCV 94.3 02/27/2023 05:11 AM    MCH 31.0 02/27/2023 05:11 AM    MCHC 32.8 02/27/2023 05:11 AM    RDW 15.5 02/27/2023 05:11 AM    NRBC 0.9 11/14/2022 05:26 AM    SEGSPCT 82 09/14/2011 09:10 AM    METASPCT 1.7 11/14/2022 05:26 AM    LYMPHOPCT 23.3 02/27/2023 05:11 AM    MONOPCT 9.1 02/27/2023 05:11 AM    MYELOPCT 2.6 11/14/2022 05:26 AM    BASOPCT 0.4 02/27/2023 05:11 AM    MONOSABS 0.48 02/27/2023 05:11 AM    LYMPHSABS 1.23 02/27/2023 05:11 AM    EOSABS 0.16 02/27/2023 05:11 AM    BASOSABS 0.02 02/27/2023 05:11 AM     BMP:    Lab Results   Component Value Date/Time     02/26/2023 05:30 AM    K 4.6 02/26/2023 05:30 AM    K 4.4 02/22/2023 10:30 AM     02/26/2023 05:30 AM    CO2 25 02/26/2023 05:30 AM    BUN 17 02/26/2023 05:30 AM    LABALBU 3.6 02/26/2023 05:30 AM    CREATININE 0.7 02/26/2023 05:30 AM    CALCIUM 8.4 02/26/2023 05:30 AM    GFRAA >60 08/02/2021 04:46 AM    LABGLOM >60 02/26/2023 05:30 AM    GLUCOSE 93 02/26/2023 05:30 AM    GLUCOSE 89 09/14/2011 09:10 AM       Assessment:  Symptomatic osteoarthritis of the left ankle status post TTC fusion of the left ankle and Edwards of graft in February 24, 2023 by Dr. Kristen Frias  Perioperative aspiration, suspected  Nonoxygen dependent COPD with exacerbation secondary to #2  Gastroesophageal reflux disease  Obesity secondary to excess caloric intake with BMI 41.88 kg meter squared  Inguinal adenopathy previously reported with plans for outpatient follow-up, unsure if followed through by patient    Plan:   Francyne Starch has been acceptable for discharge for several days. We continue to await nursing home acceptance and then precertification.   Medication reconciliation has been completed in the event she can be discharged today. Otherwise, she will continue working with the therapy teams. All pain medications currently and at discharge will be provided by the podiatric team.    More than 50% of my time was spent at the bedside counseling/coordinating care with the patient and/or family with face to face contact. This time was spent reviewing notes and laboratory data as well as instructing and counseling the patient. Time I spent with the family or surrogate(s) is included only if the patient was incapable of providing the necessary information or participating in medical decisions. I also discussed the differential diagnosis and all of the proposed management plans with the patient and individuals accompanying the patient. I am readily available for any further decision-making and intervention.        Patti Khan DO, LATONIAA.C.O.I.  2/27/2023  6:11 AM

## 2023-02-27 NOTE — PLAN OF CARE
Problem: Discharge Planning  Goal: Discharge to home or other facility with appropriate resources  2/26/2023 2125 by Carmita Humphreys RN  Outcome: Progressing  2/26/2023 1023 by Lance Abbasi RN  Outcome: Progressing     Problem: Pain  Goal: Verbalizes/displays adequate comfort level or baseline comfort level  2/26/2023 2125 by Carmita Humphreys RN  Outcome: Progressing  2/26/2023 1023 by Lance Abbasi RN  Outcome: Progressing     Problem: Safety - Adult  Goal: Free from fall injury  2/26/2023 2125 by Carmiat Humphreys RN  Outcome: Progressing  2/26/2023 1023 by Lance Abbasi RN  Outcome: Progressing     Problem: ABCDS Injury Assessment  Goal: Absence of physical injury  2/26/2023 2125 by Carmita Humphreys RN  Outcome: Progressing  2/26/2023 1023 by Lance Abbasi RN  Outcome: Progressing

## 2023-02-27 NOTE — PROGRESS NOTES
Physical Therapy  Physical Therapy Treatment Note/Plan of Care    Room #:  0330/0330-01  Patient Name: Alfreda Wyatt  YOB: 1970  MRN: [id number]    Date of Service: 2/27/2023     Tentative placement recommendation: Subacute Rehab  Equipment recommendation: Laura Baldwin, Bedside commode, and Tub transfer bench if d/jasmyn home    Evaluating Physical Therapist: Soledad Lefort, PT, DPT #[id number]      Specific Provider Orders/Date/Referring Provider :     02/24/23 1300    PT eval and treat  Start:  02/24/23 1300,   End:  02/24/23 1300,   ONE TIME,   Standing Count:  1 Occurrences,   R         Paula Wagner, APRN - CNP Acknowledge New     Admitting Diagnosis:   Osteoarthritis of left ankle, unspecified osteoarthritis type [M19.072]  Postoperative pain [G89.18]  Arthritis of left ankle [M19.072]      Surgery:  TTC Fusion L ankle 2/24/23  Visit Diagnoses         Codes    Osteoarthritis of left ankle, unspecified osteoarthritis type     M19.072            Patient Active Problem List   Diagnosis    GERD (gastroesophageal reflux disease)    Osteoarthritis cervical spine    Osteoarthritis of lumbar spine    Small bowel obstruction ([address]    Morbid obesity ([address]    Hypokalemia    Venous insufficiency    SBO (small bowel obstruction) ([address]    Incarcerated hernia    Intractable abdominal pain    Incarcerated incisional hernia    Disruption or dehiscence of closure of fascia, superficial or muscular    Intractable nausea and vomiting    Intractable vomiting    Brain mass    Altered mental status    Sepsis secondary to UTI ([address]    Cellulitis    Cellulitis of left lower extremity    Postoperative pain    Arthritis of left ankle        ASSESSMENT of Current Deficits Patient exhibits decreased strength, balance, and endurance impairing functional mobility, transfers, gait , gait distance, and tolerance to activity.  Pt needed supervision for all functional mobility due to being impulsive and trying to perform each task to fast. Cueing throughout tx session for decreased speed and safety. Pt able to maintain NWB through LLE during session. Pt agreeable to seated/supine exercises following function with VC for technique. PHYSICAL THERAPY  PLAN OF CARE       Physical therapy plan of care is established based on physician order,  patient diagnosis and clinical assessment    Current Treatment Recommendations:    -Bed Mobility: Lower extremity exercises  and Trunk control activities   -Sitting Balance: Incorporate reaching activities to activate trunk muscles , Hands on support to maintain midline , Facilitate active trunk muscle engagement , Facilitate postural control in all planes , and Engage in core activities to allow for movement within base of support   -Standing Balance: Perform strengthening exercises in standing to promote motor control with or without upper extremity support , Instruct patient on adequate base of support to maintain balance, and Challenge balance utilizing reaching  activities beyond center of gravity    -Transfers: Provide instruction on proper hand and foot position for adequate transfer of weight onto lower extremities and use of gait device if needed, Cues for hand placement, technique and safety.  Provide stabilization to prevent fall , Facilitate weight shift forward on to lower extremities and provide necessary stabilization of bilateral lower extremities , Support transfer of weight on to lower extremities, and Assist with extension of knees trunk and hip to accept weight transfer   -Gait: Gait training, Standing activities to improve: base of support, weight shift, weight bearing , Exercises to improve trunk control, Exercises to improve hip and knee control, Performance of protected weight bearing activities, and Activities to increase weight bearing   -Endurance: Utilize Supervised activities to increase level of endurance to allow for safe functional mobility including transfers and gait  and Use graduated activities to promote good breathing techniques and provide support and education to maximize respiratory function  -Stairs: Stair training with instruction on proper technique and hand placement on rail    PT long term treatment goals are located in below grid    Patient and or family understand(s) diagnosis, prognosis, and plan of care. Frequency of treatments: Patient will be seen  dailyyy. Prior Level of Function: Patient ambulated with crutches   Rehab Potential: good - for baseline    Past medical history:   Past Medical History:   Diagnosis Date    Arthritis     Brain venous angioma (HCC)     Bursitis     hips    Cat scratch of left lower leg     Class 3 severe obesity due to excess calories with body mass index (BMI) of 40.0 to 44.9 in adult [facility])     COPD (chronic obstructive pulmonary disease) (HCC)     Diverticulitis     GERD (gastroesophageal reflux disease)     Incisional hernia with obstruction but no gangrene     Strep throat 2021     Past Surgical History:   Procedure Laterality Date    ANKLE FRACTURE SURGERY Left 2/24/2023    TTC FUSION LEFT ANKLE AND HARVEST OF GRAFT performed by Kyle Miguel DPM at [address] [place] [address]  2016    FOREARM FRACTURE SURGERY Left     fracture of ulna s/p repair    HERNIA REPAIR      HERNIA REPAIR  08/25/2015    incarcerated hernia repair    HERNIA REPAIR N/A 03/25/2021    INCISIONAL HERNIA REPAIR WITH MESH, POSSIBLE COMPONENT SEPARATION  LAPAROSCOPIC ROBOTIC XI ASSISTED (CPT K2719530) performed by Ioana Villalta MD at [address] [place] [address] (CERVIX STATUS UNKNOWN)      NERVE BLOCK      sacroiliac bilateral 12-    PELVIC FRACTURE SURGERY      VENTRAL HERNIA REPAIR  08/25/2015       SUBJECTIVE:    Precautions:  Up with assistance, falls and non weight bearing LLE      Social history: Patient lives with daughter, son, and grandchildren  in a ranch home  with 3 steps, bilateral rail  to enter home. Tub shower       Equipment owned: Crutches,  cane    AM-PAC Basic Mobility       AM-PAC Basic Mobility - Inpatient   How much help is needed turning from your back to your side while in a flat bed without using bedrails?: A Little  How much help is needed moving from lying on your back to sitting on the side of a flat bed without using bedrails?: A Little  How much help is needed moving to and from a bed to a chair?: A Little  How much help is needed standing up from a chair using your arms?: A Little  How much help is needed walking in hospital room?: A Little  How much help is needed climbing 3-5 steps with a railing?: A Lot  AM-PAC Inpatient Mobility Raw Score : 17  AM-PAC Inpatient T-Scale Score : 42.13  Mobility Inpatient CMS 0-100% Score: 50.57  Mobility Inpatient CMS G-Code Modifier : CK    Nursing cleared patient for PT treatment. OBJECTIVE;   Initial Evaluation  Date: 2/25/2023 Treatment Date:  2/27/2023       Short Term/ Long Term   Goals   Was pt agreeable to Eval/treatment? Yes yes To be met in 3 days   Pain level   \"11\"/10  L ankle No number given  Left ankle    Bed Mobility    Rolling: Supervision     Supine to sit: Supervision     Sit to supine: Supervision     Scooting: Supervision    Rolling: Supervision    Supine to sit: Supervision    Sit to supine: Supervision    Scooting: Supervision     Rolling: Independent    Supine to sit:  Independent    Sit to supine: Independent    Scooting: Independent     Transfers Sit to stand: Minimal assist of 1  Sit to stand: Supervision  Cues for hand placement and safety     Sit to stand: Independent    Ambulation     2 x 15 feet using  wheeled walker with Minimal assist of 1   for walker control, walker approximation, balance, upright, weight shift, and safety 2 x 70 feet using  knee scooter with Supervision    cues for decreased speed, safety, pacing, and obstacle negotiation     > 100 feet using  wheeled walker with Supervision     Stair negotiation: ascended and descended   Not assessed     3 steps, bilateral rail with Sally   ROM Within functional limits    Increase range of motion 10% of affected joints    Strength BUE:  refer to OT eval  RLE:  4+/5  LLE:  4/5  Increase strength in affected mm groups by 1/3 grade   Balance Sitting EOB:  good -  Dynamic Standing:  fair with [address] Sitting EOB: good   Dynamic Standing: fair to the knee scooter   Sitting EOB:  good   Dynamic Standing: fair +     Patient is Alert & Oriented x person, place, time, and situation and follows directions    Sensation:  Patient  denies numbness/tingling   Edema:  no   Endurance: fair  -    Vitals: room air   Blood Pressure at rest  Blood Pressure during session    Heart Rate at rest  Heart Rate during session    SPO2 at rest %  SPO2 during session %     Patient education  Patient educated on role of Physical Therapy, risks of immobility, safety and plan of care, energy conservation,  importance of mobility while in hospital , purse lip breathing, ankle pumps, quad set and glut set for edema control, blood clot prevention, importance and purpose of adaptive device and adjusted to proper height for the patient. , safety , and stair training      Patient response to education:   Pt verbalized understanding Pt demonstrated skill Pt requires further education in this area   Yes Partial Yes      Treatment:  Patient practiced and was instructed/facilitated in the following treatment: Patient Sat edge of bed 10 minutes with Supervision  to increase dynamic sitting balance and activity tolerance. Pt instructed and demonstrated getting on/off knee scooter, safety features, maneuvering in tight quarters, and go slow for safety. Pt went 2 x 70 feet on the knee scooter and back to the edge of the bed. Pt performed seated exercises, returned to supine and performed SLR's.      Therapeutic Exercises:  ankle pumps, heel raises, hip abduction/adduction, straight leg raise, long arc quad, and seated marching,  x 10 - 15 reps. AROM      At end of session, patient in bed with     call light and phone within reach,  all lines and tubes intact, nursing notified. Patient would benefit from continued skilled Physical Therapy to improve functional independence and quality of life. Patient's/ family goals   rehab    Time in  13:45  Time out  14:08    Total Treatment Time  23 minutes        CPT codes:    Therapeutic activities (63971)   12 minutes  1 unit(s)  Therapeutic exercises (48533)   11 minutes  1 unit(s)    Reji Torres  [facility]  LIC # [id number]

## 2023-02-27 NOTE — PROGRESS NOTES
Podiatry Progress Note  2/27/2023   Julio Hernandez       SUBJECTIVE: Patient is being seen s/p left TTC fusion(DOS:2/24). Patient in acute pain today during encounter. Breakthrough dose of IV dilaudid ordered and drain subsequently pulled. Pt also complaining of being sent to a SNF over 100 miles away.      Past Medical History:   Diagnosis Date    Arthritis     Brain venous angioma (HCC)     Bursitis     hips    Cat scratch of left lower leg     Class 3 severe obesity due to excess calories with body mass index (BMI) of 40.0 to 44.9 in adult [facility])     COPD (chronic obstructive pulmonary disease) (HCC)     Diverticulitis     GERD (gastroesophageal reflux disease)     Incisional hernia with obstruction but no gangrene     Strep throat 2021        Past Surgical History:   Procedure Laterality Date    ANKLE FRACTURE SURGERY Left 2/24/2023    TTC FUSION LEFT ANKLE AND HARVEST OF GRAFT performed by Semaj Scott DPM at [address] [place] [address]  2016    FOREARM FRACTURE SURGERY Left     fracture of ulna s/p repair    HERNIA REPAIR      HERNIA REPAIR  08/25/2015    incarcerated hernia repair    HERNIA REPAIR N/A 03/25/2021    INCISIONAL HERNIA REPAIR WITH MESH, POSSIBLE COMPONENT SEPARATION  LAPAROSCOPIC ROBOTIC XI ASSISTED (CPT [id number]) performed by Linda Combs MD at [address] [place] [address] (CERVIX STATUS UNKNOWN)      NERVE BLOCK      sacroiliac bilateral 12-    PELVIC FRACTURE SURGERY      VENTRAL HERNIA REPAIR  08/25/2015         Family History   Problem Relation Age of Onset    Osteoarthritis Other     Diabetes Other     Other Father         pancreatitis    Prostate Cancer Father     Stroke Brother         half brother    Cancer Paternal Grandfather         Social History     Tobacco Use    Smoking status: Former     Packs/day: 0.50     Years: 30.00     Pack years: 15.00     Types: Cigarettes     Quit date: 3/5/2021     Years since quittin.9    Smokeless tobacco: Never   Substance Use Topics    Alcohol use: Yes     Comment: socially        Prior to Admission medications    Medication Sig Start Date End Date Taking? Authorizing Provider   HYDROcodone-acetaminophen (NORCO) 5-325 MG per tablet Take 1 tablet by mouth every 4 hours as needed for Pain for up to 7 days. Intended supply: 7 days. Take lowest dose possible to manage pain Max Daily Amount: 6 tablets 2/27/23 3/6/23 Yes Elroy X Fahim, DPM   levothyroxine (SYNTHROID) 50 MCG tablet Take 1 tablet by mouth Daily 11/15/22   Madyson Marker, DO   levETIRAcetam (KEPPRA) 500 MG tablet Take 1 tablet by mouth 2 times daily 21ina Marker, DO   hydrOXYzine (ATARAX) 50 MG tablet Take 50 mg by mouth nightly  21   Historical Provider, MD        Ibuprofen, Nsaids, and Morphine         OBJECTIVE:        Vitals:    23 0555   BP:    Pulse:    Resp: 18   Temp:    SpO2:               EXAM:        Pt is AAOx3  Posterior splint intact to LLE. Patient able to actively range all digits. Digits 1-5 well perfused with no signs of venous congestion. Drain pulled today at bedside.      Current Facility-Administered Medications   Medication Dose Route Frequency Provider Last Rate Last Admin    hydrOXYzine HCl (ATARAX) tablet 50 mg  50 mg Oral Nightly Lauren Knight APRN - CNP   50 mg at 23 205    levothyroxine (SYNTHROID) tablet 50 mcg  50 mcg Oral Daily Lauren Knight APRN - CNP   50 mcg at 23 0525    albuterol (PROVENTIL) nebulizer solution 2.5 mg  2.5 mg Nebulization Q4H PRN Lauren Knight APRN - CNP        budesonide (PULMICORT) nebulizer suspension 500 mcg  0.5 mg Nebulization BID Lauren Knight APRN - CNP   500 mcg at 23 3583    hydrALAZINE (APRESOLINE) injection 5 mg  5 mg IntraVENous Q4H PRN Lauren Knight APRN - CNP        magnesium sulfate 1000 mg in dextrose 5% 100 mL IVPB  1,000 mg IntraVENous PRN Lauren Knight APRN - CNP        sodium phosphate 16.62 mmol in sodium chloride 0.9 % 250 mL IVPB  0.16 mmol/kg IntraVENous PRN Estle Rings, APRN - CNP        Or    sodium phosphate 33.24 mmol in sodium chloride 0.9 % 250 mL IVPB  0.32 mmol/kg IntraVENous PRN Estle Rings, APRN - CNP        potassium chloride (KLOR-CON M) extended release tablet 40 mEq  40 mEq Oral PRN Estle Rings, APRN - CNP        Or    potassium bicarb-citric acid (EFFER-K) effervescent tablet 40 mEq  40 mEq Oral PRN Estle Rings, APRN - CNP        Or    potassium chloride 10 mEq/100 mL IVPB (Peripheral Line)  10 mEq IntraVENous PRN Estle Rings, APRN - CNP        arformoterol tartrate (BROVANA) nebulizer solution 15 mcg  15 mcg Nebulization BID Estle Rings, APRN - CNP   15 mcg at 02/27/23 4935    sodium chloride flush 0.9 % injection 5-40 mL  5-40 mL IntraVENous 2 times per day Estle Rings, APRN - CNP   10 mL at 02/26/23 2059    sodium chloride flush 0.9 % injection 5-40 mL  5-40 mL IntraVENous PRN Estle Rings, APRN - CNP        0.9 % sodium chloride infusion   IntraVENous PRN Estle Rings, APRN - CNP        ondansetron (ZOFRAN-ODT) disintegrating tablet 4 mg  4 mg Oral Q8H PRN Estle Rings, APRN - CNP        Or    ondansetron (ZOFRAN) injection 4 mg  4 mg IntraVENous Q6H PRN Estle Rings, APRN - CNP        polyethylene glycol (GLYCOLAX) packet 17 g  17 g Oral Daily PRN Estle Rings, APRN - CNP        acetaminophen (TYLENOL) tablet 650 mg  650 mg Oral Q6H PRN Estle Rings, APRN - CNP        Or    acetaminophen (TYLENOL) suppository 650 mg  650 mg Rectal Q6H PRN Estle Rings, APRN - CNP        levETIRAcetam (KEPPRA) tablet 500 mg  500 mg Oral BID Estle Rings, APRN - CNP   500 mg at 02/27/23 0847    enoxaparin Sodium (LOVENOX) injection 30 mg  30 mg SubCUTAneous BID Estle Rings, APRN - CNP   30 mg at 02/27/23 0847    HYDROmorphone HCl PF (DILAUDID) injection 1 mg  1 mg IntraVENous Q4H PRN Elroy Strauss DPM   1 mg at 02/27/23 6095    HYDROcodone-acetaminophen (NORCO) 5-325 MG per tablet 1 tablet 1 tablet Oral Q6H PRN Elroy Strauss DPM   1 tablet at 02/27/23 0525        Lab Results   Component Value Date    WBC 5.3 02/27/2023    HCT 33.2 (L) 02/27/2023    HGB 10.9 (L) 02/27/2023     02/27/2023     02/27/2023    K 4.8 02/27/2023     02/27/2023    CO2 27 02/27/2023    BUN 16 02/27/2023    CREATININE 0.8 02/27/2023    GLUCOSE 98 02/27/2023    CRP 0.6 (H) 11/28/2022         Radiographs:    ASSESEMENT:  -S/P left TTC fusion(DOS:2/24)  - Obesity        PLAN:  - Examined and evaluated  - All labs, imaging, and charts reviewed  - Abx: ancef 2 gm x3   - Pain control: IV and PO. Appreciate IM assistance  - Case management consulted for assistance with SNF placement. - Posterior splint to LLE to remain clean, dry, and intact. Drain pulled today at bedside  - PT/OT. NWB to LLE   - Stable for d/c from Podiatry standpoint once cleared by all teams on board   - Will continue to monitor while in house    Discussed with   KAYODE Prince0  Fellowship-Trained Foot and Ankle Surgeon  Diplomate, [employer] Foot [employer]le Sur[employer]  [phone]       Thank you for involving podiatry in this patient's care.  Please do not hesitate to call with any questions, concerns, or new findings      Gaurav Savage DPM   2/27/2023   10:46 AM

## 2023-02-27 NOTE — CARE COORDINATION
2/27/2023: SS Note/Discharge plan:  SS Consult noted for d/c planning for discharge today however sw met with pt to confirm transition of care plan, reviewed therapy evals, discussed d/c planning options for [facility] placement vs home with home health therapy, pt does NOT feel she can go directly home, says her daughter works out of the home but will NOT be  able to physically help her, pt is requesting temporary SNF placement, updated SNF provider list reviewed, pt prefers to go to [facility], referral made to [facility] [place], admissions liaison, awaiting chart review and acceptance, Pt will NEED PRE-CERT, a neg covid test, a HENS and VIVEK completed for [facility] transfer. Sw to follow. Electronically signed by MOON Bui on 2/27/2023 at 10:24 AM

## 2023-02-27 NOTE — CARE COORDINATION
2/27/2023: SS Note/Discharge plan:  Notified by Renay Kelly admissions for [facility] that pt was accepted medically and will SUBMIT PRE CERT, VIVEK and HENS exemption initiated, sw will follow to confirm transfer arrangements, nursing notified. Electronically signed by MOON Hanna on 2/27/2023 at 4:24 PM

## 2023-02-27 NOTE — PLAN OF CARE
Problem: Discharge Planning  Goal: Discharge to home or other facility with appropriate resources  2/27/2023 1030 by Mey Wilson RN  Outcome: Progressing     Problem: Pain  Goal: Verbalizes/displays adequate comfort level or baseline comfort level  2/27/2023 1030 by Mey Wilson RN  Outcome: Progressing     Problem: Safety - Adult  Goal: Free from fall injury  2/27/2023 1030 by Mey Wilson RN  Outcome: Progressing     Problem: ABCDS Injury Assessment  Goal: Absence of physical injury  2/27/2023 1030 by Mey Wilson RN  Outcome: Progressing

## 2023-02-27 NOTE — CARE COORDINATION
2/27/2023: SS Note/Discharge plan:  Notified by Mary Rivera admissions for [facility] that she was trying to have her insurance verified as being in network but says they will not have a bed available now for new admission, pt informed, SNF provider list reviewed, prefers [facility] as alternative preference, referral made to Sameera [facility] admissions liaison who confirmed they do have beds available, awaiting chart review and acceptance, PRE CERT NEEDED prior to transfer, pt declined having a 3rd choice, states she will go home if unable to go to [facility], sw to follow, nursing informed. Electronically signed by MOON Ruiz on 2/27/2023 at 3:35 PM

## 2023-02-27 NOTE — PROGRESS NOTES
Physical Therapy    0330/0330-01    Patient unavailable for physical therapy treatment due to nursing was changing her bandages and ace wrap on her left lower extremity. Read Susan Torres  [facility]  LIC # [id number]

## 2023-02-28 LAB
ALBUMIN SERPL-MCNC: 3.3 G/DL (ref 3.5–5.2)
ALP BLD-CCNC: 64 U/L (ref 35–104)
ALT SERPL-CCNC: 20 U/L (ref 0–32)
ANION GAP SERPL CALCULATED.3IONS-SCNC: 8 MMOL/L (ref 7–16)
AST SERPL-CCNC: 16 U/L (ref 0–31)
BASOPHILS ABSOLUTE: 0.02 E9/L (ref 0–0.2)
BASOPHILS RELATIVE PERCENT: 0.3 % (ref 0–2)
BILIRUB SERPL-MCNC: 0.2 MG/DL (ref 0–1.2)
BILIRUBIN DIRECT: <0.2 MG/DL (ref 0–0.3)
BILIRUBIN, INDIRECT: ABNORMAL MG/DL (ref 0–1)
BUN BLDV-MCNC: 20 MG/DL (ref 6–20)
CALCIUM SERPL-MCNC: 8.7 MG/DL (ref 8.6–10.2)
CHLORIDE BLD-SCNC: 104 MMOL/L (ref 98–107)
CO2: 26 MMOL/L (ref 22–29)
CREAT SERPL-MCNC: 0.9 MG/DL (ref 0.5–1)
EOSINOPHILS ABSOLUTE: 0.16 E9/L (ref 0.05–0.5)
EOSINOPHILS RELATIVE PERCENT: 2.8 % (ref 0–6)
GFR SERPL CREATININE-BSD FRML MDRD: >60 ML/MIN/1.73
GLUCOSE BLD-MCNC: 104 MG/DL (ref 74–99)
HCT VFR BLD CALC: 36.5 % (ref 34–48)
HEMOGLOBIN: 11.4 G/DL (ref 11.5–15.5)
IMMATURE GRANULOCYTES #: 0.03 E9/L
IMMATURE GRANULOCYTES %: 0.5 % (ref 0–5)
LYMPHOCYTES ABSOLUTE: 1.14 E9/L (ref 1.5–4)
LYMPHOCYTES RELATIVE PERCENT: 19.8 % (ref 20–42)
MAGNESIUM: 2 MG/DL (ref 1.6–2.6)
MCH RBC QN AUTO: 29.8 PG (ref 26–35)
MCHC RBC AUTO-ENTMCNC: 31.2 % (ref 32–34.5)
MCV RBC AUTO: 95.5 FL (ref 80–99.9)
MONOCYTES ABSOLUTE: 0.48 E9/L (ref 0.1–0.95)
MONOCYTES RELATIVE PERCENT: 8.3 % (ref 2–12)
NEUTROPHILS ABSOLUTE: 3.93 E9/L (ref 1.8–7.3)
NEUTROPHILS RELATIVE PERCENT: 68.3 % (ref 43–80)
PDW BLD-RTO: 14.9 FL (ref 11.5–15)
PHOSPHORUS: 4 MG/DL (ref 2.5–4.5)
PLATELET # BLD: 227 E9/L (ref 130–450)
PMV BLD AUTO: 10 FL (ref 7–12)
POTASSIUM SERPL-SCNC: 4.5 MMOL/L (ref 3.5–5)
RBC # BLD: 3.82 E12/L (ref 3.5–5.5)
SODIUM BLD-SCNC: 138 MMOL/L (ref 132–146)
TOTAL PROTEIN: 6.1 G/DL (ref 6.4–8.3)
WBC # BLD: 5.8 E9/L (ref 4.5–11.5)

## 2023-02-28 PROCEDURE — 80048 BASIC METABOLIC PNL TOTAL CA: CPT

## 2023-02-28 PROCEDURE — 1200000000 HC SEMI PRIVATE

## 2023-02-28 PROCEDURE — 94640 AIRWAY INHALATION TREATMENT: CPT

## 2023-02-28 PROCEDURE — 97535 SELF CARE MNGMENT TRAINING: CPT

## 2023-02-28 PROCEDURE — 6370000000 HC RX 637 (ALT 250 FOR IP): Performed by: PODIATRIST

## 2023-02-28 PROCEDURE — 6360000002 HC RX W HCPCS: Performed by: NURSE PRACTITIONER

## 2023-02-28 PROCEDURE — 97530 THERAPEUTIC ACTIVITIES: CPT

## 2023-02-28 PROCEDURE — 97110 THERAPEUTIC EXERCISES: CPT

## 2023-02-28 PROCEDURE — 83735 ASSAY OF MAGNESIUM: CPT

## 2023-02-28 PROCEDURE — 6370000000 HC RX 637 (ALT 250 FOR IP): Performed by: NURSE PRACTITIONER

## 2023-02-28 PROCEDURE — 36415 COLL VENOUS BLD VENIPUNCTURE: CPT

## 2023-02-28 PROCEDURE — 85025 COMPLETE CBC W/AUTO DIFF WBC: CPT

## 2023-02-28 PROCEDURE — 84100 ASSAY OF PHOSPHORUS: CPT

## 2023-02-28 PROCEDURE — 80076 HEPATIC FUNCTION PANEL: CPT

## 2023-02-28 RX ADMIN — ARFORMOTEROL TARTRATE 15 MCG: 15 SOLUTION RESPIRATORY (INHALATION) at 19:26

## 2023-02-28 RX ADMIN — HYDROCODONE BITARTRATE AND ACETAMINOPHEN 1 TABLET: 5; 325 TABLET ORAL at 23:33

## 2023-02-28 RX ADMIN — LEVETIRACETAM 500 MG: 500 TABLET, FILM COATED ORAL at 20:26

## 2023-02-28 RX ADMIN — HYDROXYZINE HYDROCHLORIDE 50 MG: 25 TABLET ORAL at 20:25

## 2023-02-28 RX ADMIN — BUDESONIDE 500 MCG: 0.5 SUSPENSION RESPIRATORY (INHALATION) at 19:26

## 2023-02-28 RX ADMIN — HYDROCODONE BITARTRATE AND ACETAMINOPHEN 1 TABLET: 5; 325 TABLET ORAL at 04:42

## 2023-02-28 RX ADMIN — LEVETIRACETAM 500 MG: 500 TABLET, FILM COATED ORAL at 08:44

## 2023-02-28 RX ADMIN — HYDROCODONE BITARTRATE AND ACETAMINOPHEN 1 TABLET: 5; 325 TABLET ORAL at 19:10

## 2023-02-28 RX ADMIN — HYDROCODONE BITARTRATE AND ACETAMINOPHEN 1 TABLET: 5; 325 TABLET ORAL at 14:11

## 2023-02-28 RX ADMIN — ARFORMOTEROL TARTRATE 15 MCG: 15 SOLUTION RESPIRATORY (INHALATION) at 04:30

## 2023-02-28 RX ADMIN — ENOXAPARIN SODIUM 30 MG: 100 INJECTION SUBCUTANEOUS at 20:25

## 2023-02-28 RX ADMIN — ENOXAPARIN SODIUM 30 MG: 100 INJECTION SUBCUTANEOUS at 08:43

## 2023-02-28 RX ADMIN — BUDESONIDE 500 MCG: 0.5 SUSPENSION RESPIRATORY (INHALATION) at 04:29

## 2023-02-28 RX ADMIN — HYDROCODONE BITARTRATE AND ACETAMINOPHEN 1 TABLET: 5; 325 TABLET ORAL at 08:43

## 2023-02-28 RX ADMIN — LEVOTHYROXINE SODIUM 50 MCG: 0.05 TABLET ORAL at 05:55

## 2023-02-28 ASSESSMENT — PAIN SCALES - GENERAL
PAINLEVEL_OUTOF10: 7
PAINLEVEL_OUTOF10: 8
PAINLEVEL_OUTOF10: 10
PAINLEVEL_OUTOF10: 5
PAINLEVEL_OUTOF10: 8

## 2023-02-28 ASSESSMENT — PAIN DESCRIPTION - DESCRIPTORS
DESCRIPTORS: GNAWING
DESCRIPTORS: ACHING
DESCRIPTORS: NAGGING
DESCRIPTORS: THROBBING

## 2023-02-28 ASSESSMENT — PAIN DESCRIPTION - LOCATION
LOCATION: LEG
LOCATION: ANKLE

## 2023-02-28 ASSESSMENT — PAIN DESCRIPTION - ORIENTATION
ORIENTATION: LEFT

## 2023-02-28 ASSESSMENT — PAIN - FUNCTIONAL ASSESSMENT: PAIN_FUNCTIONAL_ASSESSMENT: ACTIVITIES ARE NOT PREVENTED

## 2023-02-28 NOTE — PROGRESS NOTES
Podiatry Progress Note  2023   Pedro Vergara       SUBJECTIVE: Patient is being seen s/p left TTC fusion(DOS:). Endorses pain to her left foot. States that she's receiving norco now.      Past Medical History:   Diagnosis Date    Arthritis     Brain venous angioma (HCC)     Bursitis     hips    Cat scratch of left lower leg     Class 3 severe obesity due to excess calories with body mass index (BMI) of 40.0 to 44.9 in adult [facility])     COPD (chronic obstructive pulmonary disease) (HCC)     Diverticulitis     GERD (gastroesophageal reflux disease)     Incisional hernia with obstruction but no gangrene     Strep throat         Past Surgical History:   Procedure Laterality Date    ANKLE FRACTURE SURGERY Left 2023    TTC FUSION LEFT ANKLE AND HARVEST OF GRAFT performed by Viktor Faith DPM at [address] [facility]      FOREARM FRACTURE SURGERY Left     fracture of ulna s/p repair    HERNIA REPAIR      HERNIA REPAIR  2015    incarcerated hernia repair    HERNIA REPAIR N/A 2021    INCISIONAL HERNIA REPAIR WITH MESH, POSSIBLE COMPONENT SEPARATION  LAPAROSCOPIC ROBOTIC XI ASSISTED (CPT D6148823) performed by Jacklyn Garcia MD at [address] (CERVIX STATUS UNKNOWN)      NERVE BLOCK      sacroiliac bilateral 2012    PELVIC FRACTURE SURGERY      VENTRAL HERNIA REPAIR  2015         Family History   Problem Relation Age of Onset    Osteoarthritis Other     Diabetes Other     Other Father         pancreatitis    Prostate Cancer Father     Stroke Brother         half brother    Cancer Paternal Grandfather         Social History     Tobacco Use    Smoking status: Former     Packs/day: 0.50     Years: 30.00     Pack years: 15.00     Types: Cigarettes     Quit date: 3/5/2021     Years since quittin.9    Smokeless tobacco: Never   Substance Use Topics    Alcohol use: Yes     Comment: socially Prior to Admission medications    Medication Sig Start Date End Date Taking? Authorizing Provider   HYDROcodone-acetaminophen (NORCO) 5-325 MG per tablet Take 1 tablet by mouth every 4 hours as needed for Pain for up to 7 days. Intended supply: 7 days. Take lowest dose possible to manage pain Max Daily Amount: 6 tablets 2/27/23 3/6/23 Yes Elroy X Fahim, DPM   levothyroxine (SYNTHROID) 50 MCG tablet Take 1 tablet by mouth Daily 11/15/22   Richy Boyer,    levETIRAcetam (KEPPRA) 500 MG tablet Take 1 tablet by mouth 2 times daily 8/2/21   Richykarly Boyer DO   hydrOXYzine (ATARAX) 50 MG tablet Take 50 mg by mouth nightly  1/27/21   Historical Provider, MD        Ibuprofen, Nsaids, and Morphine         OBJECTIVE:        Vitals:    02/28/23 0913   BP:    Pulse:    Resp: 18   Temp:    SpO2:               EXAM:        Pt is AAOx3  Posterior splint intact to LLE. Patient able to actively range all digits. Digits 1-5 well perfused with no signs of venous congestion. Drain pulled today at bedside.      Current Facility-Administered Medications   Medication Dose Route Frequency Provider Last Rate Last Admin    HYDROcodone-acetaminophen (NORCO) 5-325 MG per tablet 1 tablet  1 tablet Oral Q4H PRN Elroy X Fahim, DPM   1 tablet at 02/28/23 1411    ketorolac (TORADOL) injection 30 mg  30 mg IntraMUSCular Q6H PRN Elroy X Fahim, DPM        hydrOXYzine HCl (ATARAX) tablet 50 mg  50 mg Oral Nightly Mary Beth Polanco, APRN - CNP   50 mg at 02/27/23 2033    levothyroxine (SYNTHROID) tablet 50 mcg  50 mcg Oral Daily Mary Bethuche Polanco, APRN - CNP   50 mcg at 02/28/23 0555    albuterol (PROVENTIL) nebulizer solution 2.5 mg  2.5 mg Nebulization Q4H PRN Mary Bethuche Polanco, APRN - CNP        budesonide (PULMICORT) nebulizer suspension 500 mcg  0.5 mg Nebulization BID Mary Beth Polanco APRN - CNP   500 mcg at 02/28/23 0429    hydrALAZINE (APRESOLINE) injection 5 mg  5 mg IntraVENous Q4H PRN Mary Bethuche Polanco, APRN - CNP        magnesium sulfate 1000 mg in dextrose 5% 100 mL IVPB  1,000 mg IntraVENous PRN Ana Sutton, APRN - CNP        sodium phosphate 16.62 mmol in sodium chloride 0.9 % 250 mL IVPB  0.16 mmol/kg IntraVENous PRN Ana Sutton, APRN - CNP        Or    sodium phosphate 33.24 mmol in sodium chloride 0.9 % 250 mL IVPB  0.32 mmol/kg IntraVENous PRN Ana Sutton, APRN - CNP        potassium chloride (KLOR-CON M) extended release tablet 40 mEq  40 mEq Oral PRN Ana Sutton, APRN - CNP        Or    potassium bicarb-citric acid (EFFER-K) effervescent tablet 40 mEq  40 mEq Oral PRN Ana Sutton, APRN - CNP        Or    potassium chloride 10 mEq/100 mL IVPB (Peripheral Line)  10 mEq IntraVENous PRN Ana Sutton, APRN - CNP        arformoterol tartrate (BROVANA) nebulizer solution 15 mcg  15 mcg Nebulization BID Ana Sutton APRN - CNP   15 mcg at 02/28/23 0430    sodium chloride flush 0.9 % injection 5-40 mL  5-40 mL IntraVENous 2 times per day Ana Sutton APRN - CNP   10 mL at 02/26/23 2059    sodium chloride flush 0.9 % injection 5-40 mL  5-40 mL IntraVENous PRN Ana Sutton APRN - CNP        0.9 % sodium chloride infusion   IntraVENous PRN Ana Sutton, APRN - CNP        ondansetron (ZOFRAN-ODT) disintegrating tablet 4 mg  4 mg Oral Q8H PRN BOB Lopez - CNP        Or    ondansetron (ZOFRAN) injection 4 mg  4 mg IntraVENous Q6H PRN Ana Sutton, APRN - CNP        polyethylene glycol (GLYCOLAX) packet 17 g  17 g Oral Daily PRN Ana Sutton APRN - CNP        acetaminophen (TYLENOL) tablet 650 mg  650 mg Oral Q6H PRN BOB Lopez - CNP        Or    acetaminophen (TYLENOL) suppository 650 mg  650 mg Rectal Q6H PRN Ana Sutton, APRN - CNP        levETIRAcetam (KEPPRA) tablet 500 mg  500 mg Oral BID Ana Sutton APRN - CNP   500 mg at 02/28/23 0844    enoxaparin Sodium (LOVENOX) injection 30 mg  30 mg SubCUTAneous BID BOB Lopez CNP   30 mg at 02/28/23 0880        Lab Results   Component Value Date    WBC 5.8 02/28/2023    HCT 36.5 02/28/2023    HGB 11.4 (L) 02/28/2023     02/28/2023     02/28/2023    K 4.5 02/28/2023     02/28/2023    CO2 26 02/28/2023    BUN 20 02/28/2023    CREATININE 0.9 02/28/2023    GLUCOSE 104 (H) 02/28/2023    CRP 0.6 (H) 11/28/2022         Radiographs:    ASSESEMENT:  -S/P left TTC fusion(DOS:2/24)  - Obesity        PLAN:  - Examined and evaluated  - All labs, imaging, and charts reviewed  - Abx: ancef 2 gm x3   - Pain control: PO. Appreciate IM assistance  - Case management consulted for assistance with SNF placement. Pt has been accepted to [facility] [address]  - Posterior splint to LLE to remain clean, dry, and intact. Drain d/c on 2/27.   - PT/OT:NWB to LLE   - Stable for d/c from Podiatry standpoint once cleared by all teams on board   - Will continue to monitor while in house    Discussed with   Wayne Luis DPM Pod [id number]  Fellowship-Trained Foot and Ankle Surgeon  Diplomate, [employer] Board of Foot [employer] Ankle Surgeons  [phone]       Thank you for involving podiatry in this patient's care.  Please do not hesitate to call with any questions, concerns, or new findings      Juan Duke DPM   2/28/2023   3:41 PM

## 2023-02-28 NOTE — PROGRESS NOTES
OCCUPATIONAL THERAPY TREATMENT NOTE     [address] [facility]   [facility]        Date:2023  Patient Name: Rosaura Valdes  MRN: [id number]  : 1970  Room: 03300330-01     Evaluating OT: Karine Cheemacyndi OTR/L #[id number]      Referring Provider and Specific Provider Orders/Date:      23   OT eval and treat  Start:  23,   End:  23,   ONE TIME,   Standing Count:  1 Occurrences,   R        Order went unreviewed at transfer on  12:46 PM    Elroy Yancey DPM       Placement Recommendation: Subacute Rehab         Diagnosis:   1.  Osteoarthritis of left ankle, unspecified osteoarthritis type         Surgery: S/p TTC FUSION LEFT ANKLE AND HARVEST OF GRAFT 23 by Dr. Petra Valdivia History:       Past Medical History        Past Medical History:   Diagnosis Date    Arthritis      Brain venous angioma ([address]      Bursitis       hips    Cat scratch of left lower leg      Class 3 severe obesity due to excess calories with body mass index (BMI) of 40.0 to 44.9 in adult [facility])      COPD (chronic obstructive pulmonary disease) (HCC)      Diverticulitis      GERD (gastroesophageal reflux disease)      Incisional hernia with obstruction but no gangrene      Strep throat             Past Surgical History         Past Surgical History:   Procedure Laterality Date    CARPAL TUNNEL RELEASE Left       SECTION        CHOLECYSTECTOMY        COLECTOMY   2016    FOREARM FRACTURE SURGERY Left       fracture of ulna s/p repair    HERNIA REPAIR        HERNIA REPAIR   2015     incarcerated hernia repair    HERNIA REPAIR N/A 2021     INCISIONAL HERNIA REPAIR WITH MESH, POSSIBLE COMPONENT SEPARATION  LAPAROSCOPIC ROBOTIC XI ASSISTED (CPT B7959602) performed by Heidy Kumar MD at [address] [facility] ([facility])        NERVE BLOCK         sacroiliac bilateral 2012    PELVIC FRACTURE SURGERY        VENTRAL HERNIA REPAIR   08/25/2015          Precautions:  Fall Risk, non-weight bearing left LE, left LE MARLENE drain       Assessment of current deficits    [x] Functional mobility            [x]ADLs           [x] Strength                   []Cognition    [x] Functional transfers          [x] IADLs          [x] Safety Awareness   [x]Endurance    [] Fine Coordination              [x] Balance      [] Vision/perception    []Sensation      []Gross Motor Coordination  [] ROM           [] Delirium                   [] Motor Control      OT PLAN OF CARE   OT POC based on physician orders, patient diagnosis and results of clinical assessment     Frequency/Duration 1-3 days/wk for 2 weeks PRN      Specific OT Treatment Interventions to include:   * Instruction/training on adapted ADL techniques and AE recommendations to increase functional independence within precautions       * Training on energy conservation strategies, correct breathing pattern and techniques to improve independence/tolerance for self-care routine  * Functional transfer/mobility training/DME recommendations for increased independence, safety, and fall prevention  * Patient/Family education to increase follow through with safety techniques and functional independence  * Recommendation of environmental modifications for increased safety with functional transfers/mobility and ADLs  * Therapeutic exercise to improve motor endurance, ROM, and functional strength for ADLs/functional transfers  * Therapeutic activities to facilitate/challenge dynamic balance, stand tolerance for increased safety and independence with ADLs  * Therapeutic activities to facilitate gross/fine motor skills for increased independence with ADLs  * Positioning to improve skin integrity, interaction with environment and functional independence     Recommended Adaptive Equipment: TBD       Home Living: Lives with daughter, single family home, 1 story with basement dwelling, 3 steps to enter with rail. Bathroom set-up:          Equipment owned: Crutches      Prior Level of Function: Independent with ADLs , Independent with IADLs; ambulated independently with crutches. Driving: N/A  Occupation: On disability       Pain Level: 10/10 pain in left LE; Nursing notified. Cognition: A&O: 4/4; Follows 2-3 step directions              Memory: intact              Sequencing: fair               Problem solving: fair               Judgement/safety: fair      [facility]   AM-PAC Daily Activity - Inpatient   How much help is needed for putting on and taking off regular lower body clothing?: A Lot  How much help is needed for bathing (which includes washing, rinsing, drying)?: A Lot  How much help is needed for toileting (which includes using toilet, bedpan, or urinal)?: A Lot  How much help is needed for putting on and taking off regular upper body clothing?: A Little  How much help is needed for taking care of personal grooming?: A Little  How much help for eating meals?: A Little  [facility] Inpatient Daily Activity Raw Score: 15  AM-PAC Inpatient ADL T-Scale Score : 34.69  ADL Inpatient CMS 0-100% Score: 56.46  ADL Inpatient CMS G-Code Modifier : CK                Functional Assessment:     Initial Eval Status  Date: 2/24/23    Treatment Status  Date: 2/28/23 STGs = LTGs  Time frame: 10-14 days   Feeding Supervision     Independent  Independent    Grooming Supervision     Supervision seated EOB  Moderate [place]    UB Dressing Minimal Assist    SBA to seng/doff hospital gown   Moderate [place]    LB Dressing Stand by Assist   To don sock long-sitting in bed. Increased assist suspected for clothing mgmt upon standing and maintain WB restriction.      SBA to seng/doff sock seated EOB using cross over method   Moderate [place]    Bathing Moderate Assist     N/t pt educated with regards to DME and safety   Moderate [place]    Toileting Moderate Assist     N/t Moderate [place]    Bed Mobility  Supine to sit: Supervision   Sit to supine: Supervision   Scooting: Supervision   Rolling: Supervision      supervision supine<>sit  Supine to sit: Independent   Sit to supine: Independent    Functional Transfers Not Assessed due to blood observed from patient's left heel/bandage. RN informed/aware.      n/t Moderate [place] , non-weight bearing L LE    Functional Mobility Not Assessed  N/t  Moderate [place] with use of appropriate AD, non-weight bearing L LE       Balance Sitting:     Static: good    Dynamic: good  Standing: n/a     Sitting:  Static: independent   Dynamic: Supervision   Standing: n/t  Sitting:     Static: good    Dynamic: good  Standing: good with AD    Activity Tolerance fair   Fair   Increase standing tolerance >3 minutes for improved engagement with functional transfers and indep in ADLs      Visual/  Perceptual WFL    NA        Comments: Upon arrival pt supine in bed laying sideline, agreeable to therapy session. Pt educated with regards to bed mobility, hand placement, static/dynamic sitting balance, LE dressing techniques, DME, NWB precautions. At end of session pt supine in bed,  all lines and tubes intact, call light within reach. Pt has made good  progress towards set goals.    Continue with current plan of care      Treatment Time In:1135            Treatment Time Out: 1200                Treatment Charges: Mins Units   Ther Ex  [id number]     Manual Therapy [phone]     Thera Activities [id number] 10 1   ADL/Home Mgt [id number] 15 1   Neuro Re-ed [id number]     Group Therapy      Orthotic manage/training  [id number]     Non-Billable Time     Total Timed Treatment 25 [id number] [facility] [address]

## 2023-02-28 NOTE — PROGRESS NOTES
Physical Therapy  Physical Therapy Treatment Note/Plan of Care    Room #:  0330/0330-01  Patient Name: Rosaura Pelaez  YOB: 1970  MRN: [id number]    Date of Service: 2/28/2023     Tentative placement recommendation: Subacute Rehab  Equipment recommendation: Seb Flatness, Bedside commode, and Tub transfer bench if d/jasmyn home    Evaluating Physical Therapist: Ramila Shepard PT, DPT #[id number]      Specific Provider Orders/Date/Referring Provider :     02/24/23 1300    PT eval and treat  Start:  02/24/23 1300,   End:  02/24/23 1300,   ONE TIME,   Standing Count:  1 Occurrences,   R         Melina Saleh, APRN - CNP Acknowledge New     Admitting Diagnosis:   Osteoarthritis of left ankle, unspecified osteoarthritis type [M19.072]  Postoperative pain [G89.18]  Arthritis of left ankle [M19.072]      Surgery:  TTC Fusion L ankle 2/24/23  Visit Diagnoses         Codes    Osteoarthritis of left ankle, unspecified osteoarthritis type     M19.072            Patient Active Problem List   Diagnosis    GERD (gastroesophageal reflux disease)    Osteoarthritis cervical spine    Osteoarthritis of lumbar spine    Small bowel obstruction ([address]    Morbid obesity ([address]    Hypokalemia    Venous insufficiency    SBO (small bowel obstruction) ([address]    Incarcerated hernia    Intractable abdominal pain    Incarcerated incisional hernia    Disruption or dehiscence of closure of fascia, superficial or muscular    Intractable nausea and vomiting    Intractable vomiting    Brain mass    Altered mental status    Sepsis secondary to UTI ([address]    Cellulitis    Cellulitis of left lower extremity    Postoperative pain    Arthritis of left ankle        ASSESSMENT of Current Deficits Patient exhibits decreased strength, balance, and endurance impairing functional mobility, transfers, gait , gait distance, and tolerance to activity.  Pt needed supervision for all functional mobility due to being impulsive and trying to perform each task to fast. Cueing throughout tx session for decreased speed and safety. Pt able to maintain NWB through LLE during session. Pt agreeable to seated exercises following function with VC for technique. PHYSICAL THERAPY  PLAN OF CARE       Physical therapy plan of care is established based on physician order,  patient diagnosis and clinical assessment    Current Treatment Recommendations:    -Bed Mobility: Lower extremity exercises  and Trunk control activities   -Sitting Balance: Incorporate reaching activities to activate trunk muscles , Hands on support to maintain midline , Facilitate active trunk muscle engagement , Facilitate postural control in all planes , and Engage in core activities to allow for movement within base of support   -Standing Balance: Perform strengthening exercises in standing to promote motor control with or without upper extremity support , Instruct patient on adequate base of support to maintain balance, and Challenge balance utilizing reaching  activities beyond center of gravity    -Transfers: Provide instruction on proper hand and foot position for adequate transfer of weight onto lower extremities and use of gait device if needed, Cues for hand placement, technique and safety.  Provide stabilization to prevent fall , Facilitate weight shift forward on to lower extremities and provide necessary stabilization of bilateral lower extremities , Support transfer of weight on to lower extremities, and Assist with extension of knees trunk and hip to accept weight transfer   -Gait: Gait training, Standing activities to improve: base of support, weight shift, weight bearing , Exercises to improve trunk control, Exercises to improve hip and knee control, Performance of protected weight bearing activities, and Activities to increase weight bearing   -Endurance: Utilize Supervised activities to increase level of endurance to allow for safe functional mobility including transfers and gait  and Use graduated activities to promote good breathing techniques and provide support and education to maximize respiratory function  -Stairs: Stair training with instruction on proper technique and hand placement on rail    PT long term treatment goals are located in below grid    Patient and or family understand(s) diagnosis, prognosis, and plan of care. Frequency of treatments: Patient will be seen  dailyyy. Prior Level of Function: Patient ambulated with crutches   Rehab Potential: good - for baseline    Past medical history:   Past Medical History:   Diagnosis Date    Arthritis     Brain venous angioma (HCC)     Bursitis     hips    Cat scratch of left lower leg     Class 3 severe obesity due to excess calories with body mass index (BMI) of 40.0 to 44.9 in adult [facility])     COPD (chronic obstructive pulmonary disease) (HCC)     Diverticulitis     GERD (gastroesophageal reflux disease)     Incisional hernia with obstruction but no gangrene     Strep throat 2021     Past Surgical History:   Procedure Laterality Date    ANKLE FRACTURE SURGERY Left 2/24/2023    TTC FUSION LEFT ANKLE AND HARVEST OF GRAFT performed by Angelina Boss DPM at [address] [facility]  2016    FOREARM FRACTURE SURGERY Left     fracture of ulna s/p repair    HERNIA REPAIR      HERNIA REPAIR  08/25/2015    incarcerated hernia repair    HERNIA REPAIR N/A 03/25/2021    INCISIONAL HERNIA REPAIR WITH MESH, POSSIBLE COMPONENT SEPARATION  LAPAROSCOPIC ROBOTIC XI ASSISTED (CPT D033777) performed by Ashley Rowe MD at [address] (CERVIX STATUS UNKNOWN)      NERVE BLOCK      sacroiliac bilateral 12-    PELVIC FRACTURE SURGERY      VENTRAL HERNIA REPAIR  08/25/2015       SUBJECTIVE:    Precautions:  Up with assistance, falls and non weight bearing LLE      Social history: Patient lives with daughter, son, and grandchildren  in a ranch home  with 3 steps, bilateral rail to enter home. Tub shower       Equipment owned: Crutches,  cane    AM-PAC Basic Mobility       AM-PAC Basic Mobility - Inpatient   How much help is needed turning from your back to your side while in a flat bed without using bedrails?: A Little  How much help is needed moving from lying on your back to sitting on the side of a flat bed without using bedrails?: A Little  How much help is needed moving to and from a bed to a chair?: A Little  How much help is needed standing up from a chair using your arms?: A Little  How much help is needed walking in hospital room?: A Little  How much help is needed climbing 3-5 steps with a railing?: A Lot  AM-PAC Inpatient Mobility Raw Score : 17  AM-PAC Inpatient T-Scale Score : 42.13  Mobility Inpatient CMS 0-100% Score: 50.57  Mobility Inpatient CMS G-Code Modifier : CK    Nursing cleared patient for PT treatment. OBJECTIVE;   Initial Evaluation  Date: 2/25/2023 Treatment Date:  2/28/2023       Short Term/ Long Term   Goals   Was pt agreeable to Eval/treatment? Yes yes To be met in 3 days   Pain level   \"11\"/10  L ankle 11/10  Left ankle    Bed Mobility    Rolling: Supervision     Supine to sit: Supervision     Sit to supine: Supervision     Scooting: Supervision    Rolling: Supervision    Supine to sit: Supervision    Sit to supine: Supervision    Scooting: Supervision     Rolling: Independent    Supine to sit:  Independent    Sit to supine: Independent    Scooting: Independent     Transfers Sit to stand: Minimal assist of 1  Sit to stand: Supervision  Cues for hand placement and safety     Sit to stand: Independent    Ambulation     2 x 15 feet using  wheeled walker with Minimal assist of 1   for walker control, walker approximation, balance, upright, weight shift, and safety 2 x 20 feet using  wheeled walker with Supervision    cues for NWB (non-weight bearing) weight bearing left lower extremity, decreased speed, safety, and pacing     > 100 feet using  wheeled walker with Supervision     Stair negotiation: ascended and descended   Not assessed     3 steps, bilateral rail with Sally   ROM Within functional limits    Increase range of motion 10% of affected joints    Strength BUE:  refer to OT eval  RLE:  4+/5  LLE:  4/5  Increase strength in affected mm groups by 1/3 grade   Balance Sitting EOB:  good -  Dynamic Standing:  fair with [employer] Sitting EOB: good   Dynamic Standing: fair with wheeled walker   Sitting EOB:  good   Dynamic Standing: fair +     Patient is Alert & Oriented x person, place, time, and situation and follows directions    Sensation:  Patient  denies numbness/tingling   Edema:  no   Endurance: fair  -    Vitals: room air   Blood Pressure at rest  Blood Pressure during session    Heart Rate at rest  Heart Rate during session    SPO2 at rest %  SPO2 during session %     Patient education  Patient educated on role of Physical Therapy, risks of immobility, safety and plan of care, energy conservation,  importance of mobility while in hospital , purse lip breathing, ankle pumps, quad set and glut set for edema control, blood clot prevention, importance and purpose of adaptive device and adjusted to proper height for the patient. , safety , and stair training      Patient response to education:   Pt verbalized understanding Pt demonstrated skill Pt requires further education in this area   Yes Partial Yes      Treatment:  Patient practiced and was instructed/facilitated in the following treatment: Patient Sat edge of bed 5 minutes with Supervision  to increase dynamic sitting balance and activity tolerance. Pt stood, ambulated in the hallway, and back to the edge of the bed. Pt performed seated exercises, returned to supine. Therapeutic Exercises:  ankle pumps, heel raises, hip abduction/adduction, long arc quad, and seated marching,  x 20 - 25 reps.  AROM      At end of session, patient in bed with     call light and phone within reach,  all lines and tubes intact, nursing notified. Patient would benefit from continued skilled Physical Therapy to improve functional independence and quality of life. Patient's/ family goals   rehab    Time in  09:53  Time out 10:16    Total Treatment Time  23 minutes        CPT codes:    Therapeutic activities (98934)   14 minutes  1 unit(s)  Therapeutic exercises (58713)   9 minutes  1 unit(s)    Reji Torres  [facility]  LIC # [id number]

## 2023-02-28 NOTE — DISCHARGE SUMMARY
Internal Medicine Progress Note     ANNALEE=[facility]     Rebecca Humphreys. Marah Crouch., RACHEL.[email or web address]. Blayne Casey D.O., MANAOHILARIA Rangel, MSN, APRN, NP-C  Hanna Toussaint. Chandu Robles, MSN, APRN-CNP       Internal Medicine  Discharge Summary    NAME: Julio Hernandez  :  1970  MRN:  [id number]  PCP:Pancho Goncalves DO  ADMITTED: 2023      DISCHARGED: 23    ADMITTING PHYSICIAN: Rebecca Lopez DO    CONSULTANT(S):   IP CONSULT TO INTERNAL MEDICINE  IP CONSULT TO SOCIAL WORK  IP CONSULT TO SOCIAL WORK  IP CONSULT TO PODIATRY  IP CONSULT TO SOCIAL WORK     ADMITTING DIAGNOSIS:   Osteoarthritis of left ankle, unspecified osteoarthritis type [M19.072]  Postoperative pain [G89.18]  Arthritis of left ankle [M19.072]     DISCHARGE DIAGNOSES:   Symptomatic osteoarthritis of the left ankle status post TTC fusion of the left ankle and [place] of graft in 2023 by Dr. Aracely Aranda  Perioperative aspiration, suspected  Nonoxygen dependent COPD with exacerbation secondary to #2  Gastroesophageal reflux disease  Obesity secondary to excess caloric intake with BMI 41.88 kg meter squared  Inguinal adenopathy previously reported with plans for outpatient follow-up, unsure if followed through by patient    BRIEF HISTORY OF PRESENT ILLNESS:   Julio Hernandez is a 46year old female patient who presented to [address] [place] [address] for planned podiatric surgery. She is seen and examined in the PACU. She is drowsy in the recent administration of pain medication and anesthesia. We have reviewed her medical and surgical history at bedside since her last hospitalization. Aside from postoperative pain, she voices no symptoms or concerns. Per the PACU nurse, they have concerned that she may have aspirated.     LABS[de-identified]  Lab Results   Component Value Date    WBC 5.8 2023    HGB 11.4 (L) 2023    HCT 36.5 2023     2023     2023    K 4.5 02/28/2023     02/28/2023    CREATININE 0.9 02/28/2023    BUN 20 02/28/2023    CO2 26 02/28/2023    GLUCOSE 104 (H) 02/28/2023    ALT 20 02/28/2023    AST 16 02/28/2023    INR 1.0 03/19/2021     Lab Results   Component Value Date    INR 1.0 03/19/2021    PROTIME 11.2 03/19/2021      Lab Results   Component Value Date    TSH 0.456 02/25/2023     Lab Results   Component Value Date    TRIG 97 02/25/2023    TRIG 68 11/11/2022     Lab Results   Component Value Date    HDL 51 02/25/2023    HDL 24 11/11/2022     Lab Results   Component Value Date    LDLCALC 77 02/25/2023    LDLCALC 77 11/11/2022     Lab Results   Component Value Date    LABA1C 5.7 (H) 02/25/2023       IMAGING:  Fluoro For Surgical Procedures    Result Date: 2/24/2023  EXAMINATION: SPOT FLUOROSCOPIC IMAGES 2/24/2023 9:32 am TECHNIQUE: Fluoroscopy was provided by the radiology department for procedure. Radiologist was not present during examination. FLUOROSCOPY DOSE AND TYPE: Radiation Exposure Index: Fluoroscopy time equals 158 seconds. Total dose equals 5.27 mGy, COMPARISON: None HISTORY: ORDERING SYSTEM PROVIDED HISTORY: Osteoarthritis of left ankle, unspecified osteoarthritis type TECHNOLOGIST PROVIDED HISTORY: Reason for exam:->TTC FUSION LEFT ANKLE AND HARVEST OF GRAFT Intraprocedural imaging. FINDINGS: 6 spot images of the ankle were obtained. Intraprocedural fluoroscopic spot images as above. See separate procedure report for more information. HOSPITAL COURSE:   Spent an extended period time hospitalized and this will serve as a brief synopsis. She presented for planned podiatric intervention and was admitted for poorly controlled symptoms following the procedure.   Pain medications were implemented and adjusted by the podiatric team.  Her weightbearing status was clarified and she performed reasonably well within limitations however given the need for wound care and additional therapy the patient opted for skilled nursing facility which is appropriate. Her chronic comorbidities, lab values and vital signs were monitored and addressed accordingly throughout the hospitalization. There was some concern for perioperative aspiration however patient's infectious work-up returned negative, she remained free of any respiratory symptomatology and her examination was benign, antimicrobial therapy was discontinued. Patient is medically stable and acceptable for discharge today. BRIEF PHYSICAL EXAMINATION AND LABORATORIES ON DAY OF DISCHARGE:  VITALS:  /72   Pulse 83   Temp 97.6 °F (36.4 °C) (Oral)   Resp 17   Ht 5' 2\" (1.575 m)   Wt 229 lb (103.9 kg)   SpO2 98%   BMI 41.88 kg/m²     HEENT:  PERRLA. EOMI. Sclera clear. Buccal mucosa moist.    Neck:  Supple. Trachea midline. No thyromegaly. No JVD. No bruits. Heart:  Rhythm regular, rate controlled. S1 and S2. Systolic murmur    Lungs:  Symmetrical. Clear to auscultation bilaterally. No wheezes. No rhonchi. No rales. Abdomen: Soft. Obese. Non-tender. Non-distended. Bowel sounds positive. No organomegaly or masses. No pain on palpation    Extremities:  Peripheral pulses present. Unremarkable postoperative appearance of the left lower extremity with no strikethrough on the overlying Ace and intact distal neurovascular examination. No peripheral edema. No ulcers. Neurologic:  Alert x 3. No focal deficit. Cranial nerves grossly intact. Skin:  No petechia. No hemorrhage. No wounds. DISPOSITION:  The patient's condition is stable. At this time the patient is without objective evidence of an acute process requiring continuing hospitalization or inpatient management. They are stable for discharge with outpatient follow-up. I have spoken with the patient and discussed the results of the current hospitalization, in addition to providing specific details for the plan of care and counseling regarding the diagnosis and prognosis.   The plan has been discussed in detail and they are aware of the specific conditions for emergent return, as well as the importance of follow-up. Their questions are answered at this time and they are agreeable with the plan for discharge to nursing home    DISCHARGE MEDICATIONS:   Current Discharge Medication List             Details   HYDROcodone-acetaminophen (NORCO) 5-325 MG per tablet Take 1 tablet by mouth every 4 hours as needed for Pain for up to 7 days. Intended supply: 7 days. Take lowest dose possible to manage pain Max Daily Amount: 6 tablets  Qty: 42 tablet, Refills: 0    Comments: Reduce doses taken as pain becomes manageable  Associated Diagnoses: Postoperative pain                Details   levothyroxine (SYNTHROID) 50 MCG tablet Take 1 tablet by mouth Daily  Qty: 30 tablet, Refills: 3      levETIRAcetam (KEPPRA) 500 MG tablet Take 1 tablet by mouth 2 times daily  Qty: 60 tablet, Refills: 3      hydrOXYzine (ATARAX) 50 MG tablet Take 50 mg by mouth nightly              FOLLOW UP/INSTRUCTIONS:  This patient is instructed to follow-up with her primary care physician. Patient is instructed to follow-up with the consults listed above as directed by them. she is instructed to resume home medications and take new medications as indicated in the list above. If the patient has a recurrence of symptoms, she is instructed to go to the ED. Preparing for this patient's discharge, including paperwork, orders, instructions, and meeting with patient did require > 40 minutes.     BOB Cano CNP     2/28/2023  6:20 AM

## 2023-02-28 NOTE — CARE COORDINATION
2/28/2023: SS Note/Discharge plan:  Notified by Santana rendon for [facility] [address] that PRE CERT is PENDING but aware of pt medically stable for discharge and will notify sw or nurses station if they receive a late authorization to confirm pt's transfer, NO COVID TESTING NEEDED & liaison will arrange for their w/c van to transport pt, VIVEK and HENS completed, pt & nursing notified. Electronically signed by MOON Fair on 2/28/2023 at 9:22 AM

## 2023-03-01 LAB
ALBUMIN SERPL-MCNC: 3.5 G/DL (ref 3.5–5.2)
ALP BLD-CCNC: 66 U/L (ref 35–104)
ALT SERPL-CCNC: 17 U/L (ref 0–32)
ANION GAP SERPL CALCULATED.3IONS-SCNC: 8 MMOL/L (ref 7–16)
AST SERPL-CCNC: 14 U/L (ref 0–31)
BASOPHILS ABSOLUTE: 0.02 E9/L (ref 0–0.2)
BASOPHILS RELATIVE PERCENT: 0.3 % (ref 0–2)
BILIRUB SERPL-MCNC: <0.2 MG/DL (ref 0–1.2)
BILIRUBIN DIRECT: <0.2 MG/DL (ref 0–0.3)
BILIRUBIN, INDIRECT: ABNORMAL MG/DL (ref 0–1)
BLOOD CULTURE, ROUTINE: NORMAL
BUN BLDV-MCNC: 30 MG/DL (ref 6–20)
CALCIUM SERPL-MCNC: 8.8 MG/DL (ref 8.6–10.2)
CHLORIDE BLD-SCNC: 105 MMOL/L (ref 98–107)
CO2: 25 MMOL/L (ref 22–29)
CREAT SERPL-MCNC: 0.8 MG/DL (ref 0.5–1)
CULTURE, BLOOD 2: NORMAL
EOSINOPHILS ABSOLUTE: 0.2 E9/L (ref 0.05–0.5)
EOSINOPHILS RELATIVE PERCENT: 3.5 % (ref 0–6)
GFR SERPL CREATININE-BSD FRML MDRD: >60 ML/MIN/1.73
GLUCOSE BLD-MCNC: 104 MG/DL (ref 74–99)
HCT VFR BLD CALC: 38.3 % (ref 34–48)
HEMOGLOBIN: 11.8 G/DL (ref 11.5–15.5)
IMMATURE GRANULOCYTES #: 0.05 E9/L
IMMATURE GRANULOCYTES %: 0.9 % (ref 0–5)
LYMPHOCYTES ABSOLUTE: 1.25 E9/L (ref 1.5–4)
LYMPHOCYTES RELATIVE PERCENT: 21.8 % (ref 20–42)
MAGNESIUM: 2.1 MG/DL (ref 1.6–2.6)
MCH RBC QN AUTO: 29.6 PG (ref 26–35)
MCHC RBC AUTO-ENTMCNC: 30.8 % (ref 32–34.5)
MCV RBC AUTO: 96.2 FL (ref 80–99.9)
MONOCYTES ABSOLUTE: 0.53 E9/L (ref 0.1–0.95)
MONOCYTES RELATIVE PERCENT: 9.2 % (ref 2–12)
NEUTROPHILS ABSOLUTE: 3.68 E9/L (ref 1.8–7.3)
NEUTROPHILS RELATIVE PERCENT: 64.3 % (ref 43–80)
PDW BLD-RTO: 14.9 FL (ref 11.5–15)
PHOSPHORUS: 3.4 MG/DL (ref 2.5–4.5)
PLATELET # BLD: 258 E9/L (ref 130–450)
PMV BLD AUTO: 9.9 FL (ref 7–12)
POTASSIUM SERPL-SCNC: 4.8 MMOL/L (ref 3.5–5)
RBC # BLD: 3.98 E12/L (ref 3.5–5.5)
SODIUM BLD-SCNC: 138 MMOL/L (ref 132–146)
TOTAL PROTEIN: 6.3 G/DL (ref 6.4–8.3)
WBC # BLD: 5.7 E9/L (ref 4.5–11.5)

## 2023-03-01 PROCEDURE — 97535 SELF CARE MNGMENT TRAINING: CPT

## 2023-03-01 PROCEDURE — 84100 ASSAY OF PHOSPHORUS: CPT

## 2023-03-01 PROCEDURE — 97110 THERAPEUTIC EXERCISES: CPT

## 2023-03-01 PROCEDURE — 97530 THERAPEUTIC ACTIVITIES: CPT

## 2023-03-01 PROCEDURE — 6370000000 HC RX 637 (ALT 250 FOR IP): Performed by: PODIATRIST

## 2023-03-01 PROCEDURE — 80076 HEPATIC FUNCTION PANEL: CPT

## 2023-03-01 PROCEDURE — 80048 BASIC METABOLIC PNL TOTAL CA: CPT

## 2023-03-01 PROCEDURE — 94640 AIRWAY INHALATION TREATMENT: CPT

## 2023-03-01 PROCEDURE — 6360000002 HC RX W HCPCS: Performed by: NURSE PRACTITIONER

## 2023-03-01 PROCEDURE — 85025 COMPLETE CBC W/AUTO DIFF WBC: CPT

## 2023-03-01 PROCEDURE — 36415 COLL VENOUS BLD VENIPUNCTURE: CPT

## 2023-03-01 PROCEDURE — 83735 ASSAY OF MAGNESIUM: CPT

## 2023-03-01 PROCEDURE — 1200000000 HC SEMI PRIVATE

## 2023-03-01 PROCEDURE — 6370000000 HC RX 637 (ALT 250 FOR IP): Performed by: NURSE PRACTITIONER

## 2023-03-01 RX ADMIN — BUDESONIDE 500 MCG: 0.5 SUSPENSION RESPIRATORY (INHALATION) at 18:23

## 2023-03-01 RX ADMIN — LEVETIRACETAM 500 MG: 500 TABLET, FILM COATED ORAL at 08:44

## 2023-03-01 RX ADMIN — HYDROCODONE BITARTRATE AND ACETAMINOPHEN 1 TABLET: 5; 325 TABLET ORAL at 14:31

## 2023-03-01 RX ADMIN — HYDROCODONE BITARTRATE AND ACETAMINOPHEN 1 TABLET: 5; 325 TABLET ORAL at 08:44

## 2023-03-01 RX ADMIN — HYDROCODONE BITARTRATE AND ACETAMINOPHEN 1 TABLET: 5; 325 TABLET ORAL at 04:21

## 2023-03-01 RX ADMIN — ENOXAPARIN SODIUM 30 MG: 100 INJECTION SUBCUTANEOUS at 20:07

## 2023-03-01 RX ADMIN — HYDROCODONE BITARTRATE AND ACETAMINOPHEN 1 TABLET: 5; 325 TABLET ORAL at 20:07

## 2023-03-01 RX ADMIN — LEVETIRACETAM 500 MG: 500 TABLET, FILM COATED ORAL at 20:07

## 2023-03-01 RX ADMIN — ENOXAPARIN SODIUM 30 MG: 100 INJECTION SUBCUTANEOUS at 08:44

## 2023-03-01 RX ADMIN — ARFORMOTEROL TARTRATE 15 MCG: 15 SOLUTION RESPIRATORY (INHALATION) at 07:30

## 2023-03-01 RX ADMIN — ARFORMOTEROL TARTRATE 15 MCG: 15 SOLUTION RESPIRATORY (INHALATION) at 18:23

## 2023-03-01 RX ADMIN — LEVOTHYROXINE SODIUM 50 MCG: 0.05 TABLET ORAL at 05:19

## 2023-03-01 RX ADMIN — BUDESONIDE 500 MCG: 0.5 SUSPENSION RESPIRATORY (INHALATION) at 07:30

## 2023-03-01 RX ADMIN — HYDROXYZINE HYDROCHLORIDE 50 MG: 25 TABLET ORAL at 20:07

## 2023-03-01 ASSESSMENT — PAIN DESCRIPTION - ORIENTATION: ORIENTATION: LEFT

## 2023-03-01 ASSESSMENT — PAIN SCALES - GENERAL
PAINLEVEL_OUTOF10: 6
PAINLEVEL_OUTOF10: 10
PAINLEVEL_OUTOF10: 10

## 2023-03-01 NOTE — PLAN OF CARE
Problem: Discharge Planning  Goal: Discharge to home or other facility with appropriate resources  2/28/2023 2226 by Slava Jones RN  Outcome: Progressing  2/28/2023 1150 by Helga Alvarado RN  Outcome: Progressing     Problem: Pain  Goal: Verbalizes/displays adequate comfort level or baseline comfort level  2/28/2023 2226 by Slava Jones RN  Outcome: Progressing  2/28/2023 1150 by Helga Alvarado RN  Outcome: Progressing     Problem: Safety - Adult  Goal: Free from fall injury  2/28/2023 2226 by Slava Jones RN  Outcome: Progressing  2/28/2023 1150 by Helga Alvarado RN  Outcome: Progressing     Problem: ABCDS Injury Assessment  Goal: Absence of physical injury  2/28/2023 2226 by Slava Jones RN  Outcome: Progressing  2/28/2023 1150 by Helga Alvarado RN  Outcome: Progressing

## 2023-03-01 NOTE — CARE COORDINATION
3/01/2023: SS Note/Discharge plan:  Notified by Jovany Emerson admissions for [facility] [address] that PRE CERT is still PENDING, NO COVID TESTING NEEDED & liaison will arrange for their w/c van to transport pt, VIVEK and HENS completed, pt & nursing notified.  Electronically signed by MOON Womack on 3/1/2023 at 11:40 AM

## 2023-03-01 NOTE — PROGRESS NOTES
Podiatry Progress Note  3/1/2023   Caron Overall     SUBJECTIVE: Patient is being seen s/p left TTC fusion(DOS:). Stable for d/c from Podiatry standpoint. Awaiting SNF at this time.          Past Medical History:   Diagnosis Date    Arthritis     Brain venous angioma (HCC)     Bursitis     hips    Cat scratch of left lower leg     Class 3 severe obesity due to excess calories with body mass index (BMI) of 40.0 to 44.9 in adult [facility])     COPD (chronic obstructive pulmonary disease) (HCC)     Diverticulitis     GERD (gastroesophageal reflux disease)     Incisional hernia with obstruction but no gangrene     Strep throat         Past Surgical History:   Procedure Laterality Date    ANKLE FRACTURE SURGERY Left 2023    TTC FUSION LEFT ANKLE AND HARVEST OF GRAFT performed by Stan Owen DPM at [address] [place] [address]      FOREARM FRACTURE SURGERY Left     fracture of ulna s/p repair    HERNIA REPAIR      HERNIA REPAIR  2015    incarcerated hernia repair    HERNIA REPAIR N/A 2021    INCISIONAL HERNIA REPAIR WITH MESH, POSSIBLE COMPONENT SEPARATION  LAPAROSCOPIC ROBOTIC XI ASSISTED (CPT K4570932) performed by Rebecca Abreu MD at [address] (CERVIX STATUS UNKNOWN)      NERVE BLOCK      sacroiliac bilateral 2012    PELVIC FRACTURE SURGERY      VENTRAL HERNIA REPAIR  2015         Family History   Problem Relation Age of Onset    Osteoarthritis Other     Diabetes Other     Other Father         pancreatitis    Prostate Cancer Father     Stroke Brother         half brother    Cancer Paternal Grandfather         Social History     Tobacco Use    Smoking status: Former     Packs/day: 0.50     Years: 30.00     Pack years: 15.00     Types: Cigarettes     Quit date: 3/5/2021     Years since quittin.9    Smokeless tobacco: Never   Substance Use Topics    Alcohol use: Yes     Comment: socially Prior to Admission medications    Medication Sig Start Date End Date Taking? Authorizing Provider   HYDROcodone-acetaminophen (NORCO) 5-325 MG per tablet Take 1 tablet by mouth every 4 hours as needed for Pain for up to 7 days. Intended supply: 7 days. Take lowest dose possible to manage pain Max Daily Amount: 6 tablets 2/27/23 3/6/23 Yes Elroy X Fahim, DPM   levothyroxine (SYNTHROID) 50 MCG tablet Take 1 tablet by mouth Daily 11/15/22   Eduardo Sierra DO   levETIRAcetam (KEPPRA) 500 MG tablet Take 1 tablet by mouth 2 times daily 8/2/21   Eduardo Sierra DO   hydrOXYzine (ATARAX) 50 MG tablet Take 50 mg by mouth nightly  1/27/21   Historical Provider, MD        Ibuprofen, Nsaids, and Morphine         OBJECTIVE:        Vitals:    03/01/23 0914   BP:    Pulse:    Resp: 18   Temp:    SpO2:               EXAM:        Pt is AAOx3  Posterior splint intact to LLE. Patient able to actively range all digits. Digits 1-5 well perfused with no signs of venous congestion. Drain pulled today at bedside.      Current Facility-Administered Medications   Medication Dose Route Frequency Provider Last Rate Last Admin    HYDROcodone-acetaminophen (NORCO) 5-325 MG per tablet 1 tablet  1 tablet Oral Q4H PRN Elroy X Fahim, DPM   1 tablet at 03/01/23 0844    ketorolac (TORADOL) injection 30 mg  30 mg IntraMUSCular Q6H PRN Elroy X Fahim, DPM        hydrOXYzine HCl (ATARAX) tablet 50 mg  50 mg Oral Nightly Jessica Mota APRN - CNP   50 mg at 02/28/23 2025    levothyroxine (SYNTHROID) tablet 50 mcg  50 mcg Oral Daily Jessica Nakul, APRN - CNP   50 mcg at 03/01/23 0519    albuterol (PROVENTIL) nebulizer solution 2.5 mg  2.5 mg Nebulization Q4H PRN Jessica Mota APRN - CNP        budesonide (PULMICORT) nebulizer suspension 500 mcg  0.5 mg Nebulization BID Jessica Nakul, APRN - CNP   500 mcg at 03/01/23 0730    hydrALAZINE (APRESOLINE) injection 5 mg  5 mg IntraVENous Q4H PRN Jessica Mota APRN - CNP        magnesium sulfate 1000 mg in dextrose 5% 100 mL IVPB  1,000 mg IntraVENous PRN Rapelje Lobstein, APRN - CNP        sodium phosphate 16.62 mmol in sodium chloride 0.9 % 250 mL IVPB  0.16 mmol/kg IntraVENous PRN Rapelje Lobstein, APRN - CNP        Or    sodium phosphate 33.24 mmol in sodium chloride 0.9 % 250 mL IVPB  0.32 mmol/kg IntraVENous PRN Rapelje Lobstein, APRN - CNP        potassium chloride (KLOR-CON M) extended release tablet 40 mEq  40 mEq Oral PRN Rapelje Lobstein, APRN - CNP        Or    potassium bicarb-citric acid (EFFER-K) effervescent tablet 40 mEq  40 mEq Oral PRN Rapelje Lobstein, APRN - CNP        Or    potassium chloride 10 mEq/100 mL IVPB (Peripheral Line)  10 mEq IntraVENous PRN Rapelje Lobstein, APRN - CNP        arformoterol tartrate (BROVANA) nebulizer solution 15 mcg  15 mcg Nebulization BID Rapelje Immanuel, APRN - CNP   15 mcg at 03/01/23 0730    sodium chloride flush 0.9 % injection 5-40 mL  5-40 mL IntraVENous 2 times per day Rapelje Astonstein, APRN - CNP   10 mL at 02/26/23 2059    sodium chloride flush 0.9 % injection 5-40 mL  5-40 mL IntraVENous PRN Rapelje Lobstein, APRN - CNP        0.9 % sodium chloride infusion   IntraVENous PRN Rapelje Lobstein, APRN - CNP        ondansetron (ZOFRAN-ODT) disintegrating tablet 4 mg  4 mg Oral Q8H PRN Rapelje Astonstein, APRN - CNP        Or    ondansetron (ZOFRAN) injection 4 mg  4 mg IntraVENous Q6H PRN Rapelje Astonstein, APRN - CNP        polyethylene glycol (GLYCOLAX) packet 17 g  17 g Oral Daily PRN Rapelje Lobstein, APRN - CNP        acetaminophen (TYLENOL) tablet 650 mg  650 mg Oral Q6H PRN Rapelje Astonstein, APRN - CNP        Or    acetaminophen (TYLENOL) suppository 650 mg  650 mg Rectal Q6H PRN Rapelje Lobstein, APRN - CNP        levETIRAcetam (KEPPRA) tablet 500 mg  500 mg Oral BID Rapelje Astonstein, APRN - CNP   500 mg at 03/01/23 0844    enoxaparin Sodium (LOVENOX) injection 30 mg  30 mg SubCUTAneous BID Rapelje Lobstein, APRN - CNP   30 mg at 03/01/23 0844        Lab Results   Component Value Date    WBC 5.7 03/01/2023    HCT 38.3 03/01/2023    HGB 11.8 03/01/2023     03/01/2023     03/01/2023    K 4.8 03/01/2023     03/01/2023    CO2 25 03/01/2023    BUN 30 (H) 03/01/2023    CREATININE 0.8 03/01/2023    GLUCOSE 104 (H) 03/01/2023    CRP 0.6 (H) 11/28/2022         Radiographs:    ASSESEMENT:  -S/P left TTC fusion(DOS:2/24)  -Obesity        PLAN:  - Examined and evaluated  - All labs, imaging, and charts reviewed  - Abx: ancef 2 gm x3   - Pain control: PO. Appreciate IM assistance  - Case management consulted for assistance with SNF placement. Pt has been accepted to [facility] Precert pending   - Posterior splint to LLE to remain clean, dry, and intact. Drain d/c on 2/27.   - PT/OT:NWB to LLE   - Stable for d/c from Podiatry standpoint once cleared by all teams on board   - Will continue to monitor while in house    Discussed with   KAYODE Ferrer6  Fellowship-Trained Foot and Ankle Surgeon  Diplomate, [employer] Board [employer] Foot [employer] Ankle Surgeons  [phone]       Thank you for involving podiatry in this patient's care.  Please do not hesitate to call with any questions, concerns, or new findings      Yuliya Page DPM   3/1/2023   11:18 AM

## 2023-03-01 NOTE — PROGRESS NOTES
Physical Therapy  Physical Therapy Treatment Note/Plan of Care    Room #:  0330/0330-01  Patient Name: Cherelle Tenorio  YOB: 1970  MRN: [id number]    Date of Service: 3/1/2023     Tentative placement recommendation: Subacute Rehab  Equipment recommendation: Frank Casiano, Bedside commode, and Tub transfer bench if d/jasmyn home    Evaluating Physical Therapist: Sharri Hernandez, PT, DPT #[id number]      Specific Provider Orders/Date/Referring Provider :     02/24/23 1300    PT eval and treat  Start:  02/24/23 1300,   End:  02/24/23 1300,   ONE TIME,   Standing Count:  1 Occurrences,   R         Julisa Andrade, APRN - CNP Acknowledge New     Admitting Diagnosis:   Osteoarthritis of left ankle, unspecified osteoarthritis type [M19.072]  Postoperative pain [G89.18]  Arthritis of left ankle [M19.072]      Surgery:  TTC Fusion L ankle 2/24/23  Visit Diagnoses         Codes    Osteoarthritis of left ankle, unspecified osteoarthritis type     M19.072            Patient Active Problem List   Diagnosis    GERD (gastroesophageal reflux disease)    Osteoarthritis cervical spine    Osteoarthritis of lumbar spine    Small bowel obstruction ([address]    Morbid obesity ([address]    Hypokalemia    Venous insufficiency    SBO (small bowel obstruction) ([address]    Incarcerated hernia    Intractable abdominal pain    Incarcerated incisional hernia    Disruption or dehiscence of closure of fascia, superficial or muscular    Intractable nausea and vomiting    Intractable vomiting    Brain mass    Altered mental status    Sepsis secondary to UTI ([address]    Cellulitis    Cellulitis of left lower extremity    Postoperative pain    Arthritis of left ankle        ASSESSMENT of Current Deficits Patient exhibits decreased strength, balance, and endurance impairing functional mobility, transfers, gait , gait distance, and tolerance to activity.  Pt needed supervision for all functional mobility due to being impulsive and trying to perform each task to fast. Cueing throughout tx session for decreased speed and safety. Pt able to maintain NWB through LLE during session. Pt able to perform all exercises with AROM. PHYSICAL THERAPY  PLAN OF CARE       Physical therapy plan of care is established based on physician order,  patient diagnosis and clinical assessment    Current Treatment Recommendations:    -Bed Mobility: Lower extremity exercises  and Trunk control activities   -Sitting Balance: Incorporate reaching activities to activate trunk muscles , Hands on support to maintain midline , Facilitate active trunk muscle engagement , Facilitate postural control in all planes , and Engage in core activities to allow for movement within base of support   -Standing Balance: Perform strengthening exercises in standing to promote motor control with or without upper extremity support , Instruct patient on adequate base of support to maintain balance, and Challenge balance utilizing reaching  activities beyond center of gravity    -Transfers: Provide instruction on proper hand and foot position for adequate transfer of weight onto lower extremities and use of gait device if needed, Cues for hand placement, technique and safety.  Provide stabilization to prevent fall , Facilitate weight shift forward on to lower extremities and provide necessary stabilization of bilateral lower extremities , Support transfer of weight on to lower extremities, and Assist with extension of knees trunk and hip to accept weight transfer   -Gait: Gait training, Standing activities to improve: base of support, weight shift, weight bearing , Exercises to improve trunk control, Exercises to improve hip and knee control, Performance of protected weight bearing activities, and Activities to increase weight bearing   -Endurance: Utilize Supervised activities to increase level of endurance to allow for safe functional mobility including transfers and gait  and Use graduated activities to promote good breathing techniques and provide support and education to maximize respiratory function  -Stairs: Stair training with instruction on proper technique and hand placement on rail    PT long term treatment goals are located in below grid    Patient and or family understand(s) diagnosis, prognosis, and plan of care. Frequency of treatments: Patient will be seen  dailyyy. Prior Level of Function: Patient ambulated with crutches   Rehab Potential: good - for baseline    Past medical history:   Past Medical History:   Diagnosis Date    Arthritis     Brain venous angioma (HCC)     Bursitis     hips    Cat scratch of left lower leg     Class 3 severe obesity due to excess calories with body mass index (BMI) of 40.0 to 44.9 in adult [facility])     COPD (chronic obstructive pulmonary disease) (HCC)     Diverticulitis     GERD (gastroesophageal reflux disease)     Incisional hernia with obstruction but no gangrene     Strep throat 2021     Past Surgical History:   Procedure Laterality Date    ANKLE FRACTURE SURGERY Left 2/24/2023    TTC FUSION LEFT ANKLE AND HARVEST OF GRAFT performed by Magda Bernstein DPM at [address] [facility]  2016    FOREARM FRACTURE SURGERY Left     fracture of ulna s/p repair    HERNIA REPAIR      HERNIA REPAIR  08/25/2015    incarcerated hernia repair    HERNIA REPAIR N/A 03/25/2021    INCISIONAL HERNIA REPAIR WITH MESH, POSSIBLE COMPONENT SEPARATION  LAPAROSCOPIC ROBOTIC XI ASSISTED (CPT L4464231) performed by Vicky Luis MD at [address] (CERVIX STATUS UNKNOWN)      NERVE BLOCK      sacroiliac bilateral 12-    PELVIC FRACTURE SURGERY      VENTRAL HERNIA REPAIR  08/25/2015       SUBJECTIVE:    Precautions: Up with assistance, falls and non weight bearing LLE      Social history: Patient lives with daughter, son, and grandchildren  in a [place] home  with 3 steps, bilateral rail  to enter home.   Tub shower       Equipment owned: Crutches,  cane    [address] - Inpatient   How much help is needed turning from your back to your side while in a flat bed without using bedrails?: A Little  How much help is needed moving from lying on your back to sitting on the side of a flat bed without using bedrails?: A Little  How much help is needed moving to and from a bed to a chair?: A Little  How much help is needed standing up from a chair using your arms?: A Little  How much help is needed walking in hospital room?: A Little  How much help is needed climbing 3-5 steps with a railing?: A Lot  AM-PAC Inpatient Mobility Raw Score : 17  AM-PAC Inpatient T-Scale Score : 42.13  Mobility Inpatient CMS 0-100% Score: 50.57  Mobility Inpatient CMS G-Code Modifier : CK    Nursing cleared patient for PT treatment. OBJECTIVE;   Initial Evaluation  Date: 2/25/2023 Treatment Date:  3/1/2023       Short Term/ Long Term   Goals   Was pt agreeable to Eval/treatment? Yes yes To be met in 3 days   Pain level   \"11\"/10  L ankle 11/10  Left ankle    Bed Mobility    Rolling: Supervision     Supine to sit: Supervision     Sit to supine: Supervision     Scooting: Supervision    Rolling: Supervision    Supine to sit: Supervision    Sit to supine: Not assessed    Scooting: Supervision     Rolling: Independent    Supine to sit:  Independent    Sit to supine: Independent    Scooting: Independent     Transfers Sit to stand: Minimal assist of 1  Sit to stand: Supervision  Cues for hand placement and safety     Sit to stand: Independent    Ambulation     2 x 15 feet using  wheeled walker with Minimal assist of 1   for walker control, walker approximation, balance, upright, weight shift, and safety 12 feet to the restroom; from the restroom to the chair 15 feet using  wheeled walker with Supervision    cues for NWB (non-weight bearing) weight bearing left lower extremity, decreased speed, safety, and pacing > 100 feet using  wheeled walker with Supervision     Stair negotiation: ascended and descended   Not assessed     3 steps, bilateral rail with Sally   ROM Within functional limits    Increase range of motion 10% of affected joints    Strength BUE:  refer to OT eval  RLE:  4+/5  LLE:  4/5  Increase strength in affected mm groups by 1/3 grade   Balance Sitting EOB:  good -  Dynamic Standing:  fair with [employer] Sitting EOB: good   Dynamic Standing: fair with wheeled walker   Sitting EOB:  good   Dynamic Standing: fair +     Patient is Alert & Oriented x person, place, time, and situation and follows directions    Sensation:  Patient  denies numbness/tingling   Edema:  no   Endurance: fair  -    Vitals: room air   Blood Pressure at rest  Blood Pressure during session    Heart Rate at rest  Heart Rate during session    SPO2 at rest %  SPO2 during session %     Patient education  Patient educated on role of Physical Therapy, risks of immobility, safety and plan of care, energy conservation,  importance of mobility while in hospital , purse lip breathing, ankle pumps, quad set and glut set for edema control, blood clot prevention, importance and purpose of adaptive device and adjusted to proper height for the patient. , safety , and stair training      Patient response to education:   Pt verbalized understanding Pt demonstrated skill Pt requires further education in this area   Yes Partial Yes      Treatment:  Patient practiced and was instructed/facilitated in the following treatment: Patient Sat edge of bed 5 minutes with Supervision  to increase dynamic sitting balance and activity tolerance. Pt stood, ambulated to the restroom, to the sink to wash her hands, and back to the chair. Pt performed seated exercises and I got her a foot stool to elevated her LLE. Therapeutic Exercises:  ankle pumps, heel raises, hip abduction/adduction, long arc quad, and seated marching,  x 20 - 25 reps.  AROM      At end of session, patient in chair with     call light and phone within reach,  all lines and tubes intact, nursing notified. Patient would benefit from continued skilled Physical Therapy to improve functional independence and quality of life. Patient's/ family goals   rehab    Time in 10:50  Time out 11:15    Total Treatment Time  25 minutes        CPT codes:    Therapeutic activities (87766)   15 minutes  1 unit(s)  Therapeutic exercises (09521)   10 minutes  1 unit(s)    Reji Torres  [facility]  LIC # [id number]

## 2023-03-01 NOTE — DISCHARGE INSTRUCTIONS
Your information:  Name: Gayatri Vázquez  : 1970    Your instructions:    Discharge to [facility]. Follow up with primary care provider and specialists as directed. Dressing change information:  - Posterior splint to LLE to remain clean, dry, and intact until follow up with podiatrist.  - PT/OT:NWB to LLE     What to do after you leave the hospital:    Recommended diet: diabetic diet    Recommended activity: NWB LLE        The following personal items were collected during your admission and were returned to you:    Belongings  Dental Appliances: None  Vision - Corrective Lenses: None  Hearing Aid: None  Clothing: Shirt, Pants, Footwear, Undergarments  Jewelry: None  Body Piercings Removed: N/A  Electronic Devices: Cell Phone,   Weapons (Notify Protective Services/Security): None  Other Valuables: Purse (patinet wants to keep at bedside)  Home Medications: None  Valuables Given To: Family (Comment)  Provide Name(s) of Who Valuable(s) Were Given To: patient  Responsible person(s) in the waiting room: no one here  Patient approves for provider to speak to responsible person post operatively: Yes    Information obtained by:  By signing below, I understand that if any problems occur once I leave the hospital I am to contact Dr. You Ching. I understand and acknowledge receipt of the instructions indicated above.

## 2023-03-01 NOTE — PLAN OF CARE
Problem: Discharge Planning  Goal: Discharge to home or other facility with appropriate resources  3/1/2023 1034 by James Wylie RN  Outcome: Progressing  2/28/2023 2226 by Radha Cuenca RN  Outcome: Progressing     Problem: Pain  Goal: Verbalizes/displays adequate comfort level or baseline comfort level  3/1/2023 1034 by James Wylie RN  Outcome: Progressing  2/28/2023 2226 by Radha Cuenca RN  Outcome: Progressing     Problem: Safety - Adult  Goal: Free from fall injury  3/1/2023 1034 by James Wylie RN  Outcome: Progressing  2/28/2023 2226 by Radha Cuenca RN  Outcome: Progressing     Problem: ABCDS Injury Assessment  Goal: Absence of physical injury  3/1/2023 1034 by James Wylie RN  Outcome: Progressing  2/28/2023 2226 by Radha Cuenca RN  Outcome: Progressing

## 2023-03-01 NOTE — PROGRESS NOTES
OCCUPATIONAL THERAPY BEDSIDE TREATMENT NOTE   [address] [facility]  [facility] Josue Combs. [place]    Date:3/1/2023  Patient Name: Caron Toribio  MRN: [id number]  : 1970  Room: [facility]0330-     Evaluating OT: Primo renetta OTR/L #[id number]      Referring Provider and Specific Provider Orders/Date:      23   OT eval and treat  Start:  23,   End:  23,   ONE TIME,   Standing Count:  1 Occurrences,   R        Order went unreviewed at transfer on  12:46 PM    Elroy Flores, DPM       Placement Recommendation: Subacute Rehab         Diagnosis:   1.  Osteoarthritis of left ankle, unspecified osteoarthritis type         Surgery: S/p TTC FUSION LEFT ANKLE AND HARVEST OF GRAFT 23 by Dr. Dong Acosta History:       Past Medical History           Past Medical History:   Diagnosis Date    Arthritis      Brain venous angioma ([address]      Bursitis       hips    Cat scratch of left lower leg      Class 3 severe obesity due to excess calories with body mass index (BMI) of 40.0 to 44.9 in adult [facility])      COPD (chronic obstructive pulmonary disease) (HCC)      Diverticulitis      GERD (gastroesophageal reflux disease)      Incisional hernia with obstruction but no gangrene      Strep throat             Past Surgical History             Past Surgical History:   Procedure Laterality Date    CARPAL TUNNEL RELEASE Left       SECTION        CHOLECYSTECTOMY        COLECTOMY   2016    FOREARM FRACTURE SURGERY Left       fracture of ulna s/p repair    HERNIA REPAIR        HERNIA REPAIR   2015     incarcerated hernia repair    HERNIA REPAIR N/A 2021     INCISIONAL HERNIA REPAIR WITH MESH, POSSIBLE COMPONENT SEPARATION  LAPAROSCOPIC ROBOTIC XI ASSISTED (CPT I6381909) performed by Rebecca Abreu MD at [address] (CERVIX STATUS UNKNOWN)        NERVE BLOCK         sacroiliac bilateral 12-    PELVIC FRACTURE SURGERY        VENTRAL HERNIA REPAIR   08/25/2015          Precautions:  Fall Risk, non-weight bearing left LE, left LE MARLENE drain       Assessment of current deficits    [x] Functional mobility            [x]ADLs           [x] Strength                   []Cognition    [x] Functional transfers          [x] IADLs          [x] Safety Awareness   [x]Endurance    [] Fine Coordination              [x] Balance      [] Vision/perception    []Sensation      []Gross Motor Coordination  [] ROM           [] Delirium                   [] Motor Control      OT PLAN OF CARE   OT POC based on physician orders, patient diagnosis and results of clinical assessment     Frequency/Duration 1-3 days/wk for 2 weeks PRN      Specific OT Treatment Interventions to include:   * Instruction/training on adapted ADL techniques and AE recommendations to increase functional independence within precautions       * Training on energy conservation strategies, correct breathing pattern and techniques to improve independence/tolerance for self-care routine  * Functional transfer/mobility training/DME recommendations for increased independence, safety, and fall prevention  * Patient/Family education to increase follow through with safety techniques and functional independence  * Recommendation of environmental modifications for increased safety with functional transfers/mobility and ADLs  * Therapeutic exercise to improve motor endurance, ROM, and functional strength for ADLs/functional transfers  * Therapeutic activities to facilitate/challenge dynamic balance, stand tolerance for increased safety and independence with ADLs  * Therapeutic activities to facilitate gross/fine motor skills for increased independence with ADLs  * Positioning to improve skin integrity, interaction with environment and functional independence     Recommended Adaptive Equipment: TBD       Home Living: Lives with daughter, single family home, 1 story with basement dwelling, 3 steps to enter with rail. Bathroom set-up:          Equipment owned: Crutches      Prior Level of Function: Independent with ADLs , Independent with IADLs; ambulated independently with crutches. Driving: N/A  Occupation: On disability       Pain Level: unquantified pain in left LE; Nursing aware; positioning provided       Cognition: A&O: 4/4; Follows 2-3 step directions              Memory: intact              Sequencing: fair               Problem solving: fair               Judgement/safety: fair/fair minus      [facility]   AM-PAC Daily Activity - Inpatient   How much help is needed for putting on and taking off regular lower body clothing?: A Little  How much help is needed for bathing (which includes washing, rinsing, drying)?: A Lot  How much help is needed for toileting (which includes using toilet, bedpan, or urinal)?: A Lot  How much help is needed for putting on and taking off regular upper body clothing?: A Little  How much help is needed for taking care of personal grooming?: A Little  How much help for eating meals?: A Little  AM-PAC Inpatient Daily Activity Raw Score: 16                Functional Assessment:     Initial Eval Status  Date: 2/24/23    Treatment Status  Date: 3/1/23 STGs = LTGs  Time frame: 10-14 days   Feeding Supervision     Independent  Independent    Grooming Supervision     Set up assist to wash face, manage containers and complete oral hygiene, as well as to brush hair when seated in chair Moderate Varnell    UB Dressing Minimal Assist    SBA to change shirts when seated in chair  Moderate Varnell    LB Dressing Stand by Assist   To don sock long-sitting in bed. Increased assist suspected for clothing mgmt upon standing and maintain WB restriction.      SBA to seng/doff sock seated EOB using cross over method ; pt able to doff/don shorts when standing with Min A with FWW d/t slight unsteadiness  Moderate Varnell    Bathing Moderate Assist     Pt able to wash UB and LE's when seated in chair (with exception of LLE as LLE is casted); assist to wash back; pt able to complete pericare and rear hygiene when standing with min A for balance and use of FWW   Moderate [place]    Toileting Moderate Assist     N/t  Moderate [place]    Bed Mobility  Supine to sit: Supervision   Sit to supine: Supervision   Scooting: Supervision   Rolling: Supervision      N/T; pt seated in chair at beginning and end of session Supine to sit: Independent   Sit to supine: Independent    Functional Transfers Not Assessed due to blood observed from patient's left heel/bandage. RN informed/aware. Sit to stand transfers to/from chair x 2 reps with min A with FWW Moderate [place] , non-weight bearing L LE    Functional Mobility Not Assessed  N/t  Moderate [place] with use of appropriate AD, non-weight bearing L LE       Balance Sitting:     Static: good    Dynamic: good  Standing: n/a     Sitting:  Static: independent   Dynamic: Supervision   Standing: fair/fair minus with MIn A for balance with FWW Sitting:     Static: good    Dynamic: good  Standing: good with AD    Activity Tolerance fair   Fair   Increase standing tolerance >3 minutes for improved engagement with functional transfers and indep in ADLs      Visual/  Perceptual WFL    NA          Comments: Patient cleared by nursing staff. Upon arrival pt seated in bedside chair. Pt agreeable to OT tx session. Pt educated with regards to  hand placement, safety awareness, static sitting balance,  standing balance, sit to stand transfer training, NWB on LLE,  ADL retraining,  grooming tasks, UE/LE bathing, LE/UE dressing,  ECT's. At end of session pt seated in chair with LLE elevated. Call light within reach. Overall, pt demonstrated decreased independence and safety during completion of ADL/functional transfers/mobility tasks.  Pt would benefit from continued skilled OT to increase safety and independence with completion of ADL/IADL tasks for functional independence and quality of life. Pt required cues and education as noted above for safe facilitation and completion of tasks. Therapist provided skilled monitoring of patient's response during treatment session. Prior to and at the end of session, environmental modifications  completed for patients safety and efficiency of treatment session. Overall, patient demonstrates minimal difficulties with completion of BADLs and IADLs. Factors contributing to these difficulties include decreased standing balance 2* to NWB on LLE,  decreased endurance, and generalized weakness. As noted above, patient likely to benefit from further OT intervention to increase independence, safety, and overall quality of life. Treatment:     Functional transfers: Facilitated transfers to/from chair  with cues for body alignment, safety and hand placement. ADL completion: Self-care retraining for the above-mentioned ADLs; training on proper hand placement, safety technique, sequencing, and energy conservation techniques. Postural Balance: Sitting/standing balance retraining to improve righting reactions with postural changes during ADLs. Skilled positioning: Proper positioning to improve interaction with environment, overall functioning and decrease edema in LLE     Pt has made fair progress towards set goals      OT 1-3 days/wk for 2 weeks PRN     Treatment Time also includes thorough review of current medical information, gathering information on past medical history/social history and prior level of function, informal observation of tasks, assessment of data and education on plan of care and goals.     Treatment Time In: 11:15 AM    Treatment Time Out: 11:38 AM           Treatment Charges: Mins Units   ADL/Home t     [address] [facility] 8 1   Ther Ex                 [id number]       Manual Therapy    [id number]     Neuro Re-ed         [id number]     Orthotic manage/training                               [id number]     Non Billable Time     Total Timed Treatment 23 [address] [facility], [address]

## 2023-03-01 NOTE — PROGRESS NOTES
Internal Medicine Progress Note    [facility]    Pham Parsons. Nicho Cavazos., F.A.CManoloO.I. Catherine Calderón D.O., MANAOKEON. Abeba Cabrales D.O. [place] Cifuentes, MSN, APRN, NP-C  Ronnette Scheuermann. Yovanny Noble, MSN, APRN-CNP     Primary Care Physician: Jose Hoffmann DO   Admitting Physician:  Ernestina Yates DO  Admission date and time: 2/24/2023  5:19 AM    Room:  [address] [place] [address]  Admitting diagnosis: Osteoarthritis of left ankle, unspecified osteoarthritis type [M19.072]  Postoperative pain [G89.18]  Arthritis of left ankle [M19.072]    Patient Name: Brandon Baeza  MRN: [id number]    Date of Service: 3/1/2023     Subjective:  Ismael Sandoval is a 46 y.o. female who was seen and examined today,3/1/2023, at the bedside. We continue to await precertification for transfer to the skilled nursing facility. She has no new complaints today. Review of System:   Constitutional:   Denies fever or chills, weight loss or gain, fatigue or malaise. HEENT:   Denies ear pain, sore throat, sinus or eye problems. Cardiovascular:   Denies any chest pain, irregular heartbeats, or palpitations. Respiratory:   Denies shortness of breath, coughing, sputum production, hemoptysis, or wheezing. Gastrointestinal:   Denies nausea, vomiting, diarrhea, or constipation. Denies any abdominal pain. Genitourinary:    Denies any urgency, frequency, hematuria. Voiding  without difficulty. Extremities:   Describes postoperative left foot pain as to be expected  Neurology:    Denies any headache or focal neurological deficits, Denies generalized weakness or memory difficulty. Psch:   Denies being anxious or depressed. Musculoskeletal:    Denies  myalgias, joint complaints or back pain. Integumentary:   Denies any rashes, ulcers, or excoriations. Denies bruising. Hematologic/Lymphatic:  Denies bruising or bleeding.     Physical Exam:  I/O this shift:  In: 600 [P.O.:600]  Out: -     Intake/Output Summary (Last 24 hours) at 3/1/2023 0640  Last data filed at 3/1/2023 0408  Gross per 24 hour   Intake 600 ml   Output --   Net 600 ml   I/O last 3 completed shifts: In: 540 [P.O.:540]  Out: 4550 [Urine:4550]  No data found. Vital Signs:   Blood pressure 123/74, pulse 60, temperature 98.1 °F (36.7 °C), temperature source Oral, resp. rate 17, height 5' 2\" (1.575 m), weight 229 lb (103.9 kg), SpO2 94 %. General appearance:  Alert, responsive, oriented to person, place, and time. Well preserved, alert, no distress. Head:  Normocephalic. No masses, lesions or tenderness. Eyes:  PERRLA. EOMI. Sclera clear. Buccal mucosa moist.  ENT:  Ears normal. Mucosa normal.  Neck:    Supple. Trachea midline. No thyromegaly. No JVD. No bruits. Heart:    Rhythm regular. Rate controlled. No murmurs. Lungs:    Symmetrical. Clear to auscultation bilaterally. No wheezes. No rhonchi. No rales. Abdomen:   Soft. Non-tender. Non-distended. Bowel sounds positive. No organomegaly or masses. No pain on palpation. Extremities:    Peripheral pulses present. Dressings in place to the left foot. Neurologic:    Alert x 3. No focal deficit. Cranial nerves grossly intact. No focal weakness. Psych:   Behavior is normal. Mood appears normal. Speech is not rapid and/or pressured. Musculoskeletal:   Spine ROM normal. Muscular strength intact. Gait not assessed. Integumentary:  No rashes  Skin normal color and texture.   Genitalia/Breast:  Deferred    Medication:  Scheduled Meds:   hydrOXYzine HCl  50 mg Oral Nightly    levothyroxine  50 mcg Oral Daily    budesonide  0.5 mg Nebulization BID    arformoterol tartrate  15 mcg Nebulization BID    sodium chloride flush  5-40 mL IntraVENous 2 times per day    levETIRAcetam  500 mg Oral BID    enoxaparin  30 mg SubCUTAneous BID     Continuous Infusions:   sodium chloride         Objective Data:  CBC with Differential:    Lab Results   Component Value Date/Time    WBC 5.7 03/01/2023 04:51 AM    RBC 3.98 03/01/2023 04:51 AM    HGB 11.8 03/01/2023 04:51 AM    HCT 38.3 03/01/2023 04:51 AM     03/01/2023 04:51 AM    MCV 96.2 03/01/2023 04:51 AM    MCH 29.6 03/01/2023 04:51 AM    MCHC 30.8 03/01/2023 04:51 AM    RDW 14.9 03/01/2023 04:51 AM    NRBC 0.9 11/14/2022 05:26 AM    SEGSPCT 82 09/14/2011 09:10 AM    METASPCT 1.7 11/14/2022 05:26 AM    LYMPHOPCT 21.8 03/01/2023 04:51 AM    MONOPCT 9.2 03/01/2023 04:51 AM    MYELOPCT 2.6 11/14/2022 05:26 AM    BASOPCT 0.3 03/01/2023 04:51 AM    MONOSABS 0.53 03/01/2023 04:51 AM    LYMPHSABS 1.25 03/01/2023 04:51 AM    EOSABS 0.20 03/01/2023 04:51 AM    BASOSABS 0.02 03/01/2023 04:51 AM     BMP:    Lab Results   Component Value Date/Time     03/01/2023 04:51 AM    K 4.8 03/01/2023 04:51 AM    K 4.4 02/22/2023 10:30 AM     03/01/2023 04:51 AM    CO2 25 03/01/2023 04:51 AM    BUN 30 03/01/2023 04:51 AM    LABALBU 3.5 03/01/2023 04:51 AM    CREATININE 0.8 03/01/2023 04:51 AM    CALCIUM 8.8 03/01/2023 04:51 AM    GFRAA >60 08/02/2021 04:46 AM    LABGLOM >60 03/01/2023 04:51 AM    GLUCOSE 104 03/01/2023 04:51 AM    GLUCOSE 89 09/14/2011 09:10 AM       Assessment:  Symptomatic osteoarthritis of the left ankle status post TTC fusion of the left ankle and [place] of graft in February 24, 2023 by Dr. Priya Jensen  Perioperative aspiration, suspected  Nonoxygen dependent COPD with exacerbation secondary to #2  Gastroesophageal reflux disease  Obesity secondary to excess caloric intake with BMI 41.88 kg meter squared  Inguinal adenopathy previously reported with plans for outpatient follow-up, unsure if followed through by patient    Plan:   Danna Rushing has been acceptable for discharge for several days. We continue to await the precertification process and this has been a very prolonged process resulting in a prolonged hospitalization. More than 50% of my time was spent at the bedside counseling/coordinating care with the patient and/or family with face to face contact.   This time was spent reviewing notes and laboratory data as well as instructing and counseling the patient. Time I spent with the family or surrogate(s) is included only if the patient was incapable of providing the necessary information or participating in medical decisions. I also discussed the differential diagnosis and all of the proposed management plans with the patient and individuals accompanying the patient. I am readily available for any further decision-making and intervention.        Sola Castellon DO, F.A.C.O.I.  3/1/2023  6:40 AM

## 2023-03-02 LAB
ALBUMIN SERPL-MCNC: 3.7 G/DL (ref 3.5–5.2)
ALP BLD-CCNC: 63 U/L (ref 35–104)
ALT SERPL-CCNC: 15 U/L (ref 0–32)
ANION GAP SERPL CALCULATED.3IONS-SCNC: 10 MMOL/L (ref 7–16)
AST SERPL-CCNC: 12 U/L (ref 0–31)
BASOPHILS ABSOLUTE: 0.03 E9/L (ref 0–0.2)
BASOPHILS RELATIVE PERCENT: 0.5 % (ref 0–2)
BILIRUB SERPL-MCNC: <0.2 MG/DL (ref 0–1.2)
BILIRUBIN DIRECT: <0.2 MG/DL (ref 0–0.3)
BILIRUBIN, INDIRECT: NORMAL MG/DL (ref 0–1)
BUN BLDV-MCNC: 28 MG/DL (ref 6–20)
CALCIUM SERPL-MCNC: 9.4 MG/DL (ref 8.6–10.2)
CHLORIDE BLD-SCNC: 104 MMOL/L (ref 98–107)
CO2: 25 MMOL/L (ref 22–29)
CREAT SERPL-MCNC: 0.9 MG/DL (ref 0.5–1)
EOSINOPHILS ABSOLUTE: 0.2 E9/L (ref 0.05–0.5)
EOSINOPHILS RELATIVE PERCENT: 3.1 % (ref 0–6)
GFR SERPL CREATININE-BSD FRML MDRD: >60 ML/MIN/1.73
GLUCOSE BLD-MCNC: 106 MG/DL (ref 74–99)
HCT VFR BLD CALC: 38.6 % (ref 34–48)
HEMOGLOBIN: 12.2 G/DL (ref 11.5–15.5)
IMMATURE GRANULOCYTES #: 0.06 E9/L
IMMATURE GRANULOCYTES %: 0.9 % (ref 0–5)
LYMPHOCYTES ABSOLUTE: 1.56 E9/L (ref 1.5–4)
LYMPHOCYTES RELATIVE PERCENT: 24 % (ref 20–42)
MAGNESIUM: 2.1 MG/DL (ref 1.6–2.6)
MCH RBC QN AUTO: 30 PG (ref 26–35)
MCHC RBC AUTO-ENTMCNC: 31.6 % (ref 32–34.5)
MCV RBC AUTO: 95.1 FL (ref 80–99.9)
MONOCYTES ABSOLUTE: 0.61 E9/L (ref 0.1–0.95)
MONOCYTES RELATIVE PERCENT: 9.4 % (ref 2–12)
NEUTROPHILS ABSOLUTE: 4.05 E9/L (ref 1.8–7.3)
NEUTROPHILS RELATIVE PERCENT: 62.1 % (ref 43–80)
PDW BLD-RTO: 14.8 FL (ref 11.5–15)
PHOSPHORUS: 4.9 MG/DL (ref 2.5–4.5)
PLATELET # BLD: 278 E9/L (ref 130–450)
PMV BLD AUTO: 9.7 FL (ref 7–12)
POTASSIUM SERPL-SCNC: 4.7 MMOL/L (ref 3.5–5)
RBC # BLD: 4.06 E12/L (ref 3.5–5.5)
SODIUM BLD-SCNC: 139 MMOL/L (ref 132–146)
TOTAL PROTEIN: 6.6 G/DL (ref 6.4–8.3)
WBC # BLD: 6.5 E9/L (ref 4.5–11.5)

## 2023-03-02 PROCEDURE — 6370000000 HC RX 637 (ALT 250 FOR IP): Performed by: NURSE PRACTITIONER

## 2023-03-02 PROCEDURE — 80076 HEPATIC FUNCTION PANEL: CPT

## 2023-03-02 PROCEDURE — 6360000002 HC RX W HCPCS: Performed by: NURSE PRACTITIONER

## 2023-03-02 PROCEDURE — 94640 AIRWAY INHALATION TREATMENT: CPT

## 2023-03-02 PROCEDURE — 97530 THERAPEUTIC ACTIVITIES: CPT

## 2023-03-02 PROCEDURE — 80048 BASIC METABOLIC PNL TOTAL CA: CPT

## 2023-03-02 PROCEDURE — 6370000000 HC RX 637 (ALT 250 FOR IP): Performed by: PODIATRIST

## 2023-03-02 PROCEDURE — 85025 COMPLETE CBC W/AUTO DIFF WBC: CPT

## 2023-03-02 PROCEDURE — 6370000000 HC RX 637 (ALT 250 FOR IP): Performed by: INTERNAL MEDICINE

## 2023-03-02 PROCEDURE — 97110 THERAPEUTIC EXERCISES: CPT

## 2023-03-02 PROCEDURE — 36415 COLL VENOUS BLD VENIPUNCTURE: CPT

## 2023-03-02 PROCEDURE — 1200000000 HC SEMI PRIVATE

## 2023-03-02 PROCEDURE — 84100 ASSAY OF PHOSPHORUS: CPT

## 2023-03-02 PROCEDURE — 83735 ASSAY OF MAGNESIUM: CPT

## 2023-03-02 RX ORDER — ZOLPIDEM TARTRATE 5 MG/1
5 TABLET ORAL NIGHTLY PRN
Status: DISCONTINUED | OUTPATIENT
Start: 2023-03-02 | End: 2023-03-03 | Stop reason: HOSPADM

## 2023-03-02 RX ADMIN — LEVOTHYROXINE SODIUM 50 MCG: 0.05 TABLET ORAL at 06:29

## 2023-03-02 RX ADMIN — HYDROCODONE BITARTRATE AND ACETAMINOPHEN 1 TABLET: 5; 325 TABLET ORAL at 20:29

## 2023-03-02 RX ADMIN — ARFORMOTEROL TARTRATE 15 MCG: 15 SOLUTION RESPIRATORY (INHALATION) at 16:48

## 2023-03-02 RX ADMIN — HYDROCODONE BITARTRATE AND ACETAMINOPHEN 1 TABLET: 5; 325 TABLET ORAL at 00:42

## 2023-03-02 RX ADMIN — HYDROXYZINE HYDROCHLORIDE 50 MG: 25 TABLET ORAL at 20:02

## 2023-03-02 RX ADMIN — ENOXAPARIN SODIUM 30 MG: 100 INJECTION SUBCUTANEOUS at 08:23

## 2023-03-02 RX ADMIN — ENOXAPARIN SODIUM 30 MG: 100 INJECTION SUBCUTANEOUS at 20:01

## 2023-03-02 RX ADMIN — ZOLPIDEM TARTRATE 5 MG: 5 TABLET ORAL at 21:22

## 2023-03-02 RX ADMIN — LEVETIRACETAM 500 MG: 500 TABLET, FILM COATED ORAL at 08:22

## 2023-03-02 RX ADMIN — HYDROCODONE BITARTRATE AND ACETAMINOPHEN 1 TABLET: 5; 325 TABLET ORAL at 16:24

## 2023-03-02 RX ADMIN — ARFORMOTEROL TARTRATE 15 MCG: 15 SOLUTION RESPIRATORY (INHALATION) at 06:59

## 2023-03-02 RX ADMIN — LEVETIRACETAM 500 MG: 500 TABLET, FILM COATED ORAL at 20:02

## 2023-03-02 RX ADMIN — HYDROCODONE BITARTRATE AND ACETAMINOPHEN 1 TABLET: 5; 325 TABLET ORAL at 10:29

## 2023-03-02 RX ADMIN — HYDROCODONE BITARTRATE AND ACETAMINOPHEN 1 TABLET: 5; 325 TABLET ORAL at 04:49

## 2023-03-02 RX ADMIN — BUDESONIDE 500 MCG: 0.5 SUSPENSION RESPIRATORY (INHALATION) at 16:48

## 2023-03-02 RX ADMIN — BUDESONIDE 500 MCG: 0.5 SUSPENSION RESPIRATORY (INHALATION) at 06:59

## 2023-03-02 ASSESSMENT — PAIN DESCRIPTION - ORIENTATION
ORIENTATION: LEFT

## 2023-03-02 ASSESSMENT — PAIN - FUNCTIONAL ASSESSMENT
PAIN_FUNCTIONAL_ASSESSMENT: PREVENTS OR INTERFERES SOME ACTIVE ACTIVITIES AND ADLS

## 2023-03-02 ASSESSMENT — PAIN DESCRIPTION - LOCATION
LOCATION: ANKLE;KNEE
LOCATION: ANKLE
LOCATION: ANKLE;FOOT
LOCATION: ANKLE

## 2023-03-02 ASSESSMENT — PAIN SCALES - GENERAL
PAINLEVEL_OUTOF10: 10
PAINLEVEL_OUTOF10: 3
PAINLEVEL_OUTOF10: 10
PAINLEVEL_OUTOF10: 5
PAINLEVEL_OUTOF10: 10

## 2023-03-02 ASSESSMENT — PAIN DESCRIPTION - DESCRIPTORS
DESCRIPTORS: ACHING;SORE
DESCRIPTORS: SORE;ACHING;DISCOMFORT
DESCRIPTORS: ACHING;SPASM;SHOOTING

## 2023-03-02 NOTE — PROGRESS NOTES
Physical Therapy  Physical Therapy Treatment Note/Plan of Care    Room #:  0330/0330-01  Patient Name: Armani Delacruz  YOB: 1970  MRN: [id number]    Date of Service: 3/2/2023     Tentative placement recommendation: Subacute unless patient meets goals [address] pending stair training  Equipment recommendation: Jhoana Loft, Bedside commode, and Tub transfer bench if d/jasmyn home    Evaluating Physical Therapist: Richa Dickson, PT, DPT #[id number]      Specific Provider Orders/Date/Referring Provider :     02/24/23 1300    PT eval and treat  Start:  02/24/23 1300,   End:  02/24/23 1300,   ONE TIME,   Standing Count:  1 Occurrences,   R         Marge Bautista APRN - CNP Acknowledge New     Admitting Diagnosis:   Osteoarthritis of left ankle, unspecified osteoarthritis type [M19.072]  Postoperative pain [G89.18]  Arthritis of left ankle [M19.072]      Surgery:  TTC Fusion L ankle 2/24/23  Visit Diagnoses         Codes    Osteoarthritis of left ankle, unspecified osteoarthritis type     M19.072            Patient Active Problem List   Diagnosis    GERD (gastroesophageal reflux disease)    Osteoarthritis cervical spine    Osteoarthritis of lumbar spine    Small bowel obstruction ([address]    Morbid obesity ([address]    Hypokalemia    Venous insufficiency    SBO (small bowel obstruction) ([address]    Incarcerated hernia    Intractable abdominal pain    Incarcerated incisional hernia    Disruption or dehiscence of closure of fascia, superficial or muscular    Intractable nausea and vomiting    Intractable vomiting    Brain mass    Altered mental status    Sepsis secondary to UTI ([address]    Cellulitis    Cellulitis of left lower extremity    Postoperative pain    Arthritis of left ankle        ASSESSMENT of Current Deficits Patient exhibits decreased strength, balance, and endurance impairing functional mobility, transfers, gait , gait distance, and tolerance to activity.  Patient independent for bed mobility today and supervision for ambulation. Patient needed one standing rest break due to fatigue. Will attempt stair training next session with crutches. Pt able to maintain NWB through LLE during session. PHYSICAL THERAPY  PLAN OF CARE       Physical therapy plan of care is established based on physician order,  patient diagnosis and clinical assessment    Current Treatment Recommendations:    -Bed Mobility: Lower extremity exercises  and Trunk control activities   -Sitting Balance: Incorporate reaching activities to activate trunk muscles , Hands on support to maintain midline , Facilitate active trunk muscle engagement , Facilitate postural control in all planes , and Engage in core activities to allow for movement within base of support   -Standing Balance: Perform strengthening exercises in standing to promote motor control with or without upper extremity support , Instruct patient on adequate base of support to maintain balance, and Challenge balance utilizing reaching  activities beyond center of gravity    -Transfers: Provide instruction on proper hand and foot position for adequate transfer of weight onto lower extremities and use of gait device if needed, Cues for hand placement, technique and safety.  Provide stabilization to prevent fall , Facilitate weight shift forward on to lower extremities and provide necessary stabilization of bilateral lower extremities , Support transfer of weight on to lower extremities, and Assist with extension of knees trunk and hip to accept weight transfer   -Gait: Gait training, Standing activities to improve: base of support, weight shift, weight bearing , Exercises to improve trunk control, Exercises to improve hip and knee control, Performance of protected weight bearing activities, and Activities to increase weight bearing   -Endurance: Utilize Supervised activities to increase level of endurance to allow for safe functional mobility including transfers and gait  and Use graduated activities to promote good breathing techniques and provide support and education to maximize respiratory function  -Stairs: Stair training with instruction on proper technique and hand placement on rail    PT long term treatment goals are located in below grid    Patient and or family understand(s) diagnosis, prognosis, and plan of care. Frequency of treatments: Patient will be seen  dailyyy. Prior Level of Function: Patient ambulated with crutches   Rehab Potential: good - for baseline    Past medical history:   Past Medical History:   Diagnosis Date    Arthritis     Brain venous angioma (HCC)     Bursitis     hips    Cat scratch of left lower leg     Class 3 severe obesity due to excess calories with body mass index (BMI) of 40.0 to 44.9 in [facility])     COPD (chronic obstructive pulmonary disease) (HCC)     Diverticulitis     GERD (gastroesophageal reflux disease)     Incisional hernia with obstruction but no gangrene     Strep throat 2021     Past Surgical History:   Procedure Laterality Date    ANKLE FRACTURE SURGERY Left 2/24/2023    TTC FUSION LEFT ANKLE AND HARVEST OF GRAFT performed by Sherly Leary DPM at [address] [place] [address]  2016    FOREARM FRACTURE SURGERY Left     fracture of ulna s/p repair    HERNIA REPAIR      HERNIA REPAIR  08/25/2015    incarcerated hernia repair    HERNIA REPAIR N/A 03/25/2021    INCISIONAL HERNIA REPAIR WITH MESH, POSSIBLE COMPONENT SEPARATION  LAPAROSCOPIC ROBOTIC XI ASSISTED (CPT [id number]) performed by Jayda Friedman MD at [address] [place] [address] (CERVIX STATUS UNKNOWN)      NERVE BLOCK      sacroiliac bilateral 12-    PELVIC FRACTURE SURGERY      VENTRAL HERNIA REPAIR  08/25/2015       SUBJECTIVE:    Precautions:  Up with assistance, falls and non weight bearing LLE      Social history: Patient lives with daughter, son, and grandchildren  in a ranch home  with 3 steps, bilateral rail  to enter home. Tub shower       Equipment owned: [place]hes,  cane    AM-PAC Basic Mobility       AM-PAC Basic Mobility - Inpatient   How much help is needed turning from your back to your side while in a flat bed without using bedrails?: None  How much help is needed moving from lying on your back to sitting on the side of a flat bed without using bedrails?: None  How much help is needed moving to and from a bed to a chair?: A Little  How much help is needed standing up from a chair using your arms?: A Little  How much help is needed walking in hospital room?: A Little  How much help is needed climbing 3-5 steps with a railing?: A Lot  AM-PAC Inpatient Mobility Raw Score : 19  AM-PAC Inpatient T-Scale Score : 45.44  Mobility Inpatient CMS 0-100% Score: 41.77  Mobility Inpatient CMS G-Code Modifier : CK    Nursing cleared patient for PT treatment. OBJECTIVE;   Initial Evaluation  Date: 2/25/2023 Treatment Date:  3/2/2023       Short Term/ Long Term   Goals   Was pt agreeable to Eval/treatment? Yes yes To be met in 3 days   Pain level   \"11\"/10  L ankle Pain in knee down to toes, no number assigned, however pain is \"high\"    Bed Mobility    Rolling: Supervision     Supine to sit: Supervision     Sit to supine: Supervision     Scooting: Supervision    Rolling: Independent   Supine to sit: Independent   Sit to supine: Independent   Scooting: Independent    Rolling: Independent    Supine to sit:  Independent    Sit to supine: Independent    Scooting: Independent     Transfers Sit to stand: Minimal assist of 1  Sit to stand: Supervision       Sit to stand: Independent    Ambulation     2 x 15 feet using  wheeled walker with Minimal assist of 1   for walker control, walker approximation, balance, upright, weight shift, and safety 60 feet, 40 feet using  wheeled walker with Supervision    cues for NWB (non-weight bearing) weight bearing left lower extremity, decreased speed, safety, and pacing     > 100 feet using  wheeled walker with Supervision     Stair negotiation: ascended and descended   Not assessed  Will attempt tomorrow with crutches   3 steps, bilateral rail with Sally   ROM Within functional limits    Increase range of motion 10% of affected joints    Strength BUE:  refer to OT markal  RLE:  4+/5  LLE:  4/5  Increase strength in affected mm groups by 1/3 grade   Balance Sitting EOB:  good -  Dynamic Standing:  fair with [employer] Sitting EOB: good   Dynamic Standing: fair with wheeled walker   Sitting EOB:  good   Dynamic Standing: fair +     Patient is Alert & Oriented x person, place, time, and situation and follows directions    Sensation:  Patient  denies numbness/tingling   Edema:  no   Endurance: fair      Vitals: room air   Blood Pressure at rest  Blood Pressure during session    Heart Rate at rest  Heart Rate during session    SPO2 at rest %  SPO2 during session %     Patient education  Patient educated on role of Physical Therapy, risks of immobility, safety and plan of care, energy conservation,  importance of mobility while in hospital , and safety      Patient response to education:   Pt verbalized understanding Pt demonstrated skill Pt requires further education in this area   Yes Partial Yes      Treatment:  Patient practiced and was instructed/facilitated in the following treatment: Patient transferred to edge of bed and stood to amb in hallway while maintaining NWB through L LE. Patient needed one standing rest. Patient returned to bedside. Therapeutic Exercises:  not performed      At end of session, patient in bed with     call light and phone within reach,  all lines and tubes intact, nursing notified. Patient would benefit from continued skilled Physical Therapy to improve functional independence and quality of life.          Patient's/ family goals   rehab    Time in 309  Time out 324    Total Treatment Time  15 minutes    CPT codes:  Therapeutic activities (92983)   15 minutes  1 unit(s)    Lorita Apley, [facility]  #[id number]

## 2023-03-02 NOTE — PROGRESS NOTES
Podiatry Progress Note  3/2/2023   Richard Marti     SUBJECTIVE: Patient is being seen s/p left TTC fusion(DOS:). States that her left foot is hurting. Awaiting DC to SNF.        Past Medical History:   Diagnosis Date    Arthritis     Brain venous angioma (HCC)     Bursitis     hips    Cat scratch of left lower leg     Class 3 severe obesity due to excess calories with body mass index (BMI) of 40.0 to 44.9 in adult [facility])     COPD (chronic obstructive pulmonary disease) (HCC)     Diverticulitis     GERD (gastroesophageal reflux disease)     Incisional hernia with obstruction but no gangrene     Strep throat         Past Surgical History:   Procedure Laterality Date    ANKLE FRACTURE SURGERY Left 2023    TTC FUSION LEFT ANKLE AND HARVEST OF GRAFT performed by Kenzie Kate DPM at [address] [facility]      FOREARM FRACTURE SURGERY Left     fracture of ulna s/p repair    HERNIA REPAIR      HERNIA REPAIR  2015    incarcerated hernia repair    HERNIA REPAIR N/A 2021    INCISIONAL HERNIA REPAIR WITH MESH, POSSIBLE COMPONENT SEPARATION  LAPAROSCOPIC ROBOTIC XI ASSISTED (CPT [id number]) performed by Teetee Garrison MD at [facility] (CERVIX STATUS UNKNOWN)      NERVE BLOCK      sacroiliac bilateral 2012    PELVIC FRACTURE SURGERY      VENTRAL HERNIA REPAIR  2015         Family History   Problem Relation Age of Onset    Osteoarthritis Other     Diabetes Other     Other Father         pancreatitis    Prostate Cancer Father     Stroke Brother         half brother    Cancer Paternal Grandfather         Social History     Tobacco Use    Smoking status: Former     Packs/day: 0.50     Years: 30.00     Pack years: 15.00     Types: Cigarettes     Quit date: 3/5/2021     Years since quittin.9    Smokeless tobacco: Never   Substance Use Topics    Alcohol use: Yes     Comment: socially        Prior to Admission medications    Medication Sig Start Date End Date Taking? Authorizing Provider   HYDROcodone-acetaminophen (NORCO) 5-325 MG per tablet Take 1 tablet by mouth every 4 hours as needed for Pain for up to 7 days. Intended supply: 7 days. Take lowest dose possible to manage pain Max Daily Amount: 6 tablets 2/27/23 3/6/23 Yes Elroy X Fahim, DPM   levothyroxine (SYNTHROID) 50 MCG tablet Take 1 tablet by mouth Daily 11/15/22   Laurie Clark DO   levETIRAcetam (KEPPRA) 500 MG tablet Take 1 tablet by mouth 2 times daily 8/2/21   Laurie Clark DO   hydrOXYzine (ATARAX) 50 MG tablet Take 50 mg by mouth nightly  1/27/21   Historical Provider, MD        Ibuprofen, Nsaids, and Morphine         OBJECTIVE:        Vitals:    03/02/23 0659   BP:    Pulse:    Resp:    Temp:    SpO2: 94%              EXAM:        Pt is AAOx3  Posterior splint intact to LLE. Patient able to actively range all digits. Digits 1-5 well perfused with no signs of venous congestion. Drain pulled 2/27 at bedside.      Current Facility-Administered Medications   Medication Dose Route Frequency Provider Last Rate Last Admin    HYDROcodone-acetaminophen (NORCO) 5-325 MG per tablet 1 tablet  1 tablet Oral Q4H PRN Elroy X Fahim, DPM   1 tablet at 03/02/23 0449    ketorolac (TORADOL) injection 30 mg  30 mg IntraMUSCular Q6H PRN Elroy X Fahim, DPM        hydrOXYzine HCl (ATARAX) tablet 50 mg  50 mg Oral Nightly Yee Mckeon APRN - CNP   50 mg at 03/01/23 2007    levothyroxine (SYNTHROID) tablet 50 mcg  50 mcg Oral Daily Yee Mckeon, APRN - CNP   50 mcg at 03/02/23 0629    albuterol (PROVENTIL) nebulizer solution 2.5 mg  2.5 mg Nebulization Q4H PRN Yee Mckeon APRN - CNP        budesonide (PULMICORT) nebulizer suspension 500 mcg  0.5 mg Nebulization BID Yee Mckeon APRN - CNP   500 mcg at 03/02/23 0877    hydrALAZINE (APRESOLINE) injection 5 mg  5 mg IntraVENous Q4H PRN Yee Mckeon APRN - CNP        magnesium sulfate 1000 mg in dextrose 5% 100 mL IVPB 1,000 mg IntraVENous PRN Lauren Fail, APRN - CNP        sodium phosphate 16.62 mmol in sodium chloride 0.9 % 250 mL IVPB  0.16 mmol/kg IntraVENous PRN Lauren Fail, APRN - CNP        Or    sodium phosphate 33.24 mmol in sodium chloride 0.9 % 250 mL IVPB  0.32 mmol/kg IntraVENous PRN Lauren Fail, APRN - CNP        potassium chloride (KLOR-CON M) extended release tablet 40 mEq  40 mEq Oral PRN Lauren Fail, APRN - CNP        Or    potassium bicarb-citric acid (EFFER-K) effervescent tablet 40 mEq  40 mEq Oral PRN Lauren Fail, APRN - CNP        Or    potassium chloride 10 mEq/100 mL IVPB (Peripheral Line)  10 mEq IntraVENous PRN Lauren Fail, APRN - CNP        arformoterol tartrate (BROVANA) nebulizer solution 15 mcg  15 mcg Nebulization BID Lauren Fail, APRN - CNP   15 mcg at 03/02/23 4522    sodium chloride flush 0.9 % injection 5-40 mL  5-40 mL IntraVENous 2 times per day Lauren Fail, APRN - CNP   10 mL at 02/26/23 2059    sodium chloride flush 0.9 % injection 5-40 mL  5-40 mL IntraVENous PRN Lauren Fail, APRN - CNP        0.9 % sodium chloride infusion   IntraVENous PRN Lauren Fail, APRN - CNP        ondansetron (ZOFRAN-ODT) disintegrating tablet 4 mg  4 mg Oral Q8H PRN Lauren Fail, APRN - CNP        Or    ondansetron (ZOFRAN) injection 4 mg  4 mg IntraVENous Q6H PRN Lauren Fail, APRN - CNP        polyethylene glycol (GLYCOLAX) packet 17 g  17 g Oral Daily PRN Lauren Fail, APRN - CNP        acetaminophen (TYLENOL) tablet 650 mg  650 mg Oral Q6H PRN Lauren Fail, APRN - CNP        Or    acetaminophen (TYLENOL) suppository 650 mg  650 mg Rectal Q6H PRN Lauren Fail, APRN - CNP        levETIRAcetam (KEPPRA) tablet 500 mg  500 mg Oral BID Lauren Fail, APRN - CNP   500 mg at 03/02/23 1686    enoxaparin Sodium (LOVENOX) injection 30 mg  30 mg SubCUTAneous BID Lauren Fail, APRN - CNP   30 mg at 03/02/23 8571        Lab Results   Component Value Date    WBC 6.5 03/02/2023    HCT 38.6 03/02/2023 HGB 12.2 03/02/2023     03/02/2023     03/02/2023    K 4.7 03/02/2023     03/02/2023    CO2 25 03/02/2023    BUN 28 (H) 03/02/2023    CREATININE 0.9 03/02/2023    GLUCOSE 106 (H) 03/02/2023    CRP 0.6 (H) 11/28/2022         Radiographs:    ASSESEMENT:  -S/P left TTC fusion(DOS:2/24)  -Obesity        PLAN:  - Examined and evaluated  - All labs, imaging, and charts reviewed  - Abx: ancef 2 gm x3   - Pain control: PO. Appreciate IM assistance  - Case management consulted for assistance with SNF placement. Pt has been accepted to [facility] Precert pending   - Posterior splint to LLE to remain clean, dry, and intact. Drain d/c on 2/27.   - PT/OT:NWB to LLE   - Stable for d/c from Podiatry standpoint once cleared by all teams on board   - Will continue to monitor while in house    Discussed with   Evangelista Velazquez DPM Pod [id number]  Fellowship-Trained Foot and Ankle Surgeon  Diplomate, [employer] Board [employer] Foot and Ankle Surgeons  [phone]       Thank you for involving podiatry in this patient's care.  Please do not hesitate to call with any questions, concerns, or new findings      Carrington Wilson DPM   3/2/2023   10:29 AM

## 2023-03-02 NOTE — PLAN OF CARE
Problem: Discharge Planning  Goal: Discharge to home or other facility with appropriate resources  3/1/2023 2039 by Izzy Palm LPN  Outcome: Progressing     Problem: Pain  Goal: Verbalizes/displays adequate comfort level or baseline comfort level  3/1/2023 2039 by Izzy Palm LPN  Outcome: Progressing     Problem: Safety - Adult  Goal: Free from fall injury  3/1/2023 2039 by Izzy Palm LPN  Outcome: Progressing     Problem: ABCDS Injury Assessment  Goal: Absence of physical injury  3/1/2023 2039 by Izzy Palm LPN  Outcome: Progressing

## 2023-03-02 NOTE — PROGRESS NOTES
Discharge instructions given. Patient verbalized understanding. Return to [facility] in 1 week.   Called/faxed orders to [facility] Progress Note    ANNALEE=[facility]    Ulises Nida. Octavio Mcclain, F.A.CManoloOManoloIManolo Dobbs D.O., SHILPI Joseph D.O. Sammi Moraes, MSN, APRN, NP-C  Winifred Hackett. Laura Jones, MSN, APRN-CNP     Primary Care Physician: Jaems Martinez DO   Admitting Physician:  Hans Franco DO  Admission date and time: 2/24/2023  5:19 AM    Room:  [address] [place] [address]  Admitting diagnosis: Osteoarthritis of left ankle, unspecified osteoarthritis type [M19.072]  Postoperative pain [G89.18]  Arthritis of left ankle [M19.072]    Patient Name: Marisol Gagnon  MRN: [id number]    Date of Service: 3/2/2023     Subjective:  Angelina Payton is a 46 y.o. female who was seen and examined today,3/2/2023, at the bedside. We continue to await precertification for transfer to the skilled nursing facility. Currently denies pain related to left foot/ankle. Tolerating diet. Review of System:   Constitutional:   Denies fever or chills, weight loss or gain, mildly fatigued. States not sleeping well. HEENT:   Denies ear pain, sore throat, sinus or eye problems. Cardiovascular:   Denies any chest pain, irregular heartbeats, or palpitations. Respiratory:   Denies shortness of breath, coughing, sputum production, hemoptysis, or wheezing. Gastrointestinal:   Denies nausea, vomiting, diarrhea, or constipation. Denies any abdominal pain. Genitourinary:    Denies any urgency, frequency, hematuria. Voiding  without difficulty. Extremities:   Currently denies left foot pain status post surgical procedure. Neurology:    Denies any headache or focal neurological deficits, Denies generalized weakness or memory difficulty. Psch:   Denies being anxious or depressed. Musculoskeletal:    Denies  myalgias, joint complaints or back pain. Integumentary:   Denies any rashes, ulcers, or excoriations. Denies bruising. Hematologic/Lymphatic:  Denies bruising or bleeding. Physical Exam:  I/O this shift:   In: 5 [P.O.:420]  Out: -     Intake/Output Summary (Last 24 hours) at 3/2/2023 1541  Last data filed at 3/2/2023 1343  Gross per 24 hour   Intake 420 ml   Output 500 ml   Net -80 ml   I/O last 3 completed shifts: In: 780 [P.O.:780]  Out: 1500 [Urine:1500]  No data found. Vital Signs:   Blood pressure 101/85, pulse 84, temperature 98.6 °F (37 °C), temperature source Oral, resp. rate 16, height 5' 2\" (1.575 m), weight 229 lb (103.9 kg), SpO2 94 %. General appearance:  Alert, responsive, oriented to person, place, and time. Well preserved, alert, no distress. Head:  Normocephalic. No masses, lesions or tenderness. Eyes:  PERRLA. EOMI. Sclera clear. Buccal mucosa moist.  ENT:  Ears normal. Mucosa normal.  Neck:    Supple. Trachea midline. No thyromegaly. No JVD. No bruits. Heart:    Rhythm regular. Rate controlled. No murmurs. Lungs:    Symmetrical. Clear to auscultation bilaterally. No wheezes. No rhonchi. No rales. Abdomen:   Soft. Non-tender. Non-distended. Bowel sounds positive. No organomegaly or masses. No pain on palpation. Extremities:    Peripheral pulses present. Surgical changes left lower extremity. Dressings in place. Neurologic:    Alert x 3. No focal deficit. Cranial nerves grossly intact. No focal weakness. Psych:   Behavior is normal. Mood appears normal. Speech is not rapid and/or pressured. Musculoskeletal:   Spine ROM normal. Muscular strength intact. Gait not assessed. Integumentary:  No rashes  Skin normal color and texture. Surgical changes left lower extremity.   Genitalia/Breast:  Deferred    Medication:  Scheduled Meds:   hydrOXYzine HCl  50 mg Oral Nightly    levothyroxine  50 mcg Oral Daily    budesonide  0.5 mg Nebulization BID    arformoterol tartrate  15 mcg Nebulization BID    levETIRAcetam  500 mg Oral BID    enoxaparin  30 mg SubCUTAneous BID     Continuous Infusions:   sodium chloride         Objective Data:  CBC with Differential:    Lab Results   Component Value Date/Time    WBC 6.5 03/02/2023 04:58 AM    RBC 4.06 03/02/2023 04:58 AM    HGB 12.2 03/02/2023 04:58 AM    HCT 38.6 03/02/2023 04:58 AM     03/02/2023 04:58 AM    MCV 95.1 03/02/2023 04:58 AM    MCH 30.0 03/02/2023 04:58 AM    MCHC 31.6 03/02/2023 04:58 AM    RDW 14.8 03/02/2023 04:58 AM    NRBC 0.9 11/14/2022 05:26 AM    SEGSPCT 82 09/14/2011 09:10 AM    METASPCT 1.7 11/14/2022 05:26 AM    LYMPHOPCT 24.0 03/02/2023 04:58 AM    MONOPCT 9.4 03/02/2023 04:58 AM    MYELOPCT 2.6 11/14/2022 05:26 AM    BASOPCT 0.5 03/02/2023 04:58 AM    MONOSABS 0.61 03/02/2023 04:58 AM    LYMPHSABS 1.56 03/02/2023 04:58 AM    EOSABS 0.20 03/02/2023 04:58 AM    BASOSABS 0.03 03/02/2023 04:58 AM     BMP:    Lab Results   Component Value Date/Time     03/02/2023 04:58 AM    K 4.7 03/02/2023 04:58 AM    K 4.4 02/22/2023 10:30 AM     03/02/2023 04:58 AM    CO2 25 03/02/2023 04:58 AM    BUN 28 03/02/2023 04:58 AM    LABALBU 3.7 03/02/2023 04:58 AM    CREATININE 0.9 03/02/2023 04:58 AM    CALCIUM 9.4 03/02/2023 04:58 AM    GFRAA >60 08/02/2021 04:46 AM    LABGLOM >60 03/02/2023 04:58 AM    GLUCOSE 106 03/02/2023 04:58 AM    GLUCOSE 89 09/14/2011 09:10 AM       Assessment:  Symptomatic osteoarthritis of the left ankle status post TTC fusion of the left ankle and [place] of graft in February 24, 2023 by Dr. Adam Madison  Perioperative aspiration, suspected  Nonoxygen dependent COPD with exacerbation secondary to #2  Gastroesophageal reflux disease  Obesity secondary to excess caloric intake with BMI 41.88 kg meter squared  Inguinal adenopathy previously reported with plans for outpatient follow-up, unsure if followed through by patient  Hypothyroidism      Plan:     Continue to work with the therapies. Medications reviewed/continued/adjusted. Lovenox for VTE prophylaxis  Continue nebulized respiratory therapies. Monitor pain and treat accordingly.  is assisting with discharge planning.   Patient has been accepted for discharge for several days. Continue await precertification which has resulted in prolonged hospitalization. More than 50% of my time was spent at the bedside counseling/coordinating care with the patient and/or family with face to face contact. This time was spent reviewing notes and laboratory data as well as instructing and counseling the patient. Time I spent with the family or surrogate(s) is included only if the patient was incapable of providing the necessary information or participating in medical decisions. I also discussed the differential diagnosis and all of the proposed management plans with the patient and individuals accompanying the patient. I am readily available for any further decision-making and intervention. The patient was seen, examined and then discussed with Dr. Sejal Wise. BOB Lui - CNP,  3/2/2023  3:41 PM        I saw and evaluated the patient. I agree with the findings and the plan of care as documented in Silverio Terrazas NP-C's  note.     Miracle Hansen DO, D.O., [facility]  4:26 PM  3/2/2023

## 2023-03-02 NOTE — CARE COORDINATION
3/02/2023: SS Note/Discharge plan:  Notified by Gwendolyn Chowdhury admissions for [facility] that PRE CERT is still PENDING, NO COVID TESTING NEEDED & liaison will arrange for their w/c van to transport pt, VIVEK and HENS completed, pt & nursing notified.  Electronically signed by MOON Muniz on 3/2/2023 at 12:25 PM

## 2023-03-02 NOTE — ADT AUTH CERT
Utilization Reviews       Musculoskeletal Surgery or Procedure GRG - Care Day 7 (3/2/2023) by Bozena Gonzalez RN       Review Status Review Entered   Completed 3/2/2023 1325       Created By   Bozena Gonzalez RN      Criteria Review      Care Day: 7 Care Date: 3/2/2023 Level of Care: Inpatient Floor    Guideline Day 1    Level Of Care    (X) OR to ICU or intermediate care    3/2/2023 1:25 PM EST by Safia Howe    Clinical Status    ( ) * Clinical Indications met    3/2/2023 1:25 PM EST by Jalyn Villafuerte MD Reviewed, recommends Inpatient    (X) * Procedure completed    3/2/2023 1:25 PM EST by Giselle Howe      s/p left TTC fusion(DOS:2/24). Interventions    (X) Inpatient interventions as needed    3/2/2023 1:25 PM EST by Giselle Howe      require the need for PO anxiolytics and frequent aerosols. Awaiting DC to SNF. * Milestone   Additional Notes   DATE:   3/2/23         Pertinent Updates:   s/p left TTC fusion(DOS:2/24). States that her left foot is hurting. Patient continues to require the need for PO anxiolytics and frequent aerosols. Awaiting DC to SNF. Vital Signs:    98.6 (37) 18 84 101/85 97 (Room air ) PAIN 10      Abnl/Pertinent Labs/Radiology/Diagnostic Studies:   BUN,BUNPL: 28 (H)   Glucose, Random: 106 (H)   Phosphorus: 4.9 (H)      Physical Exam:    Pt is AAOx3   Posterior splint intact to LLE. Patient able to actively range all digits. Digits 1-5 well perfused with no signs of venous congestion. Drain pulled today at bedside. MD Consults/ Assessments & Plans:    Podiatry   ASSESEMENT:   -S/P left TTC fusion(DOS:2/24)   - Obesity            PLAN:   - Examined and evaluated   - All labs, imaging, and charts reviewed   - Abx: ancef 2 gm x3    - Pain control: PO. Appreciate IM assistance   - Case management consulted for assistance with SNF placement. Pt has been accepted to [facility]  Precert pending    - Posterior splint to LLE to remain clean, dry, and intact. Drain d/c on 2/27.    - PT/OT:NWB to LLE    - Will continue to monitor while in house          Medications   arformoterol tartrate (BROVANA) nebulizer solution 15 mcg   Freq: 2 TIMES DAILY Route: NEBULIZATION      budesonide (PULMICORT) nebulizer suspension 500 mcg   Freq: 2 TIMES DAILY Route: NEBULIZATION      enoxaparin Sodium (LOVENOX) injection 30 mg   Freq: 2 TIMES DAILY Route: SC      hydrOXYzine HCl (ATARAX) tablet 50 mg   Freq: NIGHTLY Route: PO      levETIRAcetam (KEPPRA) tablet 500 mg   Freq: 2 TIMES DAILY Route: PO      HYDROcodone-acetaminophen (NORCO) 5-325 MG per tablet 1 tabletX3   Freq: EVERY 4 HOURS PRN Route: PO         Order   ADULT DIET; Regular; 4 carb choices (60 gm/meal)    Elevate Head of Bed        PT/OT/SLP/CM Assessments or Notes   SS:   Notified by Makayla Lopez admissions for [facility]. that PRE CERT is still PENDING, NO COVID TESTING NEEDED & liaison will arrange for their w/c [employer] to transport pt, VIVEK and HENS completed, pt & nursing notified              Musculoskeletal Surgery or Procedure GRG - Care Day 5 (2/28/2023) by Zaheer Quarles RN       Review Status Review Entered   Completed 3/2/2023 1317       Created By   Zaheer Quarles RN      Criteria Review      Care Day: 5 Care Date: 2/28/2023 Level of Care: Inpatient Floor    Guideline Day 1    Level Of Care    (X) OR to ICU or intermediate care    3/2/2023 1:17 PM EST by Giselle Gonzales    Clinical Status    ( ) * Clinical Indications met    3/2/2023 1:17 PM EST by Pete Tolbert MD Reviewed, recommends Inpatient    (X) * Procedure completed    3/2/2023 1:17 PM EST by Giselle Gonzales      S/P left TTC fusion    Interventions    (X) Inpatient interventions as needed    3/2/2023 1:17 PM EST by Eli Gonzales      Patient continues to require the need for PO anxiolytics, po prn pain meds  and frequent aerosols.     * Milestone   Additional Notes   DATE:   2/28/23         Pertinent Updates:   Patient is being seen s/p left TTC fusion(DOS:2/24). Endorses pain to her left foot; receiving norco    Patient continues to require the need for PO anxiolytics and frequent aerosols. Vital Signs:   97.6 (36.4) 18 83 128/72  98  (Room air) 10       Abnl/Pertinent Labs/Radiology/Diagnostic Studies:   Glucose, Random: 104 (H)   Total Protein: 6.1 (L)   Albumin: 3.3 (L)   Total Protein: 6.1 (L)   Glucose, Random: 104 (H)   Hemoglobin Quant: 11.4 (L)   Lymphocyte %: 19.8 (L)   Lymphocytes Absolute: 1.14 (L)         Physical Exam:    Pt is AAOx3   Posterior splint intact to LLE. Patient able to actively range all digits. Digits 1-5 well perfused with no signs of venous congestion. Drain pulled today at bedside. MD Consults/ Assessments & Plans:    Podiatry   ASSESEMENT:   -S/P left TTC fusion(DOS:2/24)   - Obesity            PLAN:   - Examined and evaluated   - All labs, imaging, and charts reviewed   - Abx: ancef 2 gm x3    - Pain control: PO. Appreciate IM assistance   - Case management consulted for assistance with SNF placement. Pt has been accepted to [facility].   - Posterior splint to LLE to remain clean, dry, and intact. Drain d/c on 2/27.    - PT/OT:NWB to LLE    - Stable for d/c from Podiatry standpoint once cleared by all teams on board    - Will continue to monitor while in house       IM:   A/P   1. Symptomatic osteoarthritis of the left ankle status post TTC fusion of the left ankle and [place] of graft    2. Perioperative aspiration, suspected   3. Nonoxygen dependent COPD with exacerbation secondary to #2   4. Gastroesophageal reflux disease   5. Obesity secondary to excess caloric intake with BMI 41.88 kg meter squared   6. Inguinal adenopathy previously reported with plans for outpatient follow-up, unsure if followed through by patient    At this time the patient is without objective evidence of an acute process requiring continuing hospitalization or inpatient management.   They are stable for discharge with outpatient follow-up. Medications   arformoterol tartrate (BROVANA) nebulizer solution 15 mcg   Freq: 2 TIMES DAILY Route: NEBULIZATION      budesonide (PULMICORT) nebulizer suspension 500 mcg   Freq: 2 TIMES DAILY Route: NEBULIZATION      enoxaparin Sodium (LOVENOX) injection 30 mg   Freq: 2 TIMES DAILY Route: SC      hydrOXYzine HCl (ATARAX) tablet 50 mg   Freq: NIGHTLY Route: PO      levETIRAcetam (KEPPRA) tablet 500 mg   Freq: 2 TIMES DAILY Route: PO      HYDROcodone-acetaminophen (NORCO) 5-325 MG per tablet 1 tabletX5   Freq: EVERY 4 HOURS PRN Route: PO         Order   ADULT DIET; Regular; 4 carb choices (60 gm/meal)    Elevate Head of Bed        PT/OT/SLP/CM Assessments or Notes   PT: AM PAC 17   OT: AMPAC 15   Pt educated with regards to bed mobility, hand placement, static/dynamic sitting balance, LE dressing techniques, DME, NWB precautions   Wheeled Walker, Bedside commode, and Tub transfer bench if d/jasmyn home   Patient exhibits decreased strength, balance, and endurance impairing functional mobility, transfers, gait , gait distance, and tolerance to activity. Pt needed supervision for all functional mobility due to being impulsive and trying to perform each task to fast. Cueing throughout tx session for decreased speed and safety. Pt able to maintain NWB through LLE during session. Pt agreeable to seated exercises following function with VC for technique. SS:   Notified  admissions for [facility] that PRE CERT is PENDING but aware of pt medically stable for discharge and will notify sw or nurses station if they receive a late authorization to confirm pt's transfer, NO COVID TESTING NEEDED & liaison will arrange for their w/c van to transport pt, VIVEK and HENS completed, pt & nursing notified.

## 2023-03-02 NOTE — PROGRESS NOTES
OCCUPATIONAL THERAPY BEDSIDE TREATMENT NOTE   [address] [facility]  [place]. [place]    Date:3/2/2023  Patient Name: Astrid Esqueda  MRN: [id number]  : 1970  Room: 0330330-01     Evaluating OT: Meg Cleaning OTR/L #[id number]      Referring Provider and Specific Provider Orders/Date:      23   OT eval and treat  Start:  23,   End:  23,   ONE TIME,   Standing Count:  1 Occurrences,   R        Order went unreviewed at transfer on  12:46 PM    Elroy Licea, KAYODE       Placement Recommendation: Subacute Rehab         Diagnosis:   1.  Osteoarthritis of left ankle, unspecified osteoarthritis type         Surgery: S/p TTC FUSION LEFT ANKLE AND HARVEST OF GRAFT 23 by Dr. Margo Mckoy History:       Past Medical History           Past Medical History:   Diagnosis Date    Arthritis      Brain venous angioma ([address]      Bursitis       hips    Cat scratch of left lower leg      Class 3 severe obesity due to excess calories with body mass index (BMI) of 40.0 to 44.9 in adult [facility])      COPD (chronic obstructive pulmonary disease) (HCC)      Diverticulitis      GERD (gastroesophageal reflux disease)      Incisional hernia with obstruction but no gangrene      Strep throat             Past Surgical History             Past Surgical History:   Procedure Laterality Date    CARPAL TUNNEL RELEASE Left       SECTION        CHOLECYSTECTOMY        COLECTOMY   2016    FOREARM FRACTURE SURGERY Left       fracture of ulna s/p repair    HERNIA REPAIR        HERNIA REPAIR   2015     incarcerated hernia repair    HERNIA REPAIR N/A 2021     INCISIONAL HERNIA REPAIR WITH MESH, POSSIBLE COMPONENT SEPARATION  LAPAROSCOPIC ROBOTIC XI ASSISTED (CPT G444075) performed by Eunice Ching MD at [address] [facility] (CERVIX STATUS UNKNOWN)        NERVE BLOCK         sacroiliac bilateral 12-    PELVIC FRACTURE SURGERY        VENTRAL HERNIA REPAIR   08/25/2015          Precautions:  Fall Risk, non-weight bearing left LE,     Assessment of current deficits    [x] Functional mobility            [x]ADLs           [x] Strength                   []Cognition    [x] Functional transfers          [x] IADLs          [x] Safety Awareness   [x]Endurance    [] Fine Coordination              [x] Balance      [] Vision/perception    []Sensation      []Gross Motor Coordination  [] ROM           [] Delirium                   [] Motor Control      OT PLAN OF CARE   OT POC based on physician orders, patient diagnosis and results of clinical assessment     Frequency/Duration 1-3 days/wk for 2 weeks PRN      Specific OT Treatment Interventions to include:   * Instruction/training on adapted ADL techniques and AE recommendations to increase functional independence within precautions       * Training on energy conservation strategies, correct breathing pattern and techniques to improve independence/tolerance for self-care routine  * Functional transfer/mobility training/DME recommendations for increased independence, safety, and fall prevention  * Patient/Family education to increase follow through with safety techniques and functional independence  * Recommendation of environmental modifications for increased safety with functional transfers/mobility and ADLs  * Therapeutic exercise to improve motor endurance, ROM, and functional strength for ADLs/functional transfers  * Therapeutic activities to facilitate/challenge dynamic balance, stand tolerance for increased safety and independence with ADLs  * Therapeutic activities to facilitate gross/fine motor skills for increased independence with ADLs  * Positioning to improve skin integrity, interaction with environment and functional independence     Recommended Adaptive Equipment: TBD       Home Living: Lives with daughter, single family home, 1 story with basement dwelling, 3 steps to enter with rail. Bathroom set-up:          Equipment owned: Crutches      Prior Level of Function: Independent with ADLs , Independent with IADLs; ambulated independently with crutches. Driving: N/A  Occupation: On disability       Pain Level: unquantified pain in left LE (pt states her L hip hurts and stretching L hip during mobility); Nursing aware; positioning provided       Cognition: A&O: 4/4; Follows 2-3 step directions              Memory: intact              Sequencing: fair               Problem solving: fair               Judgement/safety: fair/fair minus      [facility]   AM-PAC Daily Activity - Inpatient   How much help is needed for putting on and taking off regular lower body clothing?: A Little  How much help is needed for bathing (which includes washing, rinsing, drying)?: A Lot  How much help is needed for toileting (which includes using toilet, bedpan, or urinal)?: A Lot  How much help is needed for putting on and taking off regular upper body clothing?: A Little  How much help is needed for taking care of personal grooming?: A Little  How much help for eating meals?: A Little  [facility] Raw Score: 16                Functional Assessment:     Initial Eval Status  Date: 2/24/23    Treatment Status  Date: 3/2/23 STGs = LTGs  Time frame: 10-14 days   Feeding Supervision     Independent to manage packages and to bring food to mouth Independent    Grooming Supervision     N/T - pt reports completing grooming tasks earlier in day Moderate [place]    UB Dressing Minimal Assist    N/T; pt changed shirt earlier in day Moderate [place]    LB Dressing Stand by Assist   To don sock long-sitting in bed. Increased assist suspected for clothing mgmt upon standing and maintain WB restriction.      SBA to don sock when seated EOB using cross over method  Moderate [place]    Bathing Moderate Assist     N/T; pt reports completing bathing task earlier in day Moderate [place]    Toileting Moderate Assist     N/t  Moderate [place]    Bed Mobility  Supine to sit: Supervision   Sit to supine: Supervision   Scooting: Supervision   Rolling: Supervision      Supervision for supine to sit; supervision for scooting; sit to supine at supervision; pt able to support LLE to/from EOB Supine to sit: Independent   Sit to supine: Independent    Functional Transfers Not Assessed due to blood observed from patient's left heel/bandage. RN informed/aware. Sit to stand transfers to/from EOB  with min A with FWW Moderate [place] , non-weight bearing L LE    Functional Mobility Not Assessed  Min A for household distances in room and hallway with FWW; good follow through with NWB, however pt required seated RB  Moderate [place] with use of appropriate AD, non-weight bearing L LE       Balance Sitting:     Static: good    Dynamic: good  Standing: n/a     Sitting:  Static: independent   Dynamic: Supervision   Standing: fair/fair minus with MIn A for balance with FWW Sitting:     Static: good    Dynamic: good  Standing: good with AD    Activity Tolerance fair   Fair   Increase standing tolerance >3 minutes for improved engagement with functional transfers and indep in ADLs      Visual/  Perceptual WFL    NA        Hand Dominance:        AROM (PROM) Strength Additional Info:  Goal:   RUE  WFL 4-/5 good  and wfl FMC/dexterity noted during ADL tasks    Improve overall RUE strength  for participation in functional tasks   LUE WFL 4-/5 good  and wfl FMC/dexterity noted during ADL tasks    Improve overall LUE strength  for participation in functional tasks    - BUE strengthening exercises: 15-20 reps in all planes of movement with mod resist theraband to increase/maintain strength required for functional transfers/ADL participation. Exercises completed in shoulder and elbow flexion/extension, internal/external rotation and abduction/adduction.   Min rest breaks provided 2* to decreased endurance. Ex's completed when pt seated in bed    Hearing:  WFL   Sensation:   No c/o numbness or tingling  Tone:  WFL   Edema: yes - LLE; casted    Comments: Patient cleared by nursing staff. Upon arrival pt seated in bed with HOB elevated. Pt agreeable to OT tx session. Pt educated with regards to  bed mobility, hand placement, safety awareness, static sitting balance,  standing balance, sit to stand transfer training, functional mobility,   NWB on LLE,  B UE strengthening ex's,   ECT's. At end of session pt seated in bed with HOB elevated. Call light within reach. Overall, pt demonstrated decreased independence and safety during completion of ADL/functional transfers/mobility tasks. Pt would benefit from continued skilled OT to increase safety and independence with completion of ADL/IADL tasks for functional independence and quality of life. Pt required cues and education as noted above for safe facilitation and completion of tasks. Therapist provided skilled monitoring of patient's response during treatment session. Prior to and at the end of session, environmental modifications  completed for patients safety and efficiency of treatment session. Overall, patient demonstrates minimal difficulties with completion of BADLs and IADLs. Factors contributing to these difficulties include decreased  balance and fatigue/pain in LLE, NWB on LLE,  decreased endurance, and generalized weakness. As noted above, patient likely to benefit from further OT intervention to increase independence, safety, and overall quality of life. Treatment:     Bed mobility: Facilitated bed mobility with cues for proper body mechanics and sequencing to prepare for ADL completion. Functional transfers: Facilitated transfers to/from various surfaces with cues for body alignment, safety and hand placement.  Good follow through with NWB in LLE; use of FWW  Postural Balance: Sitting/standing balance retraining to improve righting reactions with postural changes during ADLs. Skilled positioning: Proper positioning to improve interaction with environment, overall functioning and decrease/prevent edema   Therapeutic Ex's: To increase B UE strength/endurance required for functional transfers and ADL participation. Pt has made fair progress towards set goals      OT 1-3 days/wk for 2 weeks PRN     Treatment Time also includes thorough review of current medical information, gathering information on past medical history/social history and prior level of function, informal observation of tasks, assessment of data and education on plan of care and goals.     Treatment Time In: 2:36 PM    Treatment Time Out: 3:04 PM           Treatment Charges: Mins Units   ADL/Home Mgt     [id number]     Thera Activities     [id number] 41 1   Ther Ex                 [id number]  10 1   Manual Therapy    [id number]     Neuro Re-ed         01842     Orthotic manage/training                               94272     Non Billable Time     Total Timed Treatment 28 [address] [facility], [place]/ #[id number]

## 2023-03-03 VITALS
OXYGEN SATURATION: 96 % | SYSTOLIC BLOOD PRESSURE: 108 MMHG | WEIGHT: 229 LBS | TEMPERATURE: 98.5 F | BODY MASS INDEX: 42.14 KG/M2 | HEART RATE: 65 BPM | DIASTOLIC BLOOD PRESSURE: 78 MMHG | RESPIRATION RATE: 18 BRPM | HEIGHT: 62 IN

## 2023-03-03 LAB
ALBUMIN SERPL-MCNC: 3.5 G/DL (ref 3.5–5.2)
ALP BLD-CCNC: 65 U/L (ref 35–104)
ALT SERPL-CCNC: 14 U/L (ref 0–32)
ANION GAP SERPL CALCULATED.3IONS-SCNC: 10 MMOL/L (ref 7–16)
AST SERPL-CCNC: 13 U/L (ref 0–31)
BASOPHILS ABSOLUTE: 0.03 E9/L (ref 0–0.2)
BASOPHILS RELATIVE PERCENT: 0.5 % (ref 0–2)
BILIRUB SERPL-MCNC: <0.2 MG/DL (ref 0–1.2)
BILIRUBIN DIRECT: <0.2 MG/DL (ref 0–0.3)
BILIRUBIN, INDIRECT: NORMAL MG/DL (ref 0–1)
BUN BLDV-MCNC: 23 MG/DL (ref 6–20)
CALCIUM SERPL-MCNC: 8.8 MG/DL (ref 8.6–10.2)
CHLORIDE BLD-SCNC: 104 MMOL/L (ref 98–107)
CO2: 24 MMOL/L (ref 22–29)
CREAT SERPL-MCNC: 0.8 MG/DL (ref 0.5–1)
EOSINOPHILS ABSOLUTE: 0.23 E9/L (ref 0.05–0.5)
EOSINOPHILS RELATIVE PERCENT: 3.8 % (ref 0–6)
GFR SERPL CREATININE-BSD FRML MDRD: >60 ML/MIN/1.73
GLUCOSE BLD-MCNC: 108 MG/DL (ref 74–99)
HCT VFR BLD CALC: 38.5 % (ref 34–48)
HEMOGLOBIN: 12 G/DL (ref 11.5–15.5)
IMMATURE GRANULOCYTES #: 0.08 E9/L
IMMATURE GRANULOCYTES %: 1.3 % (ref 0–5)
LYMPHOCYTES ABSOLUTE: 1.27 E9/L (ref 1.5–4)
LYMPHOCYTES RELATIVE PERCENT: 21 % (ref 20–42)
MAGNESIUM: 2.2 MG/DL (ref 1.6–2.6)
MCH RBC QN AUTO: 29.5 PG (ref 26–35)
MCHC RBC AUTO-ENTMCNC: 31.2 % (ref 32–34.5)
MCV RBC AUTO: 94.6 FL (ref 80–99.9)
MONOCYTES ABSOLUTE: 0.67 E9/L (ref 0.1–0.95)
MONOCYTES RELATIVE PERCENT: 11.1 % (ref 2–12)
NEUTROPHILS ABSOLUTE: 3.76 E9/L (ref 1.8–7.3)
NEUTROPHILS RELATIVE PERCENT: 62.3 % (ref 43–80)
PDW BLD-RTO: 14.7 FL (ref 11.5–15)
PHOSPHORUS: 4.2 MG/DL (ref 2.5–4.5)
PLATELET # BLD: 271 E9/L (ref 130–450)
PMV BLD AUTO: 9.5 FL (ref 7–12)
POTASSIUM SERPL-SCNC: 4.4 MMOL/L (ref 3.5–5)
RBC # BLD: 4.07 E12/L (ref 3.5–5.5)
SODIUM BLD-SCNC: 138 MMOL/L (ref 132–146)
TOTAL PROTEIN: 6.4 G/DL (ref 6.4–8.3)
WBC # BLD: 6 E9/L (ref 4.5–11.5)

## 2023-03-03 PROCEDURE — 84100 ASSAY OF PHOSPHORUS: CPT

## 2023-03-03 PROCEDURE — 83735 ASSAY OF MAGNESIUM: CPT

## 2023-03-03 PROCEDURE — 6370000000 HC RX 637 (ALT 250 FOR IP): Performed by: NURSE PRACTITIONER

## 2023-03-03 PROCEDURE — 6370000000 HC RX 637 (ALT 250 FOR IP): Performed by: PODIATRIST

## 2023-03-03 PROCEDURE — 6360000002 HC RX W HCPCS: Performed by: NURSE PRACTITIONER

## 2023-03-03 PROCEDURE — 80048 BASIC METABOLIC PNL TOTAL CA: CPT

## 2023-03-03 PROCEDURE — 36415 COLL VENOUS BLD VENIPUNCTURE: CPT

## 2023-03-03 PROCEDURE — 85025 COMPLETE CBC W/AUTO DIFF WBC: CPT

## 2023-03-03 PROCEDURE — 80076 HEPATIC FUNCTION PANEL: CPT

## 2023-03-03 PROCEDURE — 94640 AIRWAY INHALATION TREATMENT: CPT

## 2023-03-03 RX ADMIN — BUDESONIDE 500 MCG: 0.5 SUSPENSION RESPIRATORY (INHALATION) at 06:29

## 2023-03-03 RX ADMIN — HYDROCODONE BITARTRATE AND ACETAMINOPHEN 1 TABLET: 5; 325 TABLET ORAL at 09:21

## 2023-03-03 RX ADMIN — ENOXAPARIN SODIUM 30 MG: 100 INJECTION SUBCUTANEOUS at 09:22

## 2023-03-03 RX ADMIN — LEVETIRACETAM 500 MG: 500 TABLET, FILM COATED ORAL at 09:22

## 2023-03-03 RX ADMIN — ARFORMOTEROL TARTRATE 15 MCG: 15 SOLUTION RESPIRATORY (INHALATION) at 06:29

## 2023-03-03 RX ADMIN — LEVOTHYROXINE SODIUM 50 MCG: 0.05 TABLET ORAL at 05:42

## 2023-03-03 RX ADMIN — HYDROCODONE BITARTRATE AND ACETAMINOPHEN 1 TABLET: 5; 325 TABLET ORAL at 04:20

## 2023-03-03 ASSESSMENT — PAIN DESCRIPTION - LOCATION
LOCATION: FOOT
LOCATION: ANKLE;KNEE

## 2023-03-03 ASSESSMENT — PAIN SCALES - GENERAL
PAINLEVEL_OUTOF10: 3
PAINLEVEL_OUTOF10: 8
PAINLEVEL_OUTOF10: 10

## 2023-03-03 ASSESSMENT — PAIN DESCRIPTION - DESCRIPTORS
DESCRIPTORS: TENDER;SORE;DISCOMFORT
DESCRIPTORS: ACHING;SORE;DISCOMFORT

## 2023-03-03 ASSESSMENT — PAIN DESCRIPTION - ORIENTATION
ORIENTATION: LEFT
ORIENTATION: LEFT

## 2023-03-03 ASSESSMENT — PAIN DESCRIPTION - PAIN TYPE: TYPE: SURGICAL PAIN

## 2023-03-03 ASSESSMENT — PAIN - FUNCTIONAL ASSESSMENT: PAIN_FUNCTIONAL_ASSESSMENT: PREVENTS OR INTERFERES SOME ACTIVE ACTIVITIES AND ADLS

## 2023-03-03 NOTE — CARE COORDINATION
3/03/2023: SS Note/Discharge plan:  Notified by Lara Donis admissions for [facility] that they received PRE CERT for [facility]ER[facility] admission and will send their w/c Clayton Devoid to pick pt up as soon as possible for transfer today 3/3, pt notified and will inform her daughter, nursing informed.  Electronically signed by MOON Aguirre on 3/3/2023 at 11:59 AM

## 2023-03-03 NOTE — PROGRESS NOTES
Comprehensive Nutrition Assessment    Type and Reason for Visit:  Initial, RD Nutrition Re-Screen/LOS (LOS)    Nutrition Recommendations/Plan:   Begin Gelatein 20 BID; good oral intakes %; will continue to monitor. Malnutrition Assessment:  Malnutrition Status:  No malnutrition (03/03/23 1105)    Context:  Acute Illness     Findings of the 6 clinical characteristics of malnutrition:  Energy Intake:  No significant decrease in energy intake  Weight Loss:  Unable to assess     Body Fat Loss:  No significant body fat loss     Muscle Mass Loss:  No significant muscle mass loss    Fluid Accumulation:  No significant fluid accumulation     Strength:  Not Performed    Nutrition Assessment:    Pt admitted s/p Symptomatic osteoarthritis of the left ankle s/p TTC fusion of the left ankle and [place] of graft (2/24/2023); PMHx obesity, GERD; awaiting D/c to SNF. Oral intakes % of meals; will begin gelatein ONS for wound. Will monitor. Nutrition Related Findings:    A/O x 4; -3.3 L I/Os; GI WDL, active BS; oral %. Wound Type: Surgical Incision (L foot)       Current Nutrition Intake & Therapies:    Average Meal Intake: %  Average Supplements Intake: None Ordered  ADULT DIET; Regular; 4 carb choices (60 gm/meal)  ADULT ORAL NUTRITION SUPPLEMENT; Breakfast, Lunch; Other Oral Supplement; Gelatein 20    Anthropometric Measures:  Height: 5' 2\" (157.5 cm)  Ideal Body Weight (IBW): 110 lbs (50 kg)       Current Body Weight: 229 lb 0.9 oz (103.9 kg) (2/22 ; standing scale), 208.2 % IBW.  Weight Source: Standing Scale  Current BMI (kg/m2): 41.9  Usual Body Weight:  ([place] lack of recent wt trend)     Weight Adjustment For: No Adjustment                 BMI Categories: Obese Class 3 (BMI 40.0 or greater)    Estimated Daily Nutrient Needs:  Energy Requirements Based On: Kcal/kg  Weight Used for Energy Requirements: Current  Energy (kcal/day):  kcal/d (15-17 kcal/kg)  Weight Used for Protein Requirements: Ideal  Protein (g/day): 100-110 gm pro/d (2+ gm pro/kg IBW)  Method Used for Fluid Requirements: 1 ml/kcal  Fluid (ml/day):  ml/d    Nutrition Diagnosis:   No nutrition diagnosis at this time related to   as evidenced by      Nutrition Interventions:   Food and/or Nutrient Delivery: Continue Current Diet, Start Oral Nutrition Supplement (Gelatein-20 once/d)  Nutrition Education/Counseling: No recommendation at this time  Coordination of Nutrition Care: Continue to monitor while inpatient       Goals:     Goals: PO intake 75% or greater, by next RD assessment       Nutrition Monitoring and Evaluation:   Behavioral-Environmental Outcomes: None Identified  Food/Nutrient Intake Outcomes: Food and Nutrient Intake, Supplement Intake  Physical Signs/Symptoms Outcomes: Biochemical Data, GI Status, Fluid Status or Edema, Nutrition Focused Physical Findings, Skin, Weight    Discharge Planning:     Too soon to determine     W.KALEE Olea, RD  Contact: [phone] No

## 2023-03-03 NOTE — PROGRESS NOTES
Internal Medicine Progress Note    [facility]    Paullina Ana María. Dav Toro., F.A.CManoloOManoloI. Jennifer Galloway D.O., MANAOLEANNA Andino D.O. Santhosh Calvert, MSN, APRN, NP-C  Arcelia Anne. Rae Rasmussen, MSN, APRN-CNP     Primary Care Physician: Monisha England DO   Admitting Physician:  Jacy Thompson DO  Admission date and time: 2/24/2023  5:19 AM    Room:  [address] [place] [address]  Admitting diagnosis: Osteoarthritis of left ankle, unspecified osteoarthritis type [M19.072]  Postoperative pain [G89.18]  Arthritis of left ankle [M19.072]    Patient Name: Yenny Berman  MRN: [id number]    Date of Service: 3/3/2023     Subjective:  Channing is a 46 y.o. female who was seen and examined today,3/3/2023, at the bedside. Patient resting comfortably at the present time. Awaiting precertification. Probable discharge today. Pain under good control. Actually slept well last night    Review of System:   Constitutional:   Denies fever or chills, weight loss or gain, mildly fatigued. HEENT:   Denies ear pain, sore throat, sinus or eye problems. Cardiovascular:   Denies any chest pain, irregular heartbeats, or palpitations. Respiratory:   Denies shortness of breath, coughing, sputum production, hemoptysis, or wheezing. Gastrointestinal:   Denies nausea, vomiting, diarrhea, or constipation. Denies any abdominal pain. Genitourinary:    Denies any urgency, frequency, hematuria. Voiding  without difficulty. Extremities:   Currently denies left foot pain status post surgical procedure. Neurology:    Denies any headache or focal neurological deficits, Denies generalized weakness or memory difficulty. Psch:   Denies being anxious or depressed. Musculoskeletal:    Denies  myalgias, joint complaints or back pain. Integumentary:   Denies any rashes, ulcers, or excoriations. Denies bruising. Hematologic/Lymphatic:  Denies bruising or bleeding.     Physical Exam:  I/O this shift:  In: 660 [P.O.:660]  Out: -     Intake/Output Summary (Last 24 hours) at 3/3/2023 1406  Last data filed at 3/3/2023 1259  Gross per 24 hour   Intake 1140 ml   Output 400 ml   Net 740 ml   I/O last 3 completed shifts: In: 900 [P.O.:900]  Out: 900 [Urine:900]  No data found. Vital Signs:   Blood pressure 108/78, pulse 65, temperature 98.5 °F (36.9 °C), temperature source Oral, resp. rate 18, height 5' 2\" (1.575 m), weight 229 lb (103.9 kg), SpO2 96 %. General appearance:  Alert, responsive, oriented to person, place, and time. Well preserved, alert, no distress. Head:  Normocephalic. No masses, lesions or tenderness. Eyes:  PERRLA. EOMI. Sclera clear. Buccal mucosa moist.  ENT:  Ears normal. Mucosa normal.  Neck:    Supple. Trachea midline. No thyromegaly. No JVD. No bruits. Heart:    Rhythm regular. Rate controlled. No murmurs. Lungs:    Symmetrical. Clear to auscultation bilaterally. No wheezes. No rhonchi. No rales. Abdomen:   Soft. Non-tender. Non-distended. Bowel sounds positive. No organomegaly or masses. No pain on palpation. Extremities:    Peripheral pulses present. Surgical changes left lower extremity. Dressings in place. Neurologic:    Alert x 3. No focal deficit. Cranial nerves grossly intact. No focal weakness. Psych:   Behavior is normal. Mood appears normal. Speech is not rapid and/or pressured. Musculoskeletal:   Spine ROM normal. Muscular strength intact. Gait not assessed. Integumentary:  No rashes  Skin normal color and texture. Surgical changes left lower extremity.   Genitalia/Breast:  Deferred    Medication:  Scheduled Meds:   hydrOXYzine HCl  50 mg Oral Nightly    levothyroxine  50 mcg Oral Daily    budesonide  0.5 mg Nebulization BID    arformoterol tartrate  15 mcg Nebulization BID    levETIRAcetam  500 mg Oral BID    enoxaparin  30 mg SubCUTAneous BID     Continuous Infusions:   sodium chloride         Objective Data:  CBC with Differential:    Lab Results   Component Value Date/Time    WBC 6.0 03/03/2023 05:33 AM    RBC 4.07 03/03/2023 05:33 AM    HGB 12.0 03/03/2023 05:33 AM    HCT 38.5 03/03/2023 05:33 AM     03/03/2023 05:33 AM    MCV 94.6 03/03/2023 05:33 AM    MCH 29.5 03/03/2023 05:33 AM    MCHC 31.2 03/03/2023 05:33 AM    RDW 14.7 03/03/2023 05:33 AM    NRBC 0.9 11/14/2022 05:26 AM    SEGSPCT 82 09/14/2011 09:10 AM    METASPCT 1.7 11/14/2022 05:26 AM    LYMPHOPCT 21.0 03/03/2023 05:33 AM    MONOPCT 11.1 03/03/2023 05:33 AM    MYELOPCT 2.6 11/14/2022 05:26 AM    BASOPCT 0.5 03/03/2023 05:33 AM    MONOSABS 0.67 03/03/2023 05:33 AM    LYMPHSABS 1.27 03/03/2023 05:33 AM    EOSABS 0.23 03/03/2023 05:33 AM    BASOSABS 0.03 03/03/2023 05:33 AM     BMP:    Lab Results   Component Value Date/Time     03/03/2023 05:33 AM    K 4.4 03/03/2023 05:33 AM    K 4.4 02/22/2023 10:30 AM     03/03/2023 05:33 AM    CO2 24 03/03/2023 05:33 AM    BUN 23 03/03/2023 05:33 AM    LABALBU 3.5 03/03/2023 05:33 AM    CREATININE 0.8 03/03/2023 05:33 AM    CALCIUM 8.8 03/03/2023 05:33 AM    GFRAA >60 08/02/2021 04:46 AM    LABGLOM >60 03/03/2023 05:33 AM    GLUCOSE 108 03/03/2023 05:33 AM    GLUCOSE 89 09/14/2011 09:10 AM       Assessment:    Symptomatic osteoarthritis of the left ankle status post TTC fusion of the left ankle and [place] of graft in February 24, 2023 by Dr. Mauricio Carbajal  Perioperative aspiration, suspected  Nonoxygen dependent COPD with exacerbation secondary to #2  Gastroesophageal reflux disease  Obesity secondary to excess caloric intake with BMI 41.88 kg meter squared  Inguinal adenopathy previously reported with plans for outpatient follow-up, unsure if followed through by patient  Hypothyroidism      Plan:     Continue to work with the therapies. Medications reviewed/continued/adjusted. Lovenox for VTE prophylaxis  Awaiting precertification.   Probable discharge today    More than 50% of my time was spent at the bedside counseling/coordinating care with the patient and/or family with face to face contact. This time was spent reviewing notes and laboratory data as well as instructing and counseling the patient. Time I spent with the family or surrogate(s) is included only if the patient was incapable of providing the necessary information or participating in medical decisions. I also discussed the differential diagnosis and all of the proposed management plans with the patient and individuals accompanying the patient. I am readily available for any further decision-making and intervention.          Sonam Stockton DO, D.O., [place]  2:06 PM  3/3/2023

## 2023-03-03 NOTE — PROGRESS NOTES
Physical Therapy        0330/0330-01    Patient unavailable for physical therapy treatment due to patient reporting pain at 10/10 this AM, requesting PM treatment today to attempt stairs. Will check back at later time/date.      Tavo Sands, PTA  #[id number]

## 2023-04-13 ENCOUNTER — HOSPITAL ENCOUNTER (EMERGENCY)
Age: 53
Discharge: HOME | End: 2023-04-13
Attending: EMERGENCY MEDICINE
Payer: MEDICAID

## 2023-04-13 ENCOUNTER — APPOINTMENT (OUTPATIENT)
Dept: GENERAL RADIOLOGY | Age: 53
End: 2023-04-13
Payer: MEDICAID

## 2023-04-13 VITALS
RESPIRATION RATE: 16 BRPM | WEIGHT: 230 LBS | TEMPERATURE: 97.8 F | HEART RATE: 82 BPM | DIASTOLIC BLOOD PRESSURE: 61 MMHG | OXYGEN SATURATION: 94 % | SYSTOLIC BLOOD PRESSURE: 100 MMHG | BODY MASS INDEX: 42.33 KG/M2 | HEIGHT: 62 IN

## 2023-04-13 DIAGNOSIS — G89.29 CHRONIC PAIN OF LEFT ANKLE: ICD-10-CM

## 2023-04-13 DIAGNOSIS — R11.0 NAUSEA: Primary | ICD-10-CM

## 2023-04-13 DIAGNOSIS — M25.572 CHRONIC PAIN OF LEFT ANKLE: ICD-10-CM

## 2023-04-13 LAB
ALBUMIN SERPL-MCNC: 4.3 G/DL (ref 3.5–5.2)
ALP SERPL-CCNC: 84 U/L (ref 35–104)
ALT SERPL-CCNC: 49 U/L (ref 0–32)
ANION GAP SERPL CALCULATED.3IONS-SCNC: 13 MMOL/L (ref 7–16)
AST SERPL-CCNC: 41 U/L (ref 0–31)
BASOPHILS # BLD: 0.05 E9/L (ref 0–0.2)
BASOPHILS NFR BLD: 0.7 % (ref 0–2)
BILIRUB SERPL-MCNC: <0.2 MG/DL (ref 0–1.2)
BUN SERPL-MCNC: 18 MG/DL (ref 6–20)
CALCIUM SERPL-MCNC: 8.8 MG/DL (ref 8.6–10.2)
CHLORIDE SERPL-SCNC: 103 MMOL/L (ref 98–107)
CO2 SERPL-SCNC: 22 MMOL/L (ref 22–29)
CREAT SERPL-MCNC: 0.8 MG/DL (ref 0.5–1)
EOSINOPHIL # BLD: 0.25 E9/L (ref 0.05–0.5)
EOSINOPHIL NFR BLD: 3.3 % (ref 0–6)
ERYTHROCYTE [DISTWIDTH] IN BLOOD BY AUTOMATED COUNT: 15.6 FL (ref 11.5–15)
GLUCOSE SERPL-MCNC: 97 MG/DL (ref 74–99)
HCT VFR BLD AUTO: 42.9 % (ref 34–48)
HGB BLD-MCNC: 14 G/DL (ref 11.5–15.5)
IMM GRANULOCYTES # BLD: 0.04 E9/L
IMM GRANULOCYTES NFR BLD: 0.5 % (ref 0–5)
LACTATE BLDV-SCNC: 2 MMOL/L (ref 0.5–2.2)
LIPASE: 51 U/L (ref 13–60)
LYMPHOCYTES # BLD: 1.87 E9/L (ref 1.5–4)
LYMPHOCYTES NFR BLD: 24.8 % (ref 20–42)
MCH RBC QN AUTO: 30.8 PG (ref 26–35)
MCHC RBC AUTO-ENTMCNC: 32.6 % (ref 32–34.5)
MCV RBC AUTO: 94.5 FL (ref 80–99.9)
MONOCYTES # BLD: 0.41 E9/L (ref 0.1–0.95)
MONOCYTES NFR BLD: 5.4 % (ref 2–12)
NEUTROPHILS # BLD: 4.93 E9/L (ref 1.8–7.3)
NEUTS SEG NFR BLD: 65.3 % (ref 43–80)
PLATELET # BLD AUTO: 301 E9/L (ref 130–450)
PMV BLD AUTO: 9 FL (ref 7–12)
POTASSIUM SERPL-SCNC: 4.1 MMOL/L (ref 3.5–5)
PROT SERPL-MCNC: 7.1 G/DL (ref 6.4–8.3)
RBC # BLD AUTO: 4.54 E12/L (ref 3.5–5.5)
SODIUM SERPL-SCNC: 138 MMOL/L (ref 132–146)
WBC # BLD: 7.6 E9/L (ref 4.5–11.5)

## 2023-04-13 PROCEDURE — 99284 EMERGENCY DEPT VISIT MOD MDM: CPT

## 2023-04-13 PROCEDURE — 96375 TX/PRO/DX INJ NEW DRUG ADDON: CPT

## 2023-04-13 PROCEDURE — 73610 X-RAY EXAM OF ANKLE: CPT

## 2023-04-13 PROCEDURE — 80053 COMPREHEN METABOLIC PANEL: CPT

## 2023-04-13 PROCEDURE — 6360000002 HC RX W HCPCS: Performed by: STUDENT IN AN ORGANIZED HEALTH CARE EDUCATION/TRAINING PROGRAM

## 2023-04-13 PROCEDURE — 2580000003 HC RX 258: Performed by: STUDENT IN AN ORGANIZED HEALTH CARE EDUCATION/TRAINING PROGRAM

## 2023-04-13 PROCEDURE — 96374 THER/PROPH/DIAG INJ IV PUSH: CPT

## 2023-04-13 PROCEDURE — 85025 COMPLETE CBC W/AUTO DIFF WBC: CPT

## 2023-04-13 PROCEDURE — 83605 ASSAY OF LACTIC ACID: CPT

## 2023-04-13 PROCEDURE — 83690 ASSAY OF LIPASE: CPT

## 2023-04-13 RX ORDER — FENTANYL CITRATE 50 UG/ML
50 INJECTION, SOLUTION INTRAMUSCULAR; INTRAVENOUS ONCE
Status: COMPLETED | OUTPATIENT
Start: 2023-04-13 | End: 2023-04-13

## 2023-04-13 RX ORDER — 0.9 % SODIUM CHLORIDE 0.9 %
1000 INTRAVENOUS SOLUTION INTRAVENOUS ONCE
Status: COMPLETED | OUTPATIENT
Start: 2023-04-13 | End: 2023-04-13

## 2023-04-13 RX ORDER — ONDANSETRON 2 MG/ML
4 INJECTION INTRAMUSCULAR; INTRAVENOUS ONCE
Status: COMPLETED | OUTPATIENT
Start: 2023-04-13 | End: 2023-04-13

## 2023-04-13 RX ORDER — ONDANSETRON 4 MG/1
4 TABLET, ORALLY DISINTEGRATING ORAL 3 TIMES DAILY PRN
Qty: 21 TABLET | Refills: 0 | Status: SHIPPED | OUTPATIENT
Start: 2023-04-13

## 2023-04-13 RX ADMIN — ONDANSETRON 4 MG: 2 INJECTION INTRAMUSCULAR; INTRAVENOUS at 18:21

## 2023-04-13 RX ADMIN — SODIUM CHLORIDE 1000 ML: 9 INJECTION, SOLUTION INTRAVENOUS at 18:23

## 2023-04-13 RX ADMIN — FENTANYL CITRATE 50 MCG: 50 INJECTION, SOLUTION INTRAMUSCULAR; INTRAVENOUS at 18:20

## 2023-04-13 ASSESSMENT — PAIN SCALES - GENERAL
PAINLEVEL_OUTOF10: 10
PAINLEVEL_OUTOF10: 10

## 2023-04-13 ASSESSMENT — PAIN DESCRIPTION - ORIENTATION
ORIENTATION: LEFT
ORIENTATION: RIGHT;LEFT

## 2023-04-13 ASSESSMENT — LIFESTYLE VARIABLES
HOW OFTEN DO YOU HAVE A DRINK CONTAINING ALCOHOL: MONTHLY OR LESS
HOW MANY STANDARD DRINKS CONTAINING ALCOHOL DO YOU HAVE ON A TYPICAL DAY: 1 OR 2

## 2023-04-13 ASSESSMENT — PAIN DESCRIPTION - DESCRIPTORS: DESCRIPTORS: ACHING

## 2023-04-13 ASSESSMENT — PAIN - FUNCTIONAL ASSESSMENT
PAIN_FUNCTIONAL_ASSESSMENT: 0-10
PAIN_FUNCTIONAL_ASSESSMENT: PREVENTS OR INTERFERES SOME ACTIVE ACTIVITIES AND ADLS

## 2023-04-13 ASSESSMENT — PAIN DESCRIPTION - LOCATION
LOCATION: ANKLE
LOCATION: ABDOMEN

## 2023-04-13 NOTE — ED PROVIDER NOTES
[address]      Pt Name: Dunia Issa  MRN: [id number]  [place] 1970  Date of evaluation: 2023  Provider: Tawanna Foley DO  PCP: Merced Mesa DO  Note Started: 5:40 PM EDT 23    CHIEF COMPLAINT       Chief Complaint   Patient presents with    Ankle Pain     X 2 days, in cast from ankle fusion done in February    Emesis     X 2 days        HISTORY OF PRESENT ILLNESS: 1 or more Elements   History From: Patient  Limitations to history : None    Dunia Issa is a 46 y.o. female who presents to the ED due to ankle pain and nausea. Patient had a ankle fusion completed in late February and reports that she has been having pain to the ankle. She states that her sensation is decreased and the leg is edematous. On examination patient is able to move her left toes and sensation is intact. Leg is not noticeably more edematous than the right leg. She also states that she has not had a bowel movement in the past 3 days has had severe nausea over the past 2 days. She denies any episodes of vomiting during this time. Denies any urinary complaints such as dysuria, hematuria, urinary urgency or frequency. States she has generalized abdominal discomfort. Denies any recent fever or chills. Nursing Notes were all reviewed and agreed with or any disagreements were addressed in the HPI. REVIEW OF SYSTEMS :    Positives and Pertinent negatives as per HPI.      SURGICAL HISTORY     Past Surgical History:   Procedure Laterality Date    ANKLE FRACTURE SURGERY Left 2023    TTC FUSION LEFT ANKLE AND HARVEST OF GRAFT performed by Aaron Stanton DPM at [address] Left      SECTION      CHOLECYSTECTOMY      COLECTOMY  2016    FOREARM FRACTURE SURGERY Left     fracture of ulna s/p repair    HERNIA REPAIR      HERNIA REPAIR  2015    incarcerated hernia repair    HERNIA REPAIR N/A 2021

## 2023-04-14 NOTE — ED NOTES
Reviewed discharge information. Patient verbalized understanding of paperwork, medication, and care instructions. Patient denied any questions.       Zully Childress RN  04/13/23 2042

## 2023-04-20 ENCOUNTER — APPOINTMENT (OUTPATIENT)
Dept: CT IMAGING | Age: 53
End: 2023-04-20
Payer: MEDICAID

## 2023-04-20 ENCOUNTER — HOSPITAL ENCOUNTER (EMERGENCY)
Age: 53
Discharge: HOME | End: 2023-04-20
Attending: EMERGENCY MEDICINE
Payer: MEDICAID

## 2023-04-20 VITALS
RESPIRATION RATE: 16 BRPM | OXYGEN SATURATION: 97 % | DIASTOLIC BLOOD PRESSURE: 88 MMHG | SYSTOLIC BLOOD PRESSURE: 165 MMHG | TEMPERATURE: 98.4 F | BODY MASS INDEX: 40.24 KG/M2 | WEIGHT: 220 LBS | HEART RATE: 80 BPM

## 2023-04-20 DIAGNOSIS — N39.0 URINARY TRACT INFECTION WITHOUT HEMATURIA, SITE UNSPECIFIED: Primary | ICD-10-CM

## 2023-04-20 LAB
ALBUMIN SERPL-MCNC: 3.6 G/DL (ref 3.5–5.2)
ALP SERPL-CCNC: 78 U/L (ref 35–104)
ALT SERPL-CCNC: 37 U/L (ref 0–32)
ANION GAP SERPL CALCULATED.3IONS-SCNC: 8 MMOL/L (ref 7–16)
AST SERPL-CCNC: 31 U/L (ref 0–31)
BACTERIA URNS QL MICRO: ABNORMAL /HPF
BASOPHILS # BLD: 0.02 E9/L (ref 0–0.2)
BASOPHILS NFR BLD: 0.3 % (ref 0–2)
BILIRUB SERPL-MCNC: 0.4 MG/DL (ref 0–1.2)
BILIRUB UR QL STRIP: NEGATIVE
BUN SERPL-MCNC: 8 MG/DL (ref 6–20)
CALCIUM SERPL-MCNC: 8.7 MG/DL (ref 8.6–10.2)
CHLORIDE SERPL-SCNC: 103 MMOL/L (ref 98–107)
CLARITY UR: ABNORMAL
CO2 SERPL-SCNC: 27 MMOL/L (ref 22–29)
COLOR UR: ABNORMAL
CREAT SERPL-MCNC: 0.7 MG/DL (ref 0.5–1)
CRYSTALS URNS MICRO: ABNORMAL /HPF
EOSINOPHIL # BLD: 0.1 E9/L (ref 0.05–0.5)
EOSINOPHIL NFR BLD: 1.4 % (ref 0–6)
EPI CELLS #/AREA URNS HPF: ABNORMAL /HPF
ERYTHROCYTE [DISTWIDTH] IN BLOOD BY AUTOMATED COUNT: 16.4 FL (ref 11.5–15)
GLUCOSE SERPL-MCNC: 107 MG/DL (ref 74–99)
GLUCOSE UR STRIP-MCNC: NEGATIVE MG/DL
HCT VFR BLD AUTO: 41.9 % (ref 34–48)
HGB BLD-MCNC: 13.9 G/DL (ref 11.5–15.5)
HGB UR QL STRIP: ABNORMAL
IMM GRANULOCYTES # BLD: 0.04 E9/L
IMM GRANULOCYTES NFR BLD: 0.5 % (ref 0–5)
KETONES UR STRIP-MCNC: ABNORMAL MG/DL
LACTATE BLDV-SCNC: 1 MMOL/L (ref 0.5–2.2)
LEUKOCYTE ESTERASE UR QL STRIP: ABNORMAL
LIPASE: 21 U/L (ref 13–60)
LYMPHOCYTES # BLD: 1.06 E9/L (ref 1.5–4)
LYMPHOCYTES NFR BLD: 14.5 % (ref 20–42)
MAGNESIUM SERPL-MCNC: 1.7 MG/DL (ref 1.6–2.6)
MCH RBC QN AUTO: 32.5 PG (ref 26–35)
MCHC RBC AUTO-ENTMCNC: 33.2 % (ref 32–34.5)
MCV RBC AUTO: 97.9 FL (ref 80–99.9)
MONOCYTES # BLD: 0.37 E9/L (ref 0.1–0.95)
MONOCYTES NFR BLD: 5.1 % (ref 2–12)
NEUTROPHILS # BLD: 5.72 E9/L (ref 1.8–7.3)
NEUTS SEG NFR BLD: 78.2 % (ref 43–80)
NITRITE UR QL STRIP: POSITIVE
PH UR STRIP: 6 [PH] (ref 5–9)
PLATELET # BLD AUTO: 186 E9/L (ref 130–450)
PMV BLD AUTO: 9.1 FL (ref 7–12)
POTASSIUM SERPL-SCNC: 3.6 MMOL/L (ref 3.5–5)
PROT SERPL-MCNC: 6.3 G/DL (ref 6.4–8.3)
PROT UR STRIP-MCNC: 30 MG/DL
RBC # BLD AUTO: 4.28 E12/L (ref 3.5–5.5)
RBC #/AREA URNS HPF: ABNORMAL /HPF (ref 0–2)
SODIUM SERPL-SCNC: 138 MMOL/L (ref 132–146)
SP GR UR STRIP: 1.02 (ref 1–1.03)
UROBILINOGEN UR STRIP-ACNC: 0.2 E.U./DL
WBC # BLD: 7.3 E9/L (ref 4.5–11.5)
WBC #/AREA URNS HPF: >20 /HPF (ref 0–5)

## 2023-04-20 PROCEDURE — 6360000002 HC RX W HCPCS: Performed by: NURSE PRACTITIONER

## 2023-04-20 PROCEDURE — 74177 CT ABD & PELVIS W/CONTRAST: CPT

## 2023-04-20 PROCEDURE — 87077 CULTURE AEROBIC IDENTIFY: CPT

## 2023-04-20 PROCEDURE — 96375 TX/PRO/DX INJ NEW DRUG ADDON: CPT

## 2023-04-20 PROCEDURE — 85025 COMPLETE CBC W/AUTO DIFF WBC: CPT

## 2023-04-20 PROCEDURE — 80053 COMPREHEN METABOLIC PANEL: CPT

## 2023-04-20 PROCEDURE — 6360000004 HC RX CONTRAST MEDICATION: Performed by: RADIOLOGY

## 2023-04-20 PROCEDURE — 83605 ASSAY OF LACTIC ACID: CPT

## 2023-04-20 PROCEDURE — 87186 SC STD MICRODIL/AGAR DIL: CPT

## 2023-04-20 PROCEDURE — 2580000003 HC RX 258: Performed by: NURSE PRACTITIONER

## 2023-04-20 PROCEDURE — 99285 EMERGENCY DEPT VISIT HI MDM: CPT

## 2023-04-20 PROCEDURE — 87088 URINE BACTERIA CULTURE: CPT

## 2023-04-20 PROCEDURE — 83690 ASSAY OF LIPASE: CPT

## 2023-04-20 PROCEDURE — 81001 URINALYSIS AUTO W/SCOPE: CPT

## 2023-04-20 PROCEDURE — 83735 ASSAY OF MAGNESIUM: CPT

## 2023-04-20 PROCEDURE — 96376 TX/PRO/DX INJ SAME DRUG ADON: CPT

## 2023-04-20 PROCEDURE — 96374 THER/PROPH/DIAG INJ IV PUSH: CPT

## 2023-04-20 RX ORDER — SODIUM CHLORIDE 9 MG/ML
INJECTION, SOLUTION INTRAVENOUS
Status: DISCONTINUED
Start: 2023-04-20 | End: 2023-04-20 | Stop reason: HOSPADM

## 2023-04-20 RX ORDER — FENTANYL CITRATE 0.05 MG/ML
25 INJECTION, SOLUTION INTRAMUSCULAR; INTRAVENOUS ONCE
Status: COMPLETED | OUTPATIENT
Start: 2023-04-20 | End: 2023-04-20

## 2023-04-20 RX ORDER — ONDANSETRON 2 MG/ML
4 INJECTION INTRAMUSCULAR; INTRAVENOUS ONCE
Status: COMPLETED | OUTPATIENT
Start: 2023-04-20 | End: 2023-04-20

## 2023-04-20 RX ORDER — FENTANYL CITRATE 0.05 MG/ML
50 INJECTION, SOLUTION INTRAMUSCULAR; INTRAVENOUS ONCE
Status: COMPLETED | OUTPATIENT
Start: 2023-04-20 | End: 2023-04-20

## 2023-04-20 RX ORDER — 0.9 % SODIUM CHLORIDE 0.9 %
1000 INTRAVENOUS SOLUTION INTRAVENOUS ONCE
Status: COMPLETED | OUTPATIENT
Start: 2023-04-20 | End: 2023-04-20

## 2023-04-20 RX ORDER — CEFDINIR 300 MG/1
300 CAPSULE ORAL 2 TIMES DAILY
Qty: 14 CAPSULE | Refills: 0 | Status: SHIPPED | OUTPATIENT
Start: 2023-04-20 | End: 2023-04-27

## 2023-04-20 RX ADMIN — ONDANSETRON 4 MG: 2 INJECTION INTRAMUSCULAR; INTRAVENOUS at 11:12

## 2023-04-20 RX ADMIN — FENTANYL CITRATE 50 MCG: 0.05 INJECTION, SOLUTION INTRAMUSCULAR; INTRAVENOUS at 11:12

## 2023-04-20 RX ADMIN — IOPAMIDOL 75 ML: 755 INJECTION, SOLUTION INTRAVENOUS at 12:04

## 2023-04-20 RX ADMIN — FENTANYL CITRATE 25 MCG: 0.05 INJECTION, SOLUTION INTRAMUSCULAR; INTRAVENOUS at 13:08

## 2023-04-20 RX ADMIN — CEFTRIAXONE SODIUM 2000 MG: 2 INJECTION, POWDER, FOR SOLUTION INTRAMUSCULAR; INTRAVENOUS at 12:21

## 2023-04-20 RX ADMIN — SODIUM CHLORIDE 1000 ML: 9 INJECTION, SOLUTION INTRAVENOUS at 11:11

## 2023-04-20 ASSESSMENT — PAIN - FUNCTIONAL ASSESSMENT
PAIN_FUNCTIONAL_ASSESSMENT: 0-10
PAIN_FUNCTIONAL_ASSESSMENT: ACTIVITIES ARE NOT PREVENTED

## 2023-04-20 ASSESSMENT — PAIN SCALES - GENERAL
PAINLEVEL_OUTOF10: 9
PAINLEVEL_OUTOF10: 0
PAINLEVEL_OUTOF10: 10
PAINLEVEL_OUTOF10: 10

## 2023-04-20 ASSESSMENT — PAIN DESCRIPTION - ONSET: ONSET: ON-GOING

## 2023-04-20 ASSESSMENT — PAIN DESCRIPTION - LOCATION
LOCATION: ABDOMEN
LOCATION: ABDOMEN

## 2023-04-20 ASSESSMENT — PAIN DESCRIPTION - DESCRIPTORS: DESCRIPTORS: CRAMPING

## 2023-04-20 ASSESSMENT — PAIN DESCRIPTION - FREQUENCY: FREQUENCY: CONTINUOUS

## 2023-04-20 ASSESSMENT — PAIN DESCRIPTION - PAIN TYPE: TYPE: ACUTE PAIN

## 2023-04-20 NOTE — CARE COORDINATION
SS Note: SW notified pt would like to speak w/SW. Pt here for abdominal pain with nausea, beginning 1 week ago. Pt found w/UTI & is d/c'd. Chart review notes pt has left lower extremity in a walking boot due to ankle surgery in February. Pt did go to [place] [address]. 3/3/23 for rehab. SW met w/pt and she explains that she was d/c'd from [facility]. & has Lucy [facility]- not sure of agency. Pt has been staying w/her step-dtr and now her step-dtr does not want her to be there anymore. Pt called [address], but they have no beds. Pt asks about returning to SNF. SW noted she would likely not qualify for rehab, especially since there is no new dx or condition to warrant in-pt rehab. Pt states she is non-wt bearing and has w/c @ her step-dtr's house. SW noted would need to call other shelters to see if they could accommodate pt. She states there may be 1 or 2 people she can call for help. Pt stated she would like some food if possible. Also, she wanted some toothpaste. SW provided pt w/2 sandwiches, some snacks and pop as well as toothpaste and toothbrush. SW also gave pt Homeless shelter list. Johnathon Arenas noted some of the providers on that list she will need to call and be screened for acceptance, but they are not emergency shelters. Pt voiced her understanding. She noted she will call for ride and try to secure alternative housing until she leaves her step dtr's. No other needs noted. VENKATESH assisted pt to [address] [place] [address] via w/c. She has phone to call someone.   Electronically signed by MOON Zimmer on 4/20/2023 at 3:37 PM

## 2023-04-20 NOTE — ED PROVIDER NOTES
[facility]  ED  Encounter Note  Admit Date/RoomTime: 2023 10:06 AM  ED Room:    Independent WILLIAM Visit. HPI:  23, Time: 10:23 AM EDT         Audra Serrato is a 46 y.o. female presenting to the ED for abdominal pain with nausea, beginning 1 week ago. The complaint has been persistent, moderate in severity, and worsened by nothing. Presents by EMS with complaints of diffuse abdominal pain which she states began approximate 1 week ago. Also states that she has had persistent nausea 1 episode of vomiting 2 episodes of diarrhea yesterday. She does have an extensive past surgical history. Denies any fever. Denies any chest pain or shortness of breath. She does have the left lower extremity in a walking boot due to ankle surgery in February. States she has not been taking any pain medication for the past 2 weeks. ROS:   Pertinent positives and negatives are stated within HPI, all other systems reviewed and are negative.  --------------------------------------------- PAST HISTORY ---------------------------------------------  Past Medical History:  has a past medical history of Arthritis, Brain venous angioma (HCC), Bursitis, Cat scratch of left lower leg, Class 3 severe obesity due to excess calories with body mass index (BMI) of 40.0 to 44.9 in adult [facility]), COPD (chronic obstructive pulmonary disease) ([facility] Diverticulitis, GERD (gastroesophageal reflux disease), Incisional hernia with obstruction but no gangrene, and Strep throat. Past Surgical History:  has a past surgical history that includes Hysterectomy; Cholecystectomy; Pelvic fracture surgery; colectomy (2016); Nerve Block; hernia repair; hernia repair (2015); ventral hernia repair (2015);  section; Forearm fracture surgery (Left); hernia repair (N/A, 2021); Carpal tunnel release (Left); and Ankle fracture surgery (Left, 2023).     Social History:

## 2023-04-22 LAB
BACTERIA UR CULT: ABNORMAL
ORGANISM: ABNORMAL

## 2023-04-22 NOTE — PROGRESS NOTES
Reviewed patients after hours culture results. Urine culture growing Escherichia coli, patient discharged on cefdinir. No need for further intervention at this time.     Paul Elliott, PharmD, BCPS 4/22/2023 1:07 PM   [phone]

## 2023-04-25 NOTE — ED NOTES
This RN entered room to collect urine specimen. Pt resting w/ eyes closed, upon awaking pt stated \"I just need to throw up and that'll make me feel better. \" This RN offered pt antiemetic, pt denied stating \"I just need to throw up. \" Pt then closed eyes and audible snoring heard. This RN awoke pt to notify need for urine specimen; pt stating, \"I'll pee in a little bit. \" Pt to notify staff via call light when they need to void.      Hans Kidd RN  04/13/23 2009 PRE-OP DIAGNOSIS:  Closed dislocation of fifth carpometacarpal joint of right hand, initial encounter 25-Apr-2023 08:35:51  Sushant Coughlin

## 2023-04-26 ENCOUNTER — HOSPITAL ENCOUNTER (EMERGENCY)
Age: 53
Discharge: HOME | End: 2023-04-26
Attending: EMERGENCY MEDICINE
Payer: MEDICAID

## 2023-04-26 ENCOUNTER — APPOINTMENT (OUTPATIENT)
Dept: ULTRASOUND IMAGING | Age: 53
End: 2023-04-26
Payer: MEDICAID

## 2023-04-26 ENCOUNTER — APPOINTMENT (OUTPATIENT)
Dept: CT IMAGING | Age: 53
End: 2023-04-26
Payer: MEDICAID

## 2023-04-26 ENCOUNTER — APPOINTMENT (OUTPATIENT)
Dept: GENERAL RADIOLOGY | Age: 53
End: 2023-04-26
Payer: MEDICAID

## 2023-04-26 VITALS
DIASTOLIC BLOOD PRESSURE: 84 MMHG | HEART RATE: 83 BPM | OXYGEN SATURATION: 96 % | SYSTOLIC BLOOD PRESSURE: 130 MMHG | RESPIRATION RATE: 23 BRPM | TEMPERATURE: 98.5 F

## 2023-04-26 DIAGNOSIS — I82.492 DEEP VEIN THROMBOSIS (DVT) OF OTHER VEIN OF LEFT LOWER EXTREMITY, UNSPECIFIED CHRONICITY (HCC): Primary | ICD-10-CM

## 2023-04-26 LAB
ALBUMIN SERPL-MCNC: 3.6 G/DL (ref 3.5–5.2)
ALP SERPL-CCNC: 80 U/L (ref 35–104)
ALT SERPL-CCNC: 98 U/L (ref 0–32)
ANION GAP SERPL CALCULATED.3IONS-SCNC: 13 MMOL/L (ref 7–16)
AST SERPL-CCNC: 96 U/L (ref 0–31)
B.E.: 2.5 MMOL/L (ref -3–3)
BASOPHILS # BLD: 0.03 E9/L (ref 0–0.2)
BASOPHILS NFR BLD: 0.4 % (ref 0–2)
BILIRUB SERPL-MCNC: 0.3 MG/DL (ref 0–1.2)
BUN SERPL-MCNC: 11 MG/DL (ref 6–20)
CALCIUM SERPL-MCNC: 8.5 MG/DL (ref 8.6–10.2)
CHLORIDE SERPL-SCNC: 103 MMOL/L (ref 98–107)
CO2 SERPL-SCNC: 24 MMOL/L (ref 22–29)
COHB: 4.1 % (ref 0–1.5)
CREAT SERPL-MCNC: 0.8 MG/DL (ref 0.5–1)
CRITICAL: ABNORMAL
D DIMER: 357 NG/ML DDU
DATE ANALYZED: ABNORMAL
DATE OF COLLECTION: ABNORMAL
EOSINOPHIL # BLD: 0.2 E9/L (ref 0.05–0.5)
EOSINOPHIL NFR BLD: 2.9 % (ref 0–6)
ERYTHROCYTE [DISTWIDTH] IN BLOOD BY AUTOMATED COUNT: 15.9 FL (ref 11.5–15)
ERYTHROCYTE [SEDIMENTATION RATE] IN BLOOD BY WESTERGREN METHOD: 4 MM/HR (ref 0–20)
GLUCOSE SERPL-MCNC: 108 MG/DL (ref 74–99)
HCO3: 26.4 MMOL/L (ref 22–26)
HCT VFR BLD AUTO: 43.5 % (ref 34–48)
HGB BLD-MCNC: 13.9 G/DL (ref 11.5–15.5)
HHB: 1 % (ref 0–5)
IMM GRANULOCYTES # BLD: 0.05 E9/L
IMM GRANULOCYTES NFR BLD: 0.7 % (ref 0–5)
LAB: ABNORMAL
LYMPHOCYTES # BLD: 1.32 E9/L (ref 1.5–4)
LYMPHOCYTES NFR BLD: 19.2 % (ref 20–42)
Lab: ABNORMAL
MCH RBC QN AUTO: 32.3 PG (ref 26–35)
MCHC RBC AUTO-ENTMCNC: 32 % (ref 32–34.5)
MCV RBC AUTO: 100.9 FL (ref 80–99.9)
METHB: 0.3 % (ref 0–1.5)
MODE: ABNORMAL
MONOCYTES # BLD: 0.76 E9/L (ref 0.1–0.95)
MONOCYTES NFR BLD: 11.1 % (ref 2–12)
NEUTROPHILS # BLD: 4.5 E9/L (ref 1.8–7.3)
NEUTS SEG NFR BLD: 65.7 % (ref 43–80)
O2 CONTENT: 19.9 ML/DL
O2 SATURATION: 99 % (ref 92–98.5)
O2HB: 94.6 % (ref 94–97)
OPERATOR ID: 7292
PATIENT TEMP: 37 C
PCO2: 38.6 MMHG (ref 35–45)
PH BLOOD GAS: 7.45 (ref 7.35–7.45)
PLATELET # BLD AUTO: 211 E9/L (ref 130–450)
PMV BLD AUTO: 9.3 FL (ref 7–12)
PO2: 176.2 MMHG (ref 75–100)
POTASSIUM SERPL-SCNC: 3.8 MMOL/L (ref 3.5–5)
PROT SERPL-MCNC: 6.5 G/DL (ref 6.4–8.3)
RBC # BLD AUTO: 4.31 E12/L (ref 3.5–5.5)
SARS-COV-2 RDRP RESP QL NAA+PROBE: NOT DETECTED
SODIUM SERPL-SCNC: 140 MMOL/L (ref 132–146)
SOURCE, BLOOD GAS: ABNORMAL
THB: 14.7 G/DL (ref 11.5–16.5)
TIME ANALYZED: 1116
WBC # BLD: 6.9 E9/L (ref 4.5–11.5)

## 2023-04-26 PROCEDURE — 71275 CT ANGIOGRAPHY CHEST: CPT

## 2023-04-26 PROCEDURE — 85025 COMPLETE CBC W/AUTO DIFF WBC: CPT

## 2023-04-26 PROCEDURE — 6360000004 HC RX CONTRAST MEDICATION: Performed by: RADIOLOGY

## 2023-04-26 PROCEDURE — 73590 X-RAY EXAM OF LOWER LEG: CPT

## 2023-04-26 PROCEDURE — 99285 EMERGENCY DEPT VISIT HI MDM: CPT

## 2023-04-26 PROCEDURE — 87635 SARS-COV-2 COVID-19 AMP PRB: CPT

## 2023-04-26 PROCEDURE — 82805 BLOOD GASES W/O2 SATURATION: CPT

## 2023-04-26 PROCEDURE — 73630 X-RAY EXAM OF FOOT: CPT

## 2023-04-26 PROCEDURE — 96374 THER/PROPH/DIAG INJ IV PUSH: CPT

## 2023-04-26 PROCEDURE — 6360000002 HC RX W HCPCS: Performed by: STUDENT IN AN ORGANIZED HEALTH CARE EDUCATION/TRAINING PROGRAM

## 2023-04-26 PROCEDURE — 93971 EXTREMITY STUDY: CPT

## 2023-04-26 PROCEDURE — 85378 FIBRIN DEGRADE SEMIQUANT: CPT

## 2023-04-26 PROCEDURE — 6370000000 HC RX 637 (ALT 250 FOR IP): Performed by: STUDENT IN AN ORGANIZED HEALTH CARE EDUCATION/TRAINING PROGRAM

## 2023-04-26 PROCEDURE — 85651 RBC SED RATE NONAUTOMATED: CPT

## 2023-04-26 PROCEDURE — 94640 AIRWAY INHALATION TREATMENT: CPT

## 2023-04-26 PROCEDURE — 80053 COMPREHEN METABOLIC PANEL: CPT

## 2023-04-26 PROCEDURE — 96375 TX/PRO/DX INJ NEW DRUG ADDON: CPT

## 2023-04-26 RX ORDER — IPRATROPIUM BROMIDE AND ALBUTEROL SULFATE 2.5; .5 MG/3ML; MG/3ML
3 SOLUTION RESPIRATORY (INHALATION) ONCE
Status: COMPLETED | OUTPATIENT
Start: 2023-04-26 | End: 2023-04-26

## 2023-04-26 RX ORDER — ONDANSETRON 2 MG/ML
4 INJECTION INTRAMUSCULAR; INTRAVENOUS ONCE
Status: COMPLETED | OUTPATIENT
Start: 2023-04-26 | End: 2023-04-26

## 2023-04-26 RX ORDER — HYDROCODONE BITARTRATE AND ACETAMINOPHEN 5; 325 MG/1; MG/1
1 TABLET ORAL ONCE
Status: COMPLETED | OUTPATIENT
Start: 2023-04-26 | End: 2023-04-26

## 2023-04-26 RX ORDER — FENTANYL CITRATE 0.05 MG/ML
50 INJECTION, SOLUTION INTRAMUSCULAR; INTRAVENOUS ONCE
Status: DISCONTINUED | OUTPATIENT
Start: 2023-04-26 | End: 2023-04-26

## 2023-04-26 RX ORDER — FENTANYL CITRATE 0.05 MG/ML
25 INJECTION, SOLUTION INTRAMUSCULAR; INTRAVENOUS ONCE
Status: COMPLETED | OUTPATIENT
Start: 2023-04-26 | End: 2023-04-26

## 2023-04-26 RX ADMIN — FENTANYL CITRATE 25 MCG: 0.05 INJECTION, SOLUTION INTRAMUSCULAR; INTRAVENOUS at 05:39

## 2023-04-26 RX ADMIN — IOPAMIDOL 80 ML: 755 INJECTION, SOLUTION INTRAVENOUS at 04:07

## 2023-04-26 RX ADMIN — IPRATROPIUM BROMIDE AND ALBUTEROL SULFATE 3 AMPULE: .5; 3 SOLUTION RESPIRATORY (INHALATION) at 09:57

## 2023-04-26 RX ADMIN — ONDANSETRON 4 MG: 2 INJECTION INTRAMUSCULAR; INTRAVENOUS at 05:38

## 2023-04-26 RX ADMIN — APIXABAN 10 MG: 5 TABLET, FILM COATED ORAL at 09:31

## 2023-04-26 RX ADMIN — HYDROCODONE BITARTRATE AND ACETAMINOPHEN 1 TABLET: 5; 325 TABLET ORAL at 11:16

## 2023-04-26 ASSESSMENT — PAIN DESCRIPTION - DESCRIPTORS: DESCRIPTORS: THROBBING

## 2023-04-26 ASSESSMENT — PAIN SCALES - GENERAL
PAINLEVEL_OUTOF10: 10
PAINLEVEL_OUTOF10: 10
PAINLEVEL_OUTOF10: 8

## 2023-04-26 ASSESSMENT — PAIN - FUNCTIONAL ASSESSMENT: PAIN_FUNCTIONAL_ASSESSMENT: 0-10

## 2023-04-26 ASSESSMENT — LIFESTYLE VARIABLES: HOW OFTEN DO YOU HAVE A DRINK CONTAINING ALCOHOL: NEVER

## 2023-04-26 ASSESSMENT — PAIN DESCRIPTION - LOCATION
LOCATION: LEG
LOCATION: LEG

## 2023-04-26 ASSESSMENT — PAIN DESCRIPTION - ORIENTATION: ORIENTATION: LEFT

## 2023-04-26 NOTE — ED NOTES
Pt desatted to 83% RA.  2L NC placed on pt per verbal order from resident [facility]  04/26/23 1906

## 2023-04-26 NOTE — ED NOTES
Pt requesting food and drink, Dr Rody Alcala approved. Pt now eating sandwich and drinking beverage, Pt NC removed, Pt pulse ox 94% on RA.       Adonis Marquis RN  04/26/23 7922

## 2023-04-26 NOTE — ED NOTES
Pt given small bottle of water with permission of resident Lily Otero, [facility]  04/26/23 5902

## 2023-04-26 NOTE — ED NOTES
Dr notified that pt states she is unable to walk that  she is non weightbearing on left extremity and uses a wheelchair with help from daughter at home. Dr aware of pt pulse ox dropping to 86% on RA while awake.  Pt placed on O2 2 liters NC and now 91%     Sahil Wagnerr, OMI  04/26/23 4481

## 2023-04-26 NOTE — ED PROVIDER NOTES
obstruction but no gangrene, and Strep throat (2021). EMERGENCY DEPARTMENT COURSE    Vitals:    Vitals:    04/26/23 1116 04/26/23 1117 04/26/23 1118 04/26/23 1119   BP:       Pulse: 86 87 87 83   Resp: 21 19 16 23   Temp:       TempSrc:       SpO2: 94% 95% 93% 96%       Patient was given the following medications:  Medications   iopamidol (ISOVUE-370) 76 % injection 80 mL (80 mLs IntraVENous Given 4/26/23 0407)   ondansetron (ZOFRAN) injection 4 mg (4 mg IntraVENous Given 4/26/23 0538)   fentaNYL (SUBLIMAZE) injection 25 mcg (25 mcg IntraVENous Given 4/26/23 0539)   apixaban (ELIQUIS) tablet 10 mg (10 mg Oral Given 4/26/23 0931)   ipratropium-albuterol (DUONEB) nebulizer solution 3 ampule (3 ampules Inhalation Given 4/26/23 0957)   HYDROcodone-acetaminophen (NORCO) 5-325 MG per tablet 1 tablet (1 tablet Oral Given 4/26/23 1116)           Is this patient to be included in the SEP-1 Core Measure due to severe sepsis or septic shock? No Exclusion criteria - the patient is NOT to be included for SEP-1 Core Measure due to: Infection is not suspected        Medical Decision Making/Differential Diagnosis:    CC/HPI Summary, Social Determinants of health, Records Reviewed, DDx, testing done/not done, ED Course, Reassessment, disposition considerations/shared decision making with patient, consults, disposition:      ED Course as of 04/27/23 2039 Wed Apr 26, 2023   0240 Patient had desaturation event, placed on nasal cannula oxygen. EMS given 150 mcg of fentanyl prior to arrival.  D-dimer elevated, will check CTA chest to rule out PE [FG]   0923 Discussed results and plan for discharge with patient, she voiced understanding and is amenable [VG]   0945 On room air while awake, patient is still hypoxic 86% ORA. CTA pulmonary performed and did not show PE or other acute abnormality. Patient does not have diagnosed history of PE, but does have hx of COPD. Will give x3 duonebs, check ABG and reassess.   [VG]      ED Course

## 2023-04-26 NOTE — ED NOTES
Bedside commode  placed in pt room.  Resident gavin made aware of pt requesting pain and nausea dada Carranza, RN  04/26/23 6080

## 2023-04-28 ENCOUNTER — HOSPITAL ENCOUNTER (EMERGENCY)
Age: 53
Discharge: HOME | End: 2023-04-28
Attending: EMERGENCY MEDICINE
Payer: MEDICAID

## 2023-04-28 VITALS
SYSTOLIC BLOOD PRESSURE: 120 MMHG | TEMPERATURE: 98.3 F | HEART RATE: 78 BPM | OXYGEN SATURATION: 98 % | RESPIRATION RATE: 18 BRPM | DIASTOLIC BLOOD PRESSURE: 72 MMHG

## 2023-04-28 DIAGNOSIS — I82.4Y2 ACUTE DEEP VEIN THROMBOSIS (DVT) OF PROXIMAL VEIN OF LEFT LOWER EXTREMITY (HCC): Primary | ICD-10-CM

## 2023-04-28 LAB
ALBUMIN SERPL-MCNC: 3.9 G/DL (ref 3.5–5.2)
ALP SERPL-CCNC: 78 U/L (ref 35–104)
ALT SERPL-CCNC: 129 U/L (ref 0–32)
ANION GAP SERPL CALCULATED.3IONS-SCNC: 13 MMOL/L (ref 7–16)
APTT BLD: 35.5 SEC (ref 24.5–35.1)
AST SERPL-CCNC: 123 U/L (ref 0–31)
BASOPHILS # BLD: 0.04 E9/L (ref 0–0.2)
BASOPHILS NFR BLD: 0.7 % (ref 0–2)
BILIRUB SERPL-MCNC: <0.2 MG/DL (ref 0–1.2)
BUN SERPL-MCNC: 15 MG/DL (ref 6–20)
CALCIUM SERPL-MCNC: 8.9 MG/DL (ref 8.6–10.2)
CHLORIDE SERPL-SCNC: 103 MMOL/L (ref 98–107)
CO2 SERPL-SCNC: 25 MMOL/L (ref 22–29)
CREAT SERPL-MCNC: 0.8 MG/DL (ref 0.5–1)
EOSINOPHIL # BLD: 0.21 E9/L (ref 0.05–0.5)
EOSINOPHIL NFR BLD: 3.7 % (ref 0–6)
ERYTHROCYTE [DISTWIDTH] IN BLOOD BY AUTOMATED COUNT: 16.3 FL (ref 11.5–15)
GLUCOSE SERPL-MCNC: 97 MG/DL (ref 74–99)
HCT VFR BLD AUTO: 44.3 % (ref 34–48)
HGB BLD-MCNC: 13.9 G/DL (ref 11.5–15.5)
IMM GRANULOCYTES # BLD: 0.06 E9/L
IMM GRANULOCYTES NFR BLD: 1 % (ref 0–5)
INR BLD: 1
LYMPHOCYTES # BLD: 1.27 E9/L (ref 1.5–4)
LYMPHOCYTES NFR BLD: 22.2 % (ref 20–42)
MCH RBC QN AUTO: 31.6 PG (ref 26–35)
MCHC RBC AUTO-ENTMCNC: 31.4 % (ref 32–34.5)
MCV RBC AUTO: 100.7 FL (ref 80–99.9)
MONOCYTES # BLD: 0.53 E9/L (ref 0.1–0.95)
MONOCYTES NFR BLD: 9.2 % (ref 2–12)
NEUTROPHILS # BLD: 3.62 E9/L (ref 1.8–7.3)
NEUTS SEG NFR BLD: 63.2 % (ref 43–80)
PLATELET # BLD AUTO: 211 E9/L (ref 130–450)
PMV BLD AUTO: 9.3 FL (ref 7–12)
POTASSIUM SERPL-SCNC: 4.3 MMOL/L (ref 3.5–5)
PROT SERPL-MCNC: 6.7 G/DL (ref 6.4–8.3)
PROTHROMBIN TIME: 11.3 SEC (ref 9.3–12.4)
RBC # BLD AUTO: 4.4 E12/L (ref 3.5–5.5)
SODIUM SERPL-SCNC: 141 MMOL/L (ref 132–146)
WBC # BLD: 5.7 E9/L (ref 4.5–11.5)

## 2023-04-28 PROCEDURE — 85025 COMPLETE CBC W/AUTO DIFF WBC: CPT

## 2023-04-28 PROCEDURE — 85610 PROTHROMBIN TIME: CPT

## 2023-04-28 PROCEDURE — 80053 COMPREHEN METABOLIC PANEL: CPT

## 2023-04-28 PROCEDURE — 6370000000 HC RX 637 (ALT 250 FOR IP): Performed by: EMERGENCY MEDICINE

## 2023-04-28 PROCEDURE — 96374 THER/PROPH/DIAG INJ IV PUSH: CPT

## 2023-04-28 PROCEDURE — 99284 EMERGENCY DEPT VISIT MOD MDM: CPT

## 2023-04-28 PROCEDURE — 85730 THROMBOPLASTIN TIME PARTIAL: CPT

## 2023-04-28 PROCEDURE — 6360000002 HC RX W HCPCS: Performed by: EMERGENCY MEDICINE

## 2023-04-28 RX ORDER — HYDROCODONE BITARTRATE AND ACETAMINOPHEN 5; 325 MG/1; MG/1
1 TABLET ORAL EVERY 4 HOURS PRN
Qty: 10 TABLET | Refills: 0 | Status: SHIPPED | OUTPATIENT
Start: 2023-04-28 | End: 2023-05-01

## 2023-04-28 RX ORDER — FENTANYL CITRATE 50 UG/ML
50 INJECTION, SOLUTION INTRAMUSCULAR; INTRAVENOUS ONCE
Status: COMPLETED | OUTPATIENT
Start: 2023-04-28 | End: 2023-04-28

## 2023-04-28 RX ORDER — HYDROCODONE BITARTRATE AND ACETAMINOPHEN 5; 325 MG/1; MG/1
1 TABLET ORAL ONCE
Status: COMPLETED | OUTPATIENT
Start: 2023-04-28 | End: 2023-04-28

## 2023-04-28 RX ADMIN — FENTANYL CITRATE 50 MCG: 50 INJECTION, SOLUTION INTRAMUSCULAR; INTRAVENOUS at 11:00

## 2023-04-28 RX ADMIN — HYDROCODONE BITARTRATE AND ACETAMINOPHEN 1 TABLET: 5; 325 TABLET ORAL at 12:38

## 2023-04-28 ASSESSMENT — PAIN DESCRIPTION - DESCRIPTORS: DESCRIPTORS: ACHING;SHOOTING;THROBBING

## 2023-04-28 ASSESSMENT — PAIN DESCRIPTION - ORIENTATION: ORIENTATION: LEFT

## 2023-04-28 ASSESSMENT — PAIN SCALES - GENERAL: PAINLEVEL_OUTOF10: 10

## 2023-04-28 ASSESSMENT — PAIN - FUNCTIONAL ASSESSMENT
PAIN_FUNCTIONAL_ASSESSMENT: NONE - DENIES PAIN
PAIN_FUNCTIONAL_ASSESSMENT: NONE - DENIES PAIN

## 2023-04-28 ASSESSMENT — PAIN DESCRIPTION - LOCATION: LOCATION: LEG

## 2023-04-28 NOTE — ED PROVIDER NOTES
within the ED encounter and vital signs as below have been reviewed by myself  BP (!) 127/91   Pulse 85   Temp 98 °F (36.7 °C)   Resp 18   SpO2 95%     Oxygen Saturation Interpretation: Normal    The patients available past medical records and past encounters were reviewed. ------------------------------ ED COURSE/MEDICAL DECISION MAKING----------------------  Medications   fentaNYL (SUBLIMAZE) injection 50 mcg (50 mcg IntraVENous Given 4/28/23 1100)           The cardiac monitor revealed sinus with a heart rate in the 80s as interpreted by me. The cardiac monitor was ordered secondary to the patient's leg pain and to monitor the patient for dysrhythmia. CPT [id number]         Medical Decision Making:     Patient presents with left lower extremity swelling. Concern for DVT, phlegmasia, cellulitis, among other pathologies. She was hemodynamically stable, did receive IV fentanyl for pain control. Exam was not consistent with ischemic limb or compartment syndrome. She had palpable pulses. Dopplerable pulses as well. Labs interpreted me shows a normal CBC, CMP, PT and INR. Chart reviewed. Ultrasound reviewed from yesterday, she did have a DVT at that time. Discussed with Dr. Ham Gonzales, after reviewing the imaging, states location of DVT is not consistent with a phlegmasia, recommends pain control and follow-up with him in the office. Duration, exam unchanged. Remains hemodynamically stable, no symptoms or complaints. Overall well-appearing. Pain is controlled. Once again, denies any chest pain or shortness of breath. Low suspicion for PE at this time. She is comfortable going home. Patient was written for analgesic medication, she is to follow-up vascular surgery, she is educated on signs and symptoms that require emergent evaluation. Counseling:    The emergency provider has spoken with the patient and discussed todays results, in addition to providing specific details for the plan

## 2023-05-01 ENCOUNTER — HOSPITAL ENCOUNTER (EMERGENCY)
Age: 53
Discharge: HOME | End: 2023-05-01
Attending: EMERGENCY MEDICINE
Payer: MEDICAID

## 2023-05-01 ENCOUNTER — APPOINTMENT (OUTPATIENT)
Dept: ULTRASOUND IMAGING | Age: 53
End: 2023-05-01
Payer: MEDICAID

## 2023-05-01 VITALS
RESPIRATION RATE: 22 BRPM | OXYGEN SATURATION: 95 % | BODY MASS INDEX: 41.88 KG/M2 | DIASTOLIC BLOOD PRESSURE: 72 MMHG | HEART RATE: 88 BPM | WEIGHT: 229 LBS | SYSTOLIC BLOOD PRESSURE: 110 MMHG | TEMPERATURE: 98.6 F

## 2023-05-01 DIAGNOSIS — I82.4Z2 ACUTE DEEP VEIN THROMBOSIS (DVT) OF DISTAL VEIN OF LEFT LOWER EXTREMITY (HCC): Primary | ICD-10-CM

## 2023-05-01 PROCEDURE — 93971 EXTREMITY STUDY: CPT

## 2023-05-01 PROCEDURE — 99284 EMERGENCY DEPT VISIT MOD MDM: CPT

## 2023-05-01 PROCEDURE — 6370000000 HC RX 637 (ALT 250 FOR IP): Performed by: STUDENT IN AN ORGANIZED HEALTH CARE EDUCATION/TRAINING PROGRAM

## 2023-05-01 RX ORDER — OXYCODONE HYDROCHLORIDE 5 MG/1
5 TABLET ORAL EVERY 8 HOURS PRN
Qty: 10 TABLET | Refills: 0 | Status: SHIPPED | OUTPATIENT
Start: 2023-05-01 | End: 2023-05-05

## 2023-05-01 RX ORDER — OXYCODONE HYDROCHLORIDE 5 MG/1
5 TABLET ORAL ONCE
Status: COMPLETED | OUTPATIENT
Start: 2023-05-01 | End: 2023-05-01

## 2023-05-01 RX ADMIN — OXYCODONE HYDROCHLORIDE 5 MG: 5 TABLET ORAL at 19:04

## 2023-05-01 ASSESSMENT — LIFESTYLE VARIABLES: HOW OFTEN DO YOU HAVE A DRINK CONTAINING ALCOHOL: NEVER

## 2023-05-01 ASSESSMENT — PAIN SCALES - GENERAL
PAINLEVEL_OUTOF10: 10
PAINLEVEL_OUTOF10: 10

## 2023-05-01 ASSESSMENT — PAIN - FUNCTIONAL ASSESSMENT: PAIN_FUNCTIONAL_ASSESSMENT: 0-10

## 2023-05-01 ASSESSMENT — PAIN DESCRIPTION - LOCATION: LOCATION: LEG

## 2023-05-01 ASSESSMENT — PAIN DESCRIPTION - ORIENTATION: ORIENTATION: LEFT

## 2023-05-01 NOTE — ED PROVIDER NOTES
[address]        Pt Name: Elana Post  MRN: [id number]  [place] 1970  Date of evaluation: 5/1/2023  Provider: Sudheer Callahan DO  PCP: Beth Enriquez DO  Note Started: 6:59 PM EDT 5/1/23    CHIEF COMPLAINT       Chief Complaint   Patient presents with    Leg Pain    Leg Swelling     Dr office instructed pt to come back to ED for admission. Pt has two blood clots in left leg       HISTORY OF PRESENT ILLNESS: 1 or more Elements   History From: Patient    Limitations to history : None    Elana Post is a 46 y.o. female who presents to emergency department for evaluation of worsening leg swelling. She has a history of a known DVT in the left lower extremity. Occlusive DVT in the left popliteal and proximal peroneal vein. She called her podiatrist who was out of office and was told to come into the emergency department for reevaluation. She has past history of left ankle fusion. She was also seen at [facility] for similar symptoms on 4/29. Consult was placed to vascular surgery  and at that time he stated that symptoms were not consistent with phlegmasia he recommended Pain control and outpatient follow-up. She has not yet followed up. Patient has run out of her hydrocodone which she was taking for pain and it was helping. Nursing Notes were all reviewed and agreed with or any disagreements were addressed in the HPI. REVIEW OF EXTERNAL NOTE :     External chart review performed by me patient had a tibiotalar calcaneal arthrodesis on 2/4/2023 by Dr. David Castro :           Positives and Pertinent negatives as per HPI.      SURGICAL HISTORY     Past Surgical History:   Procedure Laterality Date    ANKLE FRACTURE SURGERY Left 2/24/2023    TTC FUSION LEFT ANKLE AND HARVEST OF GRAFT performed by Ruy Cervantes DPM at [address] Left

## 2023-05-05 ENCOUNTER — APPOINTMENT (OUTPATIENT)
Dept: CT IMAGING | Age: 53
DRG: 197 | End: 2023-05-05
Payer: MEDICAID

## 2023-05-05 ENCOUNTER — HOSPITAL ENCOUNTER (INPATIENT)
Age: 53
LOS: 1 days | Discharge: HOME | DRG: 197 | End: 2023-05-06
Attending: EMERGENCY MEDICINE | Admitting: INTERNAL MEDICINE
Payer: MEDICAID

## 2023-05-05 DIAGNOSIS — I26.94 MULTIPLE SUBSEGMENTAL PULMONARY EMBOLI WITHOUT ACUTE COR PULMONALE (HCC): Primary | ICD-10-CM

## 2023-05-05 DIAGNOSIS — J96.01 ACUTE RESPIRATORY FAILURE WITH HYPOXIA (HCC): ICD-10-CM

## 2023-05-05 DIAGNOSIS — K92.2 GASTROINTESTINAL HEMORRHAGE, UNSPECIFIED GASTROINTESTINAL HEMORRHAGE TYPE: ICD-10-CM

## 2023-05-05 DIAGNOSIS — M79.605 LEFT LEG PAIN: ICD-10-CM

## 2023-05-05 PROBLEM — G89.18 POSTOPERATIVE PAIN: Status: RESOLVED | Noted: 2023-02-24 | Resolved: 2023-05-05

## 2023-05-05 PROBLEM — I26.99 ACUTE PULMONARY EMBOLISM WITHOUT ACUTE COR PULMONALE, UNSPECIFIED PULMONARY EMBOLISM TYPE (HCC): Status: ACTIVE | Noted: 2023-05-05

## 2023-05-05 PROBLEM — L03.116 CELLULITIS OF LEFT LOWER EXTREMITY: Status: RESOLVED | Noted: 2022-11-28 | Resolved: 2023-05-05

## 2023-05-05 PROBLEM — B35.9 DERMATOPHYTOSIS: Status: ACTIVE | Noted: 2023-05-05

## 2023-05-05 PROBLEM — I26.99 ACUTE PULMONARY EMBOLISM WITHOUT ACUTE COR PULMONALE (HCC): Status: ACTIVE | Noted: 2023-05-05

## 2023-05-05 PROBLEM — L03.90 CELLULITIS: Status: RESOLVED | Noted: 2022-11-10 | Resolved: 2023-05-05

## 2023-05-05 PROBLEM — R06.02 SOB (SHORTNESS OF BREATH): Status: ACTIVE | Noted: 2023-05-05

## 2023-05-05 PROBLEM — I82.432 ACUTE DEEP VEIN THROMBOSIS (DVT) OF POPLITEAL VEIN OF LEFT LOWER EXTREMITY (HCC): Status: ACTIVE | Noted: 2023-05-05

## 2023-05-05 PROBLEM — R10.9 INTRACTABLE ABDOMINAL PAIN: Status: RESOLVED | Noted: 2021-03-09 | Resolved: 2023-05-05

## 2023-05-05 LAB
ABO + RH BLD: NORMAL
ALBUMIN SERPL-MCNC: 3.2 G/DL (ref 3.5–5.2)
ALP SERPL-CCNC: 63 U/L (ref 35–104)
ALT SERPL-CCNC: 75 U/L (ref 0–32)
ANION GAP SERPL CALCULATED.3IONS-SCNC: 10 MMOL/L (ref 7–16)
APTT BLD: 52.2 SEC (ref 24.5–35.1)
AST SERPL-CCNC: 62 U/L (ref 0–31)
B PARAP IS1001 DNA NPH QL NAA+NON-PROBE: NOT DETECTED
B PERT.PT PRMT NPH QL NAA+NON-PROBE: NOT DETECTED
BACTERIA URNS QL MICRO: ABNORMAL /HPF
BASOPHILS # BLD: 0 E9/L (ref 0–0.2)
BASOPHILS NFR BLD: 0.3 % (ref 0–2)
BILIRUB SERPL-MCNC: 0.3 MG/DL (ref 0–1.2)
BILIRUB UR QL STRIP: NEGATIVE
BLD GP AB SCN SERPL QL: NORMAL
BNP BLD-MCNC: 89 PG/ML (ref 0–125)
BUN SERPL-MCNC: 10 MG/DL (ref 6–20)
BURR CELLS: ABNORMAL
C PNEUM DNA NPH QL NAA+NON-PROBE: NOT DETECTED
CALCIUM SERPL-MCNC: 6.9 MG/DL (ref 8.6–10.2)
CHLORIDE SERPL-SCNC: 112 MMOL/L (ref 98–107)
CLARITY UR: CLEAR
CO2 SERPL-SCNC: 21 MMOL/L (ref 22–29)
COLOR UR: YELLOW
CREAT SERPL-MCNC: 0.6 MG/DL (ref 0.5–1)
EOSINOPHIL # BLD: 0.04 E9/L (ref 0.05–0.5)
EOSINOPHIL NFR BLD: 0.9 % (ref 0–6)
EPI CELLS #/AREA URNS HPF: ABNORMAL /HPF
ERYTHROCYTE [DISTWIDTH] IN BLOOD BY AUTOMATED COUNT: 16.5 FL (ref 11.5–15)
ERYTHROCYTE [DISTWIDTH] IN BLOOD BY AUTOMATED COUNT: 16.7 FL (ref 11.5–15)
FLUAV RNA NPH QL NAA+NON-PROBE: NOT DETECTED
FLUBV RNA NPH QL NAA+NON-PROBE: NOT DETECTED
GLUCOSE SERPL-MCNC: 94 MG/DL (ref 74–99)
GLUCOSE UR STRIP-MCNC: NEGATIVE MG/DL
HADV DNA NPH QL NAA+NON-PROBE: NOT DETECTED
HCOV 229E RNA NPH QL NAA+NON-PROBE: NOT DETECTED
HCOV HKU1 RNA NPH QL NAA+NON-PROBE: NOT DETECTED
HCOV NL63 RNA NPH QL NAA+NON-PROBE: NOT DETECTED
HCOV OC43 RNA NPH QL NAA+NON-PROBE: NOT DETECTED
HCT VFR BLD AUTO: 26.5 % (ref 34–48)
HCT VFR BLD AUTO: 40.2 % (ref 34–48)
HCT VFR BLD AUTO: 40.8 % (ref 34–48)
HGB BLD-MCNC: 12.9 G/DL (ref 11.5–15.5)
HGB BLD-MCNC: 13 G/DL (ref 11.5–15.5)
HGB BLD-MCNC: 8.2 G/DL (ref 11.5–15.5)
HGB UR QL STRIP: ABNORMAL
HMPV RNA NPH QL NAA+NON-PROBE: NOT DETECTED
HPIV1 RNA NPH QL NAA+NON-PROBE: NOT DETECTED
HPIV2 RNA NPH QL NAA+NON-PROBE: NOT DETECTED
HPIV3 RNA NPH QL NAA+NON-PROBE: NOT DETECTED
HPIV4 RNA NPH QL NAA+NON-PROBE: NOT DETECTED
HYPOCHROMIA: ABNORMAL
INR BLD: 2.2
KETONES UR STRIP-MCNC: NEGATIVE MG/DL
LACTATE BLDV-SCNC: 2 MMOL/L (ref 0.5–2.2)
LEUKOCYTE ESTERASE UR QL STRIP: NEGATIVE
LIPASE: 24 U/L (ref 13–60)
LV EF: 65 %
LVEF MODALITY: NORMAL
LYMPHOCYTES # BLD: 0.43 E9/L (ref 1.5–4)
LYMPHOCYTES NFR BLD: 10.6 % (ref 20–42)
M PNEUMO DNA NPH QL NAA+NON-PROBE: NOT DETECTED
MAGNESIUM SERPL-MCNC: 1.6 MG/DL (ref 1.6–2.6)
MCH RBC QN AUTO: 32.3 PG (ref 26–35)
MCH RBC QN AUTO: 32.5 PG (ref 26–35)
MCHC RBC AUTO-ENTMCNC: 30.9 % (ref 32–34.5)
MCHC RBC AUTO-ENTMCNC: 31.6 % (ref 32–34.5)
MCV RBC AUTO: 102.3 FL (ref 80–99.9)
MCV RBC AUTO: 105.2 FL (ref 80–99.9)
MONOCYTES # BLD: 0.16 E9/L (ref 0.1–0.95)
MONOCYTES NFR BLD: 3.5 % (ref 2–12)
NEUTROPHILS # BLD: 3.32 E9/L (ref 1.8–7.3)
NEUTS SEG NFR BLD: 85 % (ref 43–80)
NITRITE UR QL STRIP: NEGATIVE
OVALOCYTES: ABNORMAL
PH UR STRIP: 5.5 [PH] (ref 5–9)
PLATELET # BLD AUTO: 122 E9/L (ref 130–450)
PLATELET # BLD AUTO: 209 E9/L (ref 130–450)
PMV BLD AUTO: 9.1 FL (ref 7–12)
PMV BLD AUTO: 9.3 FL (ref 7–12)
POIKILOCYTES: ABNORMAL
POTASSIUM SERPL-SCNC: 3.1 MMOL/L (ref 3.5–5)
POTASSIUM SERPL-SCNC: 4.3 MMOL/L (ref 3.5–5)
PROT SERPL-MCNC: 5.4 G/DL (ref 6.4–8.3)
PROT UR STRIP-MCNC: NEGATIVE MG/DL
PROTHROMBIN TIME: 23.8 SEC (ref 9.3–12.4)
RBC # BLD AUTO: 2.52 E12/L (ref 3.5–5.5)
RBC # BLD AUTO: 3.99 E12/L (ref 3.5–5.5)
RBC #/AREA URNS HPF: ABNORMAL /HPF (ref 0–2)
RSV RNA NPH QL NAA+NON-PROBE: NOT DETECTED
RV+EV RNA NPH QL NAA+NON-PROBE: NOT DETECTED
SARS-COV-2 RNA NPH QL NAA+NON-PROBE: NOT DETECTED
SODIUM SERPL-SCNC: 143 MMOL/L (ref 132–146)
SP GR UR STRIP: <=1.005 (ref 1–1.03)
TROPONIN, HIGH SENSITIVITY: 7 NG/L (ref 0–9)
TROPONIN, HIGH SENSITIVITY: 8 NG/L (ref 0–9)
TROPONIN, HIGH SENSITIVITY: <6 NG/L (ref 0–9)
UROBILINOGEN UR STRIP-ACNC: 0.2 E.U./DL
WBC # BLD: 3.9 E9/L (ref 4.5–11.5)
WBC # BLD: 5.3 E9/L (ref 4.5–11.5)
WBC #/AREA URNS HPF: ABNORMAL /HPF (ref 0–5)

## 2023-05-05 PROCEDURE — 85025 COMPLETE CBC W/AUTO DIFF WBC: CPT

## 2023-05-05 PROCEDURE — G0378 HOSPITAL OBSERVATION PER HR: HCPCS

## 2023-05-05 PROCEDURE — A4216 STERILE WATER/SALINE, 10 ML: HCPCS | Performed by: INTERNAL MEDICINE

## 2023-05-05 PROCEDURE — 94664 DEMO&/EVAL PT USE INHALER: CPT

## 2023-05-05 PROCEDURE — 85014 HEMATOCRIT: CPT

## 2023-05-05 PROCEDURE — 2140000000 HC CCU INTERMEDIATE R&B

## 2023-05-05 PROCEDURE — 99285 EMERGENCY DEPT VISIT HI MDM: CPT

## 2023-05-05 PROCEDURE — 6370000000 HC RX 637 (ALT 250 FOR IP): Performed by: INTERNAL MEDICINE

## 2023-05-05 PROCEDURE — 84484 ASSAY OF TROPONIN QUANT: CPT

## 2023-05-05 PROCEDURE — 96375 TX/PRO/DX INJ NEW DRUG ADDON: CPT

## 2023-05-05 PROCEDURE — C9113 INJ PANTOPRAZOLE SODIUM, VIA: HCPCS | Performed by: INTERNAL MEDICINE

## 2023-05-05 PROCEDURE — 94640 AIRWAY INHALATION TREATMENT: CPT

## 2023-05-05 PROCEDURE — 96374 THER/PROPH/DIAG INJ IV PUSH: CPT

## 2023-05-05 PROCEDURE — 83690 ASSAY OF LIPASE: CPT

## 2023-05-05 PROCEDURE — 6360000002 HC RX W HCPCS: Performed by: STUDENT IN AN ORGANIZED HEALTH CARE EDUCATION/TRAINING PROGRAM

## 2023-05-05 PROCEDURE — 0202U NFCT DS 22 TRGT SARS-COV-2: CPT

## 2023-05-05 PROCEDURE — 71275 CT ANGIOGRAPHY CHEST: CPT

## 2023-05-05 PROCEDURE — 86900 BLOOD TYPING SEROLOGIC ABO: CPT

## 2023-05-05 PROCEDURE — 99222 1ST HOSP IP/OBS MODERATE 55: CPT | Performed by: SURGERY

## 2023-05-05 PROCEDURE — 2580000003 HC RX 258: Performed by: STUDENT IN AN ORGANIZED HEALTH CARE EDUCATION/TRAINING PROGRAM

## 2023-05-05 PROCEDURE — 86850 RBC ANTIBODY SCREEN: CPT

## 2023-05-05 PROCEDURE — 81001 URINALYSIS AUTO W/SCOPE: CPT

## 2023-05-05 PROCEDURE — 6370000000 HC RX 637 (ALT 250 FOR IP): Performed by: SURGERY

## 2023-05-05 PROCEDURE — 86901 BLOOD TYPING SEROLOGIC RH(D): CPT

## 2023-05-05 PROCEDURE — 36415 COLL VENOUS BLD VENIPUNCTURE: CPT

## 2023-05-05 PROCEDURE — 85027 COMPLETE CBC AUTOMATED: CPT

## 2023-05-05 PROCEDURE — 2580000003 HC RX 258: Performed by: INTERNAL MEDICINE

## 2023-05-05 PROCEDURE — 83735 ASSAY OF MAGNESIUM: CPT

## 2023-05-05 PROCEDURE — 6360000002 HC RX W HCPCS: Performed by: INTERNAL MEDICINE

## 2023-05-05 PROCEDURE — 74177 CT ABD & PELVIS W/CONTRAST: CPT

## 2023-05-05 PROCEDURE — 99254 IP/OBS CNSLTJ NEW/EST MOD 60: CPT | Performed by: SURGERY

## 2023-05-05 PROCEDURE — 83880 ASSAY OF NATRIURETIC PEPTIDE: CPT

## 2023-05-05 PROCEDURE — 85018 HEMOGLOBIN: CPT

## 2023-05-05 PROCEDURE — 85730 THROMBOPLASTIN TIME PARTIAL: CPT

## 2023-05-05 PROCEDURE — 83605 ASSAY OF LACTIC ACID: CPT

## 2023-05-05 PROCEDURE — 80053 COMPREHEN METABOLIC PANEL: CPT

## 2023-05-05 PROCEDURE — 85610 PROTHROMBIN TIME: CPT

## 2023-05-05 PROCEDURE — 93306 TTE W/DOPPLER COMPLETE: CPT

## 2023-05-05 PROCEDURE — 84132 ASSAY OF SERUM POTASSIUM: CPT

## 2023-05-05 PROCEDURE — 6360000004 HC RX CONTRAST MEDICATION: Performed by: RADIOLOGY

## 2023-05-05 RX ORDER — ONDANSETRON 2 MG/ML
4 INJECTION INTRAMUSCULAR; INTRAVENOUS EVERY 6 HOURS PRN
Status: DISCONTINUED | OUTPATIENT
Start: 2023-05-05 | End: 2023-05-06 | Stop reason: HOSPADM

## 2023-05-05 RX ORDER — OXYCODONE HYDROCHLORIDE 5 MG/1
5 TABLET ORAL EVERY 8 HOURS PRN
Status: DISCONTINUED | OUTPATIENT
Start: 2023-05-05 | End: 2023-05-05 | Stop reason: SDUPTHER

## 2023-05-05 RX ORDER — SENNA PLUS 8.6 MG/1
1 TABLET ORAL DAILY PRN
Status: DISCONTINUED | OUTPATIENT
Start: 2023-05-05 | End: 2023-05-06 | Stop reason: HOSPADM

## 2023-05-05 RX ORDER — FENTANYL CITRATE 50 UG/ML
50 INJECTION, SOLUTION INTRAMUSCULAR; INTRAVENOUS ONCE
Status: COMPLETED | OUTPATIENT
Start: 2023-05-05 | End: 2023-05-05

## 2023-05-05 RX ORDER — ACETAMINOPHEN 650 MG/1
650 SUPPOSITORY RECTAL EVERY 6 HOURS PRN
Status: DISCONTINUED | OUTPATIENT
Start: 2023-05-05 | End: 2023-05-06 | Stop reason: HOSPADM

## 2023-05-05 RX ORDER — OXYCODONE HYDROCHLORIDE 5 MG/1
5 TABLET ORAL EVERY 4 HOURS PRN
Status: DISCONTINUED | OUTPATIENT
Start: 2023-05-05 | End: 2023-05-06 | Stop reason: HOSPADM

## 2023-05-05 RX ORDER — SODIUM CHLORIDE 9 MG/ML
INJECTION, SOLUTION INTRAVENOUS PRN
Status: DISCONTINUED | OUTPATIENT
Start: 2023-05-05 | End: 2023-05-06 | Stop reason: HOSPADM

## 2023-05-05 RX ORDER — SODIUM CHLORIDE 0.9 % (FLUSH) 0.9 %
10 SYRINGE (ML) INJECTION
Status: ACTIVE | OUTPATIENT
Start: 2023-05-05 | End: 2023-05-06

## 2023-05-05 RX ORDER — BISACODYL 10 MG
10 SUPPOSITORY, RECTAL RECTAL DAILY PRN
Status: DISCONTINUED | OUTPATIENT
Start: 2023-05-05 | End: 2023-05-06 | Stop reason: HOSPADM

## 2023-05-05 RX ORDER — ONDANSETRON 4 MG/1
4 TABLET, ORALLY DISINTEGRATING ORAL EVERY 8 HOURS PRN
Status: DISCONTINUED | OUTPATIENT
Start: 2023-05-05 | End: 2023-05-06 | Stop reason: HOSPADM

## 2023-05-05 RX ORDER — SODIUM CHLORIDE 0.9 % (FLUSH) 0.9 %
10 SYRINGE (ML) INJECTION PRN
Status: DISCONTINUED | OUTPATIENT
Start: 2023-05-05 | End: 2023-05-06 | Stop reason: HOSPADM

## 2023-05-05 RX ORDER — POTASSIUM CHLORIDE 7.45 MG/ML
10 INJECTION INTRAVENOUS
Status: DISPENSED | OUTPATIENT
Start: 2023-05-05 | End: 2023-05-05

## 2023-05-05 RX ORDER — SODIUM CHLORIDE AND POTASSIUM CHLORIDE 150; 900 MG/100ML; MG/100ML
INJECTION, SOLUTION INTRAVENOUS CONTINUOUS
Status: DISCONTINUED | OUTPATIENT
Start: 2023-05-05 | End: 2023-05-05

## 2023-05-05 RX ORDER — MECOBALAMIN 5000 MCG
10 TABLET,DISINTEGRATING ORAL NIGHTLY
Status: DISCONTINUED | OUTPATIENT
Start: 2023-05-05 | End: 2023-05-06 | Stop reason: HOSPADM

## 2023-05-05 RX ORDER — CYCLOBENZAPRINE HCL 10 MG
10 TABLET ORAL 3 TIMES DAILY PRN
Status: DISCONTINUED | OUTPATIENT
Start: 2023-05-05 | End: 2023-05-06 | Stop reason: HOSPADM

## 2023-05-05 RX ORDER — POTASSIUM CHLORIDE 20 MEQ/1
40 TABLET, EXTENDED RELEASE ORAL ONCE
Status: DISCONTINUED | OUTPATIENT
Start: 2023-05-05 | End: 2023-05-05

## 2023-05-05 RX ORDER — IPRATROPIUM BROMIDE AND ALBUTEROL SULFATE 2.5; .5 MG/3ML; MG/3ML
1 SOLUTION RESPIRATORY (INHALATION)
Status: DISCONTINUED | OUTPATIENT
Start: 2023-05-05 | End: 2023-05-06 | Stop reason: HOSPADM

## 2023-05-05 RX ORDER — SODIUM CHLORIDE 0.9 % (FLUSH) 0.9 %
10 SYRINGE (ML) INJECTION EVERY 12 HOURS SCHEDULED
Status: DISCONTINUED | OUTPATIENT
Start: 2023-05-05 | End: 2023-05-06 | Stop reason: HOSPADM

## 2023-05-05 RX ORDER — 0.9 % SODIUM CHLORIDE 0.9 %
1000 INTRAVENOUS SOLUTION INTRAVENOUS ONCE
Status: COMPLETED | OUTPATIENT
Start: 2023-05-05 | End: 2023-05-05

## 2023-05-05 RX ORDER — LEVOTHYROXINE SODIUM 0.1 MG/1
50 TABLET ORAL DAILY
Status: DISCONTINUED | OUTPATIENT
Start: 2023-05-05 | End: 2023-05-06 | Stop reason: HOSPADM

## 2023-05-05 RX ORDER — ACETAMINOPHEN 325 MG/1
650 TABLET ORAL EVERY 6 HOURS PRN
Status: DISCONTINUED | OUTPATIENT
Start: 2023-05-05 | End: 2023-05-06 | Stop reason: HOSPADM

## 2023-05-05 RX ADMIN — SODIUM CHLORIDE, PRESERVATIVE FREE 10 ML: 5 INJECTION INTRAVENOUS at 20:11

## 2023-05-05 RX ADMIN — APIXABAN 5 MG: 5 TABLET, FILM COATED ORAL at 20:08

## 2023-05-05 RX ADMIN — CYCLOBENZAPRINE 10 MG: 10 TABLET, FILM COATED ORAL at 20:07

## 2023-05-05 RX ADMIN — Medication 10 MG: at 23:10

## 2023-05-05 RX ADMIN — PANTOPRAZOLE SODIUM 40 MG: 40 INJECTION, POWDER, FOR SOLUTION INTRAVENOUS at 20:09

## 2023-05-05 RX ADMIN — SODIUM CHLORIDE 1000 ML: 9 INJECTION, SOLUTION INTRAVENOUS at 14:52

## 2023-05-05 RX ADMIN — ONDANSETRON 4 MG: 2 INJECTION INTRAMUSCULAR; INTRAVENOUS at 23:11

## 2023-05-05 RX ADMIN — LEVOTHYROXINE SODIUM 50 MCG: 0.1 TABLET ORAL at 15:50

## 2023-05-05 RX ADMIN — Medication: at 22:48

## 2023-05-05 RX ADMIN — POTASSIUM CHLORIDE 10 MEQ: 7.46 INJECTION, SOLUTION INTRAVENOUS at 14:38

## 2023-05-05 RX ADMIN — IOPAMIDOL 75 ML: 755 INJECTION, SOLUTION INTRAVENOUS at 11:00

## 2023-05-05 RX ADMIN — IPRATROPIUM BROMIDE AND ALBUTEROL SULFATE 1 AMPULE: 2.5; .5 SOLUTION RESPIRATORY (INHALATION) at 15:40

## 2023-05-05 RX ADMIN — IPRATROPIUM BROMIDE AND ALBUTEROL SULFATE 1 AMPULE: 2.5; .5 SOLUTION RESPIRATORY (INHALATION) at 19:30

## 2023-05-05 RX ADMIN — OXYCODONE 5 MG: 5 TABLET ORAL at 20:09

## 2023-05-05 RX ADMIN — FENTANYL CITRATE 50 MCG: 50 INJECTION, SOLUTION INTRAMUSCULAR; INTRAVENOUS at 10:53

## 2023-05-05 RX ADMIN — OXYCODONE 5 MG: 5 TABLET ORAL at 15:50

## 2023-05-05 RX ADMIN — IOPAMIDOL 75 ML: 755 INJECTION, SOLUTION INTRAVENOUS at 11:44

## 2023-05-05 ASSESSMENT — PAIN SCALES - GENERAL
PAINLEVEL_OUTOF10: 10
PAINLEVEL_OUTOF10: 10

## 2023-05-05 ASSESSMENT — ENCOUNTER SYMPTOMS
FACIAL SWELLING: 0
COUGH: 0
COLOR CHANGE: 0
TROUBLE SWALLOWING: 0
VOMITING: 0
ABDOMINAL DISTENTION: 0
SHORTNESS OF BREATH: 1
NAUSEA: 0
CONSTIPATION: 0
ABDOMINAL PAIN: 0
DIARRHEA: 0

## 2023-05-05 ASSESSMENT — PAIN DESCRIPTION - ORIENTATION
ORIENTATION: LEFT
ORIENTATION: LEFT

## 2023-05-05 ASSESSMENT — PAIN DESCRIPTION - DESCRIPTORS
DESCRIPTORS: THROBBING;SHOOTING;PRESSURE
DESCRIPTORS: SHARP;SHOOTING;THROBBING

## 2023-05-05 ASSESSMENT — PAIN DESCRIPTION - LOCATION
LOCATION: LEG
LOCATION: LEG

## 2023-05-05 NOTE — H&P
follow up recommended. US DUP LOWER EXTREMITY LEFT CALEB    Result Date: 5/1/2023  EXAMINATION: DUPLEX VENOUS ULTRASOUND OF THE LEFT LOWER EXTREMITY 5/1/2023 7:09 pm TECHNIQUE: Duplex ultrasound using B-mode/gray scaled imaging and Doppler spectral analysis and color flow was obtained of the deep venous structures of the left lower extremity. COMPARISON: /26/23 HISTORY: ORDERING SYSTEM PROVIDED HISTORY: Known DVT, comparison ultrasound TECHNOLOGIST PROVIDED HISTORY: Reason for exam:->Known DVT, comparison ultrasound What reading provider will be dictating this exam?->CRC FINDINGS: The visualized veins of the left lower extremity are patent and free of echogenic thrombus. The veins demonstrate good compressibility with normal color flow study and spectral analysis. The popliteal vein and peroneal veins are not visualized. No evidence of DVT in the l visualized veins of the eft lower extremity. The popliteal and peroneal veins are not well visualized. US DUP LOWER EXTREMITY LEFT CALEB    Result Date: 4/26/2023  EXAMINATION: DUPLEX VENOUS ULTRASOUND OF THE LEFT LOWER EXTREMITY 4/26/2023 6:59 am TECHNIQUE: Duplex ultrasound using B-mode/gray scaled imaging and Doppler spectral analysis and color flow was obtained of the deep venous structures of the left lower extremity. COMPARISON: None. HISTORY: ORDERING SYSTEM PROVIDED HISTORY: r/o dvt TECHNOLOGIST PROVIDED HISTORY: Reason for exam:->r/o dvt What reading provider will be dictating this exam?->CRC FINDINGS: There is occlusive DVT within the left popliteal vein and proximal peroneal vein. The other calf veins are not diagnostically visualized. Normal venous flow signal within the deep veins above the knee. Occlusive DVT left popliteal vein and peroneal vein.      CT ABDOMEN PELVIS W IV CONTRAST Additional Contrast? None   Final Result   No acute findings of the abdomen or pelvis specifically no mechanical   obstructive process of bowel or focal

## 2023-05-05 NOTE — PROGRESS NOTES
Patient admitted to PCCU with purse, pants, shirt. Valuables include one bracelet and one ring. Patient states that one of her bracelets is missing. ER called to see if it was left down there.

## 2023-05-05 NOTE — ED NOTES
Pt's potassium was redrawn at 1315 with her repeat troponin and repeat CBC. Her redraw was 4.4. she did not receive any IV/PO potassium before this redraw. She did receive 10mEq of K while waiting for the redraw to result. The pt's hgb/hct also greatly improved to normal range at the redraw without receiving any blood products. It is felt that her first lab draw on arrival was diluted with saline. Dr. Blake Walter was perfectserved about the results, he replied back to not give any more of the additional potassium that was ordered.       Bryson Priest, RN  05/05/23 7507

## 2023-05-05 NOTE — ED PROVIDER NOTES
Sensitivity 8 0 - 9 ng/L   Troponin   Result Value Ref Range    Troponin, High Sensitivity 7 0 - 9 ng/L   Hemoglobin and Hematocrit   Result Value Ref Range    Hemoglobin 13.0 11.5 - 15.5 g/dL    Hematocrit 40.2 34.0 - 48.0 %   Hemoglobin and Hematocrit   Result Value Ref Range    Hemoglobin 12.8 11.5 - 15.5 g/dL    Hematocrit 40.6 34.0 - 48.0 %   POCT Glucose   Result Value Ref Range    Meter Glucose 136 (H) 74 - 99 mg/dL   TYPE AND SCREEN   Result Value Ref Range    ABO/Rh A NEG     Antibody Screen NEG          Radiology:   Non-plain film images such as CT, Ultrasound and MRI are read by the radiologist. Plain radiographic images are visualized and preliminarily interpreted by the ED Provider in the MDM section. Interpretation per the Radiologist below, if available at the time of this note:    CT ABDOMEN PELVIS W IV CONTRAST Additional Contrast? None   Final Result   No acute findings of the abdomen or pelvis specifically no mechanical   obstructive process of bowel or focal inflammatory findings      Hepatic steatosis at least moderate involvement. CTA PULMONARY W CONTRAST   Final Result   1. Small pulmonary emboli seen within the subsegmental pulmonary arteries of   the right lower lobe best appreciated on axial image number 193 and 194.   2. There is no left pulmonary embolus   3. There are no findings of pneumonia or failure. US DUP LOWER EXTREMITY LEFT CALEB    Result Date: 5/1/2023  EXAMINATION: DUPLEX VENOUS ULTRASOUND OF THE LEFT LOWER EXTREMITY 5/1/2023 7:09 pm TECHNIQUE: Duplex ultrasound using B-mode/gray scaled imaging and Doppler spectral analysis and color flow was obtained of the deep venous structures of the left lower extremity.  COMPARISON: /26/23 HISTORY: ORDERING SYSTEM PROVIDED HISTORY: Known DVT, comparison ultrasound TECHNOLOGIST PROVIDED HISTORY: Reason for exam:->Known DVT, comparison ultrasound What reading provider will be dictating this exam?->CRC FINDINGS: The

## 2023-05-05 NOTE — CONSULTS
GENERAL SURGERY  CONSULT NOTE  5/5/2023    Physician Consulted: Dr. Mariya Adrian  Reason for Consult: ELVIS Naylor is a 46 y.o. female who presents for evaluation of syncope and dyspnea. Past medical history remarkable for smoking, GERD, diverticulitis status post colectomy with primary anastomosis, cholecystectomy, ventral hernia repair with mesh. General surgery consulted for anemia/GI bleed. Patient states she was smoking a cigarette when she fainted. When she awoke she felt short of breath and EMS arrived to take her to the emergency department. Patient's recent history pertinent for left ankle fracture and surgery approximately 2 months ago. She states she was in a cast and developed a DVT of her left lower extremity. At that time she was started on Eliquis. She states she has been on Eliquis for 1 week. Patient denies any nausea abdominal pain emesis. CTA pulmonary today with evidence of subsegmental right sided pulmonary emboli. Initial labs remarkable for globin of 8.2. Repeat CBC hemoglobin 12.9.       Past Medical History:   Diagnosis Date    Acute deep vein thrombosis (DVT) of popliteal vein of left lower extremity ([address] 5/5/2023    Acute pulmonary embolism without acute cor pulmonale (HCC) 5/5/2023    Arthritis     Brain venous angioma (HCC)     Bursitis     hips    Cat scratch of left lower leg     Class 3 severe obesity due to excess calories with body mass index (BMI) of 40.0 to 44.9 in adult [facility])     COPD (chronic obstructive pulmonary disease) ([facility] [address]     Dermatophytosis 5/5/2023    Diverticulitis     GERD (gastroesophageal reflux disease)     Incisional hernia with obstruction but no gangrene     Strep throat 2021       Past Surgical History:   Procedure Laterality Date    ANKLE FRACTURE SURGERY Left 2/24/2023    TTC FUSION LEFT ANKLE AND HARVEST OF GRAFT [facility] [address] [facility]
Vascular : Pt seen,chart,lab and x rays reviewed. Assessment: 1. Left popliteal vein DVT involving the calf, based upon the venous ultrasound study done 26 April, similar findings noted on the ultrasound done on 1 May associate with pulm embolus, on the right lower lobe, on the CT scan that was done today, also noted similar findings on the CT scan done in April 26    2. Repeat CBC, hemoglobin 12.9 g    Plan: Discussed with the patient and Dr. Ev Murray in the emergency room, no need for any change in anticoagulation plans for placement of IVC filter etc. from a vascular perspective, because of strongly positive stool for occult blood, consider general surgery consultation for monitoring    Full consult to follow.     Thank you    Rhett Myers MD
g      Discussed with the patient and Dr. Suni Barbosa in the emergency room, as the patient's CBC is stable, no need for any change in anticoagulation plans or placement of IVC filter etc. from a vascular perspective, because of strongly positive stool for occult blood, consider general surgery consultation for monitoring    Recommend monitoring of the CBC for the next few days and please call us with any drop in hemoglobin count    Vascular service will sign of the case and see the patient as needed    Joselyn Islas MD

## 2023-05-05 NOTE — PROGRESS NOTES
Notified attending that patient asking for something else for pain. States every 8 hours is not going to work.

## 2023-05-05 NOTE — ED NOTES
Radiology Procedure Waiver   Name: Martha Mahmood  : 1970  MRN: [id number]    Date:  23    Time: 10:44 AM EDT    Benefits of immediately proceeding with Radiology exam(s) without pre-testing outweigh the risks or are not indicated as specified below and therefore the following is/are being waived:    [] Pregnancy test   [] Patients LMP on-time and regular.   [] Patient had Tubal Ligation or has other Contraception Device. [] Patient  is Menopausal or Premenarcheal.    [] Patient had Full or Partial Hysterectomy. [] Protocol for Iodine allergy    [] MRI Questionnaire     [x] BUN/Creatinine   [] Patient age w/no hx of renal dysfunction. [] Patient on Dialysis. [] Recent Normal Labs.   Electronically signed by Cholo Gaines DO on 23 at 10:44 AM EDT               Cholo Gaines DO  Resident  23 [phone]

## 2023-05-06 VITALS
SYSTOLIC BLOOD PRESSURE: 123 MMHG | DIASTOLIC BLOOD PRESSURE: 78 MMHG | TEMPERATURE: 97.5 F | HEIGHT: 62 IN | OXYGEN SATURATION: 98 % | HEART RATE: 73 BPM | RESPIRATION RATE: 18 BRPM | BODY MASS INDEX: 45.25 KG/M2 | WEIGHT: 245.9 LBS

## 2023-05-06 LAB
ALBUMIN SERPL-MCNC: 3.7 G/DL (ref 3.5–5.2)
ALP SERPL-CCNC: 74 U/L (ref 35–104)
ALT SERPL-CCNC: 78 U/L (ref 0–32)
ANION GAP SERPL CALCULATED.3IONS-SCNC: 8 MMOL/L (ref 7–16)
AST SERPL-CCNC: 59 U/L (ref 0–31)
BASOPHILS # BLD: 0.02 E9/L (ref 0–0.2)
BASOPHILS NFR BLD: 0.4 % (ref 0–2)
BILIRUB SERPL-MCNC: 0.3 MG/DL (ref 0–1.2)
BUN SERPL-MCNC: 12 MG/DL (ref 6–20)
CALCIUM SERPL-MCNC: 8.9 MG/DL (ref 8.6–10.2)
CHLORIDE SERPL-SCNC: 101 MMOL/L (ref 98–107)
CO2 SERPL-SCNC: 27 MMOL/L (ref 22–29)
CREAT SERPL-MCNC: 0.9 MG/DL (ref 0.5–1)
EOSINOPHIL # BLD: 0.14 E9/L (ref 0.05–0.5)
EOSINOPHIL NFR BLD: 2.8 % (ref 0–6)
ERYTHROCYTE [DISTWIDTH] IN BLOOD BY AUTOMATED COUNT: 16.9 FL (ref 11.5–15)
GLUCOSE SERPL-MCNC: 105 MG/DL (ref 74–99)
HCT VFR BLD AUTO: 40.6 % (ref 34–48)
HCT VFR BLD AUTO: 40.9 % (ref 34–48)
HGB BLD-MCNC: 12.7 G/DL (ref 11.5–15.5)
HGB BLD-MCNC: 12.8 G/DL (ref 11.5–15.5)
IMM GRANULOCYTES # BLD: 0.02 E9/L
IMM GRANULOCYTES NFR BLD: 0.4 % (ref 0–5)
LYMPHOCYTES # BLD: 1.06 E9/L (ref 1.5–4)
LYMPHOCYTES NFR BLD: 20.8 % (ref 20–42)
MCH RBC QN AUTO: 31.8 PG (ref 26–35)
MCHC RBC AUTO-ENTMCNC: 31.1 % (ref 32–34.5)
MCV RBC AUTO: 102.3 FL (ref 80–99.9)
METER GLUCOSE: 136 MG/DL (ref 74–99)
MONOCYTES # BLD: 0.41 E9/L (ref 0.1–0.95)
MONOCYTES NFR BLD: 8.1 % (ref 2–12)
NEUTROPHILS # BLD: 3.44 E9/L (ref 1.8–7.3)
NEUTS SEG NFR BLD: 67.5 % (ref 43–80)
PLATELET # BLD AUTO: 201 E9/L (ref 130–450)
PMV BLD AUTO: 9.7 FL (ref 7–12)
POTASSIUM SERPL-SCNC: 4.2 MMOL/L (ref 3.5–5)
PROT SERPL-MCNC: 6.1 G/DL (ref 6.4–8.3)
RBC # BLD AUTO: 4 E12/L (ref 3.5–5.5)
SODIUM SERPL-SCNC: 136 MMOL/L (ref 132–146)
TROPONIN, HIGH SENSITIVITY: 7 NG/L (ref 0–9)
WBC # BLD: 5.1 E9/L (ref 4.5–11.5)

## 2023-05-06 PROCEDURE — 85014 HEMATOCRIT: CPT

## 2023-05-06 PROCEDURE — 80053 COMPREHEN METABOLIC PANEL: CPT

## 2023-05-06 PROCEDURE — 6370000000 HC RX 637 (ALT 250 FOR IP): Performed by: INTERNAL MEDICINE

## 2023-05-06 PROCEDURE — 82962 GLUCOSE BLOOD TEST: CPT

## 2023-05-06 PROCEDURE — G0378 HOSPITAL OBSERVATION PER HR: HCPCS

## 2023-05-06 PROCEDURE — A4216 STERILE WATER/SALINE, 10 ML: HCPCS | Performed by: INTERNAL MEDICINE

## 2023-05-06 PROCEDURE — 85018 HEMOGLOBIN: CPT

## 2023-05-06 PROCEDURE — 2580000003 HC RX 258: Performed by: INTERNAL MEDICINE

## 2023-05-06 PROCEDURE — 6360000002 HC RX W HCPCS: Performed by: INTERNAL MEDICINE

## 2023-05-06 PROCEDURE — C9113 INJ PANTOPRAZOLE SODIUM, VIA: HCPCS | Performed by: INTERNAL MEDICINE

## 2023-05-06 PROCEDURE — 96376 TX/PRO/DX INJ SAME DRUG ADON: CPT

## 2023-05-06 PROCEDURE — 36415 COLL VENOUS BLD VENIPUNCTURE: CPT

## 2023-05-06 PROCEDURE — 85025 COMPLETE CBC W/AUTO DIFF WBC: CPT

## 2023-05-06 PROCEDURE — 94640 AIRWAY INHALATION TREATMENT: CPT

## 2023-05-06 RX ORDER — LEVETIRACETAM 500 MG/1
500 TABLET ORAL 2 TIMES DAILY
Status: DISCONTINUED | OUTPATIENT
Start: 2023-05-06 | End: 2023-05-06 | Stop reason: HOSPADM

## 2023-05-06 RX ORDER — OXYCODONE HYDROCHLORIDE 5 MG/1
5 TABLET ORAL EVERY 8 HOURS PRN
Qty: 6 TABLET | Refills: 0 | Status: SHIPPED | OUTPATIENT
Start: 2023-05-06 | End: 2023-05-08

## 2023-05-06 RX ADMIN — ACETAMINOPHEN 650 MG: 325 TABLET ORAL at 00:22

## 2023-05-06 RX ADMIN — OXYCODONE 5 MG: 5 TABLET ORAL at 09:16

## 2023-05-06 RX ADMIN — LEVETIRACETAM 500 MG: 500 TABLET, FILM COATED ORAL at 11:36

## 2023-05-06 RX ADMIN — PANTOPRAZOLE SODIUM 40 MG: 40 INJECTION, POWDER, FOR SOLUTION INTRAVENOUS at 08:31

## 2023-05-06 RX ADMIN — APIXABAN 5 MG: 5 TABLET, FILM COATED ORAL at 08:31

## 2023-05-06 RX ADMIN — IPRATROPIUM BROMIDE AND ALBUTEROL SULFATE 1 AMPULE: 2.5; .5 SOLUTION RESPIRATORY (INHALATION) at 11:57

## 2023-05-06 RX ADMIN — Medication: at 08:31

## 2023-05-06 RX ADMIN — SODIUM CHLORIDE, PRESERVATIVE FREE 10 ML: 5 INJECTION INTRAVENOUS at 08:31

## 2023-05-06 RX ADMIN — IPRATROPIUM BROMIDE AND ALBUTEROL SULFATE 1 AMPULE: 2.5; .5 SOLUTION RESPIRATORY (INHALATION) at 09:31

## 2023-05-06 RX ADMIN — OXYCODONE 5 MG: 5 TABLET ORAL at 00:23

## 2023-05-06 RX ADMIN — OXYCODONE 5 MG: 5 TABLET ORAL at 04:46

## 2023-05-06 RX ADMIN — LEVOTHYROXINE SODIUM 50 MCG: 0.1 TABLET ORAL at 05:00

## 2023-05-06 ASSESSMENT — PAIN DESCRIPTION - ONSET: ONSET: ON-GOING

## 2023-05-06 ASSESSMENT — PAIN DESCRIPTION - LOCATION
LOCATION: LEG

## 2023-05-06 ASSESSMENT — PAIN DESCRIPTION - ORIENTATION
ORIENTATION: LEFT

## 2023-05-06 ASSESSMENT — PAIN SCALES - GENERAL
PAINLEVEL_OUTOF10: 10
PAINLEVEL_OUTOF10: 4
PAINLEVEL_OUTOF10: 8
PAINLEVEL_OUTOF10: 8

## 2023-05-06 ASSESSMENT — PAIN DESCRIPTION - DESCRIPTORS
DESCRIPTORS: SHARP;SHOOTING;STABBING
DESCRIPTORS: ACHING;DISCOMFORT;SORE;TENDER
DESCRIPTORS: SHARP;SHOOTING;DULL

## 2023-05-06 ASSESSMENT — PAIN DESCRIPTION - PAIN TYPE: TYPE: ACUTE PAIN

## 2023-05-06 ASSESSMENT — PAIN DESCRIPTION - FREQUENCY: FREQUENCY: CONTINUOUS

## 2023-05-06 ASSESSMENT — PAIN - FUNCTIONAL ASSESSMENT: PAIN_FUNCTIONAL_ASSESSMENT: ACTIVITIES ARE NOT PREVENTED

## 2023-05-06 NOTE — DISCHARGE INSTRUCTIONS
Please call your PCP and ask them to check a CBC and CMP in 3-5 days    Call and schedule follow up appointments as directed in this packet. Be sure to attend your appointments. If your symptoms worsen after leaving the hospital please call your PCP. If it is a medical emergency call 911 or go to the nearest emergency room.

## 2023-05-06 NOTE — PROGRESS NOTES
RN called ED to follow up on missing bracelet. ED said they will check in lost and found then call back if they find anything.

## 2023-05-06 NOTE — PATIENT CARE CONFERENCE
P Quality Flow/Interdisciplinary Rounds Progress Note        Quality Flow Rounds held on May 6, 2023    Disciplines Attending:  Bedside Nurse, , , and Nursing Unit Leadership    Martha Mahmood was admitted on 5/5/2023 10:49 AM    Anticipated Discharge Date:       Disposition:    Guillermo Score:  Guillermo Scale Score: 20    Readmission Risk              Risk of Unplanned Readmission:  29           Discussed patient goal for the day, patient clinical progression, and barriers to discharge.   The following Goal(s) of the Day/Commitment(s) have been identified:    Report labs/diagnostics     Alpesh Lawler RN  May 6, 2023

## 2023-05-06 NOTE — PLAN OF CARE
Problem: Discharge Planning  Goal: Discharge to home or other facility with appropriate resources  5/6/2023 1138 by Abhilash Butler RN  Outcome: Completed

## 2023-05-06 NOTE — DISCHARGE SUMMARY
Hospital Medicine Discharge Summary    Patient ID: Marda Mis      Patient's PCP: Meg Marsh DO    Admit Date: 5/5/2023     Discharge Date:   05/06/23    Admitting Physician: Madison Lopez MD     Discharge Physician: Judith Hansen MD     Discharge Diagnoses: Active Hospital Problems    Diagnosis Date Noted    Arthritis of left ankle [M19.072] 02/24/2023     Priority: Medium    Dermatophytosis [B35.9] 05/05/2023    Acute deep vein thrombosis (DVT) of popliteal vein of left lower extremity ([address] [I82.432] 05/05/2023    Acute pulmonary embolism without acute cor pulmonale (HCC) [I26.99] 05/05/2023    Acute pulmonary embolism without acute cor pulmonale, unspecified pulmonary embolism type ([address] [I26.99] 05/05/2023    SOB (shortness of breath) [R06.02] 05/05/2023    Multiple subsegmental pulmonary emboli without acute cor pulmonale ([address] [I26.94] 05/05/2023       The patient was seen and examined on day of discharge and this discharge summary is in conjunction with any daily progress note from day of discharge. Hospital Course:     46 y.o. female presents the ED for evaluation of syncope and dyspnea. Patient was examined by medical history for smoking, GERD diverticulitis. We are consulted for possible GI bleed. Apparently patient in the hospital was found unresponsive after she went out to smoke cigarettes and hypoxic tachycardic instrument subsequently found to have pulmonary emboli patient had a prior acute DVT left calf and left popliteal vein on April 26. Her initial hemoglobin was 13-14 and she had a repeat check with acute drop to 8.4. Eliquis was held and vascular surgery and general surgery were consulted. Repeat check was 12 without any intervention. The value of 8.4 was thought to be due to lab error and Eliquis was resumed with no vascular intervention or endoscopy needed. No concern for GI bleed.  Patient was discharged home in stable condition with instructions to follow up

## 2023-05-06 NOTE — PLAN OF CARE
Problem: Discharge Planning  Goal: Discharge to home or other facility with appropriate resources  Outcome: Progressing     Problem: ABCDS Injury Assessment  Goal: Absence of physical injury  Outcome: Progressing     Problem: Skin/Tissue Integrity  Goal: Absence of new skin breakdown  Description: 1. Monitor for areas of redness and/or skin breakdown  2. Assess vascular access sites hourly  3. Every 4-6 hours minimum:  Change oxygen saturation probe site  4. Every 4-6 hours:  If on nasal continuous positive airway pressure, respiratory therapy assess nares and determine need for appliance change or resting period.   Outcome: Progressing     Problem: Safety - Adult  Goal: Free from fall injury  Outcome: Progressing     Problem: Risk for Elopement  Goal: Patient will not exit the unit/facility without proper excort  Outcome: Progressing

## 2023-05-12 NOTE — DISCHARGE INSTR - COC
Continuity of Care Form    Patient Name: Cheri Staton   :  1970  MRN:  [id number]    Admit date:  2023  Discharge date:  3/1/2023    Code Status Order: Full Code   Advance Directives:     Admitting Physician:  Viki Crabtree DO  PCP: Wendy Stevenson DO    Discharging Nurse: [facility] Unit/Room#: 0330/0330-01  Discharging Unit Phone Number: [phone]    Emergency Contact:   Extended Emergency Contact Information  Primary Emergency Contact: [address] Phone: [phone]  Mobile Phone: [phone]  Relation: Child  Preferred language: English   needed? No  Secondary Emergency Contact: Stas Mckeon Phone: [phone]  Relation: Parent  Preferred language: English   needed?  No    Past Surgical History:  Past Surgical History:   Procedure Laterality Date    CARPAL TUNNEL RELEASE Left      SECTION      CHOLECYSTECTOMY      COLECTOMY  2016    FOREARM FRACTURE SURGERY Left     fracture of ulna s/p repair    HERNIA REPAIR      HERNIA REPAIR  2015    incarcerated hernia repair    HERNIA REPAIR N/A 2021    INCISIONAL HERNIA REPAIR WITH MESH, POSSIBLE COMPONENT SEPARATION  LAPAROSCOPIC ROBOTIC XI ASSISTED (CPT 19240) performed by Chetan Townsend MD at [place] [address] (CERVIX STATUS UNKNOWN)      NERVE BLOCK      sacroiliac bilateral 2012    PELVIC FRACTURE SURGERY      VENTRAL HERNIA REPAIR  2015       Immunization History:   Immunization History   Administered Date(s) Administered    Td (Adult), 2 Lf Tetanus Toxoid, Pf (Td, Absorbed) 11/10/2022       Active Problems:  Patient Active Problem List   Diagnosis Code    GERD (gastroesophageal reflux disease) K21.9    Osteoarthritis cervical spine M47.812    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    Small bowel obstruction ([address] K56.609    Morbid obesity (HCC) E66.01    Hypokalemia E87.6    Venous insufficiency I87.2    SBO (small bowel obstruction) ([address] K56.609 Patient states that yesterday she received an injection at her podiatrists office for pain in her R foot    The injection contained Bupivacaine and Graimcinolone    She said that it helped with the pain but she has been unable to sleep at all since   She said she only slept for about 20 min last night and she has been on the go ever since    She said she tried to call her podiatrist's office to discuss this, but they are closed today     She is asking if you have any advice for what she can do     She also wants you to be aware that when she was there yesterday her BP was 170/102 Incarcerated hernia K46.0    Intractable abdominal pain R10.9    Incarcerated incisional hernia K43.0    Disruption or dehiscence of closure of fascia, superficial or muscular T81. 32XA    Intractable nausea and vomiting R11.2    Intractable vomiting R11.10    Brain mass G93.89    Altered mental status R41.82    Sepsis secondary to UTI (HCC) A41.9    Cellulitis L03.90    Cellulitis of left lower extremity L03.116    Postoperative pain G89.18    Arthritis of left ankle M19.072       Isolation/Infection:   Isolation            No Isolation          Patient Infection Status       Infection Onset Added Last Indicated Last Indicated By Review Planned Expiration Resolved Resolved By    None active    Resolved    COVID-19 (Rule Out) 23 Respiratory Panel, Molecular, with COVID-19 (Restricted: peds pts or suitable admitted adults) (Ordered)   23 Rule-Out Test Resulted    COVID-19 (Rule Out) 22 Respiratory Panel, Molecular, with COVID-19 (Restricted: peds pts or suitable admitted adults) (Ordered)   22 Rule-Out Test Resulted    COVID-19 21 COVID-19   21     COVID-19 (Rule Out) 21 COVID-19 (Ordered)   21 Rule-Out Test Resulted    COVID-19 (Rule Out) 21 Respiratory Panel, Molecular, with COVID-19 (Restricted: peds pts or suitable admitted adults) (Ordered)   21 Rule-Out Test Resulted    COVID-19 (Rule Out) 21 COVID-19, Rapid (Ordered)   21 Rule-Out Test Resulted    Rapid is negative    COVID-19 (Rule Out) 21 COVID-19, Rapid (Ordered)   21 Rule-Out Test Resulted    C-diff Rule Out 21 CLOSTRIDIUM DIFFICILE EIA (Ordered)   21 Al Devine RN    Pt doesn't have liquid stool  ORDER DISCONTINUED    C-diff Rule Out 21 C. difficile toxin Molecular (Ordered)   21 Al Devine RN    Order discontinued    COVID-19 (Rule Out) 03/10/21 03/10/21 03/10/21 Respiratory Panel, Molecular, with COVID-19 (Restricted: peds pts or suitable admitted adults) (Ordered)   03/10/21 Rule-Out Test Resulted    COVID-19 (Rule Out) 02/20/21 02/20/21 02/20/21 COVID-19, Rapid (Ordered)   02/20/21 Rule-Out Test Resulted            Nurse Assessment:  Last Vital Signs: /74   Pulse 75   Temp 97.7 °F (36.5 °C) (Oral)   Resp 16   Ht 5' 2\" (1.575 m)   Wt 229 lb (103.9 kg)   SpO2 93%   BMI 41.88 kg/m²     Last documented pain score (0-10 scale): Pain Level: 10  Last Weight:   Wt Readings from Last 1 Encounters:   02/24/23 229 lb (103.9 kg)     Mental Status:  oriented and alert    IV Access:  - None    Nursing Mobility/ADLs:  Walking   Dependent  Transfer  Assisted  Bathing  Assisted  Dressing  Assisted  Toileting  Assisted  Feeding  Independent  Med Admin  Independent  Med Delivery   whole    Wound Care Documentation and Therapy:  Incision 02/24/23 Foot Anterior; Left (Active)   Dressing Status Old drainage noted;Dry; Intact 02/26/23 2045   Dressing/Treatment Ace wrap 02/26/23 2045   Incision Assessment Other (Comment) 02/25/23 0915   Drainage Amount None 02/24/23 1014   Odor None 02/24/23 1014   Number of days: 3        Elimination:  Continence: Bowel: Yes  Bladder: Yes  Urinary Catheter: None   Colostomy/Ileostomy/Ileal Conduit: No       Date of Last BM: ***    Intake/Output Summary (Last 24 hours) at 2/27/2023 0610  Last data filed at 2/27/2023 0539  Gross per 24 hour   Intake 2560 ml   Output 2200 ml   Net 360 ml     I/O last 3 completed shifts: In: 2440 [P.O.:2140; I.V.:300]  Out: 4166 [Urine:4900; Drains:3]    Safety Concerns: At Risk for Falls    Impairments/Disabilities:      None    Nutrition Therapy:  Current Nutrition Therapy:   - Oral Diet:  Carb Control 5 carbs/meal (2000kcals/day)    Routes of Feeding: Oral  Liquids:  Thin Liquids  Daily Fluid Restriction: no  Last Modified Barium Swallow with Video (Video Swallowing Test): not done    Treatments at the Time of Hospital Discharge:   Respiratory Treatments: ***  Oxygen Therapy:  is not on home oxygen therapy. Ventilator:    - No ventilator support    Rehab Therapies: Physical Therapy and Occupational Therapy  Weight Bearing Status/Restrictions: Non-weight bearing on left leg  Other Medical Equipment (for information only, NOT a DME order):  walker and bedside commode  Other Treatments: ***    Patient's personal belongings (please select all that are sent with patient):  Ramila    RN SIGNATURE:  Electronically signed by Reyna Bates RN on 3/1/23 at 11:46 AM EST    CASE MANAGEMENT/SOCIAL WORK SECTION    Inpatient Status Date: 2023    Readmission Risk Assessment Score:  Readmission Risk              Risk of Unplanned Readmission:  22           Discharging to Facility/ Agency   Name: [facility]  Address:  Phone: [phone]        Fax: [phone]    Dialysis Facility (if applicable)   Name:  Address:  Dialysis Schedule:  Phone:  Fax:    / signature: Electronically signed by MOON Melara on 23 at 4:29 PM EST    PHYSICIAN SECTION    Prognosis: Good    Condition at Discharge: Stable    Rehab Potential (if transferring to Rehab): Good    Recommended Labs or Other Treatments After Discharge: ***    Physician Certification: I certify the above information and transfer of Gerrianne Collet  is necessary for the continuing treatment of the diagnosis listed and that she requires [facility] for less 30 days. Update Admission H&P: {CHP DME Changes in UDW}    PHYSICIAN SIGNATURE:  Electronically signed by Edward Mcmahan DO on 23 at 6:10 AM EST  Terryl Cooks. Bisel D.O., F.A.C.O.I.  3/3/2023  12:00 PM
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
## 2023-08-07 NOTE — ED NOTES
Pt to xray via kayleigh Espinoza, OMI  04/26/23 3952 Detail Level: Detailed Show Applicator Variable?: Yes Consent: The patient's consent was obtained including but not limited to risks of crusting, scabbing, blistering, scarring, darker or lighter pigmentary change, recurrence, incomplete removal and infection. Render Note In Bullet Format When Appropriate: No Application Tool (Optional): Liquid Nitrogen Sprayer Duration Of Freeze Thaw-Cycle (Seconds): 5 Post-Care Instructions: I reviewed with the patient in detail post-care instructions. Patient is to wear sunprotection, and avoid picking at any of the treated lesions. Pt may apply Vaseline to crusted or scabbing areas. Number Of Freeze-Thaw Cycles: 2 freeze-thaw cycles

## 2023-08-10 ENCOUNTER — HOSPITAL ENCOUNTER (EMERGENCY)
Age: 53
Discharge: HOME | End: 2023-08-10
Payer: MEDICAID

## 2023-08-10 VITALS
WEIGHT: 230 LBS | OXYGEN SATURATION: 100 % | SYSTOLIC BLOOD PRESSURE: 136 MMHG | TEMPERATURE: 97.4 F | DIASTOLIC BLOOD PRESSURE: 84 MMHG | HEART RATE: 79 BPM | BODY MASS INDEX: 42.33 KG/M2 | RESPIRATION RATE: 20 BRPM | HEIGHT: 62 IN

## 2023-08-10 DIAGNOSIS — K08.89 PAIN, DENTAL: Primary | ICD-10-CM

## 2023-08-10 PROCEDURE — 99283 EMERGENCY DEPT VISIT LOW MDM: CPT

## 2023-08-10 PROCEDURE — 6370000000 HC RX 637 (ALT 250 FOR IP): Performed by: NURSE PRACTITIONER

## 2023-08-10 RX ORDER — CHLORHEXIDINE GLUCONATE 0.12 MG/ML
15 RINSE ORAL 2 TIMES DAILY
Qty: 420 ML | Refills: 0 | Status: SHIPPED | OUTPATIENT
Start: 2023-08-10 | End: 2023-08-24

## 2023-08-10 RX ORDER — AMOXICILLIN AND CLAVULANATE POTASSIUM 875; 125 MG/1; MG/1
1 TABLET, FILM COATED ORAL ONCE
Status: COMPLETED | OUTPATIENT
Start: 2023-08-10 | End: 2023-08-10

## 2023-08-10 RX ORDER — LIDOCAINE HYDROCHLORIDE 20 MG/ML
15 SOLUTION OROPHARYNGEAL
Qty: 100 ML | Refills: 0 | Status: SHIPPED | OUTPATIENT
Start: 2023-08-10

## 2023-08-10 RX ORDER — AMOXICILLIN AND CLAVULANATE POTASSIUM 875; 125 MG/1; MG/1
1 TABLET, FILM COATED ORAL 2 TIMES DAILY
Qty: 20 TABLET | Refills: 0 | Status: SHIPPED | OUTPATIENT
Start: 2023-08-10 | End: 2023-08-20

## 2023-08-10 RX ORDER — LIDOCAINE HYDROCHLORIDE 20 MG/ML
15 SOLUTION OROPHARYNGEAL ONCE
Status: COMPLETED | OUTPATIENT
Start: 2023-08-10 | End: 2023-08-10

## 2023-08-10 RX ADMIN — AMOXICILLIN AND CLAVULANATE POTASSIUM 1 TABLET: 875; 125 TABLET, FILM COATED ORAL at 22:11

## 2023-08-10 RX ADMIN — Medication 15 ML: at 22:11

## 2023-08-10 ASSESSMENT — PAIN DESCRIPTION - PAIN TYPE: TYPE: ACUTE PAIN

## 2023-08-10 ASSESSMENT — PAIN - FUNCTIONAL ASSESSMENT
PAIN_FUNCTIONAL_ASSESSMENT: 0-10
PAIN_FUNCTIONAL_ASSESSMENT: NONE - DENIES PAIN

## 2023-08-10 ASSESSMENT — PAIN DESCRIPTION - LOCATION: LOCATION: TEETH

## 2023-08-10 ASSESSMENT — PAIN SCALES - GENERAL: PAINLEVEL_OUTOF10: 10

## 2023-08-10 ASSESSMENT — PAIN DESCRIPTION - DESCRIPTORS: DESCRIPTORS: ACHING;SHARP

## 2023-08-10 ASSESSMENT — PAIN DESCRIPTION - ORIENTATION: ORIENTATION: RIGHT;LOWER

## 2023-10-30 ENCOUNTER — APPOINTMENT (OUTPATIENT)
Dept: CT IMAGING | Age: 53
End: 2023-10-30
Attending: EMERGENCY MEDICINE
Payer: MEDICAID

## 2023-10-30 ENCOUNTER — HOSPITAL ENCOUNTER (EMERGENCY)
Age: 53
Discharge: HOME | End: 2023-10-31
Attending: EMERGENCY MEDICINE
Payer: MEDICAID

## 2023-10-30 DIAGNOSIS — S09.90XA INJURY OF HEAD, INITIAL ENCOUNTER: ICD-10-CM
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
## 2023-10-30 LAB
ALBUMIN SERPL-MCNC: 4 G/DL (ref 3.5–5.2)
ALP SERPL-CCNC: 71 U/L (ref 35–104)
ALT SERPL-CCNC: 18 U/L (ref 0–32)
ANION GAP SERPL CALCULATED.3IONS-SCNC: 7 MMOL/L (ref 7–16)
AST SERPL-CCNC: 12 U/L (ref 0–31)
BASOPHILS # BLD: 0.03 K/UL (ref 0–0.2)
BASOPHILS NFR BLD: 0 % (ref 0–2)
BILIRUB SERPL-MCNC: 0.2 MG/DL (ref 0–1.2)
BUN SERPL-MCNC: 10 MG/DL (ref 6–20)
CALCIUM SERPL-MCNC: 9.1 MG/DL (ref 8.6–10.2)
CHLORIDE SERPL-SCNC: 109 MMOL/L (ref 98–107)
CHP ED QC CHECK: NORMAL
CO2 SERPL-SCNC: 26 MMOL/L (ref 22–29)
CREAT SERPL-MCNC: 0.8 MG/DL (ref 0.5–1)
EOSINOPHIL # BLD: 0.11 K/UL (ref 0.05–0.5)
EOSINOPHILS RELATIVE PERCENT: 1 % (ref 0–6)
ERYTHROCYTE [DISTWIDTH] IN BLOOD BY AUTOMATED COUNT: 13.1 % (ref 11.5–15)
GFR SERPL CREATININE-BSD FRML MDRD: >60 ML/MIN/1.73M2
GLUCOSE BLD-MCNC: 120 MG/DL (ref 74–99)
GLUCOSE BLD-MCNC: NORMAL MG/DL
GLUCOSE SERPL-MCNC: 117 MG/DL (ref 74–99)
HCT VFR BLD AUTO: 42.7 % (ref 34–48)
HGB BLD-MCNC: 13.5 G/DL (ref 11.5–15.5)
IMM GRANULOCYTES # BLD AUTO: 0.06 K/UL (ref 0–0.58)
IMM GRANULOCYTES NFR BLD: 1 % (ref 0–5)
LYMPHOCYTES NFR BLD: 1.52 K/UL (ref 1.5–4)
LYMPHOCYTES RELATIVE PERCENT: 16 % (ref 20–42)
MCH RBC QN AUTO: 32 PG (ref 26–35)
MCHC RBC AUTO-ENTMCNC: 31.6 G/DL (ref 32–34.5)
MCV RBC AUTO: 101.2 FL (ref 80–99.9)
MONOCYTES NFR BLD: 0.59 K/UL (ref 0.1–0.95)
MONOCYTES NFR BLD: 6 % (ref 2–12)
NEUTROPHILS NFR BLD: 77 % (ref 43–80)
NEUTS SEG NFR BLD: 7.52 K/UL (ref 1.8–7.3)
PLATELET # BLD AUTO: 202 K/UL (ref 130–450)
PMV BLD AUTO: 9.9 FL (ref 7–12)
POTASSIUM SERPL-SCNC: 4 MMOL/L (ref 3.5–5)
PROT SERPL-MCNC: 6.6 G/DL (ref 6.4–8.3)
RBC # BLD AUTO: 4.22 M/UL (ref 3.5–5.5)
SODIUM SERPL-SCNC: 142 MMOL/L (ref 132–146)
WBC OTHER # BLD: 9.8 K/UL (ref 4.5–11.5)

## 2023-10-30 PROCEDURE — 85025 COMPLETE CBC W/AUTO DIFF WBC: CPT

## 2023-10-30 PROCEDURE — 82962 GLUCOSE BLOOD TEST: CPT

## 2023-10-30 PROCEDURE — 99284 EMERGENCY DEPT VISIT MOD MDM: CPT

## 2023-10-30 PROCEDURE — 80053 COMPREHEN METABOLIC PANEL: CPT

## 2023-10-30 PROCEDURE — 70450 CT HEAD/BRAIN W/O DYE: CPT

## 2023-10-30 PROCEDURE — 72125 CT NECK SPINE W/O DYE: CPT

## 2023-10-30 ASSESSMENT — PAIN SCALES - GENERAL: PAINLEVEL_OUTOF10: 8

## 2023-10-30 ASSESSMENT — PAIN - FUNCTIONAL ASSESSMENT: PAIN_FUNCTIONAL_ASSESSMENT: 0-10

## 2023-10-30 ASSESSMENT — PAIN DESCRIPTION - LOCATION: LOCATION: BACK;HEAD

## 2023-10-31 ENCOUNTER — OFFICE VISIT (OUTPATIENT)
Dept: NEUROSURGERY | Age: 53
End: 2023-10-31
Payer: MEDICAID

## 2023-10-31 VITALS
RESPIRATION RATE: 18 BRPM | DIASTOLIC BLOOD PRESSURE: 70 MMHG | SYSTOLIC BLOOD PRESSURE: 145 MMHG | OXYGEN SATURATION: 97 % | HEIGHT: 63 IN | WEIGHT: 245 LBS | TEMPERATURE: 98.6 F | BODY MASS INDEX: 43.41 KG/M2 | HEART RATE: 81 BPM

## 2023-10-31 VITALS
SYSTOLIC BLOOD PRESSURE: 144 MMHG | OXYGEN SATURATION: 95 % | BODY MASS INDEX: 43.41 KG/M2 | DIASTOLIC BLOOD PRESSURE: 86 MMHG | HEART RATE: 73 BPM | WEIGHT: 245 LBS | HEIGHT: 63 IN

## 2023-10-31 DIAGNOSIS — D32.9 MENINGIOMA (HCC): Primary | ICD-10-CM

## 2023-10-31 PROCEDURE — 99202 OFFICE O/P NEW SF 15 MIN: CPT

## 2023-10-31 PROCEDURE — 99203 OFFICE O/P NEW LOW 30 MIN: CPT | Performed by: STUDENT IN AN ORGANIZED HEALTH CARE EDUCATION/TRAINING PROGRAM

## 2023-10-31 RX ORDER — GABAPENTIN 600 MG/1
TABLET ORAL
COMMUNITY
Start: 2023-10-19

## 2023-10-31 RX ORDER — TIOTROPIUM BROMIDE 18 UG/1
CAPSULE ORAL; RESPIRATORY (INHALATION)
COMMUNITY
Start: 2023-10-04

## 2023-10-31 RX ORDER — BUSPIRONE HYDROCHLORIDE 10 MG/1
TABLET ORAL
COMMUNITY
Start: 2023-10-27

## 2023-10-31 RX ORDER — BACLOFEN 10 MG/1
TABLET ORAL
COMMUNITY
Start: 2023-10-09

## 2023-10-31 ASSESSMENT — ENCOUNTER SYMPTOMS
SHORTNESS OF BREATH: 0
PHOTOPHOBIA: 0
TROUBLE SWALLOWING: 0
ABDOMINAL PAIN: 0

## 2023-10-31 ASSESSMENT — PAIN - FUNCTIONAL ASSESSMENT: PAIN_FUNCTIONAL_ASSESSMENT: NONE - DENIES PAIN

## 2023-10-31 NOTE — PROGRESS NOTES
Subjective:      Patient ID: Harry Mccarthy is a 48 y.o. female with a PMH of Meningioma, GERD and osteoarthritis who presents to office to establish care. Patient states she had a MRI of her Brain in 2021 and was diagnosed with a meningioma. Patient states she was seen in the ER yesterday after falling and hitting her head. On exam today, she admits to headaches and light senstivity that has started since she hit her head. She denies any new numbness or weakness. Denies loss of bowel or bladder,  fever, chills, N/V, SOB, or chest pain. Patient is a current smoker, smoking 1/2 ppd and takes Eliquis daily. CT reviewed with patient. Review of Systems   Constitutional:  Negative for chills and fever. HENT:  Negative for trouble swallowing. Eyes:  Negative for photophobia. Respiratory:  Negative for shortness of breath. Cardiovascular:  Negative for chest pain. Gastrointestinal:  Negative for abdominal pain. Endocrine: Negative for heat intolerance. Genitourinary:  Negative for difficulty urinating and flank pain. Musculoskeletal:  Negative for myalgias. Skin:  Negative for wound. Neurological:  Positive for numbness and headaches. Negative for weakness. Psychiatric/Behavioral:  Negative for confusion. Objective:   Physical Exam  HENT:      Head: Normocephalic. Eyes:      Extraocular Movements: Extraocular movements intact. Pupils: Pupils are equal, round, and reactive to light. Cardiovascular:      Rate and Rhythm: Normal rate. Pulmonary:      Effort: Pulmonary effort is normal.   Abdominal:      General: There is no distension. Musculoskeletal:         General: Normal range of motion. Cervical back: Normal range of motion. Skin:     General: Skin is warm and dry. Neurological:      Mental Status: She is alert.       Comments: A&Ox3  CN3-12 intact  Motor Strength full   Sensation intact to light touch   Reflexes normal   (-)drift   Psychiatric:         Thought Content:

## 2023-10-31 NOTE — FLOWSHEET NOTE
Pt presents to the ED secondary to a mechanical fall. Pt states that her wheel chair became, \"stuck. \" While attempting to fix the wheel chair, she fell forward striking her head, injuring her right knee and increasing her chronic lower back pain. Pt denies any LOC, is currently alert and speaking in full sentences. Pt denies any chest pain, SOB or dizziness. Pt has a contusion to the forehead and has no other signs of obvious trauma.

## 2023-10-31 NOTE — ED PROVIDER NOTES
Department of Emergency Medicine   ED  Provider Note  Admit Date/RoomTime: 10/30/2023  8:10 PM  ED Room: [facility]          History of Present Illness:  10/30/23, Time: 11:57 PM EDT  Chief Complaint   Patient presents with    Fall     Tripped over lip of curb into nursing home. [place] and hit head on concrete. +LOC +thinners                Bartolome Pulido is a 48 y.o. female presenting to the ED for fall. Patient is on a wheelchair after fusion of her ankle. She was rolling in her wheelchair when she hit the lip of the curb and fell out of her wheelchair. She did strike her head, she is on anticoagulation, Eliquis, does complain of a frontal headache. Mild in nature. Nothing makes it better or worse. No neck pain or stiffness. No chest pain or abdominal pain. No weakness or numbness in extremities. No other symptoms or complaints. Review of Systems:   Pertinent positives and negatives are stated within HPI, all other systems reviewed and are negative.        --------------------------------------------- PAST HISTORY ---------------------------------------------  Past Medical History:  has a past medical history of Acute deep vein thrombosis (DVT) of popliteal vein of left lower extremity ([address]), Acute pulmonary embolism without acute cor pulmonale ([address]), Arthritis, Brain venous angioma (HCC), Bursitis, Cat scratch of left lower leg, Class 3 severe obesity due to excess calories with body mass index (BMI) of 40.0 to 44.9 in adult [facility]), COPD (chronic obstructive pulmonary disease) ([address]), Dermatophytosis, Diverticulitis, GERD (gastroesophageal reflux disease), Incisional hernia with obstruction but no gangrene, and Strep throat. Past Surgical History:  has a past surgical history that includes Hysterectomy; Cholecystectomy; Pelvic fracture surgery; colectomy (2016); Nerve Block; hernia repair; hernia repair (2015); ventral hernia repair (2015);   section; Forearm fracture surgery (Left);

## 2023-11-17 ENCOUNTER — HOSPITAL ENCOUNTER (OUTPATIENT)
Dept: MRI IMAGING | Age: 53
End: 2023-11-17
Payer: MEDICAID

## 2023-11-17 DIAGNOSIS — D32.9 MENINGIOMA (HCC): ICD-10-CM

## 2023-11-17 PROCEDURE — A9577 INJ MULTIHANCE: HCPCS | Performed by: RADIOLOGY

## 2023-11-17 PROCEDURE — 70553 MRI BRAIN STEM W/O & W/DYE: CPT

## 2023-11-17 PROCEDURE — 6360000004 HC RX CONTRAST MEDICATION: Performed by: RADIOLOGY

## 2023-11-17 RX ADMIN — GADOBENATE DIMEGLUMINE 20 ML: 529 INJECTION, SOLUTION INTRAVENOUS at 14:34

## 2023-12-06 ENCOUNTER — TELEPHONE (OUTPATIENT)
Dept: NEUROSURGERY | Age: 53
End: 2023-12-06

## 2023-12-06 DIAGNOSIS — D32.9 MENINGIOMA (HCC): Primary | ICD-10-CM

## 2023-12-22 ENCOUNTER — HOSPITAL ENCOUNTER (OUTPATIENT)
Dept: MRI IMAGING | Age: 53
Discharge: HOME | End: 2023-12-24
Payer: MEDICAID

## 2023-12-22 DIAGNOSIS — D32.9 MENINGIOMA (HCC): ICD-10-CM

## 2023-12-22 PROCEDURE — 6360000004 HC RX CONTRAST MEDICATION: Performed by: RADIOLOGY

## 2023-12-22 PROCEDURE — 70553 MRI BRAIN STEM W/O & W/DYE: CPT

## 2023-12-22 PROCEDURE — A9577 INJ MULTIHANCE: HCPCS | Performed by: RADIOLOGY

## 2023-12-22 RX ADMIN — GADOBENATE DIMEGLUMINE 20 ML: 529 INJECTION, SOLUTION INTRAVENOUS at 14:32

## 2024-01-05 ENCOUNTER — OFFICE VISIT (OUTPATIENT)
Dept: NEUROSURGERY | Age: 54
End: 2024-01-05
Payer: MEDICAID

## 2024-01-05 DIAGNOSIS — J32.9 SINUSITIS, UNSPECIFIED CHRONICITY, UNSPECIFIED LOCATION: ICD-10-CM

## 2024-01-05 DIAGNOSIS — D32.9 MENINGIOMA (HCC): Primary | ICD-10-CM

## 2024-01-05 DIAGNOSIS — R09.89 SINUS COMPLAINT: ICD-10-CM

## 2024-01-05 PROCEDURE — 99212 OFFICE O/P EST SF 10 MIN: CPT

## 2024-01-05 PROCEDURE — 99213 OFFICE O/P EST LOW 20 MIN: CPT | Performed by: STUDENT IN AN ORGANIZED HEALTH CARE EDUCATION/TRAINING PROGRAM

## 2024-01-05 NOTE — PROGRESS NOTES
Problem Focused Office Visit     Subjective: China Damon is a 53 y.o.  female who has a past medical history of Meningioma, GERD and osteoarthritis who presents for office follow up/review MRI results. Patient states since last visit she has not had any new symptoms. She denies any headaches.  She denies any new numbness or weakness. Denies loss of bowel or bladder,  fever, chills, N/V, SOB, or chest pain. Patient is a current smoker, smoking 1/2 ppd and takes Eliquis daily. MRI reviewed with patient.       Physical Exam  HENT:      Head: Normocephalic.   Eyes:      Pupils: Pupils are equal, round, and reactive to light.   Cardiovascular:      Rate and Rhythm: Normal rate.   Pulmonary:      Effort: Pulmonary effort is normal.   Abdominal:      General: There is no distension.   Musculoskeletal:         General: Normal range of motion.      Cervical back: Normal range of motion.   Skin:     General: Skin is warm and dry.   Neurological:      Mental Status: She is alert.      Comments: A&Ox3  CN3-12 intact  In wheelchair  Motor Strength full   Sensation intact to light touch   Left AFO in place   Psychiatric:         Thought Content: Thought content normal.                   Imagin2023 MRI Brain W/WO Contrast  1. No acute intracranial process.  2. Stable left frontal convexity meningioma measuring 1.8 cm.     Assessment: This is a 53 y.o.  female presenting for office follow up/review MRI. Meningioma stable.      Plan:  -MRI reviewed and discussed in detail  -No neurosurgical interventions required   -Follow-up in neurosurgery clinic in 1 year with repeat MRI  -Call or return to neurosurgery office sooner if symptoms worsen or if new issues arise in the interim.    Electronically signed by Louann Cr PA-C on 2024 at 3:38 PM

## 2024-02-05 ENCOUNTER — TELEPHONE (OUTPATIENT)
Dept: BARIATRICS/WEIGHT MGMT | Age: 54
End: 2024-02-05

## 2024-02-05 NOTE — TELEPHONE ENCOUNTER
A first call was made in an attempt to reach this patient for a referral we received. I left a detailed voicemail to call our office to schedule an initial consult.    Ok to schedule

## 2024-02-16 ENCOUNTER — INITIAL CONSULT (OUTPATIENT)
Dept: BARIATRICS/WEIGHT MGMT | Age: 54
End: 2024-02-16
Payer: MEDICAID

## 2024-02-16 ENCOUNTER — PREP FOR PROCEDURE (OUTPATIENT)
Dept: BARIATRICS/WEIGHT MGMT | Age: 54
End: 2024-02-16

## 2024-02-16 ENCOUNTER — TELEPHONE (OUTPATIENT)
Dept: BARIATRICS/WEIGHT MGMT | Age: 54
End: 2024-02-16

## 2024-02-16 VITALS
HEART RATE: 70 BPM | DIASTOLIC BLOOD PRESSURE: 82 MMHG | SYSTOLIC BLOOD PRESSURE: 130 MMHG | TEMPERATURE: 97.7 F | BODY MASS INDEX: 48.21 KG/M2 | HEIGHT: 62 IN | WEIGHT: 262 LBS | RESPIRATION RATE: 20 BRPM

## 2024-02-16 DIAGNOSIS — K21.9 GASTROESOPHAGEAL REFLUX DISEASE WITHOUT ESOPHAGITIS: ICD-10-CM

## 2024-02-16 DIAGNOSIS — E66.01 MORBID OBESITY DUE TO EXCESS CALORIES (HCC): Primary | ICD-10-CM

## 2024-02-16 PROCEDURE — 99204 OFFICE O/P NEW MOD 45 MIN: CPT | Performed by: SURGERY

## 2024-02-16 RX ORDER — LURASIDONE HYDROCHLORIDE 40 MG/1
TABLET, FILM COATED ORAL
COMMUNITY

## 2024-02-16 NOTE — PATIENT INSTRUCTIONS
What is the next step to proceed with weight loss surgery?    Please be aware that any co-pays or deductibles may be requested prior to testing and / or procedures.    You will need to schedule a psychological evaluation for weight loss surgery.  Patients will be required to complete all psychological testing as required by the mental health provider. Patients must also follow all of the provider's recommendations before weight loss surgery can be scheduled.     The evaluation must be done a standard way for weight loss surgery. We strongly recommend that you contact one of our preferred providers listed below to arrange this:      Kash Granger, [facility]-  [address], [place]   [phone]    Jeremy D[facility]az and [facility]  [address], [place]   [phone]    Dr. Fredrick Lebron, PhD    [address] [place], [place]    [phone]      You will also need to plan on attending a 2 hour nutrition class at [facility] prior to your surgery.  We will schedule this for you when we schedule your surgery.    Please remember to have your labs drawn 10 days prior to your first scheduled dietary appointment.    Please remember, that while we will submit your case to insurance for surgery authorization, it is your responsibility to know if your plan covers weight loss surgery and keep up-to-date with changes to your insurance coverage.  We will do everything possible to help you get approved for weight loss surgery, but cannot guarantee an approval.     Please note that you will not be submitted to your insurance company until all pre-operative testing requirements are met.    Last Surgical Weight Loss:       No data to display

## 2024-02-16 NOTE — PROGRESS NOTES
China Damon  2/16/2024  [facility]    Initial Evaluation  Bariatric Surgery and EGD       Subjective:  CHIEF COMPLAINT: Morbid obesity, malnutrition, Hyperlipidemia, Obstructive Sleep Apnea, Dyspnea on Exertion, GERD, Degenerative Joint Disease (DJD), Depression, Anxiety, Binge Eating Disorder, Bleeding/Clotting Disorder, History of VTE (Venous Thromboembolism), Venous Stasis Disease, and multiple previous abdominal surgeries    HISTORY OF PRESENT ILLNESS: China Damon is a morbidly-obese 53 y.o.  female, who weighs 118.8 kg (262 lb). She is 114 pounds over her ideal body weight. The severity is severe with Body mass index is 47.92 kg/m².  She has multiple medical problems aggravated by her obesity. They have been overweight since age 15. She wishes to have bariatric surgery so that she can lose a large amount of weight to a goal of their ideal body weight based on height, build and sex, and keep the weight off. I have met with her in the [facility] where we discussed the surgery in great detail and went over the risks and benefits. She has watched our informational video so she understands all of the extensive risks involved. She states that she understands all of the risks and wishes to proceed with the evaluation. We have discussed the different surgical options in detail with use of diagrams and drawings to detail the procedures, risks and alternatives. We discussed the postoperative diet and proposed life long diet for weight management after surgery.     They are a smoker  They have significant exposure to second hand smoke    Past Medical History  Patient Active Problem List   Diagnosis    GERD (gastroesophageal reflux disease)    Osteoarthritis cervical spine    Osteoarthritis of lumbar spine    Small bowel obstruction (HCC)    Morbid obesity (HCC)    Hypokalemia    Venous insufficiency    SBO (small bowel obstruction) (HCC)    Incarcerated hernia

## 2024-02-16 NOTE — TELEPHONE ENCOUNTER
Prior Authorization Form  DEMOGRAPHICS:    Patient Name:  China Hayes  Patient :  1970            Insurance:  Payor: [facility] MEDICAID / Plan: [facility] MEDICAID / Product Type: *No Product type* /   Insurance ID Number:    Payer/Plan Subscr  Sex Relation Sub. Ins. ID Effective Group Num   1. [facility] MED* CHINA HAYES 1970 Female Self [id number]                                             DIAGNOSIS & PROCEDURE:    Procedure/Operation: egd           CPT Code: 41563    Diagnosis:  gerd    ICD10 Code: k21.9    Location:  [facility]    Surgeon:  Ronit    SCHEDULING INFORMATION:    Date: 3/11/24    Time:               Anesthesia:  MAC/TIVA                                                       Status:  Outpatient        Special Comments:         Electronically signed by PRABHA HER MA on 2024 at 11:41 AM

## 2024-02-23 ENCOUNTER — INITIAL CONSULT (OUTPATIENT)
Dept: BARIATRICS/WEIGHT MGMT | Age: 54
End: 2024-02-23
Payer: MEDICAID

## 2024-02-23 VITALS — HEIGHT: 62 IN | BODY MASS INDEX: 48.76 KG/M2 | WEIGHT: 265 LBS

## 2024-02-23 DIAGNOSIS — Z71.3 DIETARY COUNSELING: ICD-10-CM

## 2024-02-23 DIAGNOSIS — E66.01 MORBID OBESITY DUE TO EXCESS CALORIES (HCC): Primary | ICD-10-CM

## 2024-02-23 PROCEDURE — 97803 MED NUTRITION INDIV SUBSEQ: CPT | Performed by: DIETITIAN, REGISTERED

## 2024-02-23 NOTE — PATIENT INSTRUCTIONS
[facility]  Dietary Initial Appointment Patient Instructions    By: Charlotte Monsalve RD / [place]  [phone]      At your initial dietary appointment you have received the following information packets:    Please be aware at each visit you have been instructed that in order for your insurance company to approve your surgery you must show a consistent weight loss of 2 lbs or greater at each visit. We can not guarantee an approval by your insurance company we can only provide the information given to us it is up to you the patient to show compliancy to your insurance company.  If you do gain weight during your supervised weight loss counseling sessions insurance companies starting in 2018 are denying patients for not showing consistent weight loss results when part of a supervised weight loss counseling program. Pt was instructed on 2/23/24. Pt verbalized understanding.      Low-Fat Diet  - Please be sure to begin your low-fat diet from today's date until your scheduled surgery date.  If you are not at goal weight by your history and physical appointment with your surgeon your surgery will be cancelled.  Goal Weight: 251 lbs (BMI of 45.9)  Low-Fat Diet - Patient Acknowledge  Supplement List - Please be sure to be saving to purchase your 3 month supply of supplements.  If you do not bring supplements to your history and physical appointment your surgery will be cancelled.  Supplement Contract - Patient Acknowledge  Please be sure to take a daily Multi-vitamin from now until surgery to reduce your incidence of infection.  If your insurance company requires additional dietary counseling you will be scheduled to see the dietitian the following month.   If your psychological evaluation is completed and all your dietary requirements for your individual insurance company have been completed you will be submitted to insurance. Please make sure to check with us to see if we have

## 2024-02-23 NOTE — PROGRESS NOTES
changes assessed to be? - Good    RD / LD feels this patients expected adherence for post surgical diet? - Good    Patient was instructed and signed consents on a low-fat diet and required supplementation at initial consult.    Comments: Patient is able to verbalize diet concepts for bariatric surgery.  Patient is aware diet concepts will be reinforced at monthly pre-op weight loss counseling appointments. Pt will also attend a 3-hour nutrition education class once scheduled for surgery .  RD / LD will monitor progress.

## 2024-03-08 RX ORDER — VITAMIN B COMPLEX
1 CAPSULE ORAL DAILY
COMMUNITY

## 2024-03-08 RX ORDER — BUSPIRONE HYDROCHLORIDE 15 MG/1
15 TABLET ORAL 2 TIMES DAILY
COMMUNITY
Start: 2024-02-20

## 2024-03-08 RX ORDER — LURASIDONE HYDROCHLORIDE 60 MG/1
60 TABLET, FILM COATED ORAL DAILY
COMMUNITY
Start: 2024-02-20

## 2024-03-08 NOTE — PROGRESS NOTES
[facility]                                                                                                                    PRE OP INSTRUCTIONS FOR  China Damon        Date: 3/8/2024    Date of surgery: 3/11/24   Arrival Time: Hospital will call you between 5pm and 7pm with your final arrival time for surgery. Go to front of hospital and check in at information desk.    Nothing by mouth (NPO) as instructed. May have clear liquids up to 2 hours prior to surgery. Nothing solid after midnight. Examples: water, apple juice, black coffee, plain tea    Take the following medications with a small sip of water on the morning of Surgery: Spiriva inhaler, buspar, pregabalin, baclofen, levothyroxine     Diabetics may take half the evening dose of insulin but none after midnight.  If you feel symptomatic or have low blood sugar morning of surgery drink 1-2 ounces of apple juice only. If you take a weekly insulin injection _______________, stop 7 days prior to surgery. If you take _______________, stop 3-4 days prior to surgery.    Aspirin, Ibuprofen, Advil, Naproxen, other Anti-inflammatory products should be stopped before surgery as directed by your surgeon, cardiologist, or primary care Doctor. Herbal supplements and Vitamin E should be stopped five days prior.  May take Tylenol unless instructed otherwise by your surgeon.    Check with your Doctor regarding stopping Plavix, Coumadin, Lovenox, Eliquis, Effient, or other blood thinners such as, pradaxa, lixiana, xaralto and savaysa.    Do not smoke, vape, or use illicit drugs and do not drink any alcoholic beverages 24 hours prior to surgery.    You may brush your teeth the morning of surgery.      You MUST make arrangements for a responsible adult, 18 and over, to take you home after your surgery. You will not be allowed to leave alone or drive yourself home.  You will need someone stay with you the first 24 hrs. Your surgery will be cancelled if

## 2024-03-11 ENCOUNTER — HOSPITAL ENCOUNTER (OUTPATIENT)
Age: 54
Setting detail: OUTPATIENT SURGERY
Discharge: HOME | End: 2024-03-11
Attending: SURGERY | Admitting: SURGERY
Payer: MEDICAID

## 2024-03-11 ENCOUNTER — ANESTHESIA EVENT (OUTPATIENT)
Dept: ENDOSCOPY | Age: 54
End: 2024-03-11
Payer: MEDICAID

## 2024-03-11 ENCOUNTER — ANESTHESIA (OUTPATIENT)
Dept: ENDOSCOPY | Age: 54
End: 2024-03-11
Payer: MEDICAID

## 2024-03-11 ENCOUNTER — HOSPITAL ENCOUNTER (OUTPATIENT)
Age: 54
Discharge: HOME | End: 2024-03-11

## 2024-03-11 VITALS
HEIGHT: 62 IN | OXYGEN SATURATION: 94 % | WEIGHT: 268 LBS | HEART RATE: 72 BPM | BODY MASS INDEX: 49.32 KG/M2 | SYSTOLIC BLOOD PRESSURE: 130 MMHG | RESPIRATION RATE: 18 BRPM | TEMPERATURE: 97.8 F | DIASTOLIC BLOOD PRESSURE: 72 MMHG

## 2024-03-11 DIAGNOSIS — K21.9 ESOPHAGEAL REFLUX: ICD-10-CM

## 2024-03-11 PROCEDURE — 2709999900 HC NON-CHARGEABLE SUPPLY: Performed by: SURGERY

## 2024-03-11 PROCEDURE — 43239 EGD BIOPSY SINGLE/MULTIPLE: CPT | Performed by: SURGERY

## 2024-03-11 PROCEDURE — 7100000011 HC PHASE II RECOVERY - ADDTL 15 MIN: Performed by: SURGERY

## 2024-03-11 PROCEDURE — 3609012400 HC EGD TRANSORAL BIOPSY SINGLE/MULTIPLE: Performed by: SURGERY

## 2024-03-11 PROCEDURE — 6360000002 HC RX W HCPCS: Performed by: NURSE ANESTHETIST, CERTIFIED REGISTERED

## 2024-03-11 PROCEDURE — 3700000000 HC ANESTHESIA ATTENDED CARE: Performed by: SURGERY

## 2024-03-11 PROCEDURE — 3700000001 HC ADD 15 MINUTES (ANESTHESIA): Performed by: SURGERY

## 2024-03-11 PROCEDURE — 88342 IMHCHEM/IMCYTCHM 1ST ANTB: CPT

## 2024-03-11 PROCEDURE — 88305 TISSUE EXAM BY PATHOLOGIST: CPT

## 2024-03-11 PROCEDURE — 2580000003 HC RX 258: Performed by: NURSE ANESTHETIST, CERTIFIED REGISTERED

## 2024-03-11 PROCEDURE — 7100000010 HC PHASE II RECOVERY - FIRST 15 MIN: Performed by: SURGERY

## 2024-03-11 RX ORDER — SODIUM CHLORIDE 0.9 % (FLUSH) 0.9 %
5-40 SYRINGE (ML) INJECTION EVERY 12 HOURS SCHEDULED
Status: DISCONTINUED | OUTPATIENT
Start: 2024-03-11 | End: 2024-03-11 | Stop reason: HOSPADM

## 2024-03-11 RX ORDER — PROPOFOL 10 MG/ML
INJECTION, EMULSION INTRAVENOUS PRN
Status: DISCONTINUED | OUTPATIENT
Start: 2024-03-11 | End: 2024-03-11 | Stop reason: SDUPTHER

## 2024-03-11 RX ORDER — SODIUM CHLORIDE 9 MG/ML
INJECTION, SOLUTION INTRAVENOUS CONTINUOUS
Status: DISCONTINUED | OUTPATIENT
Start: 2024-03-11 | End: 2024-03-11 | Stop reason: HOSPADM

## 2024-03-11 RX ORDER — SODIUM CHLORIDE, SODIUM LACTATE, POTASSIUM CHLORIDE, CALCIUM CHLORIDE 600; 310; 30; 20 MG/100ML; MG/100ML; MG/100ML; MG/100ML
INJECTION, SOLUTION INTRAVENOUS CONTINUOUS
Status: DISCONTINUED | OUTPATIENT
Start: 2024-03-11 | End: 2024-03-11 | Stop reason: HOSPADM

## 2024-03-11 RX ORDER — SODIUM CHLORIDE 9 MG/ML
INJECTION, SOLUTION INTRAVENOUS PRN
Status: DISCONTINUED | OUTPATIENT
Start: 2024-03-11 | End: 2024-03-11 | Stop reason: HOSPADM

## 2024-03-11 RX ORDER — SODIUM CHLORIDE 0.9 % (FLUSH) 0.9 %
5-40 SYRINGE (ML) INJECTION PRN
Status: DISCONTINUED | OUTPATIENT
Start: 2024-03-11 | End: 2024-03-11 | Stop reason: HOSPADM

## 2024-03-11 RX ORDER — SODIUM CHLORIDE, SODIUM LACTATE, POTASSIUM CHLORIDE, CALCIUM CHLORIDE 600; 310; 30; 20 MG/100ML; MG/100ML; MG/100ML; MG/100ML
INJECTION, SOLUTION INTRAVENOUS CONTINUOUS PRN
Status: DISCONTINUED | OUTPATIENT
Start: 2024-03-11 | End: 2024-03-11 | Stop reason: SDUPTHER

## 2024-03-11 RX ADMIN — PROPOFOL 150 MG: 10 INJECTION, EMULSION INTRAVENOUS at 12:59

## 2024-03-11 RX ADMIN — SODIUM CHLORIDE, POTASSIUM CHLORIDE, SODIUM LACTATE AND CALCIUM CHLORIDE: 600; 310; 30; 20 INJECTION, SOLUTION INTRAVENOUS at 12:54

## 2024-03-11 ASSESSMENT — PAIN - FUNCTIONAL ASSESSMENT
PAIN_FUNCTIONAL_ASSESSMENT: NONE - DENIES PAIN
PAIN_FUNCTIONAL_ASSESSMENT: NONE - DENIES PAIN

## 2024-03-11 ASSESSMENT — LIFESTYLE VARIABLES: SMOKING_STATUS: 1

## 2024-03-11 ASSESSMENT — COPD QUESTIONNAIRES: CAT_SEVERITY: MILD

## 2024-03-11 NOTE — H&P
China Damon  3/11/2024  [facility]    Initial Evaluation  Bariatric Surgery and EGD       Subjective:  CHIEF COMPLAINT: Morbid obesity, malnutrition, Hyperlipidemia, Obstructive Sleep Apnea, Dyspnea on Exertion, GERD, Degenerative Joint Disease (DJD), Depression, Anxiety, Binge Eating Disorder, Bleeding/Clotting Disorder, History of VTE (Venous Thromboembolism), Venous Stasis Disease, and multiple previous abdominal surgeries    HISTORY OF PRESENT ILLNESS: China Damon is a morbidly-obese 53 y.o.  female, who weighs 118.8 kg (262 lb). She is 114 pounds over her ideal body weight. The severity is severe with Body mass index is 49.02 kg/m².  She has multiple medical problems aggravated by her obesity. They have been overweight since age 15. She wishes to have bariatric surgery so that she can lose a large amount of weight to a goal of their ideal body weight based on height, build and sex, and keep the weight off. I have met with her in the [facility] where we discussed the surgery in great detail and went over the risks and benefits. She has watched our informational video so she understands all of the extensive risks involved. She states that she understands all of the risks and wishes to proceed with the evaluation. We have discussed the different surgical options in detail with use of diagrams and drawings to detail the procedures, risks and alternatives. We discussed the postoperative diet and proposed life long diet for weight management after surgery.     They are a smoker  They have significant exposure to second hand smoke    Past Medical History  Patient Active Problem List   Diagnosis    GERD (gastroesophageal reflux disease)    Osteoarthritis cervical spine    Osteoarthritis of lumbar spine    Small bowel obstruction (HCC)    Morbid obesity (HCC)    Hypokalemia    Venous insufficiency    SBO (small bowel obstruction) (HCC)    Incarcerated hernia     abrasions  Heart: reg rate, no murmur  Abdomen: soft, nondistended, nontender, obese  Extremities: full ROM all 4 ext, no gross motor or sensory deficits  Pulses: 2+ distal  Skin: warm and dry  Neurologic: spontanous eye opening, purposeful, follows complex commands    Assessment/Plan:  China Damon is a morbidly-obese 53 y.o. female with failure of medical management, inability to keep the weight off with diet and exercise. She is interested in Surgical weight loss and specifically we discussed at length the options of Laparoscopic Gail-en- Y Gastric Bypass or Sleeve Gastrectomy.  We discussed that our goal is to ameliorate the medical problems and not to obtain a specific body mass index. She understands the risks and benefits, and wishes to proceed with the evaluation.      1. Pre-op evaluation  EGD with biopsy- Scheduled   - US GALLBLADDER RUQ; Scheduled     2. Morbid obesity due to excess calories (HCC)    - medical weight loss management- dietary and nutritional counseling provided for 15min during our conversation with emphasis on protein intake, low calorie, limit late night eating and eating away from the table with other distractions.    - Comprehensive Metabolic Panel; Future  - CBC; Future  - Hemoglobin A1C; Future  - Ferritin; Future  - Triglyceride; Future  - Cholesterol, Total; Future  - Zinc; Future  - Vitamin B12; Future  - Folate; Future  - Vitamin B1; Future  - Vitamin D 25 Hydroxy; Future  - Prealbumin; Future       3. Gastroesophageal reflux disease without esophagitis    EGD eval H pylori/gastritis  Continue tums prn    4.  Smoking-Patient educated about the healing rates with this condition. Patient does smoke and does have secondhand smoke exposure. In this discussion of approximately 10 minutes, patient was counseled about decrease in smoking exposure regards to healing and overall good health. Patient seems reluctant but is willing to listen to the discussion in detail. They agreed and

## 2024-03-11 NOTE — OP NOTE
ulcerations. Biopsy was taken to check for H. pylori. The scope was then passed through the pylorus into the duodenal bulb which looked normal, then around to the distal duodenum which looked normal, and the scope was then withdrawn. The patient tolerated the procedure well.       Diet: Low fat, low calorie, high protein    PLAN:  (1) Follow up in office to discuss pathology, imaging, labs      Further Procedures:  Surgical weight loss pending approval    Physician Signature: Electronically signed by Dr. Cottrell

## 2024-03-11 NOTE — ANESTHESIA PRE PROCEDURE
(gastroesophageal reflux disease)     Incisional hernia with obstruction but no gangrene     PONV (postoperative nausea and vomiting)     Strep throat        Past Surgical History:        Procedure Laterality Date    ANKLE FRACTURE SURGERY Left 2023    TTC FUSION LEFT ANKLE AND HARVEST OF GRAFT performed by Elroy Strauss DPM at [facility] OR    CARPAL TUNNEL RELEASE Left      SECTION      CHOLECYSTECTOMY      COLECTOMY      FOREARM FRACTURE SURGERY Left     fracture of ulna s/p repair    HERNIA REPAIR  2015    incarcerated hernia repair    HERNIA REPAIR N/A 2021    INCISIONAL HERNIA REPAIR WITH MESH, POSSIBLE COMPONENT SEPARATION  LAPAROSCOPIC ROBOTIC XI ASSISTED (CPT 50301) performed by Lino Cottrell MD at [facility] OR    HYSTERECTOMY (CERVIX STATUS UNKNOWN)      NERVE BLOCK      sacroiliac bilateral 2012    PELVIC FRACTURE SURGERY      VENTRAL HERNIA REPAIR  2015       Social History:    Social History     Tobacco Use    Smoking status: Every Day     Current packs/day: 0.50     Average packs/day: 0.5 packs/day for 30.0 years (15.0 ttl pk-yrs)     Types: Cigarettes    Smokeless tobacco: Never   Substance Use Topics    Alcohol use: Not Currently     Comment: socially                                Ready to quit: Not Answered  Counseling given: Not Answered      Vital Signs (Current):   Vitals:    24 0807 24 1144   BP:  117/74   Pulse:  70   Resp:  16   Temp:  36.5 °C (97.7 °F)   TempSrc:  Infrared   SpO2:  92%   Weight: 113.4 kg (250 lb) 121.6 kg (268 lb)   Height:  1.575 m (5' 2\")                                              BP Readings from Last 3 Encounters:   24 117/74   24 130/82   10/31/23 (!) 144/86       NPO Status: Time of last liquid consumption:                         Time of last solid consumption:                         Date of last liquid consumption: 03/10/24                        Date of last solid food consumption:

## 2024-03-11 NOTE — ANESTHESIA POSTPROCEDURE EVALUATION
Department of Anesthesiology  Postprocedure Note    Patient: China Damon  MRN: [id number]  YOB: 1970  Date of evaluation: 3/11/2024    Procedure Summary       Date: 03/11/24 Room / Location: [place] [address] / [facility]    Anesthesia Start: 1252 Anesthesia Stop: 1304    Procedure: ESOPHAGOGASTRODUODENOSCOPY BIOPSY Diagnosis:       Esophageal reflux      (Esophageal reflux [K21.9])    Surgeons: Lino Cottrell MD Responsible Provider: Kris Vieyra MD    Anesthesia Type: MAC ASA Status: 3            Anesthesia Type: No value filed.    Russell Phase I: Russell Score: 10    Russell Phase II: Russell Score: 10    Anesthesia Post Evaluation    Patient location during evaluation: bedside  Patient participation: complete - patient participated  Level of consciousness: awake  Pain score: 2  Airway patency: patent  Nausea & Vomiting: no nausea  Cardiovascular status: blood pressure returned to baseline  Respiratory status: acceptable  Hydration status: euvolemic  Pain management: adequate    No notable events documented.

## 2024-03-15 LAB
25-HYDROXY VITAMIN D-2: 7 NG/ML (ref 30–100)
A/G RATIO: 1.3 RATIO (ref 1.1–2.2)
ALBUMIN SERPL-MCNC: 3.9 G/DL (ref 3.5–5)
ALP BLD-CCNC: 69 U/L (ref 42–121)
ALT SERPL-CCNC: 12 U/L (ref 7–52)
ANION GAP SERPL CALCULATED.3IONS-SCNC: 5 MEQ/L (ref 4–14)
AST SERPL-CCNC: 10 U/L (ref 10–41)
BASOPHILS ABSOLUTE: 8.3 K/UL (ref 4.5–11)
BILIRUB SERPL-MCNC: 0.3 MG/DL (ref 0.3–1.5)
BUN BLDV-MCNC: 16 MG/DL (ref 7–25)
CALCIUM SERPL-MCNC: 9.2 MG/DL (ref 8.5–10.5)
CHLORIDE BLD-SCNC: 104 MEQ/L (ref 98–107)
CHOLESTEROL: 171 MG/DL (ref 0–199)
CO2: 30 MEQ/L (ref 21–31)
CREAT SERPL-MCNC: 0.94 MG/DL (ref 0.6–1.2)
CREATININE + EGFR PANEL: 75 ML/MIN
FERRITIN: 28 NG/ML (ref 11–307)
FOLATE: 7.6 NG/ML
GFR NON-AFRICAN AMERICAN: 62 ML/MIN
GLOBULIN: 3 G/DL (ref 1.9–3.9)
GLUCOSE BLD-MCNC: 136 MG/DL (ref 70–99)
HCT VFR BLD CALC: 41 % (ref 36–44)
HDLC SERPL-MCNC: 34 MG/DL
HEMOGLOBIN: 13.5 G/DL (ref 12–15)
LDL CHOLESTEROL: 131 MG/DL (ref 0–99)
LDL/HDL RATIO: 3.9 RATIO
MCH RBC QN AUTO: 29 PG (ref 28–34)
MCHC RBC AUTO-ENTMCNC: 33 G/DL (ref 33–37)
MCV RBC AUTO: 87.7 FL (ref 80–100)
PDW BLD-RTO: 14.6 % (ref 10.9–14.3)
PLATELET # BLD: 240 K/UL (ref 150–450)
PMV BLD AUTO: 8.8 FL (ref 7.4–10.4)
POTASSIUM SERPL-SCNC: 4.5 MEQ/L (ref 3.6–5)
PREALBUMIN: 15 MG/DL (ref 17–34)
RBC # BLD: 4.67 M/UL (ref 4–4.9)
SODIUM BLD-SCNC: 139 MEQ/L (ref 135–145)
SURGICAL PATHOLOGY REPORT: NORMAL
TOTAL PROTEIN: 6.9 G/DL (ref 6.2–8)
TRIGL SERPL-MCNC: 233 MG/DL (ref 0–149)
VITAMIN B-12: 347 PG/ML (ref 180–914)

## 2024-03-16 LAB
ESTIMATED AVERAGE GLUCOSE: 160 MG/DL
HBA1C MFR BLD: 7.2 % (ref 4–6)

## 2024-03-18 LAB — ZINC: 60 UG/DL (ref 44–115)

## 2024-03-19 LAB
COPPER: 148 UG/DL (ref 80–158)
RETINOL (VITAMIN A): 28.8 UG/DL (ref 20.1–62)

## 2024-03-20 ENCOUNTER — OFFICE VISIT (OUTPATIENT)
Dept: ENT CLINIC | Age: 54
End: 2024-03-20
Payer: MEDICAID

## 2024-03-20 VITALS — BODY MASS INDEX: 49.32 KG/M2 | WEIGHT: 268 LBS | HEIGHT: 62 IN

## 2024-03-20 DIAGNOSIS — J34.89 NASAL DRYNESS: ICD-10-CM

## 2024-03-20 DIAGNOSIS — J34.89 NASAL SEPTAL PERFORATION: Primary | ICD-10-CM

## 2024-03-20 DIAGNOSIS — J34.89 NASAL CRUSTING: ICD-10-CM

## 2024-03-20 DIAGNOSIS — J32.4 CHRONIC PANSINUSITIS: ICD-10-CM

## 2024-03-20 LAB — VITAMIN B1 WHOLE BLOOD: 178 NMOL/L (ref 66.5–200)

## 2024-03-20 PROCEDURE — 99203 OFFICE O/P NEW LOW 30 MIN: CPT | Performed by: NURSE PRACTITIONER

## 2024-03-20 RX ORDER — ECHINACEA PURPUREA EXTRACT 125 MG
2 TABLET ORAL 4 TIMES DAILY
Qty: 3 EACH | Refills: 3 | Status: SHIPPED | OUTPATIENT
Start: 2024-03-20

## 2024-03-20 ASSESSMENT — ENCOUNTER SYMPTOMS
SINUS PRESSURE: 1
RESPIRATORY NEGATIVE: 1
STRIDOR: 0
RHINORRHEA: 0
EYES NEGATIVE: 1
SHORTNESS OF BREATH: 0
SINUS PAIN: 0

## 2024-03-20 NOTE — PATIENT INSTRUCTIONS
BACTROBAN NASAL SPRAY     Fill prescription for 22 grams of Bactroban ointment.     Purchase a bottle of nasal saline ( Nasal, Ocean or generic)     In a coffee mug mix:  1/3 tube ointment in 44 or 45 ml bottle of saline  OR  2/3 tube ointment in 88 ml bottle of saline  Microwave 15 seconds  Stir and let solution cool  After the solution is cool, poor the liquid back into the empty saline bottle and shake well     Use 2 sprays in each nostril 2 times a day for 2 weeks.

## 2024-03-20 NOTE — PROGRESS NOTES
[facility]  CHANEL RichardO. Ms.Ed.  New Consult       Patient Name:  China Damon  :  1970     CHIEF C/O:    Chief Complaint   Patient presents with    New Patient     Patient presents following MRI brain which showed sinus cyst. Reports that she has history of cocaine use and is concerned with the damage on in the inside of her nose. Reports that there is a septal deviation, as well as damage to the rest of her inner nose. Reports that when she lays on her back she can't breath through her nose. Reports that when she is laying on her left side there is a \"sizzle\" sound coming from her nose.        HISTORY OBTAINED FROM:  patient    HISTORY OF PRESENT ILLNESS:       China is a 53 y.o. year old female, here today for:       Chronic nasal congestion, chronic sinusitis.  Patient states chronic sinus symptoms for many years but she feels stems from many years of cocaine abuse.  She recently underwent an MRI of the brain that showed mucosal thickening in multiple sinuses with a mucous retention cyst within the maxillary sinus.  She states she has a persistent congestion sensation with intermittent postnasal drainage.  She also complains of intermittent pressure.  She states when she lays on her back she feels like she cannot breathe through her nose.  She feels like she may have a deviated septum which may be contributing to her congestion symptoms.  She denies any recent fevers or recent antibiotics.  Denies any ear pain or pressure.  She denies any sore throat.           Past Medical History:   Diagnosis Date    Acute deep vein thrombosis (DVT) of popliteal vein of left lower extremity ([facility]) 2023    Acute pulmonary embolism without acute cor pulmonale ([facility]) 2023    Arthritis     Brain venous angioma ([facility])     Bursitis     hips    Cat scratch of left lower leg     Class 3 severe obesity due to excess calories with body mass index (BMI) of 40.0 to 44.9 in adult ([facility])     COPD

## 2024-03-22 ENCOUNTER — OFFICE VISIT (OUTPATIENT)
Dept: BARIATRICS/WEIGHT MGMT | Age: 54
End: 2024-03-22
Payer: MEDICAID

## 2024-03-22 VITALS
HEART RATE: 98 BPM | WEIGHT: 267 LBS | BODY MASS INDEX: 49.13 KG/M2 | HEIGHT: 62 IN | SYSTOLIC BLOOD PRESSURE: 128 MMHG | DIASTOLIC BLOOD PRESSURE: 84 MMHG | TEMPERATURE: 97.3 F

## 2024-03-22 DIAGNOSIS — Z71.3 DIETARY COUNSELING: ICD-10-CM

## 2024-03-22 DIAGNOSIS — E66.9 DIABETES MELLITUS TYPE 2 IN OBESE (HCC): ICD-10-CM

## 2024-03-22 DIAGNOSIS — E66.01 MORBID OBESITY DUE TO EXCESS CALORIES (HCC): Primary | ICD-10-CM

## 2024-03-22 DIAGNOSIS — E55.9 VITAMIN D DEFICIENCY: ICD-10-CM

## 2024-03-22 DIAGNOSIS — E11.69 DIABETES MELLITUS TYPE 2 IN OBESE (HCC): ICD-10-CM

## 2024-03-22 PROCEDURE — 99214 OFFICE O/P EST MOD 30 MIN: CPT | Performed by: NURSE PRACTITIONER

## 2024-03-22 PROCEDURE — 3051F HG A1C>EQUAL 7.0%<8.0%: CPT | Performed by: NURSE PRACTITIONER

## 2024-03-22 RX ORDER — ERGOCALCIFEROL 1.25 MG/1
50000 CAPSULE ORAL WEEKLY
Qty: 12 CAPSULE | Refills: 1 | Status: SHIPPED | OUTPATIENT
Start: 2024-03-22

## 2024-03-22 NOTE — PATIENT INSTRUCTIONS
What is the next step to proceed with weight loss surgery?    Please be aware that any co-pays or deductibles may be requested prior to testing and / or procedures.    You will need to schedule a psychological evaluation for weight loss surgery.  Patients will be required to complete all psychological testing as required by the mental health provider. Patients must also follow all of the provider's recommendations before weight loss surgery can be scheduled.     The evaluation must be done a standard way for weight loss surgery. We strongly recommend that you contact one of our preferred providers listed below to arrange this:      Kash Granger, [facility]-  [address], [place]   [phone]    Dr. Abeba Lombardi, PhD , [facility]  [facility] [phone]      Dr. Fredrick Lebron, PhD     [address], [place]    [phone]      You will also need to plan on attending a 2 hour nutrition class at the [facility] prior to your surgery.  We will schedule this for you when we schedule your surgery.    Please remember to have your labs drawn 10 days prior to your first scheduled dietary appointment.    Please remember, that while we will submit your case to insurance for surgery authorization, it is your responsibility to know if your plan covers weight loss surgery and keep up-to-date with changes to your insurance coverage.  We will do everything possible to help you get approved for weight loss surgery, but cannot guarantee an approval.     Please note that you will not be submitted to your insurance company until all pre-operative testing requirements are met.    Last Surgical Weight Loss:       No data to display

## 2024-03-22 NOTE — PROGRESS NOTES
otherwise negative unless mentioned in the above HPI. Specific negatives are listed below but may not include all those reviewed.    General ROS: negative obtundation, AMS  ENT ROS: negative rhinorrhea, epistaxis  Allergy and Immunology ROS: negative itchy/watery eyes or nasal congestion  Hematological and Lymphatic ROS: negative spontaneous bleeding or bruising  Endocrine ROS: negative  lethargy, mood swings, palpitations or polydipsia/polyuria  Respiratory ROS: negative sputum changes, stridor, tachypnea or wheezing  Cardiovascular ROS: negative for - loss of consciousness, murmur or orthopnea  Gastrointestinal ROS: negative for - hematochezia or hematemesis  Genito-Urinary ROS: negative for -  genital discharge or hematuria  Musculoskeletal ROS: negative for - focal weakness, gangrene  Psych/Neuro ROS: negative for - visual or auditory hallucinations, suicidal ideation    Physical exam:   /84 (Site: Right Upper Arm, Position: Sitting, Cuff Size: Large Adult)   Pulse 98   Temp 97.3 °F (36.3 °C)   Ht 1.575 m (5' 2\")   Wt 121.1 kg (267 lb)   BMI 48.83 kg/m²   General appearance: AAO, NAD  Eyes: PERRL, EOMI, red conjunctiva  Lungs: Equal chest rise bilateral  Chest wall: atraumatic, no tenderness, no ecchymosis or abrasions  Heart: reg rate, no murmur  Abdomen: soft, non distended, tender minimal, no hernias, no peritioneal signs  Extremities: full ROM all 4 ext, no gross motor or sensory deficits  Pulses: 2+ distal  Skin: warm and dry  Neurologic: spontanous eye opening, purposeful, follows complex commands  Psych: No tremor, no suicidal ideation, no hallucinations      Assessment/Plan:       4. Dietary counseling  1.Increase protein intake  Patient was instructed on types of protein supplements and usage of protein supplements before and after surgery. The goal of protein intake after bariatric surgery is 60-80 grams daily. Patient was able to verbalize the instruction of how to take the supplements and

## 2024-03-25 DIAGNOSIS — Z01.818 PRE-OP EVALUATION: Primary | ICD-10-CM

## 2024-04-11 ENCOUNTER — TELEPHONE (OUTPATIENT)
Dept: BARIATRICS/WEIGHT MGMT | Age: 54
End: 2024-04-11

## 2024-04-22 ENCOUNTER — INITIAL CONSULT (OUTPATIENT)
Dept: BARIATRICS/WEIGHT MGMT | Age: 54
End: 2024-04-22
Payer: MEDICAID

## 2024-04-22 ENCOUNTER — OFFICE VISIT (OUTPATIENT)
Dept: ENT CLINIC | Age: 54
End: 2024-04-22
Payer: MEDICAID

## 2024-04-22 ENCOUNTER — OFFICE VISIT (OUTPATIENT)
Dept: BARIATRICS/WEIGHT MGMT | Age: 54
End: 2024-04-22
Payer: MEDICAID

## 2024-04-22 VITALS
DIASTOLIC BLOOD PRESSURE: 82 MMHG | HEART RATE: 98 BPM | HEIGHT: 62 IN | TEMPERATURE: 97.4 F | BODY MASS INDEX: 50.42 KG/M2 | SYSTOLIC BLOOD PRESSURE: 124 MMHG | WEIGHT: 274 LBS

## 2024-04-22 VITALS — WEIGHT: 273 LBS | HEIGHT: 62 IN | BODY MASS INDEX: 50.24 KG/M2

## 2024-04-22 VITALS
DIASTOLIC BLOOD PRESSURE: 67 MMHG | BODY MASS INDEX: 50.12 KG/M2 | SYSTOLIC BLOOD PRESSURE: 97 MMHG | HEART RATE: 77 BPM | WEIGHT: 274 LBS

## 2024-04-22 DIAGNOSIS — E66.01 MORBID OBESITY DUE TO EXCESS CALORIES (HCC): ICD-10-CM

## 2024-04-22 DIAGNOSIS — J34.89 NASAL SEPTAL PERFORATION: Primary | ICD-10-CM

## 2024-04-22 DIAGNOSIS — E66.01 MORBID OBESITY (HCC): ICD-10-CM

## 2024-04-22 DIAGNOSIS — Z71.3 NUTRITIONAL COUNSELING: ICD-10-CM

## 2024-04-22 DIAGNOSIS — Z00.8 NUTRITIONAL ASSESSMENT: Primary | ICD-10-CM

## 2024-04-22 DIAGNOSIS — Z02.6 ENCOUNTER FOR EXAMINATION FOR INSURANCE PURPOSES: ICD-10-CM

## 2024-04-22 DIAGNOSIS — E66.9 TYPE 2 DIABETES MELLITUS WITH OBESITY (HCC): Primary | ICD-10-CM

## 2024-04-22 DIAGNOSIS — Z71.3 ENCOUNTER FOR DIETARY COUNSELING AND SURVEILLANCE: ICD-10-CM

## 2024-04-22 DIAGNOSIS — E11.69 TYPE 2 DIABETES MELLITUS WITH OBESITY (HCC): Primary | ICD-10-CM

## 2024-04-22 PROCEDURE — 3051F HG A1C>EQUAL 7.0%<8.0%: CPT | Performed by: NURSE PRACTITIONER

## 2024-04-22 PROCEDURE — 97803 MED NUTRITION INDIV SUBSEQ: CPT | Performed by: DIETITIAN, REGISTERED

## 2024-04-22 PROCEDURE — 99211 OFF/OP EST MAY X REQ PHY/QHP: CPT

## 2024-04-22 PROCEDURE — 99213 OFFICE O/P EST LOW 20 MIN: CPT | Performed by: OTOLARYNGOLOGY

## 2024-04-22 PROCEDURE — 99213 OFFICE O/P EST LOW 20 MIN: CPT | Performed by: NURSE PRACTITIONER

## 2024-04-22 ASSESSMENT — ENCOUNTER SYMPTOMS
WHEEZING: 0
EYES NEGATIVE: 1
SINUS PAIN: 0
NAUSEA: 0
RESPIRATORY NEGATIVE: 1
SHORTNESS OF BREATH: 0
DIARRHEA: 0
VOMITING: 0
SHORTNESS OF BREATH: 0
CONSTIPATION: 0
RHINORRHEA: 0
STRIDOR: 0
SINUS PRESSURE: 1

## 2024-04-22 NOTE — PATIENT INSTRUCTIONS
What is the next step to proceed with weight loss surgery?    Please be aware that any co-pays or deductibles may be requested prior to testing and / or procedures.    You will need to schedule a psychological evaluation for weight loss surgery.  Patients will be required to complete all psychological testing as required by the mental health provider. Patients must also follow all of the provider's recommendations before weight loss surgery can be scheduled.     The evaluation must be done a standard way for weight loss surgery. We strongly recommend that you contact one of our preferred providers listed below to arrange this:      Kash Granger, [facility]-  [address], [place]   [phone]    Dr. Abeba Lombardi, PhD , [facility]  [facility] [phone]      Dr. Fredrick Lebron, PhD     [address], [place]    [phone]      You will also need to plan on attending a 2 hour nutrition class at the [facility] prior to your surgery.  We will schedule this for you when we schedule your surgery.    Please remember to have your labs drawn 10 days prior to your first scheduled dietary appointment.    Please remember, that while we will submit your case to insurance for surgery authorization, it is your responsibility to know if your plan covers weight loss surgery and keep up-to-date with changes to your insurance coverage.  We will do everything possible to help you get approved for weight loss surgery, but cannot guarantee an approval.     Please note that you will not be submitted to your insurance company until all pre-operative testing requirements are met.    Last Surgical Weight Loss:       No data to display

## 2024-04-22 NOTE — PROGRESS NOTES
range of motion and neck supple.   Lymphadenopathy:      Cervical: No cervical adenopathy.   Skin:     General: Skin is warm and dry.   Neurological:      Mental Status: She is alert and oriented to person, place, and time.   Psychiatric:         Behavior: Behavior normal.           Assessment/Plan:    1. Type 2 diabetes mellitus with obesity (HCC)  Will attempt to obtain for patient   If patient decides to not have bariatric surgery, will transfer prescription of ozempic back to pcp. Patient verbalized understanding.  - Semaglutide,0.25 or 0.5MG/DOS, 2 MG/1.5ML SOPN; Inject 0.25 mg into the skin once a week  Dispense: 1.5 mL; Refill: 0    2. Morbid obesity due to excess calories (HCC)  Continue with process for bariatric surgery as scheduled     Follow up as scheduled      BOB Oglesby - CNP

## 2024-04-22 NOTE — PROGRESS NOTES
along with calories, fat and sugar content.  Pt needs to be able to see that he / she is meeting his / her macro nutrient needs daily.  Michael Roberts reviewed different ways to keep foods records either manually or with computer apps.  Pt was able to verbalize understanding. Failure to keep food records show poor compliancy following weight loss surgery. Pt has been given all the tools and education necessary to complete this task.  Education spent with pt just on food records 20 minutes.         Pt. is aware if they do not comply with [facility] Guidelines that this can lead to the patient being dismissed from the program.    The registered dietitian spent the following time 60 minutes educating the patient and providing the patient with nutritional handouts to follow.  __________________________________________________________________________________  Primary Care Physician Follow-up:  Pt. was seen by Charlotte Monsalve RD/GERSON regarding weight loss education and follow-up on 4/22/24. This was the patients 3rd appointment with the registered dietitian. The registered dietitian spent the following amount of time with the patient 60 minutes.      Please Hughes the following:  The Primary Care Physician reviewed the above nutrition assessment and patient education and agrees with current diet plan.    The Primary Care Physician wants the current diet plan changed to the following:_____________________________________________________________________________________________________________________________________________________________________________________________________________.  Physician Signature:__________________________ Date:______________  Once signed please fax back to the [facility] [phone]. We thank you for allowing us to participate in your patients care.

## 2024-04-22 NOTE — PROGRESS NOTES
[facility]  CHANEL RichardO. Ms.Ed.  New Consult       Patient Name:  China Damon  :  1970     CHIEF C/O:    Chief Complaint   Patient presents with    Follow-up     1 month nasal defects/bactroban. Pt states she has a hard time blowing her nose. Post nasal drip.        HISTORY OBTAINED FROM:  patient    HISTORY OF PRESENT ILLNESS:       China is a 53 y.o. year old female, here today for:       Chronic nasal congestion, chronic sinusitis.  Patient states chronic sinus symptoms for many years but she feels stems from many years of cocaine abuse.  She recently underwent an MRI of the brain for meningioma that showed mucosal thickening in multiple sinuses with a mucous retention cyst within the maxillary sinus.  She did start bactroban nasal saline as well as plain saline mist without much relief.  She also complains of intermittent pressure.  She states when she lays on her back she feels like she cannot breathe through her nose.  She denies any recent fevers or recent antibiotics.  Denies any ear pain or pressure. She denies any sore throat.           Past Medical History:   Diagnosis Date    Acute deep vein thrombosis (DVT) of popliteal vein of left lower extremity ([facility]) 2023    Acute pulmonary embolism without acute cor pulmonale ([facility]) 2023    Arthritis     Brain venous angioma ([facility])     Bursitis     hips    Cat scratch of left lower leg     Class 3 severe obesity due to excess calories with body mass index (BMI) of 40.0 to 44.9 in adult ([facility])     COPD (chronic obstructive pulmonary disease) ([facility])     Dermatophytosis 2023    Diverticulitis     GERD (gastroesophageal reflux disease)     Incisional hernia with obstruction but no gangrene     PONV (postoperative nausea and vomiting)     Strep throat      Past Surgical History:   Procedure Laterality Date    ANKLE FRACTURE SURGERY Left 2023    TTC FUSION LEFT ANKLE AND HARVEST OF GRAFT performed by Elroy Strauss

## 2024-04-23 ENCOUNTER — TELEPHONE (OUTPATIENT)
Dept: BARIATRICS/WEIGHT MGMT | Age: 54
End: 2024-04-23

## 2024-04-23 NOTE — TELEPHONE ENCOUNTER
SW called PT regarding psych eval  appointment but PT was unavailable.  Left v-mail requesting that the PT call to schedule the appointment.     DONNY Granger

## 2024-04-30 ENCOUNTER — TELEPHONE (OUTPATIENT)
Dept: BARIATRICS/WEIGHT MGMT | Age: 54
End: 2024-04-30

## 2024-04-30 NOTE — TELEPHONE ENCOUNTER
Please let her know that the ozempic was denied. She needs to try 3 different diabetes medications before they will approve this. She can talk to her family doctor further about this if she is still interested.

## 2024-05-09 ENCOUNTER — TELEPHONE (OUTPATIENT)
Dept: ADMINISTRATIVE | Age: 54
End: 2024-05-09

## 2024-05-09 ENCOUNTER — INITIAL CONSULT (OUTPATIENT)
Dept: BARIATRICS/WEIGHT MGMT | Age: 54
End: 2024-05-09
Payer: MEDICAID

## 2024-05-09 DIAGNOSIS — Z71.89 ENCOUNTER FOR PSYCHOLOGICAL ASSESSMENT PRIOR TO BARIATRIC SURGERY: Primary | ICD-10-CM

## 2024-05-09 DIAGNOSIS — F50.9 COMPULSIVE EATING PATTERNS: ICD-10-CM

## 2024-05-09 PROCEDURE — 90791 PSYCH DIAGNOSTIC EVALUATION: CPT | Performed by: SOCIAL WORKER

## 2024-05-09 NOTE — PATIENT INSTRUCTIONS
Assignments/Recommendations: 1-2 follow-up sessions with  for further education/evaluation.  Complete entries in \"Why We Eat\", \"Reality Journal\" and \"Identifying and Handling Cravings\" to discuss next session.  Attend [facility] support group.  Follow-up with: referrals/present providers/all scheduled appointments at [facility].  Handouts provided  in office.

## 2024-05-09 NOTE — TELEPHONE ENCOUNTER
NP returning call to schedule with Cardiology    Patient Appointment Form:      PCP: Dr. Goncalves  Referring: Dr. Cottrell    Has the Patient:    Seen a Cardiologist? no; if she did decades ago?     Had a heart catheterization? no    Had heart surgery? no    Had a stress test or nuclear stress test? no    Had an echocardiogram? Yes 5/5/23 2021 Epic    Had a vascular ultrasound? 2023 US Counts include 234 beds at the [facility]    Had a 24/48 heart monitor or extended cardiac event monitor? No    Had recent blood work in the last 6 months? Yes Epic     Had a pacemaker/ICD/ILR implant? no    Seen an Electrophysiologist? no        Will send records via: Prior echoes only; Ref phys recs in Epic       Date & time of appointment:  5/21/24 @ 12:30
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
## 2024-05-20 ENCOUNTER — HOSPITAL ENCOUNTER (OUTPATIENT)
Dept: CT IMAGING | Age: 54
Discharge: HOME | End: 2024-05-22
Payer: MEDICAID

## 2024-05-20 DIAGNOSIS — Z87.891 PERSONAL HISTORY OF TOBACCO USE: ICD-10-CM

## 2024-05-20 DIAGNOSIS — F17.210 CIGARETTE SMOKER: ICD-10-CM

## 2024-05-20 DIAGNOSIS — Z87.891 PERSONAL HISTORY OF NICOTINE DEPENDENCE: ICD-10-CM

## 2024-05-20 PROCEDURE — 71271 CT THORAX LUNG CANCER SCR C-: CPT

## 2024-05-21 ENCOUNTER — OFFICE VISIT (OUTPATIENT)
Dept: CARDIOLOGY CLINIC | Age: 54
End: 2024-05-21
Payer: MEDICAID

## 2024-05-21 VITALS
HEIGHT: 62 IN | DIASTOLIC BLOOD PRESSURE: 84 MMHG | HEART RATE: 76 BPM | RESPIRATION RATE: 18 BRPM | SYSTOLIC BLOOD PRESSURE: 178 MMHG | BODY MASS INDEX: 50.02 KG/M2 | WEIGHT: 271.8 LBS

## 2024-05-21 DIAGNOSIS — Z01.810 PREOP CARDIOVASCULAR EXAM: Primary | ICD-10-CM

## 2024-05-21 DIAGNOSIS — E66.01 MORBID OBESITY (HCC): Chronic | ICD-10-CM
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
## 2024-05-21 PROBLEM — A41.9 SEPSIS (HCC): Status: RESOLVED | Noted: 2021-07-30 | Resolved: 2024-05-21

## 2024-05-21 PROBLEM — K56.609 SBO (SMALL BOWEL OBSTRUCTION) (HCC): Status: RESOLVED | Noted: 2018-01-18 | Resolved: 2024-05-21

## 2024-05-21 PROBLEM — E87.6 HYPOKALEMIA: Status: RESOLVED | Noted: 2018-01-18 | Resolved: 2024-05-21

## 2024-05-21 PROBLEM — K56.609 SMALL BOWEL OBSTRUCTION (HCC): Status: RESOLVED | Noted: 2018-01-17 | Resolved: 2024-05-21

## 2024-05-21 PROBLEM — I82.432 ACUTE DEEP VEIN THROMBOSIS (DVT) OF POPLITEAL VEIN OF LEFT LOWER EXTREMITY (HCC): Status: RESOLVED | Noted: 2023-05-05 | Resolved: 2024-05-21

## 2024-05-21 PROBLEM — I26.99 ACUTE PULMONARY EMBOLISM WITHOUT ACUTE COR PULMONALE (HCC): Status: RESOLVED | Noted: 2023-05-05 | Resolved: 2024-05-21

## 2024-05-21 PROBLEM — R11.2 INTRACTABLE NAUSEA AND VOMITING: Status: RESOLVED | Noted: 2021-04-23 | Resolved: 2024-05-21

## 2024-05-21 PROBLEM — M19.072 ARTHRITIS OF LEFT ANKLE: Status: RESOLVED | Noted: 2023-02-24 | Resolved: 2024-05-21

## 2024-05-21 PROBLEM — K46.0 INCARCERATED HERNIA: Status: RESOLVED | Noted: 2021-02-20 | Resolved: 2024-05-21

## 2024-05-21 PROBLEM — I26.99 ACUTE PULMONARY EMBOLISM WITHOUT ACUTE COR PULMONALE, UNSPECIFIED PULMONARY EMBOLISM TYPE (HCC): Status: RESOLVED | Noted: 2023-05-05 | Resolved: 2024-05-21

## 2024-05-21 PROBLEM — R41.82 ALTERED MENTAL STATUS: Status: RESOLVED | Noted: 2021-07-30 | Resolved: 2024-05-21

## 2024-05-21 PROCEDURE — 99204 OFFICE O/P NEW MOD 45 MIN: CPT | Performed by: INTERNAL MEDICINE

## 2024-05-21 PROCEDURE — 93000 ELECTROCARDIOGRAM COMPLETE: CPT | Performed by: INTERNAL MEDICINE

## 2024-05-21 RX ORDER — BUPROPION HYDROCHLORIDE 150 MG/1
150 TABLET, EXTENDED RELEASE ORAL 2 TIMES DAILY
COMMUNITY

## 2024-05-21 NOTE — PROGRESS NOTES
Chief Complaint   Patient presents with    Cardiac Clearance       Patient Active Problem List    Diagnosis Date Noted    Esophageal reflux 02/16/2024    Dermatophytosis 05/05/2023    SOB (shortness of breath) 05/05/2023    Multiple subsegmental pulmonary emboli without acute cor pulmonale (HCC) 05/05/2023    Brain mass 07/29/2021    Morbid obesity (HCC) 01/18/2018    Venous insufficiency 01/18/2018       Current Outpatient Medications   Medication Sig Dispense Refill    buPROPion (WELLBUTRIN SR) 150 MG extended release tablet Take 1 tablet by mouth 2 times daily      metFORMIN (GLUCOPHAGE) 500 MG tablet Take 1 tablet by mouth 2 times daily (with meals)      vitamin D (ERGOCALCIFEROL) 1.25 MG (61775 UT) CAPS capsule Take 1 capsule by mouth once a week 12 capsule 1    mupirocin (BACTROBAN) 2 % ointment Mixed in nasal saline, 2 sprays each nostril twice daily for 14 days 1 each 1    sodium chloride (ALTAMIST SPRAY) 0.65 % nasal spray 2 sprays by Nasal route 4 times daily 3 each 3    b complex vitamins capsule Take 1 capsule by mouth daily      busPIRone (BUSPAR) 15 MG tablet Take 15 mg by mouth 2 times daily      lurasidone (LATUDA) 60 MG TABS tablet Take 1 tablet by mouth daily with food      Pantoprazole Sodium (PROTONIX PO) Take 40 mg by mouth Daily      Pregabalin (LYRICA PO) Take 100 mg by mouth 3 times daily      baclofen (LIORESAL) 10 MG tablet Take 2 tablets by mouth 3 times daily      SPIRIVA HANDIHALER 18 MCG inhalation capsule Inhale 1 capsule into the lungs in the morning and 1 capsule in the evening.      apixaban (ELIQUIS) 5 MG TABS tablet Take 1 tablet by mouth 2 times daily      levothyroxine (SYNTHROID) 50 MCG tablet Take 1 tablet by mouth Daily 30 tablet 3     No current facility-administered medications for this visit.        Allergies   Allergen Reactions    Ibuprofen Hives    Nsaids Hives    Morphine Hives and Anxiety       Vitals:    05/21/24 1226   BP: (!) 178/84   Pulse: 76   Resp: 18   Weight:

## 2024-05-28 ENCOUNTER — TELEPHONE (OUTPATIENT)
Dept: BARIATRICS/WEIGHT MGMT | Age: 54
End: 2024-05-28

## 2024-05-30 ENCOUNTER — OFFICE VISIT (OUTPATIENT)
Dept: PAIN MANAGEMENT | Age: 54
End: 2024-05-30
Payer: MEDICAID

## 2024-05-30 VITALS
WEIGHT: 271 LBS | HEIGHT: 62 IN | HEART RATE: 76 BPM | BODY MASS INDEX: 49.87 KG/M2 | DIASTOLIC BLOOD PRESSURE: 69 MMHG | TEMPERATURE: 97.2 F | RESPIRATION RATE: 16 BRPM | SYSTOLIC BLOOD PRESSURE: 104 MMHG | OXYGEN SATURATION: 91 %

## 2024-05-30 DIAGNOSIS — M51.36 LUMBAR DEGENERATIVE DISC DISEASE: ICD-10-CM

## 2024-05-30 DIAGNOSIS — M54.50 CHRONIC BILATERAL LOW BACK PAIN WITHOUT SCIATICA: Primary | ICD-10-CM

## 2024-05-30 DIAGNOSIS — G89.29 CHRONIC BILATERAL LOW BACK PAIN WITHOUT SCIATICA: Primary | ICD-10-CM

## 2024-05-30 DIAGNOSIS — M25.572 CHRONIC PAIN OF LEFT ANKLE: ICD-10-CM

## 2024-05-30 DIAGNOSIS — F14.11 HISTORY OF COCAINE ABUSE (HCC): ICD-10-CM

## 2024-05-30 DIAGNOSIS — G62.9 NEUROPATHY: ICD-10-CM

## 2024-05-30 DIAGNOSIS — Z98.1 S/P ANKLE FUSION: ICD-10-CM

## 2024-05-30 DIAGNOSIS — G89.29 CHRONIC PAIN OF LEFT ANKLE: ICD-10-CM

## 2024-05-30 DIAGNOSIS — Z79.899 MEDICAL MARIJUANA USE: ICD-10-CM

## 2024-05-30 DIAGNOSIS — M47.816 LUMBAR SPONDYLOSIS: ICD-10-CM

## 2024-05-30 PROCEDURE — 99204 OFFICE O/P NEW MOD 45 MIN: CPT | Performed by: STUDENT IN AN ORGANIZED HEALTH CARE EDUCATION/TRAINING PROGRAM
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
## 2024-06-06 ENCOUNTER — OFFICE VISIT (OUTPATIENT)
Dept: BARIATRICS/WEIGHT MGMT | Age: 54
End: 2024-06-06
Payer: MEDICAID

## 2024-06-06 DIAGNOSIS — F50.9 COMPULSIVE EATING PATTERNS: Primary | ICD-10-CM

## 2024-06-06 PROCEDURE — 90837 PSYTX W PT 60 MINUTES: CPT | Performed by: SOCIAL WORKER

## 2024-06-06 NOTE — PROGRESS NOTES
INDIVIDUAL SESSION:  SUMMARY/PSYCH CLEARANCE     China Damon is a 53 y.o. ,   female, referred by Primary Care Provider  for evaluation and treatment.  Patient identify verified by Name and .       Those attending session : patient    DX:   Encounter Diagnosis   Name Primary?    Compulsive eating patterns Yes       Chief Complaint   Patient presents with    Follow-up         Wt Readings from Last 3 Encounters:   24 122.9 kg (271 lb)   24 123.3 kg (271 lb 12.8 oz)   24 124.3 kg (274 lb)          Narrative: China stated that she did complete the homework without any problems.  She was able to identify problems with emotional eating patterns including that mindless eating has led to over eating at times.  She also stated that she does crave carbs at time and financial problems have caused some food insecurity. She was also able to identify things that triggers cravings for food. She shared that smell, things she sees, and places are triggers. China stated that she has made some progress with compulsive eating patterns and reported making the following changes: China stated that she is more mindful about what hand how much she is eating since she has been keeping a food journal.  She stated that she uses self talk when she is having cravings.  She also stated that if she is bored she reaches out to friends, reads books or finds other productive things to do to distract herself. China stated that she has a good support system, her parents, children and her friends.        Mental Status Exam: appearance:  appropriately dressed and appropriately groomed, behavior:  normal, attitude:  cooperative, speech:  appropriate, mood:  euthymic, affect:  congruent with mood, thought content:  no evidence of psychosis, thought process:  logical and coherent, orientation:  oriented in all spheres, memory:  recent:  good and remote:  good, insight:  fair , judgment:  fair , and cognitive:  intact

## 2024-06-06 NOTE — PATIENT INSTRUCTIONS
Assignments/Recommendations: Follow-up with SW as needed.  Attend [facility] support group. Follow up with all referrals/present providers/all scheduled appointment at [facility].

## 2024-06-17 ENCOUNTER — TELEPHONE (OUTPATIENT)
Dept: SLEEP CENTER | Age: 54
End: 2024-06-17

## 2024-06-20 ENCOUNTER — HOSPITAL ENCOUNTER (OUTPATIENT)
Age: 54
Discharge: HOME | End: 2024-06-20
Payer: MEDICAID

## 2024-06-20 LAB
ALBUMIN SERPL-MCNC: 4.4 G/DL (ref 3.5–5.2)
ALP SERPL-CCNC: 85 U/L (ref 35–104)
ALT SERPL-CCNC: 17 U/L (ref 0–32)
ANION GAP SERPL CALCULATED.3IONS-SCNC: 10 MMOL/L (ref 7–16)
AST SERPL-CCNC: 13 U/L (ref 0–31)
BASOPHILS # BLD: 0.03 K/UL (ref 0–0.2)
BASOPHILS NFR BLD: 0 % (ref 0–2)
BILIRUB SERPL-MCNC: <0.2 MG/DL (ref 0–1.2)
BUN SERPL-MCNC: 16 MG/DL (ref 6–20)
CALCIUM SERPL-MCNC: 9.9 MG/DL (ref 8.6–10.2)
CHLORIDE SERPL-SCNC: 104 MMOL/L (ref 98–107)
CO2 SERPL-SCNC: 28 MMOL/L (ref 22–29)
CREAT SERPL-MCNC: 1.2 MG/DL (ref 0.5–1)
EOSINOPHIL # BLD: 0.2 K/UL (ref 0.05–0.5)
EOSINOPHILS RELATIVE PERCENT: 2 % (ref 0–6)
ERYTHROCYTE [DISTWIDTH] IN BLOOD BY AUTOMATED COUNT: 15 % (ref 11.5–15)
GFR, ESTIMATED: 55 ML/MIN/1.73M2
GLUCOSE SERPL-MCNC: 88 MG/DL (ref 74–99)
HCT VFR BLD AUTO: 41.5 % (ref 34–48)
HGB BLD-MCNC: 13.2 G/DL (ref 11.5–15.5)
IMM GRANULOCYTES # BLD AUTO: 0.05 K/UL (ref 0–0.58)
IMM GRANULOCYTES NFR BLD: 1 % (ref 0–5)
LYMPHOCYTES NFR BLD: 1.56 K/UL (ref 1.5–4)
LYMPHOCYTES RELATIVE PERCENT: 16 % (ref 20–42)
MCH RBC QN AUTO: 29.7 PG (ref 26–35)
MCHC RBC AUTO-ENTMCNC: 31.8 G/DL (ref 32–34.5)
MCV RBC AUTO: 93.3 FL (ref 80–99.9)
MONOCYTES NFR BLD: 0.58 K/UL (ref 0.1–0.95)
MONOCYTES NFR BLD: 6 % (ref 2–12)
NEUTROPHILS NFR BLD: 76 % (ref 43–80)
NEUTS SEG NFR BLD: 7.61 K/UL (ref 1.8–7.3)
PLATELET # BLD AUTO: 281 K/UL (ref 130–450)
PMV BLD AUTO: 10.7 FL (ref 7–12)
POTASSIUM SERPL-SCNC: 4.9 MMOL/L (ref 3.5–5)
PROT SERPL-MCNC: 7.7 G/DL (ref 6.4–8.3)
RBC # BLD AUTO: 4.45 M/UL (ref 3.5–5.5)
SODIUM SERPL-SCNC: 142 MMOL/L (ref 132–146)
WBC OTHER # BLD: 10 K/UL (ref 4.5–11.5)

## 2024-06-20 PROCEDURE — 80053 COMPREHEN METABOLIC PANEL: CPT

## 2024-06-20 PROCEDURE — 36415 COLL VENOUS BLD VENIPUNCTURE: CPT

## 2024-06-20 PROCEDURE — 85025 COMPLETE CBC W/AUTO DIFF WBC: CPT

## 2024-07-11 ENCOUNTER — OFFICE VISIT (OUTPATIENT)
Dept: PAIN MANAGEMENT | Age: 54
End: 2024-07-11
Payer: MEDICAID

## 2024-07-11 VITALS
HEIGHT: 62 IN | OXYGEN SATURATION: 97 % | WEIGHT: 271 LBS | BODY MASS INDEX: 49.87 KG/M2 | RESPIRATION RATE: 16 BRPM | DIASTOLIC BLOOD PRESSURE: 68 MMHG | TEMPERATURE: 97.2 F | SYSTOLIC BLOOD PRESSURE: 116 MMHG | HEART RATE: 85 BPM

## 2024-07-11 DIAGNOSIS — M43.16 SPONDYLOLISTHESIS OF LUMBAR REGION: ICD-10-CM

## 2024-07-11 DIAGNOSIS — G89.29 CHRONIC BILATERAL LOW BACK PAIN WITHOUT SCIATICA: Primary | ICD-10-CM

## 2024-07-11 DIAGNOSIS — G89.29 CHRONIC PAIN OF LEFT ANKLE: ICD-10-CM

## 2024-07-11 DIAGNOSIS — M51.36 LUMBAR DEGENERATIVE DISC DISEASE: ICD-10-CM

## 2024-07-11 DIAGNOSIS — Z98.1 S/P ANKLE FUSION: ICD-10-CM

## 2024-07-11 DIAGNOSIS — M25.572 CHRONIC PAIN OF LEFT ANKLE: ICD-10-CM

## 2024-07-11 DIAGNOSIS — M54.50 CHRONIC BILATERAL LOW BACK PAIN WITHOUT SCIATICA: Primary | ICD-10-CM

## 2024-07-11 DIAGNOSIS — G62.9 NEUROPATHY: ICD-10-CM

## 2024-07-11 DIAGNOSIS — M47.816 LUMBAR SPONDYLOSIS: ICD-10-CM

## 2024-07-11 PROCEDURE — 99214 OFFICE O/P EST MOD 30 MIN: CPT | Performed by: STUDENT IN AN ORGANIZED HEALTH CARE EDUCATION/TRAINING PROGRAM

## 2024-07-11 NOTE — PROGRESS NOTES
China Damon presents to the [facility] on 7/11/2024. China is complaining of pain in her low back and left ankle. The pain is constant. The pain is described as aching, dull, and sharp. Pain is rated on her best day at a 5, on her worst day at a 10, and on average at a 7 on the VAS scale. She took her last dose of Lyrica today.     Any procedures since your last visit: No    Pacemaker or defibrillator: No     She is not on NSAIDS and is  on anticoagulation medications to include ASA and is managed by Pancho Goncalves DO  .     Medication Contract and Consent for Opioid Use Documents Filed        No documents found                    /68   Pulse 85   Temp 97.2 °F (36.2 °C) (Infrared)   Resp 16   Ht 1.575 m (5' 2\")   Wt 122.9 kg (271 lb)   SpO2 97%   BMI 49.57 kg/m²      No LMP recorded. Patient has had a hysterectomy.

## 2024-07-11 NOTE — PROGRESS NOTES
[facility] - Pain Medicine  [address] [place] [address]    Pain Medicine Follow Up Note      China Damon     Date of Visit:  7/11/2024    CC:  Patient presents for follow up   Chief Complaint   Patient presents with    Follow-up     Low back and left ankle pain        HPI:    Pain is worse.  Medication side effects:none.   Recent interventional procedures:none. .    Blood Thinners/Anticoagulation:  yes - Eliquis   Herbal Supplements: no  Pertinent Allergies: yes - ibuprofen, morphine, nsaids  Diabetic: yes - DM2 A1C 7.5  Bowel/Bladder Incontinence: no    Previous Plan:  HEP / PT  XR L Spine - done  Consider cymbalta   Has appt with psych next month  Ulnar transposition surgery  Left side done  Planning for right side    Interval Changes:  Recent Left ulnar Transposition - [place] June 25, 2024  Some issue with drainage   On Abx and Oxycodone   Arm pain bothering her more than back    Procedures:        Previous Treatments:   ylenol, Lyrica, gabapentin, opioids, baclofen, prednisone, Latuda, BuSpar, physical therapy, SYED?     Potential Aberrant Drug-Related Behavior:  no      Imaging/Studies: New: yes,       XRAY:  XR  Lumbar 5/30/24  IMPRESSION:  1. Degenerative disc disease from L2-3 through L4-5.  2. Grade 1 anterior subluxation of L4 with respect L5.      MRI:  MRI L-spine 2012  Impression- No severe spinal stenosis all levels with minimal disc   bulging L2-L3. Disc bulging at L3-L4 is asymmetrically greater to the   right of midline could be causing slight impingement on the intrathecal   L4 nerve root.  Patent foramen bilaterally all levels. Degenerative   disc disease of essentially all lumbar discs. No compression fractures.   4 cm cystic lesion right side of pelvis likely ovarian and sonography   can clarify.           CT:      EMG:    Pertinent Labs:   Lab Results   Component Value Date/Time    BUN 16 06/20/2024 03:34 PM    CREATININE 1.2 06/20/2024 03:34 PM

## 2024-07-14 ENCOUNTER — HOSPITAL ENCOUNTER (OUTPATIENT)
Dept: SLEEP CENTER | Age: 54
Discharge: HOME | End: 2024-07-14
Payer: MEDICAID

## 2024-07-14 DIAGNOSIS — G47.33 OSA (OBSTRUCTIVE SLEEP APNEA): Primary | ICD-10-CM

## 2024-07-14 PROCEDURE — 2700000000 HC OXYGEN THERAPY PER DAY

## 2024-07-14 PROCEDURE — 95811 POLYSOM 6/>YRS CPAP 4/> PARM: CPT

## 2024-07-18 ENCOUNTER — TELEPHONE (OUTPATIENT)
Dept: BARIATRICS/WEIGHT MGMT | Age: 54
End: 2024-07-18

## 2024-07-18 NOTE — TELEPHONE ENCOUNTER
Called to check on smoking status, she has quit around 6/8/24 and having sx on arm 8/16. She will test first of sept and then I can submit.

## 2024-08-05 RX ORDER — DULOXETIN HYDROCHLORIDE 20 MG/1
20 CAPSULE, DELAYED RELEASE ORAL DAILY
Qty: 30 CAPSULE | Refills: 0 | OUTPATIENT
Start: 2024-08-05 | End: 2024-09-04

## 2024-08-12 ENCOUNTER — TELEPHONE (OUTPATIENT)
Dept: BARIATRICS/WEIGHT MGMT | Age: 54
End: 2024-08-12

## 2024-08-12 RX ORDER — DULOXETIN HYDROCHLORIDE 20 MG/1
20 CAPSULE, DELAYED RELEASE ORAL DAILY
Qty: 30 CAPSULE | Refills: 0 | OUTPATIENT
Start: 2024-08-12

## 2024-08-12 NOTE — TELEPHONE ENCOUNTER
LM for patient asking her to call about the status of her smoking and if she is able to test at this time.

## 2024-12-30 ENCOUNTER — HOSPITAL ENCOUNTER (OUTPATIENT)
Dept: MRI IMAGING | Age: 54
Discharge: HOME | End: 2025-01-01

## 2024-12-30 DIAGNOSIS — D32.9 MENINGIOMA (HCC): ICD-10-CM

## 2025-01-30 ENCOUNTER — HOSPITAL ENCOUNTER (OUTPATIENT)
Dept: MRI IMAGING | Age: 55
Discharge: HOME | End: 2025-02-01
Payer: MEDICAID

## 2025-01-30 PROCEDURE — 6360000004 HC RX CONTRAST MEDICATION: Performed by: RADIOLOGY

## 2025-01-30 PROCEDURE — 70553 MRI BRAIN STEM W/O & W/DYE: CPT

## 2025-01-30 PROCEDURE — A9577 INJ MULTIHANCE: HCPCS | Performed by: RADIOLOGY

## 2025-01-30 RX ADMIN — GADOBENATE DIMEGLUMINE 20 ML: 529 INJECTION, SOLUTION INTRAVENOUS at 12:00

## (undated) DEVICE — MARKER,SKIN,WI/RULER AND LABELS: Brand: MEDLINE

## (undated) DEVICE — AGENT HEMSTAT 3GM OXIDIZED REGENERATED CELOS ABSRB FOR CONT (ORDER MULTIPLES OF 5EA)

## (undated) DEVICE — GOWN,SIRUS,FABRNF,XL,20/CS: Brand: MEDLINE

## (undated) DEVICE — PACK SURG LAP CHOLE CUSTOM

## (undated) DEVICE — GLOVE ORANGE PI 7 1/2   MSG9075

## (undated) DEVICE — Device

## (undated) DEVICE — TIP COVER ACCESSORY

## (undated) DEVICE — SYRINGE IRRIG 60ML SFT PLIABLE BLB EZ TO GRP 1 HND USE W/

## (undated) DEVICE — DRILL, AO, STERILE

## (undated) DEVICE — YANKAUER,BULB TIP,W/O VENT,RIGID,STERILE: Brand: MEDLINE

## (undated) DEVICE — 6 X 9  1.75MIL 4-WALL LABGUARD: Brand: MINIGRIP COMMERCIAL LLC

## (undated) DEVICE — BANDAGE ELASTIC COMPRSS ESMRK 4.0INX3.0YD

## (undated) DEVICE — CANNULA SEAL

## (undated) DEVICE — GUIDE WIRE, SMOOTH-TIPPED, STERILE

## (undated) DEVICE — BANDAGE COMPR W4INXL5YD WHT BGE POLY COT M E WRP WV HK AND

## (undated) DEVICE — STRIP,CLOSURE,WOUND,MEDI-STRIP,1/4X3: Brand: MEDLINE

## (undated) DEVICE — SPONGE GZ 4IN 4IN 4 PLY N WVN AVANT

## (undated) DEVICE — PADDING,UNDERCAST,COTTON, 4"X4YD STERILE: Brand: MEDLINE

## (undated) DEVICE — DOUBLE BASIN SET: Brand: MEDLINE INDUSTRIES, INC.

## (undated) DEVICE — SET INST DAVINCI XI ACCESSORIES

## (undated) DEVICE — PROGRASP FORCEPS: Brand: ENDOWRIST

## (undated) DEVICE — STRYKER PERFORMANCE SERIES SAGITTAL BLADE: Brand: STRYKER PERFORMANCE SERIES

## (undated) DEVICE — GAUZE,SPONGE,4"X4",16PLY,STRL,LF,10/TRAY: Brand: MEDLINE

## (undated) DEVICE — CONTAINER SPEC COLL 960ML POLYPR TRIANG GRAD INTAKE/OUTPUT

## (undated) DEVICE — CONTAINER SPEC 480ML CLR POLYSTYR 10% NEUT BUFF FRMLN ZN

## (undated) DEVICE — GOWN ISOLATN REG YEL M WT MULTIPLY SIDETIE LEV 2

## (undated) DEVICE — DRESSING GZ W1XL8IN COT XRFRM N ADH OVERWRAP CURAD

## (undated) DEVICE — CLOTH SURG PREP PREOPERATIVE CHLORHEXIDINE GLUC 2% READYPREP

## (undated) DEVICE — 3M™ STERI-DRAPE™ U-DRAPE 1015: Brand: STERI-DRAPE™

## (undated) DEVICE — GARMENT,MEDLINE,DVT,INT,CALF,MED, GEN2: Brand: MEDLINE

## (undated) DEVICE — NEEDLE HYPO 25GA L1.5IN BLU POLYPR HUB S STL REG BVL STR

## (undated) DEVICE — MASK,FACE,MAXFLUIDPROTECT,SHIELD/ERLPS: Brand: MEDLINE

## (undated) DEVICE — BLOCK BITE 60FR CAREGUARD

## (undated) DEVICE — KIT BEDSIDE REVITAL OX 500ML

## (undated) DEVICE — COVER,LIGHT HANDLE,FLX,1/PK: Brand: MEDLINE INDUSTRIES, INC.

## (undated) DEVICE — ELECTRODE PT RET AD L9FT HI MOIST COND ADH HYDRGEL CORDED

## (undated) DEVICE — STEPDRILL: Brand: T2

## (undated) DEVICE — SCOPE DAVINCI XI 30 DEG W/CORD

## (undated) DEVICE — BANDAGE COMPR W6INXL12FT SMOOTH FOR LIMB EXSANG ESMARCH

## (undated) DEVICE — SHEET,DRAPE,40X58,STERILE: Brand: MEDLINE

## (undated) DEVICE — SHEET DRAPE FULL 70X100

## (undated) DEVICE — BANDAGE E W6INXL11YD CLP CLSR DBL LEN FLEX-MASTER

## (undated) DEVICE — SET MAJOR INSTR HOUSE

## (undated) DEVICE — SCISSORS SURG DIA8MM MPLR CRV ENDOWRIST

## (undated) DEVICE — K-WIRE, STERILE

## (undated) DEVICE — MICRO TIP WIPE: Brand: DEVON

## (undated) DEVICE — VALVE SUCTION AIR H2O HYDR H2O JET CONN STRL ORCA POD + DISP

## (undated) DEVICE — KENDALL 450 SERIES MONITORING FOAM ELECTRODE - RECTANGULAR SHAPE ( 3/PK): Brand: KENDALL

## (undated) DEVICE — SUTURE V-LOC 180 SZ 0 L9IN ABSRB GRN GS-21 L37MM 1/2 CIR VLOCL0346

## (undated) DEVICE — SURGICEL ENDOSCP APPL

## (undated) DEVICE — INSUFFLATION NEEDLE TO ESTABLISH PNEUMOPERITONEUM.: Brand: INSUFFLATION NEEDLE

## (undated) DEVICE — SYRINGE MED 10ML TRNSLUC BRL PLUNG BLK MRK POLYPR CTRL

## (undated) DEVICE — GOWN,SIRUS,POLYRNF,BRTHSLV,XLN/XL,20/CS: Brand: MEDLINE

## (undated) DEVICE — ADAPTER CLEANING PORPOISE CLEANING

## (undated) DEVICE — TUBING, SUCTION, 1/4" X 10', STRAIGHT: Brand: MEDLINE

## (undated) DEVICE — INTENDED FOR TISSUE SEPARATION, AND OTHER PROCEDURES THAT REQUIRE A SHARP SURGICAL BLADE TO PUNCTURE OR CUT.: Brand: BARD-PARKER ® STAINLESS STEEL BLADES

## (undated) DEVICE — ELECTRO LUBE IS A SINGLE PATIENT USE DEVICE THAT IS INTENDED TO BE USED ON ELECTROSURGICAL ELECTRODES TO REDUCE STICKING.: Brand: KEY SURGICAL ELECTRO LUBE

## (undated) DEVICE — GOWN,SIRUS,NONRNF,SETINSLV,XL,20/CS: Brand: MEDLINE

## (undated) DEVICE — REAMER SHAFT, MOD.TRINKLE: Brand: BIXCUT

## (undated) DEVICE — LUBRICANT SURG JELLY ST BACTER TUBE 4.25OZ

## (undated) DEVICE — DRAPE 64X41IN RADIOLOGY C ARM EQUIP STER

## (undated) DEVICE — TOWEL,OR,DSP,ST,BLUE,STD,6/PK,12PK/CS: Brand: MEDLINE

## (undated) DEVICE — DRAIN SURG 15FR L3/16IN DIA4.7MM SIL CHN RND HUBLESS FULL

## (undated) DEVICE — SUCTION IRRIGATOR: Brand: ENDOWRIST

## (undated) DEVICE — BANDAGE COMPR W6INXL5YD SELF ADH COHESIVE CO FLX

## (undated) DEVICE — COLUMN DRAPE

## (undated) DEVICE — APPLICATOR MEDICATED 26 CC SOLUTION HI LT ORNG CHLORAPREP

## (undated) DEVICE — PACK,EXTREMITY,ORTHOMAX,5/CS: Brand: MEDLINE

## (undated) DEVICE — NDL CNTR 40CT FM MAG: Brand: MEDLINE INDUSTRIES, INC.

## (undated) DEVICE — WARMER SCP LAP

## (undated) DEVICE — ARM DRAPE

## (undated) DEVICE — SPONGE,LAP,12"X12",XR,ST,5/PK,40PK/CS: Brand: MEDLINE

## (undated) DEVICE — SOLUTION IRRIG 1000ML 0.9% SOD CHL USP POUR PLAS BTL

## (undated) DEVICE — GLOVE ORTHO 7   MSG9470

## (undated) DEVICE — FORCEPS BX L240CM JAW DIA2.8MM L CAP W/ NDL MIC MESH TOOTH